# Patient Record
Sex: MALE | Race: WHITE | NOT HISPANIC OR LATINO | URBAN - METROPOLITAN AREA
[De-identification: names, ages, dates, MRNs, and addresses within clinical notes are randomized per-mention and may not be internally consistent; named-entity substitution may affect disease eponyms.]

---

## 2017-02-21 ENCOUNTER — OUTPATIENT (OUTPATIENT)
Dept: OUTPATIENT SERVICES | Facility: HOSPITAL | Age: 77
LOS: 1 days | Discharge: HOME | End: 2017-02-21

## 2017-03-31 ENCOUNTER — RECORD ABSTRACTING (OUTPATIENT)
Age: 77
End: 2017-03-31

## 2017-03-31 DIAGNOSIS — Z86.718 PERSONAL HISTORY OF OTHER VENOUS THROMBOSIS AND EMBOLISM: ICD-10-CM

## 2017-04-06 ENCOUNTER — APPOINTMENT (OUTPATIENT)
Dept: VASCULAR SURGERY | Facility: CLINIC | Age: 77
End: 2017-04-06

## 2017-04-17 ENCOUNTER — APPOINTMENT (OUTPATIENT)
Dept: VASCULAR SURGERY | Facility: CLINIC | Age: 77
End: 2017-04-17

## 2017-04-17 VITALS
WEIGHT: 292 LBS | HEIGHT: 75 IN | BODY MASS INDEX: 36.31 KG/M2 | DIASTOLIC BLOOD PRESSURE: 60 MMHG | SYSTOLIC BLOOD PRESSURE: 116 MMHG

## 2017-04-18 ENCOUNTER — APPOINTMENT (OUTPATIENT)
Dept: SURGERY | Facility: CLINIC | Age: 77
End: 2017-04-18

## 2017-04-24 ENCOUNTER — APPOINTMENT (OUTPATIENT)
Dept: VASCULAR SURGERY | Facility: CLINIC | Age: 77
End: 2017-04-24

## 2017-04-28 ENCOUNTER — APPOINTMENT (OUTPATIENT)
Dept: VASCULAR SURGERY | Facility: CLINIC | Age: 77
End: 2017-04-28

## 2017-05-03 ENCOUNTER — APPOINTMENT (OUTPATIENT)
Dept: VASCULAR SURGERY | Facility: CLINIC | Age: 77
End: 2017-05-03

## 2017-05-03 ENCOUNTER — APPOINTMENT (OUTPATIENT)
Dept: CARDIOLOGY | Facility: CLINIC | Age: 77
End: 2017-05-03

## 2017-05-03 VITALS
BODY MASS INDEX: 35.56 KG/M2 | HEIGHT: 75 IN | SYSTOLIC BLOOD PRESSURE: 130 MMHG | DIASTOLIC BLOOD PRESSURE: 60 MMHG | HEART RATE: 59 BPM | WEIGHT: 286 LBS

## 2017-05-09 ENCOUNTER — APPOINTMENT (OUTPATIENT)
Dept: VASCULAR SURGERY | Facility: CLINIC | Age: 77
End: 2017-05-09

## 2017-05-09 DIAGNOSIS — I83.023 VARICOSE VEINS OF LEFT LOWER EXTREMITY WITH ULCER OF ANKLE: ICD-10-CM

## 2017-05-12 ENCOUNTER — RECORD ABSTRACTING (OUTPATIENT)
Age: 77
End: 2017-05-12

## 2017-05-12 DIAGNOSIS — Z87.39 PERSONAL HISTORY OF OTHER DISEASES OF THE MUSCULOSKELETAL SYSTEM AND CONNECTIVE TISSUE: ICD-10-CM

## 2017-05-12 DIAGNOSIS — Z82.49 FAMILY HISTORY OF ISCHEMIC HEART DISEASE AND OTHER DISEASES OF THE CIRCULATORY SYSTEM: ICD-10-CM

## 2017-05-12 DIAGNOSIS — H54.7 UNSPECIFIED VISUAL LOSS: ICD-10-CM

## 2017-05-18 ENCOUNTER — APPOINTMENT (OUTPATIENT)
Dept: SURGERY | Facility: CLINIC | Age: 77
End: 2017-05-18

## 2017-05-23 ENCOUNTER — APPOINTMENT (OUTPATIENT)
Dept: VASCULAR SURGERY | Facility: CLINIC | Age: 77
End: 2017-05-23

## 2017-05-23 ENCOUNTER — APPOINTMENT (OUTPATIENT)
Dept: SURGERY | Facility: CLINIC | Age: 77
End: 2017-05-23

## 2017-05-23 VITALS
WEIGHT: 287 LBS | HEIGHT: 75 IN | BODY MASS INDEX: 35.68 KG/M2 | DIASTOLIC BLOOD PRESSURE: 60 MMHG | SYSTOLIC BLOOD PRESSURE: 140 MMHG

## 2017-06-13 ENCOUNTER — OUTPATIENT (OUTPATIENT)
Dept: OUTPATIENT SERVICES | Facility: HOSPITAL | Age: 77
LOS: 1 days | Discharge: HOME | End: 2017-06-13

## 2017-06-13 DIAGNOSIS — I50.9 HEART FAILURE, UNSPECIFIED: ICD-10-CM

## 2017-06-13 DIAGNOSIS — I48.2 CHRONIC ATRIAL FIBRILLATION: ICD-10-CM

## 2017-06-13 DIAGNOSIS — R07.9 CHEST PAIN, UNSPECIFIED: ICD-10-CM

## 2017-06-13 DIAGNOSIS — M17.10 UNILATERAL PRIMARY OSTEOARTHRITIS, UNSPECIFIED KNEE: ICD-10-CM

## 2017-06-13 DIAGNOSIS — J18.9 PNEUMONIA, UNSPECIFIED ORGANISM: ICD-10-CM

## 2017-06-14 ENCOUNTER — MEDICATION RENEWAL (OUTPATIENT)
Age: 77
End: 2017-06-14

## 2017-06-22 ENCOUNTER — MEDICATION RENEWAL (OUTPATIENT)
Age: 77
End: 2017-06-22

## 2017-06-22 ENCOUNTER — INPATIENT (INPATIENT)
Facility: HOSPITAL | Age: 77
LOS: 5 days | Discharge: HOME | End: 2017-06-28
Attending: INTERNAL MEDICINE | Admitting: INTERNAL MEDICINE

## 2017-06-22 DIAGNOSIS — I48.2 CHRONIC ATRIAL FIBRILLATION: ICD-10-CM

## 2017-06-22 DIAGNOSIS — M17.10 UNILATERAL PRIMARY OSTEOARTHRITIS, UNSPECIFIED KNEE: ICD-10-CM

## 2017-06-22 DIAGNOSIS — J18.9 PNEUMONIA, UNSPECIFIED ORGANISM: ICD-10-CM

## 2017-06-22 DIAGNOSIS — R07.9 CHEST PAIN, UNSPECIFIED: ICD-10-CM

## 2017-06-22 DIAGNOSIS — I50.9 HEART FAILURE, UNSPECIFIED: ICD-10-CM

## 2017-06-28 DIAGNOSIS — Z79.01 LONG TERM (CURRENT) USE OF ANTICOAGULANTS: ICD-10-CM

## 2017-06-28 DIAGNOSIS — I82.91 CHRONIC EMBOLISM AND THROMBOSIS OF UNSPECIFIED VEIN: ICD-10-CM

## 2017-07-06 ENCOUNTER — OUTPATIENT (OUTPATIENT)
Dept: OUTPATIENT SERVICES | Facility: HOSPITAL | Age: 77
LOS: 1 days | Discharge: HOME | End: 2017-07-06

## 2017-07-06 ENCOUNTER — APPOINTMENT (OUTPATIENT)
Dept: CARDIOLOGY | Facility: CLINIC | Age: 77
End: 2017-07-06

## 2017-07-06 VITALS
DIASTOLIC BLOOD PRESSURE: 50 MMHG | HEART RATE: 49 BPM | HEIGHT: 75 IN | BODY MASS INDEX: 33.69 KG/M2 | SYSTOLIC BLOOD PRESSURE: 102 MMHG | WEIGHT: 271 LBS

## 2017-07-06 DIAGNOSIS — I50.9 HEART FAILURE, UNSPECIFIED: ICD-10-CM

## 2017-07-06 DIAGNOSIS — I82.91 CHRONIC EMBOLISM AND THROMBOSIS OF UNSPECIFIED VEIN: ICD-10-CM

## 2017-07-06 DIAGNOSIS — M17.10 UNILATERAL PRIMARY OSTEOARTHRITIS, UNSPECIFIED KNEE: ICD-10-CM

## 2017-07-06 DIAGNOSIS — J18.9 PNEUMONIA, UNSPECIFIED ORGANISM: ICD-10-CM

## 2017-07-06 DIAGNOSIS — Z79.01 LONG TERM (CURRENT) USE OF ANTICOAGULANTS: ICD-10-CM

## 2017-07-06 DIAGNOSIS — I24.8 OTHER FORMS OF ACUTE ISCHEMIC HEART DISEASE: ICD-10-CM

## 2017-07-06 DIAGNOSIS — R07.9 CHEST PAIN, UNSPECIFIED: ICD-10-CM

## 2017-07-06 DIAGNOSIS — I48.2 CHRONIC ATRIAL FIBRILLATION: ICD-10-CM

## 2017-07-06 RX ORDER — ALBUTEROL SULFATE 90 UG/1
108 (90 BASE) AEROSOL, METERED RESPIRATORY (INHALATION)
Qty: 9 | Refills: 0 | Status: ACTIVE | COMMUNITY
Start: 2017-06-28

## 2017-07-12 DIAGNOSIS — E66.9 OBESITY, UNSPECIFIED: ICD-10-CM

## 2017-07-12 DIAGNOSIS — I48.2 CHRONIC ATRIAL FIBRILLATION: ICD-10-CM

## 2017-07-12 DIAGNOSIS — I50.23 ACUTE ON CHRONIC SYSTOLIC (CONGESTIVE) HEART FAILURE: ICD-10-CM

## 2017-07-12 DIAGNOSIS — J44.1 CHRONIC OBSTRUCTIVE PULMONARY DISEASE WITH (ACUTE) EXACERBATION: ICD-10-CM

## 2017-07-12 DIAGNOSIS — I49.3 VENTRICULAR PREMATURE DEPOLARIZATION: ICD-10-CM

## 2017-07-12 DIAGNOSIS — Z72.0 TOBACCO USE: ICD-10-CM

## 2017-07-12 DIAGNOSIS — Z79.01 LONG TERM (CURRENT) USE OF ANTICOAGULANTS: ICD-10-CM

## 2017-07-12 DIAGNOSIS — E78.5 HYPERLIPIDEMIA, UNSPECIFIED: ICD-10-CM

## 2017-07-12 DIAGNOSIS — I11.0 HYPERTENSIVE HEART DISEASE WITH HEART FAILURE: ICD-10-CM

## 2017-07-12 DIAGNOSIS — J96.01 ACUTE RESPIRATORY FAILURE WITH HYPOXIA: ICD-10-CM

## 2017-07-12 DIAGNOSIS — I25.10 ATHEROSCLEROTIC HEART DISEASE OF NATIVE CORONARY ARTERY WITHOUT ANGINA PECTORIS: ICD-10-CM

## 2017-07-12 DIAGNOSIS — Z96.651 PRESENCE OF RIGHT ARTIFICIAL KNEE JOINT: ICD-10-CM

## 2017-07-14 ENCOUNTER — OUTPATIENT (OUTPATIENT)
Dept: OUTPATIENT SERVICES | Facility: HOSPITAL | Age: 77
LOS: 1 days | Discharge: HOME | End: 2017-07-14

## 2017-07-14 DIAGNOSIS — R07.9 CHEST PAIN, UNSPECIFIED: ICD-10-CM

## 2017-07-14 DIAGNOSIS — I50.9 HEART FAILURE, UNSPECIFIED: ICD-10-CM

## 2017-07-14 DIAGNOSIS — M17.10 UNILATERAL PRIMARY OSTEOARTHRITIS, UNSPECIFIED KNEE: ICD-10-CM

## 2017-07-14 DIAGNOSIS — J18.9 PNEUMONIA, UNSPECIFIED ORGANISM: ICD-10-CM

## 2017-07-14 DIAGNOSIS — I82.91 CHRONIC EMBOLISM AND THROMBOSIS OF UNSPECIFIED VEIN: ICD-10-CM

## 2017-07-14 DIAGNOSIS — Z79.01 LONG TERM (CURRENT) USE OF ANTICOAGULANTS: ICD-10-CM

## 2017-07-14 DIAGNOSIS — I48.2 CHRONIC ATRIAL FIBRILLATION: ICD-10-CM

## 2017-08-01 ENCOUNTER — APPOINTMENT (OUTPATIENT)
Dept: SURGERY | Facility: CLINIC | Age: 77
End: 2017-08-01
Payer: MEDICARE

## 2017-08-01 ENCOUNTER — MEDICATION RENEWAL (OUTPATIENT)
Age: 77
End: 2017-08-01

## 2017-08-01 VITALS
WEIGHT: 271 LBS | HEIGHT: 75 IN | BODY MASS INDEX: 33.69 KG/M2 | DIASTOLIC BLOOD PRESSURE: 60 MMHG | HEART RATE: 91 BPM | SYSTOLIC BLOOD PRESSURE: 110 MMHG | OXYGEN SATURATION: 71 %

## 2017-08-01 DIAGNOSIS — E78.2 MIXED HYPERLIPIDEMIA: ICD-10-CM

## 2017-08-01 PROCEDURE — 99213 OFFICE O/P EST LOW 20 MIN: CPT

## 2017-08-10 ENCOUNTER — APPOINTMENT (OUTPATIENT)
Dept: CARDIOLOGY | Facility: CLINIC | Age: 77
End: 2017-08-10

## 2017-08-10 VITALS
HEIGHT: 75 IN | BODY MASS INDEX: 33.07 KG/M2 | SYSTOLIC BLOOD PRESSURE: 86 MMHG | WEIGHT: 266 LBS | DIASTOLIC BLOOD PRESSURE: 62 MMHG

## 2017-08-14 ENCOUNTER — APPOINTMENT (OUTPATIENT)
Dept: VASCULAR SURGERY | Facility: CLINIC | Age: 77
End: 2017-08-14

## 2017-08-16 ENCOUNTER — MEDICATION RENEWAL (OUTPATIENT)
Age: 77
End: 2017-08-16

## 2017-09-06 ENCOUNTER — APPOINTMENT (OUTPATIENT)
Dept: CARDIOLOGY | Facility: CLINIC | Age: 77
End: 2017-09-06

## 2017-09-08 ENCOUNTER — OUTPATIENT (OUTPATIENT)
Dept: OUTPATIENT SERVICES | Facility: HOSPITAL | Age: 77
LOS: 1 days | Discharge: HOME | End: 2017-09-08

## 2017-09-08 DIAGNOSIS — I48.2 CHRONIC ATRIAL FIBRILLATION: ICD-10-CM

## 2017-09-08 DIAGNOSIS — J18.9 PNEUMONIA, UNSPECIFIED ORGANISM: ICD-10-CM

## 2017-09-08 DIAGNOSIS — Z79.01 LONG TERM (CURRENT) USE OF ANTICOAGULANTS: ICD-10-CM

## 2017-09-08 DIAGNOSIS — R07.9 CHEST PAIN, UNSPECIFIED: ICD-10-CM

## 2017-09-08 DIAGNOSIS — I82.91 CHRONIC EMBOLISM AND THROMBOSIS OF UNSPECIFIED VEIN: ICD-10-CM

## 2017-09-08 DIAGNOSIS — I50.9 HEART FAILURE, UNSPECIFIED: ICD-10-CM

## 2017-09-08 DIAGNOSIS — M17.10 UNILATERAL PRIMARY OSTEOARTHRITIS, UNSPECIFIED KNEE: ICD-10-CM

## 2017-10-18 DIAGNOSIS — I82.91 CHRONIC EMBOLISM AND THROMBOSIS OF UNSPECIFIED VEIN: ICD-10-CM

## 2017-10-18 DIAGNOSIS — Z79.01 LONG TERM (CURRENT) USE OF ANTICOAGULANTS: ICD-10-CM

## 2017-11-03 ENCOUNTER — OUTPATIENT (OUTPATIENT)
Dept: OUTPATIENT SERVICES | Facility: HOSPITAL | Age: 77
LOS: 1 days | Discharge: HOME | End: 2017-11-03

## 2017-11-03 DIAGNOSIS — I82.91 CHRONIC EMBOLISM AND THROMBOSIS OF UNSPECIFIED VEIN: ICD-10-CM

## 2017-11-03 DIAGNOSIS — R07.9 CHEST PAIN, UNSPECIFIED: ICD-10-CM

## 2017-11-03 DIAGNOSIS — M17.10 UNILATERAL PRIMARY OSTEOARTHRITIS, UNSPECIFIED KNEE: ICD-10-CM

## 2017-11-03 DIAGNOSIS — J18.9 PNEUMONIA, UNSPECIFIED ORGANISM: ICD-10-CM

## 2017-11-03 DIAGNOSIS — I48.2 CHRONIC ATRIAL FIBRILLATION: ICD-10-CM

## 2017-11-03 DIAGNOSIS — I50.9 HEART FAILURE, UNSPECIFIED: ICD-10-CM

## 2017-11-03 DIAGNOSIS — Z79.01 LONG TERM (CURRENT) USE OF ANTICOAGULANTS: ICD-10-CM

## 2017-11-03 DIAGNOSIS — J44.1 CHRONIC OBSTRUCTIVE PULMONARY DISEASE WITH (ACUTE) EXACERBATION: ICD-10-CM

## 2017-11-16 ENCOUNTER — APPOINTMENT (OUTPATIENT)
Dept: CARDIOLOGY | Facility: CLINIC | Age: 77
End: 2017-11-16

## 2017-11-16 VITALS
SYSTOLIC BLOOD PRESSURE: 100 MMHG | BODY MASS INDEX: 34.57 KG/M2 | HEART RATE: 91 BPM | DIASTOLIC BLOOD PRESSURE: 64 MMHG | WEIGHT: 278 LBS | HEIGHT: 75 IN

## 2017-12-29 ENCOUNTER — OUTPATIENT (OUTPATIENT)
Dept: OUTPATIENT SERVICES | Facility: HOSPITAL | Age: 77
LOS: 1 days | Discharge: HOME | End: 2017-12-29

## 2017-12-29 DIAGNOSIS — I50.9 HEART FAILURE, UNSPECIFIED: ICD-10-CM

## 2017-12-29 DIAGNOSIS — J18.9 PNEUMONIA, UNSPECIFIED ORGANISM: ICD-10-CM

## 2017-12-29 DIAGNOSIS — I48.2 CHRONIC ATRIAL FIBRILLATION: ICD-10-CM

## 2017-12-29 DIAGNOSIS — R07.9 CHEST PAIN, UNSPECIFIED: ICD-10-CM

## 2017-12-29 DIAGNOSIS — M17.10 UNILATERAL PRIMARY OSTEOARTHRITIS, UNSPECIFIED KNEE: ICD-10-CM

## 2018-02-23 ENCOUNTER — OUTPATIENT (OUTPATIENT)
Dept: OUTPATIENT SERVICES | Facility: HOSPITAL | Age: 78
LOS: 1 days | Discharge: HOME | End: 2018-02-23

## 2018-02-23 DIAGNOSIS — Z79.01 LONG TERM (CURRENT) USE OF ANTICOAGULANTS: ICD-10-CM

## 2018-02-23 DIAGNOSIS — I82.91 CHRONIC EMBOLISM AND THROMBOSIS OF UNSPECIFIED VEIN: ICD-10-CM

## 2018-03-20 ENCOUNTER — MEDICATION RENEWAL (OUTPATIENT)
Age: 78
End: 2018-03-20

## 2018-03-29 ENCOUNTER — OUTPATIENT (OUTPATIENT)
Dept: OUTPATIENT SERVICES | Facility: HOSPITAL | Age: 78
LOS: 1 days | Discharge: HOME | End: 2018-03-29

## 2018-03-29 DIAGNOSIS — I50.9 HEART FAILURE, UNSPECIFIED: ICD-10-CM

## 2018-04-04 ENCOUNTER — APPOINTMENT (OUTPATIENT)
Dept: CARDIOLOGY | Facility: CLINIC | Age: 78
End: 2018-04-04

## 2018-04-04 VITALS
BODY MASS INDEX: 37.37 KG/M2 | HEART RATE: 51 BPM | DIASTOLIC BLOOD PRESSURE: 59 MMHG | WEIGHT: 299 LBS | SYSTOLIC BLOOD PRESSURE: 130 MMHG

## 2018-04-04 RX ORDER — CLOPIDOGREL BISULFATE 75 MG/1
75 TABLET, FILM COATED ORAL DAILY
Qty: 90 | Refills: 3 | Status: COMPLETED | COMMUNITY
End: 2018-04-04

## 2018-04-06 ENCOUNTER — APPOINTMENT (OUTPATIENT)
Dept: VASCULAR SURGERY | Facility: CLINIC | Age: 78
End: 2018-04-06
Payer: MEDICARE

## 2018-04-06 PROCEDURE — 29580 STRAPPING UNNA BOOT: CPT | Mod: RT

## 2018-04-10 RX ORDER — LISINOPRIL 10 MG/1
10 TABLET ORAL
Qty: 90 | Refills: 0 | Status: DISCONTINUED | COMMUNITY
Start: 2017-07-06 | End: 2018-04-10

## 2018-04-13 ENCOUNTER — APPOINTMENT (OUTPATIENT)
Dept: VASCULAR SURGERY | Facility: CLINIC | Age: 78
End: 2018-04-13
Payer: MEDICARE

## 2018-04-13 PROCEDURE — 29580 STRAPPING UNNA BOOT: CPT | Mod: RT

## 2018-04-16 ENCOUNTER — APPOINTMENT (OUTPATIENT)
Dept: CARDIOLOGY | Facility: CLINIC | Age: 78
End: 2018-04-16

## 2018-04-16 ENCOUNTER — APPOINTMENT (OUTPATIENT)
Dept: VASCULAR SURGERY | Facility: CLINIC | Age: 78
End: 2018-04-16
Payer: MEDICARE

## 2018-04-16 ENCOUNTER — INPATIENT (INPATIENT)
Facility: HOSPITAL | Age: 78
LOS: 3 days | Discharge: HOME HEALTH | End: 2018-04-20
Attending: INTERNAL MEDICINE | Admitting: INTERNAL MEDICINE

## 2018-04-16 VITALS
TEMPERATURE: 98 F | RESPIRATION RATE: 20 BRPM | DIASTOLIC BLOOD PRESSURE: 56 MMHG | HEART RATE: 56 BPM | OXYGEN SATURATION: 86 % | SYSTOLIC BLOOD PRESSURE: 124 MMHG

## 2018-04-16 VITALS
BODY MASS INDEX: 37.18 KG/M2 | SYSTOLIC BLOOD PRESSURE: 99 MMHG | HEART RATE: 53 BPM | DIASTOLIC BLOOD PRESSURE: 62 MMHG | HEIGHT: 75 IN | WEIGHT: 299 LBS

## 2018-04-16 DIAGNOSIS — T36.95XA ADVERSE EFFECT OF UNSPECIFIED SYSTEMIC ANTIBIOTIC, INITIAL ENCOUNTER: ICD-10-CM

## 2018-04-16 DIAGNOSIS — F17.210 NICOTINE DEPENDENCE, CIGARETTES, UNCOMPLICATED: ICD-10-CM

## 2018-04-16 DIAGNOSIS — E66.01 MORBID (SEVERE) OBESITY DUE TO EXCESS CALORIES: ICD-10-CM

## 2018-04-16 DIAGNOSIS — I27.29 OTHER SECONDARY PULMONARY HYPERTENSION: ICD-10-CM

## 2018-04-16 DIAGNOSIS — R79.1 ABNORMAL COAGULATION PROFILE: ICD-10-CM

## 2018-04-16 DIAGNOSIS — L03.115 CELLULITIS OF RIGHT LOWER LIMB: ICD-10-CM

## 2018-04-16 DIAGNOSIS — I48.91 UNSPECIFIED ATRIAL FIBRILLATION: ICD-10-CM

## 2018-04-16 DIAGNOSIS — E78.5 HYPERLIPIDEMIA, UNSPECIFIED: ICD-10-CM

## 2018-04-16 DIAGNOSIS — E11.622 TYPE 2 DIABETES MELLITUS WITH OTHER SKIN ULCER: ICD-10-CM

## 2018-04-16 DIAGNOSIS — I87.2 VENOUS INSUFFICIENCY (CHRONIC) (PERIPHERAL): ICD-10-CM

## 2018-04-16 DIAGNOSIS — Z91.11 PATIENT'S NONCOMPLIANCE WITH DIETARY REGIMEN: ICD-10-CM

## 2018-04-16 DIAGNOSIS — Z98.84 BARIATRIC SURGERY STATUS: ICD-10-CM

## 2018-04-16 DIAGNOSIS — J96.10 CHRONIC RESPIRATORY FAILURE, UNSPECIFIED WHETHER WITH HYPOXIA OR HYPERCAPNIA: ICD-10-CM

## 2018-04-16 DIAGNOSIS — I25.10 ATHEROSCLEROTIC HEART DISEASE OF NATIVE CORONARY ARTERY WITHOUT ANGINA PECTORIS: ICD-10-CM

## 2018-04-16 DIAGNOSIS — L97.919 NON-PRESSURE CHRONIC ULCER OF UNSPECIFIED PART OF RIGHT LOWER LEG WITH UNSPECIFIED SEVERITY: ICD-10-CM

## 2018-04-16 DIAGNOSIS — J44.9 CHRONIC OBSTRUCTIVE PULMONARY DISEASE, UNSPECIFIED: ICD-10-CM

## 2018-04-16 DIAGNOSIS — I50.9 HEART FAILURE, UNSPECIFIED: ICD-10-CM

## 2018-04-16 DIAGNOSIS — Z99.81 DEPENDENCE ON SUPPLEMENTAL OXYGEN: ICD-10-CM

## 2018-04-16 DIAGNOSIS — Z95.5 PRESENCE OF CORONARY ANGIOPLASTY IMPLANT AND GRAFT: Chronic | ICD-10-CM

## 2018-04-16 DIAGNOSIS — G47.33 OBSTRUCTIVE SLEEP APNEA (ADULT) (PEDIATRIC): ICD-10-CM

## 2018-04-16 DIAGNOSIS — Z95.5 PRESENCE OF CORONARY ANGIOPLASTY IMPLANT AND GRAFT: ICD-10-CM

## 2018-04-16 DIAGNOSIS — Z86.718 PERSONAL HISTORY OF OTHER VENOUS THROMBOSIS AND EMBOLISM: ICD-10-CM

## 2018-04-16 DIAGNOSIS — Z79.01 LONG TERM (CURRENT) USE OF ANTICOAGULANTS: ICD-10-CM

## 2018-04-16 DIAGNOSIS — I11.0 HYPERTENSIVE HEART DISEASE WITH HEART FAILURE: ICD-10-CM

## 2018-04-16 DIAGNOSIS — I50.22 CHRONIC SYSTOLIC (CONGESTIVE) HEART FAILURE: ICD-10-CM

## 2018-04-16 LAB
ALBUMIN SERPL ELPH-MCNC: 3.3 G/DL — LOW (ref 3.5–5.2)
ALP SERPL-CCNC: 90 U/L — SIGNIFICANT CHANGE UP (ref 30–115)
ALT FLD-CCNC: 14 U/L — SIGNIFICANT CHANGE UP (ref 0–41)
APTT BLD: 51.2 SEC — HIGH (ref 27–39.2)
AST SERPL-CCNC: 28 U/L — SIGNIFICANT CHANGE UP (ref 0–41)
BASE EXCESS BLDV CALC-SCNC: 9.7 MMOL/L — HIGH (ref -2–2)
BASOPHILS # BLD AUTO: 0.02 K/UL — SIGNIFICANT CHANGE UP (ref 0–0.2)
BASOPHILS NFR BLD AUTO: 0.3 % — SIGNIFICANT CHANGE UP (ref 0–1)
BILIRUB SERPL-MCNC: 1.6 MG/DL — HIGH (ref 0.2–1.2)
BUN SERPL-MCNC: 17 MG/DL — SIGNIFICANT CHANGE UP (ref 10–20)
CA-I SERPL-SCNC: 1.09 MMOL/L — LOW (ref 1.12–1.3)
CALCIUM SERPL-MCNC: 8.1 MG/DL — LOW (ref 8.5–10.1)
CHLORIDE SERPL-SCNC: 98 MMOL/L — SIGNIFICANT CHANGE UP (ref 98–110)
CO2 SERPL-SCNC: 31 MMOL/L — SIGNIFICANT CHANGE UP (ref 17–32)
CREAT SERPL-MCNC: 1.2 MG/DL — SIGNIFICANT CHANGE UP (ref 0.7–1.5)
EOSINOPHIL # BLD AUTO: 0.21 K/UL — SIGNIFICANT CHANGE UP (ref 0–0.7)
EOSINOPHIL NFR BLD AUTO: 3.6 % — SIGNIFICANT CHANGE UP (ref 0–8)
GAS PNL BLDV: 140 MMOL/L — SIGNIFICANT CHANGE UP (ref 136–145)
GAS PNL BLDV: SIGNIFICANT CHANGE UP
GLUCOSE SERPL-MCNC: 101 MG/DL — HIGH (ref 70–99)
HCO3 BLDV-SCNC: 36 MMOL/L — HIGH (ref 22–29)
HCT VFR BLD CALC: 31.6 % — LOW (ref 42–52)
HCT VFR BLDA CALC: 32.3 % — LOW (ref 34–44)
HGB BLD CALC-MCNC: 10.5 G/DL — LOW (ref 14–18)
HGB BLD-MCNC: 9.7 G/DL — LOW (ref 14–18)
IMM GRANULOCYTES NFR BLD AUTO: 0.2 % — SIGNIFICANT CHANGE UP (ref 0.1–0.3)
INR BLD: 1.05 RATIO — SIGNIFICANT CHANGE UP (ref 0.65–1.3)
LACTATE BLDV-MCNC: 1.5 MMOL/L — SIGNIFICANT CHANGE UP (ref 0.5–1.6)
LACTATE SERPL-SCNC: 2.8 MMOL/L — HIGH (ref 0.5–2.2)
LYMPHOCYTES # BLD AUTO: 0.72 K/UL — LOW (ref 1.2–3.4)
LYMPHOCYTES # BLD AUTO: 12.2 % — LOW (ref 20.5–51.1)
MCHC RBC-ENTMCNC: 26.6 PG — LOW (ref 27–31)
MCHC RBC-ENTMCNC: 30.7 G/DL — LOW (ref 32–37)
MCV RBC AUTO: 86.8 FL — SIGNIFICANT CHANGE UP (ref 80–94)
MONOCYTES # BLD AUTO: 1.13 K/UL — HIGH (ref 0.1–0.6)
MONOCYTES NFR BLD AUTO: 19.2 % — HIGH (ref 1.7–9.3)
NEUTROPHILS # BLD AUTO: 3.81 K/UL — SIGNIFICANT CHANGE UP (ref 1.4–6.5)
NEUTROPHILS NFR BLD AUTO: 64.5 % — SIGNIFICANT CHANGE UP (ref 42.2–75.2)
NRBC # BLD: 0 /100 WBCS — SIGNIFICANT CHANGE UP (ref 0–0)
PCO2 BLDV: 61 MMHG — HIGH (ref 41–51)
PH BLDV: 7.39 — SIGNIFICANT CHANGE UP (ref 7.26–7.43)
PLATELET # BLD AUTO: 214 K/UL — SIGNIFICANT CHANGE UP (ref 130–400)
PO2 BLDV: 16 MMHG — LOW (ref 20–40)
POTASSIUM BLDV-SCNC: 3.8 MMOL/L — SIGNIFICANT CHANGE UP (ref 3.3–5.6)
POTASSIUM SERPL-MCNC: 4.7 MMOL/L — SIGNIFICANT CHANGE UP (ref 3.5–5)
POTASSIUM SERPL-SCNC: 4.7 MMOL/L — SIGNIFICANT CHANGE UP (ref 3.5–5)
PROT SERPL-MCNC: 6.3 G/DL — SIGNIFICANT CHANGE UP (ref 6–8)
PROTHROM AB SERPL-ACNC: 11.4 SEC — SIGNIFICANT CHANGE UP (ref 9.95–12.87)
RBC # BLD: 3.64 M/UL — LOW (ref 4.7–6.1)
RBC # FLD: 18.6 % — HIGH (ref 11.5–14.5)
SAO2 % BLDV: 16 % — SIGNIFICANT CHANGE UP
SODIUM SERPL-SCNC: 141 MMOL/L — SIGNIFICANT CHANGE UP (ref 135–146)
WBC # BLD: 5.9 K/UL — SIGNIFICANT CHANGE UP (ref 4.8–10.8)
WBC # FLD AUTO: 5.9 K/UL — SIGNIFICANT CHANGE UP (ref 4.8–10.8)

## 2018-04-16 PROCEDURE — 99212 OFFICE O/P EST SF 10 MIN: CPT

## 2018-04-16 RX ORDER — WARFARIN SODIUM 2.5 MG/1
5 TABLET ORAL ONCE
Qty: 0 | Refills: 0 | Status: COMPLETED | OUTPATIENT
Start: 2018-04-16 | End: 2018-04-16

## 2018-04-16 RX ORDER — LISINOPRIL 2.5 MG/1
10 TABLET ORAL DAILY
Qty: 0 | Refills: 0 | Status: DISCONTINUED | OUTPATIENT
Start: 2018-04-16 | End: 2018-04-17

## 2018-04-16 RX ORDER — ATORVASTATIN CALCIUM 80 MG/1
40 TABLET, FILM COATED ORAL DAILY
Qty: 0 | Refills: 0 | Status: DISCONTINUED | OUTPATIENT
Start: 2018-04-16 | End: 2018-04-17

## 2018-04-16 RX ORDER — AMPICILLIN SODIUM AND SULBACTAM SODIUM 250; 125 MG/ML; MG/ML
INJECTION, POWDER, FOR SUSPENSION INTRAMUSCULAR; INTRAVENOUS
Qty: 0 | Refills: 0 | Status: DISCONTINUED | OUTPATIENT
Start: 2018-04-16 | End: 2018-04-18

## 2018-04-16 RX ORDER — METOPROLOL TARTRATE 50 MG
50 TABLET ORAL DAILY
Qty: 0 | Refills: 0 | Status: DISCONTINUED | OUTPATIENT
Start: 2018-04-16 | End: 2018-04-17

## 2018-04-16 RX ORDER — AMPICILLIN SODIUM AND SULBACTAM SODIUM 250; 125 MG/ML; MG/ML
3 INJECTION, POWDER, FOR SUSPENSION INTRAMUSCULAR; INTRAVENOUS EVERY 6 HOURS
Qty: 0 | Refills: 0 | Status: DISCONTINUED | OUTPATIENT
Start: 2018-04-16 | End: 2018-04-18

## 2018-04-16 RX ORDER — SODIUM CHLORIDE 9 MG/ML
4100 INJECTION, SOLUTION INTRAVENOUS ONCE
Qty: 0 | Refills: 0 | Status: COMPLETED | OUTPATIENT
Start: 2018-04-16 | End: 2018-04-16

## 2018-04-16 RX ORDER — AMPICILLIN SODIUM AND SULBACTAM SODIUM 250; 125 MG/ML; MG/ML
3 INJECTION, POWDER, FOR SUSPENSION INTRAMUSCULAR; INTRAVENOUS ONCE
Qty: 0 | Refills: 0 | Status: COMPLETED | OUTPATIENT
Start: 2018-04-16 | End: 2018-04-16

## 2018-04-16 RX ADMIN — AMPICILLIN SODIUM AND SULBACTAM SODIUM 200 GRAM(S): 250; 125 INJECTION, POWDER, FOR SUSPENSION INTRAMUSCULAR; INTRAVENOUS at 16:03

## 2018-04-16 RX ADMIN — SODIUM CHLORIDE 1366.67 MILLILITER(S): 9 INJECTION, SOLUTION INTRAVENOUS at 12:46

## 2018-04-16 RX ADMIN — AMPICILLIN SODIUM AND SULBACTAM SODIUM 200 GRAM(S): 250; 125 INJECTION, POWDER, FOR SUSPENSION INTRAMUSCULAR; INTRAVENOUS at 20:17

## 2018-04-16 NOTE — CONSULT NOTE ADULT - SUBJECTIVE AND OBJECTIVE BOX
VASCULAR SURGERY CONSULT NOTE    HPI:  Mr. Browne is a 78 yo M with significant PMHx for HTN, CAD, DM, HL, A-Fib and RLE DVT, on Coumadin, venous stasis and venous stasis ulcerations, who was sent into ED for right lower extremity erythema, suspected cellulitis, after his evaluation in the office for 2 weeks worsening skin breaking, increased greenish color drainage and redness of the skin       PAST MEDICAL & SURGICAL HISTORY:  High cholesterol  Hypertension  Blood clot in vein  COPD (chronic obstructive pulmonary disease)  S/P coronary artery stent placement  TKR    No Known Allergies    Home Medications:  atorvastatin:  (16 Apr 2018 11:22)  lisinopril:  (16 Apr 2018 11:22)  Metoprolol Tartrate:  (16 Apr 2018 11:22)  warfarin:  (16 Apr 2018 11:22)      REVIEW OF SYSTEMS:  GENERAL:                                         negative  SKIN/BREAST:                                  see HPI  OPTHALMOLOGIC:                          negative  ENMT:                                               negative  RESPIRATORY AND THORAX:        negative  CARDIOVASCULAR:                         see HPI  GASTROINTESTINAL:                       negative  MUSCULOSKELETAL:                       negative  NEUROLOGIC:                                   negative  PSYCHIATRIC:                                    negative  HEMATOLOGY/LYMPHATICS:         negative  ENDOCRINE:                                     negative  ALLERGIC/IMMUNOLOGIC:            negative      PHYSICAL EXAM  Vital Signs Last 24 Hrs  T(C): 36.6 (16 Apr 2018 10:14), Max: 36.6 (16 Apr 2018 10:14)  T(F): 97.8 (16 Apr 2018 10:14), Max: 97.8 (16 Apr 2018 10:14)  HR: 56 (16 Apr 2018 10:14) (56 - 56)  BP: 124/56 (16 Apr 2018 10:14) (124/56 - 124/56)  BP(mean): --  RR: 20 (16 Apr 2018 10:14) (20 - 20)  SpO2: 95% (16 Apr 2018 10:19) (86% - 95%)    Appearance: Normal	  HEENT:   Normal oral mucosa, PERRL, EOMI	  Neck: Supple, + JVD/ - JVD; Carotid Bruit   Cardiovascular: Normal S1 S2, No JVD, No murmurs,   Respiratory: Lungs clear to auscultation/Decreased Breath Sounds/No Rales, Rhonchi, Wheezing	  Gastrointestinal:  Soft, Non-tender, + BS	  Skin: right lower extremity: erythema warm from the foot to the knee. Superficial superficial skin ulcers leaking skin and drainage. weeping edema   Extremities: Normal range of motion, No clubbing, cyanosis   Neurologic: Non-focal  Psychiatry: A & O x 3, Mood & affect appropriate        MEDICATIONS:   MEDICATIONS  (STANDING):  ampicillin/sulbactam  IVPB      ampicillin/sulbactam  IVPB 3 Gram(s) IV Intermittent once  ampicillin/sulbactam  IVPB 3 Gram(s) IV Intermittent every 6 hours    MEDICATIONS  (PRN):      LAB/STUDIES:                        9.7    5.90  )-----------( 214      ( 16 Apr 2018 11:42 )             31.6     04-16    141  |  98  |  17  ----------------------------<  101<H>  4.7   |  31  |  1.2    Ca    8.1<L>      16 Apr 2018 11:42    TPro  6.3  /  Alb  3.3<L>  /  TBili  1.6<H>  /  DBili  x   /  AST  28  /  ALT  14  /  AlkPhos  90  04-16    PT/INR - ( 16 Apr 2018 11:47 )   PT: 11.40 sec;   INR: 1.05 ratio         PTT - ( 16 Apr 2018 11:47 )  PTT:51.2 sec  LIVER FUNCTIONS - ( 16 Apr 2018 11:42 )  Alb: 3.3 g/dL / Pro: 6.3 g/dL / ALK PHOS: 90 U/L / ALT: 14 U/L / AST: 28 U/L / GGT: x               ASSESSMENT/PLAN:  78 yo M with significant PMHx for HTN, CAD, DM, HL, A-Fib and RLE DVT, on Coumadin, venous stasis and venous stasis ulcerations, who was sent into ED for right lower extremity erythema, suspected cellulitis due to large are of erythema and greenish color drainage    -  Dressing twice a day changes: with non-adhesive pads, abd pads, kirlex and ace wraps  - keep right leg elevated on 2 pillows to improve swelling  - IV Abx for cellulitis  - Hx DVT - on Coumadin, keep INR 2-3        SPECTRA: [id number]

## 2018-04-16 NOTE — ED ADULT TRIAGE NOTE - CHIEF COMPLAINT QUOTE
Patient sent in by MD Colin for IV abx for RLE cellulitis Patient sent in by vascular doctor for IV abx for RLE cellulitis.

## 2018-04-16 NOTE — ED PROVIDER NOTE - PROGRESS NOTE DETAILS
CASE D/W VASCULAR RESIDENT, WILL EVALUATE PT IN THE ED IVFS HELD DUE TO H/O CHF AND WORSNEING PULMONARY VASCULAR CONGESTION ON CXR. VASCULAR AT PTS BEDSIDE.

## 2018-04-16 NOTE — H&P ADULT - NSHPREVIEWOFSYSTEMS_GEN_ALL_CORE
General:  not confused  Skin: as per HPI  Ophthalmologic: no blurred vision  ENMT: no neck mass	  Respiratory and Thorax: no chest pain, worsening SOB in the mornine when waking up  Cardiovascular: no chest pain, no chest palpitations  Gastrointestinal: no abdominal pain	  Musculoskeletal: no muscle pain  Neurological: no neuropathy, no weakness  Psychiatric: AOX3

## 2018-04-16 NOTE — H&P ADULT - NSHPLABSRESULTS_GEN_ALL_CORE
Medications:    MEDICATIONS  (STANDING):  ampicillin/sulbactam  IVPB      ampicillin/sulbactam  IVPB 3 Gram(s) IV Intermittent once  ampicillin/sulbactam  IVPB 3 Gram(s) IV Intermittent every 6 hours  atorvastatin Oral Tab/Cap - Peds 40 milliGRAM(s) Oral daily  lisinopril 10 milliGRAM(s) Oral daily  metoprolol succinate ER 50 milliGRAM(s) Oral daily  warfarin 5 milliGRAM(s) Oral once    MEDICATIONS  (PRN):      Vitals:    Vital Signs Last 24 Hrs  T(C): 36.6 (16 Apr 2018 10:14), Max: 36.6 (16 Apr 2018 10:14)  T(F): 97.8 (16 Apr 2018 10:14), Max: 97.8 (16 Apr 2018 10:14)  HR: 56 (16 Apr 2018 10:14) (56 - 56)  BP: 124/56 (16 Apr 2018 10:14) (124/56 - 124/56)  BP(mean): --  RR: 20 (16 Apr 2018 10:14) (20 - 20)  SpO2: 95% (16 Apr 2018 10:19) (86% - 95%)    Labs:     04-16    141  |  98  |  17  ----------------------------<  101<H>  4.7   |  31  |  1.2    Ca    8.1<L>      16 Apr 2018 11:42    TPro  6.3  /  Alb  3.3<L>  /  TBili  1.6<H>  /  DBili  x   /  AST  28  /  ALT  14  /  AlkPhos  90  04-16                          9.7    5.90  )-----------( 214      ( 16 Apr 2018 11:42 )             31.6         LIVER FUNCTIONS - ( 16 Apr 2018 11:42 )  Alb: 3.3 g/dL / Pro: 6.3 g/dL / ALK PHOS: 90 U/L / ALT: 14 U/L / AST: 28 U/L / GGT: x           PT/INR - ( 16 Apr 2018 11:47 )   PT: 11.40 sec;   INR: 1.05 ratio         PTT - ( 16 Apr 2018 11:47 )  PTT:51.2 sec      Cultures:

## 2018-04-16 NOTE — ED PROVIDER NOTE - OBJECTIVE STATEMENT
76 Y/O M HTN, DYSLIPIDEMIA, DM, COPD, CAD, CHF, S/P CARDIAC STENTS, AFIB, RLE DVT (ON COUMADIN) SENT TO ED FROM VASCULAR CLINIC WITH RLE CELLULITIS. PT WITH B/L CHRONIC VENOUS STASIS CHANGES AND OVER THE PAST WEEK NOTED INCREASING EDEMA, ERTYHEMA AND WEEPING FROM UNDER COMPRESSION WRAP. NO FEVER, CHILLS. NO N/V, DIZZINESS, WEAKNESS.

## 2018-04-16 NOTE — ED ADULT NURSE NOTE - PMH
Blood clot in vein    COPD (chronic obstructive pulmonary disease)    High cholesterol    Hypertension

## 2018-04-16 NOTE — H&P ADULT - ATTENDING COMMENTS
78 yo M with PMHx HTN, CAD, DM2, HL, A-Fib, COPD, chronic respiratory failure, chronic systolic CHF, KYLIE, obesity and RLE DVT, on Coumadin, venous stasis and venous stasis ulcerations, who was sent into ED for right lower extremity erythema, suspected cellulitis, after his evaluation in the office for 2 weeks worsening skin breaking, increased greenish color drainage and redness of the skin. Pt denied trauma, but states his boot was too tight and that contributed to ulcer formation, denies fever, chills. (+) SOB, leg swelling, denies orthopnea. Pt states he never had similar symptoms before.    FHx: non contributory  Surgical Hx: Gastric bypass, R TKR  Social Hx: former smoker, drinks alcohol on weekends, denies drugs    ROS: ALL ROS negative except as mentioned above    T(C): 35.7  HR: 56  BP: 136/63  RR: 18  SpO2: 95%    Physical exam: NAD, not toxic looking, obese  HEENT: PERRL  NECK: supple, no JVD  RESP: basilar crackles, no rhonchi  CVS: S1S2, irregularly irregular   GI: abdomen soft NT, ND  Extremities: Right foot covered with dressing, pt states it was just changed and he didn't let it unwrap. + edema RLE >LLE  NEURO: AOx3, no focal deficit  H/L: no enlarged LN noted     LABS:                       8.5    5.18  )-----------( 184      ( 17 Apr 2018 06:00 )             27.6   04-17    141  |  99  |  15  ----------------------------<  83  3.0<L>   |  35<H>  |  1.0    Ca    7.6<L>      17 Apr 2018 06:00  Mg     2.1     04-17    TPro  6.3  /  Alb  3.3<L>  /  TBili  1.6<H>  /  DBili  x   /  AST  28  /  ALT  14  /  AlkPhos  90  04-16    Sedimentation Rate, Erythrocyte (04.17.18 @ 12:55)    Sedimentation Rate, Erythrocyte: 35 mm/Hr  INR: 4.15    CXR: cardiomegaly, vascular congestion    EKG: A. Fib with slow ventricular response    A/P: 1. Infected RLE ulcer with cellulitis, in setting of chronic venous stasis.  - continue Unasyn  - Wound Cx  - wound care  - leg elevation  - pt has know recent DVT on RLE, no need for US at this time    2. Acute on chronic systolic CHF exacerbation  - rios end BNP  - will switch to Lasix 40 mg IV BID  - continue BB, Statins, not on ASA and ACE, will clarify reason and start if no contraindications.  - daily weight, monitor I&O, fluid restriction  - last ECHO in 6/17, was not able to review the result due to technical issues    3. COPD, chronic respiratory failure as per pt  - no acute exacerbation, not on inhalers, will clarify the reason    4. DVT of RLE  - on Coumadin  - INR supratherapeutic today, will hold dose today and repeat INR tomorrow    5. KYLIE - not on BiPAP at home  6. Morbid obesity  7. DM2 - Insulin regimen, CBG check    Prophylactic measures  DVT ppx with therapeutic dose of Coumadin  GI ppx not indicated  Heart healthy diet with fluid restriction  Full code    Dispo - pending clinical course

## 2018-04-16 NOTE — H&P ADULT - HISTORY OF PRESENT ILLNESS
78 yo M with significant PMHx for HTN, CAD, DM, HL, A-Fib and RLE DVT, on Coumadin, venous stasis and venous stasis ulcerations, who was sent into ED for right lower extremity erythema, suspected cellulitis due to large are of erythema and greenish color drainage from vascular office. Patient states that he had a DVT 3 weeks ago and was seen by vascular where they placed a compression boot on right leg for thrombophlebity syndrome (post-DVT edema). Pt states the boot was too tight so he went back in 1 week ago after wife had cut the boot off because he could not feel his toes. Another compression boot was placed however he noticed that there was malodourous and serous fluid leaking from the edges. This prompted another visit to the vascular office, whereupon he was directed to the ED after boot was removed for cellulitis. No fever, no chills, no SOB, no chest pain.       pulm: Dr. Murray  cards: Dr. Scherer

## 2018-04-16 NOTE — PROVIDER CONTACT NOTE (MEDICATION) - SITUATION
md Garcia was notified that the pt does not take 5 of coumadin. he also stated he took the coumadin this AM, and refused this evening dose. INR will be reevaluated in AM.

## 2018-04-16 NOTE — H&P ADULT - ASSESSMENT
76 yo M with significant PMHx for HTN, CAD, DM, HL, A-Fib and RLE DVT, on Coumadin, venous stasis and venous stasis ulcerations, who was sent into ED for right lower extremity erythema, suspected cellulitis due to large are of erythema and greenish color drainage from vascular office.      1) Cellulitis  - f/u podiatry --> Dressing twice a day changes: with non-adhesive pads, abd pads, kirlex and ace wraps; keep right leg elevated on 2 pillows to improve swelling  - c/w Unasyn   - f/u ID consult    2) h/o DVT on coumadin  - keep INR 2-3    3) DM type II  - carb consistent diet  - monitor finger sticks  - will start insulin regimen if sugars high    4) h/o Afib on coumadin  - c/w coumadin: INR 2-3    5) HTN/CAD  - c/w home meds    6) COPD: has home O2  - c/w O2  - usese BiPAP at night    7) likely has KYLIE: stop bang questionnaire yields 8/8: high risk; follow up pulmonolgy as outpatient, will need polysomnography  - c/w current management; consider discussing with pulmonologist as outpatient      DVT ppx: coumadin    DISPO: home when medically stable

## 2018-04-16 NOTE — ED PROVIDER NOTE - NS ED ROS FT
Constitutional:  See HPI.  Eyes:  No visual changes, eye pain or discharge.  ENMT:  No hearing changes, pain, discharge or infections. No neck pain or stiffness.  Cardiac:  No chest pain, SOB or edema. No chest pain with exertion.  Respiratory:  No cough or respiratory distress. No hemoptysis. No history of asthma or RAD.  GI:  No nausea, vomiting, diarrhea or abdominal pain.  MS:  No myalgia, muscle weakness, joint pain or back pain.  Neuro:  No headache or weakness.  No LOC.  Except as documented in the HPI,  all other systems are negative.

## 2018-04-16 NOTE — H&P ADULT - NSHPSOCIALHISTORY_GEN_ALL_CORE
significant smoking history of 1-2 packs a day for last 30 years: attempted to quick several times, decreasing smoking dose now  no drug use    lives with wife at home, 2 kids, he has help at home from family member. no health aid, states he does not need one

## 2018-04-16 NOTE — ED PROVIDER NOTE - PHYSICAL EXAMINATION
CONSTITUTIONAL: Well-appearing; well-nourished; in no apparent distress.   HEAD: Normocephalic; atraumatic.   EYES: PERRL; EOM intact. Conjunctiva normal B/L.   ENT: Normal pharynx with no tonsillar hypertrophy. MMM.  NECK: Supple; non-tender; no cervical lymphadenopathy.   CHEST: Normal chest excursion with respiration.   CARDIOVASCULAR: Irregularly irregular rhythm, no murmurs, rubs, or gallops.   RESPIRATORY: B/L rhonchi  GI/: Normal bowel sounds; non-distended; non-tender.  EXT: Normal ROM in all four extremities; RLE with erythema, edema from proximal leg to foot. + crusting of leg and skin cracking. + weeping seroud fluid. calf supple, nontender. B/L chronic venous stasis changes. 2+ pedal pulses B/L. No ulcercations, crepitus.   NEURO: A & O x 4; CN 2-12 intact. Grossly unremarkable.

## 2018-04-17 LAB
ANION GAP SERPL CALC-SCNC: 7 MMOL/L — SIGNIFICANT CHANGE UP (ref 7–14)
APTT BLD: 42.8 SEC — HIGH (ref 27–39.2)
BASOPHILS # BLD AUTO: 0.01 K/UL — SIGNIFICANT CHANGE UP (ref 0–0.2)
BASOPHILS NFR BLD AUTO: 0.2 % — SIGNIFICANT CHANGE UP (ref 0–1)
BUN SERPL-MCNC: 15 MG/DL — SIGNIFICANT CHANGE UP (ref 10–20)
CALCIUM SERPL-MCNC: 7.6 MG/DL — LOW (ref 8.5–10.1)
CHLORIDE SERPL-SCNC: 99 MMOL/L — SIGNIFICANT CHANGE UP (ref 98–110)
CO2 SERPL-SCNC: 35 MMOL/L — HIGH (ref 17–32)
CREAT SERPL-MCNC: 1 MG/DL — SIGNIFICANT CHANGE UP (ref 0.7–1.5)
EOSINOPHIL # BLD AUTO: 0.21 K/UL — SIGNIFICANT CHANGE UP (ref 0–0.7)
EOSINOPHIL NFR BLD AUTO: 4.1 % — SIGNIFICANT CHANGE UP (ref 0–8)
ERYTHROCYTE [SEDIMENTATION RATE] IN BLOOD: 35 MM/HR — HIGH (ref 0–10)
GLUCOSE SERPL-MCNC: 83 MG/DL — SIGNIFICANT CHANGE UP (ref 70–99)
HCT VFR BLD CALC: 27.6 % — LOW (ref 42–52)
HGB BLD-MCNC: 8.5 G/DL — LOW (ref 14–18)
IMM GRANULOCYTES NFR BLD AUTO: 0.4 % — HIGH (ref 0.1–0.3)
INR BLD: 4.08 RATIO — HIGH (ref 0.65–1.3)
INR BLD: 4.15 RATIO — HIGH (ref 0.65–1.3)
LYMPHOCYTES # BLD AUTO: 0.72 K/UL — LOW (ref 1.2–3.4)
LYMPHOCYTES # BLD AUTO: 13.9 % — LOW (ref 20.5–51.1)
MAGNESIUM SERPL-MCNC: 2.1 MG/DL — SIGNIFICANT CHANGE UP (ref 1.8–2.4)
MCHC RBC-ENTMCNC: 26.6 PG — LOW (ref 27–31)
MCHC RBC-ENTMCNC: 30.8 G/DL — LOW (ref 32–37)
MCV RBC AUTO: 86.3 FL — SIGNIFICANT CHANGE UP (ref 80–94)
MONOCYTES # BLD AUTO: 0.92 K/UL — HIGH (ref 0.1–0.6)
MONOCYTES NFR BLD AUTO: 17.8 % — HIGH (ref 1.7–9.3)
NEUTROPHILS # BLD AUTO: 3.3 K/UL — SIGNIFICANT CHANGE UP (ref 1.4–6.5)
NEUTROPHILS NFR BLD AUTO: 63.6 % — SIGNIFICANT CHANGE UP (ref 42.2–75.2)
NRBC # BLD: 0 /100 WBCS — SIGNIFICANT CHANGE UP (ref 0–0)
PLATELET # BLD AUTO: 184 K/UL — SIGNIFICANT CHANGE UP (ref 130–400)
POTASSIUM SERPL-MCNC: 3 MMOL/L — LOW (ref 3.5–5)
POTASSIUM SERPL-SCNC: 3 MMOL/L — LOW (ref 3.5–5)
PROTHROM AB SERPL-ACNC: >40 SEC — HIGH (ref 9.95–12.87)
PROTHROM AB SERPL-ACNC: >40 SEC — HIGH (ref 9.95–12.87)
RBC # BLD: 3.2 M/UL — LOW (ref 4.7–6.1)
RBC # FLD: 18.5 % — HIGH (ref 11.5–14.5)
SODIUM SERPL-SCNC: 141 MMOL/L — SIGNIFICANT CHANGE UP (ref 135–146)
WBC # BLD: 5.18 K/UL — SIGNIFICANT CHANGE UP (ref 4.8–10.8)
WBC # FLD AUTO: 5.18 K/UL — SIGNIFICANT CHANGE UP (ref 4.8–10.8)

## 2018-04-17 RX ORDER — TRAMADOL HYDROCHLORIDE 50 MG/1
50 TABLET ORAL EVERY 8 HOURS
Qty: 0 | Refills: 0 | Status: DISCONTINUED | OUTPATIENT
Start: 2018-04-17 | End: 2018-04-20

## 2018-04-17 RX ORDER — METOPROLOL TARTRATE 50 MG
25 TABLET ORAL DAILY
Qty: 0 | Refills: 0 | Status: DISCONTINUED | OUTPATIENT
Start: 2018-04-17 | End: 2018-04-20

## 2018-04-17 RX ORDER — POTASSIUM CHLORIDE 20 MEQ
40 PACKET (EA) ORAL EVERY 4 HOURS
Qty: 0 | Refills: 0 | Status: COMPLETED | OUTPATIENT
Start: 2018-04-17 | End: 2018-04-17

## 2018-04-17 RX ORDER — FUROSEMIDE 40 MG
40 TABLET ORAL ONCE
Qty: 0 | Refills: 0 | Status: COMPLETED | OUTPATIENT
Start: 2018-04-17 | End: 2018-04-17

## 2018-04-17 RX ORDER — OXYCODONE AND ACETAMINOPHEN 5; 325 MG/1; MG/1
1 TABLET ORAL EVERY 6 HOURS
Qty: 0 | Refills: 0 | Status: DISCONTINUED | OUTPATIENT
Start: 2018-04-17 | End: 2018-04-17

## 2018-04-17 RX ORDER — FUROSEMIDE 40 MG
40 TABLET ORAL EVERY 12 HOURS
Qty: 0 | Refills: 0 | Status: DISCONTINUED | OUTPATIENT
Start: 2018-04-17 | End: 2018-04-17

## 2018-04-17 RX ORDER — KETOROLAC TROMETHAMINE 30 MG/ML
30 SYRINGE (ML) INJECTION ONCE
Qty: 0 | Refills: 0 | Status: DISCONTINUED | OUTPATIENT
Start: 2018-04-17 | End: 2018-04-17

## 2018-04-17 RX ORDER — ATORVASTATIN CALCIUM 80 MG/1
20 TABLET, FILM COATED ORAL AT BEDTIME
Qty: 0 | Refills: 0 | Status: DISCONTINUED | OUTPATIENT
Start: 2018-04-17 | End: 2018-04-20

## 2018-04-17 RX ORDER — ATORVASTATIN CALCIUM 80 MG/1
40 TABLET, FILM COATED ORAL AT BEDTIME
Qty: 0 | Refills: 0 | Status: DISCONTINUED | OUTPATIENT
Start: 2018-04-17 | End: 2018-04-17

## 2018-04-17 RX ORDER — FUROSEMIDE 40 MG
40 TABLET ORAL EVERY 12 HOURS
Qty: 0 | Refills: 0 | Status: DISCONTINUED | OUTPATIENT
Start: 2018-04-17 | End: 2018-04-20

## 2018-04-17 RX ORDER — SACUBITRIL AND VALSARTAN 24; 26 MG/1; MG/1
1 TABLET, FILM COATED ORAL
Qty: 0 | Refills: 0 | Status: DISCONTINUED | OUTPATIENT
Start: 2018-04-17 | End: 2018-04-20

## 2018-04-17 RX ADMIN — ATORVASTATIN CALCIUM 20 MILLIGRAM(S): 80 TABLET, FILM COATED ORAL at 21:09

## 2018-04-17 RX ADMIN — Medication 40 MILLIGRAM(S): at 12:59

## 2018-04-17 RX ADMIN — Medication 40 MILLIGRAM(S): at 15:59

## 2018-04-17 RX ADMIN — AMPICILLIN SODIUM AND SULBACTAM SODIUM 200 GRAM(S): 250; 125 INJECTION, POWDER, FOR SUSPENSION INTRAMUSCULAR; INTRAVENOUS at 17:10

## 2018-04-17 RX ADMIN — Medication 40 MILLIEQUIVALENT(S): at 13:00

## 2018-04-17 RX ADMIN — Medication 30 MILLIGRAM(S): at 11:03

## 2018-04-17 RX ADMIN — AMPICILLIN SODIUM AND SULBACTAM SODIUM 200 GRAM(S): 250; 125 INJECTION, POWDER, FOR SUSPENSION INTRAMUSCULAR; INTRAVENOUS at 07:02

## 2018-04-17 RX ADMIN — AMPICILLIN SODIUM AND SULBACTAM SODIUM 200 GRAM(S): 250; 125 INJECTION, POWDER, FOR SUSPENSION INTRAMUSCULAR; INTRAVENOUS at 23:01

## 2018-04-17 RX ADMIN — Medication 50 MILLIGRAM(S): at 05:33

## 2018-04-17 RX ADMIN — Medication 40 MILLIGRAM(S): at 17:10

## 2018-04-17 RX ADMIN — SACUBITRIL AND VALSARTAN 1 TABLET(S): 24; 26 TABLET, FILM COATED ORAL at 22:59

## 2018-04-17 RX ADMIN — AMPICILLIN SODIUM AND SULBACTAM SODIUM 200 GRAM(S): 250; 125 INJECTION, POWDER, FOR SUSPENSION INTRAMUSCULAR; INTRAVENOUS at 11:04

## 2018-04-17 RX ADMIN — AMPICILLIN SODIUM AND SULBACTAM SODIUM 200 GRAM(S): 250; 125 INJECTION, POWDER, FOR SUSPENSION INTRAMUSCULAR; INTRAVENOUS at 01:43

## 2018-04-17 RX ADMIN — Medication 40 MILLIEQUIVALENT(S): at 10:37

## 2018-04-17 RX ADMIN — AMPICILLIN SODIUM AND SULBACTAM SODIUM 200 GRAM(S): 250; 125 INJECTION, POWDER, FOR SUSPENSION INTRAMUSCULAR; INTRAVENOUS at 01:44

## 2018-04-17 NOTE — PROGRESS NOTE ADULT - SUBJECTIVE AND OBJECTIVE BOX
Patient is a 77y old  Male who presents with a chief complaint of cellulitis (16 Apr 2018 15:16)  Overnight events: no acute events, right leg dressing changed by vascular team this morning    PAST MEDICAL & SURGICAL HISTORY:  High cholesterol  Hypertension  Blood clot in vein  COPD (chronic obstructive pulmonary disease)  S/P coronary artery stent placement      MEDICATIONS  (STANDING):  ampicillin/sulbactam  IVPB      ampicillin/sulbactam  IVPB 3 Gram(s) IV Intermittent every 6 hours  atorvastatin 20 milliGRAM(s) Oral at bedtime  furosemide    Tablet 40 milliGRAM(s) Oral every 12 hours  metoprolol succinate ER 25 milliGRAM(s) Oral daily    MEDICATIONS  (PRN):  traMADol 50 milliGRAM(s) Oral every 8 hours PRN Severe Pain (7 - 10)      Home medications  atorvastatin:   lisinopril:   Metoprolol Tartrate:   warfarin:       Vital Signs Last 24 Hrs  T(C): 35.6 (17 Apr 2018 07:30), Max: 36.8 (16 Apr 2018 23:54)  T(F): 96.1 (17 Apr 2018 07:30), Max: 98.2 (16 Apr 2018 23:54)  HR: 66 (17 Apr 2018 07:30) (66 - 80)  BP: 129/56 (17 Apr 2018 07:30) (129/56 - 183/63)  BP(mean): --  RR: 18 (17 Apr 2018 07:30) (18 - 18)  SpO2: 95% (16 Apr 2018 19:35) (95% - 96%)  CAPILLARY BLOOD GLUCOSE        I&O's Summary    16 Apr 2018 07:01  -  17 Apr 2018 07:00  --------------------------------------------------------  IN: 200 mL / OUT: 390 mL / NET: -190 mL    17 Apr 2018 07:01  -  17 Apr 2018 13:41  --------------------------------------------------------  IN: 0 mL / OUT: 250 mL / NET: -250 mL        Physical Exam:    -     General : on O2, NAD    -      Cardiac: irregular S1S2    -      Pulm: bibasilar crackles R>L    -      GI: soft non tender        Labs:                        8.5    5.18  )-----------( 184      ( 17 Apr 2018 06:00 )             27.6             04-17    141  |  99  |  15  ----------------------------<  83  3.0<L>   |  35<H>  |  1.0    Ca    7.6<L>      17 Apr 2018 06:00    TPro  6.3  /  Alb  3.3<L>  /  TBili  1.6<H>  /  DBili  x   /  AST  28  /  ALT  14  /  AlkPhos  90  04-16    LIVER FUNCTIONS - ( 16 Apr 2018 11:42 )  Alb: 3.3 g/dL / Pro: 6.3 g/dL / ALK PHOS: 90 U/L / ALT: 14 U/L / AST: 28 U/L / GGT: x                 PT/INR - ( 17 Apr 2018 06:00 )   PT: >40.00 sec;   INR: 4.08 ratio         PTT - ( 17 Apr 2018 06:00 )  PTT:42.8 sec    Imaging:  < from: Xray Chest 1 View- PORTABLE-Urgent (04.16.18 @ 11:32) >  Stable right lower lung field pleural effusion versus thickening with   adjacent stable parenchymal disease.     New pulmonary vascular congestion.    < end of copied text >      ECG: < from: 12 Lead ECG (04.16.18 @ 10:54) >  Diagnosis Line Atrial fibrillation with slow ventricular response with ventricular escape  complexes  Low voltage QRS  Nonspecific ST abnormality  Abnormal ECG    < end of copied text >

## 2018-04-18 LAB
ANION GAP SERPL CALC-SCNC: 10 MMOL/L — SIGNIFICANT CHANGE UP (ref 7–14)
ANION GAP SERPL CALC-SCNC: 13 MMOL/L — SIGNIFICANT CHANGE UP (ref 7–14)
BASOPHILS # BLD AUTO: 0.02 K/UL — SIGNIFICANT CHANGE UP (ref 0–0.2)
BASOPHILS NFR BLD AUTO: 0.4 % — SIGNIFICANT CHANGE UP (ref 0–1)
BUN SERPL-MCNC: 16 MG/DL — SIGNIFICANT CHANGE UP (ref 10–20)
BUN SERPL-MCNC: 17 MG/DL — SIGNIFICANT CHANGE UP (ref 10–20)
CALCIUM SERPL-MCNC: 7.9 MG/DL — LOW (ref 8.5–10.1)
CALCIUM SERPL-MCNC: 8 MG/DL — LOW (ref 8.5–10.1)
CHLORIDE SERPL-SCNC: 100 MMOL/L — SIGNIFICANT CHANGE UP (ref 98–110)
CHLORIDE SERPL-SCNC: 99 MMOL/L — SIGNIFICANT CHANGE UP (ref 98–110)
CO2 SERPL-SCNC: 28 MMOL/L — SIGNIFICANT CHANGE UP (ref 17–32)
CO2 SERPL-SCNC: 33 MMOL/L — HIGH (ref 17–32)
CREAT SERPL-MCNC: 1.1 MG/DL — SIGNIFICANT CHANGE UP (ref 0.7–1.5)
CREAT SERPL-MCNC: 1.2 MG/DL — SIGNIFICANT CHANGE UP (ref 0.7–1.5)
CRP SERPL-MCNC: 5.5 MG/DL — HIGH (ref 0–0.4)
EOSINOPHIL # BLD AUTO: 0.22 K/UL — SIGNIFICANT CHANGE UP (ref 0–0.7)
EOSINOPHIL NFR BLD AUTO: 4.5 % — SIGNIFICANT CHANGE UP (ref 0–8)
GLUCOSE SERPL-MCNC: 83 MG/DL — SIGNIFICANT CHANGE UP (ref 70–99)
GLUCOSE SERPL-MCNC: 93 MG/DL — SIGNIFICANT CHANGE UP (ref 70–99)
HCT VFR BLD CALC: 31.6 % — LOW (ref 42–52)
HGB BLD-MCNC: 9.5 G/DL — LOW (ref 14–18)
IMM GRANULOCYTES NFR BLD AUTO: 0.2 % — SIGNIFICANT CHANGE UP (ref 0.1–0.3)
INR BLD: 3.5 RATIO — HIGH (ref 0.65–1.3)
LYMPHOCYTES # BLD AUTO: 0.79 K/UL — LOW (ref 1.2–3.4)
LYMPHOCYTES # BLD AUTO: 16.2 % — LOW (ref 20.5–51.1)
MAGNESIUM SERPL-MCNC: 2.1 MG/DL — SIGNIFICANT CHANGE UP (ref 1.8–2.4)
MCHC RBC-ENTMCNC: 26.5 PG — LOW (ref 27–31)
MCHC RBC-ENTMCNC: 30.1 G/DL — LOW (ref 32–37)
MCV RBC AUTO: 88.3 FL — SIGNIFICANT CHANGE UP (ref 80–94)
MONOCYTES # BLD AUTO: 0.72 K/UL — HIGH (ref 0.1–0.6)
MONOCYTES NFR BLD AUTO: 14.7 % — HIGH (ref 1.7–9.3)
NEUTROPHILS # BLD AUTO: 3.13 K/UL — SIGNIFICANT CHANGE UP (ref 1.4–6.5)
NEUTROPHILS NFR BLD AUTO: 64 % — SIGNIFICANT CHANGE UP (ref 42.2–75.2)
NT-PROBNP SERPL-SCNC: 7601 PG/ML — HIGH (ref 0–300)
PLATELET # BLD AUTO: 203 K/UL — SIGNIFICANT CHANGE UP (ref 130–400)
POTASSIUM SERPL-MCNC: 3.7 MMOL/L — SIGNIFICANT CHANGE UP (ref 3.5–5)
POTASSIUM SERPL-MCNC: 4.8 MMOL/L — SIGNIFICANT CHANGE UP (ref 3.5–5)
POTASSIUM SERPL-SCNC: 3.7 MMOL/L — SIGNIFICANT CHANGE UP (ref 3.5–5)
POTASSIUM SERPL-SCNC: 4.8 MMOL/L — SIGNIFICANT CHANGE UP (ref 3.5–5)
PROTHROM AB SERPL-ACNC: 38.8 SEC — HIGH (ref 9.95–12.87)
RBC # BLD: 3.58 M/UL — LOW (ref 4.7–6.1)
RBC # FLD: 18.7 % — HIGH (ref 11.5–14.5)
SODIUM SERPL-SCNC: 140 MMOL/L — SIGNIFICANT CHANGE UP (ref 135–146)
SODIUM SERPL-SCNC: 143 MMOL/L — SIGNIFICANT CHANGE UP (ref 135–146)
WBC # BLD: 4.89 K/UL — SIGNIFICANT CHANGE UP (ref 4.8–10.8)
WBC # FLD AUTO: 4.89 K/UL — SIGNIFICANT CHANGE UP (ref 4.8–10.8)

## 2018-04-18 RX ORDER — BUDESONIDE AND FORMOTEROL FUMARATE DIHYDRATE 160; 4.5 UG/1; UG/1
2 AEROSOL RESPIRATORY (INHALATION)
Qty: 0 | Refills: 0 | Status: DISCONTINUED | OUTPATIENT
Start: 2018-04-18 | End: 2018-04-20

## 2018-04-18 RX ORDER — CEFAZOLIN SODIUM 1 G
2000 VIAL (EA) INJECTION ONCE
Qty: 0 | Refills: 0 | Status: COMPLETED | OUTPATIENT
Start: 2018-04-18 | End: 2018-04-18

## 2018-04-18 RX ORDER — CEFAZOLIN SODIUM 1 G
2000 VIAL (EA) INJECTION EVERY 8 HOURS
Qty: 0 | Refills: 0 | Status: DISCONTINUED | OUTPATIENT
Start: 2018-04-18 | End: 2018-04-20

## 2018-04-18 RX ORDER — CEFAZOLIN SODIUM 1 G
VIAL (EA) INJECTION
Qty: 0 | Refills: 0 | Status: DISCONTINUED | OUTPATIENT
Start: 2018-04-18 | End: 2018-04-20

## 2018-04-18 RX ADMIN — Medication 100 MILLIGRAM(S): at 14:30

## 2018-04-18 RX ADMIN — ATORVASTATIN CALCIUM 20 MILLIGRAM(S): 80 TABLET, FILM COATED ORAL at 21:21

## 2018-04-18 RX ADMIN — TRAMADOL HYDROCHLORIDE 50 MILLIGRAM(S): 50 TABLET ORAL at 22:39

## 2018-04-18 RX ADMIN — TRAMADOL HYDROCHLORIDE 50 MILLIGRAM(S): 50 TABLET ORAL at 12:30

## 2018-04-18 RX ADMIN — BUDESONIDE AND FORMOTEROL FUMARATE DIHYDRATE 2 PUFF(S): 160; 4.5 AEROSOL RESPIRATORY (INHALATION) at 21:21

## 2018-04-18 RX ADMIN — Medication 25 MILLIGRAM(S): at 05:05

## 2018-04-18 RX ADMIN — Medication 40 MILLIGRAM(S): at 17:55

## 2018-04-18 RX ADMIN — Medication 100 MILLIGRAM(S): at 21:21

## 2018-04-18 RX ADMIN — TRAMADOL HYDROCHLORIDE 50 MILLIGRAM(S): 50 TABLET ORAL at 12:45

## 2018-04-18 RX ADMIN — TRAMADOL HYDROCHLORIDE 50 MILLIGRAM(S): 50 TABLET ORAL at 00:12

## 2018-04-18 RX ADMIN — Medication 40 MILLIGRAM(S): at 17:56

## 2018-04-18 RX ADMIN — Medication 100 MILLIGRAM(S): at 22:34

## 2018-04-18 RX ADMIN — AMPICILLIN SODIUM AND SULBACTAM SODIUM 200 GRAM(S): 250; 125 INJECTION, POWDER, FOR SUSPENSION INTRAMUSCULAR; INTRAVENOUS at 05:06

## 2018-04-18 RX ADMIN — SACUBITRIL AND VALSARTAN 1 TABLET(S): 24; 26 TABLET, FILM COATED ORAL at 19:12

## 2018-04-18 RX ADMIN — SACUBITRIL AND VALSARTAN 1 TABLET(S): 24; 26 TABLET, FILM COATED ORAL at 05:05

## 2018-04-18 RX ADMIN — AMPICILLIN SODIUM AND SULBACTAM SODIUM 200 GRAM(S): 250; 125 INJECTION, POWDER, FOR SUSPENSION INTRAMUSCULAR; INTRAVENOUS at 11:14

## 2018-04-18 RX ADMIN — Medication 40 MILLIGRAM(S): at 05:05

## 2018-04-18 RX ADMIN — TRAMADOL HYDROCHLORIDE 50 MILLIGRAM(S): 50 TABLET ORAL at 22:09

## 2018-04-18 NOTE — CONSULT NOTE ADULT - ASSESSMENT
76 yo M with significant PMHx for HTN, CAD, CHF,  HL, A-Fib and RLE DVT, on Coumadin, venous stasis and venous stasis ulcerations, was sent into ED from vascular office for right lower extremity erythema, suspected cellulitis with greenish color drainage. Patient states that he had a DVT 3 weeks ago and was seen by vascular where they placed a compression boot on right leg for thrombophlebity syndrome (post-DVT edema). Pt states the boot was too tight so he went back in 1 week ago after wife had cut the boot off because he could not feel his toes. Another compression boot was placed however he noticed that there was malodourous and serous fluid leaking from the edges. This prompted another visit to the vascular office, whereupon he was directed to the ED after boot was removed for cellulitis. No fever, no chills, no SOB, no chest pain.     ** Cellulitis  - No fever or elevated WBC since admission  -   - f/u vascular --> Dressing twice a day changes: with non-adhesive pads, abd pads, kirlex and ace wraps; keep right leg elevated on 2 pillows to improve swelling  - currently on Unasyn   - keep right leg elevated on 2 pillows to improve swelling 76 yo M with significant PMHx for HTN, CAD, CHF,  HL, A-Fib and RLE DVT, on Coumadin, venous stasis and venous stasis ulcerations, was sent into ED from vascular office for right lower extremity erythema, suspected cellulitis with greenish color drainage. Patient states that he had a DVT 3 weeks ago and was seen by vascular where they placed a compression boot on right leg for thrombophlebity syndrome (post-DVT edema). Pt states the boot was too tight so he went back in 1 week ago after wife had cut the boot off because he could not feel his toes. Another compression boot was placed however he noticed that there was malodourous and serous fluid leaking from the edges. This prompted another visit to the vascular office, whereupon he was directed to the ED after boot was removed for cellulitis. No fever, no chills, no SOB, no chest pain.     ** Cellulitis  - No fever or elevated WBC since admission  - Pt states symptoms improved since antibiotics started  - f/u vascular --> Dressing twice a day changes: with non-adhesive pads, abd pads, kirlex and ace wraps; keep right leg elevated on 2 pillows to improve swelling  - currently on Unasyn   - keep right leg elevated on 2 pillows to improve swelling 76 yo M with significant PMHx for HTN, CAD, CHF,  HL, A-Fib and RLE DVT, on Coumadin, venous stasis and venous stasis ulcerations, was sent into ED from vascular office for right lower extremity erythema, suspected cellulitis with greenish color drainage. Patient states that he had a DVT 3 weeks ago and was seen by vascular where they placed a compression boot on right leg for thrombophlebity syndrome (post-DVT edema). Pt states the boot was too tight so he went back in 1 week ago after wife had cut the boot off because he could not feel his toes. Another compression boot was placed however he noticed that there was malodourous and serous fluid leaking from the edges. This prompted another visit to the vascular office, whereupon he was directed to the ED after boot was removed for cellulitis. No fever, no chills, no SOB, no chest pain.     IMPRESSION:  Cellulitis RLE, possibly ersipelas.    RECOMMENDATIONS:  Clinda 600mg iv q6h  Ancef 2gm iv q8h.  Prednisone po 40mg q24h

## 2018-04-18 NOTE — PROGRESS NOTE ADULT - SUBJECTIVE AND OBJECTIVE BOX
Patient is a 77y old  Male who presents with a chief complaint of cellulitis (16 Apr 2018 15:16)    Overnight events: no acute events overnight    PAST MEDICAL & SURGICAL HISTORY:  High cholesterol  Hypertension  Blood clot in vein  COPD (chronic obstructive pulmonary disease)  S/P coronary artery stent placement      MEDICATIONS  (STANDING):  atorvastatin 20 milliGRAM(s) Oral at bedtime  ceFAZolin   IVPB      ceFAZolin   IVPB 2000 milliGRAM(s) IV Intermittent once  ceFAZolin   IVPB 2000 milliGRAM(s) IV Intermittent every 8 hours  clindamycin IVPB 600 milliGRAM(s) IV Intermittent once  clindamycin IVPB 600 milliGRAM(s) IV Intermittent every 8 hours  clindamycin IVPB      furosemide   Injectable 40 milliGRAM(s) IV Push every 12 hours  metoprolol succinate ER 25 milliGRAM(s) Oral daily  predniSONE   Tablet 40 milliGRAM(s) Oral daily  sacubitril 24 mG/valsartan 26 mG 1 Tablet(s) Oral two times a day    MEDICATIONS  (PRN):  traMADol 50 milliGRAM(s) Oral every 8 hours PRN Severe Pain (7 - 10)    Vital Signs Last 24 Hrs  T(C): 35.2 (18 Apr 2018 08:00), Max: 36.3 (17 Apr 2018 23:48)  T(F): 95.4 (18 Apr 2018 08:00), Max: 97.4 (17 Apr 2018 23:48)  HR: 59 (18 Apr 2018 08:00) (56 - 63)  BP: 132/77 (18 Apr 2018 08:00) (132/77 - 153/68)  BP(mean): --  RR: 18 (18 Apr 2018 08:00) (18 - 18)  SpO2: --  CAPILLARY BLOOD GLUCOSE        I&O's Summary    17 Apr 2018 07:01  -  18 Apr 2018 07:00  --------------------------------------------------------  IN: 200 mL / OUT: 2170 mL / NET: -1970 mL    18 Apr 2018 07:01  -  18 Apr 2018 14:33  --------------------------------------------------------  IN: 480 mL / OUT: 1050 mL / NET: -570 mL        Physical Exam:    -     General : NAD    -      Cardiac: irregular S1S2    -      Pulm: bibasilar crackles R>L    -      GI:soft non tender    -      Musculoskeletal: right leg erythema from heel to below knee with demarcation and swelling        Labs:                        9.5    4.89  )-----------( 203      ( 18 Apr 2018 09:01 )             31.6             04-18    143  |  100  |  16  ----------------------------<  93  3.7   |  33<H>  |  1.1    Ca    7.9<L>      18 Apr 2018 09:01  Mg     2.1     04-17              PT/INR - ( 18 Apr 2018 09:01 )   PT: 38.80 sec;   INR: 3.50 ratio         PTT - ( 17 Apr 2018 06:00 )  PTT:42.8 sec            Culture - Blood (collected 16 Apr 2018 10:32)  Source: .Blood Blood  Preliminary Report (18 Apr 2018 01:02):    No growth to date.    Culture - Blood (collected 16 Apr 2018 10:32)  Source: .Blood Blood  Preliminary Report (18 Apr 2018 01:02):    No growth to date.

## 2018-04-18 NOTE — PROGRESS NOTE ADULT - ASSESSMENT
78 yo M with significant PMHx for HTN, CAD, CHF,  HL, A-Fib and RLE DVT, on Coumadin, venous stasis and venous stasis ulcerations, was sent into ED from vascular office for right lower extremity erythema, suspected cellulitis with greenish color drainage. Patient states that he had a DVT 3 weeks ago and was seen by vascular where they placed a compression boot on right leg for thrombophlebity syndrome (post-DVT edema). Pt states the boot was too tight so he went back in 1 week ago after wife had cut the boot off because he could not feel his toes. Another compression boot was placed however he noticed that there was malodourous and serous fluid leaking from the edges. This prompted another visit to the vascular office, whereupon he was directed to the ED after boot was removed for cellulitis. No fever, no chills, no SOB, no chest pain.     ** Cellulitis / erysipelas of right lower extremity   - Dressing twice a day changes: with non-adhesive pads, abd pads, kirlex and ace wraps; keep right leg elevated on 2 pillows to improve swelling  - was on unacyn (4/17), will switch to clindamycin and cefazolin as per ID recommendations  - keep right leg elevated on 2 pillows to improve swelling    **h/o DVT on coumadin / afib  -INR =3.5 today, coumadin is to be held again tonight  -HR controlled / c/w lopressor    **HTN/CAD/DLD  - c/w home meds    **Acute on Chronic systolic heart failure; BNP>7K  -EF=45% in 2017, repeated echo is still pending  -on BB, entresto  -Lasix increased to IV bid, patient is in negative balance    ** COPD: has home O2  - c/w O2  - usees BiPAP at night  -symbicort to be resumed    ** likely has KYLIE: stop bang questionnaire yields 8/8: high risk; follow up pulmonolgy as outpatient, will need polysomnography    **DVT ppx: systemic ATG    DISPO: comes from home

## 2018-04-18 NOTE — CONSULT NOTE ADULT - SUBJECTIVE AND OBJECTIVE BOX
SUBJECTIVE:    Patient is a 77y old Male who presents with a chief complaint of cellulitis (16 Apr 2018 15:16)    Currently admitted to medicine with the primary diagnosis of Cellulitis of leg, right     Today is hospital day 2d.       PAST MEDICAL & SURGICAL HISTORY  High cholesterol  Hypertension  Blood clot in vein  COPD (chronic obstructive pulmonary disease)  S/P coronary artery stent placement    SOCIAL HISTORY:  Negative for smoking/alcohol/drug use.     ALLERGIES:  No Known Allergies    MEDICATIONS:  STANDING MEDICATIONS  ampicillin/sulbactam  IVPB      ampicillin/sulbactam  IVPB 3 Gram(s) IV Intermittent every 6 hours  atorvastatin 20 milliGRAM(s) Oral at bedtime  furosemide   Injectable 40 milliGRAM(s) IV Push every 12 hours  metoprolol succinate ER 25 milliGRAM(s) Oral daily  sacubitril 24 mG/valsartan 26 mG 1 Tablet(s) Oral two times a day    PRN MEDICATIONS  traMADol 50 milliGRAM(s) Oral every 8 hours PRN    VITALS:   T(F): 95.4  HR: 59  BP: 132/77  RR: 18  SpO2: --    LABS:                        8.5    5.18  )-----------( 184      ( 17 Apr 2018 06:00 )             27.6     04-17    141  |  99  |  15  ----------------------------<  83  3.0<L>   |  35<H>  |  1.0    Ca    7.6<L>      17 Apr 2018 06:00  Mg     2.1     04-17    TPro  6.3  /  Alb  3.3<L>  /  TBili  1.6<H>  /  DBili  x   /  AST  28  /  ALT  14  /  AlkPhos  90  04-16    PT/INR - ( 17 Apr 2018 12:55 )   PT: >40.00 sec;   INR: 4.15 ratio         PTT - ( 17 Apr 2018 06:00 )  PTT:42.8 sec      Sedimentation Rate, Erythrocyte: 35 mm/Hr <H> (04-17-18 @ 12:55)      Culture - Blood (collected 16 Apr 2018 10:32)  Source: .Blood Blood  Preliminary Report (18 Apr 2018 01:02):    No growth to date.    Culture - Blood (collected 16 Apr 2018 10:32)  Source: .Blood Blood  Preliminary Report (18 Apr 2018 01:02):    No growth to date.      RADIOLOGY:  < from: Xray Chest 1 View- PORTABLE-Urgent (04.16.18 @ 11:32) >  Stable right lower lung field pleural effusion versus thickening with   adjacent stable parenchymal disease.     New pulmonary vascular congestion.    < end of copied text >        PHYSICAL EXAM:  GEN: No acute distress  HEENT:   LUNGS:   HEART:   ABD:   EXT:   NEURO: AAOX3 SUBJECTIVE:    Patient is a 77y old Male who presents with a chief complaint of cellulitis (16 Apr 2018 15:16)    Currently admitted to medicine with the primary diagnosis of Cellulitis of leg, right     Today is hospital day 2d. States his pain in his leg is improved      PAST MEDICAL & SURGICAL HISTORY  High cholesterol  Hypertension  Blood clot in vein  COPD (chronic obstructive pulmonary disease)  S/P coronary artery stent placement    SOCIAL HISTORY:  Negative for smoking/alcohol/drug use.     ALLERGIES:  No Known Allergies    MEDICATIONS:  STANDING MEDICATIONS  ampicillin/sulbactam  IVPB      ampicillin/sulbactam  IVPB 3 Gram(s) IV Intermittent every 6 hours  atorvastatin 20 milliGRAM(s) Oral at bedtime  furosemide   Injectable 40 milliGRAM(s) IV Push every 12 hours  metoprolol succinate ER 25 milliGRAM(s) Oral daily  sacubitril 24 mG/valsartan 26 mG 1 Tablet(s) Oral two times a day    PRN MEDICATIONS  traMADol 50 milliGRAM(s) Oral every 8 hours PRN    VITALS:   T(F): 95.4  HR: 59  BP: 132/77  RR: 18  SpO2: --    LABS:                        8.5    5.18  )-----------( 184      ( 17 Apr 2018 06:00 )             27.6     04-17    141  |  99  |  15  ----------------------------<  83  3.0<L>   |  35<H>  |  1.0    Ca    7.6<L>      17 Apr 2018 06:00  Mg     2.1     04-17    TPro  6.3  /  Alb  3.3<L>  /  TBili  1.6<H>  /  DBili  x   /  AST  28  /  ALT  14  /  AlkPhos  90  04-16    PT/INR - ( 17 Apr 2018 12:55 )   PT: >40.00 sec;   INR: 4.15 ratio         PTT - ( 17 Apr 2018 06:00 )  PTT:42.8 sec      Sedimentation Rate, Erythrocyte: 35 mm/Hr <H> (04-17-18 @ 12:55)      Culture - Blood (collected 16 Apr 2018 10:32)  Source: .Blood Blood  Preliminary Report (18 Apr 2018 01:02):    No growth to date.    Culture - Blood (collected 16 Apr 2018 10:32)  Source: .Blood Blood  Preliminary Report (18 Apr 2018 01:02):    No growth to date.      RADIOLOGY:  < from: Xray Chest 1 View- PORTABLE-Urgent (04.16.18 @ 11:32) >  Stable right lower lung field pleural effusion versus thickening with   adjacent stable parenchymal disease.     New pulmonary vascular congestion.    < end of copied text >        PHYSICAL EXAM:  GEN: No acute distress  HEENT:   LUNGS:   HEART:   ABD:   EXT:   NEURO: AAOX3 SUBJECTIVE:    Patient is a 77y old Male who presents with a chief complaint of cellulitis (16 Apr 2018 15:16)    Currently admitted to medicine with the primary diagnosis of Cellulitis of leg, right     Today is hospital day 2d. States his pain in his leg is improved      PAST MEDICAL & SURGICAL HISTORY  High cholesterol  Hypertension  Blood clot in vein  COPD (chronic obstructive pulmonary disease)  S/P coronary artery stent placement    SOCIAL HISTORY:  Negative for smoking/alcohol/drug use.     ALLERGIES:  No Known Allergies    MEDICATIONS:  STANDING MEDICATIONS  ampicillin/sulbactam  IVPB      ampicillin/sulbactam  IVPB 3 Gram(s) IV Intermittent every 6 hours  atorvastatin 20 milliGRAM(s) Oral at bedtime  furosemide   Injectable 40 milliGRAM(s) IV Push every 12 hours  metoprolol succinate ER 25 milliGRAM(s) Oral daily  sacubitril 24 mG/valsartan 26 mG 1 Tablet(s) Oral two times a day    PRN MEDICATIONS  traMADol 50 milliGRAM(s) Oral every 8 hours PRN    VITALS:   T(F): 95.4  HR: 59  BP: 132/77  RR: 18  SpO2: --    LABS:                        8.5    5.18  )-----------( 184      ( 17 Apr 2018 06:00 )             27.6     04-17    141  |  99  |  15  ----------------------------<  83  3.0<L>   |  35<H>  |  1.0    Ca    7.6<L>      17 Apr 2018 06:00  Mg     2.1     04-17    TPro  6.3  /  Alb  3.3<L>  /  TBili  1.6<H>  /  DBili  x   /  AST  28  /  ALT  14  /  AlkPhos  90  04-16    PT/INR - ( 17 Apr 2018 12:55 )   PT: >40.00 sec;   INR: 4.15 ratio         PTT - ( 17 Apr 2018 06:00 )  PTT:42.8 sec      Sedimentation Rate, Erythrocyte: 35 mm/Hr <H> (04-17-18 @ 12:55)      Culture - Blood (collected 16 Apr 2018 10:32)  Source: .Blood Blood  Preliminary Report (18 Apr 2018 01:02):    No growth to date.    Culture - Blood (collected 16 Apr 2018 10:32)  Source: .Blood Blood  Preliminary Report (18 Apr 2018 01:02):    No growth to date.      RADIOLOGY:  < from: Xray Chest 1 View- PORTABLE-Urgent (04.16.18 @ 11:32) >  Stable right lower lung field pleural effusion versus thickening with   adjacent stable parenchymal disease.     New pulmonary vascular congestion.    < end of copied text >        PHYSICAL EXAM:  GEN: No acute distress  HEENT: NC/AT  ABD: soft, NT, mild distention  EXT: R leg erythema from below knee to dorsum of foot, tenderness to palpation of patches of yellowish cracked and crusted skin on dorsum of foot. +2 palpable pulses. +2 edema to R leg from ankle to thigh. No pus, signs of abscess or induration.  NEURO: AAOX3, no focal deficits. Sensation in tact to bilateral lower extremities. MSE 5/5 nilateral lower extremities.

## 2018-04-18 NOTE — CONSULT NOTE ADULT - ATTENDING COMMENTS
as above    Pt with history of ulceration presented with cellulitis.     1) IV abx  2) leg elevation
I have personally examined the patient and reviewed the documentation above.  Corrections and edits were made wherever needed.

## 2018-04-19 ENCOUNTER — TRANSCRIPTION ENCOUNTER (OUTPATIENT)
Age: 78
End: 2018-04-19

## 2018-04-19 LAB
ANION GAP SERPL CALC-SCNC: 11 MMOL/L — SIGNIFICANT CHANGE UP (ref 7–14)
APTT BLD: 41.4 SEC — HIGH (ref 27–39.2)
BUN SERPL-MCNC: 17 MG/DL — SIGNIFICANT CHANGE UP (ref 10–20)
CALCIUM SERPL-MCNC: 7.7 MG/DL — LOW (ref 8.5–10.1)
CHLORIDE SERPL-SCNC: 94 MMOL/L — LOW (ref 98–110)
CO2 SERPL-SCNC: 34 MMOL/L — HIGH (ref 17–32)
CREAT SERPL-MCNC: 1.3 MG/DL — SIGNIFICANT CHANGE UP (ref 0.7–1.5)
GLUCOSE SERPL-MCNC: 127 MG/DL — HIGH (ref 70–99)
INR BLD: 4.13 RATIO — HIGH (ref 0.65–1.3)
POTASSIUM SERPL-MCNC: 3.8 MMOL/L — SIGNIFICANT CHANGE UP (ref 3.5–5)
POTASSIUM SERPL-SCNC: 3.8 MMOL/L — SIGNIFICANT CHANGE UP (ref 3.5–5)
PROTHROM AB SERPL-ACNC: >40 SEC — HIGH (ref 9.95–12.87)
SODIUM SERPL-SCNC: 139 MMOL/L — SIGNIFICANT CHANGE UP (ref 135–146)

## 2018-04-19 RX ADMIN — Medication 100 MILLIGRAM(S): at 05:27

## 2018-04-19 RX ADMIN — BUDESONIDE AND FORMOTEROL FUMARATE DIHYDRATE 2 PUFF(S): 160; 4.5 AEROSOL RESPIRATORY (INHALATION) at 22:25

## 2018-04-19 RX ADMIN — ATORVASTATIN CALCIUM 20 MILLIGRAM(S): 80 TABLET, FILM COATED ORAL at 22:25

## 2018-04-19 RX ADMIN — Medication 25 MILLIGRAM(S): at 05:26

## 2018-04-19 RX ADMIN — BUDESONIDE AND FORMOTEROL FUMARATE DIHYDRATE 2 PUFF(S): 160; 4.5 AEROSOL RESPIRATORY (INHALATION) at 11:44

## 2018-04-19 RX ADMIN — Medication 40 MILLIGRAM(S): at 05:26

## 2018-04-19 RX ADMIN — SACUBITRIL AND VALSARTAN 1 TABLET(S): 24; 26 TABLET, FILM COATED ORAL at 17:16

## 2018-04-19 RX ADMIN — Medication 100 MILLIGRAM(S): at 14:19

## 2018-04-19 RX ADMIN — Medication 40 MILLIGRAM(S): at 17:17

## 2018-04-19 RX ADMIN — Medication 100 MILLIGRAM(S): at 11:44

## 2018-04-19 RX ADMIN — Medication 100 MILLIGRAM(S): at 22:25

## 2018-04-19 RX ADMIN — Medication 100 MILLIGRAM(S): at 05:26

## 2018-04-19 RX ADMIN — SACUBITRIL AND VALSARTAN 1 TABLET(S): 24; 26 TABLET, FILM COATED ORAL at 05:26

## 2018-04-19 NOTE — DISCHARGE NOTE ADULT - MEDICATION SUMMARY - MEDICATIONS TO STOP TAKING
I will STOP taking the medications listed below when I get home from the hospital:    lisinopril    warfarin

## 2018-04-19 NOTE — DISCHARGE NOTE ADULT - CARE PLAN
Principal Discharge DX:	Cellulitis of foot, right  Goal:	treatment  Assessment and plan of treatment:	-continue antibiotics doxycycline for 8 more days  -dressing once daily  -keep right leg elevated to decrease edema  - You can take a shower or bath, but be sure to pat the area dry with a towel afterward. Do not put any antibiotic ointments or creams on the area  -You should call if the red area gets Bigger, More swollen or More painful  Secondary Diagnosis:	Heart failure  Goal:	symptoms relief  Assessment and plan of treatment:	-take lasix as instructed  -avoid salt or excess water intake  -if SOB or increase feet swelling call your doctor

## 2018-04-19 NOTE — DISCHARGE NOTE ADULT - MEDICATION SUMMARY - MEDICATIONS TO TAKE
I will START or STAY ON the medications listed below when I get home from the hospital:    sacubitril-valsartan 24 mg-26 mg oral tablet  -- 1 tab(s) by mouth 2 times a day  -- Indication: For Heart failure    Lipitor 20 mg oral tablet  -- 1 tab(s) by mouth once a day (at bedtime)  -- Indication: For dyslipidemia    doxycycline hyclate 100 mg oral capsule  -- 1 cap(s) by mouth 2 times a day   -- Avoid prolonged or excessive exposure to direct and/or artificial sunlight while taking this medication.  Do not take this drug if you are pregnant.  Finish all this medication unless otherwise directed by prescriber.  Medication should be taken with plenty of water.    -- Indication: For Cellulitis of foot, right    Metoprolol Succinate ER 25 mg oral tablet, extended release  -- 1 tab(s) by mouth once a day  -- Indication: For Heart failure    budesonide-formoterol 80 mcg-4.5 mcg/inh inhalation aerosol  -- 1 puff(s) inhaled 2 times a day  -- Indication: For COPD (chronic obstructive pulmonary disease)    furosemide 40 mg oral tablet  -- 1 tab(s) by mouth 2 times a day  -- Indication: For Heart failure I will START or STAY ON the medications listed below when I get home from the hospital:    sacubitril-valsartan 24 mg-26 mg oral tablet  -- 1 tab(s) by mouth 2 times a day  -- Indication: For CHF    Lipitor 20 mg oral tablet  -- 1 tab(s) by mouth once a day (at bedtime)  -- Indication: For DLD    doxycycline hyclate 100 mg oral capsule  -- 1 cap(s) by mouth 2 times a day   -- Avoid prolonged or excessive exposure to direct and/or artificial sunlight while taking this medication.  Do not take this drug if you are pregnant.  Finish all this medication unless otherwise directed by prescriber.  Medication should be taken with plenty of water.    -- Indication: For Cellulitis of foot, right    Metoprolol Succinate ER 25 mg oral tablet, extended release  -- 1 tab(s) by mouth once a day  -- Indication: For Heart failure    budesonide-formoterol 80 mcg-4.5 mcg/inh inhalation aerosol  -- 1 puff(s) inhaled 2 times a day  -- Indication: For COPD (chronic obstructive pulmonary disease)    furosemide 40 mg oral tablet  -- 1 tab(s) by mouth 2 times a day  -- Indication: For Heart failure

## 2018-04-19 NOTE — PROGRESS NOTE ADULT - SUBJECTIVE AND OBJECTIVE BOX
Patient is a 77y old  Male who presents with a chief complaint of cellulitis (16 Apr 2018 15:16)      Overnight events:    PAST MEDICAL & SURGICAL HISTORY:  High cholesterol  Hypertension  Blood clot in vein  COPD (chronic obstructive pulmonary disease)  S/P coronary artery stent placement      MEDICATIONS  (STANDING):  atorvastatin 20 milliGRAM(s) Oral at bedtime  buDESOnide  80 MICROgram(s)/formoterol 4.5 MICROgram(s) Inhaler 2 Puff(s) Inhalation two times a day  ceFAZolin   IVPB      ceFAZolin   IVPB 2000 milliGRAM(s) IV Intermittent every 8 hours  clindamycin IVPB 600 milliGRAM(s) IV Intermittent every 8 hours  clindamycin IVPB      furosemide   Injectable 40 milliGRAM(s) IV Push every 12 hours  metoprolol succinate ER 25 milliGRAM(s) Oral daily  sacubitril 24 mG/valsartan 26 mG 1 Tablet(s) Oral two times a day    MEDICATIONS  (PRN):  traMADol 50 milliGRAM(s) Oral every 8 hours PRN Severe Pain (7 - 10)      Home medications  atorvastatin:   lisinopril:   Metoprolol Tartrate:   warfarin:       Vital Signs Last 24 Hrs  T(C): 36.2 (19 Apr 2018 07:56), Max: 36.2 (19 Apr 2018 07:56)  T(F): 97.1 (19 Apr 2018 07:56), Max: 97.1 (19 Apr 2018 07:56)  HR: 69 (19 Apr 2018 07:56) (66 - 70)  BP: 123/59 (19 Apr 2018 07:56) (118/58 - 135/58)  BP(mean): --  RR: 20 (19 Apr 2018 07:56) (18 - 20)  SpO2: 96% (19 Apr 2018 05:27) (96% - 96%)  CAPILLARY BLOOD GLUCOSE        I&O's Summary    18 Apr 2018 07:01  -  19 Apr 2018 07:00  --------------------------------------------------------  IN: 680 mL / OUT: 2050 mL / NET: -1370 mL        Physical Exam:    -     General : NAD    -      Cardiac: regular S1S2    -      Pulm: decrease crackles but still present on the right    -      GI: soft non tender    -      Musculoskeletal: right leg decreased swelling and oozing, no edema in left side    -      Neuro:        Labs:                        9.5    4.89  )-----------( 203      ( 18 Apr 2018 09:01 )             31.6             04-19    139  |  94<L>  |  17  ----------------------------<  127<H>  3.8   |  34<H>  |  1.3    Ca    7.7<L>      19 Apr 2018 06:59  Mg     2.1     04-18              PT/INR - ( 19 Apr 2018 06:59 )   PT: >40.00 sec;   INR: 4.13 ratio        Culture - Blood (collected 16 Apr 2018 10:32)  Source: .Blood Blood  Preliminary Report (18 Apr 2018 01:02):    No growth to date.    Culture - Blood (collected 16 Apr 2018 10:32)  Source: .Blood Blood  Preliminary Report (18 Apr 2018 01:02):    No growth to date. Patient is a 77y old  Male who presents with a chief complaint of cellulitis (16 Apr 2018 15:16)    Overnight events: no events overnight reports improvement in breathing and LE swelling    PAST MEDICAL & SURGICAL HISTORY:  High cholesterol  Hypertension  Blood clot in vein  COPD (chronic obstructive pulmonary disease)  S/P coronary artery stent placement      MEDICATIONS  (STANDING):  atorvastatin 20 milliGRAM(s) Oral at bedtime  buDESOnide  80 MICROgram(s)/formoterol 4.5 MICROgram(s) Inhaler 2 Puff(s) Inhalation two times a day  ceFAZolin   IVPB      ceFAZolin   IVPB 2000 milliGRAM(s) IV Intermittent every 8 hours  clindamycin IVPB 600 milliGRAM(s) IV Intermittent every 8 hours  clindamycin IVPB      furosemide   Injectable 40 milliGRAM(s) IV Push every 12 hours  metoprolol succinate ER 25 milliGRAM(s) Oral daily  sacubitril 24 mG/valsartan 26 mG 1 Tablet(s) Oral two times a day    MEDICATIONS  (PRN):  traMADol 50 milliGRAM(s) Oral every 8 hours PRN Severe Pain (7 - 10)      Home medications  atorvastatin:   lisinopril:   Metoprolol Tartrate:   warfarin:       Vital Signs Last 24 Hrs  T(C): 36.2 (19 Apr 2018 07:56), Max: 36.2 (19 Apr 2018 07:56)  T(F): 97.1 (19 Apr 2018 07:56), Max: 97.1 (19 Apr 2018 07:56)  HR: 69 (19 Apr 2018 07:56) (66 - 70)  BP: 123/59 (19 Apr 2018 07:56) (118/58 - 135/58)  BP(mean): --  RR: 20 (19 Apr 2018 07:56) (18 - 20)  SpO2: 96% (19 Apr 2018 05:27) (96% - 96%)  CAPILLARY BLOOD GLUCOSE        I&O's Summary    18 Apr 2018 07:01  -  19 Apr 2018 07:00  --------------------------------------------------------  IN: 680 mL / OUT: 2050 mL / NET: -1370 mL        Physical Exam:    -     General : NAD    -      Cardiac: regular S1S2    -      Pulm: decrease crackles but still present on the right    -      GI: soft non tender    -      Musculoskeletal: right leg decreased swelling and oozing, no edema in left side    Labs:                        9.5    4.89  )-----------( 203      ( 18 Apr 2018 09:01 )             31.6             04-19    139  |  94<L>  |  17  ----------------------------<  127<H>  3.8   |  34<H>  |  1.3    Ca    7.7<L>      19 Apr 2018 06:59  Mg     2.1     04-18              PT/INR - ( 19 Apr 2018 06:59 )   PT: >40.00 sec;   INR: 4.13 ratio        Culture - Blood (collected 16 Apr 2018 10:32)  Source: .Blood Blood  Preliminary Report (18 Apr 2018 01:02):    No growth to date.    Culture - Blood (collected 16 Apr 2018 10:32)  Source: .Blood Blood  Preliminary Report (18 Apr 2018 01:02):    No growth to date.

## 2018-04-19 NOTE — DISCHARGE NOTE ADULT - CARE PROVIDER_API CALL
Arnulfo Murray), Medicine  [address] [place] [address]  Phone: [phone]  Fax: [phone]    Arnulfo Scherer), Cardiovascular Disease; Internal Medicine; Interventional Cardiology  [address] [place] [id number]  Phone: [phone]  Fax: [phone]    coumadin clinic,   Phone: [phone]  Fax: (   )    -    [place]), Internal Medicine  [address] [place] [address]  Phone: [phone]  Fax: [phone]

## 2018-04-19 NOTE — PROGRESS NOTE ADULT - SUBJECTIVE AND OBJECTIVE BOX
RODNEY SANTO  77y, Male      OVERNIGHT EVENTS:    Wife at bedside. No pain in RLE.    VITALS:  T(F): 96.4, Max: 96.9 (04-18-18 @ 15:20)  HR: 65  BP: 111/63  RR: 18Vital Signs Last 24 Hrs  T(C): 35.8 (19 Apr 2018 07:56), Max: 36.1 (18 Apr 2018 15:20)  T(F): 96.4 (19 Apr 2018 07:56), Max: 96.9 (18 Apr 2018 15:20)  HR: 65 (19 Apr 2018 07:56) (65 - 70)  BP: 111/63 (19 Apr 2018 07:56) (111/63 - 135/58)  BP(mean): --  RR: 18 (19 Apr 2018 07:56) (18 - 18)  SpO2: 96% (19 Apr 2018 05:27) (96% - 96%)    TESTS & MEASUREMENTS:                        9.5    4.89  )-----------( 203      ( 18 Apr 2018 09:01 )             31.6     04-19    139  |  94<L>  |  17  ----------------------------<  127<H>  3.8   |  34<H>  |  1.3    Ca    7.7<L>      19 Apr 2018 06:59  Mg     2.1     04-18          Culture - Blood (collected 04-16-18 @ 10:32)  Source: .Blood Blood  Preliminary Report (04-18-18 @ 01:02):    No growth to date.    Culture - Blood (collected 04-16-18 @ 10:32)  Source: .Blood Blood  Preliminary Report (04-18-18 @ 01:02):    No growth to date.            RADIOLOGY & ADDITIONAL TESTS:    ANTIBIOTICS:  ceFAZolin   IVPB      ceFAZolin   IVPB 2000 milliGRAM(s) IV Intermittent every 8 hours  clindamycin IVPB 600 milliGRAM(s) IV Intermittent every 8 hours  clindamycin IVPB

## 2018-04-19 NOTE — DISCHARGE NOTE ADULT - PLAN OF CARE
MD Evaluation/Progress Note  Time: 25 minutes    Chief Complaint/Problem Status:Follow up on    Paranoid schizophrenia    History: Chart reviewed, progress gathered, case discussed with multidisciplinary team, patient seen, appropriate support/counselling/ therapy provided.    Subjectively reports making progress every day but objectively appears mildly deteriorated since last review yesterday. Not as spontaneous. Remains isolated in room. States this is how he is trying to deal with the auditory hallucinations. Continues to endorse hearing a voice that remains critical in nature towards him and passes negative judgments on others. However claims that it has quitened down. Did not attend any groups. Writer encouraged patient to attend some groups and also spend some time in the milieu to test himself out. Denies subjective paranoia. Complying with medications. In fact he seeks them out on his own. Increasing dose of Zyprexa. Stipulations finalized  Medication Side Effects: None   Sleep: Adequate  Appetite:Fair  Groups:no      Recent Lab study results:    CBC with differential  Lab Results   Component Value Date    WBC 3.7 (L) 03/04/2017    RBC 5.11 03/04/2017    HGB 16.2 03/04/2017    HCT 44.9 03/04/2017     03/04/2017    SEG 59 03/04/2017    PMON 11 03/04/2017    PEOS 1 03/04/2017    PBASO 0 03/04/2017    ANEUT 2.2 03/04/2017    ALYMS 1.1 (L) 03/04/2017    PEREZ 0.4 03/04/2017    AEOS 0.0 (L) 03/04/2017    ABASO 0.0 03/04/2017       Comprehensive metabolic panel  Lab Results   Component Value Date    SODIUM 141 03/04/2017    POTASSIUM 4.1 03/04/2017    CHLORIDE 106 03/04/2017    CO2 25 03/04/2017    GLUCOSE 123 (H) 03/04/2017    BUN 8 03/04/2017    CREATININE 0.77 03/04/2017    CALCIUM 9.7 03/04/2017    ALBUMIN 4.4 03/04/2017    BILIRUBIN 0.5 03/04/2017    ALKPT 48 03/04/2017    AST 12 03/04/2017    GPT 23 03/04/2017     No results for input(s): GGTP in the last 72 hours.  No results found for: MG  No results  found for this or any previous visit.    Thyroid function tests  No results for input(s): TSH, T4FREE, T3FREE in the last 72 hours.    POC Breath Alcohol testing result:       [facility]  [id number]@    POC Urine Drug Screen Results  Cocaine:                      Opiates:                       Buprenorphine:             Amphetamines:            Propoxyphene:            Oxycodone/Oxycontin:      Methamphetamine:        Barbituates:                   Marijuana:                 Benzodiazepine:         Methadone:                       Methylenedioxymethamphetamine:         No results found for: LITHIUM   No results found for: VALP  No results found for: CARBAM    No results found for: CPK  No results found for: HGBA1C    LIPID PANEL  No results found for: CHOLESTEROL No results found for: HDL No results found for: CHOHDL No results found for: TRIGLYCERIDE No results found for: CALCLDL  No results found for this or any previous visit.    Urine Panel  No results for input(s): UOSM, UK, 5UNITR, UCROA, UCL, NANCY, UKET, USPG, UPROT, UWBC, URBC, UBILI, UPH, UURO, USPG, UBACTR, UTPELC in the last 72 hours.    Invalid input(s): SPGRAVITY    Latest radiology results: No results found.    EKG - none    Examination:     VITALS:  Visit Vitals   • /84 (Patient Position: Standing)   • Pulse 105   • Temp 97.3 °F (36.3 °C) (Oral)   • Resp 18   • Ht 5' 8\" (1.727 m)   • Wt 73.6 kg   • SpO2 99%   • BMI 24.67 kg/m2       MENTAL STATUS EXAM  General appearance: Cooperative, age-appropriate  Speech: Fluent  Mood/affect: Constricted  Psychomotor: slow  Associations: intact  Thought process: Mostly linear, impoverished  Thought content: Paranoid  Perceptual disorders/hallucinations: auditory hallucinations  Level of consciousness: Alert  Orientation: oriented to person, place, time, and general circumstances  Attention/ Concentration: reduced ability to maintain attention  Fund of knowledge/ Intelligence:  average  Memory: no apparent impairments in short term memory and no apparent impairments in long term memory   Insight: Partial  Judgement: Better  Suicidal thoughts: no  homicidal thoughts: no  Language:intact  Gait/ Station:normal    Medical Decision Making     Overall Response to Treatment:  Essentially Same    New problem: no    Medication Review/Mgmt: medication changes:  see orders    Labs: no    Legal : Chapter 51 stipulations    Risk of Assessment for Harm to Self/Suicide risk: low    Risk of Assessment of Harm to Others: moderate    Risk of vulnerability: high    Withdrawal risk: none    Reasons for continued hospitalization: continued treatment of psychotic symptoms     treatment -continue antibiotics doxycycline for 8 more days  -dressing once daily  -keep right leg elevated to decrease edema  - You can take a shower or bath, but be sure to pat the area dry with a towel afterward. Do not put any antibiotic ointments or creams on the area  -You should call if the red area gets Bigger, More swollen or More painful symptoms relief -take lasix as instructed  -avoid salt or excess water intake  -if SOB or increase feet swelling call your doctor

## 2018-04-19 NOTE — CONSULT NOTE ADULT - SUBJECTIVE AND OBJECTIVE BOX
HPI:  76 yo M with significant PMHx for HTN, CAD, DM, HL, A-Fib and RLE DVT, on Coumadin, venous stasis and venous stasis ulcerations, who was sent into ED for right lower extremity erythema, suspected cellulitis due to large are of erythema and greenish color drainage from vascular office. Patient states that he had a DVT 3 weeks ago and was seen by vascular where they placed a compression boot on right leg for thrombophlebity syndrome (post-DVT edema). Pt states the boot was too tight so he went back in 1 week ago after wife had cut the boot off because he could not feel his toes. Another compression boot was placed however he noticed that there was malodourous and serous fluid leaking from the edges. This prompted another visit to the vascular office, whereupon he was directed to the ED after boot was removed for cellulitis. No fever, no chills, no SOB, no chest pain.       pulm: Dr. Murray  cards: Dr. Scherer (16 Apr 2018 15:16)      T(C): 36.2 (04-19-18 @ 15:41), Max: 36.2 (04-19-18 @ 15:41)  HR: 59 (04-19-18 @ 15:41) (59 - 70)  BP: 128/60 (04-19-18 @ 15:41) (111/63 - 135/58)  RR: 18 (04-19-18 @ 15:41) (18 - 18)  SpO2: 96% (04-19-18 @ 05:27) (96% - 96%)    MOTOR EXAM  5/5 all ext MOORE on O2 secondary to CHF      Physical Medicine And Rehabilitation Services are not indicated in this patient for the following Reason(s):    [    ] Patient is medically unstable      [    ]  Patient does not have appropriate activity orders      [     ] Patient has no weight bearing status for:      [   x  ] Patient is independently ambulating  HE DOESNT WANT PT OR NEED IT      [     ]  Patient is from Skilled Nursing Facility and is appropriate to return      [     ] Patient was non-ambulatory prior to admission      [     ]  Other      WILL CANCEL PM&R / PT request

## 2018-04-19 NOTE — PROGRESS NOTE ADULT - ASSESSMENT
78 yo M with significant PMHx for HTN, CAD, CHF,  HL, A-Fib and RLE DVT, on Coumadin, venous stasis and venous stasis ulcerations, was sent into ED from vascular office for right lower extremity erythema, suspected cellulitis with greenish color drainage. Patient states that he had a DVT 3 weeks ago and was seen by vascular where they placed a compression boot on right leg for thrombophlebity syndrome (post-DVT edema). Pt states the boot was too tight so he went back in 1 week ago after wife had cut the boot off because he could not feel his toes. Another compression boot was placed however he noticed that there was malodourous and serous fluid leaking from the edges. This prompted another visit to the vascular office, whereupon he was directed to the ED after boot was removed for cellulitis. No fever, no chills, no SOB, no chest pain.     ** Cellulitis / erysipelas of right lower extremity   - Dressing twice a day changes: with non-adhesive pads, abd pads, kirlex and ace wraps; keep right leg elevated on 2 pillows to improve swelling  - was on unacyn (4/17), will switch to clindamycin and cefazolin as per ID recommendations  - keep right leg elevated on 2 pillows to improve swelling    **h/o DVT on coumadin / afib  -INR =3.5 today, coumadin is to be held again tonight  -HR controlled / c/w lopressor    **HTN/CAD/DLD  - c/w home meds    **Acute on Chronic systolic heart failure; BNP>7K  -EF=45% in 2017, repeated echo is still pending  -on BB, entresto  -Lasix increased to IV bid, patient is in negative balance    ** COPD: has home O2  - c/w O2  - usees BiPAP at night  -symbicort to be resumed    ** likely has KYLIE: stop bang questionnaire yields 8/8: high risk; follow up pulmonolgy as outpatient, will need polysomnography    **DVT ppx: systemic ATG    DISPO: comes from home 76 yo M with significant PMHx for HTN, CAD, CHF,  HL, A-Fib and RLE DVT, on Coumadin, venous stasis and venous stasis ulcerations, was sent into ED from vascular office for right lower extremity erythema, suspected cellulitis with greenish color drainage. Patient states that he had a DVT 3 weeks ago and was seen by vascular where they placed a compression boot on right leg for thrombophlebity syndrome (post-DVT edema). Pt states the boot was too tight so he went back in 1 week ago after wife had cut the boot off because he could not feel his toes. Another compression boot was placed however he noticed that there was malodourous and serous fluid leaking from the edges. This prompted another visit to the vascular office, whereupon he was directed to the ED after boot was removed for cellulitis. No fever, no chills, no SOB, no chest pain.     ** Cellulitis improving  - Dressing twice a day changes: with non-adhesive pads, abd pads, kirlex and ace wraps; keep right leg elevated on 2 pillows to improve swelling  - was on unacyn (4/17) then clindamycin and cefazolin (4/18); plan to po Doxycycline 100mg q12h for 8 more days  - keep right leg elevated on 2 pillows to improve swelling    **h/o DVT on coumadin / afib  -INR =4.13 today, last dose of coumadin 4/16, keep coumadin on hold  -HR controlled / c/w lopressor    **HTN/CAD/DLD  - c/w home meds    **Acute on Chronic systolic heart failure; BNP>7K: improving  -EF=45% in 2017, repeated echo is still pending  -on BB, Entresto  -Lasix 40iv bid; negative 2L    ** COPD: has home O2  - c/w O2  - usees BiPAP at night  -symbicort bid    ** likely has KYLIE: stop bang questionnaire yields 8/8: high risk; follow up pulmonolgy as outpatient, will need polysomnography    **DVT ppx: systemic ATG    DISPO: comes from home

## 2018-04-19 NOTE — DISCHARGE NOTE ADULT - ADDITIONAL INSTRUCTIONS
-keep off coumadin until monday / follow up coumadin clinic on monday  -follow up dr green for KYLIE

## 2018-04-19 NOTE — DISCHARGE NOTE ADULT - HOSPITAL COURSE
78 yo M with significant PMHx for HTN, CAD, CHF,  HL, A-Fib and RLE DVT, on Coumadin, venous stasis and venous stasis ulcerations, was sent into ED from vascular office for right lower extremity erythema, suspected cellulitis with greenish color drainage. Patient states that he had a DVT 3 weeks ago and was seen by vascular where they placed a compression boot on right leg for thrombophlebity syndrome (post-DVT edema).   upon admission he was diagnosed with right leg cellulitis and was started on unacyn then switched to clindamycin and cefazolin, plan to discharge on doxycycline  patient also has heart failure with SOB, pro  BNP>7000 and congestion on CXR, repeat echo showed EF of 55% with elevated SPAP possible secondary to KYLIE. plan to follow up with dr way / dr Murray on OP for KYLIE diagnosis  patient was on coumadin for DVT + afib during admission INR was >3, coumadin was held --> follow up coumadin clinic in monday 76 yo M with significant PMHx for HTN, CAD, HL, A-Fib and RLE DVT, on Coumadin, venous stasis and venous stasis ulcerations, was sent into ED from vascular office for right lower extremity erythema, suspected cellulitis with greenish color drainage. Patient states that he had a DVT 3 weeks ago and was seen by vascular where they placed a compression boot on right leg for thrombophlebity syndrome (post-DVT edema).   upon admission he was diagnosed with right leg cellulitis and was started on unacyn then switched to clindamycin and cefazolin, plan to discharge on doxycycline  On admission patient also presented with SOB, pro  BNP>7000 and congestion on CXR, repeat echo showed EF of 55% with pulmonary hypertension possible secondary to KYLIE. plan to follow up with dr way / dr Murray on OP for KYLIE diagnosis  patient was on coumadin for DVT + afib during admission INR was >3, coumadin was held --> follow up coumadin clinic on monday

## 2018-04-19 NOTE — DISCHARGE NOTE ADULT - PROVIDER TOKENS
TOKEN:'[id number]',TOKEN:'[id number]',FREE:[LAST:[coumadin clinic],PHONE:[phone]],FAX:[(   )    -]],TOKEN:'[id number]'

## 2018-04-19 NOTE — PROGRESS NOTE ADULT - ATTENDING COMMENTS
Pt says the RLE pain and swelling is better, still has SOB even moving in bed.     PE: NAD,  morbidly obese  Lungs: b/l basilar crackles  CVS: S1S2, RRR  GI/Abd: soft, NT, ND, +BS  Extr: RLE with erythema from the dorsum of the foot to the knee, 3+ pitting edema.  Neuro: AOx3    A/P: 1. RLE cellulitis, in setting of chronic venous stasis after DVT.  - ABx adjusted based on ID recommendations   - leg elevation    2. Acute on chronic systolic CHF exacerbation  - BNP >7000  - will repeat ECHO  - continue Lasix 40 mg IV BID  - continue BB, Statins, Entresto (ordered as non-formulary).  - daily weight, monitor I&O, fluid restriction  - will consider cardiology eval if with significant change    3. COPD, chronic respiratory failure as per pt  - no acute exacerbation, not on inhalers, will clarify the reason    4. DVT of RLE  - on Coumadin  - INR 3.5 today, will hold dose today and repeat INR tomorrow and resume Coumadin tomorrow    5. KYLIE - not on BiPAP at home  6. Morbid obesity  7. DM2 - Insulin regimen, CBG check    Prophylactic measures  DVT ppx with therapeutic dose of Coumadin  GI ppx not indicated  Heart healthy diet with fluid restriction  Full code    Dispo - pending clinical course .
Agree with resident's note, HPI, PE, assessment and plan.    NO active complaints.  RLE swelling better, but pain persists.  SOB pretty much the same, but on PE crackles are less    1. Cellulitis - cont ABx IV today, will switch to PO tomorrow, dressing changes BID.  2. Hx of DVT, on Coumadin. Supra therapeutic INR - Coumadin on hold, INR still elevated, ? due to ABx or dietary noncompliance  3. Acute on chronic systolic CHF exacerbation - ECHO pending, will call cardio, pt insists to see him prior to dc, continue IV Lasix for today.  4. KYLIE - not compliant with BiPAP  5. Morbid obesity    Dispo: pending clinical course

## 2018-04-19 NOTE — DISCHARGE NOTE ADULT - CARE PROVIDERS DIRECT ADDRESSES
,[email or web address],DirectAddress_Unknown,DirectAddress_Unknown

## 2018-04-19 NOTE — PROGRESS NOTE ADULT - ASSESSMENT
IMPRESSIONS:  Resolving cellulitis.  RLE looks significantly better.  Blood cultures are negative.    RECOMMENDATIONS:  Iv antibiotics for 24 h and then change iv to po Doxycycline 100mg q12h for 8 more days.   D/C prednisone after dose on 4/20.  Recall prn please.

## 2018-04-20 ENCOUNTER — APPOINTMENT (OUTPATIENT)
Dept: VASCULAR SURGERY | Facility: CLINIC | Age: 78
End: 2018-04-20

## 2018-04-20 VITALS
SYSTOLIC BLOOD PRESSURE: 125 MMHG | HEART RATE: 69 BPM | TEMPERATURE: 97 F | RESPIRATION RATE: 18 BRPM | DIASTOLIC BLOOD PRESSURE: 80 MMHG

## 2018-04-20 LAB
ALBUMIN SERPL ELPH-MCNC: 3 G/DL — LOW (ref 3.5–5.2)
ALP SERPL-CCNC: 65 U/L — SIGNIFICANT CHANGE UP (ref 30–115)
ALT FLD-CCNC: 8 U/L — SIGNIFICANT CHANGE UP (ref 0–41)
ANION GAP SERPL CALC-SCNC: 10 MMOL/L — SIGNIFICANT CHANGE UP (ref 7–14)
APTT BLD: 38.3 SEC — SIGNIFICANT CHANGE UP (ref 27–39.2)
AST SERPL-CCNC: 21 U/L — SIGNIFICANT CHANGE UP (ref 0–41)
BILIRUB SERPL-MCNC: 0.7 MG/DL — SIGNIFICANT CHANGE UP (ref 0.2–1.2)
BUN SERPL-MCNC: 22 MG/DL — HIGH (ref 10–20)
CALCIUM SERPL-MCNC: 7.8 MG/DL — LOW (ref 8.5–10.1)
CHLORIDE SERPL-SCNC: 98 MMOL/L — SIGNIFICANT CHANGE UP (ref 98–110)
CO2 SERPL-SCNC: 35 MMOL/L — HIGH (ref 17–32)
CREAT SERPL-MCNC: 1.2 MG/DL — SIGNIFICANT CHANGE UP (ref 0.7–1.5)
FIBRINOGEN PPP-MCNC: 419 MG/DL — SIGNIFICANT CHANGE UP (ref 204.4–570.6)
GLUCOSE SERPL-MCNC: 99 MG/DL — SIGNIFICANT CHANGE UP (ref 70–99)
INR BLD: 4.56 RATIO — HIGH (ref 0.65–1.3)
POTASSIUM SERPL-MCNC: 3.5 MMOL/L — SIGNIFICANT CHANGE UP (ref 3.5–5)
POTASSIUM SERPL-SCNC: 3.5 MMOL/L — SIGNIFICANT CHANGE UP (ref 3.5–5)
PROT SERPL-MCNC: 5.8 G/DL — LOW (ref 6–8)
PROTHROM AB SERPL-ACNC: >40 SEC — HIGH (ref 9.95–12.87)
SODIUM SERPL-SCNC: 143 MMOL/L — SIGNIFICANT CHANGE UP (ref 135–146)

## 2018-04-20 RX ORDER — LISINOPRIL 2.5 MG/1
0 TABLET ORAL
Qty: 0 | Refills: 0 | COMMUNITY

## 2018-04-20 RX ORDER — WARFARIN SODIUM 2.5 MG/1
0 TABLET ORAL
Qty: 0 | Refills: 0 | COMMUNITY

## 2018-04-20 RX ORDER — METOPROLOL TARTRATE 50 MG
0 TABLET ORAL
Qty: 0 | Refills: 0 | COMMUNITY

## 2018-04-20 RX ORDER — BUDESONIDE AND FORMOTEROL FUMARATE DIHYDRATE 160; 4.5 UG/1; UG/1
1 AEROSOL RESPIRATORY (INHALATION)
Qty: 0 | Refills: 0 | DISCHARGE
Start: 2018-04-20

## 2018-04-20 RX ORDER — ATORVASTATIN CALCIUM 80 MG/1
0 TABLET, FILM COATED ORAL
Qty: 0 | Refills: 0 | COMMUNITY

## 2018-04-20 RX ORDER — FUROSEMIDE 40 MG
40 TABLET ORAL
Qty: 0 | Refills: 0 | Status: DISCONTINUED | OUTPATIENT
Start: 2018-04-20 | End: 2018-04-20

## 2018-04-20 RX ORDER — METOPROLOL TARTRATE 50 MG
1 TABLET ORAL
Qty: 0 | Refills: 0 | COMMUNITY
Start: 2018-04-20

## 2018-04-20 RX ORDER — SACUBITRIL AND VALSARTAN 24; 26 MG/1; MG/1
1 TABLET, FILM COATED ORAL
Qty: 0 | Refills: 0 | DISCHARGE
Start: 2018-04-20

## 2018-04-20 RX ORDER — FUROSEMIDE 40 MG
1 TABLET ORAL
Qty: 0 | Refills: 0 | COMMUNITY
Start: 2018-04-20

## 2018-04-20 RX ORDER — ATORVASTATIN CALCIUM 80 MG/1
1 TABLET, FILM COATED ORAL
Qty: 0 | Refills: 0 | DISCHARGE
Start: 2018-04-20

## 2018-04-20 RX ADMIN — SACUBITRIL AND VALSARTAN 1 TABLET(S): 24; 26 TABLET, FILM COATED ORAL at 05:23

## 2018-04-20 RX ADMIN — Medication 25 MILLIGRAM(S): at 05:23

## 2018-04-20 RX ADMIN — Medication 100 MILLIGRAM(S): at 05:22

## 2018-04-20 RX ADMIN — Medication 100 MILLIGRAM(S): at 14:20

## 2018-04-20 RX ADMIN — Medication 40 MILLIGRAM(S): at 05:23

## 2018-04-20 RX ADMIN — Medication 100 MILLIGRAM(S): at 05:24

## 2018-04-20 RX ADMIN — Medication 100 MILLIGRAM(S): at 13:23

## 2018-04-20 NOTE — PROGRESS NOTE ADULT - ASSESSMENT
1. Cellulitis - improving, can switch to PO ABx as per ID recs, dressing change QD.   2. Hx of DVT, on Coumadin. Supra therapeutic INR - Coumadin on hold, INR still elevated, ? due to ABx or dietary noncompliance. Pt instructed to hold Coumadin until Monday, when he will have appointment in Coumadin clinic.  3. SOB most likely due to Pulmonary HTN likely due to KYLIE - pt advised to f/u with Dr. Murray.  4. ?CHF - ECHO normal systolic function, continue Lasix 40 mg PO BID, BB, Entresto   5. Morbid obesity    Dispo: home with VNS, clinically stable for discharge home today. Pt will f/u with cardiology, PMD and pulm OP.

## 2018-04-20 NOTE — CONSULT NOTE ADULT - SUBJECTIVE AND OBJECTIVE BOX
76 yo M with HTN, non obstructive CAD, DM, HL, A-Fib and RLE DVT, on Coumadin, venous stasis and venous stasis ulcerations, who is admitted for RLE cellulitis caused by a boot he was wearing to avoid post DVT edema. He wa found to have acute on chronic CHF and was started on IV diuresis.    PAST MEDICAL & SURGICAL HISTORY:  High cholesterol  Hypertension  Blood clot in vein  COPD (chronic obstructive pulmonary disease)  S/P coronary artery stent placement    Home Medications:  atorvastatin:  (16 Apr 2018 11:22)  lisinopril:  (16 Apr 2018 11:22)  Metoprolol Tartrate:  (16 Apr 2018 11:22)  warfarin:  (16 Apr 2018 11:22)    MEDICATIONS  (STANDING):  atorvastatin 20 milliGRAM(s) Oral at bedtime  buDESOnide  80 MICROgram(s)/formoterol 4.5 MICROgram(s) Inhaler 2 Puff(s) Inhalation two times a day  ceFAZolin   IVPB      ceFAZolin   IVPB 2000 milliGRAM(s) IV Intermittent every 8 hours  clindamycin IVPB 600 milliGRAM(s) IV Intermittent every 8 hours  clindamycin IVPB      furosemide   Injectable 40 milliGRAM(s) IV Push every 12 hours  metoprolol succinate ER 25 milliGRAM(s) Oral daily  sacubitril 24 mG/valsartan 26 mG 1 Tablet(s) Oral two times a day    T(C): 35.8 (04-20-18 @ 08:00), Max: 36.2 (04-19-18 @ 15:41)  HR: 59 (04-20-18 @ 08:00) (59 - 78)  BP: 145/69 (04-20-18 @ 08:00) (128/60 - 145/69)  RR: 18 (04-20-18 @ 08:00) (18 - 19)  SpO2: 96% (04-19-18 @ 19:32) (96% - 96%)    PHYSICAL EXAM:  GENERAL: NAD, well-developed  CHEST/LUNG: decreased aire entry right base  HEART: irregular rate and rhythm  ABDOMEN: Soft, Nontender, Nondistended; Bowel sounds present  EXTREMITIES:  2+ Peripheral Pulses, no LLE edema, + redness and edema RLE    04-20    143  |  98  |  22<H>  ----------------------------<  99  3.5   |  35<H>  |  1.2    Ca    7.8<L>      20 Apr 2018 06:54  Mg     2.1     04-18    TPro  5.8<L>  /  Alb  3.0<L>  /  TBili  0.7  /  DBili  x   /  AST  21  /  ALT  8   /  AlkPhos  65  04-20    PT/INR - ( 20 Apr 2018 06:54 )   PT: >40.00 sec;   INR: 4.56 ratio         PTT - ( 20 Apr 2018 06:54 )  PTT:38.3 sec    < from: 12 Lead ECG (04.16.18 @ 10:54) >  Diagnosis Line Atrial fibrillation with slow ventricular response with ventricular escape  complexes  Low voltage QRS  Nonspecific ST abnormality  Abnormal ECG    < from: Xray Chest 1 View- PORTABLE-Urgent (04.16.18 @ 11:32) >  Stable right lower lung field pleural effusion versus thickening with   adjacent stable parenchymal disease.     New pulmonary vascular congestion.    < from: Transthoracic Echocardiogram (04.19.18 @ 09:40) >    Summary:   1. LV Ejection Fraction by Brown's Method with a biplane EF of 55 %.   2. Elevated mean left atrial pressure.   3. Estimated pulmonary artery systolic pressure is 51.3 mmHg assuming a   right atrial pressure of 10 mmHg, which is consistent with moderate   pulmonary hypertension.   4. Mitral valve mean gradient is 2.0 mmHg consistent with normal mitral   stenosis.    6/2017 : CORONARY CIRCULATION: Left main: Normal. The vessel was medium sized. LAD: The   vessel was medium sized and calcified. Angiography showed mild   atherosclerosis with no flow limiting lesions. Circumflex: The vessel was   medium to large sized. Mid circumflex: There was a discrete 60 % stenosis at   a site with no prior intervention, at the origin of OM1. The lesion is same   as seen on prior cath in 2007. There was JOSELUIS grade 3 flow through the vessel   (brisk flow). Distal circumflex: There was a diffuse 25 % stenosis at the   site of a prior stent, in-stent. 1st obtuse marginal: The vessel was large   sized. Angiography showed minor luminal irregularities with no flow limiting   lesions. RCA: The vessel was large sized (dominant). Angiography showed mild   atherosclerosis with no flow limiting lesions. Mid RCA: There was a tubular   40 % stenosis. There was JOSELUIS grade 3 flow through the vessel (brisk flow).   Distal vessel angiography showed mild diffuse disease. 77 M with Nonobstructive CAD, DM, AF on Coumadin and RLE DVT venous stasis and venous stasis ulcerations, who is admitted for RLE cellulitis caused by a boot he was wearing to avoid post DVT edema. He wa found to have acute on chronic CHF and was started on IV diuresis.    Cardiologist: Dr. Arnulfo Scherer    PAST MEDICAL & SURGICAL HISTORY:  High cholesterol  Hypertension  Blood clot in vein  COPD (chronic obstructive pulmonary disease)  S/P coronary artery stent placement    Home Medications:  atorvastatin:  (16 Apr 2018 11:22)  lisinopril:  (16 Apr 2018 11:22)  Metoprolol Tartrate:  (16 Apr 2018 11:22)  warfarin:  (16 Apr 2018 11:22)    MEDICATIONS  (STANDING):  atorvastatin 20 milliGRAM(s) Oral at bedtime  buDESOnide  80 MICROgram(s)/formoterol 4.5 MICROgram(s) Inhaler 2 Puff(s) Inhalation two times a day  ceFAZolin   IVPB      ceFAZolin   IVPB 2000 milliGRAM(s) IV Intermittent every 8 hours  clindamycin IVPB 600 milliGRAM(s) IV Intermittent every 8 hours  clindamycin IVPB      furosemide   Injectable 40 milliGRAM(s) IV Push every 12 hours  metoprolol succinate ER 25 milliGRAM(s) Oral daily  sacubitril 24 mG/valsartan 26 mG 1 Tablet(s) Oral two times a day    T(C): 35.8 (04-20-18 @ 08:00), Max: 36.2 (04-19-18 @ 15:41)  HR: 59 (04-20-18 @ 08:00) (59 - 78)  BP: 145/69 (04-20-18 @ 08:00) (128/60 - 145/69)  RR: 18 (04-20-18 @ 08:00) (18 - 19)  SpO2: 96% (04-19-18 @ 19:32) (96% - 96%)    PHYSICAL EXAM:  GENERAL: NAD, well-developed  CHEST/LUNG: decreased aire entry right base  HEART: irregular rate and rhythm  ABDOMEN: Soft, Nontender, Nondistended; Bowel sounds present  EXTREMITIES:  2+ Peripheral Pulses, no LLE edema, + redness and edema RLE    04-20    143  |  98  |  22<H>  ----------------------------<  99  3.5   |  35<H>  |  1.2    Ca    7.8<L>      20 Apr 2018 06:54  Mg     2.1     04-18    TPro  5.8<L>  /  Alb  3.0<L>  /  TBili  0.7  /  DBili  x   /  AST  21  /  ALT  8   /  AlkPhos  65  04-20    PT/INR - ( 20 Apr 2018 06:54 )   PT: >40.00 sec;   INR: 4.56 ratio      PTT - ( 20 Apr 2018 06:54 )  PTT:38.3 sec      < from: 12 Lead ECG (04.16.18 @ 10:54) >  Diagnosis Line Atrial fibrillation with slow ventricular response with ventricular escape  complexes  Low voltage QRS  Nonspecific ST abnormality  Abnormal ECG      < from: Xray Chest 1 View- PORTABLE-Urgent (04.16.18 @ 11:32) >  Stable right lower lung field pleural effusion versus thickening with   adjacent stable parenchymal disease.     New pulmonary vascular congestion.      < from: Transthoracic Echocardiogram (04.19.18 @ 09:40) >    Summary:   1. LV Ejection Fraction by Brown's Method with a biplane EF of 55 %.   2. Elevated mean left atrial pressure.   3. Estimated pulmonary artery systolic pressure is 51.3 mmHg assuming a   right atrial pressure of 10 mmHg, which is consistent with moderate   pulmonary hypertension.   4. Mitral valve mean gradient is 2.0 mmHg consistent with normal mitral   stenosis.    6/2017 : CORONARY CIRCULATION: Left main: Normal. The vessel was medium sized. LAD: The   vessel was medium sized and calcified. Angiography showed mild   atherosclerosis with no flow limiting lesions. Circumflex: The vessel was   medium to large sized. Mid circumflex: There was a discrete 60 % stenosis at   a site with no prior intervention, at the origin of OM1. The lesion is same   as seen on prior cath in 2007. There was JOSELUIS grade 3 flow through the vessel   (brisk flow). Distal circumflex: There was a diffuse 25 % stenosis at the   site of a prior stent, in-stent. 1st obtuse marginal: The vessel was large   sized. Angiography showed minor luminal irregularities with no flow limiting   lesions. RCA: The vessel was large sized (dominant). Angiography showed mild   atherosclerosis with no flow limiting lesions. Mid RCA: There was a tubular   40 % stenosis. There was JOSELUIS grade 3 flow through the vessel (brisk flow).   Distal vessel angiography showed mild diffuse disease.

## 2018-04-20 NOTE — PROGRESS NOTE ADULT - SUBJECTIVE AND OBJECTIVE BOX
RODNEY SANTO    76 yo M with PMHx HTN, CAD, DM2, HL, A-Fib, COPD, chronic respiratory failure, chronic systolic CHF, KYLIE, obesity and RLE DVT, on Coumadin, venous stasis and venous stasis ulcerations, who was sent into ED for right lower extremity erythema, suspected cellulitis, after his evaluation in the office for 2 weeks worsening skin breaking, increased greenish color drainage and redness of the skin. Pt denied trauma, but states his boot was too tight and that contributed to ulcer formation, denies fever, chills. (+) SOB, leg swelling, denies orthopnea. Pt states he never had similar symptoms before.    NO new complaints, SOB slightly better, draining from the Right leg significantly improved as well as pain and erythema.    PHYSICAL EXAM:  T(C): 35.8, Max: 35.9 (04-19-18 @ 23:32)  HR: 59 (59 - 78)  BP: 145/69 (134/58 - 145/69)  RR: 18 (18 - 19)  SpO2: 96% (96% - 96%)    Physical exam: NAD, morbidly obese  HEENT: PERRL  NECK: supple, no JVD  RESP: basilar crackles improved, no rhonchi  CVS: S1S2, irregularly irregular   GI: abdomen soft NT, ND, obese  Extremities: Right leg edema improved, erythema subsided  NEURO: AOx3, no focal deficit  H/L: no enlarged LN noted     LABS:    04-20    143  |  98  |  22<H>  ----------------------------<  99  3.5   |  35<H>  |  1.2    Ca    7.8<L>      20 Apr 2018 06:54  Mg     2.1     04-18    TPro  5.8<L>  /  Alb  3.0<L>  /  TBili  0.7  /  DBili  x   /  AST  21  /  ALT  8   /  AlkPhos  65  04-20    PT/INR - ( 20 Apr 2018 06:54 )   PT: >40.00 sec;   INR: 4.56 ratio         PTT - ( 20 Apr 2018 06:54 )  PTT:38.3 sec    ECHO: Moderate pulmonary hypertension. Mitral valve mean gradient is 2.0 mmHg consistent with normal mitral stenosis.    MEDICATIONS  (STANDING):  atorvastatin 20 milliGRAM(s) Oral at bedtime  buDESOnide  80 MICROgram(s)/formoterol 4.5 MICROgram(s) Inhaler 2 Puff(s) Inhalation two times a day  ceFAZolin   IVPB      ceFAZolin   IVPB 2000 milliGRAM(s) IV Intermittent every 8 hours  clindamycin IVPB 600 milliGRAM(s) IV Intermittent every 8 hours  clindamycin IVPB      furosemide    Tablet 40 milliGRAM(s) Oral two times a day  metoprolol succinate ER 25 milliGRAM(s) Oral daily  sacubitril 24 mG/valsartan 26 mG 1 Tablet(s) Oral two times a day    MEDICATIONS  (PRN):  traMADol 50 milliGRAM(s) Oral every 8 hours PRN Severe Pain (7 - 10)

## 2018-04-20 NOTE — PROGRESS NOTE ADULT - NSHPATTENDINGPLANDISCUSS_GEN_ALL_CORE
residents, nursing, , patient
residents, nursing, , patient and his wife
residents, nursing, , patient and his wife

## 2018-04-20 NOTE — CONSULT NOTE ADULT - ASSESSMENT
77 yrs old male patient with CHF, COPD on home oxygen, AFib and DVT on coumadin is here for RLE cellulitis being treated with antibiotics - found to have pulmonary congestion on CXR with elevated pro BNP - started on IV diuresis.    * acute on chronic systolic CHF: Repeat echo with EF 55%, pulmonary hypertension    on IV diuresis --->  switch to PO lasix    continue with bblocker and entresto  *AFib , rate control with bblocker - continue with coumadin  * Infected RLE ulcer with cellulitis, in setting of chronic venous stasis: ABX as per ID

## 2018-04-22 LAB
CULTURE RESULTS: SIGNIFICANT CHANGE UP
CULTURE RESULTS: SIGNIFICANT CHANGE UP
SPECIMEN SOURCE: SIGNIFICANT CHANGE UP
SPECIMEN SOURCE: SIGNIFICANT CHANGE UP

## 2018-04-23 ENCOUNTER — OUTPATIENT (OUTPATIENT)
Dept: OUTPATIENT SERVICES | Facility: HOSPITAL | Age: 78
LOS: 1 days | Discharge: HOME | End: 2018-04-23

## 2018-04-23 DIAGNOSIS — I82.91 CHRONIC EMBOLISM AND THROMBOSIS OF UNSPECIFIED VEIN: ICD-10-CM

## 2018-04-23 DIAGNOSIS — Z79.01 LONG TERM (CURRENT) USE OF ANTICOAGULANTS: ICD-10-CM

## 2018-04-23 DIAGNOSIS — Z95.5 PRESENCE OF CORONARY ANGIOPLASTY IMPLANT AND GRAFT: Chronic | ICD-10-CM

## 2018-04-23 PROBLEM — I10 ESSENTIAL (PRIMARY) HYPERTENSION: Chronic | Status: ACTIVE | Noted: 2018-04-16

## 2018-04-23 PROBLEM — J44.9 CHRONIC OBSTRUCTIVE PULMONARY DISEASE, UNSPECIFIED: Chronic | Status: ACTIVE | Noted: 2018-04-16

## 2018-05-07 ENCOUNTER — APPOINTMENT (OUTPATIENT)
Dept: VASCULAR SURGERY | Facility: CLINIC | Age: 78
End: 2018-05-07
Payer: MEDICARE

## 2018-05-07 ENCOUNTER — OUTPATIENT (OUTPATIENT)
Dept: OUTPATIENT SERVICES | Facility: HOSPITAL | Age: 78
LOS: 1 days | Discharge: HOME | End: 2018-05-07

## 2018-05-07 DIAGNOSIS — Z79.01 LONG TERM (CURRENT) USE OF ANTICOAGULANTS: ICD-10-CM

## 2018-05-07 DIAGNOSIS — I82.91 CHRONIC EMBOLISM AND THROMBOSIS OF UNSPECIFIED VEIN: ICD-10-CM

## 2018-05-07 DIAGNOSIS — Z95.5 PRESENCE OF CORONARY ANGIOPLASTY IMPLANT AND GRAFT: Chronic | ICD-10-CM

## 2018-05-07 PROCEDURE — 99212 OFFICE O/P EST SF 10 MIN: CPT

## 2018-05-14 ENCOUNTER — OUTPATIENT (OUTPATIENT)
Dept: OUTPATIENT SERVICES | Facility: HOSPITAL | Age: 78
LOS: 1 days | Discharge: HOME | End: 2018-05-14

## 2018-05-14 DIAGNOSIS — I82.91 CHRONIC EMBOLISM AND THROMBOSIS OF UNSPECIFIED VEIN: ICD-10-CM

## 2018-05-14 DIAGNOSIS — Z95.5 PRESENCE OF CORONARY ANGIOPLASTY IMPLANT AND GRAFT: Chronic | ICD-10-CM

## 2018-05-14 DIAGNOSIS — Z79.01 LONG TERM (CURRENT) USE OF ANTICOAGULANTS: ICD-10-CM

## 2018-05-21 ENCOUNTER — OUTPATIENT (OUTPATIENT)
Dept: OUTPATIENT SERVICES | Facility: HOSPITAL | Age: 78
LOS: 1 days | Discharge: HOME | End: 2018-05-21

## 2018-05-21 DIAGNOSIS — Z79.01 LONG TERM (CURRENT) USE OF ANTICOAGULANTS: ICD-10-CM

## 2018-05-21 DIAGNOSIS — I82.91 CHRONIC EMBOLISM AND THROMBOSIS OF UNSPECIFIED VEIN: ICD-10-CM

## 2018-05-21 DIAGNOSIS — Z95.5 PRESENCE OF CORONARY ANGIOPLASTY IMPLANT AND GRAFT: Chronic | ICD-10-CM

## 2018-05-24 ENCOUNTER — APPOINTMENT (OUTPATIENT)
Dept: VASCULAR SURGERY | Facility: CLINIC | Age: 78
End: 2018-05-24
Payer: MEDICARE

## 2018-05-24 PROCEDURE — 99212 OFFICE O/P EST SF 10 MIN: CPT

## 2018-06-04 ENCOUNTER — OUTPATIENT (OUTPATIENT)
Dept: OUTPATIENT SERVICES | Facility: HOSPITAL | Age: 78
LOS: 1 days | Discharge: HOME | End: 2018-06-04

## 2018-06-04 DIAGNOSIS — Z79.01 LONG TERM (CURRENT) USE OF ANTICOAGULANTS: ICD-10-CM

## 2018-06-04 DIAGNOSIS — I82.91 CHRONIC EMBOLISM AND THROMBOSIS OF UNSPECIFIED VEIN: ICD-10-CM

## 2018-06-04 DIAGNOSIS — Z95.5 PRESENCE OF CORONARY ANGIOPLASTY IMPLANT AND GRAFT: Chronic | ICD-10-CM

## 2018-06-07 ENCOUNTER — APPOINTMENT (OUTPATIENT)
Dept: VASCULAR SURGERY | Facility: CLINIC | Age: 78
End: 2018-06-07
Payer: MEDICARE

## 2018-06-07 DIAGNOSIS — I87.2 VENOUS INSUFFICIENCY (CHRONIC) (PERIPHERAL): ICD-10-CM

## 2018-06-07 DIAGNOSIS — Z86.718 PERSONAL HISTORY OF OTHER VENOUS THROMBOSIS AND EMBOLISM: ICD-10-CM

## 2018-06-07 PROCEDURE — 93931 UPPER EXTREMITY STUDY: CPT

## 2018-06-07 PROCEDURE — 93971 EXTREMITY STUDY: CPT

## 2018-06-07 PROCEDURE — 99213 OFFICE O/P EST LOW 20 MIN: CPT

## 2018-06-21 ENCOUNTER — APPOINTMENT (OUTPATIENT)
Dept: CARDIOLOGY | Facility: CLINIC | Age: 78
End: 2018-06-21

## 2018-06-21 VITALS
WEIGHT: 293 LBS | BODY MASS INDEX: 36.43 KG/M2 | SYSTOLIC BLOOD PRESSURE: 90 MMHG | HEART RATE: 55 BPM | DIASTOLIC BLOOD PRESSURE: 58 MMHG | HEIGHT: 75 IN

## 2018-06-21 DIAGNOSIS — Z87.891 PERSONAL HISTORY OF NICOTINE DEPENDENCE: ICD-10-CM

## 2018-06-25 ENCOUNTER — OUTPATIENT (OUTPATIENT)
Dept: OUTPATIENT SERVICES | Facility: HOSPITAL | Age: 78
LOS: 1 days | Discharge: HOME | End: 2018-06-25

## 2018-06-25 DIAGNOSIS — I82.91 CHRONIC EMBOLISM AND THROMBOSIS OF UNSPECIFIED VEIN: ICD-10-CM

## 2018-06-25 DIAGNOSIS — Z95.5 PRESENCE OF CORONARY ANGIOPLASTY IMPLANT AND GRAFT: Chronic | ICD-10-CM

## 2018-06-25 DIAGNOSIS — Z79.01 LONG TERM (CURRENT) USE OF ANTICOAGULANTS: ICD-10-CM

## 2018-06-28 ENCOUNTER — APPOINTMENT (OUTPATIENT)
Dept: VASCULAR SURGERY | Facility: CLINIC | Age: 78
End: 2018-06-28
Payer: MEDICARE

## 2018-06-28 DIAGNOSIS — L97.319 VARICOSE VEINS OF RIGHT LOWER EXTREMITY WITH ULCER OF ANKLE: ICD-10-CM

## 2018-06-28 DIAGNOSIS — I83.013 VARICOSE VEINS OF RIGHT LOWER EXTREMITY WITH ULCER OF ANKLE: ICD-10-CM

## 2018-06-28 PROCEDURE — 99212 OFFICE O/P EST SF 10 MIN: CPT

## 2018-07-06 ENCOUNTER — EMERGENCY (EMERGENCY)
Facility: HOSPITAL | Age: 78
LOS: 0 days | Discharge: HOME | End: 2018-07-06
Attending: EMERGENCY MEDICINE | Admitting: EMERGENCY MEDICINE

## 2018-07-06 VITALS
DIASTOLIC BLOOD PRESSURE: 59 MMHG | RESPIRATION RATE: 22 BRPM | TEMPERATURE: 99 F | SYSTOLIC BLOOD PRESSURE: 143 MMHG | OXYGEN SATURATION: 91 % | HEART RATE: 46 BPM

## 2018-07-06 VITALS
HEART RATE: 48 BPM | RESPIRATION RATE: 20 BRPM | SYSTOLIC BLOOD PRESSURE: 138 MMHG | OXYGEN SATURATION: 93 % | DIASTOLIC BLOOD PRESSURE: 60 MMHG

## 2018-07-06 DIAGNOSIS — Z86.718 PERSONAL HISTORY OF OTHER VENOUS THROMBOSIS AND EMBOLISM: ICD-10-CM

## 2018-07-06 DIAGNOSIS — I25.10 ATHEROSCLEROTIC HEART DISEASE OF NATIVE CORONARY ARTERY WITHOUT ANGINA PECTORIS: ICD-10-CM

## 2018-07-06 DIAGNOSIS — R60.9 EDEMA, UNSPECIFIED: ICD-10-CM

## 2018-07-06 DIAGNOSIS — R63.5 ABNORMAL WEIGHT GAIN: ICD-10-CM

## 2018-07-06 DIAGNOSIS — Z79.01 LONG TERM (CURRENT) USE OF ANTICOAGULANTS: ICD-10-CM

## 2018-07-06 DIAGNOSIS — Z79.899 OTHER LONG TERM (CURRENT) DRUG THERAPY: ICD-10-CM

## 2018-07-06 DIAGNOSIS — Z95.5 PRESENCE OF CORONARY ANGIOPLASTY IMPLANT AND GRAFT: Chronic | ICD-10-CM

## 2018-07-06 DIAGNOSIS — J44.9 CHRONIC OBSTRUCTIVE PULMONARY DISEASE, UNSPECIFIED: ICD-10-CM

## 2018-07-06 DIAGNOSIS — I50.9 HEART FAILURE, UNSPECIFIED: ICD-10-CM

## 2018-07-06 DIAGNOSIS — Z79.84 LONG TERM (CURRENT) USE OF ORAL HYPOGLYCEMIC DRUGS: ICD-10-CM

## 2018-07-06 DIAGNOSIS — Z79.52 LONG TERM (CURRENT) USE OF SYSTEMIC STEROIDS: ICD-10-CM

## 2018-07-06 DIAGNOSIS — R06.02 SHORTNESS OF BREATH: ICD-10-CM

## 2018-07-06 DIAGNOSIS — Z87.891 PERSONAL HISTORY OF NICOTINE DEPENDENCE: ICD-10-CM

## 2018-07-06 DIAGNOSIS — I48.91 UNSPECIFIED ATRIAL FIBRILLATION: ICD-10-CM

## 2018-07-06 LAB
ALBUMIN SERPL ELPH-MCNC: 3.7 G/DL — SIGNIFICANT CHANGE UP (ref 3.5–5.2)
ALP SERPL-CCNC: 91 U/L — SIGNIFICANT CHANGE UP (ref 30–115)
ALT FLD-CCNC: 13 U/L — SIGNIFICANT CHANGE UP (ref 0–41)
ANION GAP SERPL CALC-SCNC: 14 MMOL/L — SIGNIFICANT CHANGE UP (ref 7–14)
APTT BLD: 41.7 SEC — HIGH (ref 27–39.2)
AST SERPL-CCNC: 20 U/L — SIGNIFICANT CHANGE UP (ref 0–41)
BASE EXCESS BLDV CALC-SCNC: 6.6 MMOL/L — HIGH (ref -2–2)
BASOPHILS # BLD AUTO: 0.02 K/UL — SIGNIFICANT CHANGE UP (ref 0–0.2)
BASOPHILS NFR BLD AUTO: 0.5 % — SIGNIFICANT CHANGE UP (ref 0–1)
BILIRUB SERPL-MCNC: 1.5 MG/DL — HIGH (ref 0.2–1.2)
BUN SERPL-MCNC: 29 MG/DL — HIGH (ref 10–20)
CA-I SERPL-SCNC: 1.16 MMOL/L — SIGNIFICANT CHANGE UP (ref 1.12–1.3)
CALCIUM SERPL-MCNC: 8.6 MG/DL — SIGNIFICANT CHANGE UP (ref 8.5–10.1)
CHLORIDE SERPL-SCNC: 101 MMOL/L — SIGNIFICANT CHANGE UP (ref 98–110)
CK SERPL-CCNC: 83 U/L — SIGNIFICANT CHANGE UP (ref 0–225)
CO2 SERPL-SCNC: 26 MMOL/L — SIGNIFICANT CHANGE UP (ref 17–32)
CREAT SERPL-MCNC: 1.4 MG/DL — SIGNIFICANT CHANGE UP (ref 0.7–1.5)
EOSINOPHIL # BLD AUTO: 0.1 K/UL — SIGNIFICANT CHANGE UP (ref 0–0.7)
EOSINOPHIL NFR BLD AUTO: 2.5 % — SIGNIFICANT CHANGE UP (ref 0–8)
GAS PNL BLDV: 143 MMOL/L — SIGNIFICANT CHANGE UP (ref 136–145)
GAS PNL BLDV: SIGNIFICANT CHANGE UP
GLUCOSE SERPL-MCNC: 117 MG/DL — HIGH (ref 70–99)
HCO3 BLDV-SCNC: 33 MMOL/L — HIGH (ref 22–29)
HCT VFR BLD CALC: 27.8 % — LOW (ref 42–52)
HCT VFR BLDA CALC: 27.5 % — LOW (ref 34–44)
HGB BLD CALC-MCNC: 9 G/DL — LOW (ref 14–18)
HGB BLD-MCNC: 8.6 G/DL — LOW (ref 14–18)
IMM GRANULOCYTES NFR BLD AUTO: 0.5 % — HIGH (ref 0.1–0.3)
INR BLD: 3.89 RATIO — HIGH (ref 0.65–1.3)
LACTATE BLDV-MCNC: 1.4 MMOL/L — SIGNIFICANT CHANGE UP (ref 0.5–1.6)
LYMPHOCYTES # BLD AUTO: 0.74 K/UL — LOW (ref 1.2–3.4)
LYMPHOCYTES # BLD AUTO: 18.2 % — LOW (ref 20.5–51.1)
MCHC RBC-ENTMCNC: 26.8 PG — LOW (ref 27–31)
MCHC RBC-ENTMCNC: 30.9 G/DL — LOW (ref 32–37)
MCV RBC AUTO: 86.6 FL — SIGNIFICANT CHANGE UP (ref 80–94)
MONOCYTES # BLD AUTO: 0.56 K/UL — SIGNIFICANT CHANGE UP (ref 0.1–0.6)
MONOCYTES NFR BLD AUTO: 13.8 % — HIGH (ref 1.7–9.3)
NEUTROPHILS # BLD AUTO: 2.62 K/UL — SIGNIFICANT CHANGE UP (ref 1.4–6.5)
NEUTROPHILS NFR BLD AUTO: 64.5 % — SIGNIFICANT CHANGE UP (ref 42.2–75.2)
NRBC # BLD: 0 /100 WBCS — SIGNIFICANT CHANGE UP (ref 0–0)
NT-PROBNP SERPL-SCNC: HIGH PG/ML (ref 0–300)
PCO2 BLDV: 60 MMHG — HIGH (ref 41–51)
PH BLDV: 7.35 — SIGNIFICANT CHANGE UP (ref 7.26–7.43)
PLATELET # BLD AUTO: 174 K/UL — SIGNIFICANT CHANGE UP (ref 130–400)
PO2 BLDV: 16 MMHG — LOW (ref 20–40)
POTASSIUM BLDV-SCNC: 3.4 MMOL/L — SIGNIFICANT CHANGE UP (ref 3.3–5.6)
POTASSIUM SERPL-MCNC: 3.7 MMOL/L — SIGNIFICANT CHANGE UP (ref 3.5–5)
POTASSIUM SERPL-SCNC: 3.7 MMOL/L — SIGNIFICANT CHANGE UP (ref 3.5–5)
PROT SERPL-MCNC: 6.8 G/DL — SIGNIFICANT CHANGE UP (ref 6–8)
PROTHROM AB SERPL-ACNC: >40 SEC — HIGH (ref 9.95–12.87)
RBC # BLD: 3.21 M/UL — LOW (ref 4.7–6.1)
RBC # FLD: 21.1 % — HIGH (ref 11.5–14.5)
SAO2 % BLDV: 14 % — SIGNIFICANT CHANGE UP
SODIUM SERPL-SCNC: 141 MMOL/L — SIGNIFICANT CHANGE UP (ref 135–146)
TROPONIN T SERPL-MCNC: <0.01 NG/ML — SIGNIFICANT CHANGE UP
WBC # BLD: 4.06 K/UL — LOW (ref 4.8–10.8)
WBC # FLD AUTO: 4.06 K/UL — LOW (ref 4.8–10.8)

## 2018-07-06 RX ORDER — FUROSEMIDE 40 MG
40 TABLET ORAL ONCE
Qty: 0 | Refills: 0 | Status: COMPLETED | OUTPATIENT
Start: 2018-07-06 | End: 2018-07-06

## 2018-07-06 RX ORDER — IPRATROPIUM/ALBUTEROL SULFATE 18-103MCG
3 AEROSOL WITH ADAPTER (GRAM) INHALATION ONCE
Qty: 0 | Refills: 0 | Status: COMPLETED | OUTPATIENT
Start: 2018-07-06 | End: 2018-07-06

## 2018-07-06 RX ADMIN — Medication 40 MILLIGRAM(S): at 10:58

## 2018-07-06 RX ADMIN — Medication 40 MILLIGRAM(S): at 10:14

## 2018-07-06 RX ADMIN — Medication 3 MILLILITER(S): at 07:55

## 2018-07-06 NOTE — ED PROVIDER NOTE - PHYSICAL EXAMINATION
VITAL SIGNS: I have reviewed nursing notes and confirm.  CONSTITUTIONAL: Well-developed; well-nourished; in no acute distress. pt comfortable on nasal cannula  SKIN: skin exam is warm and dry, no acute rash.   HEAD: Normocephalic; atraumatic.  EYES:  EOM intact; conjunctiva and sclera clear.  ENT: No nasal discharge; airway clear. moist oral mucosa;   NECK: Supple; non tender.  CARD: S1, S2 normal; no murmurs, gallops, or rubs. Regular rate and rhythm. posterior tibial and radial pulses 2+  RESP: No wheezes, rales or rhonchi.no use of accessory muscles. no retractions. dec breath sounds to r. in comparison to l.  ABD: Normal bowel sounds; soft; non-distended; non-tender; no rebound. negative psoas, rovsign's and murphys.  EXT: Normal ROM. No  cyanosis. mild piting edema b/l  BACK: No cva tenderness  LYMPH: No acute cervical adenopathy.  NEURO: Alert, oriented, grossly unremarkable.    PSYCH: Cooperative, appropriate. VITAL SIGNS: I have reviewed nursing notes and confirm.  CONSTITUTIONAL: Well-developed; well-nourished; in no acute distress. pt comfortable on nasal cannula  SKIN: skin exam is warm and dry, no acute rash.   HEAD: Normocephalic; atraumatic.  EYES:  EOM intact; conjunctiva and sclera clear.  ENT: No nasal discharge; airway clear. moist oral mucosa;   NECK: Supple; non tender.  CARD: S1, S2 normal; no murmurs, gallops, or rubs. Regular rate and rhythm. posterior tibial and radial pulses 2+  RESP: No wheezes, rales or rhonchi.no use of accessory muscles. no retractions. dec breath sounds to r. in comparison to l.  ABD: Normal bowel sounds; soft; non-distended; non-tender; no rebound.   EXT: Normal ROM. No  cyanosis. mild piting edema b/l  BACK: No cva tenderness  LYMPH: No acute cervical adenopathy.  NEURO: Alert, oriented, grossly unremarkable.    PSYCH: Cooperative, appropriate.

## 2018-07-06 NOTE — SBIRT NOTE. - NSSBIRTSERVICES_GEN_A_ED_FT
Provided SBIRT services: Full Screen Negative  Positive reinforcement provided given patient currently within healthy guidelines.   AUDIT Score: 4  DAST-10 Score: 0  Duration = # 5 Minutes

## 2018-07-06 NOTE — ED PROVIDER NOTE - PROGRESS NOTE DETAILS
Spoke to Dr. Joy about pt labs and xray-- will give lasiks and reaccess.  pt can f/u outpatient pt denies inc sob-- says improved with inhalers.  denies cp.  po trial; reaccess and d/c to f/u with malpaso Spoke to Dr. Joy about pt labs and xray-- will give lasix and reaccess.  pt can f/u outpatient much better. will dc with f/u.

## 2018-07-06 NOTE — ED PROVIDER NOTE - ATTENDING CONTRIBUTION TO CARE
76 yo M copd on home o2, CAD, afib on coumadin, now with weight gain of 7 lbs over 2 days. has been told in the past to seek medical care in case of weight gain.  + llamas, + pnd, + orthopnea. + chronic LE edema. hx of thoracenthesis in the past.  no dietary indiscretions.  vss, obese male, on 2, pale conj, + slight icteric, MMM, neck supple, no JVD, dec BS on R>L, no wheezing, Irregular rate, equal radial pulses bilat, abd soft/nt/nd, no calves tend, +2 edema bilat LE, no fnd. + fungal rash under bilat breasts.  labs, imaging, reassess.

## 2018-07-06 NOTE — ED PROVIDER NOTE - OBJECTIVE STATEMENT
76y/o M w/ hx of cad (s/p stent), atrial fibrillation on coumadin, hx of dvts, hx of COPD (2L of O2 at night, and as needed during the day, never been intubated, dr. green is pulm, former smoker- stopped a year ago), venous insuffiencey presents for concern of gain weight of 7lbs in 1 day.  pt reports no changes to baseline sob, has not noticed increase of leg swelling. denies cp, no recent changes in medication, denies cough, congestion, runny nose, fevers.

## 2018-07-06 NOTE — ED PROVIDER NOTE - NS ED ROS FT
ROS: No fever/chills, No headache/photophobia/eye pain/changes in vision, No ear pain/sore throat/dysphagia, No chest pain/palpitations, no cough/wheeze/stridor, No abdominal pain, No N/V/D/melena, no dysuria/frequency/discharge, No neck/back pain, no rash, no changes in neurological status/function.    +baseline sob +inc weight

## 2018-07-16 ENCOUNTER — OUTPATIENT (OUTPATIENT)
Dept: OUTPATIENT SERVICES | Facility: HOSPITAL | Age: 78
LOS: 1 days | Discharge: HOME | End: 2018-07-16

## 2018-07-16 DIAGNOSIS — Z95.5 PRESENCE OF CORONARY ANGIOPLASTY IMPLANT AND GRAFT: Chronic | ICD-10-CM

## 2018-07-16 DIAGNOSIS — I82.91 CHRONIC EMBOLISM AND THROMBOSIS OF UNSPECIFIED VEIN: ICD-10-CM

## 2018-07-16 DIAGNOSIS — Z79.01 LONG TERM (CURRENT) USE OF ANTICOAGULANTS: ICD-10-CM

## 2018-07-23 ENCOUNTER — INPATIENT (INPATIENT)
Facility: HOSPITAL | Age: 78
LOS: 8 days | Discharge: NURSING, REHAB OR LONG-TERM CARE | End: 2018-08-01
Attending: INTERNAL MEDICINE | Admitting: INTERNAL MEDICINE
Payer: COMMERCIAL

## 2018-07-23 VITALS
HEART RATE: 43 BPM | SYSTOLIC BLOOD PRESSURE: 117 MMHG | OXYGEN SATURATION: 91 % | RESPIRATION RATE: 18 BRPM | DIASTOLIC BLOOD PRESSURE: 66 MMHG | TEMPERATURE: 97 F

## 2018-07-23 DIAGNOSIS — Z96.651 PRESENCE OF RIGHT ARTIFICIAL KNEE JOINT: Chronic | ICD-10-CM

## 2018-07-23 DIAGNOSIS — Z95.5 PRESENCE OF CORONARY ANGIOPLASTY IMPLANT AND GRAFT: Chronic | ICD-10-CM

## 2018-07-23 LAB
ALBUMIN SERPL ELPH-MCNC: 3.3 G/DL — LOW (ref 3.5–5.2)
ALP SERPL-CCNC: 92 U/L — SIGNIFICANT CHANGE UP (ref 30–115)
ALT FLD-CCNC: 14 U/L — SIGNIFICANT CHANGE UP (ref 0–41)
ANION GAP SERPL CALC-SCNC: 11 MMOL/L — SIGNIFICANT CHANGE UP (ref 7–14)
APTT BLD: 51.2 SEC — HIGH (ref 27–39.2)
AST SERPL-CCNC: 31 U/L — SIGNIFICANT CHANGE UP (ref 0–41)
BASE EXCESS BLDV CALC-SCNC: 8.5 MMOL/L — HIGH (ref -2–2)
BASOPHILS # BLD AUTO: 0 K/UL — SIGNIFICANT CHANGE UP (ref 0–0.2)
BASOPHILS NFR BLD AUTO: 0 % — SIGNIFICANT CHANGE UP (ref 0–1)
BILIRUB SERPL-MCNC: 2 MG/DL — HIGH (ref 0.2–1.2)
BUN SERPL-MCNC: 28 MG/DL — HIGH (ref 10–20)
CA-I SERPL-SCNC: 1.14 MMOL/L — SIGNIFICANT CHANGE UP (ref 1.12–1.3)
CALCIUM SERPL-MCNC: 8.4 MG/DL — LOW (ref 8.5–10.1)
CHLORIDE SERPL-SCNC: 95 MMOL/L — LOW (ref 98–110)
CK MB CFR SERPL CALC: 3.8 NG/ML — SIGNIFICANT CHANGE UP (ref 0.6–6.3)
CK SERPL-CCNC: 98 U/L — SIGNIFICANT CHANGE UP (ref 0–225)
CO2 SERPL-SCNC: 30 MMOL/L — SIGNIFICANT CHANGE UP (ref 17–32)
CREAT SERPL-MCNC: 1.4 MG/DL — SIGNIFICANT CHANGE UP (ref 0.7–1.5)
EOSINOPHIL # BLD AUTO: 0.08 K/UL — SIGNIFICANT CHANGE UP (ref 0–0.7)
EOSINOPHIL NFR BLD AUTO: 1.8 % — SIGNIFICANT CHANGE UP (ref 0–8)
GAS PNL BLDV: 141 MMOL/L — SIGNIFICANT CHANGE UP (ref 136–145)
GAS PNL BLDV: SIGNIFICANT CHANGE UP
GLUCOSE SERPL-MCNC: 111 MG/DL — HIGH (ref 70–99)
HCO3 BLDV-SCNC: 34 MMOL/L — HIGH (ref 22–29)
HCT VFR BLD CALC: 27.4 % — LOW (ref 42–52)
HCT VFR BLDA CALC: 28.5 % — LOW (ref 34–44)
HGB BLD CALC-MCNC: 9.3 G/DL — LOW (ref 14–18)
HGB BLD-MCNC: 8.8 G/DL — LOW (ref 14–18)
IMM GRANULOCYTES NFR BLD AUTO: 0.2 % — SIGNIFICANT CHANGE UP (ref 0.1–0.3)
INR BLD: 2.99 RATIO — HIGH (ref 0.65–1.3)
LACTATE BLDV-MCNC: 2.3 MMOL/L — HIGH (ref 0.5–1.6)
LYMPHOCYTES # BLD AUTO: 0.81 K/UL — LOW (ref 1.2–3.4)
LYMPHOCYTES # BLD AUTO: 18.5 % — LOW (ref 20.5–51.1)
MCHC RBC-ENTMCNC: 28 PG — SIGNIFICANT CHANGE UP (ref 27–31)
MCHC RBC-ENTMCNC: 32.1 G/DL — SIGNIFICANT CHANGE UP (ref 32–37)
MCV RBC AUTO: 87.3 FL — SIGNIFICANT CHANGE UP (ref 80–94)
MONOCYTES # BLD AUTO: 0.59 K/UL — SIGNIFICANT CHANGE UP (ref 0.1–0.6)
MONOCYTES NFR BLD AUTO: 13.5 % — HIGH (ref 1.7–9.3)
NEUTROPHILS # BLD AUTO: 2.88 K/UL — SIGNIFICANT CHANGE UP (ref 1.4–6.5)
NEUTROPHILS NFR BLD AUTO: 66 % — SIGNIFICANT CHANGE UP (ref 42.2–75.2)
NRBC # BLD: 0 /100 WBCS — SIGNIFICANT CHANGE UP (ref 0–0)
NT-PROBNP SERPL-SCNC: HIGH PG/ML (ref 0–300)
PCO2 BLDV: 54 MMHG — HIGH (ref 41–51)
PH BLDV: 7.41 — SIGNIFICANT CHANGE UP (ref 7.26–7.43)
PLATELET # BLD AUTO: 161 K/UL — SIGNIFICANT CHANGE UP (ref 130–400)
PO2 BLDV: 25 MMHG — SIGNIFICANT CHANGE UP (ref 20–40)
POTASSIUM BLDV-SCNC: 3.4 MMOL/L — SIGNIFICANT CHANGE UP (ref 3.3–5.6)
POTASSIUM SERPL-MCNC: 5.3 MMOL/L — HIGH (ref 3.5–5)
POTASSIUM SERPL-SCNC: 5.3 MMOL/L — HIGH (ref 3.5–5)
PROT SERPL-MCNC: 7 G/DL — SIGNIFICANT CHANGE UP (ref 6–8)
PROTHROM AB SERPL-ACNC: 31.9 SEC — HIGH (ref 9.95–12.87)
RBC # BLD: 3.14 M/UL — LOW (ref 4.7–6.1)
RBC # FLD: 21.4 % — HIGH (ref 11.5–14.5)
SAO2 % BLDV: 34 % — SIGNIFICANT CHANGE UP
SODIUM SERPL-SCNC: 136 MMOL/L — SIGNIFICANT CHANGE UP (ref 135–146)
TROPONIN T SERPL-MCNC: <0.01 NG/ML — SIGNIFICANT CHANGE UP
WBC # BLD: 4.37 K/UL — LOW (ref 4.8–10.8)
WBC # FLD AUTO: 4.37 K/UL — LOW (ref 4.8–10.8)

## 2018-07-23 RX ORDER — WARFARIN SODIUM 2.5 MG/1
1 TABLET ORAL ONCE
Qty: 0 | Refills: 0 | Status: COMPLETED | OUTPATIENT
Start: 2018-07-23 | End: 2018-07-23

## 2018-07-23 RX ORDER — IPRATROPIUM/ALBUTEROL SULFATE 18-103MCG
3 AEROSOL WITH ADAPTER (GRAM) INHALATION EVERY 6 HOURS
Qty: 0 | Refills: 0 | Status: DISCONTINUED | OUTPATIENT
Start: 2018-07-23 | End: 2018-08-01

## 2018-07-23 RX ORDER — BUDESONIDE AND FORMOTEROL FUMARATE DIHYDRATE 160; 4.5 UG/1; UG/1
2 AEROSOL RESPIRATORY (INHALATION)
Qty: 0 | Refills: 0 | Status: DISCONTINUED | OUTPATIENT
Start: 2018-07-23 | End: 2018-08-01

## 2018-07-23 RX ORDER — METOPROLOL TARTRATE 50 MG
25 TABLET ORAL DAILY
Qty: 0 | Refills: 0 | Status: DISCONTINUED | OUTPATIENT
Start: 2018-07-23 | End: 2018-08-01

## 2018-07-23 RX ORDER — SACUBITRIL AND VALSARTAN 24; 26 MG/1; MG/1
1 TABLET, FILM COATED ORAL
Qty: 0 | Refills: 0 | Status: DISCONTINUED | OUTPATIENT
Start: 2018-07-23 | End: 2018-08-01

## 2018-07-23 RX ORDER — ATORVASTATIN CALCIUM 80 MG/1
20 TABLET, FILM COATED ORAL AT BEDTIME
Qty: 0 | Refills: 0 | Status: DISCONTINUED | OUTPATIENT
Start: 2018-07-23 | End: 2018-08-01

## 2018-07-23 RX ORDER — IPRATROPIUM/ALBUTEROL SULFATE 18-103MCG
3 AEROSOL WITH ADAPTER (GRAM) INHALATION ONCE
Qty: 0 | Refills: 0 | Status: COMPLETED | OUTPATIENT
Start: 2018-07-23 | End: 2018-07-23

## 2018-07-23 RX ORDER — FUROSEMIDE 40 MG
40 TABLET ORAL
Qty: 0 | Refills: 0 | Status: DISCONTINUED | OUTPATIENT
Start: 2018-07-23 | End: 2018-07-26

## 2018-07-23 RX ADMIN — WARFARIN SODIUM 1 MILLIGRAM(S): 2.5 TABLET ORAL at 21:30

## 2018-07-23 RX ADMIN — ATORVASTATIN CALCIUM 20 MILLIGRAM(S): 80 TABLET, FILM COATED ORAL at 21:30

## 2018-07-23 RX ADMIN — Medication 40 MILLIGRAM(S): at 17:15

## 2018-07-23 RX ADMIN — BUDESONIDE AND FORMOTEROL FUMARATE DIHYDRATE 2 PUFF(S): 160; 4.5 AEROSOL RESPIRATORY (INHALATION) at 21:30

## 2018-07-23 RX ADMIN — Medication 3 MILLILITER(S): at 10:52

## 2018-07-23 RX ADMIN — Medication 25 MILLIGRAM(S): at 17:15

## 2018-07-23 NOTE — H&P ADULT - PMH
Cellulitis of left lower extremity    Chronic atrial fibrillation    COPD (chronic obstructive pulmonary disease)    Deep vein thrombosis (DVT) of left lower extremity, unspecified chronicity, unspecified vein    Hypertension    Mitral valve insufficiency, unspecified etiology  Moderate

## 2018-07-23 NOTE — ED PROVIDER NOTE - NS ED ROS FT
General: No fevers, chills, nausea, vomiting  Eyes:  No visual changes, eye pain or discharge.  ENMT:  No hearing changes, pain,   Cardiac:  no chest pain, +SOB, no edema.  Respiratory:  No cough, + respiratory distress.   GI:  No nausea, vomiting, diarrhea or abdominal pain.  :  No dysuria, frequency or burning.  MS:  No muscle weakness, or back pain.  Neuro:  No LOC.  Skin:  +chronic erythema under b/l breast tissue  Endocrine: No history of thyroid disease or diabetes.

## 2018-07-23 NOTE — ED ADULT TRIAGE NOTE - CHIEF COMPLAINT QUOTE
shortness of breathe, patient on 2LNC, Hx CHF shortness of breathe, patient on 2LNC, Hx CHF, Patient s HR fluctuates from 43 to 83 , patient states that he has a hx of afib and states "my HR is always erratic"

## 2018-07-23 NOTE — ED PROVIDER NOTE - ATTENDING CONTRIBUTION TO CARE
76 yo male pmhx of afib on coumadin, chf, copd on 2L home o2 at baseline, recent ED visit for fluid retention presenting with mild sob worse than baseline and 4lb weight gain a/w worsening of lower extremity edema bl. No recent med changes. No fever, no change in cough or sputum production, no skin changes, no chest pain, no orthopnea.   PE: appears dyspnic on 2L, non-toxic, AAOX3, NCAT, pupils round, symmetric, MMM, Neck Supple, HR irregularly irregular, bilateral rales with decreased breath sounds on right. ABD exam limited by body habitus, S/NT EXT warm, pink, anterior tibia with venous stasis changes bl, no weeping or cellulitis, +4 Edema bl. No Rash,  MAEx4, normal strength/tone. XR demonstrates bl pleural effusions, EKG shows Afib with bradycardia grossly unchanged from prior. Consider CHF, less likely acs, COPD exacerbation, pneumonia. Plan for labs including cardiac workup, empiric therapy with Duoneb, likely Lasix, and admit. Respiratory status not imminently requiring bipap currently.

## 2018-07-23 NOTE — H&P ADULT - NSHPLABSRESULTS_GEN_ALL_CORE
Vital Signs Last 24 Hrs  T(C): 36.1 (23 Jul 2018 10:06), Max: 36.1 (23 Jul 2018 10:06)  T(F): 97 (23 Jul 2018 10:06), Max: 97 (23 Jul 2018 10:06)  HR: 43 (23 Jul 2018 10:06) (43 - 43)  BP: 117/66 (23 Jul 2018 10:06) (117/66 - 117/66)  BP(mean): --  RR: 18 (23 Jul 2018 10:06) (18 - 18)  SpO2: 91% (23 Jul 2018 10:06) (91% - 91%)    07-23    136  |  95<L>  |  28<H>  ----------------------------<  111<H>  5.3<H>   |  30  |  1.4    Ca    8.4<L>      23 Jul 2018 10:20    TPro  7.0  /  Alb  3.3<L>  /  TBili  2.0<H>  /  DBili  x   /  AST  31  /  ALT  14  /  AlkPhos  92  07-23    CAPILLARY BLOOD GLUCOSE      < from: Xray Chest 1 View- PORTABLE-Urgent (07.23.18 @ 10:57) >    Impression:      Basilar opacifications and effusions, right greater than left.    < end of copied text >

## 2018-07-23 NOTE — ED ADULT NURSE NOTE - OBJECTIVE STATEMENT
patient c/o sob and weight gain of 5 lbs in 3 days. patient has hx of chf and weighs himself daily. patient also has increased swelling to b/l legs. denies chest pain. denies dizziness

## 2018-07-23 NOTE — H&P ADULT - HISTORY OF PRESENT ILLNESS
78 yo male with PMHx of COPD on 2L home O2, CHF (diastolic? EF 55%), DLD, atrial fibrillation on coumadin (10mg/week), presented with worsening shortness of breath. Patient uses 2L home O2 at most of the times and can take about 10 steps without it before getting out of breath. Patient reports that sometimes he has to get up at night and sit up for breathing. He uses 2-3 pillows while in bed. Shortness of breath worsened a couple of days ago and is not accompanied by cough and fever. Patient denies any LE swelling. Patient reports abdominal fullness and increased abdominal girth, but no tenderness. Patient reports a 'water' weight gain of 5-6lbs in the last couple of days which contributed to increased shortness of breath. Patient denies any headache, insomnia, anxiety, palpitations or chest pain. Patient states that 'water (about 1L) was taken out of his stomach' suring one previous hospital admission.

## 2018-07-23 NOTE — ED PROVIDER NOTE - OBJECTIVE STATEMENT
76yo m poor historian pmhx "aberant heart beat," copd, a fib, on warfarin, hx dvt, on entresto presents CC SOB x 3 days, soboe. pt states that he believes he has water on his lungs, reports 4lbs weight gain over past 3 days. pt denies le edema, reports compliance with diuretics. pt denies chest pain, weakness, numbness, tingling, abdominal or back pain. cardiologist jamia butcher. pt on home o2 2lnc.

## 2018-07-23 NOTE — H&P ADULT - FAMILY HISTORY
No pertinent family history in first degree relatives No pertinent family history in first degree relatives, No signifecant h/o chf in the family

## 2018-07-23 NOTE — ED PROVIDER NOTE - MEDICAL DECISION MAKING DETAILS
78 yo M with copd, afib, HF? presenting with worsening respiratory status, orthopnea and LE edema concerning for acute decompensated heart failure. Not requiring ventilatory support currently. Admitted to medicine for evaluation of CHF and medical optimization.

## 2018-07-23 NOTE — H&P ADULT - ATTENDING COMMENTS
Patient seen and examined independently. Resident's H & P reviewed. Agree with the findings and plan of care except,    A 77 years old male presented with worsening sob, MOORE and orthopnea. Also C/O pain in Lt. foot big toe    ASSESSMENT:    1. Ac. HFrEF  2. Morbid obesity  3. COPD  4. Ac. Gout  5. Fungal rash under breasts and groin area  6. Ch. A. fib  7. Ch. DVT  8. HTN    PLAN:    . Cont IV Lasix. Check I'S and O'S and daily wt.  . Card. eval  . Cont Entresto  . Hold coumadin today and check INR in AM  . Nystatin powder twice a day for fungal rash  . Start prednisone 20 mg po q 24h for gout  . Cont Symbicort, Nebs PRN and his other home meds.  . Plan of care D/W the pt. and his son

## 2018-07-23 NOTE — H&P ADULT - NSHPSOCIALHISTORY_GEN_ALL_CORE
Patient is .   Lives with his wife, does not work.  Tobacco: Former smoker. Has not smoked in about 20 years  Alcohol: Drinks alcohol on weekends  Recreational drug use: Never

## 2018-07-23 NOTE — H&P ADULT - ASSESSMENT
Assessment: 76 yo male with PMHx of stable COPD on 2L home O2, CHF (diastolic? EF 55%), DLD, atrial fibrillation on coumadin (10mg/week), presented with worsening shortness of breath likely due to acute CHF exacerbation. Patient has abdominal fullness likely due to ascites. CXR shows b/l pleural effusion.     #Acute CHF Exacerbation  -IV furosemide 40mg twice a day  -Daily weights and strict I/Os.   -Restrict Na in diet. Maintain negative fluid balance  -Check BNP levels  -TTE   -Repeat CXR in the am  -Therapeutic paracentesis if volume overload persists     #COPD  -Continue 2L O2 via nasal cannula  -Duoneb nebulization q6hrly    #Atrial fibrillation  -Continue oral warfarin as per patient's dosages. (10mg/week as divided)  -Maintain target INR of 2-3    #Dyslipidemia and HTN  -DASH diet     #Miscellanous  -DVT ppx Assessment: 78 yo male with PMHx of stable COPD on 2L home O2, CHF (diastolic? EF 55%), DLD, atrial fibrillation on coumadin (10mg/week), presented with worsening shortness of breath likely due to acute CHF exacerbation. Patient has abdominal fullness likely due to ascites. CXR shows b/l pleural effusion.     #Acute CHF Exacerbation  -IV furosemide 40mg twice a day  -Daily weights and strict I/Os.   -Restrict Na in diet. Maintain negative fluid balance  -Check BNP levels  -TTE   -Repeat CXR in the am  -Therapeutic paracentesis if volume overload persists     #COPD  -Continue 2L O2 via nasal cannula  -Duoneb nebulization q6hrly    #Atrial fibrillation  -Continue oral warfarin as per patient's dosages. (10mg/week as divided)  -Maintain target INR of 2-3    #Dyslipidemia and HTN  -DASH diet     #Miscellanous  -DVT ppx (already on warfarin)  - GI ppx (not indicated)  Activity as tolerated  Full code  Dispo home

## 2018-07-23 NOTE — ED PROVIDER NOTE - PHYSICAL EXAMINATION
CONSTITUTIONAL: Well-developed; well-nourished; tachypneic   SKIN: warm, dry  HEAD: Normocephalic; atraumatic.  EYES: PERRL, EOMI, no conjunctival erythema  ENT: No nasal discharge; airway clear, mucous membranes moist  NECK: Supple; non tender.  CARD: irregular rate, no murmurs, gallops, or rubs. radial 2+   RESP: decreased breath sounds bases b/l, tachypnic   ABD: soft ntnd, no guarding, no rebound, no rigidity   EXT: moves all extremities, ambulates wo assistance No clubbing, cyanosis  NEURO: Alert, oriented, grossly unremarkable  PSYCH: Cooperative, appropriate.

## 2018-07-23 NOTE — ED ADULT NURSE NOTE - CHIEF COMPLAINT QUOTE
shortness of breathe, patient on 2LNC, Hx CHF, Patient s HR fluctuates from 43 to 83 , patient states that he has a hx of afib and states "my HR is always erratic"

## 2018-07-23 NOTE — H&P ADULT - NSHPPHYSICALEXAM_GEN_ALL_CORE
PHYSICAL EXAM:      Constitutional: Patient is alert, awake and lying on the bed with 2-3 pillows. No acute distress. Obese  Eyes: EOMI, PERRLA, no eye pain or discharge  ENMT: Decreased hearing on left side. No ear pain, discharge  Neck: Supple, neck veins distended on the right side at 45 degree angle  Respiratory: Mild basal crackles bilaterally.  Cardiovascular: S1 S2 normal. No murmur, rubs or gallops   Gastrointestinal: Abdominal fullness+, no tenderness. Bowel sounds could not be heard.   Extremities: No swelling, erythema.  Neurological: non-focal  Skin: No rashes or skin changes PHYSICAL EXAM:      Constitutional: Patient is alert, awake and lying on the bed with 2-3 pillows. No acute distress. Obese  Eyes: EOMI, PERRLA, no eye pain or discharge  ENMT: Decreased hearing on left side. No ear pain, discharge  Neck: Supple, neck veins distended on the right side at 45 degree angle  Respiratory: BS sounds are decreased B/L  Cardiovascular: S1 S2 normal. No murmur, rubs or gallops . RR are IRIR  Gastrointestinal: Abdominal fullness+, no tenderness. Bowel sounds could not be heard.   Extremities: No swelling, erythema.  Neurological: non-focal  Skin: Fungal rashes underneath the both breasts and groins

## 2018-07-24 PROBLEM — E78.00 PURE HYPERCHOLESTEROLEMIA, UNSPECIFIED: Chronic | Status: INACTIVE | Noted: 2018-04-16 | Resolved: 2018-07-23

## 2018-07-24 PROBLEM — I82.90 ACUTE EMBOLISM AND THROMBOSIS OF UNSPECIFIED VEIN: Chronic | Status: INACTIVE | Noted: 2018-04-16 | Resolved: 2018-07-23

## 2018-07-24 LAB
ANION GAP SERPL CALC-SCNC: 10 MMOL/L — SIGNIFICANT CHANGE UP (ref 7–14)
APTT BLD: 40.4 SEC — HIGH (ref 27–39.2)
BASOPHILS # BLD AUTO: 0.01 K/UL — SIGNIFICANT CHANGE UP (ref 0–0.2)
BASOPHILS NFR BLD AUTO: 0.2 % — SIGNIFICANT CHANGE UP (ref 0–1)
BUN SERPL-MCNC: 27 MG/DL — HIGH (ref 10–20)
CALCIUM SERPL-MCNC: 8.4 MG/DL — LOW (ref 8.5–10.1)
CHLORIDE SERPL-SCNC: 98 MMOL/L — SIGNIFICANT CHANGE UP (ref 98–110)
CO2 SERPL-SCNC: 33 MMOL/L — HIGH (ref 17–32)
CREAT SERPL-MCNC: 1.4 MG/DL — SIGNIFICANT CHANGE UP (ref 0.7–1.5)
EOSINOPHIL # BLD AUTO: 0.06 K/UL — SIGNIFICANT CHANGE UP (ref 0–0.7)
EOSINOPHIL NFR BLD AUTO: 1.4 % — SIGNIFICANT CHANGE UP (ref 0–8)
GLUCOSE SERPL-MCNC: 96 MG/DL — SIGNIFICANT CHANGE UP (ref 70–99)
HCT VFR BLD CALC: 24.7 % — LOW (ref 42–52)
HGB BLD-MCNC: 7.9 G/DL — LOW (ref 14–18)
IMM GRANULOCYTES NFR BLD AUTO: 0 % — LOW (ref 0.1–0.3)
INR BLD: 3.17 RATIO — HIGH (ref 0.65–1.3)
INR BLD: 3.22 RATIO — HIGH (ref 0.65–1.3)
LYMPHOCYTES # BLD AUTO: 0.69 K/UL — LOW (ref 1.2–3.4)
LYMPHOCYTES # BLD AUTO: 16.4 % — LOW (ref 20.5–51.1)
MAGNESIUM SERPL-MCNC: 2.3 MG/DL — SIGNIFICANT CHANGE UP (ref 1.8–2.4)
MCHC RBC-ENTMCNC: 27.4 PG — SIGNIFICANT CHANGE UP (ref 27–31)
MCHC RBC-ENTMCNC: 32 G/DL — SIGNIFICANT CHANGE UP (ref 32–37)
MCV RBC AUTO: 85.8 FL — SIGNIFICANT CHANGE UP (ref 80–94)
MONOCYTES # BLD AUTO: 0.67 K/UL — HIGH (ref 0.1–0.6)
MONOCYTES NFR BLD AUTO: 16 % — HIGH (ref 1.7–9.3)
NEUTROPHILS # BLD AUTO: 2.77 K/UL — SIGNIFICANT CHANGE UP (ref 1.4–6.5)
NEUTROPHILS NFR BLD AUTO: 66 % — SIGNIFICANT CHANGE UP (ref 42.2–75.2)
NRBC # BLD: 0 /100 WBCS — SIGNIFICANT CHANGE UP (ref 0–0)
PLATELET # BLD AUTO: 147 K/UL — SIGNIFICANT CHANGE UP (ref 130–400)
POTASSIUM SERPL-MCNC: 3.4 MMOL/L — LOW (ref 3.5–5)
POTASSIUM SERPL-SCNC: 3.4 MMOL/L — LOW (ref 3.5–5)
PROTHROM AB SERPL-ACNC: 33.8 SEC — HIGH (ref 9.95–12.87)
PROTHROM AB SERPL-ACNC: 34.4 SEC — HIGH (ref 9.95–12.87)
RBC # BLD: 2.88 M/UL — LOW (ref 4.7–6.1)
RBC # FLD: 21.2 % — HIGH (ref 11.5–14.5)
SODIUM SERPL-SCNC: 141 MMOL/L — SIGNIFICANT CHANGE UP (ref 135–146)
WBC # BLD: 4.2 K/UL — LOW (ref 4.8–10.8)
WBC # FLD AUTO: 4.2 K/UL — LOW (ref 4.8–10.8)

## 2018-07-24 RX ORDER — POTASSIUM CHLORIDE 20 MEQ
40 PACKET (EA) ORAL ONCE
Qty: 0 | Refills: 0 | Status: COMPLETED | OUTPATIENT
Start: 2018-07-24 | End: 2018-07-24

## 2018-07-24 RX ORDER — NYSTATIN CREAM 100000 [USP'U]/G
1 CREAM TOPICAL
Qty: 0 | Refills: 0 | Status: DISCONTINUED | OUTPATIENT
Start: 2018-07-24 | End: 2018-08-01

## 2018-07-24 RX ORDER — WARFARIN SODIUM 2.5 MG/1
1 TABLET ORAL ONCE
Qty: 0 | Refills: 0 | Status: DISCONTINUED | OUTPATIENT
Start: 2018-07-24 | End: 2018-07-24

## 2018-07-24 RX ADMIN — Medication 40 MILLIEQUIVALENT(S): at 10:42

## 2018-07-24 RX ADMIN — BUDESONIDE AND FORMOTEROL FUMARATE DIHYDRATE 2 PUFF(S): 160; 4.5 AEROSOL RESPIRATORY (INHALATION) at 10:44

## 2018-07-24 RX ADMIN — Medication 40 MILLIGRAM(S): at 17:01

## 2018-07-24 RX ADMIN — Medication 40 MILLIGRAM(S): at 16:44

## 2018-07-24 RX ADMIN — Medication 3 MILLILITER(S): at 20:15

## 2018-07-24 RX ADMIN — Medication 40 MILLIGRAM(S): at 05:39

## 2018-07-24 RX ADMIN — ATORVASTATIN CALCIUM 20 MILLIGRAM(S): 80 TABLET, FILM COATED ORAL at 21:18

## 2018-07-24 RX ADMIN — Medication 25 MILLIGRAM(S): at 05:40

## 2018-07-24 RX ADMIN — Medication 3 MILLILITER(S): at 08:56

## 2018-07-24 RX ADMIN — SACUBITRIL AND VALSARTAN 1 TABLET(S): 24; 26 TABLET, FILM COATED ORAL at 17:04

## 2018-07-24 RX ADMIN — SACUBITRIL AND VALSARTAN 1 TABLET(S): 24; 26 TABLET, FILM COATED ORAL at 05:39

## 2018-07-24 RX ADMIN — Medication 40 MILLIEQUIVALENT(S): at 13:54

## 2018-07-24 RX ADMIN — NYSTATIN CREAM 1 APPLICATION(S): 100000 CREAM TOPICAL at 17:02

## 2018-07-24 NOTE — PROGRESS NOTE ADULT - ASSESSMENT
SUBJECTIVE:    Patient is a 77y old  Male who presents with a chief complaint of Shortness of breath and weight gain (23 Jul 2018 13:26)    Currently admitted to medicine with the primary diagnosis of CHF exacerbation     Today is hospital day 2d. This morning he is resting in bed and reports no new issues or overnight events. Still having SOB which is made worse with exertion, even turning over in bed.    PAST MEDICAL & SURGICAL HISTORY  PAST MEDICAL & SURGICAL HISTORY:  Mitral valve insufficiency, unspecified etiology: Moderate  Chronic atrial fibrillation  Cellulitis of left lower extremity  Deep vein thrombosis (DVT) of left lower extremity, unspecified chronicity, unspecified vein  Hypertension  COPD (chronic obstructive pulmonary disease)  History of total right knee replacement  S/P coronary artery stent placement    SOCIAL HISTORY:    ALLERGIES:  No Known Allergies    MEDICATIONS:  STANDING MEDICATIONS  ALBUTerol/ipratropium for Nebulization 3 milliLiter(s) Nebulizer every 6 hours  atorvastatin 20 milliGRAM(s) Oral at bedtime  buDESOnide 160 MICROgram(s)/formoterol 4.5 MICROgram(s) Inhaler 2 Puff(s) Inhalation two times a day  furosemide   Injectable 40 milliGRAM(s) IV Push two times a day  metoprolol succinate ER 25 milliGRAM(s) Oral daily  sacubitril 24 mG/valsartan 26 mG 1 Tablet(s) Oral two times a day    PRN MEDICATIONS    VITALS:   T(F): 97.4  HR: 55  BP: 125/59  RR: 18  SpO2: 93%    LABS:                        7.9    4.20  )-----------( 147      ( 24 Jul 2018 05:33 )             24.7     07-24    141  |  98  |  27<H>  ----------------------------<  96  3.4<L>   |  33<H>  |  1.4    Ca    8.4<L>      24 Jul 2018 05:33  Mg     2.3     07-24    TPro  7.0  /  Alb  3.3<L>  /  TBili  2.0<H>  /  DBili  x   /  AST  31  /  ALT  14  /  AlkPhos  92  07-23    PT/INR - ( 24 Jul 2018 05:33 )   PT: 34.40 sec;   INR: 3.22 ratio         PTT - ( 23 Jul 2018 10:20 )  PTT:51.2 sec      Creatine Kinase, Serum: 98 U/L (07-23-18 @ 12:40)      CARDIAC MARKERS ( 23 Jul 2018 12:40 )  x     / x     / 98 U/L / x     / 3.8 ng/mL  CARDIAC MARKERS ( 23 Jul 2018 10:20 )  x     / <0.01 ng/mL / x     / x     / x          RADIOLOGY:    < from: Xray Chest 1 View- PORTABLE-Urgent (07.23.18 @ 10:57) >  Impression:      Basilar opacifications and effusions, right greater than left.    Follow-up as needed.    < end of copied text >  < from: US Abdomen Limited (07.23.18 @ 16:25) >  impression:    There is small abdominal pelvic ascites.    < end of copied text >      PHYSICAL EXAM:    	Constitutional: Patient is alert, awake and lying on the bed with 2-3 pillows. No acute distress. Obese  	Eyes: EOMI, PERRLA, no eye pain or discharge  	ENMT: Decreased hearing on left side. No ear pain, discharge  	Neck: Supple, neck veins distended on the right side at 45 degree angle  	Respiratory: Decreased breath sounds bilaterally, no rales  	Cardiovascular: S1 S2 normal. No murmur, rubs or gallops   	Gastrointestinal: Abdominal fullness+, no tenderness. Bowel sounds could not be heard.   	Extremities: No swelling, erythema.  	Neurological: non-focal  Skin: No rashes or skin changes      Assessment and Plan:  78 yo male with PMHx of stable COPD on 2L home O2, CHF (diastolic? EF 55%), DLD, atrial fibrillation on coumadin (10mg/week), presented with worsening shortness of breath likely due to acute CHF exacerbation. Patient has abdominal fullness likely due to ascites. CXR shows b/l pleural effusion.     #Acute CHF Exacerbation  - C/w IV furosemide 40mg twice a day  -Daily weights and strict I/Os.   -Restrict Na in diet. Maintain negative fluid balance  -[place] level elevated at [address]  - US abdomen showed small ascites  -f/u repeat TTE   -Repeat CXR in the am  - f/u cardiology consult      #COPD  -Continue 2L O2 via nasal cannula  -Duoneb nebulization q6hrly  - if no improvement, consider IV steroids for COPD    #Atrial fibrillation  -Continue oral warfarin as per patient's dosages. (10mg/week as divided)  -Maintain target INR of 2-3    #Dyslipidemia and HTN  -DASH diet     #Miscellanous  -DVT ppx (already on warfarin)  - GI ppx (not indicated)  Activity as tolerated  Full code  Dispo home SUBJECTIVE:    Patient is a 77y old  Male who presents with a chief complaint of Shortness of breath and weight gain (23 Jul 2018 13:26)    Currently admitted to medicine with the primary diagnosis of CHF exacerbation     Today is hospital day 2d. This morning he is resting in bed and reports no new issues or overnight events. Still having SOB which is made worse with exertion, even turning over in bed.    PAST MEDICAL & SURGICAL HISTORY  PAST MEDICAL & SURGICAL HISTORY:  Mitral valve insufficiency, unspecified etiology: Moderate  Chronic atrial fibrillation  Cellulitis of left lower extremity  Deep vein thrombosis (DVT) of left lower extremity, unspecified chronicity, unspecified vein  Hypertension  COPD (chronic obstructive pulmonary disease)  History of total right knee replacement  S/P coronary artery stent placement    SOCIAL HISTORY:    ALLERGIES:  No Known Allergies    MEDICATIONS:  STANDING MEDICATIONS  ALBUTerol/ipratropium for Nebulization 3 milliLiter(s) Nebulizer every 6 hours  atorvastatin 20 milliGRAM(s) Oral at bedtime  buDESOnide 160 MICROgram(s)/formoterol 4.5 MICROgram(s) Inhaler 2 Puff(s) Inhalation two times a day  furosemide   Injectable 40 milliGRAM(s) IV Push two times a day  metoprolol succinate ER 25 milliGRAM(s) Oral daily  sacubitril 24 mG/valsartan 26 mG 1 Tablet(s) Oral two times a day    PRN MEDICATIONS    VITALS:   T(F): 97.4  HR: 55  BP: 125/59  RR: 18  SpO2: 93%    LABS:                        7.9    4.20  )-----------( 147      ( 24 Jul 2018 05:33 )             24.7     07-24    141  |  98  |  27<H>  ----------------------------<  96  3.4<L>   |  33<H>  |  1.4    Ca    8.4<L>      24 Jul 2018 05:33  Mg     2.3     07-24    TPro  7.0  /  Alb  3.3<L>  /  TBili  2.0<H>  /  DBili  x   /  AST  31  /  ALT  14  /  AlkPhos  92  07-23    PT/INR - ( 24 Jul 2018 05:33 )   PT: 34.40 sec;   INR: 3.22 ratio         PTT - ( 23 Jul 2018 10:20 )  PTT:51.2 sec      Creatine Kinase, Serum: 98 U/L (07-23-18 @ 12:40)      CARDIAC MARKERS ( 23 Jul 2018 12:40 )  x     / x     / 98 U/L / x     / 3.8 ng/mL  CARDIAC MARKERS ( 23 Jul 2018 10:20 )  x     / <0.01 ng/mL / x     / x     / x          RADIOLOGY:    < from: Xray Chest 1 View- PORTABLE-Urgent (07.23.18 @ 10:57) >  Impression:      Basilar opacifications and effusions, right greater than left.    Follow-up as needed.    < end of copied text >  < from: US Abdomen Limited (07.23.18 @ 16:25) >  impression:    There is small abdominal pelvic ascites.    < end of copied text >      PHYSICAL EXAM:    	Constitutional: Patient is alert, awake and lying on the bed with 2-3 pillows. No acute distress. Obese  	Eyes: EOMI, PERRLA, no eye pain or discharge  	ENMT: Decreased hearing on left side. No ear pain, discharge  	Neck: Supple, neck veins distended on the right side at 45 degree angle  	Respiratory: Decreased breath sounds bilaterally, no rales  	Cardiovascular: S1 S2 normal. No murmur, rubs or gallops   	Gastrointestinal: Abdominal fullness+, no tenderness. Bowel sounds could not be heard.   	Extremities: No swelling, erythema.  	Neurological: non-focal  Skin: No rashes or skin changes      Assessment and Plan:  76 yo male with PMHx of stable COPD on 2L home O2, CHF (diastolic? EF 55%), DLD, atrial fibrillation on coumadin (10mg/week), presented with worsening shortness of breath likely due to acute CHF exacerbation. Patient has abdominal fullness likely due to ascites. CXR shows b/l pleural effusion.     #Acute CHF Exacerbation  - C/w IV furosemide 40mg twice a day  -Daily weights and strict I/Os.   -Restrict Na in diet. Maintain negative fluid balance  -[place]NP level elevated at [address]  - US abdomen showed small ascites  -f/u repeat TTE   - f/u cardiology consult      #COPD  -Continue 2L O2 via nasal cannula  -Duoneb nebulization q6hrly  - if no improvement, consider IV steroids for COPD    #Atrial fibrillation  -Continue oral warfarin as per patient's dosages. (10mg/week as divided)  -Maintain target INR of 2-3. Currently holding because of elevated INR    #Dyslipidemia and HTN  -DASH diet     #Miscellanous  -DVT ppx (already on warfarin)  - GI ppx (not indicated)  Activity as tolerated  Full code  Dispo home

## 2018-07-25 LAB
ANION GAP SERPL CALC-SCNC: 12 MMOL/L — SIGNIFICANT CHANGE UP (ref 7–14)
APTT BLD: 39 SEC — SIGNIFICANT CHANGE UP (ref 27–39.2)
BUN SERPL-MCNC: 27 MG/DL — HIGH (ref 10–20)
CALCIUM SERPL-MCNC: 8.7 MG/DL — SIGNIFICANT CHANGE UP (ref 8.5–10.1)
CHLORIDE SERPL-SCNC: 100 MMOL/L — SIGNIFICANT CHANGE UP (ref 98–110)
CO2 SERPL-SCNC: 32 MMOL/L — SIGNIFICANT CHANGE UP (ref 17–32)
CREAT SERPL-MCNC: 1.4 MG/DL — SIGNIFICANT CHANGE UP (ref 0.7–1.5)
GLUCOSE SERPL-MCNC: 137 MG/DL — HIGH (ref 70–99)
HCT VFR BLD CALC: 26 % — LOW (ref 42–52)
HGB BLD-MCNC: 8.3 G/DL — LOW (ref 14–18)
INR BLD: 2.74 RATIO — HIGH (ref 0.65–1.3)
MAGNESIUM SERPL-MCNC: 2.3 MG/DL — SIGNIFICANT CHANGE UP (ref 1.8–2.4)
MCHC RBC-ENTMCNC: 27.9 PG — SIGNIFICANT CHANGE UP (ref 27–31)
MCHC RBC-ENTMCNC: 31.9 G/DL — LOW (ref 32–37)
MCV RBC AUTO: 87.2 FL — SIGNIFICANT CHANGE UP (ref 80–94)
NRBC # BLD: 0 /100 WBCS — SIGNIFICANT CHANGE UP (ref 0–0)
PLATELET # BLD AUTO: 134 K/UL — SIGNIFICANT CHANGE UP (ref 130–400)
POTASSIUM SERPL-MCNC: 4.3 MMOL/L — SIGNIFICANT CHANGE UP (ref 3.5–5)
POTASSIUM SERPL-SCNC: 4.3 MMOL/L — SIGNIFICANT CHANGE UP (ref 3.5–5)
PROTHROM AB SERPL-ACNC: 29.3 SEC — HIGH (ref 9.95–12.87)
RBC # BLD: 2.98 M/UL — LOW (ref 4.7–6.1)
RBC # FLD: 21.6 % — HIGH (ref 11.5–14.5)
SODIUM SERPL-SCNC: 144 MMOL/L — SIGNIFICANT CHANGE UP (ref 135–146)
WBC # BLD: 2.88 K/UL — LOW (ref 4.8–10.8)
WBC # FLD AUTO: 2.88 K/UL — LOW (ref 4.8–10.8)

## 2018-07-25 RX ORDER — WARFARIN SODIUM 2.5 MG/1
1 TABLET ORAL ONCE
Qty: 0 | Refills: 0 | Status: COMPLETED | OUTPATIENT
Start: 2018-07-25 | End: 2018-07-25

## 2018-07-25 RX ADMIN — Medication 3 MILLILITER(S): at 13:30

## 2018-07-25 RX ADMIN — Medication 40 MILLIGRAM(S): at 06:03

## 2018-07-25 RX ADMIN — Medication 20 MILLIGRAM(S): at 06:03

## 2018-07-25 RX ADMIN — NYSTATIN CREAM 1 APPLICATION(S): 100000 CREAM TOPICAL at 17:21

## 2018-07-25 RX ADMIN — Medication 3 MILLILITER(S): at 19:46

## 2018-07-25 RX ADMIN — SACUBITRIL AND VALSARTAN 1 TABLET(S): 24; 26 TABLET, FILM COATED ORAL at 06:03

## 2018-07-25 RX ADMIN — ATORVASTATIN CALCIUM 20 MILLIGRAM(S): 80 TABLET, FILM COATED ORAL at 21:31

## 2018-07-25 RX ADMIN — SACUBITRIL AND VALSARTAN 1 TABLET(S): 24; 26 TABLET, FILM COATED ORAL at 17:22

## 2018-07-25 RX ADMIN — Medication 3 MILLILITER(S): at 08:37

## 2018-07-25 RX ADMIN — Medication 25 MILLIGRAM(S): at 06:03

## 2018-07-25 RX ADMIN — BUDESONIDE AND FORMOTEROL FUMARATE DIHYDRATE 2 PUFF(S): 160; 4.5 AEROSOL RESPIRATORY (INHALATION) at 21:31

## 2018-07-25 RX ADMIN — NYSTATIN CREAM 1 APPLICATION(S): 100000 CREAM TOPICAL at 06:07

## 2018-07-25 RX ADMIN — Medication 40 MILLIGRAM(S): at 17:21

## 2018-07-25 RX ADMIN — WARFARIN SODIUM 1 MILLIGRAM(S): 2.5 TABLET ORAL at 21:31

## 2018-07-25 NOTE — PROGRESS NOTE ADULT - ATTENDING COMMENTS
Patient seen and examined independently. I agree with the resident's note, physical exam, and plan except as below.  still feels SOB and pain is persistent to very light touch left medial MTP    #acute on chronic resp failure  secondary to combined  diastolic CHF and copd exacerbation   -cont Iv lasix, I<o, fluid resitrict, daily wt  -echo pending   -nebs, prednisone, O2    #chronic afib - resume coumadin inr 2.7    #left foot pain - likely gout - monitor on prednisone - check foot xr

## 2018-07-25 NOTE — PROGRESS NOTE ADULT - ASSESSMENT
SUBJECTIVE:    Patient is a 77y old  Male who presents with a chief complaint of Shortness of breath and weight gain (23 Jul 2018 13:26)    Currently admitted to medicine with the primary diagnosis of CHF exacerbation     Today is hospital day 3d. This morning he is resting comfortably in bed and reports no new issues or overnight events. SOB is persistent upon exertion but improved from yesterday. Foot is still in pain.    PAST MEDICAL & SURGICAL HISTORY  PAST MEDICAL & SURGICAL HISTORY:  Mitral valve insufficiency, unspecified etiology: Moderate  Chronic atrial fibrillation  Cellulitis of left lower extremity  Deep vein thrombosis (DVT) of left lower extremity, unspecified chronicity, unspecified vein  Hypertension  COPD (chronic obstructive pulmonary disease)  History of total right knee replacement  S/P coronary artery stent placement    SOCIAL HISTORY:    ALLERGIES:  No Known Allergies    MEDICATIONS:  STANDING MEDICATIONS  ALBUTerol/ipratropium for Nebulization 3 milliLiter(s) Nebulizer every 6 hours  atorvastatin 20 milliGRAM(s) Oral at bedtime  buDESOnide 160 MICROgram(s)/formoterol 4.5 MICROgram(s) Inhaler 2 Puff(s) Inhalation two times a day  furosemide   Injectable 40 milliGRAM(s) IV Push two times a day  metoprolol succinate ER 25 milliGRAM(s) Oral daily  nystatin Powder 1 Application(s) Topical two times a day  predniSONE   Tablet 20 milliGRAM(s) Oral daily  sacubitril 24 mG/valsartan 26 mG 1 Tablet(s) Oral two times a day  warfarin 1 milliGRAM(s) Oral once    PRN MEDICATIONS    VITALS:   T(F): 98.6  HR: 64  BP: 123/62  RR: 18  SpO2: 95%    LABS:                        8.3    2.88  )-----------( 134      ( 25 Jul 2018 07:03 )             26.0     07-25    144  |  100  |  27<H>  ----------------------------<  137<H>  4.3   |  32  |  1.4    Ca    8.7      25 Jul 2018 07:03  Mg     2.3     07-25    TPro  7.0  /  Alb  3.3<L>  /  TBili  2.0<H>  /  DBili  x   /  AST  31  /  ALT  14  /  AlkPhos  92  07-23    PT/INR - ( 25 Jul 2018 07:03 )   PT: 29.30 sec;   INR: 2.74 ratio         PTT - ( 25 Jul 2018 07:03 )  PTT:39.0 sec          CARDIAC MARKERS ( 23 Jul 2018 12:40 )  x     / x     / 98 U/L / x     / 3.8 ng/mL  CARDIAC MARKERS ( 23 Jul 2018 10:20 )  x     / <0.01 ng/mL / x     / x     / x          RADIOLOGY:    PHYSICAL EXAM:  Constitutional: Patient is alert, awake and lying on the bed with 2-3 pillows. No acute distress. Obese  HEENT: NCAT  	Respiratory: Decreased breath sounds bilaterally, no rales  	Cardiovascular: S1 S2 normal. No murmur, rubs or gallops.    	Gastrointestinal: Abdominal fullness+, no tenderness.   	Extremities: Redness on medial aspect of L big toe, ttp  	Neurological: non-focal  Skin: Rash on chest under breast bilaterally      Assessment and Plan:  76 yo male with PMHx of stable COPD on 2L home O2, CHF (diastolic? EF 55%), DLD, atrial fibrillation on coumadin (10mg/week), presented with worsening shortness of breath likely due to acute CHF exacerbation. Patient has abdominal fullness likely due to ascites. CXR shows b/l pleural effusion.     #Acute CHF Exacerbation  - C/w IV furosemide 40mg twice a day  - c/w entresto  -Daily weights and strict I/Os.   -Restrict Na in diet. Maintain negative fluid balance  - US abdomen showed small ascites  -f/u repeat TTE   - f/u cardiology consult      #COPD  -Continue 2L O2 via nasal cannula  -Duoneb nebulization q6hrly  - on prednisone 20 daily    #Foot pain  - likely 2/2 gout  - gave 40 mg prednisone yesterday and started on 20 mg daily today  - if not resolving consider x-ray, possible podiatry consult      #Atrial fibrillation  - coumadin was held yesterday but restarted today on home dose because INR was not elevated    #Chest rash  -nystatin BID    #Dyslipidemia and HTN  -DASH diet     #Miscellanous  -DVT ppx (already on warfarin)  - GI ppx (not indicated)  Activity as tolerated  Full code  Dispo home

## 2018-07-26 LAB
ANION GAP SERPL CALC-SCNC: 13 MMOL/L — SIGNIFICANT CHANGE UP (ref 7–14)
APTT BLD: 36 SEC — SIGNIFICANT CHANGE UP (ref 27–39.2)
BUN SERPL-MCNC: 33 MG/DL — HIGH (ref 10–20)
CALCIUM SERPL-MCNC: 8.6 MG/DL — SIGNIFICANT CHANGE UP (ref 8.5–10.1)
CHLORIDE SERPL-SCNC: 98 MMOL/L — SIGNIFICANT CHANGE UP (ref 98–110)
CO2 SERPL-SCNC: 31 MMOL/L — SIGNIFICANT CHANGE UP (ref 17–32)
CREAT SERPL-MCNC: 1.6 MG/DL — HIGH (ref 0.7–1.5)
GLUCOSE SERPL-MCNC: 98 MG/DL — SIGNIFICANT CHANGE UP (ref 70–99)
HCT VFR BLD CALC: 24.2 % — LOW (ref 42–52)
HGB BLD-MCNC: 7.6 G/DL — LOW (ref 14–18)
INR BLD: 3.05 RATIO — HIGH (ref 0.65–1.3)
MAGNESIUM SERPL-MCNC: 2.4 MG/DL — SIGNIFICANT CHANGE UP (ref 1.8–2.4)
MCHC RBC-ENTMCNC: 27.4 PG — SIGNIFICANT CHANGE UP (ref 27–31)
MCHC RBC-ENTMCNC: 31.4 G/DL — LOW (ref 32–37)
MCV RBC AUTO: 87.4 FL — SIGNIFICANT CHANGE UP (ref 80–94)
NRBC # BLD: 0 /100 WBCS — SIGNIFICANT CHANGE UP (ref 0–0)
PLATELET # BLD AUTO: 141 K/UL — SIGNIFICANT CHANGE UP (ref 130–400)
POTASSIUM SERPL-MCNC: 3.9 MMOL/L — SIGNIFICANT CHANGE UP (ref 3.5–5)
POTASSIUM SERPL-SCNC: 3.9 MMOL/L — SIGNIFICANT CHANGE UP (ref 3.5–5)
PROTHROM AB SERPL-ACNC: 32.6 SEC — HIGH (ref 9.95–12.87)
RBC # BLD: 2.77 M/UL — LOW (ref 4.7–6.1)
RBC # FLD: 21.5 % — HIGH (ref 11.5–14.5)
SODIUM SERPL-SCNC: 142 MMOL/L — SIGNIFICANT CHANGE UP (ref 135–146)
WBC # BLD: 4.89 K/UL — SIGNIFICANT CHANGE UP (ref 4.8–10.8)
WBC # FLD AUTO: 4.89 K/UL — SIGNIFICANT CHANGE UP (ref 4.8–10.8)

## 2018-07-26 PROCEDURE — 93970 EXTREMITY STUDY: CPT | Mod: 26

## 2018-07-26 RX ORDER — FUROSEMIDE 40 MG
60 TABLET ORAL DAILY
Qty: 0 | Refills: 0 | Status: DISCONTINUED | OUTPATIENT
Start: 2018-07-26 | End: 2018-07-26

## 2018-07-26 RX ORDER — WARFARIN SODIUM 2.5 MG/1
0.5 TABLET ORAL ONCE
Qty: 0 | Refills: 0 | Status: COMPLETED | OUTPATIENT
Start: 2018-07-26 | End: 2018-07-26

## 2018-07-26 RX ORDER — IBUPROFEN 200 MG
400 TABLET ORAL ONCE
Qty: 0 | Refills: 0 | Status: COMPLETED | OUTPATIENT
Start: 2018-07-26 | End: 2018-07-26

## 2018-07-26 RX ORDER — BUDESONIDE AND FORMOTEROL FUMARATE DIHYDRATE 160; 4.5 UG/1; UG/1
2 AEROSOL RESPIRATORY (INHALATION)
Qty: 0 | Refills: 0 | Status: DISCONTINUED | OUTPATIENT
Start: 2018-07-26 | End: 2018-07-27

## 2018-07-26 RX ORDER — FUROSEMIDE 40 MG
60 TABLET ORAL
Qty: 0 | Refills: 0 | Status: DISCONTINUED | OUTPATIENT
Start: 2018-07-26 | End: 2018-07-30

## 2018-07-26 RX ADMIN — WARFARIN SODIUM 0.5 MILLIGRAM(S): 2.5 TABLET ORAL at 21:25

## 2018-07-26 RX ADMIN — NYSTATIN CREAM 1 APPLICATION(S): 100000 CREAM TOPICAL at 06:53

## 2018-07-26 RX ADMIN — Medication 40 MILLIGRAM(S): at 06:53

## 2018-07-26 RX ADMIN — Medication 400 MILLIGRAM(S): at 07:45

## 2018-07-26 RX ADMIN — Medication 20 MILLIGRAM(S): at 06:53

## 2018-07-26 RX ADMIN — Medication 3 MILLILITER(S): at 08:03

## 2018-07-26 RX ADMIN — Medication 60 MILLIGRAM(S): at 08:38

## 2018-07-26 RX ADMIN — Medication 60 MILLIGRAM(S): at 17:19

## 2018-07-26 RX ADMIN — Medication 3 MILLILITER(S): at 13:55

## 2018-07-26 RX ADMIN — ATORVASTATIN CALCIUM 20 MILLIGRAM(S): 80 TABLET, FILM COATED ORAL at 21:25

## 2018-07-26 RX ADMIN — BUDESONIDE AND FORMOTEROL FUMARATE DIHYDRATE 2 PUFF(S): 160; 4.5 AEROSOL RESPIRATORY (INHALATION) at 21:26

## 2018-07-26 RX ADMIN — Medication 3 MILLILITER(S): at 21:28

## 2018-07-26 RX ADMIN — SACUBITRIL AND VALSARTAN 1 TABLET(S): 24; 26 TABLET, FILM COATED ORAL at 06:53

## 2018-07-26 RX ADMIN — Medication 25 MILLIGRAM(S): at 06:53

## 2018-07-26 RX ADMIN — Medication 400 MILLIGRAM(S): at 00:40

## 2018-07-26 RX ADMIN — NYSTATIN CREAM 1 APPLICATION(S): 100000 CREAM TOPICAL at 17:20

## 2018-07-26 RX ADMIN — SACUBITRIL AND VALSARTAN 1 TABLET(S): 24; 26 TABLET, FILM COATED ORAL at 17:20

## 2018-07-26 NOTE — PROGRESS NOTE ADULT - ASSESSMENT
SUBJECTIVE:    Patient is a 77y old  Male who presents with a chief complaint of Shortness of breath and weight gain (23 Jul 2018 13:26)    Currently admitted to medicine with the primary diagnosis of CHF exacerbation     Today is hospital day 4d. This morning he is resting comfortably in bed and reports no new issues or overnight events. Still is SOB even at rest, no improvement from yesterday. No chest pain.    PAST MEDICAL & SURGICAL HISTORY  PAST MEDICAL & SURGICAL HISTORY:  Mitral valve insufficiency, unspecified etiology: Moderate  Chronic atrial fibrillation  Cellulitis of left lower extremity  Deep vein thrombosis (DVT) of left lower extremity, unspecified chronicity, unspecified vein  Hypertension  COPD (chronic obstructive pulmonary disease)  History of total right knee replacement  S/P coronary artery stent placement    SOCIAL HISTORY:    ALLERGIES:  No Known Allergies    MEDICATIONS:  STANDING MEDICATIONS  ALBUTerol/ipratropium for Nebulization 3 milliLiter(s) Nebulizer every 6 hours  atorvastatin 20 milliGRAM(s) Oral at bedtime  buDESOnide  80 MICROgram(s)/formoterol 4.5 MICROgram(s) Inhaler 2 Puff(s) Inhalation two times a day  buDESOnide 160 MICROgram(s)/formoterol 4.5 MICROgram(s) Inhaler 2 Puff(s) Inhalation two times a day  furosemide    Tablet 60 milliGRAM(s) Oral daily  methylPREDNISolone sodium succinate Injectable 60 milliGRAM(s) IV Push every 12 hours  metoprolol succinate ER 25 milliGRAM(s) Oral daily  nystatin Powder 1 Application(s) Topical two times a day  sacubitril 24 mG/valsartan 26 mG 1 Tablet(s) Oral two times a day  warfarin 0.5 milliGRAM(s) Oral once    PRN MEDICATIONS    VITALS:   T(F): 98.2  HR: 67  BP: 137/62  RR: 18  SpO2: --    LABS:                        7.6    4.89  )-----------( 141      ( 26 Jul 2018 06:13 )             24.2     07-26    142  |  98  |  33<H>  ----------------------------<  98  3.9   |  31  |  1.6<H>    Ca    8.6      26 Jul 2018 06:13  Mg     2.4     07-26      PT/INR - ( 26 Jul 2018 06:13 )   PT: 32.60 sec;   INR: 3.05 ratio         PTT - ( 26 Jul 2018 06:13 )  PTT:36.0 sec              RADIOLOGY:    < from: VA Duplex Lower Ext Vein Scan, Bilat (07.26.18 @ 09:12) >  Impression:    Chronic deep venous thrombosis right common femoral, femoral and   popliteal veins    No evidence of deep venous thrombosis or superficial thrombophlebitis in   left lower extremity.    < end of copied text >  < from: Xray Chest 1 View-PORTABLE IMMEDIATE (07.26.18 @ 08:54) >  Impression:      Stable right greater than left parenchymal opacity/right effusion    < end of copied text >    < from: Transthoracic Echocardiogram (07.25.18 @ 12:04) >  Summary:   1. Normal global left ventricular systolic function.   2. Left atrial enlargement.   3. LV Ejection Fraction by Brown's Method with a biplane EF of 64 %.   4. Moderately enlarged right ventricle.   5. Moderately reduced RV systolic function.   6. Right atrial enlargement.   7. Mild to moderate mitral valve regurgitation.   8. Moderate-severe tricuspid regurgitation.   9. Pulmonic valve regurgitation.  10. Estimated pulmonary artery systolic pressure is 62.7 mmHg assuming a   right atrial pressure of 15 mmHg, which is consistent with severe   pulmonary hypertension.  11. There is mild aortic root calcification.    < end of copied text >      PHYSICAL EXAM:  Constitutional: Patient is alert, awake and lying on the bed with 2-3 pillows. No acute distress. Obese  HEENT: NCAT  	Respiratory: Decreased breath sounds bilaterally, no rales  	Cardiovascular: S1 S2 normal. No murmur, rubs or gallops.    	Gastrointestinal: Abdominal fullness+, no tenderness.   	Extremities: Redness on medial aspect of L big toe, ttp  	Neurological: non-focal  Skin: Rash on chest under breast bilaterally      Assessment and Plan:  78 yo male with PMHx of stable COPD on 2L home O2, CHF (diastolic? EF 55%), DLD, atrial fibrillation on coumadin (10mg/week), presented with worsening shortness of breath likely due to acute CHF exacerbation. Patient has abdominal fullness likely due to ascites. CXR shows b/l pleural effusion. Now treating for COPD exacerbation as well.    #Acute CHF Exacerbation  - switch to lasix PO 60 mg daily  - c/w entresto  -Daily weights and strict I/Os.   -Restrict Na in diet. Maintain negative fluid balance  - US abdomen showed small ascites  - TTE shows severe pulmonary HTN  - repeat CXR shows stable effusion  - va duplex shows chronic DVT in R leg  - f/u cardiology consult      #COPD  - switch to IV solumedrol 60 mg q12h  - start on symbicort    #Foot pain  - likely 2/2 gout  - c/w prednisone 20 mg daily   - if not resolving consider x-ray, possible podiatry consult      #Atrial fibrillation  - INR slightly elevated, gaving coumadin 0.5 mg today    #Chest rash  -nystatin BID    #Dyslipidemia and HTN  -DASH diet     #Miscellanous  -DVT ppx (already on warfarin)  - GI ppx (not indicated)  Activity as tolerated  Full code  Dispo home

## 2018-07-26 NOTE — CONSULT NOTE ADULT - ASSESSMENT
76 yo male with PMHx of COPD on 2L home O2, CHF (diastolic? EF 55%), DLD, atrial fibrillation on coumadin (10mg/week), presented with worsening shortness of breath. Patient uses 2L home O2 at most of the times and can take about 10 steps without it before getting out of breath. Patient reports that sometimes he has to get up at night and sit up for breathing. He uses 2-3 pillows while in bed. Shortness of breath worsened a couple of days ago and is not accompanied by cough and fever. Patient denies any LE swelling. Patient reports abdominal fullness and increased abdominal girth, but no tenderness. Patient reports a 'water' weight gain of 5-6lbs in the last couple of days which contributed to increased shortness of breath. Patient denies any headache, insomnia, anxiety, palpitations or chest pain.     1) SOB secondary to CHF Exacerbation   Lasix IV   Follow strict I&O  Follow with Repeat CXR 76 yo male with PMHx of COPD on 2L home O2, CHF (diastolic? EF 55%), DLD, atrial fibrillation on coumadin (10mg/week), presented with worsening shortness of breath. Patient uses 2L home O2 at most of the times and can take about 10 steps without it before getting out of breath. Patient reports that sometimes he has to get up at night and sit up for breathing. He uses 2-3 pillows while in bed. Shortness of breath worsened a couple of days ago and is not accompanied by cough and fever. Patient denies any LE swelling. Patient reports abdominal fullness and increased abdominal girth, but no tenderness. Patient reports a 'water' weight gain of 5-6lbs in the last couple of days which contributed to increased shortness of breath. Patient denies any headache, insomnia, anxiety, palpitations or chest pain.     1) SOB secondary to CHF Exacerbation   Lasix IV   Follow strict I&O  Follow with Repeat CXR     Attending: Patient seen and examined. Discussed with the team on rounds.   CHF exacerbation. Diastolic CHF and right sided HF, pulmonary HTN, COPD, pleural effusions.    Recommend: Increase Lasix to 60 mg IV q12 and re-assess I/O, weight and symptoms.  C/w BB, Entresto  Cath - non-obstructive.  Echo reviewed.  Monitor renal function.  F/u with Dr. Scherer.  Consider pulmonary evaluation.

## 2018-07-26 NOTE — PROGRESS NOTE ADULT - SUBJECTIVE AND OBJECTIVE BOX
RODNEY SANTO  77y Male    INTERVAL HPI/OVERNIGHT EVENTS:  Patient seen and examined.    ROS: All other systems are negative.    Vital Signs:    T(F): 95.4 (18 @ 13:04), Max: 98.2 (18 @ 05:26)  HR: 72 (18 @ 13:04) (67 - 72)  BP: 134/61 (18 @ 13:04) (133/63 - 137/62)  RR: 18 (18 @ 13:04) (18 - 18)  SpO2: --  I&O's Summary    2018 07:  -  2018 07:00  --------------------------------------------------------  IN: 700 mL / OUT: 1340 mL / NET: -640 mL    2018 07:  -  2018 16:38  --------------------------------------------------------  IN: 700 mL / OUT: 400 mL / NET: 300 mL      Daily     Daily Weight in k.6 (2018 05:26)  CAPILLARY BLOOD GLUCOSE          PHYSICAL EXAM:    GENERAL:  NAD  SKIN: No rashes or lesions  HENT: Atrumatic. Normocephalic. PERRL. Moist membranes.  NECK: Supple, No JVD. No lymphadenopathy.  PULMONARY: CTA B/L. No wheezing. No rales  CVS: Normal S1, S2. Rate and Rythm are regular. No murmurs.  ABDOMEN/GI: Soft, Nontender, Nondistended; BS present  EXTREMITIES: Peripheral pulses intact. No edema B/L LE.  NEUROLOGIC:  No motor or sensory deficit.  PSYCH: Alert & oriented x 3    Consultant(s) Notes Reviewed:  [x ] YES  [ ] NO  Care Discussed with Consultants/Other Providers [ x] YES  [ ] NO    EKG reviewed  Telemetry reviewed    LABS:                        7.6    4.89  )-----------( 141      ( 2018 06:13 )             24.2         142  |  98  |  33<H>  ----------------------------<  98  3.9   |  31  |  1.6<H>    Ca    8.6      2018 06:13  Mg     2.4           PT/INR - ( 2018 06:13 )   PT: 32.60 sec;   INR: 3.05 ratio         PTT - ( 2018 06:13 )  PTT:36.0 sec  Serum Pro-Brain Natriuretic Peptide: 13689 pg/mL (18 @ 12:40)          RADIOLOGY & ADDITIONAL TESTS:      < from: Transthoracic Echocardiogram (18 @ 12:04) >    Summary:   1. Normal global left ventricular systolic function.   2. Left atrial enlargement.   3. LV Ejection Fraction by Brown's Method with a biplane EF of 64 %.   4. Moderately enlarged right ventricle.   5. Moderately reduced RV systolic function.   6. Right atrial enlargement.   7. Mild to moderate mitral valve regurgitation.   8. Moderate-severe tricuspid regurgitation.   9. Pulmonic valve regurgitation.  10. Estimated pulmonary artery systolic pressure is 62.7 mmHg assuming a   right atrial pressure of 15 mmHg, which is consistent with severe   pulmonary hypertension.  11. There is mild aortic root calcification.    < end of copied text >  Imaging or report Personally Reviewed:  [ ] YES  [ ] NO    Medications:  Standing  ALBUTerol/ipratropium for Nebulization 3 milliLiter(s) Nebulizer every 6 hours  atorvastatin 20 milliGRAM(s) Oral at bedtime  buDESOnide  80 MICROgram(s)/formoterol 4.5 MICROgram(s) Inhaler 2 Puff(s) Inhalation two times a day  buDESOnide 160 MICROgram(s)/formoterol 4.5 MICROgram(s) Inhaler 2 Puff(s) Inhalation two times a day  furosemide   Injectable 60 milliGRAM(s) IV Push two times a day  methylPREDNISolone sodium succinate Injectable 60 milliGRAM(s) IV Push every 12 hours  metolazone 2.5 milliGRAM(s) Oral daily  metoprolol succinate ER 25 milliGRAM(s) Oral daily  nystatin Powder 1 Application(s) Topical two times a day  sacubitril 24 mG/valsartan 26 mG 1 Tablet(s) Oral two times a day  warfarin 0.5 milliGRAM(s) Oral once    PRN Meds      Case discussed with resident    Care discussed with pt/family RODNEY SANTO  77y Male    INTERVAL HPI/OVERNIGHT EVENTS:  Patient seen and examined. Complains that his breathing has gotten worse. Becomes short of breath after walking a few steps.    ROS: All other systems are negative.    Vital Signs:    T(F): 95.4 (18 @ 13:04), Max: 98.2 (18 @ 05:26)  HR: 72 (18 @ 13:04) (67 - 72)  BP: 134/61 (18 @ 13:04) (133/63 - 137/62)  RR: 18 (18 @ 13:04) (18 - 18)  SpO2: --  I&O's Summary    2018 07:  -  2018 07:00  --------------------------------------------------------  IN: 700 mL / OUT: 1340 mL / NET: -640 mL    2018 07:01  -  2018 16:38  --------------------------------------------------------  IN: 700 mL / OUT: 400 mL / NET: 300 mL      Daily     Daily Weight in k.6 (2018 05:26)  CAPILLARY BLOOD GLUCOSE          PHYSICAL EXAM:    GENERAL:  NAD  SKIN: No rashes or lesions  HENT: Atrumatic. Normocephalic. PERRL. Moist membranes.  NECK: Supple, No JVD. No lymphadenopathy.  PULMONARY: +ve B/L wheezing. BS are decreased in the bases.  CVS: Normal S1, S2. Rate and Rythm are regular. No murmurs.  ABDOMEN/GI: Obese, Soft, Nontender, Nondistended; BS present  EXTREMITIES: Peripheral pulses intact. Rt. leg swollen as compared to Lt.  NEUROLOGIC:  No motor or sensory deficit.  PSYCH: Alert & oriented x 3    Consultant(s) Notes Reviewed:  [x ] YES  [ ] NO  Care Discussed with Consultants/Other Providers [ x] YES  [ ] NO    EKG reviewed  Telemetry reviewed    LABS:                        7.6    4.89  )-----------( 141      ( 2018 06:13 )             24.2   Hemoglobin: 7.6 g/dL ( @ 06:13)  Hemoglobin: 8.3 g/dL ( @ 07:03)  Hemoglobin: 7.9 g/dL ( @ 05:33)  Hemoglobin: 8.8 g/dL ( @ 10:20)        142  |  98  |  33<H>  ----------------------------<  98   Creatinine Trend: 1.6<--, 1.4<--, 1.4<--, 1.4<--, 1.4<--  3.9   |  31  |  1.6<H>    Ca    8.6      2018 06:13  Mg     2.4           PT/INR - ( 2018 06:13 )   PT: 32.60 sec;   INR: 3.05 ratio         PTT - ( 2018 06:13 )  PTT:36.0 sec  Serum Pro-Brain Natriuretic Peptide: 42810 pg/mL (18 @ 12:40)          RADIOLOGY & ADDITIONAL TESTS:      < from: Transthoracic Echocardiogram (18 @ 12:04) >    Summary:   1. Normal global left ventricular systolic function.   2. Left atrial enlargement.   3. LV Ejection Fraction by Brown's Method with a biplane EF of 64 %.   4. Moderately enlarged right ventricle.   5. Moderately reduced RV systolic function.   6. Right atrial enlargement.   7. Mild to moderate mitral valve regurgitation.   8. Moderate-severe tricuspid regurgitation.   9. Pulmonic valve regurgitation.  10. Estimated pulmonary artery systolic pressure is 62.7 mmHg assuming a   right atrial pressure of 15 mmHg, which is consistent with severe   pulmonary hypertension.  11. There is mild aortic root calcification.    < end of copied text >  Imaging or report Personally Reviewed:  [ ] YES  [ ] NO    Medications:  Standing  ALBUTerol/ipratropium for Nebulization 3 milliLiter(s) Nebulizer every 6 hours  atorvastatin 20 milliGRAM(s) Oral at bedtime  buDESOnide  80 MICROgram(s)/formoterol 4.5 MICROgram(s) Inhaler 2 Puff(s) Inhalation two times a day  buDESOnide 160 MICROgram(s)/formoterol 4.5 MICROgram(s) Inhaler 2 Puff(s) Inhalation two times a day  furosemide   Injectable 60 milliGRAM(s) IV Push two times a day  methylPREDNISolone sodium succinate Injectable 60 milliGRAM(s) IV Push every 12 hours  metolazone 2.5 milliGRAM(s) Oral daily  metoprolol succinate ER 25 milliGRAM(s) Oral daily  nystatin Powder 1 Application(s) Topical two times a day  sacubitril 24 mG/valsartan 26 mG 1 Tablet(s) Oral two times a day  warfarin 0.5 milliGRAM(s) Oral once    PRN Meds      Case discussed with resident    Care discussed with pt/family

## 2018-07-26 NOTE — PROGRESS NOTE ADULT - ASSESSMENT
Assessment: 76 yo male with PMHx of stable COPD on 2L home O2, CHF (diastolic? EF 55%), DLD, atrial fibrillation on coumadin (10mg/week), presented with worsening shortness of breath, MOORE and orthopnea. Also C/O pain in Lt. foot big toe    1. Ac. HFrEF  2. Morbid obesity  3. COPD  4. Ac. Gout  5. Fungal rash under breasts and groin area  6. Ch. A. fib  7. Ch. DVT  8. HTN    PLAN:    . Cont IV Lasix. Add metolazone 2.5 mg po q 24h. Check I'S and O'S and daily wt.  . Care D/W the Card.  . Cont Entresto  . Give Coumadin .5 po tonight.  . Nystatin powder twice a day for fungal rash  . D/C prednisone. Start Solumedrol 60 mg IVPB q 12h.  . Cont Symbicort, Nebs PRN and his other home meds.  . Plan of care D/W the pt. and his son . Assessment: 78 yo male with PMHx of stable COPD on 2L home O2, CHF (diastolic? EF 55%), DLD, atrial fibrillation on coumadin (10mg/week), presented with worsening shortness of breath, MOORE and orthopnea. Also C/O pain in Lt. foot big toe    1. Ac. HFrEF  2. Morbid obesity  3. COPD  4. Ac. Gout  5. Fungal rash under breasts and groin area  6. Ch. A. fib  7. Ch. DVT  8. HTN    PLAN:    . Cont IV Lasix. Add metolazone 2.5 mg po q 24h. Check I'S and O'S and daily wt.  . Care D/W the Card.  . Cont Entresto  . Give Coumadin .5 mg po tonight. Check INR in AM  . Nystatin powder twice a day for fungal rash  . D/C prednisone. Start Solumedrol 60 mg IVPB q 12h.  . Cont Symbicort, Nebs PRN and his other home meds.  . Plan of care D/W the pt.

## 2018-07-26 NOTE — CONSULT NOTE ADULT - SUBJECTIVE AND OBJECTIVE BOX
Patient is a 77y old  Male who presents with a chief complaint of Shortness of breath and weight gain (23 Jul 2018 13:26)      HPI:  78 yo male with PMHx of COPD on 2L home O2, CHF (diastolic? EF 55%), DLD, atrial fibrillation on coumadin (10mg/week), presented with worsening shortness of breath. Patient uses 2L home O2 at most of the times and can take about 10 steps without it before getting out of breath. Patient reports that sometimes he has to get up at night and sit up for breathing. He uses 2-3 pillows while in bed. Shortness of breath worsened a couple of days ago and is not accompanied by cough and fever. Patient denies any LE swelling. Patient reports abdominal fullness and increased abdominal girth, but no tenderness. Patient reports a 'water' weight gain of 5-6lbs in the last couple of days which contributed to increased shortness of breath. Patient denies any headache, insomnia, anxiety, palpitations or chest pain. Patient states that 'water (about 1L) was taken out of his stomach' suring one previous hospital admission. (23 Jul 2018 13:26)      PAST MEDICAL & SURGICAL HISTORY:  Mitral valve insufficiency, unspecified etiology: Moderate  Chronic atrial fibrillation  Cellulitis of left lower extremity  Deep vein thrombosis (DVT) of left lower extremity, unspecified chronicity, unspecified vein  Hypertension  COPD (chronic obstructive pulmonary disease)  History of total right knee replacement  S/P coronary artery stent placement            ECHO  FINDINGS:    < from: Transthoracic Echocardiogram (07.25.18 @ 12:04) >  Summary:   1. Normal global left ventricular systolic function.   2. Left atrial enlargement.   3. LV Ejection Fraction by Brown's Method with a biplane EF of 64 %.   4. Moderately enlarged right ventricle.   5. Moderately reduced RV systolic function.   6. Right atrial enlargement.   7. Mild to moderate mitral valve regurgitation.   8. Moderate-severe tricuspid regurgitation.   9. Pulmonic valve regurgitation.  10. Estimated pulmonary artery systolic pressure is 62.7 mmHg assuming a   right atrial pressure of 15 mmHg, which is consistent with severe   pulmonary hypertension.  11. There is mild aortic root calcification.    < end of copied text >        MEDICATIONS  (STANDING):  ALBUTerol/ipratropium for Nebulization 3 milliLiter(s) Nebulizer every 6 hours  atorvastatin 20 milliGRAM(s) Oral at bedtime  buDESOnide  80 MICROgram(s)/formoterol 4.5 MICROgram(s) Inhaler 2 Puff(s) Inhalation two times a day  buDESOnide 160 MICROgram(s)/formoterol 4.5 MICROgram(s) Inhaler 2 Puff(s) Inhalation two times a day  furosemide    Tablet 60 milliGRAM(s) Oral daily  methylPREDNISolone sodium succinate Injectable 60 milliGRAM(s) IV Push every 12 hours  metoprolol succinate ER 25 milliGRAM(s) Oral daily  nystatin Powder 1 Application(s) Topical two times a day  sacubitril 24 mG/valsartan 26 mG 1 Tablet(s) Oral two times a day  warfarin 0.5 milliGRAM(s) Oral once    MEDICATIONS  (PRN):      FAMILY HISTORY:  No pertinent family history in first degree relatives: No signifecant h/o chf in the family        ALCOHOL:  Vital Signs Last 24 Hrs  T(C): 36.8 (26 Jul 2018 05:26), Max: 36.8 (26 Jul 2018 05:26)  T(F): 98.2 (26 Jul 2018 05:26), Max: 98.2 (26 Jul 2018 05:26)  HR: 67 (26 Jul 2018 05:26) (67 - 72)  BP: 137/62 (26 Jul 2018 05:26) (129/55 - 137/62)  BP(mean): --  RR: 18 (26 Jul 2018 05:26) (18 - 18)  SpO2: --    PHYSICAL EXAM:      Constitutional: obese male experiencing Increased shortness of breath but no acute distress.     Eyes: pupils equal and reactive to light.     Neck: soft no mass     Back: WNL     Respiratory: Good air entry bilaterally. Positive rhonchi and crackles bilaterally.     Cardiovascular: s1s2 no murmur.     Gastrointestinal: Abd soft non tender     Extremities: WNL     Neurological: AAOX3  no focal neurological deficits     Skin: Good skin turgor no discoloration       Musculoskeletal: WNL           ECG:    I&O's Detail    25 Jul 2018 07:01  -  26 Jul 2018 07:00  --------------------------------------------------------  IN:    Oral Fluid: 700 mL  Total IN: 700 mL    OUT:    Voided: 1340 mL  Total OUT: 1340 mL    Total NET: -640 mL      26 Jul 2018 07:01  -  26 Jul 2018 11:34  --------------------------------------------------------  IN:    Oral Fluid: 360 mL  Total IN: 360 mL    OUT:    Voided: 300 mL  Total OUT: 300 mL    Total NET: 60 mL          LABS:                        7.6    4.89  )-----------( 141      ( 26 Jul 2018 06:13 )             24.2     07-26    < from: Xray Chest 1 View-PORTABLE IMMEDIATE (07.26.18 @ 08:54) >    Impression:      Stable right greater than left parenchymal opacity/right effusion    < end of copied text >  < from: Xray Chest 1 View-PORTABLE IMMEDIATE (07.26.18 @ 08:54) >    Impression:      Stable right greater than left parenchymal opacity/right effusion    < end of copied text >  142  |  98  |  33<H>  ----------------------------<  98  3.9   |  31  |  1.6<H>    Ca    8.6      26 Jul 2018 06:13  Mg     2.4     07-26          PT/INR - ( 26 Jul 2018 06:13 )   PT: 32.60 sec;   INR: 3.05 ratio         PTT - ( 26 Jul 2018 06:13 )  PTT:36.0 sec    I&O's Summary    25 Jul 2018 07:01  -  26 Jul 2018 07:00  --------------------------------------------------------  IN: 700 mL / OUT: 1340 mL / NET: -640 mL    26 Jul 2018 07:01  -  26 Jul 2018 11:34  --------------------------------------------------------  IN: 360 mL / OUT: 300 mL / NET: 60 mL      BNP 17,039 Patient is a 77y old  Male who presents with a chief complaint of Shortness of breath and weight gain (23 Jul 2018 13:26)      HPI:  76 yo male with PMHx of COPD on 2L home O2, CHF (diastolic? EF 55%), DLD, atrial fibrillation on coumadin (10mg/week), presented with worsening shortness of breath. Patient uses 2L home O2 at most of the times and can take about 10 steps without it before getting out of breath. Patient reports that sometimes he has to get up at night and sit up for breathing. He uses 2-3 pillows while in bed. Shortness of breath worsened a couple of days ago and is not accompanied by cough and fever. Patient denies any LE swelling. Patient reports abdominal fullness and increased abdominal girth, but no tenderness. Patient reports a 'water' weight gain of 5-6lbs in the last couple of days which contributed to increased shortness of breath. Patient denies any headache, insomnia, anxiety, palpitations or chest pain. Patient states that 'water (about 1L) was taken out of his stomach' suring one previous hospital admission. (23 Jul 2018 13:26)      PAST MEDICAL & SURGICAL HISTORY:  Mitral valve insufficiency, unspecified etiology: Moderate  Chronic atrial fibrillation  Cellulitis of left lower extremity  Deep vein thrombosis (DVT) of left lower extremity, unspecified chronicity, unspecified vein  Hypertension  COPD (chronic obstructive pulmonary disease)  History of total right knee replacement  S/P coronary artery stent placement    ROS: As above, otherwise negative    Social: Former smoker            ECHO  FINDINGS:    < from: Transthoracic Echocardiogram (07.25.18 @ 12:04) >  Summary:   1. Normal global left ventricular systolic function.   2. Left atrial enlargement.   3. LV Ejection Fraction by Brown's Method with a biplane EF of 64 %.   4. Moderately enlarged right ventricle.   5. Moderately reduced RV systolic function.   6. Right atrial enlargement.   7. Mild to moderate mitral valve regurgitation.   8. Moderate-severe tricuspid regurgitation.   9. Pulmonic valve regurgitation.  10. Estimated pulmonary artery systolic pressure is 62.7 mmHg assuming a   right atrial pressure of 15 mmHg, which is consistent with severe   pulmonary hypertension.  11. There is mild aortic root calcification.    < end of copied text >        MEDICATIONS  (STANDING):  ALBUTerol/ipratropium for Nebulization 3 milliLiter(s) Nebulizer every 6 hours  atorvastatin 20 milliGRAM(s) Oral at bedtime  buDESOnide  80 MICROgram(s)/formoterol 4.5 MICROgram(s) Inhaler 2 Puff(s) Inhalation two times a day  buDESOnide 160 MICROgram(s)/formoterol 4.5 MICROgram(s) Inhaler 2 Puff(s) Inhalation two times a day  furosemide    Tablet 60 milliGRAM(s) Oral daily  methylPREDNISolone sodium succinate Injectable 60 milliGRAM(s) IV Push every 12 hours  metoprolol succinate ER 25 milliGRAM(s) Oral daily  nystatin Powder 1 Application(s) Topical two times a day  sacubitril 24 mG/valsartan 26 mG 1 Tablet(s) Oral two times a day  warfarin 0.5 milliGRAM(s) Oral once    MEDICATIONS  (PRN):      FAMILY HISTORY:  No pertinent family history in first degree relatives: No significant h/o chf in the family        ALCOHOL:  Vital Signs Last 24 Hrs  T(C): 36.8 (26 Jul 2018 05:26), Max: 36.8 (26 Jul 2018 05:26)  T(F): 98.2 (26 Jul 2018 05:26), Max: 98.2 (26 Jul 2018 05:26)  HR: 67 (26 Jul 2018 05:26) (67 - 72)  BP: 137/62 (26 Jul 2018 05:26) (129/55 - 137/62)  BP(mean): --  RR: 18 (26 Jul 2018 05:26) (18 - 18)  SpO2: --    PHYSICAL EXAM:      Constitutional: obese male experiencing Increased shortness of breath but no acute distress.     Eyes: pupils equal and reactive to light.     Neck: soft no mass     Back: WNL     Respiratory: Good air entry bilaterally. Positive rhonchi and crackles bilaterally.     Cardiovascular: s1s2 no murmur.     Gastrointestinal: Abd soft non tender     Extremities: B/L edema, right >left     Neurological: AAOX3  no focal neurological deficits     Skin: Good skin turgor no discoloration       Musculoskeletal: WNL           ECG:    AF    I&O's Detail    25 Jul 2018 07:01  -  26 Jul 2018 07:00  --------------------------------------------------------  IN:    Oral Fluid: 700 mL  Total IN: 700 mL    OUT:    Voided: 1340 mL  Total OUT: 1340 mL    Total NET: -640 mL      26 Jul 2018 07:01  -  26 Jul 2018 11:34  --------------------------------------------------------  IN:    Oral Fluid: 360 mL  Total IN: 360 mL    OUT:    Voided: 300 mL  Total OUT: 300 mL    Total NET: 60 mL          LABS:                        7.6    4.89  )-----------( 141      ( 26 Jul 2018 06:13 )             24.2     07-26    < from: Xray Chest 1 View-PORTABLE IMMEDIATE (07.26.18 @ 08:54) >    Impression:      Stable right greater than left parenchymal opacity/right effusion    < end of copied text >  < from: Xray Chest 1 View-PORTABLE IMMEDIATE (07.26.18 @ 08:54) >    Impression:      Stable right greater than left parenchymal opacity/right effusion    < end of copied text >  142  |  98  |  33<H>  ----------------------------<  98  3.9   |  31  |  1.6<H>    Ca    8.6      26 Jul 2018 06:13  Mg     2.4     07-26          PT/INR - ( 26 Jul 2018 06:13 )   PT: 32.60 sec;   INR: 3.05 ratio         PTT - ( 26 Jul 2018 06:13 )  PTT:36.0 sec    I&O's Summary    25 Jul 2018 07:01  -  26 Jul 2018 07:00  --------------------------------------------------------  IN: 700 mL / OUT: 1340 mL / NET: -640 mL    26 Jul 2018 07:01  -  26 Jul 2018 11:34  --------------------------------------------------------  IN: 360 mL / OUT: 300 mL / NET: 60 mL      BNP 17,039

## 2018-07-27 LAB
ANION GAP SERPL CALC-SCNC: 11 MMOL/L — SIGNIFICANT CHANGE UP (ref 7–14)
APTT BLD: 34 SEC — SIGNIFICANT CHANGE UP (ref 27–39.2)
BUN SERPL-MCNC: 40 MG/DL — HIGH (ref 10–20)
CALCIUM SERPL-MCNC: 8.8 MG/DL — SIGNIFICANT CHANGE UP (ref 8.5–10.1)
CHLORIDE SERPL-SCNC: 98 MMOL/L — SIGNIFICANT CHANGE UP (ref 98–110)
CO2 SERPL-SCNC: 33 MMOL/L — HIGH (ref 17–32)
CREAT SERPL-MCNC: 1.6 MG/DL — HIGH (ref 0.7–1.5)
GLUCOSE SERPL-MCNC: 147 MG/DL — HIGH (ref 70–99)
HCT VFR BLD CALC: 25 % — LOW (ref 42–52)
HGB BLD-MCNC: 7.9 G/DL — LOW (ref 14–18)
INR BLD: 3 RATIO — HIGH (ref 0.65–1.3)
MAGNESIUM SERPL-MCNC: 2.4 MG/DL — SIGNIFICANT CHANGE UP (ref 1.8–2.4)
MCHC RBC-ENTMCNC: 27.5 PG — SIGNIFICANT CHANGE UP (ref 27–31)
MCHC RBC-ENTMCNC: 31.6 G/DL — LOW (ref 32–37)
MCV RBC AUTO: 87.1 FL — SIGNIFICANT CHANGE UP (ref 80–94)
NRBC # BLD: 0 /100 WBCS — SIGNIFICANT CHANGE UP (ref 0–0)
PLATELET # BLD AUTO: 143 K/UL — SIGNIFICANT CHANGE UP (ref 130–400)
POTASSIUM SERPL-MCNC: 4.1 MMOL/L — SIGNIFICANT CHANGE UP (ref 3.5–5)
POTASSIUM SERPL-SCNC: 4.1 MMOL/L — SIGNIFICANT CHANGE UP (ref 3.5–5)
PROTHROM AB SERPL-ACNC: 32 SEC — HIGH (ref 9.95–12.87)
RBC # BLD: 2.87 M/UL — LOW (ref 4.7–6.1)
RBC # FLD: 21.6 % — HIGH (ref 11.5–14.5)
SODIUM SERPL-SCNC: 142 MMOL/L — SIGNIFICANT CHANGE UP (ref 135–146)
WBC # BLD: 4.48 K/UL — LOW (ref 4.8–10.8)
WBC # FLD AUTO: 4.48 K/UL — LOW (ref 4.8–10.8)

## 2018-07-27 RX ORDER — ACETAMINOPHEN 500 MG
650 TABLET ORAL EVERY 6 HOURS
Qty: 0 | Refills: 0 | Status: DISCONTINUED | OUTPATIENT
Start: 2018-07-27 | End: 2018-08-01

## 2018-07-27 RX ORDER — WARFARIN SODIUM 2.5 MG/1
0.5 TABLET ORAL ONCE
Qty: 0 | Refills: 0 | Status: COMPLETED | OUTPATIENT
Start: 2018-07-27 | End: 2018-07-27

## 2018-07-27 RX ADMIN — BUDESONIDE AND FORMOTEROL FUMARATE DIHYDRATE 2 PUFF(S): 160; 4.5 AEROSOL RESPIRATORY (INHALATION) at 11:50

## 2018-07-27 RX ADMIN — BUDESONIDE AND FORMOTEROL FUMARATE DIHYDRATE 2 PUFF(S): 160; 4.5 AEROSOL RESPIRATORY (INHALATION) at 21:07

## 2018-07-27 RX ADMIN — Medication 60 MILLIGRAM(S): at 17:17

## 2018-07-27 RX ADMIN — ATORVASTATIN CALCIUM 20 MILLIGRAM(S): 80 TABLET, FILM COATED ORAL at 21:09

## 2018-07-27 RX ADMIN — Medication 60 MILLIGRAM(S): at 06:29

## 2018-07-27 RX ADMIN — SACUBITRIL AND VALSARTAN 1 TABLET(S): 24; 26 TABLET, FILM COATED ORAL at 17:18

## 2018-07-27 RX ADMIN — Medication 3 MILLILITER(S): at 09:02

## 2018-07-27 RX ADMIN — WARFARIN SODIUM 0.5 MILLIGRAM(S): 2.5 TABLET ORAL at 21:06

## 2018-07-27 RX ADMIN — Medication 650 MILLIGRAM(S): at 06:37

## 2018-07-27 RX ADMIN — SACUBITRIL AND VALSARTAN 1 TABLET(S): 24; 26 TABLET, FILM COATED ORAL at 06:26

## 2018-07-27 RX ADMIN — Medication 650 MILLIGRAM(S): at 07:30

## 2018-07-27 RX ADMIN — Medication 25 MILLIGRAM(S): at 06:27

## 2018-07-27 RX ADMIN — Medication 60 MILLIGRAM(S): at 06:27

## 2018-07-27 RX ADMIN — Medication 3 MILLILITER(S): at 21:24

## 2018-07-27 RX ADMIN — NYSTATIN CREAM 1 APPLICATION(S): 100000 CREAM TOPICAL at 17:18

## 2018-07-27 RX ADMIN — Medication 3 MILLILITER(S): at 13:07

## 2018-07-27 RX ADMIN — NYSTATIN CREAM 1 APPLICATION(S): 100000 CREAM TOPICAL at 06:28

## 2018-07-27 NOTE — PROGRESS NOTE ADULT - ASSESSMENT
Assessment: 78 yo male with PMHx of stable COPD on 2L home O2, CHF (diastolic? EF 55%), DLD, atrial fibrillation on coumadin (10mg/week), presented with worsening shortness of breath, MOORE and orthopnea. Also C/O pain in Lt. foot big toe    1. Ac. HFrEF  2. Morbid obesity  3. COPD  4. Ac. Gout  5. Fungal rash under breasts and groin area  6. Ch. A. fib  7. Ch. DVT  8. HTN  9. Ch. Iron deficiency anemia (H/O Gastric bypass)    PLAN:    . Cont IV Lasix. Add metolazone 2.5 mg po q 24h. Check I'S and O'S and daily wt.  . Care D/W the Card.  . Pulm eval  . Cont Entresto  . Give Coumadin .5 mg po tonight. Check INR in AM  . Nystatin powder twice a day for fungal rash  . D/C prednisone. Start Solumedrol 60 mg IVPB q 12h.  . Cont Symbicort, Nebs PRN and his other home meds.  . Plan of care D/W the pt. Assessment: 76 yo male with PMHx of stable COPD on 2L home O2, CHF (diastolic? EF 55%), DLD, atrial fibrillation on coumadin (10mg/week), presented with worsening shortness of breath, MOORE and orthopnea. Also C/O pain in Lt. foot big toe    1. Ac. HFrEF  2. Morbid obesity  3. COPD  4. Ac. Gout  5. Fungal rash under breasts and groin area  6. Ch. A. fib  7. Ch. DVT  8. HTN  9. Ch. Iron deficiency anemia (H/O Gastric bypass)    PLAN:    . Cont IV Lasix. and metolazone 2.5 mg po q 24h. Check I'S and O'S and daily wt.  . Check BUN/Cr in AM. If trending up switch him to po diuretics.  . Pulm eval  . Care D/W the Card.  . Pulm eval  . Cont Entresto  . Give Coumadin .5 mg po tonight. Check INR in AM  . Nystatin powder twice a day for fungal rash  . D/C Solumedrol. Prednisone 40 mg po q 24h.  . Cont Symbicort, Nebs PRN and his other home meds.  . Plan of care D/W the pt.

## 2018-07-27 NOTE — PROGRESS NOTE ADULT - ASSESSMENT
SUBJECTIVE:    Patient is a 77y old  Male who presents with a chief complaint of Shortness of breath and weight gain (23 Jul 2018 13:26)    Currently admitted to medicine with the primary diagnosis of CHF exacerbation     Today is hospital day 4d. This morning he is resting in bed and reports no new issues or overnight events. Still has SOB, unchanged, no chest pain. R foot still painful.    PAST MEDICAL & SURGICAL HISTORY  PAST MEDICAL & SURGICAL HISTORY:  Mitral valve insufficiency, unspecified etiology: Moderate  Chronic atrial fibrillation  Cellulitis of left lower extremity  Deep vein thrombosis (DVT) of left lower extremity, unspecified chronicity, unspecified vein  Hypertension  COPD (chronic obstructive pulmonary disease)  History of total right knee replacement  S/P coronary artery stent placement    SOCIAL HISTORY:    ALLERGIES:  No Known Allergies    MEDICATIONS:  STANDING MEDICATIONS  ALBUTerol/ipratropium for Nebulization 3 milliLiter(s) Nebulizer every 6 hours  atorvastatin 20 milliGRAM(s) Oral at bedtime  buDESOnide 160 MICROgram(s)/formoterol 4.5 MICROgram(s) Inhaler 2 Puff(s) Inhalation two times a day  furosemide   Injectable 60 milliGRAM(s) IV Push two times a day  methylPREDNISolone sodium succinate Injectable 60 milliGRAM(s) IV Push every 12 hours  metolazone 2.5 milliGRAM(s) Oral daily  metoprolol succinate ER 25 milliGRAM(s) Oral daily  nystatin Powder 1 Application(s) Topical two times a day  sacubitril 24 mG/valsartan 26 mG 1 Tablet(s) Oral two times a day  warfarin 0.5 milliGRAM(s) Oral once    PRN MEDICATIONS  acetaminophen   Tablet. 650 milliGRAM(s) Oral every 6 hours PRN    VITALS:   T(F): 97.7  HR: 70  BP: 139/59  RR: 18  SpO2: --    LABS:                        7.9    4.48  )-----------( 143      ( 27 Jul 2018 06:31 )             25.0     07-27    142  |  98  |  40<H>  ----------------------------<  147<H>  4.1   |  33<H>  |  1.6<H>    Ca    8.8      27 Jul 2018 06:31  Mg     2.4     07-27      PT/INR - ( 27 Jul 2018 06:31 )   PT: 32.00 sec;   INR: 3.00 ratio         PTT - ( 27 Jul 2018 06:31 )  PTT:34.0 sec              RADIOLOGY:      < from: Xray Chest 1 View-PORTABLE IMMEDIATE (07.26.18 @ 08:54) >  Impression:      Stable right greater than left parenchymal opacity/right effusion    < end of copied text >  PHYSICAL EXAM:  Constitutional: Patient is alert, awake and lying on the bed with 2-3 pillows. Having difficulty speaking full sentences due to SOB. Obese  HEENT: NCAT  	Respiratory: Decreased breath sounds bilaterally, no rales  	Cardiovascular: S1 S2 normal. No murmur, rubs or gallops.    	Gastrointestinal: Abdominal fullness+, no tenderness.   	Extremities: Redness on medial aspect of L big toe, ttp  	Neurological: non-focal  Skin: Rash on chest under breast bilaterally      Assessment and Plan:  78 yo male with PMHx of stable COPD on 2L home O2, CHF (diastolic? EF 55%), DLD, atrial fibrillation on coumadin (10mg/week), presented with worsening shortness of breath likely due to acute CHF exacerbation. Patient has abdominal fullness likely due to ascites. CXR shows b/l pleural effusion. Now treating for COPD exacerbation as well.    #Acute CHF Exacerbation  - increased to IV lasix 60 mg BID  - Added metolazone 2.5 mg po q 24h  - c/w entresto  -Daily weights and strict I/Os.   -Restrict Na in diet. Maintain negative fluid balance  - US abdomen showed small ascites  - TTE shows severe pulmonary HTN  - repeat CXR shows stable effusion  - va duplex shows chronic DVT in R leg  - f/u cardiology recommendations      #COPD  - IV solumedrol 60 mg one more dose tonight  - switch to prednisone 40 mg daily tomorrow  - start on symbicort    #Foot pain  -has not responded to steroids, unlikely gout  - f/u L foot xray       #Atrial fibrillation  - INR slightly elevated, giving coumadin again 0.5 mg today  -monitor INR    #Chest rash  -nystatin BID    #Dyslipidemia and HTN  -DASH diet     #Miscellanous  -DVT ppx (already on warfarin)  - GI ppx (not indicated)  Activity as tolerated  Full code  Dispo home

## 2018-07-27 NOTE — CONSULT NOTE ADULT - SUBJECTIVE AND OBJECTIVE BOX
Patient is a 77y old  Male who presents with a chief complaint of Shortness of breath and weight gain (23 Jul 2018 13:26)    HPI:  76 yo male with PMHx of COPD on 2L home O2, CHF (diastolic? EF 55%), DLD, atrial fibrillation on coumadin (10mg/week), presented with worsening shortness of breath. Patient uses 2L home O2 at most of the times and can take about 10 steps without it before getting out of breath. Patient reports that sometimes he has to get up at night and sit up for breathing. He uses 2-3 pillows while in bed. Shortness of breath worsened a couple of days ago and is not accompanied by cough and fever. Patient denies any LE swelling. Patient reports abdominal fullness and increased abdominal girth, but no tenderness. Patient reports a 'water' weight gain of 5-6lbs in the last couple of days which contributed to increased shortness of breath. Patient denies any headache, insomnia, anxiety, palpitations or chest pain. Patient states that 'water (about 1L) was taken out of his stomach' suring one previous hospital admission. (23 Jul 2018 13:26)      PAST MEDICAL & SURGICAL HISTORY:  Mitral valve insufficiency, unspecified etiology: Moderate  Chronic atrial fibrillation  Cellulitis of left lower extremity  Deep vein thrombosis (DVT) of left lower extremity, unspecified chronicity, unspecified vein  Hypertension  COPD (chronic obstructive pulmonary disease)  History of total right knee replacement  S/P coronary artery stent placement      Hospital Course: Exac COPD/ CHF- On diuretics/ steroids  L Foot pain- ? gout -no improvement on steroids- xray pending  TODAY'S SUBJECTIVE & REVIEW OF SYMPTOMS:     Constitutional Weakness   Cardio WNL   Resp WNL   GI WNL  Heme WNL  Endo WNL  Skin WNL  MSK WNL  Neuro WNL  Cognitive WNL  Psych WNL      MEDICATIONS  (STANDING):  ALBUTerol/ipratropium for Nebulization 3 milliLiter(s) Nebulizer every 6 hours  atorvastatin 20 milliGRAM(s) Oral at bedtime  buDESOnide 160 MICROgram(s)/formoterol 4.5 MICROgram(s) Inhaler 2 Puff(s) Inhalation two times a day  furosemide   Injectable 60 milliGRAM(s) IV Push two times a day  methylPREDNISolone sodium succinate Injectable 60 milliGRAM(s) IV Push every 12 hours  metolazone 2.5 milliGRAM(s) Oral daily  metoprolol succinate ER 25 milliGRAM(s) Oral daily  nystatin Powder 1 Application(s) Topical two times a day  sacubitril 24 mG/valsartan 26 mG 1 Tablet(s) Oral two times a day  warfarin 0.5 milliGRAM(s) Oral once    MEDICATIONS  (PRN):  acetaminophen   Tablet. 650 milliGRAM(s) Oral every 6 hours PRN Mild Pain (1 - 3)      FAMILY HISTORY:  No pertinent family history in first degree relatives: No signifecant h/o chf in the family      Allergies    No Known Allergies    Intolerances        SOCIAL HISTORY:    [    ] Etoh  [    ] Smoking  [    ] Substance abuse     Home Environment:  [    ] Home Alone  [ x   ] Lives with Family  [    ] Home Health Aid    Dwelling:  [    ] Apartment  [  x  ] Private House  [    ] Adult Home  [    ] Skilled Nursing Facility      [    ] Short Term  [    ] Long Term  [ x   ] Stairs CHAIRLIFT                          [    ] Elevator     FUNCTIONAL STATUS PTA: (Check all that apply)  Ambulation: [  x   ]Independent    [    ] Dependent     [    ] Non-Ambulatory  Assistive Device: [    ] SA Cane  [    ]  Q Cane  [ x   ] Walker  [    ]  Wheelchair  ADL : [ x   ] Independent  [    ]  Dependent   +O2    Vital Signs Last 24 Hrs  T(C): 36.5 (27 Jul 2018 04:52), Max: 36.5 (27 Jul 2018 04:52)  T(F): 97.7 (27 Jul 2018 04:52), Max: 97.7 (27 Jul 2018 04:52)  HR: 70 (27 Jul 2018 04:52) (64 - 70)  BP: 139/59 (27 Jul 2018 04:52) (139/59 - 141/64)  BP(mean): --  RR: 18 (27 Jul 2018 04:52) (18 - 18)  SpO2: --      PHYSICAL EXAM: Alert & Oriented X3 on O2  GENERAL: NAD, well-groomed, well-developed  HEAD:  Atraumatic, Normocephalic  EYES: EOMI, PERRLA, conjunctiva and sclera clear  NECK: Supple, No JVD, Normal thyroid  CHEST/LUNG: Diminished BS Lucas  HEART: S1s2 RRR  ABDOMEN: Soft, Nontender, Nondistended; Bowel sounds present  EXTREMITIES:  Chronic swelling/erythema RLE, L foot with tenderness at instep/ no warmth, no erythema    NERVOUS SYSTEM:  Cranial Nerves 2-12 intact [  x  ] Abnormal  [    ]  ROM: WFL all extremities [  x  ]  Abnormal [     ]  Motor Strength: WFL all extremities  [   x ]  Abnormal [    ]  Sensation: intact to light touch [    ] Abnormal [    ]      FUNCTIONAL STATUS:  Bed Mobility: [   ]  Independent [    ]  Supervision [x    ]  Needs Assistance [  ]  N/A  Transfers: [    ]  Independent [    ]  Supervision [  x  ]  Needs Assistance [    ]  N/A    Ambulation:  [    ]  Independent [    ]  Supervision [  x  ]  Needs Assistance [    ]  N/A   ADL:  [    ]   Independent [ x   ] Requires Assistance [    ] N/A       LABS:                        7.9    4.48  )-----------( 143      ( 27 Jul 2018 06:31 )             25.0     07-27    142  |  98  |  40<H>  ----------------------------<  147<H>  4.1   |  33<H>  |  1.6<H>    Ca    8.8      27 Jul 2018 06:31  Mg     2.4     07-27      PT/INR - ( 27 Jul 2018 06:31 )   PT: 32.00 sec;   INR: 3.00 ratio         PTT - ( 27 Jul 2018 06:31 )  PTT:34.0 sec      RADIOLOGY & ADDITIONAL STUDIES:

## 2018-07-27 NOTE — PROGRESS NOTE ADULT - SUBJECTIVE AND OBJECTIVE BOX
RODNEY SANTO  77y Male    INTERVAL HPI/OVERNIGHT EVENTS:  Patient seen and examined. Still having sob. Having dry cough.     ROS: All other systems are negative.    Vital Signs:    T(F): 97.7 (18 @ 04:52), Max: 97.7 (18 @ 04:52)  HR: 63 (18 @ 14:16) (63 - 70)  BP: 129/59 (18 @ 14:16) (129/59 - 141/64)  RR: 18 (18 @ 14:16) (18 - 18)  SpO2: --  I&O's Summary    2018 07:  -  2018 07:00  --------------------------------------------------------  IN: 700 mL / OUT: 1725 mL / NET: -1025 mL    2018 07:  -  2018 14:42  --------------------------------------------------------  IN: 940 mL / OUT: 600 mL / NET: 340 mL      Daily     Daily Weight in k.5 (2018 04:52)  CAPILLARY BLOOD GLUCOSE          PHYSICAL EXAM:    GENERAL:  NAD  SKIN: No rashes or lesions  HENT: Atrumatic. Normocephalic. PERRL. Moist membranes.  NECK: Supple, No JVD. No lymphadenopathy.  PULMONARY: BS are decreased in the bases. No wheezing. No rales  CVS: Normal S1, S2. Rate and Rythm are regular. No murmurs.  ABDOMEN/GI: Obese, Soft, Nontender, Nondistended; BS present  EXTREMITIES: Peripheral pulses intact. 2+ pitting edema B/L LE.  NEUROLOGIC:  No motor or sensory deficit.  PSYCH: Alert & oriented x 3    Consultant(s) Notes Reviewed:  [x ] YES  [ ] NO  Care Discussed with Consultants/Other Providers [ x] YES  [ ] NO    EKG reviewed  Telemetry reviewed    LABS:                        7.9    4.48  )-----------( 143      ( 2018 06:31 )             25.0   Hemoglobin: 7.9 g/dL ( @ 06:31)  Hemoglobin: 7.6 g/dL ( @ 06:13)  Hemoglobin: 8.3 g/dL ( @ 07:03)  Hemoglobin: 7.9 g/dL ( @ 05:33)  Hemoglobin: 8.8 g/dL ( @ 10:20)        142  |  98  |  40<H>  ----------------------------<  147<H>  4.1   |  33<H>  |  1.6<H>    Ca    8.8      2018 06:31  Mg     2.4           PT/INR - ( 2018 06:31 )   PT: 32.00 sec;   INR: 3.00 ratio         PTT - ( 2018 06:31 )  PTT:34.0 sec  Serum Pro-Brain Natriuretic Peptide: 35263 pg/mL (18 @ 12:40)          RADIOLOGY & ADDITIONAL TESTS:      Imaging or report Personally Reviewed:  [ ] YES  [ ] NO    Medications:  Standing  ALBUTerol/ipratropium for Nebulization 3 milliLiter(s) Nebulizer every 6 hours  atorvastatin 20 milliGRAM(s) Oral at bedtime  buDESOnide 160 MICROgram(s)/formoterol 4.5 MICROgram(s) Inhaler 2 Puff(s) Inhalation two times a day  furosemide   Injectable 60 milliGRAM(s) IV Push two times a day  methylPREDNISolone sodium succinate Injectable 60 milliGRAM(s) IV Push every 12 hours  metolazone 2.5 milliGRAM(s) Oral daily  metoprolol succinate ER 25 milliGRAM(s) Oral daily  nystatin Powder 1 Application(s) Topical two times a day  sacubitril 24 mG/valsartan 26 mG 1 Tablet(s) Oral two times a day  warfarin 0.5 milliGRAM(s) Oral once    PRN Meds  acetaminophen   Tablet. 650 milliGRAM(s) Oral every 6 hours PRN      Case discussed with resident    Care discussed with pt/family

## 2018-07-27 NOTE — CONSULT NOTE ADULT - ASSESSMENT
IMPRESSION: Rehab of Debilitation / L foot pain Doubt gout    PRECAUTIONS: [   x ] Cardiac  [ x   ] Respiratory  [    ] Seizures [    ] Contact Isolation  [    ] Droplet Isolation  [    ] Other    Weight Bearing Status:     RECOMMENDATION:    Out of Bed to Chair     DVT/Decubiti Prophylaxis    REHAB PLAN:     [   x  ] Bedside P/T 3-5 times a week   [     ] Bedside O/T  2-3 times a week   [     ] No Rehab Therapy Indicated   [     ]  Speech Therapy   Conditioning/ROM                                 ADL  Bed Mobility                                            Conditioning/ROM  Transfers                                                  Bed Mobility  Sitting /Standing Balance                      Transfers                                        Gait Training                                            Sitting/Standing Balance  Stair Training [   ]Applicable                 Home equipment Eval                                                                     Splinting  [   ] Only      GOALS:   ADL   [  x  ]   Independent         Transfers  [  x  ] Independent            Ambulation  [   x  ] Independent     [  x   ] With device                            [    ]  CG                                               [    ]  CG                                                    [     ] CG                            [    ] Min A                                          [    ] Min A                                                [     ] Min  A          DISCHARGE PLAN:   [     ]  Good candidate for Intensive Rehabilitation/Hospital based-4A [facility]                                             Will tolerate 3hrs Intensive Rehab Daily                                       [      ]  Short Term Rehab in Skilled Nursing Facility                                       [   x   ]  Home with Outpatient or  services                                         [      ]  Possible Candidate for Intensive Hospital based Rehab

## 2018-07-28 DIAGNOSIS — R06.02 SHORTNESS OF BREATH: ICD-10-CM

## 2018-07-28 DIAGNOSIS — I48.2 CHRONIC ATRIAL FIBRILLATION: ICD-10-CM

## 2018-07-28 DIAGNOSIS — I50.9 HEART FAILURE, UNSPECIFIED: ICD-10-CM

## 2018-07-28 DIAGNOSIS — J44.9 CHRONIC OBSTRUCTIVE PULMONARY DISEASE, UNSPECIFIED: ICD-10-CM

## 2018-07-28 DIAGNOSIS — E66.9 OBESITY, UNSPECIFIED: ICD-10-CM

## 2018-07-28 DIAGNOSIS — N17.9 ACUTE KIDNEY FAILURE, UNSPECIFIED: ICD-10-CM

## 2018-07-28 DIAGNOSIS — I82.402 ACUTE EMBOLISM AND THROMBOSIS OF UNSPECIFIED DEEP VEINS OF LEFT LOWER EXTREMITY: ICD-10-CM

## 2018-07-28 DIAGNOSIS — Z29.9 ENCOUNTER FOR PROPHYLACTIC MEASURES, UNSPECIFIED: ICD-10-CM

## 2018-07-28 DIAGNOSIS — L03.116 CELLULITIS OF LEFT LOWER LIMB: ICD-10-CM

## 2018-07-28 DIAGNOSIS — I10 ESSENTIAL (PRIMARY) HYPERTENSION: ICD-10-CM

## 2018-07-28 DIAGNOSIS — E66.01 MORBID (SEVERE) OBESITY DUE TO EXCESS CALORIES: ICD-10-CM

## 2018-07-28 LAB
ANION GAP SERPL CALC-SCNC: 14 MMOL/L — SIGNIFICANT CHANGE UP (ref 7–14)
APTT BLD: 35.2 SEC — SIGNIFICANT CHANGE UP (ref 27–39.2)
BUN SERPL-MCNC: 46 MG/DL — HIGH (ref 10–20)
CALCIUM SERPL-MCNC: 8.9 MG/DL — SIGNIFICANT CHANGE UP (ref 8.5–10.1)
CHLORIDE SERPL-SCNC: 95 MMOL/L — LOW (ref 98–110)
CO2 SERPL-SCNC: 32 MMOL/L — SIGNIFICANT CHANGE UP (ref 17–32)
CREAT SERPL-MCNC: 1.6 MG/DL — HIGH (ref 0.7–1.5)
GLUCOSE SERPL-MCNC: 130 MG/DL — HIGH (ref 70–99)
HCT VFR BLD CALC: 26.3 % — LOW (ref 42–52)
HGB BLD-MCNC: 8.4 G/DL — LOW (ref 14–18)
INR BLD: 2.92 RATIO — HIGH (ref 0.65–1.3)
MAGNESIUM SERPL-MCNC: 2.5 MG/DL — HIGH (ref 1.8–2.4)
MCHC RBC-ENTMCNC: 28 PG — SIGNIFICANT CHANGE UP (ref 27–31)
MCHC RBC-ENTMCNC: 31.9 G/DL — LOW (ref 32–37)
MCV RBC AUTO: 87.7 FL — SIGNIFICANT CHANGE UP (ref 80–94)
NRBC # BLD: 0 /100 WBCS — SIGNIFICANT CHANGE UP (ref 0–0)
PLATELET # BLD AUTO: 159 K/UL — SIGNIFICANT CHANGE UP (ref 130–400)
POTASSIUM SERPL-MCNC: 4.1 MMOL/L — SIGNIFICANT CHANGE UP (ref 3.5–5)
POTASSIUM SERPL-SCNC: 4.1 MMOL/L — SIGNIFICANT CHANGE UP (ref 3.5–5)
PROTHROM AB SERPL-ACNC: 31.2 SEC — HIGH (ref 9.95–12.87)
RBC # BLD: 3 M/UL — LOW (ref 4.7–6.1)
RBC # FLD: 21.8 % — HIGH (ref 11.5–14.5)
SODIUM SERPL-SCNC: 141 MMOL/L — SIGNIFICANT CHANGE UP (ref 135–146)
WBC # BLD: 6.36 K/UL — SIGNIFICANT CHANGE UP (ref 4.8–10.8)
WBC # FLD AUTO: 6.36 K/UL — SIGNIFICANT CHANGE UP (ref 4.8–10.8)

## 2018-07-28 RX ORDER — WARFARIN SODIUM 2.5 MG/1
0.5 TABLET ORAL ONCE
Qty: 0 | Refills: 0 | Status: COMPLETED | OUTPATIENT
Start: 2018-07-28 | End: 2018-07-28

## 2018-07-28 RX ADMIN — BUDESONIDE AND FORMOTEROL FUMARATE DIHYDRATE 2 PUFF(S): 160; 4.5 AEROSOL RESPIRATORY (INHALATION) at 07:25

## 2018-07-28 RX ADMIN — NYSTATIN CREAM 1 APPLICATION(S): 100000 CREAM TOPICAL at 17:36

## 2018-07-28 RX ADMIN — Medication 3 MILLILITER(S): at 07:52

## 2018-07-28 RX ADMIN — SACUBITRIL AND VALSARTAN 1 TABLET(S): 24; 26 TABLET, FILM COATED ORAL at 05:28

## 2018-07-28 RX ADMIN — Medication 60 MILLIGRAM(S): at 17:36

## 2018-07-28 RX ADMIN — NYSTATIN CREAM 1 APPLICATION(S): 100000 CREAM TOPICAL at 05:29

## 2018-07-28 RX ADMIN — Medication 3 MILLILITER(S): at 19:55

## 2018-07-28 RX ADMIN — ATORVASTATIN CALCIUM 20 MILLIGRAM(S): 80 TABLET, FILM COATED ORAL at 22:13

## 2018-07-28 RX ADMIN — Medication 40 MILLIGRAM(S): at 05:28

## 2018-07-28 RX ADMIN — BUDESONIDE AND FORMOTEROL FUMARATE DIHYDRATE 2 PUFF(S): 160; 4.5 AEROSOL RESPIRATORY (INHALATION) at 20:18

## 2018-07-28 RX ADMIN — SACUBITRIL AND VALSARTAN 1 TABLET(S): 24; 26 TABLET, FILM COATED ORAL at 17:35

## 2018-07-28 RX ADMIN — Medication 25 MILLIGRAM(S): at 05:28

## 2018-07-28 RX ADMIN — Medication 3 MILLILITER(S): at 13:56

## 2018-07-28 RX ADMIN — Medication 60 MILLIGRAM(S): at 05:28

## 2018-07-28 RX ADMIN — WARFARIN SODIUM 0.5 MILLIGRAM(S): 2.5 TABLET ORAL at 22:14

## 2018-07-28 NOTE — PROGRESS NOTE ADULT - SUBJECTIVE AND OBJECTIVE BOX
RODNEY SANTO  77y  Male      Patient is a 77y old  Male who presents with a chief complaint of Shortness of breath and weight gain (23 Jul 2018 13:26)      INTERVAL HPI/OVERNIGHT EVENTS:  no cp, abd pain, fever, diarrhea. sob stable from yesterday.    Vital Signs Last 24 Hrs  T(C): 36 (28 Jul 2018 13:11), Max: 36.3 (28 Jul 2018 06:10)  T(F): 96.8 (28 Jul 2018 13:11), Max: 97.3 (28 Jul 2018 06:10)  HR: 63 (28 Jul 2018 13:11) (54 - 66)  BP: 135/60 (28 Jul 2018 13:11) (115/55 - 135/60)  BP(mean): --  RR: 20 (28 Jul 2018 13:11) (18 - 20)  SpO2: --    PHYSICAL EXAM:  GENERAL: mod resp distress; obese  HEAD:  Atraumatic, Normocephalic  CHEST/LUNG: moderate resp distress; base crackles  HEART: Regular rate and rhythm; No murmurs, rubs, or gallops  ABDOMEN: Soft, Nontender, Nondistended; Bowel sounds present  EXTREMITIES:  2+ Peripheral Pulses, No clubbing, cyanosis, 2+ pit edema R>L      Consultant(s) Notes Reviewed:  [x ] YES  [ ] NO  Care Discussed with Consultants/Other Providers [ x] YES  [ ] NO    LABS:                        8.4    6.36  )-----------( 159      ( 28 Jul 2018 05:48 )             26.3     07-28    141  |  95<L>  |  46<H>  ----------------------------<  130<H>  4.1   |  32  |  1.6<H>    Ca    8.9      28 Jul 2018 05:48  Mg     2.5     07-28      PT/INR - ( 28 Jul 2018 05:48 )   PT: 31.20 sec;   INR: 2.92 ratio         PTT - ( 28 Jul 2018 05:48 )  PTT:35.2 sec      CAPILLARY BLOOD GLUCOSE          RADIOLOGY & ADDITIONAL TESTS:    Imaging Personally Reviewed:  [ ] YES  [ ] NO

## 2018-07-28 NOTE — PROGRESS NOTE ADULT - ASSESSMENT
77M PMHx COPD on 2L home O2, diastolic CHF, Afib on coumadin here with acute hypoxic and hypercapnic resp failure.

## 2018-07-28 NOTE — CONSULT NOTE ADULT - SUBJECTIVE AND OBJECTIVE BOX
RODNEY SANTO  77y  Male  Patient seen and examined. Chart reviewed and events noted.  HPI:  76 yo male with PMHx of COPD, KYLIE not using CPAP but only 2L home O2, CHF (diastolic? EF 55%), Pulmonary HTN, atrial fibrillation on coumadin (10mg/week), presented with worsening shortness of breath. Patient uses 2L home O2 at most of the times and can take about 10 steps without it before getting out of breath. Patient reports that sometimes he has to get up at night and sit up for breathing. He uses 2-3 pillows while in bed. Shortness of breath worsened a couple of days ago and is not accompanied by cough and fever. Patient denies any LE swelling. Patient reports abdominal fullness and increased abdominal girth, but no tenderness. Patient reports a 'water' weight gain of 5-6lbs in the last couple of days which contributed to increased shortness of breath. Patient denies any headache, insomnia, anxiety, palpitations or chest pain. (23 Jul 2018 13:26)    PAST MEDICAL & SURGICAL HISTORY:  Mitral valve insufficiency, unspecified etiology: Moderate  Pulm HTN (~65) SEVERE  Chronic atrial fibrillation  KYLIE (non compliant)   Cellulitis of left lower extremity  Deep vein thrombosis (DVT) of left lower extremity  Hypertension  COPD (chronic obstructive pulmonary disease)  CAD  History of total right knee replacement  S/P coronary artery stent placement    Allergies - No Known Allergies    FAMILY HISTORY:  No pertinent family history in first degree relatives    SOCIAL HISTORY  Smoking History: Ex smoker  Alcohol: denied  Drugs: denied  Occupation: retired &   MEDICATIONS  (STANDING):  ALBUTerol/ipratropium for Nebulization 3 milliLiter(s) Nebulizer every 6 hours  atorvastatin 20 milliGRAM(s) Oral at bedtime  buDESOnide 160 MICROgram(s)/formoterol 4.5 MICROgram(s) Inhaler 2 Puff(s) Inhalation two times a day  furosemide   Injectable 60 milliGRAM(s) IV Push two times a day  metolazone 2.5 milliGRAM(s) Oral daily  metoprolol succinate ER 25 milliGRAM(s) Oral daily  nystatin Powder 1 Application(s) Topical two times a day  predniSONE   Tablet 40 milliGRAM(s) Oral daily  sacubitril 24 mG/valsartan 26 mG 1 Tablet(s) Oral two times a day  MEDICATIONS  (PRN):  acetaminophen   Tablet. 650 milliGRAM(s) Oral every 6 hours PRN Mild Pain (1 - 3)    REVIEW OF SYSTEMS:  CONSTITUTIONAL: No fevers or chills.  HEENT: No visual disturbance or sore throat  NECK: No pain or stiffness  RESPIRATORY: SOB/MOORE and dry cough  CARDIOVASCULAR: No chest pain or palpitations  GASTROINTESTINAL:  No nausea, vomiting, or diarrhea. No abdominal pain.  GENITOURINARY: No dysuria, frequency or hematuria  NEUROLOGICAL: No numbness or headache  EXTREMITIES: Mild edema  No calf pain or tenderness. Chronic redness legs  Vital Signs Last 24 Hrs  T(F): 96.8 (28 Jul 2018 13:11), Max: 97.3 (28 Jul 2018 06:10)  HR: 63 (28 Jul 2018 13:11) (54 - 66)  BP: 135/60 (28 Jul 2018 13:11) (115/55 - 135/60)  RR: 20 (28 Jul 2018 13:11) (18 - 20)  SpO2: 94% on 2 Liters NC O2  PHYSICAL EXAM:  Constitutional: A A O x 3 NAD Freely speaking. O2 in use  HEAD: PERRLA, No JVD, Atraumatic, Normocephalic  Neck: No neck pain  Respiratory: Decreased BS bilaterally   Cardiovascular: Irregular rate and rhythm;  Gastrointestinal: Soft NT +BS Obese  Extremities: +1 edema. No calf pain or tenderness. Movement in all four extremities  Skin: Chronic skin changes    LABS:                        8.4    6.36  )-----------( 159      ( 28 Jul 2018 05:48 )             26.3     07-28    141  |  95<L>  |  46<H>  ----------------------------<  130<H>  4.1   |  32  |  1.6<H>    Ca    8.9      28 Jul 2018 05:48  Mg     2.5     07-28  PT/INR - ( 28 Jul 2018 05:48 )   PT: 31.20 sec;   INR: 2.92 ratio    PTT - ( 28 Jul 2018 05:48 )  PTT:35.2 sec  BNP 42909    RADIOLOGY:  Chest X-Ray: Bilateral pleural effusions, CHF, and atelectasis      2 D ECHO:   1. Normal global left ventricular systolic function.   2. Left atrial enlargement.   3. LV Ejection Fraction by Brown's Method with a biplane EF of 64 %.   4. Moderately enlarged right ventricle.   5. Moderately reduced RV systolic function.   6. Right atrial enlargement.   7. Mild to moderate mitral valve regurgitation.   8. Moderate-severe tricuspid regurgitation.   9. Pulmonic valve regurgitation.  10. Estimated pulmonary artery systolic pressure is 62.7 mmHg assuming a right atrial pressure of 15 mmHg, which is consistent with severe         pulmonary hypertension.  11. There is mild aortic root calcification.    Venous Duplex:  Chronic deep venous thrombosis right common femoral, femoral and   popliteal veins    ASSESSMENT:  Chronic SOB & hypoxia & dry cough  Untreated severe KYLIE  Severe Pulm HTN  Chronic CHF - Cor pulmonale   Chronic bilateral pleural effusions with atelectasis   O2 dependant due to above  Moderate COPD H/O tobacco use  AFIB on AC RX  Chronic Right DVT    PLAN:  O2 at 2  liters. Keep Sat at 92%  Lasix   I and O monitoring   Bronchodilator Rx  CPAP of 10 cm at HS - Refused   Follow his INR  GI / DVT prophylaxis   Rate control for AFib   Out patient follow up  I spoke with Dr Henderson several times yesterday regarding this patient and left a message for Dr. Pritchett to call me if any questions  THANK YOU

## 2018-07-28 NOTE — PROGRESS NOTE ADULT - PROBLEM SELECTOR PLAN 1
suspect acute decompensated heart failure in setting of end stage COPD +/- obesity hypoventilation syndrome  appears overloaded continue lasix 60 iv bid  metolazone 2.5  prednisone 40  supplemental o2 to keep spO2 >88  symbicort, duoneb q6

## 2018-07-29 LAB
ANION GAP SERPL CALC-SCNC: 13 MMOL/L — SIGNIFICANT CHANGE UP (ref 7–14)
APTT BLD: 32.9 SEC — SIGNIFICANT CHANGE UP (ref 27–39.2)
BUN SERPL-MCNC: 50 MG/DL — HIGH (ref 10–20)
CALCIUM SERPL-MCNC: 9.2 MG/DL — SIGNIFICANT CHANGE UP (ref 8.5–10.1)
CHLORIDE SERPL-SCNC: 92 MMOL/L — LOW (ref 98–110)
CO2 SERPL-SCNC: 36 MMOL/L — HIGH (ref 17–32)
CREAT SERPL-MCNC: 1.7 MG/DL — HIGH (ref 0.7–1.5)
GLUCOSE SERPL-MCNC: 103 MG/DL — HIGH (ref 70–99)
HCT VFR BLD CALC: 26.9 % — LOW (ref 42–52)
HGB BLD-MCNC: 8.6 G/DL — LOW (ref 14–18)
INR BLD: 3.15 RATIO — HIGH (ref 0.65–1.3)
MAGNESIUM SERPL-MCNC: 2.2 MG/DL — SIGNIFICANT CHANGE UP (ref 1.8–2.4)
MCHC RBC-ENTMCNC: 27.7 PG — SIGNIFICANT CHANGE UP (ref 27–31)
MCHC RBC-ENTMCNC: 32 G/DL — SIGNIFICANT CHANGE UP (ref 32–37)
MCV RBC AUTO: 86.5 FL — SIGNIFICANT CHANGE UP (ref 80–94)
NRBC # BLD: 0 /100 WBCS — SIGNIFICANT CHANGE UP (ref 0–0)
PLATELET # BLD AUTO: 163 K/UL — SIGNIFICANT CHANGE UP (ref 130–400)
POTASSIUM SERPL-MCNC: 3.6 MMOL/L — SIGNIFICANT CHANGE UP (ref 3.5–5)
POTASSIUM SERPL-SCNC: 3.6 MMOL/L — SIGNIFICANT CHANGE UP (ref 3.5–5)
PROTHROM AB SERPL-ACNC: 33.6 SEC — HIGH (ref 9.95–12.87)
RBC # BLD: 3.11 M/UL — LOW (ref 4.7–6.1)
RBC # FLD: 21.7 % — HIGH (ref 11.5–14.5)
SODIUM SERPL-SCNC: 141 MMOL/L — SIGNIFICANT CHANGE UP (ref 135–146)
WBC # BLD: 5.93 K/UL — SIGNIFICANT CHANGE UP (ref 4.8–10.8)
WBC # FLD AUTO: 5.93 K/UL — SIGNIFICANT CHANGE UP (ref 4.8–10.8)

## 2018-07-29 RX ADMIN — BUDESONIDE AND FORMOTEROL FUMARATE DIHYDRATE 2 PUFF(S): 160; 4.5 AEROSOL RESPIRATORY (INHALATION) at 19:59

## 2018-07-29 RX ADMIN — Medication 3 MILLILITER(S): at 19:54

## 2018-07-29 RX ADMIN — ATORVASTATIN CALCIUM 20 MILLIGRAM(S): 80 TABLET, FILM COATED ORAL at 21:30

## 2018-07-29 RX ADMIN — Medication 60 MILLIGRAM(S): at 06:07

## 2018-07-29 RX ADMIN — NYSTATIN CREAM 1 APPLICATION(S): 100000 CREAM TOPICAL at 06:09

## 2018-07-29 RX ADMIN — Medication 25 MILLIGRAM(S): at 06:08

## 2018-07-29 RX ADMIN — Medication 650 MILLIGRAM(S): at 11:31

## 2018-07-29 RX ADMIN — NYSTATIN CREAM 1 APPLICATION(S): 100000 CREAM TOPICAL at 18:31

## 2018-07-29 RX ADMIN — Medication 3 MILLILITER(S): at 08:10

## 2018-07-29 RX ADMIN — Medication 650 MILLIGRAM(S): at 23:23

## 2018-07-29 RX ADMIN — Medication 40 MILLIGRAM(S): at 06:07

## 2018-07-29 RX ADMIN — Medication 650 MILLIGRAM(S): at 04:52

## 2018-07-29 RX ADMIN — Medication 60 MILLIGRAM(S): at 18:30

## 2018-07-29 RX ADMIN — SACUBITRIL AND VALSARTAN 1 TABLET(S): 24; 26 TABLET, FILM COATED ORAL at 06:07

## 2018-07-29 RX ADMIN — Medication 3 MILLILITER(S): at 14:07

## 2018-07-29 RX ADMIN — Medication 650 MILLIGRAM(S): at 23:53

## 2018-07-29 RX ADMIN — SACUBITRIL AND VALSARTAN 1 TABLET(S): 24; 26 TABLET, FILM COATED ORAL at 18:30

## 2018-07-29 NOTE — PROGRESS NOTE ADULT - PROBLEM SELECTOR PLAN 1
due to acute decompensated heart failure  much improved today, diuresing well; negative 8 L for this admission; still with impressive scrotal and penile edema but lungs clear; needs further diuresis  continue lasix 60 iv bid  metolazone 2.5  prednisone 40  supplemental o2 to keep spO2 >88  symbicort, duoneb q6 due to acute decompensated heart failure  much improved today, diuresing well; negative 8 L for this admission; still with impressive scrotal and penile edema but lungs clear; needs further diuresis  continue lasix 60 iv bid  metolazone 2.5  d/c prednisone  supplemental o2 to keep spO2 >88  symbicort, duoneb q6

## 2018-07-29 NOTE — PROGRESS NOTE ADULT - ASSESSMENT
SUBJECTIVE:    Patient is a 77y old  Male who presents with a chief complaint of Shortness of breath and weight gain (23 Jul 2018 13:26)    Currently admitted to medicine with the primary diagnosis of CHF exacerbation     Today is hospital day 7d. This morning he is resting comfortably in bed and reports no new issues or overnight events. SOB improved today.    PAST MEDICAL & SURGICAL HISTORY  PAST MEDICAL & SURGICAL HISTORY:  Mitral valve insufficiency, unspecified etiology: Moderate  Chronic atrial fibrillation  Cellulitis of left lower extremity  Deep vein thrombosis (DVT) of left lower extremity, unspecified chronicity, unspecified vein  Hypertension  COPD (chronic obstructive pulmonary disease)  History of total right knee replacement  S/P coronary artery stent placement    SOCIAL HISTORY:    ALLERGIES:  No Known Allergies    MEDICATIONS:  STANDING MEDICATIONS  ALBUTerol/ipratropium for Nebulization 3 milliLiter(s) Nebulizer every 6 hours  atorvastatin 20 milliGRAM(s) Oral at bedtime  buDESOnide 160 MICROgram(s)/formoterol 4.5 MICROgram(s) Inhaler 2 Puff(s) Inhalation two times a day  furosemide   Injectable 60 milliGRAM(s) IV Push two times a day  metolazone 2.5 milliGRAM(s) Oral daily  metoprolol succinate ER 25 milliGRAM(s) Oral daily  nystatin Powder 1 Application(s) Topical two times a day  sacubitril 24 mG/valsartan 26 mG 1 Tablet(s) Oral two times a day    PRN MEDICATIONS  acetaminophen   Tablet. 650 milliGRAM(s) Oral every 6 hours PRN    VITALS:   T(F): 97.1  HR: 77  BP: 119/69  RR: 20  SpO2: --    LABS:                        8.6    5.93  )-----------( 163      ( 29 Jul 2018 08:08 )             26.9     07-29    141  |  92<L>  |  50<H>  ----------------------------<  103<H>  3.6   |  36<H>  |  1.7<H>    Ca    9.2      29 Jul 2018 08:08  Mg     2.2     07-29      PT/INR - ( 29 Jul 2018 08:08 )   PT: 33.60 sec;   INR: 3.15 ratio         PTT - ( 29 Jul 2018 08:08 )  PTT:32.9 sec              RADIOLOGY:    PHYSICAL EXAM:  GENERAL: NAD, well-groomed, well-developed  HEAD:  Atraumatic, Normocephalic  CHEST/LUNG: Clear to auscultation bilaterally; No rales, rhonchi, wheezing, or rubs  HEART: Regular rate and rhythm; No murmurs, rubs, or gallops  ABDOMEN: Soft, Nontender, Nondistended; Bowel sounds present  EXTREMITIES:  2+ Peripheral Pulses, 2+ pit edema; +scrotal and penile edema      Assessment and Plan:  76 yo male with PMHx of stable COPD on 2L home O2, CHF (diastolic? EF 55%), DLD, atrial fibrillation on coumadin (10mg/week), presented with worsening shortness of breath likely due to acute CHF exacerbation. Now treating for COPD exacerbation as well.    #Acute CHF Exacerbation  - c/w IV lasix 60 mg BID  - c/w metolazone 2.5 mg po q 24h  - c/w entresto  -Daily weights and strict I/Os.   -Restrict Na in diet. Maintain negative fluid balance  - US abdomen showed small ascites  - TTE shows severe pulmonary HTN  - va duplex shows chronic DVT in R leg  - f/u cardiology recommendations      #COPD  -d/c prednisone  - start on symbicort    #Foot pain  -has not responded to steroids, unlikely gout  - L foot xray shows osteoarthritis       #Atrial fibrillation  - c/w coumadin  -monitor INR    #Chest rash  -nystatin BID    #Dyslipidemia and HTN  -DASH diet     #Miscellanous  -DVT ppx (already on warfarin)  - GI ppx (not indicated)  Activity as tolerated  Full code  Dispo home

## 2018-07-29 NOTE — PROGRESS NOTE ADULT - PROBLEM SELECTOR PROBLEM 3
Deep vein thrombosis (DVT) of left lower extremity, unspecified chronicity, unspecified vein
Deep vein thrombosis (DVT) of left lower extremity, unspecified chronicity, unspecified vein

## 2018-07-29 NOTE — PROGRESS NOTE ADULT - SUBJECTIVE AND OBJECTIVE BOX
RODNEY SANTO  77y  Male      Patient is a 77y old  Male who presents with a chief complaint of Shortness of breath and weight gain (23 Jul 2018 13:26)      INTERVAL HPI/OVERNIGHT EVENTS:  sob improved today. no cp, abd pain, fever, diarrhea.    Vital Signs Last 24 Hrs  T(C): 36.2 (29 Jul 2018 12:54), Max: 36.3 (28 Jul 2018 20:02)  T(F): 97.1 (29 Jul 2018 12:54), Max: 97.4 (28 Jul 2018 20:02)  HR: 77 (29 Jul 2018 12:54) (66 - 77)  BP: 119/69 (29 Jul 2018 12:54) (119/69 - 151/65)  BP(mean): --  RR: 20 (29 Jul 2018 12:54) (20 - 20)  SpO2: --    PHYSICAL EXAM:  GENERAL: NAD, well-groomed, well-developed  HEAD:  Atraumatic, Normocephalic  CHEST/LUNG: Clear to auscultation bilaterally; No rales, rhonchi, wheezing, or rubs  HEART: Regular rate and rhythm; No murmurs, rubs, or gallops  ABDOMEN: Soft, Nontender, Nondistended; Bowel sounds present  EXTREMITIES:  2+ Peripheral Pulses, 2+ pit edema; +scrotal and penile edema      Consultant(s) Notes Reviewed:  [x ] YES  [ ] NO  Care Discussed with Consultants/Other Providers [ x] YES  [ ] NO    LABS:                        8.6    5.93  )-----------( 163      ( 29 Jul 2018 08:08 )             26.9     07-29    141  |  92<L>  |  50<H>  ----------------------------<  103<H>  3.6   |  36<H>  |  1.7<H>    Ca    9.2      29 Jul 2018 08:08  Mg     2.2     07-29      PT/INR - ( 29 Jul 2018 08:08 )   PT: 33.60 sec;   INR: 3.15 ratio         PTT - ( 29 Jul 2018 08:08 )  PTT:32.9 sec      CAPILLARY BLOOD GLUCOSE          RADIOLOGY & ADDITIONAL TESTS:    Imaging Personally Reviewed:  [ ] YES  [ ] NO

## 2018-07-29 NOTE — PHYSICAL THERAPY INITIAL EVALUATION ADULT - SPECIFY REASON(S)
Pt declined PT at this time. Reports he had a panic attack last night, and would like to rest right now. Pt reports he has been getting OOB, sitting in b/s chair. Encouraged to continue OOB.

## 2018-07-29 NOTE — PROGRESS NOTE ADULT - ASSESSMENT
77M PMHx COPD on 2L home O2, diastolic CHF, Afib on coumadin here with acute hypoxic and hypercapnic resp failure due to acute decompensated heart failure.

## 2018-07-30 LAB
ANION GAP SERPL CALC-SCNC: 12 MMOL/L — SIGNIFICANT CHANGE UP (ref 7–14)
APTT BLD: 32.8 SEC — SIGNIFICANT CHANGE UP (ref 27–39.2)
BASE EXCESS BLDA CALC-SCNC: 19.9 MMOL/L — HIGH (ref -2–2)
BUN SERPL-MCNC: 49 MG/DL — HIGH (ref 10–20)
CALCIUM SERPL-MCNC: 9.4 MG/DL — SIGNIFICANT CHANGE UP (ref 8.5–10.1)
CHLORIDE SERPL-SCNC: 89 MMOL/L — LOW (ref 98–110)
CO2 SERPL-SCNC: 41 MMOL/L — CRITICAL HIGH (ref 17–32)
CREAT SERPL-MCNC: 1.5 MG/DL — SIGNIFICANT CHANGE UP (ref 0.7–1.5)
GLUCOSE SERPL-MCNC: 132 MG/DL — HIGH (ref 70–99)
HCO3 BLDA-SCNC: 46 MMOL/L — CRITICAL HIGH (ref 23–27)
HCT VFR BLD CALC: 28 % — LOW (ref 42–52)
HGB BLD-MCNC: 9 G/DL — LOW (ref 14–18)
INR BLD: 3.13 RATIO — HIGH (ref 0.65–1.3)
MAGNESIUM SERPL-MCNC: 2 MG/DL — SIGNIFICANT CHANGE UP (ref 1.8–2.4)
MCHC RBC-ENTMCNC: 27.3 PG — SIGNIFICANT CHANGE UP (ref 27–31)
MCHC RBC-ENTMCNC: 32.1 G/DL — SIGNIFICANT CHANGE UP (ref 32–37)
MCV RBC AUTO: 84.8 FL — SIGNIFICANT CHANGE UP (ref 80–94)
NRBC # BLD: 0 /100 WBCS — SIGNIFICANT CHANGE UP (ref 0–0)
PCO2 BLDA: 60 MMHG — HIGH (ref 38–42)
PH BLDA: 7.49 — HIGH (ref 7.38–7.42)
PLATELET # BLD AUTO: 165 K/UL — SIGNIFICANT CHANGE UP (ref 130–400)
PO2 BLDA: 64 MMHG — LOW (ref 78–95)
POTASSIUM SERPL-MCNC: 3.1 MMOL/L — LOW (ref 3.5–5)
POTASSIUM SERPL-SCNC: 3.1 MMOL/L — LOW (ref 3.5–5)
PROTHROM AB SERPL-ACNC: 33.4 SEC — HIGH (ref 9.95–12.87)
RBC # BLD: 3.3 M/UL — LOW (ref 4.7–6.1)
RBC # FLD: 21.2 % — HIGH (ref 11.5–14.5)
SAO2 % BLDA: 92 % — LOW (ref 94–98)
SODIUM SERPL-SCNC: 142 MMOL/L — SIGNIFICANT CHANGE UP (ref 135–146)
WBC # BLD: 5.72 K/UL — SIGNIFICANT CHANGE UP (ref 4.8–10.8)
WBC # FLD AUTO: 5.72 K/UL — SIGNIFICANT CHANGE UP (ref 4.8–10.8)

## 2018-07-30 RX ORDER — POTASSIUM CHLORIDE 20 MEQ
20 PACKET (EA) ORAL THREE TIMES A DAY
Qty: 0 | Refills: 0 | Status: COMPLETED | OUTPATIENT
Start: 2018-07-30 | End: 2018-07-30

## 2018-07-30 RX ORDER — OXYCODONE AND ACETAMINOPHEN 5; 325 MG/1; MG/1
1 TABLET ORAL EVERY 6 HOURS
Qty: 0 | Refills: 0 | Status: DISCONTINUED | OUTPATIENT
Start: 2018-07-30 | End: 2018-08-01

## 2018-07-30 RX ORDER — WARFARIN SODIUM 2.5 MG/1
0.5 TABLET ORAL ONCE
Qty: 0 | Refills: 0 | Status: COMPLETED | OUTPATIENT
Start: 2018-07-30 | End: 2018-07-30

## 2018-07-30 RX ORDER — WARFARIN SODIUM 2.5 MG/1
1 TABLET ORAL ONCE
Qty: 0 | Refills: 0 | Status: DISCONTINUED | OUTPATIENT
Start: 2018-07-30 | End: 2018-07-30

## 2018-07-30 RX ORDER — OXYCODONE AND ACETAMINOPHEN 5; 325 MG/1; MG/1
1 TABLET ORAL ONCE
Qty: 0 | Refills: 0 | Status: DISCONTINUED | OUTPATIENT
Start: 2018-07-30 | End: 2018-07-30

## 2018-07-30 RX ORDER — FUROSEMIDE 40 MG
60 TABLET ORAL
Qty: 0 | Refills: 0 | Status: DISCONTINUED | OUTPATIENT
Start: 2018-07-30 | End: 2018-08-01

## 2018-07-30 RX ADMIN — SACUBITRIL AND VALSARTAN 1 TABLET(S): 24; 26 TABLET, FILM COATED ORAL at 05:52

## 2018-07-30 RX ADMIN — Medication 60 MILLIGRAM(S): at 17:13

## 2018-07-30 RX ADMIN — SACUBITRIL AND VALSARTAN 1 TABLET(S): 24; 26 TABLET, FILM COATED ORAL at 17:13

## 2018-07-30 RX ADMIN — Medication 3 MILLILITER(S): at 07:43

## 2018-07-30 RX ADMIN — Medication 25 MILLIGRAM(S): at 05:52

## 2018-07-30 RX ADMIN — Medication 20 MILLIEQUIVALENT(S): at 21:25

## 2018-07-30 RX ADMIN — BUDESONIDE AND FORMOTEROL FUMARATE DIHYDRATE 2 PUFF(S): 160; 4.5 AEROSOL RESPIRATORY (INHALATION) at 08:02

## 2018-07-30 RX ADMIN — BUDESONIDE AND FORMOTEROL FUMARATE DIHYDRATE 2 PUFF(S): 160; 4.5 AEROSOL RESPIRATORY (INHALATION) at 20:07

## 2018-07-30 RX ADMIN — Medication 60 MILLIGRAM(S): at 05:53

## 2018-07-30 RX ADMIN — OXYCODONE AND ACETAMINOPHEN 1 TABLET(S): 5; 325 TABLET ORAL at 01:51

## 2018-07-30 RX ADMIN — Medication 3 MILLILITER(S): at 19:44

## 2018-07-30 RX ADMIN — Medication 3 MILLILITER(S): at 13:18

## 2018-07-30 RX ADMIN — WARFARIN SODIUM 0.5 MILLIGRAM(S): 2.5 TABLET ORAL at 21:25

## 2018-07-30 RX ADMIN — Medication 20 MILLIEQUIVALENT(S): at 10:49

## 2018-07-30 RX ADMIN — OXYCODONE AND ACETAMINOPHEN 1 TABLET(S): 5; 325 TABLET ORAL at 10:52

## 2018-07-30 RX ADMIN — ATORVASTATIN CALCIUM 20 MILLIGRAM(S): 80 TABLET, FILM COATED ORAL at 21:25

## 2018-07-30 RX ADMIN — OXYCODONE AND ACETAMINOPHEN 1 TABLET(S): 5; 325 TABLET ORAL at 10:55

## 2018-07-30 RX ADMIN — Medication 3 MILLILITER(S): at 01:55

## 2018-07-30 RX ADMIN — NYSTATIN CREAM 1 APPLICATION(S): 100000 CREAM TOPICAL at 17:13

## 2018-07-30 RX ADMIN — NYSTATIN CREAM 1 APPLICATION(S): 100000 CREAM TOPICAL at 05:53

## 2018-07-30 RX ADMIN — Medication 20 MILLIEQUIVALENT(S): at 13:24

## 2018-07-30 NOTE — DIETITIAN INITIAL EVALUATION ADULT. - PHYSICAL APPEARANCE
obese/alert and oriented. BMI: 37.2 (282#/73"), IBW: 184#, UBW: 292-303#, 2+ edema to B/L leg; Baljinder 17

## 2018-07-30 NOTE — DIETITIAN INITIAL EVALUATION ADULT. - ORAL INTAKE PTA
good/Pt has good/appetite po intake and follows a heart healthy diet focusing on no salt at all. Pt is aware of his fluid shifts and avoids salt. Denies use of oral nutrition supplements and has NKFA.

## 2018-07-30 NOTE — PROGRESS NOTE ADULT - ASSESSMENT
Assessment: 76 yo male with PMHx of stable COPD on 2L home O2, CHF (diastolic? EF 55%), DLD, atrial fibrillation on coumadin (10mg/week), presented with worsening shortness of breath, MOORE and orthopnea. Also C/O pain in Lt. foot big toe    1. Ac. HFrEF  2. Morbid obesity  3. COPD  4. Ac. Gout  5. Fungal rash under breasts and groin area  6. Ch. A. fib  7. Ch. DVT  8. HTN  9. Ch. Iron deficiency anemia (H/O Gastric bypass)    PLAN:    . Cont IV Lasix. and metolazone 2.5 mg po q 24h. Check I'S and O'S and daily wt.  . Check BUN/Cr in AM. If trending up switch him to po diuretics.  . Pulm eval  . Care D/W the Card.  . Pulm eval  . Cont Entresto  . Give Coumadin .5 mg po tonight. Check INR in AM  . Nystatin powder twice a day for fungal rash  . D/C Solumedrol. Prednisone 40 mg po q 24h.  . Cont Symbicort, Nebs PRN and his other home meds.  . Plan of care D/W the pt. Assessment: 76 yo male with PMHx of stable COPD on 2L home O2, CHF (diastolic? EF 55%), DLD, atrial fibrillation on coumadin (10mg/week), presented with worsening shortness of breath, MOORE and orthopnea. Also C/O pain in Lt. foot big toe    1. Ac. HFrEF  2. Morbid obesity  3. COPD  4. Ac. Gout  5. Fungal rash under breasts and groin area  6. Ch. A. fib  7. Ch. DVT  8. HTN  9. Ch. Iron deficiency anemia (H/O Gastric bypass)  10. Alkalosis    PLAN:    . Change Lasix to 60 mg po q 12h. Cont metolazone 2.5 mg po q 24h. Check I'S and O'S and daily wt.  . Check BUN/Cr in AM.   . Pulm eval noted  . Care D/W the Card.  . Cont Entresto  . Give Coumadin .5 mg po tonight. Check INR in AM  . Nystatin powder twice a day for fungal rash  . Cont Symbicort, Nebs PRN and his other home meds.  . Rehab eval  . Plan of care D/W the pt.

## 2018-07-30 NOTE — PROGRESS NOTE ADULT - SUBJECTIVE AND OBJECTIVE BOX
RODNEY SANTO  77y Male    INTERVAL HPI/OVERNIGHT EVENTS:  Patient seen and examined. Feels better today. Breathing has improved. No sob. No cough.    ROS: All other systems are negative.    Vital Signs:    T(F): 96.4 (18 @ 13:25), Max: 97.7 (18 @ 20:12)  HR: 70 (18 @ 13:25) (62 - 70)  BP: 95/46 (18 @ 13:25) (95/46 - 122/55)  RR: 18 (18 @ 13:25) (18 - 20)  SpO2: 95% (18 @ 18:16) (95% - 95%)  I&O's Summary    2018 07:  -  2018 07:00  --------------------------------------------------------  IN: 50 mL / OUT: 3825 mL / NET: -3775 mL    2018 07:01  -  2018 17:29  --------------------------------------------------------  IN: 0 mL / OUT: 1800 mL / NET: -1800 mL      Daily     Daily Weight in k.4 (2018 10:57)  CAPILLARY BLOOD GLUCOSE  125 (2018 20:46)          PHYSICAL EXAM:    GENERAL:  NAD  SKIN: No rashes or lesions  HENT: Atrumatic. Normocephalic. PERRL. Moist membranes.  NECK: Supple, No JVD. No lymphadenopathy.  PULMONARY: BS are decreased in the bases B/L. No wheezing. No rales  CVS: Normal S1, S2. Rate and Rythm are regular. No murmurs.  ABDOMEN/GI: Soft, Nontender, Nondistended; BS present  EXTREMITIES: Peripheral pulses intact. 2+ pitting edema B/L LE.  NEUROLOGIC:  No motor or sensory deficit.  PSYCH: Alert & oriented x 3    Consultant(s) Notes Reviewed:  [x ] YES  [ ] NO  Care Discussed with Consultants/Other Providers [ x] YES  [ ] NO    EKG reviewed  Telemetry reviewed    LABS:                        9.0    5.72  )-----------( 165      ( 2018 09:15 )             28.0     07-30    142  |  89<L>  |  49<H>  ----------------------------<  132<H>  3.1<L>   |  41<HH>  |  1.5    Ca    9.4      2018 09:15  Mg     2.0     07-30      PT/INR - ( 2018 09:15 )   PT: 33.40 sec;   INR: 3.13 ratio         PTT - ( 2018 09:15 )  PTT:32.8 sec          RADIOLOGY & ADDITIONAL TESTS:      Imaging or report Personally Reviewed:  [ ] YES  [ ] NO    Medications:  Standing  ALBUTerol/ipratropium for Nebulization 3 milliLiter(s) Nebulizer every 6 hours  atorvastatin 20 milliGRAM(s) Oral at bedtime  buDESOnide 160 MICROgram(s)/formoterol 4.5 MICROgram(s) Inhaler 2 Puff(s) Inhalation two times a day  furosemide    Tablet 60 milliGRAM(s) Oral two times a day  metolazone 2.5 milliGRAM(s) Oral daily  metoprolol succinate ER 25 milliGRAM(s) Oral daily  nystatin Powder 1 Application(s) Topical two times a day  potassium chloride   Powder 20 milliEquivalent(s) Oral three times a day  sacubitril 24 mG/valsartan 26 mG 1 Tablet(s) Oral two times a day  warfarin 0.5 milliGRAM(s) Oral once    PRN Meds  acetaminophen   Tablet. 650 milliGRAM(s) Oral every 6 hours PRN  oxyCODONE    5 mG/acetaminophen 325 mG 1 Tablet(s) Oral every 6 hours PRN      Case discussed with resident    Care discussed with pt/family

## 2018-07-30 NOTE — DIETITIAN INITIAL EVALUATION ADULT. - OTHER INFO
Pt p/w with SOB and weight gain. Acute CHF Exacerbation--TTE shows severe pulmonary HTN and  va duplex shows chronic DVT in R leg; f/u cardiology recommendations. Foot pain--L foot xray shows osteoarthritis. Reason for Assessment: LOS

## 2018-07-30 NOTE — PROGRESS NOTE ADULT - SUBJECTIVE AND OBJECTIVE BOX
RODNEY SANTO 77y Male  MRN#: [id number]   CODE STATUS:________      SUBJECTIVE  Patient is a 77y old Male who presents with a chief complaint of Shortness of breath and weight gain (23 Jul 2018 13:26)    he is currently admitted to medicine with the primary diagnosis of CHF exacerbation    Today is hospital day 7d, and this morning he is complaining of Lt great toe pain and testicle pain. Pt is also complaining of occasional SOB. Otherwise, pt is resting in bed comfortably today.    No acute overnight events.     OBJECTIVE  PAST MEDICAL & SURGICAL HISTORY  Mitral valve insufficiency, unspecified etiology: Moderate  Chronic atrial fibrillation  Cellulitis of left lower extremity  Deep vein thrombosis (DVT) of left lower extremity, unspecified chronicity, unspecified vein  Hypertension  COPD (chronic obstructive pulmonary disease)  History of total right knee replacement  S/P coronary artery stent placement    ALLERGIES:  No Known Allergies    MEDICATIONS:  STANDING MEDICATIONS  ALBUTerol/ipratropium for Nebulization 3 milliLiter(s) Nebulizer every 6 hours  atorvastatin 20 milliGRAM(s) Oral at bedtime  buDESOnide 160 MICROgram(s)/formoterol 4.5 MICROgram(s) Inhaler 2 Puff(s) Inhalation two times a day  furosemide    Tablet 60 milliGRAM(s) Oral two times a day  metolazone 2.5 milliGRAM(s) Oral daily  metoprolol succinate ER 25 milliGRAM(s) Oral daily  nystatin Powder 1 Application(s) Topical two times a day  potassium chloride   Powder 20 milliEquivalent(s) Oral three times a day  sacubitril 24 mG/valsartan 26 mG 1 Tablet(s) Oral two times a day  warfarin 0.5 milliGRAM(s) Oral once    PRN MEDICATIONS  acetaminophen   Tablet. 650 milliGRAM(s) Oral every 6 hours PRN  oxyCODONE    5 mG/acetaminophen 325 mG 1 Tablet(s) Oral every 6 hours PRN    HOME MEDICATIONS  Home Medications:  budesonide-formoterol 80 mcg-4.5 mcg/inh inhalation aerosol: 1 puff(s) inhaled 2 times a day (20 Apr 2018 14:44)  furosemide 40 mg oral tablet: 1 tab(s) orally 2 times a day (20 Apr 2018 14:44)  Lipitor 20 mg oral tablet: 1 tab(s) orally once a day (at bedtime) (20 Apr 2018 14:44)  Metoprolol Succinate ER 25 mg oral tablet, extended release: 1 tab(s) orally once a day (20 Apr 2018 14:44)  sacubitril-valsartan 24 mg-26 mg oral tablet: 1 tab(s) orally 2 times a day (20 Apr 2018 14:44)  warfarin 2 mg oral tablet: Patient takes 10mg warfarin per week and has his doses divided into 1-2gm doses per day (23 Jul 2018 13:42)      VITAL SIGNS: Last 24 Hours  T(C): 36.2 (30 Jul 2018 05:40), Max: 36.5 (29 Jul 2018 20:12)  T(F): 97.2 (30 Jul 2018 05:40), Max: 97.7 (29 Jul 2018 20:12)  HR: 65 (30 Jul 2018 05:40) (62 - 77)  BP: 107/50 (30 Jul 2018 05:40) (107/50 - 122/55)  BP(mean): --  RR: 18 (30 Jul 2018 05:40) (18 - 20)  SpO2: 95% (29 Jul 2018 18:16) (95% - 95%)    LABS:                        9.0    5.72  )-----------( 165      ( 30 Jul 2018 09:15 )             28.0     07-30    142  |  89<L>  |  49<H>  ----------------------------<  132<H>  3.1<L>   |  41<HH>  |  1.5    Ca    9.4      30 Jul 2018 09:15  Mg     2.0     07-30        PT/INR - ( 30 Jul 2018 09:15 )   PT: 33.40 sec;   INR: 3.13 ratio         PTT - ( 30 Jul 2018 09:15 )  PTT:32.8 sec              CAPILLARY BLOOD GLUCOSE  125 (29 Jul 2018 20:46)          RADIOLOGY:  < from: Xray Foot AP + Lateral + Oblique, Left (07.27.18 @ 10:25) >  Impression:  No acute fracture or dislocation.  Osteoarthritis, moderate.  < end of copied text >    - US abdomen showed small ascites  - TTE shows severe pulmonary HTN  - CXR shows stable effusion  - va duplex shows chronic DVT in R leg      PHYSICAL EXAM:    GENERAL: NAD, well-developed, AAOx3  HEENT:  Atraumatic, Normocephalic. EOMI, PERRLA, conjunctiva and sclera clear, No JVD  PULMONARY: Clear to auscultation bilaterally; No wheeze  CARDIOVASCULAR: Regular rate and rhythm; No murmurs, rubs, or gallops  GASTROINTESTINAL: Soft, Nontender, Nondistended; Bowel sounds present  MUSCULOSKELETAL:  +tenderness and erythema on LT great toe, pitting edema on Lt LE  NEUROLOGY: non-focal  SKIN: No rashes or lesions      ADMISSION SUMMARY  Patient is a 77y old Male who presents with a chief complaint of Shortness of breath and weight gain (23 Jul 2018 13:26)     he currently admitted to medicine with the primary diagnosis of CHF exacerbation      ASSESSMENT & PLAN    76 yo male with PMHx of stable COPD on 2L home O2, CHF (diastolic? EF 55%), DLD, atrial fibrillation on coumadin (10mg/week), presented with worsening shortness of breath likely due to acute CHF exacerbation. Patient has abdominal fullness likely due to ascites. CXR shows b/l pleural effusion. Now treating for COPD exacerbation as well.    #Acute CHF Exacerbation  - c/w IV lasix 60 mg BID  - c/w metolazone 2.5 mg po q 24h  - c/w entresto  -Daily weights and strict I/Os.   -Restrict Na in diet. Maintain negative fluid balance  - f/u cardiology recommendations    #COPD  - switch to prednisone 40 mg daily tomorrow  - start on symbicort  - outpt f/u    #Foot pain  -has not responded to steroids, unlikely gout  -L foot xray showed no acute fx or dislocation    #Atrial fibrillation  -c/w coumadin  -monitor INR    #Chest rash  -nystatin BID    #Dyslipidemia and HTN  -DASH diet     #Miscellanous  -DVT ppx (already on warfarin)  - GI ppx (not indicated)  Activity as tolerated  Full code  Dispo home RODNEY SANTO 77y Male  MRN#: [id number]   CODE STATUS:________      SUBJECTIVE  Patient is a 77y old Male who presents with a chief complaint of Shortness of breath and weight gain (23 Jul 2018 13:26)    he is currently admitted to medicine with the primary diagnosis of CHF exacerbation    Today is hospital day 7d, and this morning he is complaining of Lt great toe pain and testicle pain. Pt is also complaining of occasional SOB. Otherwise, pt is resting in bed comfortably today. Wife is by bedside and does not want pt to go SNF.    No acute overnight events.     OBJECTIVE  PAST MEDICAL & SURGICAL HISTORY  Mitral valve insufficiency, unspecified etiology: Moderate  Chronic atrial fibrillation  Cellulitis of left lower extremity  Deep vein thrombosis (DVT) of left lower extremity, unspecified chronicity, unspecified vein  Hypertension  COPD (chronic obstructive pulmonary disease)  History of total right knee replacement  S/P coronary artery stent placement    ALLERGIES:  No Known Allergies    MEDICATIONS:  STANDING MEDICATIONS  ALBUTerol/ipratropium for Nebulization 3 milliLiter(s) Nebulizer every 6 hours  atorvastatin 20 milliGRAM(s) Oral at bedtime  buDESOnide 160 MICROgram(s)/formoterol 4.5 MICROgram(s) Inhaler 2 Puff(s) Inhalation two times a day  furosemide    Tablet 60 milliGRAM(s) Oral two times a day  metolazone 2.5 milliGRAM(s) Oral daily  metoprolol succinate ER 25 milliGRAM(s) Oral daily  nystatin Powder 1 Application(s) Topical two times a day  potassium chloride   Powder 20 milliEquivalent(s) Oral three times a day  sacubitril 24 mG/valsartan 26 mG 1 Tablet(s) Oral two times a day  warfarin 0.5 milliGRAM(s) Oral once    PRN MEDICATIONS  acetaminophen   Tablet. 650 milliGRAM(s) Oral every 6 hours PRN  oxyCODONE    5 mG/acetaminophen 325 mG 1 Tablet(s) Oral every 6 hours PRN    HOME MEDICATIONS  Home Medications:  budesonide-formoterol 80 mcg-4.5 mcg/inh inhalation aerosol: 1 puff(s) inhaled 2 times a day (20 Apr 2018 14:44)  furosemide 40 mg oral tablet: 1 tab(s) orally 2 times a day (20 Apr 2018 14:44)  Lipitor 20 mg oral tablet: 1 tab(s) orally once a day (at bedtime) (20 Apr 2018 14:44)  Metoprolol Succinate ER 25 mg oral tablet, extended release: 1 tab(s) orally once a day (20 Apr 2018 14:44)  sacubitril-valsartan 24 mg-26 mg oral tablet: 1 tab(s) orally 2 times a day (20 Apr 2018 14:44)  warfarin 2 mg oral tablet: Patient takes 10mg warfarin per week and has his doses divided into 1-2gm doses per day (23 Jul 2018 13:42)      VITAL SIGNS: Last 24 Hours  T(C): 36.2 (30 Jul 2018 05:40), Max: 36.5 (29 Jul 2018 20:12)  T(F): 97.2 (30 Jul 2018 05:40), Max: 97.7 (29 Jul 2018 20:12)  HR: 65 (30 Jul 2018 05:40) (62 - 77)  BP: 107/50 (30 Jul 2018 05:40) (107/50 - 122/55)  BP(mean): --  RR: 18 (30 Jul 2018 05:40) (18 - 20)  SpO2: 95% (29 Jul 2018 18:16) (95% - 95%)    LABS:                        9.0    5.72  )-----------( 165      ( 30 Jul 2018 09:15 )             28.0     07-30    142  |  89<L>  |  49<H>  ----------------------------<  132<H>  3.1<L>   |  41<HH>  |  1.5    Ca    9.4      30 Jul 2018 09:15  Mg     2.0     07-30        PT/INR - ( 30 Jul 2018 09:15 )   PT: 33.40 sec;   INR: 3.13 ratio         PTT - ( 30 Jul 2018 09:15 )  PTT:32.8 sec              CAPILLARY BLOOD GLUCOSE  125 (29 Jul 2018 20:46)          RADIOLOGY:  < from: Xray Foot AP + Lateral + Oblique, Left (07.27.18 @ 10:25) >  Impression:  No acute fracture or dislocation.  Osteoarthritis, moderate.  < end of copied text >    - US abdomen showed small ascites  - TTE shows severe pulmonary HTN  - CXR shows stable effusion  - va duplex shows chronic DVT in R leg      PHYSICAL EXAM:    GENERAL: NAD, well-developed, AAOx3  HEENT:  Atraumatic, Normocephalic. EOMI, PERRLA, conjunctiva and sclera clear, No JVD  PULMONARY: Clear to auscultation bilaterally; No wheeze  CARDIOVASCULAR: Regular rate and rhythm; No murmurs, rubs, or gallops  GASTROINTESTINAL: Soft, Nontender, Nondistended; Bowel sounds present  MUSCULOSKELETAL:  +tenderness and erythema on LT great toe, pitting edema on Lt LE  NEUROLOGY: non-focal  SKIN: No rashes or lesions      ADMISSION SUMMARY  Patient is a 77y old Male who presents with a chief complaint of Shortness of breath and weight gain (23 Jul 2018 13:26)     he currently admitted to medicine with the primary diagnosis of CHF exacerbation      ASSESSMENT & PLAN    78 yo male with PMHx of stable COPD on 2L home O2, CHF (diastolic? EF 55%), DLD, atrial fibrillation on coumadin (10mg/week), presented with worsening shortness of breath likely due to acute CHF exacerbation. Patient has abdominal fullness likely due to ascites. CXR shows b/l pleural effusion. Now treating for COPD exacerbation as well.    #Acute CHF Exacerbation  - switch to lasix 60mg BID from IV  - c/w metolazone 2.5 mg po q 24h  - c/w entresto  - repeated CXR shows stable bilateral effusions and opacities  - Daily weights and strict I/Os.   - Restrict Na in diet. Maintain negative fluid balance  - f/u cardiology recommendations    #COPD  - switch to prednisone 40 mg daily tomorrow  - start on symbicort  - outpt f/u    #Foot pain - likely osteoarthritis   -has not responded to steroids, unlikely gout  -L foot xray shows osteoarthritis  -pain management with tylenol    #Atrial fibrillation  -INR: 3.13 ratio    -c/w coumadin 0.5mg  -monitor INR    #Chest rash  -nystatin BID    #Dyslipidemia and HTN  -DASH diet     #Miscellanous  -DVT ppx (already on warfarin)  -GI ppx (not indicated)  Activity as tolerated  Full code  Dispo home

## 2018-07-30 NOTE — DIETITIAN INITIAL EVALUATION ADULT. - FEEDING SKILL
independent/Pt reports consuming ~75% of his meal trays and doesn't want any oral supplements at this time.

## 2018-07-30 NOTE — DIETITIAN INITIAL EVALUATION ADULT. - ENERGY NEEDS
Estimated Calorie Needs: 1969-4651 kcal/day (20-25 kcal/kg IBW)--needs decreased d/t obese BMI  Estimated Protein Needs: 74-83 gm/day (0.9-1 gm/kg IBW)  Estimated Fluid Needs: 1000ml fluid restriction per LIP

## 2018-07-31 ENCOUNTER — MEDICATION RENEWAL (OUTPATIENT)
Age: 78
End: 2018-07-31

## 2018-07-31 ENCOUNTER — TRANSCRIPTION ENCOUNTER (OUTPATIENT)
Age: 78
End: 2018-07-31

## 2018-07-31 LAB
ANION GAP SERPL CALC-SCNC: 6 MMOL/L — LOW (ref 7–14)
APTT BLD: 34.2 SEC — SIGNIFICANT CHANGE UP (ref 27–39.2)
BUN SERPL-MCNC: 45 MG/DL — HIGH (ref 10–20)
CALCIUM SERPL-MCNC: 9.3 MG/DL — SIGNIFICANT CHANGE UP (ref 8.5–10.1)
CHLORIDE SERPL-SCNC: 90 MMOL/L — LOW (ref 98–110)
CO2 SERPL-SCNC: 46 MMOL/L — CRITICAL HIGH (ref 17–32)
CREAT SERPL-MCNC: 1.4 MG/DL — SIGNIFICANT CHANGE UP (ref 0.7–1.5)
GLUCOSE SERPL-MCNC: 90 MG/DL — SIGNIFICANT CHANGE UP (ref 70–99)
HCT VFR BLD CALC: 30.4 % — LOW (ref 42–52)
HGB BLD-MCNC: 10 G/DL — LOW (ref 14–18)
INR BLD: 2.77 RATIO — HIGH (ref 0.65–1.3)
MAGNESIUM SERPL-MCNC: 2 MG/DL — SIGNIFICANT CHANGE UP (ref 1.8–2.4)
MCHC RBC-ENTMCNC: 28.2 PG — SIGNIFICANT CHANGE UP (ref 27–31)
MCHC RBC-ENTMCNC: 32.9 G/DL — SIGNIFICANT CHANGE UP (ref 32–37)
MCV RBC AUTO: 85.9 FL — SIGNIFICANT CHANGE UP (ref 80–94)
NRBC # BLD: 0 /100 WBCS — SIGNIFICANT CHANGE UP (ref 0–0)
PLATELET # BLD AUTO: 190 K/UL — SIGNIFICANT CHANGE UP (ref 130–400)
POTASSIUM SERPL-MCNC: 3.8 MMOL/L — SIGNIFICANT CHANGE UP (ref 3.5–5)
POTASSIUM SERPL-SCNC: 3.8 MMOL/L — SIGNIFICANT CHANGE UP (ref 3.5–5)
PROTHROM AB SERPL-ACNC: 29.6 SEC — HIGH (ref 9.95–12.87)
RBC # BLD: 3.54 M/UL — LOW (ref 4.7–6.1)
RBC # FLD: 21.3 % — HIGH (ref 11.5–14.5)
SODIUM SERPL-SCNC: 142 MMOL/L — SIGNIFICANT CHANGE UP (ref 135–146)
WBC # BLD: 6.28 K/UL — SIGNIFICANT CHANGE UP (ref 4.8–10.8)
WBC # FLD AUTO: 6.28 K/UL — SIGNIFICANT CHANGE UP (ref 4.8–10.8)

## 2018-07-31 RX ORDER — FUROSEMIDE 40 MG
3 TABLET ORAL
Qty: 0 | Refills: 0 | COMMUNITY
Start: 2018-07-31

## 2018-07-31 RX ORDER — DOCUSATE SODIUM 100 MG
100 CAPSULE ORAL THREE TIMES A DAY
Qty: 0 | Refills: 0 | Status: DISCONTINUED | OUTPATIENT
Start: 2018-07-31 | End: 2018-08-01

## 2018-07-31 RX ORDER — ACETAZOLAMIDE 250 MG/1
1 TABLET ORAL
Qty: 0 | Refills: 0 | COMMUNITY
Start: 2018-07-31

## 2018-07-31 RX ORDER — NYSTATIN CREAM 100000 [USP'U]/G
1 CREAM TOPICAL
Qty: 0 | Refills: 0 | DISCHARGE
Start: 2018-07-31

## 2018-07-31 RX ORDER — METOPROLOL TARTRATE 50 MG
1 TABLET ORAL
Qty: 0 | Refills: 0 | DISCHARGE
Start: 2018-07-31

## 2018-07-31 RX ORDER — ACETAMINOPHEN 500 MG
2 TABLET ORAL
Qty: 0 | Refills: 0 | DISCHARGE
Start: 2018-07-31

## 2018-07-31 RX ORDER — ACETAZOLAMIDE 250 MG/1
250 TABLET ORAL
Qty: 0 | Refills: 0 | Status: COMPLETED | OUTPATIENT
Start: 2018-07-31 | End: 2018-08-01

## 2018-07-31 RX ADMIN — BUDESONIDE AND FORMOTEROL FUMARATE DIHYDRATE 2 PUFF(S): 160; 4.5 AEROSOL RESPIRATORY (INHALATION) at 21:38

## 2018-07-31 RX ADMIN — Medication 3 MILLILITER(S): at 13:48

## 2018-07-31 RX ADMIN — OXYCODONE AND ACETAMINOPHEN 1 TABLET(S): 5; 325 TABLET ORAL at 21:41

## 2018-07-31 RX ADMIN — ATORVASTATIN CALCIUM 20 MILLIGRAM(S): 80 TABLET, FILM COATED ORAL at 21:39

## 2018-07-31 RX ADMIN — Medication 60 MILLIGRAM(S): at 17:34

## 2018-07-31 RX ADMIN — SACUBITRIL AND VALSARTAN 1 TABLET(S): 24; 26 TABLET, FILM COATED ORAL at 17:34

## 2018-07-31 RX ADMIN — BUDESONIDE AND FORMOTEROL FUMARATE DIHYDRATE 2 PUFF(S): 160; 4.5 AEROSOL RESPIRATORY (INHALATION) at 10:42

## 2018-07-31 RX ADMIN — Medication 3 MILLILITER(S): at 20:04

## 2018-07-31 RX ADMIN — NYSTATIN CREAM 1 APPLICATION(S): 100000 CREAM TOPICAL at 17:32

## 2018-07-31 RX ADMIN — OXYCODONE AND ACETAMINOPHEN 1 TABLET(S): 5; 325 TABLET ORAL at 22:30

## 2018-07-31 RX ADMIN — SACUBITRIL AND VALSARTAN 1 TABLET(S): 24; 26 TABLET, FILM COATED ORAL at 06:31

## 2018-07-31 RX ADMIN — OXYCODONE AND ACETAMINOPHEN 1 TABLET(S): 5; 325 TABLET ORAL at 10:42

## 2018-07-31 RX ADMIN — ACETAZOLAMIDE 250 MILLIGRAM(S): 250 TABLET ORAL at 13:29

## 2018-07-31 RX ADMIN — Medication 60 MILLIGRAM(S): at 06:31

## 2018-07-31 RX ADMIN — OXYCODONE AND ACETAMINOPHEN 1 TABLET(S): 5; 325 TABLET ORAL at 11:40

## 2018-07-31 RX ADMIN — ACETAZOLAMIDE 250 MILLIGRAM(S): 250 TABLET ORAL at 17:34

## 2018-07-31 RX ADMIN — Medication 3 MILLILITER(S): at 08:23

## 2018-07-31 RX ADMIN — Medication 25 MILLIGRAM(S): at 06:31

## 2018-07-31 RX ADMIN — NYSTATIN CREAM 1 APPLICATION(S): 100000 CREAM TOPICAL at 06:31

## 2018-07-31 NOTE — DISCHARGE NOTE ADULT - HOSPITAL COURSE
76 yo male with PMHx of COPD on 2L home O2, CHF (diastolic? EF 55%), DLD, atrial fibrillation on coumadin (10mg/week), presented with worsening shortness of breath. Patient uses 2L home O2 at most of the times and can take about 10 steps without it before getting out of breath. Patient reports that sometimes he has to get up at night and sit up for breathing. He uses 2-3 pillows while in bed. Shortness of breath worsened a couple of days ago and is not accompanied by cough and fever. Patient denies any LE swelling. Patient reports abdominal fullness and increased abdominal girth, but no tenderness. Patient reports a 'water' weight gain of 5-6lbs in the last couple of days which contributed to increased shortness of breath. Patient denies any headache, insomnia, anxiety, palpitations or chest pain. Patient states that 'water (about 1L) was taken out of his stomach' suring one previous hospital admission. (23 Jul 2018 13:26)    Pt was admitted for the primary diagnosis of CHF exacerbation. During the course of his stay, pt had a TTE showing normal EF function. Pt was asked to c/w lasix 60mg BID PO, metolazone 2.5mg PO q24h and entresto. Pt had daily weight and I/O monitoring. Sodium was restricted from diet and was maintained on negative fluid balance. Pt also had swollen testicles and legs most likely secondary to acute CHF exacerbation. Recommend to c/w diuresis and testicle sting. Pt also had COPD exacerbation. Pt had prednisone 40mg. Pt also started on symbicort and duoneb. Pt's COPD exacerbation resolved and was asked to follow up as outpatient. Pt started having foot pain. Venous duplex was done showing no DVT. Xray of Lt foot shows osteoarthritis. Recommended pain management with tylenols.    Pt is now medically stable for discharge. Pt was educated on correct way to take medications and asked to follow up with cardiology and pulmonology for his CHF and COPD. Pt further informed to return to the ED for worsening of symptoms.

## 2018-07-31 NOTE — DISCHARGE NOTE ADULT - MEDICATION SUMMARY - MEDICATIONS TO CHANGE
I will SWITCH the dose or number of times a day I take the medications listed below when I get home from the hospital:  None I will SWITCH the dose or number of times a day I take the medications listed below when I get home from the hospital:    furosemide 40 mg oral tablet  -- 1 tab(s) by mouth 2 times a day    warfarin 2 mg oral tablet  -- Patient takes 10mg warfarin per week and has his doses divided into 1-2gm doses per day I will SWITCH the dose or number of times a day I take the medications listed below when I get home from the hospital:    warfarin 2 mg oral tablet  -- Patient takes 10mg warfarin per week and has his doses divided into 1-2gm doses per day

## 2018-07-31 NOTE — DISCHARGE NOTE ADULT - CARE PROVIDERS DIRECT ADDRESSES
[email or web address]

## 2018-07-31 NOTE — DISCHARGE NOTE ADULT - CARE PROVIDER_API CALL
Arnulfo Scherer), Cardiovascular Disease; Internal Medicine; Interventional Cardiology  [address] [place] [address]  Phone: [phone]  Fax: [phone]    Arnulfo Murray), Medicine  [address] [place] [address]  Phone: [phone]  Fax: [phone]

## 2018-07-31 NOTE — DISCHARGE NOTE ADULT - PATIENT PORTAL LINK FT
You can access the [facility] Patient Portal, offered by [facility], by registering with the following website: [email or web address]

## 2018-07-31 NOTE — PROGRESS NOTE ADULT - SUBJECTIVE AND OBJECTIVE BOX
76 yo male with PMHx of stable COPD on 2L home O2, CHF (diastolic? EF 55%), DLD, atrial fibrillation on coumadin (10mg/week), presented with worsening shortness of breath likely due to acute CHF exacerbation. Patient has abdominal fullness likely due to ascites. CXR shows b/l pleural effusion. Now treating for COPD exacerbation as well.    #Acute CHF Exacerbation  - lasix 60mg BID PO  - c/w metolazone 2.5 mg po q 24h  - c/w entresto  - f/u BUN/Cr  - Daily weights and strict I/Os.   - Restrict Na in diet. Maintain negative fluid balance  - f/u cardiology recommendations    #COPD  - switch to prednisone 40 mg daily tomorrow  - start on symbicort  - outpt f/u    #Foot pain - likely osteoarthritis   -has not responded to steroids, unlikely gout  -L foot xray shows osteoarthritis  -pain management with tylenol    #Atrial fibrillation - stable  -INR: 3.13 ratio    -c/w coumadin 0.5mg  -monitor INR    #Chest rash  -nystatin BID    #Dyslipidemia and HTN  -DASH diet     #Miscellanous  -DVT ppx (already on warfarin)  -GI ppx (not indicated)  Activity as tolerated  Full code  Dispo home RODNEY SANTO 77y Male  MRN#: [id number]   CODE STATUS:________      SUBJECTIVE  Patient is a 77y old Male who presents with a chief complaint of Shortness of breath and weight gain (31 Jul 2018 10:20)    he is currently admitted to medicine with the primary diagnosis of Acute on chronic diastolic congestive heart failure    Today is hospital day 8d, and this morning he is still complaining of swollen testicles and Lt foot pain. Otherwise, pt has no other complaints and said he is ready to discharge to nursing home    No acute overnight events.     OBJECTIVE  PAST MEDICAL & SURGICAL HISTORY  Mitral valve insufficiency, unspecified etiology: Moderate  Chronic atrial fibrillation  Cellulitis of left lower extremity  Deep vein thrombosis (DVT) of left lower extremity, unspecified chronicity, unspecified vein  Hypertension  COPD (chronic obstructive pulmonary disease)  History of total right knee replacement  S/P coronary artery stent placement    ALLERGIES:  No Known Allergies    MEDICATIONS:  STANDING MEDICATIONS  acetazolamide    Tablet 250 milliGRAM(s) Oral two times a day  ALBUTerol/ipratropium for Nebulization 3 milliLiter(s) Nebulizer every 6 hours  atorvastatin 20 milliGRAM(s) Oral at bedtime  buDESOnide 160 MICROgram(s)/formoterol 4.5 MICROgram(s) Inhaler 2 Puff(s) Inhalation two times a day  furosemide    Tablet 60 milliGRAM(s) Oral two times a day  metoprolol succinate ER 25 milliGRAM(s) Oral daily  nystatin Powder 1 Application(s) Topical two times a day  sacubitril 24 mG/valsartan 26 mG 1 Tablet(s) Oral two times a day    PRN MEDICATIONS  acetaminophen   Tablet. 650 milliGRAM(s) Oral every 6 hours PRN  oxyCODONE    5 mG/acetaminophen 325 mG 1 Tablet(s) Oral every 6 hours PRN    HOME MEDICATIONS  Home Medications:  acetaminophen 325 mg oral tablet: 2 tab(s) orally every 6 hours, As needed, Mild Pain (1 - 3) (31 Jul 2018 13:18)  acetaZOLAMIDE 250 mg oral tablet: 1 tab(s) orally 2 times a day (31 Jul 2018 13:18)  budesonide-formoterol 80 mcg-4.5 mcg/inh inhalation aerosol: 1 puff(s) inhaled 2 times a day (20 Apr 2018 14:44)  furosemide 20 mg oral tablet: 3 tab(s) orally 2 times a day (31 Jul 2018 13:18)  Lipitor 20 mg oral tablet: 1 tab(s) orally once a day (at bedtime) (20 Apr 2018 14:44)  metoprolol succinate 25 mg oral tablet, extended release: 1 tab(s) orally once a day (31 Jul 2018 13:18)  nystatin 100,000 units/g topical powder: 1 application topically 2 times a day (31 Jul 2018 13:18)  oxyCODONE-acetaminophen 5 mg-325 mg oral tablet: 1 tab(s) orally every 6 hours, As needed, Severe Pain (7 - 10) (31 Jul 2018 13:18)  sacubitril-valsartan 24 mg-26 mg oral tablet: 1 tab(s) orally 2 times a day (20 Apr 2018 14:44)  warfarin 2 mg oral tablet: Patient takes 10mg warfarin per week and has his doses divided into 1-2gm doses per day (23 Jul 2018 13:42)      VITAL SIGNS: Last 24 Hours  T(C): 35.8 (31 Jul 2018 12:48), Max: 36.4 (31 Jul 2018 05:37)  T(F): 96.4 (31 Jul 2018 12:48), Max: 97.6 (31 Jul 2018 05:37)  HR: 65 (31 Jul 2018 12:48) (65 - 70)  BP: 110/53 (31 Jul 2018 12:48) (104/51 - 110/53)  BP(mean): --  RR: 18 (31 Jul 2018 12:48) (18 - 18)  SpO2: --    LABS:                        10.0   6.28  )-----------( 190      ( 31 Jul 2018 08:32 )             30.4     07-31    142  |  90<L>  |  45<H>  ----------------------------<  90  3.8   |  46<HH>  |  1.4    Ca    9.3      31 Jul 2018 08:32  Mg     2.0     07-31        PT/INR - ( 31 Jul 2018 08:32 )   PT: 29.60 sec;   INR: 2.77 ratio         PTT - ( 31 Jul 2018 08:32 )  PTT:34.2 sec    ABG - ( 30 Jul 2018 12:12 )  pH, Arterial: 7.49  pH, Blood: x     /  pCO2: 60    /  pO2: 64    / HCO3: 46    / Base Excess: 19.9  /  SaO2: 92                        CAPILLARY BLOOD GLUCOSE  125 (29 Jul 2018 20:46)          RADIOLOGY:      PHYSICAL EXAM:    GENERAL: NAD, well-developed, AAOx3  HEENT:  Atraumatic, Normocephalic. EOMI, PERRLA, conjunctiva and sclera clear, No JVD  PULMONARY: Clear to auscultation bilaterally; No wheeze  CARDIOVASCULAR: Regular rate and rhythm; No murmurs, rubs, or gallops  GASTROINTESTINAL: Soft, Nontender, Nondistended; Bowel sounds present  MUSCULOSKELETAL:  2+ Peripheral Pulses, B/L LE pitting edema, Lt 1st toe erythema and tenderness  NEUROLOGY: non-focal  SKIN: No rashes or lesions      ADMISSION SUMMARY  Patient is a 77y old Male who presents with a chief complaint of Shortness of breath and weight gain (31 Jul 2018 10:20)     he currently admitted to medicine with the primary diagnosis of Acute on chronic diastolic congestive heart failure      ASSESSMENT & PLAN    78 yo male with PMHx of stable COPD on 2L home O2, CHF (diastolic? EF 55%), DLD, atrial fibrillation on coumadin (10mg/week), presented with worsening shortness of breath likely due to acute CHF exacerbation. Patient has abdominal fullness likely due to ascites. CXR shows b/l pleural effusion. Now treating for COPD exacerbation as well.    #Metabolic alkalosis  -acetazolamice 250 mg PO q12h x 3 doses    #Acute CHF Exacerbation  - lasix 60mg BID PO  - d/c metolazone 2.5 mg po q 24h  - c/w entresto  - f/u BUN/Cr  - Daily weights and strict I/Os.   - Restrict Na in diet. Maintain negative fluid balance  - f/u cardiology recommendations    #COPD  - switch to prednisone 40 mg daily tomorrow  - start on symbicort and Neb PRN  - outpt f/u    #Foot pain - likely osteoarthritis   -has not responded to steroids, unlikely gout  -L foot xray shows osteoarthritis  -pain management with tylenol    #Atrial fibrillation - stable  -INR: 3.13 ratio    -c/w coumadin 0.5mg  -monitor INR    #Chest rash  -nystatin BID    #Dyslipidemia and HTN  -DASH diet     #Miscellanous  -DVT ppx (already on warfarin)  -GI ppx (not indicated)  Activity as tolerated  Full code  Dispo home

## 2018-07-31 NOTE — DISCHARGE NOTE ADULT - OTHER SIGNIFICANT FINDINGS
< from: Xray Chest 1 View AP/PA (07.24.18 @ 06:39) >  Impression:      Bilateral lower lung field opacities, right greater than left.      Small bilateral pleural effusions and atelectasis.      < end of copied text >      < from: Xray Chest 1 View-PORTABLE IMMEDIATE (07.30.18 @ 10:42) >  Impression:    Stable bilateral bilateral effusions and opacities.    < end of copied text >      < from: Xray Foot AP + Lateral + Oblique, Left (07.27.18 @ 10:25) >  Impression:    No acute fracture or dislocation.    Osteoarthritis, moderate.    < end of copied text >      < from: VA Duplex Lower Ext Vein Scan, Bilat (07.26.18 @ 09:12) >  Impression:    Chronic deep venous thrombosis right common femoral, femoral and   popliteal veins    No evidence of deep venous thrombosis or superficial thrombophlebitis in   left lower extremity.    < end of copied text >

## 2018-07-31 NOTE — DISCHARGE NOTE ADULT - CARE PLAN
Principal Discharge DX:	Acute on chronic diastolic congestive heart failure  Goal:	Stable. Medical Management.  Assessment and plan of treatment:	You were diagnosed with the primary diagnosis of acute on chronic diastolic congestive heart failure. Please continue your lasix 60 mg twice a day, metolazone 2.5mg once a day and entresto twice a day. Daily weight monitoring. Restrict sodium in diet. Follow up with your cardiologist as outpatient.  Secondary Diagnosis:	Chronic obstructive pulmonary disease with acute exacerbation  Goal:	Stable. Medical Management.  Assessment and plan of treatment:	You had acute exacerbation of COPD. You were on prednisone 40mg and your acute exacerbation resolved. Please continue your symbicort, duoneb. Please follow up as outpatient with your pulmonologist.  Secondary Diagnosis:	Osteoarthritis of foot, left  Goal:	Stable. Medical Management.  Assessment and plan of treatment:	Your new foot pain is most likely from osteoarthritis. We did duplex in your legs showing no DVT. Gout is not likely because the pain did not response to the oral steroid. X-ray of your foot showed osteoarthritis in the foot. Please continue your pain medication tylenol as needed. Follow up with your primary care doctor if foot pain worsen.  Secondary Diagnosis:	Chronic atrial fibrillation  Goal:	Stable. Medical Management.  Assessment and plan of treatment:	Your INR is in therapeutical level. Please continue your coumadin and get your INR checked regularly. Please follow up with your cardiologist as outpatient.  Secondary Diagnosis:	Hypertension  Goal:	Medical Management.  Assessment and plan of treatment:	Please continue your antihypertensive medication as instructed. Please check your blood pressure regularly and have low sodium diet. Please follow up with your primary doctor regularly for blood pressure monitoring. Principal Discharge DX:	Acute on chronic diastolic congestive heart failure  Goal:	Stable. Medical Management.  Assessment and plan of treatment:	You were diagnosed with the primary diagnosis of acute on chronic diastolic congestive heart failure. Please continue your lasix 60 mg twice a day and entresto twice a day. Daily weight monitoring. Restrict sodium in diet. Follow up with your cardiologist as outpatient.  Secondary Diagnosis:	Chronic obstructive pulmonary disease with acute exacerbation  Goal:	Stable. Medical Management.  Assessment and plan of treatment:	You had acute exacerbation of COPD. You were on prednisone 40mg and your acute exacerbation resolved. Please continue your symbicort, duoneb. Please follow up as outpatient with your pulmonologist.  Secondary Diagnosis:	Osteoarthritis of foot, left  Goal:	Stable. Medical Management.  Assessment and plan of treatment:	Your new foot pain is most likely from osteoarthritis. We did duplex in your legs showing no DVT. Gout is not likely because the pain did not response to the oral steroid. X-ray of your foot showed osteoarthritis in the foot. Please continue your pain medication tylenol as needed. Follow up with your primary care doctor if foot pain worsen.  Secondary Diagnosis:	Chronic atrial fibrillation  Goal:	Stable. Medical Management.  Assessment and plan of treatment:	Your INR is in therapeutical level. Please continue your coumadin and get your INR checked regularly. Please follow up with your cardiologist as outpatient.  Secondary Diagnosis:	Hypertension  Goal:	Medical Management.  Assessment and plan of treatment:	Please continue your antihypertensive medication as instructed. Please check your blood pressure regularly and have low sodium diet. Please follow up with your primary doctor regularly for blood pressure monitoring.

## 2018-07-31 NOTE — PHYSICAL THERAPY INITIAL EVALUATION ADULT - GAIT DISTANCE, PT EVAL
12 ft x 1, pt unable to amb further 2* SOB, pulse ox while amb was 93-95% on 2L, when pt returned to bed was 89-93% on 2L

## 2018-07-31 NOTE — DISCHARGE NOTE ADULT - SECONDARY DIAGNOSIS.
Chronic obstructive pulmonary disease with acute exacerbation Osteoarthritis of foot, left Chronic atrial fibrillation Hypertension

## 2018-07-31 NOTE — DISCHARGE NOTE ADULT - PLAN OF CARE
Stable. Medical Management. You were diagnosed with the primary diagnosis of acute on chronic diastolic congestive heart failure. Please continue your lasix 60 mg twice a day, metolazone 2.5mg once a day and entresto twice a day. Daily weight monitoring. Restrict sodium in diet. Follow up with your cardiologist as outpatient. You had acute exacerbation of COPD. You were on prednisone 40mg and your acute exacerbation resolved. Please continue your symbicort, duoneb. Please follow up as outpatient with your pulmonologist. Your new foot pain is most likely from osteoarthritis. We did duplex in your legs showing no DVT. Gout is not likely because the pain did not response to the oral steroid. X-ray of your foot showed osteoarthritis in the foot. Please continue your pain medication tylenol as needed. Follow up with your primary care doctor if foot pain worsen. Your INR is in therapeutical level. Please continue your coumadin and get your INR checked regularly. Please follow up with your cardiologist as outpatient. Medical Management. Please continue your antihypertensive medication as instructed. Please check your blood pressure regularly and have low sodium diet. Please follow up with your primary doctor regularly for blood pressure monitoring. You were diagnosed with the primary diagnosis of acute on chronic diastolic congestive heart failure. Please continue your lasix 60 mg twice a day and entresto twice a day. Daily weight monitoring. Restrict sodium in diet. Follow up with your cardiologist as outpatient.

## 2018-07-31 NOTE — PROGRESS NOTE ADULT - ASSESSMENT
Assessment: 76 yo male with PMHx of stable COPD on 2L home O2, CHF (diastolic? EF 55%), DLD, atrial fibrillation on coumadin (10mg/week), presented with worsening shortness of breath, MOORE and orthopnea. Also C/O pain in Lt. foot big toe    1. Ac. HFrEF  2. Morbid obesity  3. COPD  4. Ac. Gout  5. Fungal rash under breasts and groin area  6. Ch. A. fib  7. Ch. DVT  8. HTN  9. Ch. Iron deficiency anemia (H/O Gastric bypass)  10. Alkalosis    PLAN:    . Give Acetazolamice 250 mg po q 12h x 3 doses.  . Cont Lasix to 60 mg po q 12h. D/C metolazone. Check I'S and O'S and daily wt.  . Check BUN/Cr in AM.   . Pulm eval noted  . Cont Entresto  . Give Coumadin .5 mg po tonight. Check INR in AM  . Nystatin powder twice a day for fungal rash  . Cont Symbicort, Nebs PRN and his other home meds.  . D/C planning    * Med rec reviewed. Plan of care D/W the pt. Time spent 42 minutes.

## 2018-07-31 NOTE — PHYSICAL THERAPY INITIAL EVALUATION ADULT - GENERAL OBSERVATIONS, REHAB EVAL
9:40-9:55; Pt encountered in bed. + tele. HR 60-86, fluctuating. Educated on seated ther ex, use of commode for bathroom.
13:43-14:30 47 min   pt rec in bed in NAD, pt stated he was OOB earlier today, pt agreeable to PT, +O2 intact at 2L

## 2018-07-31 NOTE — DISCHARGE NOTE ADULT - MEDICATION SUMMARY - MEDICATIONS TO TAKE
I will START or STAY ON the medications listed below when I get home from the hospital:    acetaminophen 325 mg oral tablet  -- 2 tab(s) by mouth every 6 hours, As needed, Mild Pain (1 - 3)  -- Indication: For pain    oxyCODONE-acetaminophen 5 mg-325 mg oral tablet  -- 1 tab(s) by mouth every 6 hours, As needed, Severe Pain (7 - 10)  -- Indication: For pain    sacubitril-valsartan 24 mg-26 mg oral tablet  -- 1 tab(s) by mouth 2 times a day  -- Indication: For CHF exacerbation    warfarin 2 mg oral tablet  -- Patient takes 10mg warfarin per week and has his doses divided into 1-2gm doses per day  -- Indication: For Atrial fibillatio    Lipitor 20 mg oral tablet  -- 1 tab(s) by mouth once a day (at bedtime)  -- Indication: For Morbid obesity    metoprolol succinate 25 mg oral tablet, extended release  -- 1 tab(s) by mouth once a day  -- Indication: For CHF exacerbation    budesonide-formoterol 80 mcg-4.5 mcg/inh inhalation aerosol  -- 1 puff(s) inhaled 2 times a day  -- Indication: For COPD (chronic obstructive pulmonary disease)    nystatin 100,000 units/g topical powder  -- 1 application on skin 2 times a day  -- Indication: For Rash    acetaZOLAMIDE 250 mg oral tablet  -- 1 tab(s) by mouth 2 times a day  -- Indication: For COPD (chronic obstructive pulmonary disease)    furosemide 20 mg oral tablet  -- 3 tab(s) by mouth 2 times a day  -- Indication: For CHF exacerbation I will START or STAY ON the medications listed below when I get home from the hospital:    acetaminophen 325 mg oral tablet  -- 2 tab(s) by mouth every 6 hours, As needed, Mild Pain (1 - 3)  -- Indication: For pain    oxyCODONE-acetaminophen 5 mg-325 mg oral tablet  -- 1 tab(s) by mouth every 6 hours, As needed, Severe Pain (7 - 10)  -- Indication: For pain    sacubitril-valsartan 24 mg-26 mg oral tablet  -- 1 tab(s) by mouth 2 times a day  -- Indication: For CHF exacerbation    warfarin 1 mg oral tablet  -- 1 tab(s) by mouth once a day   -- Do not take this drug if you are pregnant.  It is very important that you take or use this exactly as directed.  Do not skip doses or discontinue unless directed by your doctor.  Obtain medical advice before taking any non-prescription drugs as some may affect the action of this medication.    -- Indication: For Atrial Fibrillation    Lipitor 20 mg oral tablet  -- 1 tab(s) by mouth once a day (at bedtime)  -- Indication: For Morbid obesity    metoprolol succinate 25 mg oral tablet, extended release  -- 1 tab(s) by mouth once a day  -- Indication: For CHF exacerbation    budesonide-formoterol 80 mcg-4.5 mcg/inh inhalation aerosol  -- 1 puff(s) inhaled 2 times a day  -- Indication: For COPD (chronic obstructive pulmonary disease)    nystatin 100,000 units/g topical powder  -- 1 application on skin 2 times a day  -- Indication: For Rash    acetaZOLAMIDE 250 mg oral tablet  -- 1 tab(s) by mouth 2 times a day for 2 days.  -- Indication: For COPD (chronic obstructive pulmonary disease)    furosemide 20 mg oral tablet  -- 2 tab(s) by mouth every 12 hours   -- Indication: For Acute on chronic diastolic congestive heart failure I will START or STAY ON the medications listed below when I get home from the hospital:    acetaminophen 325 mg oral tablet  -- 2 tab(s) by mouth every 6 hours, As needed, Mild Pain (1 - 3)  -- Indication: For pain    oxyCODONE-acetaminophen 5 mg-325 mg oral tablet  -- 1 tab(s) by mouth every 6 hours, As needed, Severe Pain (7 - 10)  -- Indication: For pain    sacubitril-valsartan 24 mg-26 mg oral tablet  -- 1 tab(s) by mouth 2 times a day  -- Indication: For CHF exacerbation    warfarin 1 mg oral tablet  -- 1 tab(s) by mouth once a day   -- Do not take this drug if you are pregnant.  It is very important that you take or use this exactly as directed.  Do not skip doses or discontinue unless directed by your doctor.  Obtain medical advice before taking any non-prescription drugs as some may affect the action of this medication.    -- Indication: For Atrial Fibrillation    Lipitor 20 mg oral tablet  -- 1 tab(s) by mouth once a day (at bedtime)  -- Indication: For Morbid obesity    metoprolol succinate 25 mg oral tablet, extended release  -- 1 tab(s) by mouth once a day  -- Indication: For CHF exacerbation    budesonide-formoterol 80 mcg-4.5 mcg/inh inhalation aerosol  -- 1 puff(s) inhaled 2 times a day  -- Indication: For COPD (chronic obstructive pulmonary disease)    nystatin 100,000 units/g topical powder  -- 1 application on skin 2 times a day  -- Indication: For Rash    acetaZOLAMIDE 250 mg oral tablet  -- 1 tab(s) by mouth 2 times a day for 2 days.  -- Indication: For COPD (chronic obstructive pulmonary disease)    furosemide 40 mg oral tablet  -- 1 tab(s) by mouth every 12 hours   -- Avoid prolonged or excessive exposure to direct and/or artificial sunlight while taking this medication.  It is very important that you take or use this exactly as directed.  Do not skip doses or discontinue unless directed by your doctor.  It may be advisable to drink a full glass orange juice or eat a banana daily while taking this medication.    -- Indication: For CHF exacerbation

## 2018-07-31 NOTE — PROGRESS NOTE ADULT - SUBJECTIVE AND OBJECTIVE BOX
RODNEY SANTO  77y Male    INTERVAL HPI/OVERNIGHT EVENTS:  Patient seen and examined. Feel much better today. Feels comfortable on 2L NC.    ROS: All other systems are negative.    Vital Signs:    T(F): 96.4 (18 @ 12:48), Max: 97.6 (18 @ 05:37)  HR: 65 (18 @ 12:48) (65 - 70)  BP: 110/53 (18 @ 12:48) (95/46 - 110/53)  RR: 18 (18 @ 12:48) (18 - 18)  SpO2: --  I&O's Summary    2018 07:  -  2018 07:00  --------------------------------------------------------  IN: 100 mL / OUT: 3225 mL / NET: -3125 mL    2018 07:  -  2018 12:59  --------------------------------------------------------  IN: 0 mL / OUT: 650 mL / NET: -650 mL      Daily     Daily Weight in k.1 (2018 10:20)  CAPILLARY BLOOD GLUCOSE          PHYSICAL EXAM:    GENERAL:  NAD  SKIN: No rashes or lesions  HENT: Atrumatic. Normocephalic. PERRL. Moist membranes.  NECK: Supple, No JVD. No lymphadenopathy.  PULMONARY: BS are decreased in the bases B/L. No wheezing. No rales  CVS: Normal S1, S2. Rate and Rythm are regular. No murmurs.  ABDOMEN/GI: Soft, Nontender, Nondistended; BS present  EXTREMITIES: Peripheral pulses intact. 1+ pitting edema B/L LE.  NEUROLOGIC:  No motor or sensory deficit.  PSYCH: Alert & oriented x 3    Consultant(s) Notes Reviewed:  [x ] YES  [ ] NO  Care Discussed with Consultants/Other Providers [ x] YES  [ ] NO    EKG reviewed  Telemetry reviewed    LABS:                        10.0   6.28  )-----------( 190      ( 2018 08:32 )             30.4     07-    142  |  90<L>  |  45<H>  ----------------------------<  90  3.8   |  46<HH>  |  1.4    Ca    9.3      2018 08:32  Mg     2.0           PT/INR - ( 2018 08:32 )   PT: 29.60 sec;   INR: 2.77 ratio         PTT - ( 2018 08:32 )  PTT:34.2 sec          RADIOLOGY & ADDITIONAL TESTS:      Imaging or report Personally Reviewed:  [ ] YES  [ ] NO    Medications:  Standing  ALBUTerol/ipratropium for Nebulization 3 milliLiter(s) Nebulizer every 6 hours  atorvastatin 20 milliGRAM(s) Oral at bedtime  buDESOnide 160 MICROgram(s)/formoterol 4.5 MICROgram(s) Inhaler 2 Puff(s) Inhalation two times a day  furosemide    Tablet 60 milliGRAM(s) Oral two times a day  metolazone 2.5 milliGRAM(s) Oral daily  metoprolol succinate ER 25 milliGRAM(s) Oral daily  nystatin Powder 1 Application(s) Topical two times a day  sacubitril 24 mG/valsartan 26 mG 1 Tablet(s) Oral two times a day    PRN Meds  acetaminophen   Tablet. 650 milliGRAM(s) Oral every 6 hours PRN  oxyCODONE    5 mG/acetaminophen 325 mG 1 Tablet(s) Oral every 6 hours PRN      Case discussed with resident    Care discussed with pt/family

## 2018-08-01 VITALS
HEART RATE: 76 BPM | DIASTOLIC BLOOD PRESSURE: 52 MMHG | RESPIRATION RATE: 18 BRPM | SYSTOLIC BLOOD PRESSURE: 107 MMHG | TEMPERATURE: 98 F

## 2018-08-01 LAB
ANION GAP SERPL CALC-SCNC: 11 MMOL/L — SIGNIFICANT CHANGE UP (ref 7–14)
APTT BLD: 31.2 SEC — SIGNIFICANT CHANGE UP (ref 27–39.2)
BUN SERPL-MCNC: 49 MG/DL — HIGH (ref 10–20)
CALCIUM SERPL-MCNC: 8.7 MG/DL — SIGNIFICANT CHANGE UP (ref 8.5–10.1)
CHLORIDE SERPL-SCNC: 86 MMOL/L — LOW (ref 98–110)
CO2 SERPL-SCNC: 43 MMOL/L — CRITICAL HIGH (ref 17–32)
CREAT SERPL-MCNC: 1.6 MG/DL — HIGH (ref 0.7–1.5)
GLUCOSE SERPL-MCNC: 90 MG/DL — SIGNIFICANT CHANGE UP (ref 70–99)
HCT VFR BLD CALC: 27.1 % — LOW (ref 42–52)
HGB BLD-MCNC: 8.8 G/DL — LOW (ref 14–18)
INR BLD: 1.9 RATIO — HIGH (ref 0.65–1.3)
MAGNESIUM SERPL-MCNC: 2.1 MG/DL — SIGNIFICANT CHANGE UP (ref 1.8–2.4)
MCHC RBC-ENTMCNC: 27.5 PG — SIGNIFICANT CHANGE UP (ref 27–31)
MCHC RBC-ENTMCNC: 32.5 G/DL — SIGNIFICANT CHANGE UP (ref 32–37)
MCV RBC AUTO: 84.7 FL — SIGNIFICANT CHANGE UP (ref 80–94)
NRBC # BLD: 0 /100 WBCS — SIGNIFICANT CHANGE UP (ref 0–0)
PLATELET # BLD AUTO: 166 K/UL — SIGNIFICANT CHANGE UP (ref 130–400)
POTASSIUM SERPL-MCNC: 3.3 MMOL/L — LOW (ref 3.5–5)
POTASSIUM SERPL-SCNC: 3.3 MMOL/L — LOW (ref 3.5–5)
PROTHROM AB SERPL-ACNC: 20.4 SEC — HIGH (ref 9.95–12.87)
RBC # BLD: 3.2 M/UL — LOW (ref 4.7–6.1)
RBC # FLD: 20.9 % — HIGH (ref 11.5–14.5)
SODIUM SERPL-SCNC: 140 MMOL/L — SIGNIFICANT CHANGE UP (ref 135–146)
WBC # BLD: 6.46 K/UL — SIGNIFICANT CHANGE UP (ref 4.8–10.8)
WBC # FLD AUTO: 6.46 K/UL — SIGNIFICANT CHANGE UP (ref 4.8–10.8)

## 2018-08-01 RX ORDER — FUROSEMIDE 40 MG
1 TABLET ORAL
Qty: 60 | Refills: 0
Start: 2018-08-01

## 2018-08-01 RX ORDER — ACETAZOLAMIDE 250 MG/1
1 TABLET ORAL
Qty: 4 | Refills: 0
Start: 2018-08-01 | End: 2018-08-02

## 2018-08-01 RX ORDER — ACETAZOLAMIDE 250 MG/1
500 TABLET ORAL ONCE
Qty: 0 | Refills: 0 | Status: COMPLETED | OUTPATIENT
Start: 2018-08-01 | End: 2018-08-01

## 2018-08-01 RX ORDER — POTASSIUM CHLORIDE 20 MEQ
40 PACKET (EA) ORAL ONCE
Qty: 0 | Refills: 0 | Status: COMPLETED | OUTPATIENT
Start: 2018-08-01 | End: 2018-08-01

## 2018-08-01 RX ORDER — WARFARIN SODIUM 2.5 MG/1
1 TABLET ORAL
Qty: 30 | Refills: 0
Start: 2018-08-01

## 2018-08-01 RX ORDER — FUROSEMIDE 40 MG
2 TABLET ORAL
Qty: 60 | Refills: 1 | OUTPATIENT
Start: 2018-08-01

## 2018-08-01 RX ORDER — WARFARIN SODIUM 2.5 MG/1
5 TABLET ORAL ONCE
Qty: 0 | Refills: 0 | Status: DISCONTINUED | OUTPATIENT
Start: 2018-08-01 | End: 2018-08-01

## 2018-08-01 RX ORDER — WARFARIN SODIUM 2.5 MG/1
0 TABLET ORAL
Qty: 0 | Refills: 0 | COMMUNITY

## 2018-08-01 RX ORDER — WARFARIN SODIUM 2.5 MG/1
2 TABLET ORAL ONCE
Qty: 0 | Refills: 0 | Status: COMPLETED | OUTPATIENT
Start: 2018-08-01 | End: 2018-08-01

## 2018-08-01 RX ADMIN — ACETAZOLAMIDE 250 MILLIGRAM(S): 250 TABLET ORAL at 05:36

## 2018-08-01 RX ADMIN — ACETAZOLAMIDE 110 MILLIGRAM(S): 250 TABLET ORAL at 12:39

## 2018-08-01 RX ADMIN — WARFARIN SODIUM 2 MILLIGRAM(S): 2.5 TABLET ORAL at 14:36

## 2018-08-01 RX ADMIN — NYSTATIN CREAM 1 APPLICATION(S): 100000 CREAM TOPICAL at 17:33

## 2018-08-01 RX ADMIN — SACUBITRIL AND VALSARTAN 1 TABLET(S): 24; 26 TABLET, FILM COATED ORAL at 05:37

## 2018-08-01 RX ADMIN — OXYCODONE AND ACETAMINOPHEN 1 TABLET(S): 5; 325 TABLET ORAL at 10:40

## 2018-08-01 RX ADMIN — OXYCODONE AND ACETAMINOPHEN 1 TABLET(S): 5; 325 TABLET ORAL at 09:48

## 2018-08-01 RX ADMIN — SACUBITRIL AND VALSARTAN 1 TABLET(S): 24; 26 TABLET, FILM COATED ORAL at 17:33

## 2018-08-01 RX ADMIN — Medication 40 MILLIEQUIVALENT(S): at 16:43

## 2018-08-01 RX ADMIN — BUDESONIDE AND FORMOTEROL FUMARATE DIHYDRATE 2 PUFF(S): 160; 4.5 AEROSOL RESPIRATORY (INHALATION) at 10:01

## 2018-08-01 RX ADMIN — Medication 3 MILLILITER(S): at 07:47

## 2018-08-01 RX ADMIN — Medication 25 MILLIGRAM(S): at 05:37

## 2018-08-01 RX ADMIN — Medication 60 MILLIGRAM(S): at 05:36

## 2018-08-01 RX ADMIN — NYSTATIN CREAM 1 APPLICATION(S): 100000 CREAM TOPICAL at 05:37

## 2018-08-01 NOTE — PROGRESS NOTE ADULT - PROVIDER SPECIALTY LIST ADULT
Internal Medicine

## 2018-08-01 NOTE — PROGRESS NOTE ADULT - SUBJECTIVE AND OBJECTIVE BOX
78 yo male with PMHx of stable COPD on 2L home O2, CHF (diastolic? EF 55%), DLD, atrial fibrillation on coumadin (10mg/week), presented with worsening shortness of breath likely due to acute CHF exacerbation. Patient has abdominal fullness likely due to ascites. CXR shows b/l pleural effusion. Now treating for COPD exacerbation as well.    #Metabolic alkalosis  -acetazolamice 250 mg PO q12h x 3 doses  -f/u BMP    #Acute CHF Exacerbation on chronic CHF - resolving  - lasix 60mg BID PO and entresto  - f/u BUN/Cr  - Daily weights and strict I/Os.   - Restrict Na in diet. Maintain negative fluid balance  - f/u cardiology recommendations    #COPD  - switch to prednisone 40 mg daily tomorrow  - start on symbicort and Neb PRN  - outpt f/u    #Foot pain - likely osteoarthritis   -has not responded to steroids, unlikely gout  -L foot xray shows osteoarthritis  -pain management with tylenol    #Atrial fibrillation - stable  -INR: 3.13 ratio    -c/w coumadin 0.5mg  -monitor INR    #Chest rash  -nystatin BID    #Dyslipidemia and HTN  -DASH diet     #Miscellanous  -DVT ppx (already on warfarin)  -GI ppx (not indicated)  Activity as tolerated  Full code  Dispo home    D/C planning

## 2018-08-01 NOTE — PROGRESS NOTE ADULT - SUBJECTIVE AND OBJECTIVE BOX
RODNEY SANTO  77y Male    INTERVAL HPI/OVERNIGHT EVENTS:  Patient seen and examined. No sob. Feels better.    ROS: All other systems are negative.    Vital Signs:    T(F): 97.9 (18 @ 14:22), Max: 98.1 (18 @ 19:50)  HR: 76 (18 @ 14:22) (65 - 77)  BP: 107/52 (18 @ 14:22) (98/47 - 109/49)  RR: 18 (18 @ 14:22) (18 - 18)  SpO2: 93% (18 @ 05:34) (93% - 98%)  I&O's Summary    2018 07:  -  01 Aug 2018 07:00  --------------------------------------------------------  IN: 0 mL / OUT: 1730 mL / NET: -1730 mL    01 Aug 2018 07:01  -  01 Aug 2018 14:31  --------------------------------------------------------  IN: 720 mL / OUT: 2030 mL / NET: -1310 mL      Daily     Daily Weight in k.8 (01 Aug 2018 05:29)  CAPILLARY BLOOD GLUCOSE          PHYSICAL EXAM:    GENERAL:  NAD  SKIN: No rashes or lesions  HENT: Atrumatic. Normocephalic. PERRL. Moist membranes.  NECK: Supple, No JVD. No lymphadenopathy.  PULMONARY: CTA B/L. No wheezing. No rales  CVS: Normal S1, S2. Rate and Rythm are IRIR. No murmurs.  ABDOMEN/GI: Soft, Nontender, Nondistended; BS present  EXTREMITIES: Peripheral pulses intact. No edema B/L LE.  NEUROLOGIC:  No motor or sensory deficit.  PSYCH: Alert & oriented x 3    Consultant(s) Notes Reviewed:  [x ] YES  [ ] NO  Care Discussed with Consultants/Other Providers [ x] YES  [ ] NO    EKG reviewed  Telemetry reviewed    LABS:                        8.8    6.46  )-----------( 166      ( 01 Aug 2018 05:11 )             27.1     08-    140  |  86<L>  |  49<H>  ----------------------------<  90  3.3<L>   |  43<HH>  |  1.6<H>    Ca    8.7      01 Aug 2018 05:11  Mg     2.1           PT/INR - ( 01 Aug 2018 12:17 )   PT: 20.40 sec;   INR: 1.90 ratio         PTT - ( 01 Aug 2018 12:17 )  PTT:31.2 sec          RADIOLOGY & ADDITIONAL TESTS:      Imaging or report Personally Reviewed:  [ ] YES  [ ] NO    Medications:  Standing  ALBUTerol/ipratropium for Nebulization 3 milliLiter(s) Nebulizer every 6 hours  atorvastatin 20 milliGRAM(s) Oral at bedtime  buDESOnide 160 MICROgram(s)/formoterol 4.5 MICROgram(s) Inhaler 2 Puff(s) Inhalation two times a day  furosemide    Tablet 60 milliGRAM(s) Oral two times a day  metoprolol succinate ER 25 milliGRAM(s) Oral daily  nystatin Powder 1 Application(s) Topical two times a day  sacubitril 24 mG/valsartan 26 mG 1 Tablet(s) Oral two times a day  warfarin 2 milliGRAM(s) Oral once    PRN Meds  acetaminophen   Tablet. 650 milliGRAM(s) Oral every 6 hours PRN  docusate sodium 100 milliGRAM(s) Oral three times a day PRN  oxyCODONE    5 mG/acetaminophen 325 mG 1 Tablet(s) Oral every 6 hours PRN      Case discussed with resident    Care discussed with pt/family

## 2018-08-01 NOTE — PROGRESS NOTE ADULT - ASSESSMENT
Assessment: 76 yo male with PMHx of stable COPD on 2L home O2, CHF (diastolic? EF 55%), DLD, atrial fibrillation on coumadin (10mg/week), presented with worsening shortness of breath, MOORE and orthopnea. Also C/O pain in Lt. foot big toe    1. Ac. HFrEF  2. Morbid obesity  3. COPD  4. Ac. Gout  5. Fungal rash under breasts and groin area  6. Ch. A. fib on coumadin  7. Ch. DVT  8. HTN  9. Ch. Iron deficiency anemia (H/O Gastric bypass)  10. Alkalosis    PLAN:    . Give Acetazolamice 250 mg po q 12h x 3 doses.  . Change Lasix to 40 mg po q 12h.   . Check BUN/Cr in AM.   . Pulm eval noted  . Cont Entresto  . Give Coumadin 1 mg po q hs on discharge  . Nystatin powder twice a day for fungal rash  . Cont Symbicort, Nebs PRN and his other home meds.  . D/C to SNF    * Med rec reviewed. Plan of care D/W the pt. Time spent 42 minutes.

## 2018-08-02 ENCOUNTER — OUTPATIENT (OUTPATIENT)
Dept: OUTPATIENT SERVICES | Facility: HOSPITAL | Age: 78
LOS: 1 days | Discharge: HOME | End: 2018-08-02

## 2018-08-02 DIAGNOSIS — R79.9 ABNORMAL FINDING OF BLOOD CHEMISTRY, UNSPECIFIED: ICD-10-CM

## 2018-08-02 DIAGNOSIS — Z96.651 PRESENCE OF RIGHT ARTIFICIAL KNEE JOINT: Chronic | ICD-10-CM

## 2018-08-02 DIAGNOSIS — Z95.5 PRESENCE OF CORONARY ANGIOPLASTY IMPLANT AND GRAFT: Chronic | ICD-10-CM

## 2018-08-02 DIAGNOSIS — D64.9 ANEMIA, UNSPECIFIED: ICD-10-CM

## 2018-08-03 PROBLEM — I48.2 CHRONIC ATRIAL FIBRILLATION: Chronic | Status: ACTIVE | Noted: 2018-07-23

## 2018-08-03 PROBLEM — I34.0 NONRHEUMATIC MITRAL (VALVE) INSUFFICIENCY: Chronic | Status: ACTIVE | Noted: 2018-07-23

## 2018-08-03 PROBLEM — I82.402 ACUTE EMBOLISM AND THROMBOSIS OF UNSPECIFIED DEEP VEINS OF LEFT LOWER EXTREMITY: Chronic | Status: ACTIVE | Noted: 2018-07-23

## 2018-08-03 PROBLEM — L03.116 CELLULITIS OF LEFT LOWER LIMB: Chronic | Status: ACTIVE | Noted: 2018-07-23

## 2018-08-06 ENCOUNTER — OUTPATIENT (OUTPATIENT)
Dept: OUTPATIENT SERVICES | Facility: HOSPITAL | Age: 78
LOS: 1 days | Discharge: HOME | End: 2018-08-06

## 2018-08-06 DIAGNOSIS — Z96.651 PRESENCE OF RIGHT ARTIFICIAL KNEE JOINT: Chronic | ICD-10-CM

## 2018-08-06 DIAGNOSIS — Z95.5 PRESENCE OF CORONARY ANGIOPLASTY IMPLANT AND GRAFT: Chronic | ICD-10-CM

## 2018-08-06 DIAGNOSIS — Z79.01 LONG TERM (CURRENT) USE OF ANTICOAGULANTS: ICD-10-CM

## 2018-08-09 ENCOUNTER — OUTPATIENT (OUTPATIENT)
Dept: OUTPATIENT SERVICES | Facility: HOSPITAL | Age: 78
LOS: 1 days | Discharge: HOME | End: 2018-08-09

## 2018-08-09 DIAGNOSIS — Z98.84 BARIATRIC SURGERY STATUS: ICD-10-CM

## 2018-08-09 DIAGNOSIS — M19.072 PRIMARY OSTEOARTHRITIS, LEFT ANKLE AND FOOT: ICD-10-CM

## 2018-08-09 DIAGNOSIS — D50.9 IRON DEFICIENCY ANEMIA, UNSPECIFIED: ICD-10-CM

## 2018-08-09 DIAGNOSIS — J44.1 CHRONIC OBSTRUCTIVE PULMONARY DISEASE WITH (ACUTE) EXACERBATION: ICD-10-CM

## 2018-08-09 DIAGNOSIS — Z87.891 PERSONAL HISTORY OF NICOTINE DEPENDENCE: ICD-10-CM

## 2018-08-09 DIAGNOSIS — E78.5 HYPERLIPIDEMIA, UNSPECIFIED: ICD-10-CM

## 2018-08-09 DIAGNOSIS — Z79.01 LONG TERM (CURRENT) USE OF ANTICOAGULANTS: ICD-10-CM

## 2018-08-09 DIAGNOSIS — R06.02 SHORTNESS OF BREATH: ICD-10-CM

## 2018-08-09 DIAGNOSIS — Z96.651 PRESENCE OF RIGHT ARTIFICIAL KNEE JOINT: ICD-10-CM

## 2018-08-09 DIAGNOSIS — M10.9 GOUT, UNSPECIFIED: ICD-10-CM

## 2018-08-09 DIAGNOSIS — G47.33 OBSTRUCTIVE SLEEP APNEA (ADULT) (PEDIATRIC): ICD-10-CM

## 2018-08-09 DIAGNOSIS — Z91.19 PATIENT'S NONCOMPLIANCE WITH OTHER MEDICAL TREATMENT AND REGIMEN: ICD-10-CM

## 2018-08-09 DIAGNOSIS — Z96.651 PRESENCE OF RIGHT ARTIFICIAL KNEE JOINT: Chronic | ICD-10-CM

## 2018-08-09 DIAGNOSIS — E87.3 ALKALOSIS: ICD-10-CM

## 2018-08-09 DIAGNOSIS — L03.116 CELLULITIS OF LEFT LOWER LIMB: ICD-10-CM

## 2018-08-09 DIAGNOSIS — I82.411 ACUTE EMBOLISM AND THROMBOSIS OF RIGHT FEMORAL VEIN: ICD-10-CM

## 2018-08-09 DIAGNOSIS — Z95.5 PRESENCE OF CORONARY ANGIOPLASTY IMPLANT AND GRAFT: ICD-10-CM

## 2018-08-09 DIAGNOSIS — I48.2 CHRONIC ATRIAL FIBRILLATION: ICD-10-CM

## 2018-08-09 DIAGNOSIS — R18.8 OTHER ASCITES: ICD-10-CM

## 2018-08-09 DIAGNOSIS — B49 UNSPECIFIED MYCOSIS: ICD-10-CM

## 2018-08-09 DIAGNOSIS — R21 RASH AND OTHER NONSPECIFIC SKIN ERUPTION: ICD-10-CM

## 2018-08-09 DIAGNOSIS — J96.20 ACUTE AND CHRONIC RESPIRATORY FAILURE, UNSPECIFIED WHETHER WITH HYPOXIA OR HYPERCAPNIA: ICD-10-CM

## 2018-08-09 DIAGNOSIS — Z95.5 PRESENCE OF CORONARY ANGIOPLASTY IMPLANT AND GRAFT: Chronic | ICD-10-CM

## 2018-08-09 DIAGNOSIS — N17.9 ACUTE KIDNEY FAILURE, UNSPECIFIED: ICD-10-CM

## 2018-08-09 DIAGNOSIS — I50.33 ACUTE ON CHRONIC DIASTOLIC (CONGESTIVE) HEART FAILURE: ICD-10-CM

## 2018-08-09 DIAGNOSIS — I08.1 RHEUMATIC DISORDERS OF BOTH MITRAL AND TRICUSPID VALVES: ICD-10-CM

## 2018-08-09 DIAGNOSIS — I25.10 ATHEROSCLEROTIC HEART DISEASE OF NATIVE CORONARY ARTERY WITHOUT ANGINA PECTORIS: ICD-10-CM

## 2018-08-09 DIAGNOSIS — I11.0 HYPERTENSIVE HEART DISEASE WITH HEART FAILURE: ICD-10-CM

## 2018-08-09 DIAGNOSIS — E66.01 MORBID (SEVERE) OBESITY DUE TO EXCESS CALORIES: ICD-10-CM

## 2018-08-13 ENCOUNTER — OUTPATIENT (OUTPATIENT)
Dept: OUTPATIENT SERVICES | Facility: HOSPITAL | Age: 78
LOS: 1 days | Discharge: HOME | End: 2018-08-13

## 2018-08-13 DIAGNOSIS — Z79.01 LONG TERM (CURRENT) USE OF ANTICOAGULANTS: ICD-10-CM

## 2018-08-13 DIAGNOSIS — Z96.651 PRESENCE OF RIGHT ARTIFICIAL KNEE JOINT: Chronic | ICD-10-CM

## 2018-08-13 DIAGNOSIS — Z95.5 PRESENCE OF CORONARY ANGIOPLASTY IMPLANT AND GRAFT: Chronic | ICD-10-CM

## 2018-08-20 ENCOUNTER — OUTPATIENT (OUTPATIENT)
Dept: OUTPATIENT SERVICES | Facility: HOSPITAL | Age: 78
LOS: 1 days | Discharge: HOME | End: 2018-08-20

## 2018-08-20 DIAGNOSIS — I82.91 CHRONIC EMBOLISM AND THROMBOSIS OF UNSPECIFIED VEIN: ICD-10-CM

## 2018-08-20 DIAGNOSIS — Z79.01 LONG TERM (CURRENT) USE OF ANTICOAGULANTS: ICD-10-CM

## 2018-08-20 DIAGNOSIS — Z95.5 PRESENCE OF CORONARY ANGIOPLASTY IMPLANT AND GRAFT: Chronic | ICD-10-CM

## 2018-08-20 DIAGNOSIS — Z96.651 PRESENCE OF RIGHT ARTIFICIAL KNEE JOINT: Chronic | ICD-10-CM

## 2018-08-27 ENCOUNTER — RX RENEWAL (OUTPATIENT)
Age: 78
End: 2018-08-27

## 2018-09-07 ENCOUNTER — OUTPATIENT (OUTPATIENT)
Dept: OUTPATIENT SERVICES | Facility: HOSPITAL | Age: 78
LOS: 1 days | Discharge: HOME | End: 2018-09-07

## 2018-09-07 DIAGNOSIS — I82.91 CHRONIC EMBOLISM AND THROMBOSIS OF UNSPECIFIED VEIN: ICD-10-CM

## 2018-09-07 DIAGNOSIS — Z95.5 PRESENCE OF CORONARY ANGIOPLASTY IMPLANT AND GRAFT: Chronic | ICD-10-CM

## 2018-09-07 DIAGNOSIS — Z79.01 LONG TERM (CURRENT) USE OF ANTICOAGULANTS: ICD-10-CM

## 2018-09-07 DIAGNOSIS — Z96.651 PRESENCE OF RIGHT ARTIFICIAL KNEE JOINT: Chronic | ICD-10-CM

## 2018-09-07 LAB
POCT INR: 3.4 RATIO — HIGH (ref 0.9–1.2)
POCT PT: 41.2 SEC — HIGH (ref 10–13.4)

## 2018-09-24 ENCOUNTER — OUTPATIENT (OUTPATIENT)
Dept: OUTPATIENT SERVICES | Facility: HOSPITAL | Age: 78
LOS: 1 days | Discharge: HOME | End: 2018-09-24

## 2018-09-24 DIAGNOSIS — I25.10 ATHEROSCLEROTIC HEART DISEASE OF NATIVE CORONARY ARTERY WITHOUT ANGINA PECTORIS: ICD-10-CM

## 2018-09-24 DIAGNOSIS — I48.91 UNSPECIFIED ATRIAL FIBRILLATION: ICD-10-CM

## 2018-09-24 DIAGNOSIS — Z96.651 PRESENCE OF RIGHT ARTIFICIAL KNEE JOINT: Chronic | ICD-10-CM

## 2018-09-24 DIAGNOSIS — Z95.5 PRESENCE OF CORONARY ANGIOPLASTY IMPLANT AND GRAFT: Chronic | ICD-10-CM

## 2018-09-24 DIAGNOSIS — I10 ESSENTIAL (PRIMARY) HYPERTENSION: ICD-10-CM

## 2018-09-27 ENCOUNTER — APPOINTMENT (OUTPATIENT)
Dept: CARDIOLOGY | Facility: CLINIC | Age: 78
End: 2018-09-27

## 2018-09-27 VITALS
SYSTOLIC BLOOD PRESSURE: 120 MMHG | DIASTOLIC BLOOD PRESSURE: 80 MMHG | WEIGHT: 235 LBS | HEART RATE: 38 BPM | BODY MASS INDEX: 29.22 KG/M2 | HEIGHT: 75 IN

## 2018-09-27 DIAGNOSIS — L02.31 CUTANEOUS ABSCESS OF BUTTOCK: ICD-10-CM

## 2018-09-27 RX ORDER — BUDESONIDE AND FORMOTEROL FUMARATE DIHYDRATE 160; 4.5 UG/1; UG/1
160-4.5 AEROSOL RESPIRATORY (INHALATION)
Refills: 0 | Status: ACTIVE | COMMUNITY
Start: 2017-06-28

## 2018-09-28 ENCOUNTER — APPOINTMENT (OUTPATIENT)
Dept: CARDIOLOGY | Facility: CLINIC | Age: 78
End: 2018-09-28

## 2018-09-28 ENCOUNTER — OUTPATIENT (OUTPATIENT)
Dept: OUTPATIENT SERVICES | Facility: HOSPITAL | Age: 78
LOS: 1 days | Discharge: HOME | End: 2018-09-28

## 2018-09-28 DIAGNOSIS — I82.91 CHRONIC EMBOLISM AND THROMBOSIS OF UNSPECIFIED VEIN: ICD-10-CM

## 2018-09-28 DIAGNOSIS — Z96.651 PRESENCE OF RIGHT ARTIFICIAL KNEE JOINT: Chronic | ICD-10-CM

## 2018-09-28 DIAGNOSIS — Z95.5 PRESENCE OF CORONARY ANGIOPLASTY IMPLANT AND GRAFT: Chronic | ICD-10-CM

## 2018-09-28 DIAGNOSIS — Z79.01 LONG TERM (CURRENT) USE OF ANTICOAGULANTS: ICD-10-CM

## 2018-09-28 LAB
POCT INR: 2.4 RATIO — HIGH (ref 0.9–1.2)
POCT PT: 28.8 SEC — HIGH (ref 10–13.4)

## 2018-10-02 ENCOUNTER — OUTPATIENT (OUTPATIENT)
Dept: OUTPATIENT SERVICES | Facility: HOSPITAL | Age: 78
LOS: 1 days | Discharge: HOME | End: 2018-10-02

## 2018-10-02 DIAGNOSIS — J44.1 CHRONIC OBSTRUCTIVE PULMONARY DISEASE WITH (ACUTE) EXACERBATION: ICD-10-CM

## 2018-10-02 DIAGNOSIS — Z95.5 PRESENCE OF CORONARY ANGIOPLASTY IMPLANT AND GRAFT: Chronic | ICD-10-CM

## 2018-10-02 DIAGNOSIS — Z96.651 PRESENCE OF RIGHT ARTIFICIAL KNEE JOINT: Chronic | ICD-10-CM

## 2018-10-04 ENCOUNTER — OUTPATIENT (OUTPATIENT)
Dept: OUTPATIENT SERVICES | Facility: HOSPITAL | Age: 78
LOS: 1 days | Discharge: HOME | End: 2018-10-04

## 2018-10-04 DIAGNOSIS — D64.9 ANEMIA, UNSPECIFIED: ICD-10-CM

## 2018-10-04 DIAGNOSIS — Z96.651 PRESENCE OF RIGHT ARTIFICIAL KNEE JOINT: Chronic | ICD-10-CM

## 2018-10-04 DIAGNOSIS — Z95.5 PRESENCE OF CORONARY ANGIOPLASTY IMPLANT AND GRAFT: Chronic | ICD-10-CM

## 2018-10-10 ENCOUNTER — APPOINTMENT (OUTPATIENT)
Dept: CARDIOLOGY | Facility: CLINIC | Age: 78
End: 2018-10-10

## 2018-10-10 VITALS — BODY MASS INDEX: 28.25 KG/M2 | WEIGHT: 226 LBS

## 2018-10-10 VITALS — HEART RATE: 49 BPM

## 2018-10-26 ENCOUNTER — OUTPATIENT (OUTPATIENT)
Dept: OUTPATIENT SERVICES | Facility: HOSPITAL | Age: 78
LOS: 1 days | Discharge: HOME | End: 2018-10-26

## 2018-10-26 DIAGNOSIS — Z79.01 LONG TERM (CURRENT) USE OF ANTICOAGULANTS: ICD-10-CM

## 2018-10-26 DIAGNOSIS — Z96.651 PRESENCE OF RIGHT ARTIFICIAL KNEE JOINT: Chronic | ICD-10-CM

## 2018-10-26 DIAGNOSIS — Z95.5 PRESENCE OF CORONARY ANGIOPLASTY IMPLANT AND GRAFT: Chronic | ICD-10-CM

## 2018-10-26 DIAGNOSIS — I82.91 CHRONIC EMBOLISM AND THROMBOSIS OF UNSPECIFIED VEIN: ICD-10-CM

## 2018-10-26 LAB
POCT INR: 1.3 RATIO — HIGH (ref 0.9–1.2)
POCT PT: 15.3 SEC — HIGH (ref 10–13.4)

## 2018-11-15 ENCOUNTER — MEDICATION RENEWAL (OUTPATIENT)
Age: 78
End: 2018-11-15

## 2018-11-23 ENCOUNTER — OUTPATIENT (OUTPATIENT)
Dept: OUTPATIENT SERVICES | Facility: HOSPITAL | Age: 78
LOS: 1 days | Discharge: HOME | End: 2018-11-23

## 2018-11-23 DIAGNOSIS — Z95.5 PRESENCE OF CORONARY ANGIOPLASTY IMPLANT AND GRAFT: Chronic | ICD-10-CM

## 2018-11-23 DIAGNOSIS — I82.91 CHRONIC EMBOLISM AND THROMBOSIS OF UNSPECIFIED VEIN: ICD-10-CM

## 2018-11-23 DIAGNOSIS — Z79.01 LONG TERM (CURRENT) USE OF ANTICOAGULANTS: ICD-10-CM

## 2018-11-23 DIAGNOSIS — Z96.651 PRESENCE OF RIGHT ARTIFICIAL KNEE JOINT: Chronic | ICD-10-CM

## 2018-11-23 LAB
POCT INR: 1.7 RATIO — HIGH (ref 0.9–1.2)
POCT PT: 20.1 SEC — HIGH (ref 10–13.4)

## 2018-12-12 LAB
ANION GAP SERPL CALC-SCNC: 13 MMOL/L
BUN SERPL-MCNC: 22 MG/DL
CALCIUM SERPL-MCNC: 8.5 MG/DL
CHLORIDE SERPL-SCNC: 101 MMOL/L
CO2 SERPL-SCNC: 29 MMOL/L
CREAT SERPL-MCNC: 1.2 MG/DL
GLUCOSE SERPL-MCNC: 99 MG/DL
POTASSIUM SERPL-SCNC: 4 MMOL/L
SODIUM SERPL-SCNC: 143 MMOL/L

## 2018-12-21 ENCOUNTER — OUTPATIENT (OUTPATIENT)
Dept: OUTPATIENT SERVICES | Facility: HOSPITAL | Age: 78
LOS: 1 days | Discharge: HOME | End: 2018-12-21

## 2018-12-21 DIAGNOSIS — Z95.5 PRESENCE OF CORONARY ANGIOPLASTY IMPLANT AND GRAFT: Chronic | ICD-10-CM

## 2018-12-21 DIAGNOSIS — I82.91 CHRONIC EMBOLISM AND THROMBOSIS OF UNSPECIFIED VEIN: ICD-10-CM

## 2018-12-21 DIAGNOSIS — Z79.01 LONG TERM (CURRENT) USE OF ANTICOAGULANTS: ICD-10-CM

## 2018-12-21 DIAGNOSIS — Z96.651 PRESENCE OF RIGHT ARTIFICIAL KNEE JOINT: Chronic | ICD-10-CM

## 2018-12-21 LAB
POCT INR: 1.8 RATIO — HIGH (ref 0.9–1.2)
POCT PT: 21.1 SEC — HIGH (ref 10–13.4)

## 2019-01-16 ENCOUNTER — APPOINTMENT (OUTPATIENT)
Dept: CARDIOLOGY | Facility: CLINIC | Age: 79
End: 2019-01-16

## 2019-01-16 VITALS
DIASTOLIC BLOOD PRESSURE: 78 MMHG | WEIGHT: 222 LBS | HEART RATE: 40 BPM | BODY MASS INDEX: 27.6 KG/M2 | SYSTOLIC BLOOD PRESSURE: 124 MMHG | HEIGHT: 75 IN

## 2019-01-16 DIAGNOSIS — D23.9 OTHER BENIGN NEOPLASM OF SKIN, UNSPECIFIED: ICD-10-CM

## 2019-01-16 RX ORDER — FLUTICASONE FUROATE AND VILANTEROL TRIFENATATE 100; 25 UG/1; UG/1
100-25 POWDER RESPIRATORY (INHALATION) TWICE DAILY
Refills: 0 | Status: ACTIVE | COMMUNITY
Start: 2018-08-14

## 2019-01-16 RX ORDER — METOPROLOL SUCCINATE 25 MG/1
25 TABLET, EXTENDED RELEASE ORAL DAILY
Refills: 5 | Status: ACTIVE | COMMUNITY

## 2019-01-16 NOTE — REASON FOR VISIT
[Follow-Up - Clinic] : a clinic follow-up of [Atrial Fibrillation] : atrial fibrillation [FreeTextEntry2] : chf

## 2019-01-16 NOTE — HISTORY OF PRESENT ILLNESS
[FreeTextEntry1] : a fib\par cad\par chf\par sob class 3\par chest pain class 1\par \par The patient was recently hospitalized on [facility] hospital for congestive heart failure.\par He gained approximately 20 pounds.\par He was Treated with IV diuretics  he he lost approximately 20 pounds\par He underwent cardiac catheterization which revealed an ejection fraction of 45-50%\par No significant coronary artery disease.\par  No significant valvular heart disease.\par \par patient c/o marked sob class 3\par on entresto\par \par paient much improved on entresto\par no chf\par no edema\par less sob\par \par no weight gain \par decrease lasix to bid\par \par patient is in slow a fib \par probably has sinus and av rika dysfunction\par will stop metropolol\par \par \par

## 2019-01-16 NOTE — PHYSICAL EXAM
[General Appearance - Well Developed] : well developed [Normal Appearance] : normal appearance [Well Groomed] : well groomed [General Appearance - Well Nourished] : well nourished [No Deformities] : no deformities [General Appearance - In No Acute Distress] : no acute distress [Normal Conjunctiva] : the conjunctiva exhibited no abnormalities [Eyelids - No Xanthelasma] : the eyelids demonstrated no xanthelasmas [Normal Oral Mucosa] : normal oral mucosa [No Oral Pallor] : no oral pallor [No Oral Cyanosis] : no oral cyanosis [Normal Jugular Venous A Waves Present] : normal jugular venous A waves present [Normal Jugular Venous V Waves Present] : normal jugular venous V waves present [No Jugular Venous Mei A Waves] : no jugular venous mei A waves [Respiration, Rhythm And Depth] : normal respiratory rhythm and effort [Exaggerated Use Of Accessory Muscles For Inspiration] : no accessory muscle use [Auscultation Breath Sounds / Voice Sounds] : lungs were clear to auscultation bilaterally [Heart Rate And Rhythm] : heart rate and rhythm were normal [Heart Sounds] : normal S1 and S2 [Murmurs] : no murmurs present [Abdomen Soft] : soft [Abdomen Tenderness] : non-tender [Abdomen Mass (___ Cm)] : no abdominal mass palpated [Abnormal Walk] : normal gait [Gait - Sufficient For Exercise Testing] : the gait was sufficient for exercise testing [Nail Clubbing] : no clubbing of the fingernails [Cyanosis, Localized] : no localized cyanosis [Petechial Hemorrhages (___cm)] : no petechial hemorrhages [Skin Color & Pigmentation] : normal skin color and pigmentation [] : no rash [No Venous Stasis] : no venous stasis [Skin Lesions] : no skin lesions [No Skin Ulcers] : no skin ulcer [No Xanthoma] : no  xanthoma was observed [Oriented To Time, Place, And Person] : oriented to person, place, and time [Affect] : the affect was normal [Mood] : the mood was normal [No Anxiety] : not feeling anxious

## 2019-01-18 ENCOUNTER — OUTPATIENT (OUTPATIENT)
Dept: OUTPATIENT SERVICES | Facility: HOSPITAL | Age: 79
LOS: 1 days | Discharge: HOME | End: 2019-01-18

## 2019-01-18 DIAGNOSIS — Z79.01 LONG TERM (CURRENT) USE OF ANTICOAGULANTS: ICD-10-CM

## 2019-01-18 DIAGNOSIS — I82.91 CHRONIC EMBOLISM AND THROMBOSIS OF UNSPECIFIED VEIN: ICD-10-CM

## 2019-01-18 DIAGNOSIS — Z96.651 PRESENCE OF RIGHT ARTIFICIAL KNEE JOINT: Chronic | ICD-10-CM

## 2019-01-18 DIAGNOSIS — Z95.5 PRESENCE OF CORONARY ANGIOPLASTY IMPLANT AND GRAFT: Chronic | ICD-10-CM

## 2019-01-18 LAB
POCT INR: 1.4 RATIO — HIGH (ref 0.9–1.2)
POCT PT: 16.8 SEC — HIGH (ref 10–13.4)

## 2019-02-08 ENCOUNTER — OUTPATIENT (OUTPATIENT)
Dept: OUTPATIENT SERVICES | Facility: HOSPITAL | Age: 79
LOS: 1 days | Discharge: HOME | End: 2019-02-08

## 2019-02-08 DIAGNOSIS — Z79.01 LONG TERM (CURRENT) USE OF ANTICOAGULANTS: ICD-10-CM

## 2019-02-08 DIAGNOSIS — I82.91 CHRONIC EMBOLISM AND THROMBOSIS OF UNSPECIFIED VEIN: ICD-10-CM

## 2019-02-08 DIAGNOSIS — Z96.651 PRESENCE OF RIGHT ARTIFICIAL KNEE JOINT: Chronic | ICD-10-CM

## 2019-02-08 DIAGNOSIS — Z95.5 PRESENCE OF CORONARY ANGIOPLASTY IMPLANT AND GRAFT: Chronic | ICD-10-CM

## 2019-02-08 LAB
POCT INR: 1.9 RATIO — HIGH (ref 0.9–1.2)
POCT PT: 23.3 SEC — HIGH (ref 10–13.4)

## 2019-02-14 ENCOUNTER — MEDICATION RENEWAL (OUTPATIENT)
Age: 79
End: 2019-02-14

## 2019-02-27 ENCOUNTER — APPOINTMENT (OUTPATIENT)
Dept: CARDIOLOGY | Facility: CLINIC | Age: 79
End: 2019-02-27

## 2019-02-27 VITALS
SYSTOLIC BLOOD PRESSURE: 130 MMHG | HEIGHT: 75 IN | BODY MASS INDEX: 27.6 KG/M2 | WEIGHT: 222 LBS | HEART RATE: 66 BPM | DIASTOLIC BLOOD PRESSURE: 60 MMHG

## 2019-02-27 NOTE — HISTORY OF PRESENT ILLNESS
[FreeTextEntry1] : a fib\par cad\par chf\par sob class 3\par chest pain class 1\par \par The patient was recently hospitalized on [facility] hospital for congestive heart failure.\par He gained approximately 20 pounds.\par He was Treated with IV diuretics  he he lost approximately 20 pounds\par He underwent cardiac catheterization which revealed an ejection fraction of 45-50%\par No significant coronary artery disease.\par  No significant valvular heart disease.\par \par patient c/o marked sob class 3\par on entresto\par \par paient much improved on entresto\par no chf\par no edema\par less sob\par \par no weight gain \par decrease lasix to bid\par \par patient is in slow a fib \par probably has sinus and av rika dysfunction\par will stop metropolol\par \par since his last visit, the patient has stopped his beta blocker\par today, his ekg still shows slow a fib\par denies pre-syncope, syncope or lightheadness\par 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

## 2019-03-01 ENCOUNTER — OUTPATIENT (OUTPATIENT)
Dept: OUTPATIENT SERVICES | Facility: HOSPITAL | Age: 79
LOS: 1 days | Discharge: HOME | End: 2019-03-01

## 2019-03-01 DIAGNOSIS — I82.91 CHRONIC EMBOLISM AND THROMBOSIS OF UNSPECIFIED VEIN: ICD-10-CM

## 2019-03-01 DIAGNOSIS — Z95.5 PRESENCE OF CORONARY ANGIOPLASTY IMPLANT AND GRAFT: Chronic | ICD-10-CM

## 2019-03-01 DIAGNOSIS — Z79.01 LONG TERM (CURRENT) USE OF ANTICOAGULANTS: ICD-10-CM

## 2019-03-01 DIAGNOSIS — Z96.651 PRESENCE OF RIGHT ARTIFICIAL KNEE JOINT: Chronic | ICD-10-CM

## 2019-03-01 LAB
POCT INR: 1.8 RATIO — HIGH (ref 0.9–1.2)
POCT PT: 22 SEC — HIGH (ref 10–13.4)

## 2019-03-13 ENCOUNTER — APPOINTMENT (OUTPATIENT)
Dept: CARDIOLOGY | Facility: CLINIC | Age: 79
End: 2019-03-13

## 2019-03-22 ENCOUNTER — OUTPATIENT (OUTPATIENT)
Dept: OUTPATIENT SERVICES | Facility: HOSPITAL | Age: 79
LOS: 1 days | Discharge: HOME | End: 2019-03-22

## 2019-03-22 DIAGNOSIS — I82.91 CHRONIC EMBOLISM AND THROMBOSIS OF UNSPECIFIED VEIN: ICD-10-CM

## 2019-03-22 DIAGNOSIS — Z95.5 PRESENCE OF CORONARY ANGIOPLASTY IMPLANT AND GRAFT: Chronic | ICD-10-CM

## 2019-03-22 DIAGNOSIS — Z79.01 LONG TERM (CURRENT) USE OF ANTICOAGULANTS: ICD-10-CM

## 2019-03-22 DIAGNOSIS — Z96.651 PRESENCE OF RIGHT ARTIFICIAL KNEE JOINT: Chronic | ICD-10-CM

## 2019-03-22 LAB
POCT INR: 2.9 RATIO — HIGH (ref 0.9–1.2)
POCT PT: 35.2 SEC — HIGH (ref 10–13.4)

## 2019-03-27 ENCOUNTER — APPOINTMENT (OUTPATIENT)
Dept: CARDIOLOGY | Facility: CLINIC | Age: 79
End: 2019-03-27

## 2019-03-27 VITALS
HEIGHT: 75 IN | BODY MASS INDEX: 28.1 KG/M2 | HEART RATE: 62 BPM | DIASTOLIC BLOOD PRESSURE: 56 MMHG | WEIGHT: 226 LBS | SYSTOLIC BLOOD PRESSURE: 124 MMHG

## 2019-03-31 NOTE — HISTORY OF PRESENT ILLNESS
[FreeTextEntry1] : a fib\par cad\par chf\par sob class 3\par chest pain class 1\par recently hospitalized on [facility] hospital for congestive heart failure.\par He gained approximately 20 pounds.\par treated with IV diuretics  he he lost approximately 20 pounds\par He underwent cardiac catheterization which revealed an ejection fraction of 45-50%\par No significant coronary artery disease.\par  No significant valvular heart disease.\par \par patient c/o marked sob class 3\par on entresto\par paient much improved on entresto\par no chf\par no edema\par less sob\par \par no weight gain \par decrease lasix to bid\par \par patient is in slow a fib \par probably has sinus and av rika dysfunction\par \par since his last visit, the patient has stopped his beta blocker\par today, his ekg still shows slow a fib\par denies pre-syncope, syncope or lightheadness\par 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

## 2019-04-19 ENCOUNTER — OUTPATIENT (OUTPATIENT)
Dept: OUTPATIENT SERVICES | Facility: HOSPITAL | Age: 79
LOS: 1 days | Discharge: HOME | End: 2019-04-19

## 2019-04-19 DIAGNOSIS — Z95.5 PRESENCE OF CORONARY ANGIOPLASTY IMPLANT AND GRAFT: Chronic | ICD-10-CM

## 2019-04-19 DIAGNOSIS — Z96.651 PRESENCE OF RIGHT ARTIFICIAL KNEE JOINT: Chronic | ICD-10-CM

## 2019-04-19 DIAGNOSIS — I82.91 CHRONIC EMBOLISM AND THROMBOSIS OF UNSPECIFIED VEIN: ICD-10-CM

## 2019-04-19 DIAGNOSIS — Z79.01 LONG TERM (CURRENT) USE OF ANTICOAGULANTS: ICD-10-CM

## 2019-04-19 LAB
POCT INR: 2.9 RATIO — HIGH (ref 0.9–1.2)
POCT PT: 34.3 SEC — HIGH (ref 10–13.4)

## 2019-05-28 ENCOUNTER — OUTPATIENT (OUTPATIENT)
Dept: OUTPATIENT SERVICES | Facility: HOSPITAL | Age: 79
LOS: 1 days | Discharge: HOME | End: 2019-05-28

## 2019-05-28 DIAGNOSIS — Z95.5 PRESENCE OF CORONARY ANGIOPLASTY IMPLANT AND GRAFT: Chronic | ICD-10-CM

## 2019-05-28 DIAGNOSIS — Z79.01 LONG TERM (CURRENT) USE OF ANTICOAGULANTS: ICD-10-CM

## 2019-05-28 DIAGNOSIS — Z96.651 PRESENCE OF RIGHT ARTIFICIAL KNEE JOINT: Chronic | ICD-10-CM

## 2019-05-28 DIAGNOSIS — I82.91 CHRONIC EMBOLISM AND THROMBOSIS OF UNSPECIFIED VEIN: ICD-10-CM

## 2019-05-28 LAB
POCT INR: 2.3 RATIO — HIGH (ref 0.9–1.2)
POCT PT: 28.1 SEC — HIGH (ref 10–13.4)

## 2019-07-02 ENCOUNTER — OUTPATIENT (OUTPATIENT)
Dept: OUTPATIENT SERVICES | Facility: HOSPITAL | Age: 79
LOS: 1 days | Discharge: HOME | End: 2019-07-02

## 2019-07-02 DIAGNOSIS — Z96.651 PRESENCE OF RIGHT ARTIFICIAL KNEE JOINT: Chronic | ICD-10-CM

## 2019-07-02 DIAGNOSIS — Z79.01 LONG TERM (CURRENT) USE OF ANTICOAGULANTS: ICD-10-CM

## 2019-07-02 DIAGNOSIS — Z95.5 PRESENCE OF CORONARY ANGIOPLASTY IMPLANT AND GRAFT: Chronic | ICD-10-CM

## 2019-07-02 DIAGNOSIS — I82.91 CHRONIC EMBOLISM AND THROMBOSIS OF UNSPECIFIED VEIN: ICD-10-CM

## 2019-07-02 LAB
POCT INR: 1.9 RATIO — HIGH (ref 0.9–1.2)
POCT PT: 22.5 SEC — HIGH (ref 10–13.4)

## 2019-07-30 ENCOUNTER — OUTPATIENT (OUTPATIENT)
Dept: OUTPATIENT SERVICES | Facility: HOSPITAL | Age: 79
LOS: 1 days | Discharge: HOME | End: 2019-07-30

## 2019-07-30 DIAGNOSIS — Z79.01 LONG TERM (CURRENT) USE OF ANTICOAGULANTS: ICD-10-CM

## 2019-07-30 DIAGNOSIS — I82.91 CHRONIC EMBOLISM AND THROMBOSIS OF UNSPECIFIED VEIN: ICD-10-CM

## 2019-07-30 DIAGNOSIS — Z95.5 PRESENCE OF CORONARY ANGIOPLASTY IMPLANT AND GRAFT: Chronic | ICD-10-CM

## 2019-07-30 DIAGNOSIS — Z96.651 PRESENCE OF RIGHT ARTIFICIAL KNEE JOINT: Chronic | ICD-10-CM

## 2019-07-30 LAB
POCT INR: 2.5 RATIO — HIGH (ref 0.9–1.2)
POCT PT: 29.5 SEC — HIGH (ref 10–13.4)

## 2019-07-31 ENCOUNTER — APPOINTMENT (OUTPATIENT)
Dept: CARDIOLOGY | Facility: CLINIC | Age: 79
End: 2019-07-31
Payer: MEDICARE

## 2019-07-31 VITALS
DIASTOLIC BLOOD PRESSURE: 80 MMHG | BODY MASS INDEX: 28.47 KG/M2 | WEIGHT: 229 LBS | HEIGHT: 75 IN | SYSTOLIC BLOOD PRESSURE: 136 MMHG | HEART RATE: 67 BPM

## 2019-07-31 PROCEDURE — 93000 ELECTROCARDIOGRAM COMPLETE: CPT

## 2019-07-31 PROCEDURE — 99214 OFFICE O/P EST MOD 30 MIN: CPT

## 2019-07-31 NOTE — HISTORY OF PRESENT ILLNESS
[FreeTextEntry1] : a fib\par cad\par chf\par sob class 3\par chest pain class 1\par recently hospitalized on [facility] hospital for congestive heart failure.\par no weight gain\par weighs approx. 229 lbs.\par \par He underwent cardiac catheterization which revealed an ejection fraction of 45-50%\par No significant coronary artery disease.\par  No significant valvular heart disease.\par \par patient c/o marked sob class 3\par on entresto\par paient much improved on entresto\par no chf\par no edema\par less sob\par no weight gain \par decrease lasix to bid\par \par patient is in slow a fib \par probably has sinus and av rika dysfunction\par since his last visit, the patient has stopped his beta blocker\par today, his ekg still shows slow a fib\par denies pre-syncope, syncope or lightheadness\par 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

## 2019-07-31 NOTE — PHYSICAL EXAM
[General Appearance - Well Developed] : well developed [Normal Appearance] : normal appearance [Well Groomed] : well groomed [General Appearance - Well Nourished] : well nourished [No Deformities] : no deformities [General Appearance - In No Acute Distress] : no acute distress [Normal Conjunctiva] : the conjunctiva exhibited no abnormalities [Normal Oral Mucosa] : normal oral mucosa [Eyelids - No Xanthelasma] : the eyelids demonstrated no xanthelasmas [No Oral Pallor] : no oral pallor [No Oral Cyanosis] : no oral cyanosis [Normal Jugular Venous A Waves Present] : normal jugular venous A waves present [Normal Jugular Venous V Waves Present] : normal jugular venous V waves present [No Jugular Venous Mei A Waves] : no jugular venous mei A waves [Respiration, Rhythm And Depth] : normal respiratory rhythm and effort [Exaggerated Use Of Accessory Muscles For Inspiration] : no accessory muscle use [Heart Rate And Rhythm] : heart rate and rhythm were normal [Heart Sounds] : normal S1 and S2 [Auscultation Breath Sounds / Voice Sounds] : lungs were clear to auscultation bilaterally [Abdomen Soft] : soft [Murmurs] : no murmurs present [Abdomen Tenderness] : non-tender [Abdomen Mass (___ Cm)] : no abdominal mass palpated [Gait - Sufficient For Exercise Testing] : the gait was sufficient for exercise testing [Abnormal Walk] : normal gait [Nail Clubbing] : no clubbing of the fingernails [Cyanosis, Localized] : no localized cyanosis [Petechial Hemorrhages (___cm)] : no petechial hemorrhages [Skin Color & Pigmentation] : normal skin color and pigmentation [] : no rash [No Venous Stasis] : no venous stasis [Skin Lesions] : no skin lesions [No Skin Ulcers] : no skin ulcer [Oriented To Time, Place, And Person] : oriented to person, place, and time [No Xanthoma] : no  xanthoma was observed [Affect] : the affect was normal [Mood] : the mood was normal [No Anxiety] : not feeling anxious

## 2019-08-21 ENCOUNTER — MEDICATION RENEWAL (OUTPATIENT)
Age: 79
End: 2019-08-21

## 2019-09-03 ENCOUNTER — OUTPATIENT (OUTPATIENT)
Dept: OUTPATIENT SERVICES | Facility: HOSPITAL | Age: 79
LOS: 1 days | Discharge: HOME | End: 2019-09-03

## 2019-09-03 DIAGNOSIS — I82.91 CHRONIC EMBOLISM AND THROMBOSIS OF UNSPECIFIED VEIN: ICD-10-CM

## 2019-09-03 DIAGNOSIS — Z96.651 PRESENCE OF RIGHT ARTIFICIAL KNEE JOINT: Chronic | ICD-10-CM

## 2019-09-03 DIAGNOSIS — Z79.01 LONG TERM (CURRENT) USE OF ANTICOAGULANTS: ICD-10-CM

## 2019-09-03 DIAGNOSIS — Z95.5 PRESENCE OF CORONARY ANGIOPLASTY IMPLANT AND GRAFT: Chronic | ICD-10-CM

## 2019-09-03 LAB
POCT INR: 3 RATIO — HIGH (ref 0.9–1.2)
POCT PT: 35.9 SEC — HIGH (ref 10–13.4)

## 2019-10-08 ENCOUNTER — OUTPATIENT (OUTPATIENT)
Dept: OUTPATIENT SERVICES | Facility: HOSPITAL | Age: 79
LOS: 1 days | Discharge: HOME | End: 2019-10-08

## 2019-10-08 DIAGNOSIS — Z95.5 PRESENCE OF CORONARY ANGIOPLASTY IMPLANT AND GRAFT: Chronic | ICD-10-CM

## 2019-10-08 DIAGNOSIS — I82.91 CHRONIC EMBOLISM AND THROMBOSIS OF UNSPECIFIED VEIN: ICD-10-CM

## 2019-10-08 DIAGNOSIS — Z79.01 LONG TERM (CURRENT) USE OF ANTICOAGULANTS: ICD-10-CM

## 2019-10-08 DIAGNOSIS — Z96.651 PRESENCE OF RIGHT ARTIFICIAL KNEE JOINT: Chronic | ICD-10-CM

## 2019-10-08 LAB
POCT INR: 3.7 RATIO — HIGH (ref 0.9–1.2)
POCT PT: 44.7 SEC — HIGH (ref 10–13.4)

## 2019-11-01 ENCOUNTER — OUTPATIENT (OUTPATIENT)
Dept: OUTPATIENT SERVICES | Facility: HOSPITAL | Age: 79
LOS: 1 days | Discharge: HOME | End: 2019-11-01

## 2019-11-01 DIAGNOSIS — I82.91 CHRONIC EMBOLISM AND THROMBOSIS OF UNSPECIFIED VEIN: ICD-10-CM

## 2019-11-01 DIAGNOSIS — Z79.01 LONG TERM (CURRENT) USE OF ANTICOAGULANTS: ICD-10-CM

## 2019-11-01 DIAGNOSIS — Z96.651 PRESENCE OF RIGHT ARTIFICIAL KNEE JOINT: Chronic | ICD-10-CM

## 2019-11-01 DIAGNOSIS — Z95.5 PRESENCE OF CORONARY ANGIOPLASTY IMPLANT AND GRAFT: Chronic | ICD-10-CM

## 2019-11-01 LAB
POCT INR: 3.1 RATIO — HIGH (ref 0.9–1.2)
POCT PT: 37.2 SEC — HIGH (ref 10–13.4)

## 2019-11-06 ENCOUNTER — APPOINTMENT (OUTPATIENT)
Dept: CARDIOLOGY | Facility: CLINIC | Age: 79
End: 2019-11-06
Payer: MEDICARE

## 2019-11-06 VITALS
BODY MASS INDEX: 28.47 KG/M2 | DIASTOLIC BLOOD PRESSURE: 52 MMHG | WEIGHT: 229 LBS | SYSTOLIC BLOOD PRESSURE: 126 MMHG | HEART RATE: 61 BPM | HEIGHT: 75 IN

## 2019-11-06 PROCEDURE — 99214 OFFICE O/P EST MOD 30 MIN: CPT

## 2019-11-06 PROCEDURE — 93000 ELECTROCARDIOGRAM COMPLETE: CPT

## 2019-11-06 NOTE — PHYSICAL EXAM
[General Appearance - Well Developed] : well developed [Normal Appearance] : normal appearance [Well Groomed] : well groomed [General Appearance - Well Nourished] : well nourished [No Deformities] : no deformities [General Appearance - In No Acute Distress] : no acute distress [Normal Conjunctiva] : the conjunctiva exhibited no abnormalities [Eyelids - No Xanthelasma] : the eyelids demonstrated no xanthelasmas [Normal Oral Mucosa] : normal oral mucosa [No Oral Pallor] : no oral pallor [No Oral Cyanosis] : no oral cyanosis [Normal Jugular Venous A Waves Present] : normal jugular venous A waves present [Normal Jugular Venous V Waves Present] : normal jugular venous V waves present [No Jugular Venous Mei A Waves] : no jugular venous mei A waves [Heart Rate And Rhythm] : heart rate and rhythm were normal [Heart Sounds] : normal S1 and S2 [Murmurs] : no murmurs present [Respiration, Rhythm And Depth] : normal respiratory rhythm and effort [Exaggerated Use Of Accessory Muscles For Inspiration] : no accessory muscle use [Auscultation Breath Sounds / Voice Sounds] : lungs were clear to auscultation bilaterally [Abdomen Soft] : soft [Abdomen Tenderness] : non-tender [Abdomen Mass (___ Cm)] : no abdominal mass palpated [Abnormal Walk] : normal gait [Gait - Sufficient For Exercise Testing] : the gait was sufficient for exercise testing [Nail Clubbing] : no clubbing of the fingernails [Cyanosis, Localized] : no localized cyanosis [Petechial Hemorrhages (___cm)] : no petechial hemorrhages [Skin Color & Pigmentation] : normal skin color and pigmentation [] : no rash [No Venous Stasis] : no venous stasis [Skin Lesions] : no skin lesions [No Skin Ulcers] : no skin ulcer [No Xanthoma] : no  xanthoma was observed [Oriented To Time, Place, And Person] : oriented to person, place, and time [Affect] : the affect was normal [Mood] : the mood was normal [No Anxiety] : not feeling anxious

## 2019-11-06 NOTE — HISTORY OF PRESENT ILLNESS
[FreeTextEntry1] : a fib\par cad\par chf\par sob class 3\par chest pain class 1\par recently hospitalized on [facility] hospital for congestive heart failure.\par no weight gain\par weighs approx. 229 lbs.\par \par He underwent cardiac catheterization which revealed an ejection fraction of 45-50%\par No significant coronary artery disease.\par  No significant valvular heart disease.\par \par patient c/o marked sob class 3\par on entresto\par paient much improved on entresto\par no chf\par no edema\par less sob\par no weight gain \par decrease lasix to bid\par \par patient is in slow a fib \par probably has sinus and av rika dysfunction\par since his last visit, the patient has stopped his beta blocker\par today, his ekg still shows slow a fib\par denies pre-syncope, syncope or lightheadness\par 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

## 2019-12-11 NOTE — DISCHARGE NOTE ADULT - NS MD DC PLAN IMMU FLU REF OTH
Detail Level: Zone Tetracycline Pregnancy And Lactation Text: This medication is Pregnancy Category D and not consider safe during pregnancy. It is also excreted in breast milk. Isotretinoin Pregnancy And Lactation Text: This medication is Pregnancy Category X and is considered extremely dangerous during pregnancy. It is unknown if it is excreted in breast milk. Doxycycline Pregnancy And Lactation Text: This medication is Pregnancy Category D and not consider safe during pregnancy. It is also excreted in breast milk but is considered safe for shorter treatment courses. Topical Clindamycin Pregnancy And Lactation Text: This medication is Pregnancy Category B and is considered safe during pregnancy. It is unknown if it is excreted in breast milk. Azithromycin Counseling:  I discussed with the patient the risks of azithromycin including but not limited to GI upset, allergic reaction, drug rash, diarrhea, and yeast infections. Use Enhanced Medication Counseling?: No Topical Retinoid Pregnancy And Lactation Text: This medication is Pregnancy Category C. It is unknown if this medication is excreted in breast milk. Doxycycline Counseling:  Patient counseled regarding possible photosensitivity and increased risk for sunburn. Patient instructed to avoid sunlight, if possible. When exposed to sunlight, patients should wear protective clothing, sunglasses, and sunscreen. The patient was instructed to call the office immediately if the following severe adverse effects occur:  hearing changes, easy bruising/bleeding, severe headache, or vision changes. The patient verbalized understanding of the proper use and possible adverse effects of doxycycline. All of the patient's questions and concerns were addressed. High Dose Vitamin A Counseling: Side effects reviewed, pt to contact office should one occur. Spironolactone Counseling: Patient advised regarding risks of diarrhea, abdominal pain, hyperkalemia, birth defects (for female patients), liver toxicity and renal toxicity. The patient may need blood work to monitor liver and kidney function and potassium levels while on therapy. The patient verbalized understanding of the proper use and possible adverse effects of spironolactone. All of the patient's questions and concerns were addressed. Benzoyl Peroxide Counseling: Patient counseled that medicine may cause skin irritation and bleach clothing. In the event of skin irritation, the patient was advised to reduce the amount of the drug applied or use it less frequently. The patient verbalized understanding of the proper use and possible adverse effects of benzoyl peroxide. All of the patient's questions and concerns were addressed. Tazorac Counseling:  Patient advised that medication is irritating and drying. Patient may need to apply sparingly and wash off after an hour before eventually leaving it on overnight. The patient verbalized understanding of the proper use and possible adverse effects of tazorac. All of the patient's questions and concerns were addressed. Topical Sulfur Applications Counseling: Topical Sulfur Counseling: Patient counseled that this medication may cause skin irritation or allergic reactions. In the event of skin irritation, the patient was advised to reduce the amount of the drug applied or use it less frequently. The patient verbalized understanding of the proper use and possible adverse effects of topical sulfur application. All of the patient's questions and concerns were addressed. Azithromycin Pregnancy And Lactation Text: This medication is considered safe during pregnancy and is also secreted in breast milk. Birth Control Pills Pregnancy And Lactation Text: This medication should be avoided if pregnant and for the first 30 days post-partum. Benzoyl Peroxide Pregnancy And Lactation Text: This medication is Pregnancy Category C. It is unknown if benzoyl peroxide is excreted in breast milk. Dapsone Pregnancy And Lactation Text: This medication is Pregnancy Category C and is not considered safe during pregnancy or breast feeding. Erythromycin Pregnancy And Lactation Text: This medication is Pregnancy Category B and is considered safe during pregnancy. It is also excreted in breast milk. Topical Sulfur Applications Pregnancy And Lactation Text: This medication is Pregnancy Category C and has an unknown safety profile during pregnancy. It is unknown if this topical medication is excreted in breast milk. Tazorac Pregnancy And Lactation Text: This medication is not safe during pregnancy. It is unknown if this medication is excreted in breast milk. Spironolactone Pregnancy And Lactation Text: This medication can cause feminization of the male fetus and should be avoided during pregnancy. The active metabolite is also found in breast milk. Bactrim Counseling:  I discussed with the patient the risks of sulfa antibiotics including but not limited to GI upset, allergic reaction, drug rash, diarrhea, dizziness, photosensitivity, and yeast infections. Rarely, more serious reactions can occur including but not limited to aplastic anemia, agranulocytosis, methemoglobinemia, blood dyscrasias, liver or kidney failure, lung infiltrates or desquamative/blistering drug rashes. Erythromycin Counseling:  I discussed with the patient the risks of erythromycin including but not limited to GI upset, allergic reaction, drug rash, diarrhea, increase in liver enzymes, and yeast infections. Dapsone Counseling: I discussed with the patient the risks of dapsone including but not limited to hemolytic anemia, agranulocytosis, rashes, methemoglobinemia, kidney failure, peripheral neuropathy, headaches, GI upset, and liver toxicity. Patients who start dapsone require monitoring including baseline LFTs and weekly CBCs for the first month, then every month thereafter. The patient verbalized understanding of the proper use and possible adverse effects of dapsone. All of the patient's questions and concerns were addressed. Minocycline Counseling: Patient advised regarding possible photosensitivity and discoloration of the teeth, skin, lips, tongue and gums. Patient instructed to avoid sunlight, if possible. When exposed to sunlight, patients should wear protective clothing, sunglasses, and sunscreen. The patient was instructed to call the office immediately if the following severe adverse effects occur:  hearing changes, easy bruising/bleeding, severe headache, or vision changes. The patient verbalized understanding of the proper use and possible adverse effects of minocycline. All of the patient's questions and concerns were addressed. Isotretinoin Counseling: Patient should get monthly blood tests, not donate blood, not drive at night if vision affected, not share medication, and not undergo elective surgery for 6 months after tx completed. Side effects reviewed, pt to contact office should one occur. Tetracycline Counseling: Patient counseled regarding possible photosensitivity and increased risk for sunburn. Patient instructed to avoid sunlight, if possible. When exposed to sunlight, patients should wear protective clothing, sunglasses, and sunscreen. The patient was instructed to call the office immediately if the following severe adverse effects occur:  hearing changes, easy bruising/bleeding, severe headache, or vision changes. The patient verbalized understanding of the proper use and possible adverse effects of tetracycline. All of the patient's questions and concerns were addressed. Patient understands to avoid pregnancy while on therapy due to potential birth defects. Birth Control Pills Counseling: Birth Control Pill Counseling: I discussed with the patient the potential side effects of OCPs including but not limited to increased risk of stroke, heart attack, thrombophlebitis, deep venous thrombosis, hepatic adenomas, breast changes, GI upset, headaches, and depression. The patient verbalized understanding of the proper use and possible adverse effects of OCPs. All of the patient's questions and concerns were addressed. Topical Clindamycin Counseling: Patient counseled that this medication may cause skin irritation or allergic reactions. In the event of skin irritation, the patient was advised to reduce the amount of the drug applied or use it less frequently. The patient verbalized understanding of the proper use and possible adverse effects of clindamycin. All of the patient's questions and concerns were addressed. Topical Retinoid counseling:  Patient advised to apply a pea-sized amount only at bedtime and wait 30 minutes after washing their face before applying. If too drying, patient may add a non-comedogenic moisturizer. The patient verbalized understanding of the proper use and possible adverse effects of retinoids. All of the patient's questions and concerns were addressed. High Dose Vitamin A Pregnancy And Lactation Text: High dose vitamin A therapy is contraindicated during pregnancy and breast feeding. Bactrim Pregnancy And Lactation Text: This medication is Pregnancy Category D and is known to cause fetal risk. It is also excreted in breast milk. Out of season

## 2019-12-16 ENCOUNTER — RX RENEWAL (OUTPATIENT)
Age: 79
End: 2019-12-16

## 2019-12-27 ENCOUNTER — OUTPATIENT (OUTPATIENT)
Dept: OUTPATIENT SERVICES | Facility: HOSPITAL | Age: 79
LOS: 1 days | Discharge: HOME | End: 2019-12-27

## 2019-12-27 DIAGNOSIS — Z96.651 PRESENCE OF RIGHT ARTIFICIAL KNEE JOINT: Chronic | ICD-10-CM

## 2019-12-27 DIAGNOSIS — Z79.01 LONG TERM (CURRENT) USE OF ANTICOAGULANTS: ICD-10-CM

## 2019-12-27 DIAGNOSIS — Z95.5 PRESENCE OF CORONARY ANGIOPLASTY IMPLANT AND GRAFT: Chronic | ICD-10-CM

## 2019-12-27 DIAGNOSIS — I48.91 UNSPECIFIED ATRIAL FIBRILLATION: ICD-10-CM

## 2019-12-27 LAB
POCT INR: 3 RATIO — HIGH (ref 0.9–1.2)
POCT PT: 45.2 SEC — HIGH (ref 10–13.4)

## 2020-01-24 ENCOUNTER — OUTPATIENT (OUTPATIENT)
Dept: OUTPATIENT SERVICES | Facility: HOSPITAL | Age: 80
LOS: 1 days | Discharge: HOME | End: 2020-01-24

## 2020-01-24 DIAGNOSIS — I82.91 CHRONIC EMBOLISM AND THROMBOSIS OF UNSPECIFIED VEIN: ICD-10-CM

## 2020-01-24 DIAGNOSIS — Z95.5 PRESENCE OF CORONARY ANGIOPLASTY IMPLANT AND GRAFT: Chronic | ICD-10-CM

## 2020-01-24 DIAGNOSIS — Z79.01 LONG TERM (CURRENT) USE OF ANTICOAGULANTS: ICD-10-CM

## 2020-01-24 DIAGNOSIS — Z96.651 PRESENCE OF RIGHT ARTIFICIAL KNEE JOINT: Chronic | ICD-10-CM

## 2020-01-24 LAB
POCT INR: 2.7 RATIO — HIGH (ref 0.9–1.2)
POCT PT: 32.2 SEC — HIGH (ref 10–13.4)

## 2020-02-28 ENCOUNTER — OUTPATIENT (OUTPATIENT)
Dept: OUTPATIENT SERVICES | Facility: HOSPITAL | Age: 80
LOS: 1 days | Discharge: HOME | End: 2020-02-28

## 2020-02-28 DIAGNOSIS — Z95.5 PRESENCE OF CORONARY ANGIOPLASTY IMPLANT AND GRAFT: Chronic | ICD-10-CM

## 2020-02-28 DIAGNOSIS — Z79.01 LONG TERM (CURRENT) USE OF ANTICOAGULANTS: ICD-10-CM

## 2020-02-28 DIAGNOSIS — Z96.651 PRESENCE OF RIGHT ARTIFICIAL KNEE JOINT: Chronic | ICD-10-CM

## 2020-02-28 DIAGNOSIS — I48.91 UNSPECIFIED ATRIAL FIBRILLATION: ICD-10-CM

## 2020-02-28 LAB
POCT INR: 2.2 RATIO — HIGH (ref 0.9–1.2)
POCT PT: 26.1 SEC — HIGH (ref 10–13.4)

## 2020-03-03 LAB
INR PPP: 2.2 RATIO
POCT-PROTHROMBIN TIME: 26.1 SECS
QUALITY CONTROL: YES

## 2020-04-10 ENCOUNTER — OUTPATIENT (OUTPATIENT)
Dept: OUTPATIENT SERVICES | Facility: HOSPITAL | Age: 80
LOS: 1 days | Discharge: HOME | End: 2020-04-10

## 2020-04-10 DIAGNOSIS — Z95.5 PRESENCE OF CORONARY ANGIOPLASTY IMPLANT AND GRAFT: Chronic | ICD-10-CM

## 2020-04-10 DIAGNOSIS — Z96.651 PRESENCE OF RIGHT ARTIFICIAL KNEE JOINT: Chronic | ICD-10-CM

## 2020-04-10 DIAGNOSIS — Z79.01 LONG TERM (CURRENT) USE OF ANTICOAGULANTS: ICD-10-CM

## 2020-04-10 DIAGNOSIS — I48.91 UNSPECIFIED ATRIAL FIBRILLATION: ICD-10-CM

## 2020-04-10 LAB
POCT INR: 3 RATIO — HIGH (ref 0.9–1.2)
POCT PT: 36.4 SEC — HIGH (ref 10–13.4)

## 2020-04-13 LAB
INR PPP: 3 RATIO
POCT-PROTHROMBIN TIME: 36.4 SECS
QUALITY CONTROL: YES

## 2020-05-13 ENCOUNTER — APPOINTMENT (OUTPATIENT)
Dept: CARDIOLOGY | Facility: CLINIC | Age: 80
End: 2020-05-13
Payer: MEDICARE

## 2020-05-13 PROCEDURE — 99443: CPT

## 2020-05-13 NOTE — HISTORY OF PRESENT ILLNESS
[Verbal consent obtained from patient] : the patient, [unfilled] [FreeTextEntry1] : a fib\par cad\par chf\par sob class 3\par chest pain class 1\par recently hospitalized on [facility] hospital for congestive heart failure.\par no weight gain\par weighs approx. 229 lbs.\par \par He underwent cardiac catheterization which revealed an ejection fraction of 45-50%\par No significant coronary artery disease.\par  No significant valvular heart disease.\par \par patient c/o marked sob class 3\par on entresto\par paient much improved on entresto\par no chf\par no edema\par less sob\par no weight gain \par decrease lasix to bid\par \par patient is in slow a fib \par probably has sinus and av rika dysfunction\par since his last visit, the patient has stopped his beta blocker\par today, his ekg still shows slow a fib\par denies pre-syncope, syncope or lightheadness\par 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 patient offers no new cardiac complaints\par chf is stable\par weight is 236, 6 to 8 lb. weight gain\par no pre-syncope or syncope\par on coumadin for a fib which is controlled\par \par plan : ov in 3 months\par continue same meds\par \par  [Time Spent: ___ minutes] : I have spent [unfilled] minutes with the patient on the telephone

## 2020-05-22 ENCOUNTER — OUTPATIENT (OUTPATIENT)
Dept: OUTPATIENT SERVICES | Facility: HOSPITAL | Age: 80
LOS: 1 days | Discharge: HOME | End: 2020-05-22

## 2020-05-22 DIAGNOSIS — Z79.01 LONG TERM (CURRENT) USE OF ANTICOAGULANTS: ICD-10-CM

## 2020-05-22 DIAGNOSIS — I48.91 UNSPECIFIED ATRIAL FIBRILLATION: ICD-10-CM

## 2020-05-22 DIAGNOSIS — Z95.5 PRESENCE OF CORONARY ANGIOPLASTY IMPLANT AND GRAFT: Chronic | ICD-10-CM

## 2020-05-22 DIAGNOSIS — Z96.651 PRESENCE OF RIGHT ARTIFICIAL KNEE JOINT: Chronic | ICD-10-CM

## 2020-05-22 LAB
INR PPP: 3.8 RATIO
POCT INR: 3.8 RATIO — HIGH (ref 0.9–1.2)
POCT PT: 45.1 SEC — HIGH (ref 10–13.4)
POCT-PROTHROMBIN TIME: 45.1 SECS
QUALITY CONTROL: YES

## 2020-06-26 ENCOUNTER — OUTPATIENT (OUTPATIENT)
Dept: OUTPATIENT SERVICES | Facility: HOSPITAL | Age: 80
LOS: 1 days | Discharge: HOME | End: 2020-06-26

## 2020-06-26 DIAGNOSIS — Z96.651 PRESENCE OF RIGHT ARTIFICIAL KNEE JOINT: Chronic | ICD-10-CM

## 2020-06-26 DIAGNOSIS — I48.91 UNSPECIFIED ATRIAL FIBRILLATION: ICD-10-CM

## 2020-06-26 DIAGNOSIS — Z79.01 LONG TERM (CURRENT) USE OF ANTICOAGULANTS: ICD-10-CM

## 2020-06-26 DIAGNOSIS — Z95.5 PRESENCE OF CORONARY ANGIOPLASTY IMPLANT AND GRAFT: Chronic | ICD-10-CM

## 2020-06-26 LAB
INR PPP: 3.5 RATIO
POCT INR: 3.5 RATIO — HIGH (ref 0.9–1.2)
POCT PT: 41.6 SEC — HIGH (ref 10–13.4)
POCT-PROTHROMBIN TIME: 41.6 SECS
QUALITY CONTROL: YES

## 2020-07-24 ENCOUNTER — OUTPATIENT (OUTPATIENT)
Dept: OUTPATIENT SERVICES | Facility: HOSPITAL | Age: 80
LOS: 1 days | Discharge: HOME | End: 2020-07-24

## 2020-07-24 DIAGNOSIS — Z96.651 PRESENCE OF RIGHT ARTIFICIAL KNEE JOINT: Chronic | ICD-10-CM

## 2020-07-24 DIAGNOSIS — I48.91 UNSPECIFIED ATRIAL FIBRILLATION: ICD-10-CM

## 2020-07-24 DIAGNOSIS — Z79.01 LONG TERM (CURRENT) USE OF ANTICOAGULANTS: ICD-10-CM

## 2020-07-24 DIAGNOSIS — Z95.5 PRESENCE OF CORONARY ANGIOPLASTY IMPLANT AND GRAFT: Chronic | ICD-10-CM

## 2020-07-24 LAB
INR PPP: 4.2 RATIO
POCT INR: 4.2 RATIO — HIGH (ref 0.9–1.2)
POCT PT: 50.5 SEC — HIGH (ref 10–13.4)
POCT-PROTHROMBIN TIME: 50.5 SECS
QUALITY CONTROL: YES

## 2020-07-30 ENCOUNTER — RECORD ABSTRACTING (OUTPATIENT)
Age: 80
End: 2020-07-30

## 2020-07-30 DIAGNOSIS — Z86.79 PERSONAL HISTORY OF OTHER DISEASES OF THE CIRCULATORY SYSTEM: ICD-10-CM

## 2020-08-01 ENCOUNTER — OUTPATIENT (OUTPATIENT)
Dept: OUTPATIENT SERVICES | Facility: HOSPITAL | Age: 80
LOS: 1 days | Discharge: HOME | End: 2020-08-01
Payer: MEDICARE

## 2020-08-01 DIAGNOSIS — Z96.651 PRESENCE OF RIGHT ARTIFICIAL KNEE JOINT: Chronic | ICD-10-CM

## 2020-08-01 DIAGNOSIS — R91.8 OTHER NONSPECIFIC ABNORMAL FINDING OF LUNG FIELD: ICD-10-CM

## 2020-08-01 DIAGNOSIS — Z95.5 PRESENCE OF CORONARY ANGIOPLASTY IMPLANT AND GRAFT: Chronic | ICD-10-CM

## 2020-08-01 PROCEDURE — 71046 X-RAY EXAM CHEST 2 VIEWS: CPT | Mod: 26

## 2020-08-18 ENCOUNTER — APPOINTMENT (OUTPATIENT)
Dept: MEDICATION MANAGEMENT | Facility: CLINIC | Age: 80
End: 2020-08-18

## 2020-08-18 ENCOUNTER — OUTPATIENT (OUTPATIENT)
Dept: OUTPATIENT SERVICES | Facility: HOSPITAL | Age: 80
LOS: 1 days | Discharge: HOME | End: 2020-08-18

## 2020-08-18 VITALS — HEART RATE: 55 BPM | RESPIRATION RATE: 14 BRPM | OXYGEN SATURATION: 92 %

## 2020-08-18 DIAGNOSIS — Z51.81 ENCOUNTER FOR THERAPEUTIC DRUG LVL MONITORING: ICD-10-CM

## 2020-08-18 DIAGNOSIS — Z79.01 LONG TERM (CURRENT) USE OF ANTICOAGULANTS: ICD-10-CM

## 2020-08-18 DIAGNOSIS — I48.91 UNSPECIFIED ATRIAL FIBRILLATION: ICD-10-CM

## 2020-08-18 DIAGNOSIS — Z79.01 ENCOUNTER FOR THERAPEUTIC DRUG LVL MONITORING: ICD-10-CM

## 2020-08-18 DIAGNOSIS — Z96.651 PRESENCE OF RIGHT ARTIFICIAL KNEE JOINT: Chronic | ICD-10-CM

## 2020-08-18 DIAGNOSIS — Z95.5 PRESENCE OF CORONARY ANGIOPLASTY IMPLANT AND GRAFT: Chronic | ICD-10-CM

## 2020-08-18 LAB
INR PPP: 2.4 RATIO
POCT-PROTHROMBIN TIME: 28.3 SECS
QUALITY CONTROL: YES

## 2020-09-09 ENCOUNTER — APPOINTMENT (OUTPATIENT)
Dept: CARDIOLOGY | Facility: CLINIC | Age: 80
End: 2020-09-09
Payer: MEDICARE

## 2020-09-09 VITALS
WEIGHT: 235 LBS | HEART RATE: 71 BPM | SYSTOLIC BLOOD PRESSURE: 140 MMHG | HEIGHT: 75 IN | TEMPERATURE: 97.1 F | BODY MASS INDEX: 29.22 KG/M2 | DIASTOLIC BLOOD PRESSURE: 60 MMHG

## 2020-09-09 PROCEDURE — 93000 ELECTROCARDIOGRAM COMPLETE: CPT

## 2020-09-09 PROCEDURE — 99214 OFFICE O/P EST MOD 30 MIN: CPT

## 2020-09-09 NOTE — HISTORY OF PRESENT ILLNESS
[FreeTextEntry1] : a fib\par cad\par chf\par sob class 3\par chest pain class 1\par recently hospitalized on [facility] hospital for congestive heart failure.\par no weight gain\par weighs approx. 229 lbs.\par \par He underwent cardiac catheterization which revealed an ejection fraction of 45-50%\par No significant coronary artery disease.\par  No significant valvular heart disease.\par \par patient c/o marked sob class 3\par on entresto\par paient much improved on entresto\par no chf\par no edema\par less sob\par no weight gain \par decrease lasix to bid\par \par patient is in slow a fib \par probably has sinus and av rika dysfunction\par since his last visit, the patient has stopped his beta blocker\par today, his ekg still shows slow a fib\par denies pre-syncope, syncope or lightheadness\par 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 patient offers no new cardiac complaints\par chf is stable\par weight is 236, 6 to 8 lb. weight gain\par no pre-syncope or syncope\par on coumadin for a fib which is controlled\par \par plan : ov in 3 months\par continue same meds\par \par  [Verbal consent obtained from patient] : the patient, [unfilled] [Time Spent: ___ minutes] : I have spent [unfilled] minutes with the patient on the telephone

## 2020-09-09 NOTE — PHYSICAL EXAM
[General Appearance - Well Developed] : well developed [Normal Appearance] : normal appearance [Well Groomed] : well groomed [General Appearance - In No Acute Distress] : no acute distress [General Appearance - Well Nourished] : well nourished [No Deformities] : no deformities [Normal Conjunctiva] : the conjunctiva exhibited no abnormalities [Eyelids - No Xanthelasma] : the eyelids demonstrated no xanthelasmas [Normal Oral Mucosa] : normal oral mucosa [No Oral Pallor] : no oral pallor [Normal Jugular Venous V Waves Present] : normal jugular venous V waves present [Normal Jugular Venous A Waves Present] : normal jugular venous A waves present [No Oral Cyanosis] : no oral cyanosis [No Jugular Venous Mei A Waves] : no jugular venous mei A waves [Respiration, Rhythm And Depth] : normal respiratory rhythm and effort [Exaggerated Use Of Accessory Muscles For Inspiration] : no accessory muscle use [Auscultation Breath Sounds / Voice Sounds] : lungs were clear to auscultation bilaterally [Heart Rate And Rhythm] : heart rate and rhythm were normal [Heart Sounds] : normal S1 and S2 [Abdomen Soft] : soft [Murmurs] : no murmurs present [Abdomen Tenderness] : non-tender [Abdomen Mass (___ Cm)] : no abdominal mass palpated [Gait - Sufficient For Exercise Testing] : the gait was sufficient for exercise testing [Abnormal Walk] : normal gait [Cyanosis, Localized] : no localized cyanosis [Nail Clubbing] : no clubbing of the fingernails [Petechial Hemorrhages (___cm)] : no petechial hemorrhages [Skin Color & Pigmentation] : normal skin color and pigmentation [] : no rash [No Venous Stasis] : no venous stasis [Skin Lesions] : no skin lesions [No Xanthoma] : no  xanthoma was observed [No Skin Ulcers] : no skin ulcer

## 2020-09-15 ENCOUNTER — OUTPATIENT (OUTPATIENT)
Dept: OUTPATIENT SERVICES | Facility: HOSPITAL | Age: 80
LOS: 1 days | Discharge: HOME | End: 2020-09-15

## 2020-09-15 ENCOUNTER — APPOINTMENT (OUTPATIENT)
Dept: MEDICATION MANAGEMENT | Facility: CLINIC | Age: 80
End: 2020-09-15

## 2020-09-15 VITALS — HEART RATE: 64 BPM | RESPIRATION RATE: 14 BRPM | OXYGEN SATURATION: 94 %

## 2020-09-15 DIAGNOSIS — Z86.39 PERSONAL HISTORY OF OTHER ENDOCRINE, NUTRITIONAL AND METABOLIC DISEASE: ICD-10-CM

## 2020-09-15 DIAGNOSIS — Z96.651 PRESENCE OF RIGHT ARTIFICIAL KNEE JOINT: Chronic | ICD-10-CM

## 2020-09-15 DIAGNOSIS — Z95.5 PRESENCE OF CORONARY ANGIOPLASTY IMPLANT AND GRAFT: Chronic | ICD-10-CM

## 2020-09-15 DIAGNOSIS — Z79.01 LONG TERM (CURRENT) USE OF ANTICOAGULANTS: ICD-10-CM

## 2020-09-15 DIAGNOSIS — I48.91 UNSPECIFIED ATRIAL FIBRILLATION: ICD-10-CM

## 2020-09-15 LAB
INR PPP: 3.1 RATIO
POCT-PROTHROMBIN TIME: 36.8 SECS
QUALITY CONTROL: YES

## 2020-09-29 ENCOUNTER — RX RENEWAL (OUTPATIENT)
Age: 80
End: 2020-09-29

## 2020-09-29 RX ORDER — WARFARIN 4 MG/1
4 TABLET ORAL
Qty: 90 | Refills: 3 | Status: ACTIVE | COMMUNITY
Start: 2017-12-14 | End: 1900-01-01

## 2020-10-20 ENCOUNTER — APPOINTMENT (OUTPATIENT)
Dept: MEDICATION MANAGEMENT | Facility: CLINIC | Age: 80
End: 2020-10-20

## 2020-10-20 ENCOUNTER — OUTPATIENT (OUTPATIENT)
Dept: OUTPATIENT SERVICES | Facility: HOSPITAL | Age: 80
LOS: 1 days | Discharge: HOME | End: 2020-10-20

## 2020-10-20 VITALS — HEART RATE: 64 BPM | RESPIRATION RATE: 14 BRPM | OXYGEN SATURATION: 85 %

## 2020-10-20 DIAGNOSIS — Z96.651 PRESENCE OF RIGHT ARTIFICIAL KNEE JOINT: Chronic | ICD-10-CM

## 2020-10-20 DIAGNOSIS — Z79.01 LONG TERM (CURRENT) USE OF ANTICOAGULANTS: ICD-10-CM

## 2020-10-20 DIAGNOSIS — Z95.5 PRESENCE OF CORONARY ANGIOPLASTY IMPLANT AND GRAFT: Chronic | ICD-10-CM

## 2020-10-20 DIAGNOSIS — I48.91 UNSPECIFIED ATRIAL FIBRILLATION: ICD-10-CM

## 2020-10-20 LAB
INR PPP: 3.7 RATIO
POCT-PROTHROMBIN TIME: 44.4 SECS
QUALITY CONTROL: YES

## 2020-10-27 ENCOUNTER — TRANSCRIPTION ENCOUNTER (OUTPATIENT)
Age: 80
End: 2020-10-27

## 2020-10-27 ENCOUNTER — EMERGENCY (EMERGENCY)
Facility: HOSPITAL | Age: 80
LOS: 0 days | Discharge: HOME | End: 2020-10-27
Attending: EMERGENCY MEDICINE | Admitting: EMERGENCY MEDICINE
Payer: MEDICARE

## 2020-10-27 VITALS
SYSTOLIC BLOOD PRESSURE: 131 MMHG | HEART RATE: 97 BPM | RESPIRATION RATE: 18 BRPM | OXYGEN SATURATION: 97 % | DIASTOLIC BLOOD PRESSURE: 102 MMHG | TEMPERATURE: 96 F

## 2020-10-27 VITALS — HEIGHT: 75 IN

## 2020-10-27 DIAGNOSIS — Z95.5 PRESENCE OF CORONARY ANGIOPLASTY IMPLANT AND GRAFT: Chronic | ICD-10-CM

## 2020-10-27 DIAGNOSIS — I50.9 HEART FAILURE, UNSPECIFIED: ICD-10-CM

## 2020-10-27 DIAGNOSIS — I48.91 UNSPECIFIED ATRIAL FIBRILLATION: ICD-10-CM

## 2020-10-27 DIAGNOSIS — I11.0 HYPERTENSIVE HEART DISEASE WITH HEART FAILURE: ICD-10-CM

## 2020-10-27 DIAGNOSIS — E78.5 HYPERLIPIDEMIA, UNSPECIFIED: ICD-10-CM

## 2020-10-27 DIAGNOSIS — Z96.651 PRESENCE OF RIGHT ARTIFICIAL KNEE JOINT: Chronic | ICD-10-CM

## 2020-10-27 DIAGNOSIS — U07.1 COVID-19: ICD-10-CM

## 2020-10-27 DIAGNOSIS — Z87.891 PERSONAL HISTORY OF NICOTINE DEPENDENCE: ICD-10-CM

## 2020-10-27 PROCEDURE — 99284 EMERGENCY DEPT VISIT MOD MDM: CPT

## 2020-10-27 PROCEDURE — 71045 X-RAY EXAM CHEST 1 VIEW: CPT | Mod: 26

## 2020-10-27 NOTE — ED PROVIDER NOTE - OBJECTIVE STATEMENT
Patient is a 81 yo M w/ hx of COPD on 2L home O2 as needed, CHF, HLD, a-fib on coumadin, DVT, HTN, former smoker, baseline pulse ox around 91-92% on RA at rest referred to ED from [facility] as patient tested positive for COVID this AM. Patient denies change of SOB, chest pain, cough, fever. Patient states he feels fine, only got tested as his son was COVID+ 1 week prior. Has pulse ox and oxygen at home.

## 2020-10-27 NOTE — ED PROVIDER NOTE - CLINICAL SUMMARY MEDICAL DECISION MAKING FREE TEXT BOX
81 yo M PMH COPD on 2L home O2 as needed, CHF, HLD, A fib on coumadin, DVT, HTN, former smoker, baseline pulse ox around 91-92% on RA at rest [place] [facility] [place] tested positive for COVID this AM. + COVID exposure, which is why he was tested. Patient feels fine and denies change of SOB, chest pain, cough, fever. Patient states he feels fine, only got tested as his son was COVID+ 1 week prior. Has pulse ox and oxygen at home.    On exam, VS reviewed. O2 sat my entire time in the room was 97-98%. Lung exam clear.  CXR shows no acute infiltrates. Patient has no signs of fluid overload.  COVID education discussed in detail.  Patient knows he is higher risk for decompensation and severe COVID. He will continue to closely monitor his symptoms and his oxygen saturation at home.    Patient is a good candidate to attempt outpatient management. Supportive care and home care discussed in detail. Patient aware they may have to return for re-evaluation and possible admission if outpatient treatment fails. Strict return precautions discussed.

## 2020-10-27 NOTE — ED ADULT TRIAGE NOTE - CHIEF COMPLAINT QUOTE
Pt swabbed positive today for covid, sent in by PMD for further eval, pt denies cp/sob/cough/fever/chills

## 2020-10-27 NOTE — ED PROVIDER NOTE - PHYSICAL EXAMINATION
CONSTITUTIONAL: Well-developed; well-nourished; in no acute distress.   SKIN: warm, dry.  HEAD: Normocephalic; atraumatic.  EYES: PERRL, EOMI, no conjunctival erythema.  ENT: No nasal discharge; airway clear.  NECK: Supple; non tender.  CARD: S1, S2 normal; no murmurs, gallops, or rubs. irregular.   RESP: normal work of breathing; bibasilar crackles. 94% O2 sat on RA at rest; with exertion, lowest saturation 90%.   ABD: soft ntnd.  EXT: Normal ROM.  1+ pitting edema to the LEs bilaterally.   NEURO: Alert, oriented, grossly unremarkable.  PSYCH: Cooperative, appropriate.

## 2020-10-27 NOTE — ED PROVIDER NOTE - CARE PROVIDER_API CALL
John Page  [address] [place] [address] [place] [id number]  Phone: [phone]  Fax: [phone]  Follow Up Time: 1-3 Days

## 2020-10-27 NOTE — ED PROVIDER NOTE - NS ED ROS FT
Constitutional: No fevers.   Eyes:  No visual changes, eye pain or discharge.  ENMT:  No sore throat or runny nose.  Cardiac:  No chest pain, SOB or edema.   Respiratory:  hx of COPD.   GI:  No nausea, vomiting, diarrhea or abdominal pain.  :  No dysuria, frequency or burning.  MS:  No myalgia, muscle weakness, joint pain or back pain.  Neuro:  No headache or weakness.  No LOC.  Skin:  No skin rash.   Endocrine: No history of thyroid disease or diabetes.

## 2020-10-27 NOTE — ED PROVIDER NOTE - PATIENT PORTAL LINK FT
You can access the FollowMyHealth Patient Portal offered by  by registering at the following website: [email or web address]. By joining [facility]’s FollowMyHealth portal, you will also be able to view your health information using other applications (apps) compatible with our system.

## 2020-10-27 NOTE — ED PROVIDER NOTE - ATTENDING CONTRIBUTION TO CARE
I personally evaluated patient. I agree with the findings and plan with all documentation on chart except as documented  in my note.    79 yo M PMH COPD on 2L home O2 as needed, CHF, HLD, A fib on coumadin, DVT, HTN, former smoker, baseline pulse ox around 91-92% on RA at rest referred to ED from [facility] as patient tested positive for COVID this AM. + COVID exposure, which is why he was tested. Patient feels fine and denies change of SOB, chest pain, cough, fever. Patient states he feels fine, only got tested as his son was COVID+ 1 week prior. Has pulse ox and oxygen at home.    On exam, VS reviewed. O2 sat my entire time in the room was 97-98%. Lung exam clear.  CXR shows no acute infiltrates. Patient has no signs of fluid overload.  COVID education discussed in detail.  Patient knows he is higher risk for decompensation and severe COVID. He will continue to closely monitor his symptoms and his oxygen saturation at home.    Patient is a good candidate to attempt outpatient management. Supportive care and home care discussed in detail. Patient aware they may have to return for re-evaluation and possible admission if outpatient treatment fails. Strict return precautions discussed.    Full DC instructions discussed and patient knows when to seek immediate medical attention.  Patient has proper follow up.  All results discussed and patient aware they may require further work up.  Proper follow up ensured. Limitations of ED work up discussed.  Medications administered and prescribed/OTC home meds discussed.  All questions and concerns from patient or family addressed. Understanding of instructions verbalized.

## 2020-10-27 NOTE — ED PROVIDER NOTE - PROGRESS NOTE DETAILS
Strict return precautions discussed with patient including desaturation below 90% on RA, fever, cough, chest pain, new/concerning symptoms. Patient prefers to go home and understands the need to return to ED if symptoms develop. Has a pulse ox and oxygen at home. Need to self-quarantine discussed in detail. -DC

## 2020-10-27 NOTE — ED PROVIDER NOTE - NSFOLLOWUPINSTRUCTIONS_ED_ALL_ED_FT
Novel Coronavirus (COVID-19)  The Facts  What is a coronavirus?  Coronaviruses are a large family of viruses that cause illnesses ranging from the common cold  to more severe diseases such as Middle East Respiratory Syndrome (MERS) and Severe Acute  Respiratory Syndrome (SARS).  What is Novel Coronavirus (COVID-19)?  COVID-19 is a new strain of Coronavirus that has not been previously identified in humans. COVID-19  was identified in [place], [place], [place] in December 2019 (COVID-19). COVID-19 has  since been identified outside of [place], in a growing number of countries internationally, including  the [place].  Where can I find the most recent information about COVID-19?  The Centers for Disease Control and Prevention (CDC) is closely monitoring the outbreak caused by the  COVID-19. For the latest information about COVID- 19, visit the CDC website at  https://www.cdc.gov/coronavirus/index.html  How are coronaviruses spread?  Coronaviruses can be transmitted from person-to- person, usually after close contact with an infected person,  for example, in a household, workplace, or healthcare setting via droplets that become airborne after a cough  or sneeze by an affected person. These droplets can then infect a nearby person. It is likely transmission also  occurs by touching recently contaminated surfaces.  What are the symptoms of coronavirus infection?  It depends on the virus, but common signs include fever and/or respiratory symptoms such as  cough and shortness of breath. In more severe cases, infection can cause pneumonia, severe acute  respiratory syndrome, kidney failure and even death. Fortunately, most cases of COVID-19 have an  illness no different than the influenza “flu”. With a majority of these patients having mild symptoms  and overall mortality which appears to be not much different than the flu.  Is there a treatment for a COVID-19?  There is no specific treatment for disease caused by COVID-19. However, many of the symptoms can  be treated based on the patient’s clinical condition. Supportive care for infected persons can be highly  effective.  What can I do to protect myself?  Washing your hands, covering your cough, and disinfecting surfaces are the best precautionary  measures. It is also advisable to avoid close contact with anyone showing symptoms of respiratory  illness such as coughing and sneezing. Those with symptoms should wear a surgical mask when  around others.  What can I do to protect those around me?  If you have been identified as someone who may be infected with COVID-19, we recommend you  follow the self-isolation procedures outlined below to protect those around you and limit the spread  of this virus.   March 3, 2020  Recommendations for Patients Advised to Self-Isolate  for Possible COVID-19 Exposure  We recommend the below precautionary steps from now until 14 days from when you  returned from your travel or date of your last known possible contact:  - Do not go to work, school, or public areas. Avoid using public transportation, ride-sharing, or  taxis.  - As much as possible, separate yourself from other people in your home. If you can, you should  stay in a room and away from other people in your home. Also, you should use a separate  bathroom, if available.  - Wear the supplied mask whenever you are around other people.  - If you have a non-urgent medical appointment, please reschedule for a later date. If the  appointment is urgent, please call the healthcare provider and tell them that you are on selfisolation for possible COVID-19. This will help the healthcare provider’s office take steps to keep  other people from getting infected or exposed. If you can reschedule routine appointments, do  so.  - Wash your hands often with soap and water for at least 15 to 20 seconds or clean your hands  with an alcohol-based hand  that contains 60 to 95% alcohol, covering all surfaces of  your hands and rubbing them together until they feel dry. Soap and water should be used  preferentially if hands are visibly dirty.  - Cover your mouth and nose with a tissue when you cough or sneeze. Throw used tissues in a  lined trash can; immediately wash your hands.  - Avoid touching your eyes, nose, and mouth with your hands.  - Avoid sharing personal household items. You should not share dishes, drinking glasses, cups,  eating utensils, towels, or bedding with other people or pets in your home. After using these  items, they should be washed thoroughly with soap and water.  - Clean and disinfect all “high-touch” surfaces every day. High touch surfaces include counters,  tabletops, doorknobs, light switches, remote controls, bathroom fixtures, toilets, phones,  keyboards, tablets, and bedside tables. Also, clean any surfaces that may have blood, stool, or  body fluids on them.       If you develop worsening symptoms:  - If you develop worsening symptoms, such as severe shortness of breath, please call [phone] option #9. They will assist you in determining your next steps.  During your time on self-isolation do the following:  - Work from home if you are able to so.  - Limit social isolation by talking with friends and family on the phone or with face-time  - Talk with friends and relatives who don’t live with you about supporting each other if one  household has to be quarantined. For example, agree to drop groceries or other supplies at the  front door.  - Exercise and spend time outdoors away from others if able to do so.    Why didn’t I get tested for novel coronavirus (COVID-19)?  The number of available tests is very limited so strict rules exist for who is allowed to be tested.  [facility] has been authorized to perform testing and is currently working hard to be  able to start providing the test. Such testing is currently reserved for patients who have had  contact with someone infected with the virus, or those who are very sick a plus those who have  traveled to areas identified by the Centers for Disease Control and Prevention (CD) and will  require hospitalization.  What should I do now?  If you are well enough to be discharged home and are not in a high risk group to have  contracted the COVID-10, you should care for yourself at home exactly like you would if you  have Influenza “flu”. Follow all the standard guidelines about washing your hands, covering  your cough, etc.  You should return to the Emergency Department if you develop worse symptoms, trouble  breathing, chest pain, and/or a fever that doesn’t improve with over the counter  acetaminophen or ibuprofen.

## 2020-11-11 ENCOUNTER — TRANSCRIPTION ENCOUNTER (OUTPATIENT)
Age: 80
End: 2020-11-11

## 2020-11-17 ENCOUNTER — OUTPATIENT (OUTPATIENT)
Dept: OUTPATIENT SERVICES | Facility: HOSPITAL | Age: 80
LOS: 1 days | Discharge: HOME | End: 2020-11-17

## 2020-11-17 ENCOUNTER — APPOINTMENT (OUTPATIENT)
Dept: MEDICATION MANAGEMENT | Facility: CLINIC | Age: 80
End: 2020-11-17

## 2020-11-17 VITALS — RESPIRATION RATE: 14 BRPM | OXYGEN SATURATION: 94 % | HEART RATE: 66 BPM

## 2020-11-17 DIAGNOSIS — Z96.651 PRESENCE OF RIGHT ARTIFICIAL KNEE JOINT: Chronic | ICD-10-CM

## 2020-11-17 DIAGNOSIS — Z95.5 PRESENCE OF CORONARY ANGIOPLASTY IMPLANT AND GRAFT: Chronic | ICD-10-CM

## 2020-11-17 DIAGNOSIS — Z79.01 LONG TERM (CURRENT) USE OF ANTICOAGULANTS: ICD-10-CM

## 2020-11-17 DIAGNOSIS — I48.91 UNSPECIFIED ATRIAL FIBRILLATION: ICD-10-CM

## 2020-11-17 LAB
INR PPP: 3.7 RATIO
POCT-PROTHROMBIN TIME: 44.4 SECS
QUALITY CONTROL: YES

## 2020-11-17 RX ORDER — WARFARIN 1 MG/1
1 TABLET ORAL
Qty: 180 | Refills: 3 | Status: ACTIVE | COMMUNITY
Start: 1900-01-01 | End: 1900-01-01

## 2020-12-07 RX ORDER — SACUBITRIL AND VALSARTAN 24; 26 MG/1; MG/1
24-26 TABLET, FILM COATED ORAL TWICE DAILY
Qty: 180 | Refills: 3 | Status: ACTIVE | COMMUNITY
Start: 2018-04-04 | End: 1900-01-01

## 2020-12-15 ENCOUNTER — APPOINTMENT (OUTPATIENT)
Dept: MEDICATION MANAGEMENT | Facility: CLINIC | Age: 80
End: 2020-12-15

## 2020-12-15 ENCOUNTER — OUTPATIENT (OUTPATIENT)
Dept: OUTPATIENT SERVICES | Facility: HOSPITAL | Age: 80
LOS: 1 days | Discharge: HOME | End: 2020-12-15

## 2020-12-15 VITALS — RESPIRATION RATE: 14 BRPM | OXYGEN SATURATION: 90 % | HEART RATE: 56 BPM

## 2020-12-15 DIAGNOSIS — Z95.5 PRESENCE OF CORONARY ANGIOPLASTY IMPLANT AND GRAFT: Chronic | ICD-10-CM

## 2020-12-15 DIAGNOSIS — Z79.01 LONG TERM (CURRENT) USE OF ANTICOAGULANTS: ICD-10-CM

## 2020-12-15 DIAGNOSIS — I48.91 UNSPECIFIED ATRIAL FIBRILLATION: ICD-10-CM

## 2020-12-15 DIAGNOSIS — Z96.651 PRESENCE OF RIGHT ARTIFICIAL KNEE JOINT: Chronic | ICD-10-CM

## 2020-12-15 LAB
INR PPP: 2 RATIO
POCT-PROTHROMBIN TIME: 24.1 SECS
QUALITY CONTROL: YES

## 2020-12-18 NOTE — ED PROVIDER NOTE - NS ED MD DISPO DISCHARGE CCDA
Patient/Caregiver provided printed discharge information. Arrives from home for worsening SOB with minimal activity, is COVID + has had symptoms for 10 days. Denies other PMH

## 2021-01-12 ENCOUNTER — APPOINTMENT (OUTPATIENT)
Dept: MEDICATION MANAGEMENT | Facility: CLINIC | Age: 81
End: 2021-01-12

## 2021-01-12 ENCOUNTER — OUTPATIENT (OUTPATIENT)
Dept: OUTPATIENT SERVICES | Facility: HOSPITAL | Age: 81
LOS: 1 days | Discharge: HOME | End: 2021-01-12

## 2021-01-12 VITALS — RESPIRATION RATE: 14 BRPM

## 2021-01-12 DIAGNOSIS — I48.91 UNSPECIFIED ATRIAL FIBRILLATION: ICD-10-CM

## 2021-01-12 DIAGNOSIS — Z79.01 LONG TERM (CURRENT) USE OF ANTICOAGULANTS: ICD-10-CM

## 2021-01-12 DIAGNOSIS — Z95.5 PRESENCE OF CORONARY ANGIOPLASTY IMPLANT AND GRAFT: Chronic | ICD-10-CM

## 2021-01-12 DIAGNOSIS — Z96.651 PRESENCE OF RIGHT ARTIFICIAL KNEE JOINT: Chronic | ICD-10-CM

## 2021-01-12 LAB
INR PPP: 2.5 RATIO
POCT-PROTHROMBIN TIME: 29.4 SECS
QUALITY CONTROL: YES

## 2021-01-15 RX ORDER — FUROSEMIDE 40 MG/1
40 TABLET ORAL
Qty: 180 | Refills: 2 | Status: ACTIVE | COMMUNITY
Start: 1900-01-01 | End: 1900-01-01

## 2021-02-08 ENCOUNTER — RX RENEWAL (OUTPATIENT)
Age: 81
End: 2021-02-08

## 2021-02-16 ENCOUNTER — APPOINTMENT (OUTPATIENT)
Dept: MEDICATION MANAGEMENT | Facility: CLINIC | Age: 81
End: 2021-02-16

## 2021-02-16 ENCOUNTER — OUTPATIENT (OUTPATIENT)
Dept: OUTPATIENT SERVICES | Facility: HOSPITAL | Age: 81
LOS: 1 days | Discharge: HOME | End: 2021-02-16

## 2021-02-16 VITALS — HEART RATE: 50 BPM | OXYGEN SATURATION: 90 % | RESPIRATION RATE: 14 BRPM

## 2021-02-16 DIAGNOSIS — Z95.5 PRESENCE OF CORONARY ANGIOPLASTY IMPLANT AND GRAFT: Chronic | ICD-10-CM

## 2021-02-16 DIAGNOSIS — Z79.01 LONG TERM (CURRENT) USE OF ANTICOAGULANTS: ICD-10-CM

## 2021-02-16 DIAGNOSIS — Z96.651 PRESENCE OF RIGHT ARTIFICIAL KNEE JOINT: Chronic | ICD-10-CM

## 2021-02-16 DIAGNOSIS — I48.91 UNSPECIFIED ATRIAL FIBRILLATION: ICD-10-CM

## 2021-02-16 LAB
INR PPP: 2.4 RATIO
POCT-PROTHROMBIN TIME: 28.7 SECS
QUALITY CONTROL: YES

## 2021-03-18 ENCOUNTER — APPOINTMENT (OUTPATIENT)
Dept: CARDIOLOGY | Facility: CLINIC | Age: 81
End: 2021-03-18
Payer: MEDICARE

## 2021-03-18 VITALS
TEMPERATURE: 97.5 F | DIASTOLIC BLOOD PRESSURE: 80 MMHG | BODY MASS INDEX: 30 KG/M2 | HEART RATE: 77 BPM | WEIGHT: 240 LBS | SYSTOLIC BLOOD PRESSURE: 160 MMHG

## 2021-03-18 DIAGNOSIS — I20.9 ANGINA PECTORIS, UNSPECIFIED: ICD-10-CM

## 2021-03-18 DIAGNOSIS — I25.10 ATHEROSCLEROTIC HEART DISEASE OF NATIVE CORONARY ARTERY W/OUT ANGINA PECTORIS: ICD-10-CM

## 2021-03-18 DIAGNOSIS — I48.91 UNSPECIFIED ATRIAL FIBRILLATION: ICD-10-CM

## 2021-03-18 DIAGNOSIS — I50.9 HEART FAILURE, UNSPECIFIED: ICD-10-CM

## 2021-03-18 PROCEDURE — 99214 OFFICE O/P EST MOD 30 MIN: CPT

## 2021-03-18 PROCEDURE — 93000 ELECTROCARDIOGRAM COMPLETE: CPT

## 2021-03-18 PROCEDURE — 99072 ADDL SUPL MATRL&STAF TM PHE: CPT

## 2021-03-18 RX ORDER — COLLAGENASE SANTYL 250 [ARB'U]/G
250 OINTMENT TOPICAL
Qty: 60 | Refills: 0 | Status: DISCONTINUED | COMMUNITY
Start: 2019-10-28 | End: 2021-03-18

## 2021-03-18 RX ORDER — MUPIROCIN 20 MG/G
2 OINTMENT TOPICAL
Qty: 44 | Refills: 0 | Status: DISCONTINUED | COMMUNITY
Start: 2019-07-19 | End: 2021-03-18

## 2021-03-18 RX ORDER — FUROSEMIDE 40 MG/1
40 TABLET ORAL TWICE DAILY
Qty: 180 | Refills: 3 | Status: DISCONTINUED | COMMUNITY
End: 2021-03-18

## 2021-03-18 RX ORDER — AMOXICILLIN AND CLAVULANATE POTASSIUM 875; 125 MG/1; MG/1
875-125 TABLET, COATED ORAL
Qty: 20 | Refills: 0 | Status: DISCONTINUED | COMMUNITY
Start: 2019-08-19 | End: 2021-03-18

## 2021-03-18 RX ORDER — HALOBETASOL PROPIONATE 0.5 MG/G
0.05 OINTMENT TOPICAL
Qty: 60 | Refills: 0 | Status: DISCONTINUED | COMMUNITY
Start: 2019-08-02 | End: 2021-03-18

## 2021-03-18 RX ORDER — ATORVASTATIN CALCIUM 20 MG/1
20 TABLET, FILM COATED ORAL DAILY
Refills: 0 | Status: DISCONTINUED | COMMUNITY
End: 2021-03-18

## 2021-03-18 RX ORDER — TRIAMCINOLONE ACETONIDE 1 MG/G
0.1 OINTMENT TOPICAL
Qty: 90 | Refills: 0 | Status: DISCONTINUED | COMMUNITY
Start: 2019-09-06 | End: 2021-03-18

## 2021-03-18 RX ORDER — MOMETASONE FUROATE 1 MG/G
0.1 CREAM TOPICAL
Qty: 45 | Refills: 0 | Status: DISCONTINUED | COMMUNITY
Start: 2019-10-28 | End: 2021-03-18

## 2021-03-18 NOTE — HISTORY OF PRESENT ILLNESS
[FreeTextEntry1] : a fib\par cad\par chf\par sob class 3\par chest pain class 1\par recently hospitalized on [facility] hospital for congestive heart failure.\par no weight gain\par weighs approx. 229 lbs.\par \par He underwent cardiac catheterization which revealed an ejection fraction of 45-50%\par No significant coronary artery disease.\par  No significant valvular heart disease.\par \par patient c/o marked sob class 3\par on entresto\par paient much improved on entresto\par no chf\par no edema\par less sob\par no weight gain \par decrease lasix to bid\par \par patient is in slow a fib \par probably has sinus and av rika dysfunction\par since his last visit, the patient has stopped his beta blocker\par today, his ekg still shows slow a fib\par denies pre-syncope, syncope or lightheadness\par 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 patient offers no new cardiac complaints\par chf is stable\par weight is 236, 6 to 8 lb. weight gain\par no pre-syncope or syncope\par on coumadin for a fib which is controlled\par \par plan : ov in 3 months\par continue same meds\par \par  [Verbal consent obtained from patient] : the patient, [unfilled] [Time Spent: ___ minutes] : I have spent [unfilled] minutes with the patient on the telephone

## 2021-03-18 NOTE — PHYSICAL EXAM
[General Appearance - Well Developed] : well developed [Normal Appearance] : normal appearance [Well Groomed] : well groomed [General Appearance - Well Nourished] : well nourished [No Deformities] : no deformities [General Appearance - In No Acute Distress] : no acute distress [Normal Conjunctiva] : the conjunctiva exhibited no abnormalities [Eyelids - No Xanthelasma] : the eyelids demonstrated no xanthelasmas [Normal Oral Mucosa] : normal oral mucosa [No Oral Pallor] : no oral pallor [No Oral Cyanosis] : no oral cyanosis [Normal Jugular Venous A Waves Present] : normal jugular venous A waves present [Normal Jugular Venous V Waves Present] : normal jugular venous V waves present [No Jugular Venous Mei A Waves] : no jugular venous mei A waves [Respiration, Rhythm And Depth] : normal respiratory rhythm and effort [Exaggerated Use Of Accessory Muscles For Inspiration] : no accessory muscle use [Auscultation Breath Sounds / Voice Sounds] : lungs were clear to auscultation bilaterally [Heart Rate And Rhythm] : heart rate and rhythm were normal [Heart Sounds] : normal S1 and S2 [Murmurs] : no murmurs present [Abdomen Soft] : soft [Abdomen Tenderness] : non-tender [Abdomen Mass (___ Cm)] : no abdominal mass palpated [Abnormal Walk] : normal gait [Gait - Sufficient For Exercise Testing] : the gait was sufficient for exercise testing [Nail Clubbing] : no clubbing of the fingernails [Cyanosis, Localized] : no localized cyanosis [Petechial Hemorrhages (___cm)] : no petechial hemorrhages [Skin Color & Pigmentation] : normal skin color and pigmentation [] : no rash [No Venous Stasis] : no venous stasis [Skin Lesions] : no skin lesions [No Skin Ulcers] : no skin ulcer [No Xanthoma] : no  xanthoma was observed

## 2021-03-20 ENCOUNTER — TRANSCRIPTION ENCOUNTER (OUTPATIENT)
Age: 81
End: 2021-03-20

## 2021-03-23 ENCOUNTER — APPOINTMENT (OUTPATIENT)
Dept: MEDICATION MANAGEMENT | Facility: CLINIC | Age: 81
End: 2021-03-23

## 2021-03-23 ENCOUNTER — OUTPATIENT (OUTPATIENT)
Dept: OUTPATIENT SERVICES | Facility: HOSPITAL | Age: 81
LOS: 1 days | Discharge: HOME | End: 2021-03-23

## 2021-03-23 VITALS — HEART RATE: 52 BPM | OXYGEN SATURATION: 90 % | RESPIRATION RATE: 14 BRPM

## 2021-03-23 DIAGNOSIS — Z79.01 LONG TERM (CURRENT) USE OF ANTICOAGULANTS: ICD-10-CM

## 2021-03-23 DIAGNOSIS — I48.91 UNSPECIFIED ATRIAL FIBRILLATION: ICD-10-CM

## 2021-03-23 DIAGNOSIS — Z95.5 PRESENCE OF CORONARY ANGIOPLASTY IMPLANT AND GRAFT: Chronic | ICD-10-CM

## 2021-03-23 DIAGNOSIS — Z96.651 PRESENCE OF RIGHT ARTIFICIAL KNEE JOINT: Chronic | ICD-10-CM

## 2021-03-23 LAB
INR PPP: 2.9 RATIO
POCT-PROTHROMBIN TIME: 34.3 SECS
QUALITY CONTROL: YES

## 2021-04-04 ENCOUNTER — INPATIENT (INPATIENT)
Facility: HOSPITAL | Age: 81
LOS: 3 days | Discharge: HOME | End: 2021-04-08
Attending: HOSPITALIST | Admitting: HOSPITALIST
Payer: MEDICARE

## 2021-04-04 VITALS
TEMPERATURE: 98 F | OXYGEN SATURATION: 88 % | DIASTOLIC BLOOD PRESSURE: 58 MMHG | HEIGHT: 75 IN | HEART RATE: 61 BPM | RESPIRATION RATE: 18 BRPM | WEIGHT: 242.07 LBS | SYSTOLIC BLOOD PRESSURE: 143 MMHG

## 2021-04-04 DIAGNOSIS — Z95.5 PRESENCE OF CORONARY ANGIOPLASTY IMPLANT AND GRAFT: Chronic | ICD-10-CM

## 2021-04-04 DIAGNOSIS — Z96.651 PRESENCE OF RIGHT ARTIFICIAL KNEE JOINT: Chronic | ICD-10-CM

## 2021-04-04 LAB
ALBUMIN SERPL ELPH-MCNC: 3.5 G/DL — SIGNIFICANT CHANGE UP (ref 3.5–5.2)
ALP SERPL-CCNC: 117 U/L — HIGH (ref 30–115)
ALT FLD-CCNC: 12 U/L — SIGNIFICANT CHANGE UP (ref 0–41)
ANION GAP SERPL CALC-SCNC: 10 MMOL/L — SIGNIFICANT CHANGE UP (ref 7–14)
APTT BLD: 60.9 SEC — HIGH (ref 27–39.2)
AST SERPL-CCNC: 19 U/L — SIGNIFICANT CHANGE UP (ref 0–41)
BASOPHILS # BLD AUTO: 0.02 K/UL — SIGNIFICANT CHANGE UP (ref 0–0.2)
BASOPHILS NFR BLD AUTO: 0.5 % — SIGNIFICANT CHANGE UP (ref 0–1)
BILIRUB SERPL-MCNC: 1.4 MG/DL — HIGH (ref 0.2–1.2)
BUN SERPL-MCNC: 20 MG/DL — SIGNIFICANT CHANGE UP (ref 10–20)
CALCIUM SERPL-MCNC: 8.1 MG/DL — LOW (ref 8.5–10.1)
CHLORIDE SERPL-SCNC: 99 MMOL/L — SIGNIFICANT CHANGE UP (ref 98–110)
CO2 SERPL-SCNC: 26 MMOL/L — SIGNIFICANT CHANGE UP (ref 17–32)
CREAT SERPL-MCNC: 1.4 MG/DL — SIGNIFICANT CHANGE UP (ref 0.7–1.5)
EOSINOPHIL # BLD AUTO: 0.04 K/UL — SIGNIFICANT CHANGE UP (ref 0–0.7)
EOSINOPHIL NFR BLD AUTO: 1 % — SIGNIFICANT CHANGE UP (ref 0–8)
GLUCOSE SERPL-MCNC: 97 MG/DL — SIGNIFICANT CHANGE UP (ref 70–99)
HCT VFR BLD CALC: 31.4 % — LOW (ref 42–52)
HGB BLD-MCNC: 9.8 G/DL — LOW (ref 14–18)
IMM GRANULOCYTES NFR BLD AUTO: 0.3 % — SIGNIFICANT CHANGE UP (ref 0.1–0.3)
INR BLD: 7.66 RATIO — CRITICAL HIGH (ref 0.65–1.3)
LYMPHOCYTES # BLD AUTO: 0.52 K/UL — LOW (ref 1.2–3.4)
LYMPHOCYTES # BLD AUTO: 13.5 % — LOW (ref 20.5–51.1)
MCHC RBC-ENTMCNC: 27.3 PG — SIGNIFICANT CHANGE UP (ref 27–31)
MCHC RBC-ENTMCNC: 31.2 G/DL — LOW (ref 32–37)
MCV RBC AUTO: 87.5 FL — SIGNIFICANT CHANGE UP (ref 80–94)
MONOCYTES # BLD AUTO: 0.5 K/UL — SIGNIFICANT CHANGE UP (ref 0.1–0.6)
MONOCYTES NFR BLD AUTO: 13 % — HIGH (ref 1.7–9.3)
NEUTROPHILS # BLD AUTO: 2.75 K/UL — SIGNIFICANT CHANGE UP (ref 1.4–6.5)
NEUTROPHILS NFR BLD AUTO: 71.7 % — SIGNIFICANT CHANGE UP (ref 42.2–75.2)
NRBC # BLD: 0 /100 WBCS — SIGNIFICANT CHANGE UP (ref 0–0)
NT-PROBNP SERPL-SCNC: HIGH PG/ML (ref 0–300)
PLATELET # BLD AUTO: 166 K/UL — SIGNIFICANT CHANGE UP (ref 130–400)
POTASSIUM SERPL-MCNC: 3.6 MMOL/L — SIGNIFICANT CHANGE UP (ref 3.5–5)
POTASSIUM SERPL-SCNC: 3.6 MMOL/L — SIGNIFICANT CHANGE UP (ref 3.5–5)
PROT SERPL-MCNC: 6.8 G/DL — SIGNIFICANT CHANGE UP (ref 6–8)
PROTHROM AB SERPL-ACNC: >40 SEC — HIGH (ref 9.95–12.87)
RBC # BLD: 3.59 M/UL — LOW (ref 4.7–6.1)
RBC # FLD: 19 % — HIGH (ref 11.5–14.5)
SARS-COV-2 RNA SPEC QL NAA+PROBE: SIGNIFICANT CHANGE UP
SODIUM SERPL-SCNC: 135 MMOL/L — SIGNIFICANT CHANGE UP (ref 135–146)
TROPONIN T SERPL-MCNC: <0.01 NG/ML — SIGNIFICANT CHANGE UP
WBC # BLD: 3.84 K/UL — LOW (ref 4.8–10.8)
WBC # FLD AUTO: 3.84 K/UL — LOW (ref 4.8–10.8)

## 2021-04-04 PROCEDURE — 99285 EMERGENCY DEPT VISIT HI MDM: CPT

## 2021-04-04 PROCEDURE — 99497 ADVNCD CARE PLAN 30 MIN: CPT | Mod: 25

## 2021-04-04 PROCEDURE — 93010 ELECTROCARDIOGRAM REPORT: CPT

## 2021-04-04 PROCEDURE — 71045 X-RAY EXAM CHEST 1 VIEW: CPT | Mod: 26

## 2021-04-04 PROCEDURE — 99223 1ST HOSP IP/OBS HIGH 75: CPT

## 2021-04-04 RX ORDER — FUROSEMIDE 40 MG
60 TABLET ORAL
Refills: 0 | Status: DISCONTINUED | OUTPATIENT
Start: 2021-04-04 | End: 2021-04-06

## 2021-04-04 RX ORDER — FUROSEMIDE 40 MG
40 TABLET ORAL ONCE
Refills: 0 | Status: COMPLETED | OUTPATIENT
Start: 2021-04-04 | End: 2021-04-04

## 2021-04-04 RX ORDER — CHLORHEXIDINE GLUCONATE 213 G/1000ML
1 SOLUTION TOPICAL
Refills: 0 | Status: DISCONTINUED | OUTPATIENT
Start: 2021-04-04 | End: 2021-04-08

## 2021-04-04 RX ORDER — ATORVASTATIN CALCIUM 80 MG/1
20 TABLET, FILM COATED ORAL AT BEDTIME
Refills: 0 | Status: DISCONTINUED | OUTPATIENT
Start: 2021-04-04 | End: 2021-04-08

## 2021-04-04 RX ORDER — SACUBITRIL AND VALSARTAN 24; 26 MG/1; MG/1
1 TABLET, FILM COATED ORAL
Refills: 0 | Status: DISCONTINUED | OUTPATIENT
Start: 2021-04-04 | End: 2021-04-08

## 2021-04-04 RX ADMIN — ATORVASTATIN CALCIUM 20 MILLIGRAM(S): 80 TABLET, FILM COATED ORAL at 22:07

## 2021-04-04 RX ADMIN — SACUBITRIL AND VALSARTAN 1 TABLET(S): 24; 26 TABLET, FILM COATED ORAL at 18:06

## 2021-04-04 RX ADMIN — Medication 40 MILLIGRAM(S): at 15:47

## 2021-04-04 NOTE — H&P ADULT - NSICDXPASTSURGICALHX_GEN_ALL_CORE_FT
PAST SURGICAL HISTORY:  History of total right knee replacement     S/P coronary artery stent placement

## 2021-04-04 NOTE — ED ADULT NURSE NOTE - OBJECTIVE STATEMENT
Patient c/o SOB and chest pain since yesterday, patient has pmh of CHF and COPD. PRN home O2 2L. On assessment no s/s of resp distress noted, patient on 2L NC sating 97%. Patient states pain comes and goes and denies alleviant or aggravating factors. CM in place.

## 2021-04-04 NOTE — H&P ADULT - ASSESSMENT
79 y/o male with PMH CAD s/p PCI and stent 2007, COPD on 2L home O2, CHFpEF, DLD, atrial fibrillation on coumadin presents to [facility] with chief complaint of chest pain and shortness of breath.    #) Acute HFpEF exacerbation   - Patient's signs and symptoms likely point to CHF exacerbation   - BNP noted to be >13,000  - Appears fluid overloaded on exam   - Cardiology consult appreciated (follows with Dr. Scherer)  - will start Lasix 60mg IV Q12  - Echo ordered  - Low salt diet  - Strict I/O   - Daily weight     #) Atypical chest discomfort in setting of known CAD  - Likely related to CHF rather than acute ACS  - Initial troponin set negative  - No acute ischemic signs on EKG  - Can trend serial troponin and am EKG    #) Elevated INR   - No overt signs of bleeding at this time  - Will hold coumadin   - F/U AM INR + CBC  - Can give vitamin K if patient develops signs of bleeding     #) A-fib w/ Bradycardia  - EP consult placed  - Holding coumadin given elevated INR     #) COPD   - Continue 2L O2 as per his normal home amount  - Outpatient follow-up     #) Normocytic anemia   - Follow-up Vitamin B12, Folate, and iron studies  - No signs of bleeding at this time    #) DLD  - Continue Statin     #) Diet- DASH  #) DVT prophylaxis - None (supratherapeutic INR)   #) Disposition- Telemetry   #) Activity- increase as tolerated  #) Code status - Full

## 2021-04-04 NOTE — ED PROVIDER NOTE - NS ED ROS FT
Constitutional: no fever, chills, no recent weight loss, change in appetite or malaise  Eyes: no redness/discharge/pain/vision changes  ENT: no rhinorrhea/ear pain/sore throat  Cardiac: N+ cp, sob and b/l le edema  Respiratory: No cough or respiratory distress  GI: No nausea, vomiting, diarrhea or abdominal pain.  : No dysuria, frequency, urgency or hematuria  MS: no pain to back or extremities, no loss of ROM, no weakness  Neuro: No headache or weakness. No LOC.  Skin: No skin rash.  Endocrine: No history of thyroid disease or diabetes.  Except as documented in the HPI, all other systems are negative.

## 2021-04-04 NOTE — ED PROVIDER NOTE - TOBACCO USE
After Visit Summary   8/24/2017    Jazz Berman    MRN: [id number]           Patient Information     Date Of Birth          1953        Visit Information        Provider Department      8/24/2017 9:00 AM Andie Hyde, JEAN [facility] Ophthalmology        Today's Diagnoses     Type 2 diabetes mellitus without complication, without long-term current use of insulin (H)    -  1    Dry eyes, bilateral        Hyperopic astigmatism of both eyes           Follow-ups after your visit        Your next 10 appointments already scheduled     Sep 05, 2017  9:00 AM CDT   LAB with  LAB   [facility] Lab ([facility])    [address] [place] [id number] [phone]           Patient must bring picture ID. Patient should be prepared to give a urine specimen  Please do not eat 10-12 hours before your appointment if you are coming in fasting for labs on lipids, cholesterol, or glucose (sugar). Pregnant women should follow their Care Team instructions. Water with medications is okay. Do not drink coffee or other fluids. If you have concerns about taking  your medications, please ask at office or if scheduling via [employer], send a message by clicking on Secure Messaging, Message Your Care Team.            Sep 05, 2017  9:40 AM CDT   (Arrive by 9:25 AM)   CT CHEST ABDOMEN PELVIS W/O & W CONTRAST with CT2   [facility] CT ([facility])    [address] [place] [id number] [phone]           Please bring any scans or X-rays taken at other hospitals, if similar tests were done. Also bring a list of your medicines, including vitamins, minerals and over-the-counter drugs. It is safest to leave personal items at home.  Be sure to tell your doctor:   If you have any allergies.   If there s any chance you are pregnant.   If you are breastfeeding.   If you have any special needs.  You may have contrast  for this exam. To prepare:   Do not eat or drink for 2 hours before your exam. If you need to take medicine, you may take it with small sips of water. (We may ask you to take liquid medicine as well.)   The day before your exam, drink extra fluids at least six 8-ounce glasses (unless your doctor tells you to restrict your fluids).  Patients over 70 or patients with diabetes or kidney problems:   If you haven t had a blood test (creatinine test) within the last 30 days, go to your clinic or Diagnostic Imaging Department for this test.  If you have diabetes:   If your kidney function is normal, continue taking your metformin (Avandamet, Glucophage, Glucovance, Metaglip) on the day of your exam.   If your kidney function is abnormal, wait 48 hours before restarting this medicine.  You will have oral contrast for this exam:   You will drink the contrast at home. Get this from your clinic or Diagnostic Imaging Department. Please follow the directions given.  Please wear loose clothing, such as a sweat suit or jogging clothes. Avoid snaps, zippers and other metal. We may ask you to undress and put on a hospital gown.  If you have any questions, please call the Imaging Department where you will have your exam.            Sep 07, 2017 10:00 AM CDT   (Arrive by 9:45 AM)   Return Visit with Sean Freed MD   [facility] ([facility])    [address] [place] [address]  2nd [facility] [id number] [phone]            Feb 14, 2018  8:00 AM CST   Lab with  LAB   [facility])    [address] [place] [address]  1st [facility] [id number] [phone]            Feb 14, 2018  8:30 AM CST   (Arrive by 8:15 AM)   CORE RETURN with DLUCE Pimentel [facility])    [address] [place] [address]  3rd [facility] [id number] [phone]              Who to contact     Please call  your clinic at [phone] to:    Ask questions about your health    Make or cancel appointments    Discuss your medicines    Learn about your test results    Speak to your doctor   If you have compliments or concerns about an experience at your clinic, or if you wish to file a complaint, please contact [facility] Patient Relations at [phone] or email us at [email or web address]         Additional Information About Your Visit        MyChart Information     [employer] gives you secure access to your electronic health record. If you see a primary care provider, you can also send messages to your care team and make appointments. If you have questions, please call your primary care clinic.  If you do not have a primary care provider, please call [phone] and they will assist you.      [employer] is an electronic gateway that provides easy, online access to your medical records. With [employer], you can request a clinic appointment, read your test results, renew a prescription or communicate with your care team.     To access your existing account, please contact your [facility] or call [phone] for assistance.        Care EveryWhere ID     This is your Care EveryWhere ID. This could be used by other organizations to access your [facility] medical records  [id number]         Blood Pressure from Last 3 Encounters:   08/14/17 128/53   06/07/17 (!) 133/91   04/24/17 126/83    Weight from Last 3 Encounters:   08/14/17 122.8 kg (270 lb 12.8 oz)   06/07/17 119.9 kg (264 lb 6.4 oz)   04/24/17 120.3 kg (265 lb 3.2 oz)              Today, you had the following     No orders found for display       Primary Care Provider Office Phone # Fax #    Rabia Carmona [phone]       [facility] [address]  [facility] [address]        Equal Access to Services     GERTRUDE RAJAN AH: janet Ng,  latia galvan ah. So [facility] [phone].    ATENCIÓN: Si sabas farrar, tiene a tapia disposición servicios gratuitos de asistencia lingüística. Kayy al [phone].    We comply with applicable federal civil rights laws and [place] laws. We do not discriminate on the basis of race, color, national origin, age, disability sex, sexual orientation or gender identity.            Thank you!     Thank you for choosing [facility] OPHTHALMOLOGY  for your care. Our goal is always to provide you with excellent care. Hearing back from our patients is one way we can continue to improve our services. Please take a few minutes to complete the written survey that you may receive in the mail after your visit with us. Thank you!             Your Updated Medication List - Protect others around you: Learn how to safely use, store and throw away your medicines at www.disposemymeds.org.          This list is accurate as of: 8/24/17  9:49 AM.  Always use your most recent med list.                   Brand Name Dispense Instructions for use Diagnosis    acetaminophen 500 MG tablet    TYLENOL    100 tablet    Take 2 tablets (1,000 mg) by mouth 3 times daily    Gastrointestinal stromal sarcoma of stomach (H)       amLODIPine 10 MG tablet    NORVASC    30 tablet    Take 1 tablet (10 mg) by mouth daily    Essential hypertension with goal blood pressure less than 140/90       blood glucose monitoring lancets     100 each    Use to test blood sugars 2 times daily or as directed.    Diabetes mellitus (H)       blood glucose monitoring test strip    no brand specified    100 each    Use to test blood sugars 2 times daily or as directed    Diabetes mellitus (H)       cycloSPORINE 0.05 % ophthalmic emulsion    RESTASIS    1 Box    Place 1 drop into both eyes 2 times daily    Dry eyes, bilateral, Punctate keratitis, bilateral       ferrous sulfate 325 (65 FE) MG tablet    IRON    90 tablet    Take 1  tablet (325 mg) by mouth 2 times daily    Iron deficiency anemia, unspecified iron deficiency anemia type       furosemide 40 MG tablet    LASIX    60 tablet    Take 1 tablet (40 mg) by mouth daily    Diastolic congestive heart failure, unspecified congestive heart failure chronicity (H)       lisinopril 20 MG tablet    PRINIVIL/ZESTRIL    180 tablet    Take 2 tablets (40 mg) by mouth daily    (HFpEF) heart failure with preserved ejection fraction (H)       metFORMIN 850 MG tablet    GLUCOPHAGE    90 tablet    Take 1 tablet (850 mg) by mouth daily (with breakfast) AM    Type 2 diabetes mellitus without complication, without long-term current use of insulin (H)       metoprolol 100 MG 24 hr tablet    TOPROL-XL    60 tablet    Take 2 tablets (200 mg) by mouth daily    Benign essential hypertension       omeprazole 40 MG capsule    priLOSEC    30 capsule    Take 1 capsule (40 mg) by mouth daily Take 30-60 minutes before a meal.    Acute gastrointestinal hemorrhage       order for DME     1 Units    Equipment being ordered: BP cuff, large    Hypertension, essential       polyethylene glycol Packet    MIRALAX/GLYCOLAX    24 packet    Take 17 g by mouth daily    Constipation, unspecified constipation type       potassium chloride SA 10 MEQ CR tablet    K-DUR/KLOR-CON M    180 tablet    Take 2 tablets (20 mEq) by mouth daily    (HFpEF) heart failure with preserved ejection fraction (H)       rivaroxaban ANTICOAGULANT 20 MG Tabs tablet    XARELTO    30 tablet    Take 1 tablet (20 mg) by mouth daily (with dinner)    Paroxysmal atrial fibrillation (H)       sildenafil 100 MG cap/tab    VIAGRA    30 tablet    Take 100mg tablet as directed.  -Rx prescribed via [employer] connection to care program-    Erectile dysfunction, unspecified erectile dysfunction type       solifenacin 10 MG tablet    VESICARE    60 tablet    Take 1 tablet (10 mg) by mouth daily AM    Urinary urgency       spironolactone 25 MG tablet    ALDACTONE    90  tablet    Take 1 tablet (25 mg) by mouth daily    Chronic diastolic heart failure (H)       vitamin D 2000 UNITS tablet     60 tablet    Take 2,000 Units by mouth daily    Vitamin D deficiency          Former smoker

## 2021-04-04 NOTE — ED PROVIDER NOTE - CARE PLAN
Principal Discharge DX:	Fluid overload  Secondary Diagnosis:	Chest pain  Secondary Diagnosis:	INR (international normal ratio) abnormal

## 2021-04-04 NOTE — H&P ADULT - NSICDXFAMILYHX_GEN_ALL_CORE_FT
FAMILY HISTORY:  No pertinent family history in first degree relatives, No signifecant h/o chf in the family

## 2021-04-04 NOTE — ED PROVIDER NOTE - OBJECTIVE STATEMENT
80 year old M with hx of copd on 2 L home O2 as needed, CHF, a fib on coumadin, HLD, DVT, HTN, former smoker presenting to ED for cp/sob. Pt sts for the last few weeks has had worsening MOORE. Sts walks 50 ft and is out of breath. + b/l worsening edema. Pt also notes since yesterday has had midsternal sharp shooting cp lasting few seconds at a time. Pt follows with Dr Scherer. No fever/chills, abd pain, nausea, vomiting calf pain, cough, recent travel, weakness.

## 2021-04-04 NOTE — ED ADULT TRIAGE NOTE - CHIEF COMPLAINT QUOTE
Pt c/o chest pain and shortness of breath since yesterday, denies fevers, pt recently finished medications for shingles

## 2021-04-04 NOTE — ED PROVIDER NOTE - CLINICAL SUMMARY MEDICAL DECISION MAKING FREE TEXT BOX
79 y/o M pmh Afib on coumadin, DVT, HTN DLD, COPD on 2 L home O2 PRN, CHF, p/w progressive SOB and CP. No fever cough.    ROS PE above. + rales at bases and some b/l pedal edema.     Trop neg. BNP elevated. EKG no stemi.  IMP: Fluid overload/ acs.  Pt admitted to [facility], [place].

## 2021-04-04 NOTE — CONSULT NOTE ADULT - ASSESSMENT
81 YO M with PMH of COPD on 2L O2 at home, HTN, DLD, H/o DVT of LLE, chronic A.Fib on coumadin, CAD s/p PCI and stent x 1 in 2007 with last cath in 2017 showing non-obstructive CAD with mild in-stent restenosis, HFpEF (EF of 55-65%), presented from home with cc of sob and chest pain.     Impression:    Acute decompensated heart failure / HFpEF (EF of 55-65% in 2019) with h/o low EF in the past, last office visit last month with Dr. Arnulfo Scherer  CAD s/p PCI and stent x 1 in 2007 with last cath in 2017 showing non-obstructive CAD with mild in-stent restenosis  A.Fib with slow ventricular response on coumadin, [id number] of 5, now with supratherapeutic INR (INR 7.66)    Recommend:    - Monitor strict input and output, daily weights  - Start lasix 60mg IV Q12  - Hold metoprolol, and coumadin for now  - No need for Vit K, as no overt signs of bleeding, monitor Hb/Hct and monitor for bleeding  - Continue with Lipitor and Entresto for now  - Obtain TTE  - Will follow

## 2021-04-04 NOTE — H&P ADULT - HISTORY OF PRESENT ILLNESS
79 y/o male with PMH CAD s/p PCI and stent 2007, COPD on 2L home O2, HFpEF, DLD, atrial fibrillation on coumadin presents to [facility] with chief complaint of chest pain and shortness of breath. Patient states his symptoms started about 2 days ago while he was sitting at rest. He developed a dull pain in his chest that he describes as someone "sitting on my chest". Over the next few days he developed some shortness of breath. The chest pain became intermittent. He has a visiting nurse who comes to take care of him - she told him that he had some "water in the lungs". He denies any fevers, chills, cough, headaches, dizziness, nausea, vomiting. He does note that his legs have become swollen. He has also felt these symptoms before (several years ago for which he was treated for congestive heart failure).     In the ED, vitals were /58, HR 61, RR 18, T 98, SPO2 88% on RA. Patient admitted to medicine for further management.

## 2021-04-04 NOTE — ED PROVIDER NOTE - PHYSICAL EXAMINATION
CONSTITUTIONAL: Well-appearing; well-nourished; in no apparent distress.   EYES: PERRL; EOM intact.   ENT: normal nose; no rhinorrhea; normal pharynx with no tonsillar hypertrophy.   NECK: Supple; non-tender; no cervical lymphadenopathy.   CARDIOVASCULAR: Normal S1, S2; no murmurs, rubs, or gallops. + b/l peripheral pitting edema  CHEST: no chest wall ttp  RESPIRATORY: + b/l rales at bases. No signs pf resp distress. No tachypnea. Normal chest excursion with respiration; breath sounds clear and equal bilaterally;  GI/: Normal bowel sounds; non-distended; non-tender; no palpable organomegaly.   MS: No evidence of trauma or deformity. Normal ROM in all four extremities; non-tender to palpation; distal pulses are normal.   SKIN: Normal for age and race; warm; dry; good turgor; no apparent lesions or exudate.   NEURO/PSYCH: A & O x 4; grossly unremarkable. mood and manner are appropriate. Grooming and personal hygiene are appropriate. No apparent thoughts of harm to self or others.

## 2021-04-04 NOTE — CONSULT NOTE ADULT - SUBJECTIVE AND OBJECTIVE BOX
HPI:  81 YO M with PMH of COPD on 2L O2 at home, HTN, DLD, H/o DVT of LLE, chronic A.Fib on coumadin, CAD s/p PCI and stent x 1 in 2007 with last cath in 2017 showing non-obstructive CAD with mild in-stent restenosis, HFpEF (EF of 55-65%), presented from home with cc of sob and chest pain. Pt. reports that he has been having worsening sob, weight gain and orthopnea for the past 2-3 weeks. Pt. reports that he also had few episodes of chest pressure lasting few seconds at night. Denies any active chest pain, but reports having sob.    Denies any palpitations, syncope, loc, hematemesis, melena, hematochezia.    PAST MEDICAL & SURGICAL HISTORY  COPD (chronic obstructive pulmonary disease)    Hypertension    Deep vein thrombosis (DVT) of left lower extremity, unspecified chronicity, unspecified vein    Cellulitis of left lower extremity    Chronic atrial fibrillation    Mitral valve insufficiency, unspecified etiology  Moderate    S/P coronary artery stent placement    History of total right knee replacement        FAMILY HISTORY:  FAMILY HISTORY:  No pertinent family history in first degree relatives  No significant h/o chf in the family        SOCIAL HISTORY:  []smoker  []Alcohol  []Drug    ALLERGIES:  No Known Allergies      MEDICATIONS:  MEDICATIONS  (STANDING):    MEDICATIONS  (PRN):      HOME MEDICATIONS:  Home Medications:  acetaminophen 325 mg oral tablet: 2 tab(s) orally every 6 hours, As needed, Mild Pain (1 - 3) (31 Jul 2018 13:18)  budesonide-formoterol 80 mcg-4.5 mcg/inh inhalation aerosol: 1 puff(s) inhaled 2 times a day (20 Apr 2018 14:44)  Lipitor 20 mg oral tablet: 1 tab(s) orally once a day (at bedtime) (20 Apr 2018 14:44)  metoprolol succinate 25 mg oral tablet, extended release: 1 tab(s) orally once a day (31 Jul 2018 13:18)  nystatin 100,000 units/g topical powder: 1 application topically 2 times a day (31 Jul 2018 13:18)  oxyCODONE-acetaminophen 5 mg-325 mg oral tablet: 1 tab(s) orally every 6 hours, As needed, Severe Pain (7 - 10) (31 Jul 2018 13:18)  sacubitril-valsartan 24 mg-26 mg oral tablet: 1 tab(s) orally 2 times a day (20 Apr 2018 14:44)      VITALS:   T(F): 97.8 (04-04 @ 15:39), Max: 98 (04-04 @ 12:05)  HR: 60 (04-04 @ 15:39) (60 - 61)  BP: 132/57 (04-04 @ 15:39) (132/57 - 143/58)  BP(mean): --  RR: 19 (04-04 @ 15:39) (18 - 20)  SpO2: 97% (04-04 @ 15:39) (88% - 97%)    I&O's Summary      REVIEW OF SYSTEMS:  CONSTITUTIONAL: No weakness, fevers or chills  EYES/ENT: No visual changes;  No vertigo or throat pain   NECK: No pain or stiffness  RESPIRATORY: No cough, wheezing, hemoptysis  CARDIOVASCULAR: No chest pain or palpitations  GASTROINTESTINAL: No abdominal or epigastric pain. No nausea, vomiting, or hematemesis; No diarrhea or constipation. No melena or hematochezia.  GENITOURINARY: No dysuria, frequency or hematuria  NEUROLOGICAL: No numbness or weakness  SKIN: No itching, no rashes    PHYSICAL EXAM:  NEURO: patient is awake , alert and oriented  GEN: Not in acute distress  NECK: no thyroid enlargement, no JVD  LUNGS: Rales on auscultation bilaterally   CARDIOVASCULAR: S1/S2 present, RRR , no murmurs or rubs, no carotid bruits, + PP bilaterally  ABD: Soft, non-tender, non-distended, +BS  EXT: bilateral NADIA  SKIN: Intact    LABS:                        9.8    3.84  )-----------( 166      ( 04 Apr 2021 13:19 )             31.4     04-04    135  |  99  |  20  ----------------------------<  97  3.6   |  26  |  1.4    Ca    8.1<L>      04 Apr 2021 13:19    TPro  6.8  /  Alb  3.5  /  TBili  1.4<H>  /  DBili  x   /  AST  19  /  ALT  12  /  AlkPhos  117<H>  04-04    PT/INR - ( 04 Apr 2021 13:19 )   PT: >40.00 sec;   INR: 7.66 ratio         PTT - ( 04 Apr 2021 13:19 )  PTT:60.9 sec  Troponin T, Serum: <0.01 ng/mL (04-04-21 @ 13:19)    CARDIAC MARKERS ( 04 Apr 2021 13:19 )  x     / <0.01 ng/mL / x     / x     / x            Troponin trend:    Serum Pro-Brain Natriuretic Peptide: 02011 pg/mL (04-04-21 @ 13:19)          RADIOLOGY:  -CXR:  -TTE:  -CCTA:  -STRESS TEST:  -CATHETERIZATION:    ECG:    TELEMETRY EVENTS:   HPI:  79 YO M with PMH of COPD on 2L O2 at home, HTN, DLD, H/o DVT of LLE, chronic A.Fib on coumadin, CAD s/p PCI and stent x 1 in 2007 with last cath in 2017 showing non-obstructive CAD with mild in-stent restenosis, HFpEF (EF of 55-65%), presented from home with cc of sob and chest pain. Pt. reports that he has been having worsening sob, weight gain and orthopnea for the past 2-3 weeks. Pt. reports that he also had few episodes of chest pressure lasting few seconds at night. Denies any active chest pain, but reports having sob.    Denies any palpitations, syncope, loc, hematemesis, melena, hematochezia.    PAST MEDICAL & SURGICAL HISTORY  COPD (chronic obstructive pulmonary disease)    Hypertension    Deep vein thrombosis (DVT) of left lower extremity, unspecified chronicity, unspecified vein    Cellulitis of left lower extremity    Chronic atrial fibrillation    Mitral valve insufficiency, unspecified etiology  Moderate    S/P coronary artery stent placement    History of total right knee replacement      No pertinent family history of premature CAD in first degree relatives    SOCIAL HISTORY: Denies EtOH, smoking or drug use    ALLERGIES:  No Known Allergies      MEDICATIONS  (STANDING):  atorvastatin 20 milliGRAM(s) Oral at bedtime  chlorhexidine 4% Liquid 1 Application(s) Topical <User Schedule>  furosemide   Injectable 60 milliGRAM(s) IV Push two times a day  sacubitril 24 mG/valsartan 26 mG 1 Tablet(s) Oral two times a day      Home Medications:  acetaminophen 325 mg oral tablet: 2 tab(s) orally every 6 hours, As needed, Mild Pain (1 - 3) (31 Jul 2018 13:18)  budesonide-formoterol 80 mcg-4.5 mcg/inh inhalation aerosol: 1 puff(s) inhaled 2 times a day (20 Apr 2018 14:44)  Lipitor 20 mg oral tablet: 1 tab(s) orally once a day (at bedtime) (20 Apr 2018 14:44)  metoprolol succinate 25 mg oral tablet, extended release: 1 tab(s) orally once a day (31 Jul 2018 13:18)  nystatin 100,000 units/g topical powder: 1 application topically 2 times a day (31 Jul 2018 13:18)  oxyCODONE-acetaminophen 5 mg-325 mg oral tablet: 1 tab(s) orally every 6 hours, As needed, Severe Pain (7 - 10) (31 Jul 2018 13:18)  sacubitril-valsartan 24 mg-26 mg oral tablet: 1 tab(s) orally 2 times a day (20 Apr 2018 14:44)      VITALS:   T(F): 97.8 (04-04 @ 15:39), Max: 98 (04-04 @ 12:05)  HR: 60 (04-04 @ 15:39) (60 - 61)  BP: 132/57 (04-04 @ 15:39) (132/57 - 143/58)  BP(mean): --  RR: 19 (04-04 @ 15:39) (18 - 20)  SpO2: 97% (04-04 @ 15:39) (88% - 97%)      REVIEW OF SYSTEMS:  CONSTITUTIONAL: No weakness, fevers or chills  EYES/ENT: No visual changes;  No vertigo or throat pain   NECK: No pain or stiffness  RESPIRATORY: No cough, wheezing, hemoptysis  CARDIOVASCULAR: No chest pain or palpitations  GASTROINTESTINAL: No abdominal or epigastric pain. No nausea, vomiting, or hematemesis; No diarrhea or constipation. No melena or hematochezia.  GENITOURINARY: No dysuria, frequency or hematuria  NEUROLOGICAL: No numbness or weakness  SKIN: No itching, no rashes    PHYSICAL EXAM:  NEURO: patient is awake , alert and oriented  GEN: Not in acute distress  NECK: no thyroid enlargement, no JVD  LUNGS: Rales on auscultation bilaterally   CARDIOVASCULAR: S1/S2 present, RRR , no murmurs or rubs, no carotid bruits, + PP bilaterally  ABD: Soft, non-tender, non-distended, +BS  EXT: bilateral NADIA  SKIN: Intact      LABS:                        9.8    3.84  )-----------( 166      ( 04 Apr 2021 13:19 )             31.4     04-04    135  |  99  |  20  ----------------------------<  97  3.6   |  26  |  1.4    Ca    8.1<L>      04 Apr 2021 13:19    TPro  6.8  /  Alb  3.5  /  TBili  1.4<H>  /  DBili  x   /  AST  19  /  ALT  12  /  AlkPhos  117<H>  04-04    PT/INR - ( 04 Apr 2021 13:19 )   PT: >40.00 sec;   INR: 7.66 ratio    PTT - ( 04 Apr 2021 13:19 )  PTT:60.9 sec      Troponin T, Serum: <0.01 ng/mL (04-04-21 @ 13:19)    Serum Pro-Brain Natriuretic Peptide: 49542 pg/mL (04-04-21 @ 13:19)

## 2021-04-04 NOTE — H&P ADULT - ATTENDING COMMENTS
81 YO M with a PMH of obesity, CAD s/p PCI and stent, COPD (2L home O2), HFpEF, DLD, and chronic Afib atrial (coumadin) who presents to the hospital with a c/o progressively worsening SOB for the past x 1.5 weeks. Associated with B/L LE swelling. + orthopnea, - non-productive cough. Takes medications daily. Follows strict fluid/salt restriction diet. Denies any CP, palpitations, N/V/D, ABD pain, dysuria, or rashes.     In the ED, Chest X-ray shows pulm vascular congestion. BNP elevated. Started on IV Lasix. Pt hypoxic to 88% on RA, started on NC. Cardio consulted and wants pt to be diuresed, tele, and echo.    Physical exam shows obese pt in NAD. Bradycardic to 50's, afebrile, not hypoxic on 4L NC. A&Ox3. Non-focal neuro exam. Muscle strength/sensation intact. Crackles noted in B/L lung fields. Irregular, no M/G/R. ABD is soft, obese, and non-tender, normoactive BSs. B/L LEs with 2+ pitting edema that extends to the mid-shin. Resolving left flank rash with crusting, dermatomal. Labs and radiology as above.    Dyspnea due to acute on chronic HFpEF, suspect medication sub-optimization. Tele. IV Lasix and transition to PO. Echo. Monitor daily weights, Is&Os, and diet/fluid restriction. BMP in the AM. Restart home meds.     Normocytic anemia, at baseline. Pt denies bleeding symptoms. Replace as necessary.     Supratherapeutic INR. Pt denies any bleeding symptoms. Hold Coumadin. No current indication for Vit K. Repeat Coags in the AM.     HX of obesity, CAD s/p PCI and stent, COPD (2L home O2), DLD, and chronic Afib atrial (coumadin). Restart home meds, except as stated above. DVT PPX. Inform PCP of pt's admission to hospital. My note supersedes the residents note.

## 2021-04-04 NOTE — H&P ADULT - NSICDXPASTMEDICALHX_GEN_ALL_CORE_FT
PAST MEDICAL HISTORY:  Cellulitis of left lower extremity     Chronic atrial fibrillation     COPD (chronic obstructive pulmonary disease)     Deep vein thrombosis (DVT) of left lower extremity, unspecified chronicity, unspecified vein     Hypertension     Mitral valve insufficiency, unspecified etiology Moderate

## 2021-04-04 NOTE — H&P ADULT - NSHPPHYSICALEXAM_GEN_ALL_CORE
VITALS:   T(F): 97.8  HR: 60  BP: 132/57  RR: 19  SpO2: 97%    PHYSICAL EXAM:  GENERAL: Obese, NAD, speaks in full sentences, no signs of respiratory distress  HEAD: Atraumatic  NECK: Supple  CHEST/LUNG: Crackles noted bilaterally.   HEART: S1, S2; RRR; No murmurs, rubs, or gallops  ABDOMEN: BS+; Soft, Non-tender, Non-distended  EXTREMITIES:  2+ Peripheral Pulses, No clubbing, cyanosis. Lower extremity edema noted bilaterally  PSYCH: AAOx3  NEUROLOGY: non-focal  SKIN: No rashes or lesions

## 2021-04-04 NOTE — H&P ADULT - NSHPLABSRESULTS_GEN_ALL_CORE
LABS:                        9.8    3.84  )-----------( 166      ( 04 Apr 2021 13:19 )             31.4     04-04    135  |  99  |  20  ----------------------------<  97  3.6   |  26  |  1.4    Ca    8.1<L>      04 Apr 2021 13:19    TPro  6.8  /  Alb  3.5  /  TBili  1.4<H>  /  DBili  x   /  AST  19  /  ALT  12  /  AlkPhos  117<H>  04-04    PT/INR - ( 04 Apr 2021 13:19 )   PT: >40.00 sec;   INR: 7.66 ratio         PTT - ( 04 Apr 2021 13:19 )  PTT:60.9 sec      Troponin T, Serum: <0.01 ng/mL (04-04-21 @ 13:19)      CARDIAC MARKERS ( 04 Apr 2021 13:19 )  x     / <0.01 ng/mL / x     / x     / x

## 2021-04-05 LAB
ALBUMIN SERPL ELPH-MCNC: 3.7 G/DL — SIGNIFICANT CHANGE UP (ref 3.5–5.2)
ALP SERPL-CCNC: 130 U/L — HIGH (ref 30–115)
ALT FLD-CCNC: 13 U/L — SIGNIFICANT CHANGE UP (ref 0–41)
ANION GAP SERPL CALC-SCNC: 10 MMOL/L — SIGNIFICANT CHANGE UP (ref 7–14)
APTT BLD: 60.2 SEC — HIGH (ref 27–39.2)
AST SERPL-CCNC: 23 U/L — SIGNIFICANT CHANGE UP (ref 0–41)
BILIRUB SERPL-MCNC: 1.2 MG/DL — SIGNIFICANT CHANGE UP (ref 0.2–1.2)
BUN SERPL-MCNC: 22 MG/DL — HIGH (ref 10–20)
CALCIUM SERPL-MCNC: 8.6 MG/DL — SIGNIFICANT CHANGE UP (ref 8.5–10.1)
CHLORIDE SERPL-SCNC: 102 MMOL/L — SIGNIFICANT CHANGE UP (ref 98–110)
CO2 SERPL-SCNC: 30 MMOL/L — SIGNIFICANT CHANGE UP (ref 17–32)
CREAT SERPL-MCNC: 1.2 MG/DL — SIGNIFICANT CHANGE UP (ref 0.7–1.5)
GLUCOSE SERPL-MCNC: 90 MG/DL — SIGNIFICANT CHANGE UP (ref 70–99)
MAGNESIUM SERPL-MCNC: 2.2 MG/DL — SIGNIFICANT CHANGE UP (ref 1.8–2.4)
POTASSIUM SERPL-MCNC: 3.9 MMOL/L — SIGNIFICANT CHANGE UP (ref 3.5–5)
POTASSIUM SERPL-SCNC: 3.9 MMOL/L — SIGNIFICANT CHANGE UP (ref 3.5–5)
PROT SERPL-MCNC: 7.3 G/DL — SIGNIFICANT CHANGE UP (ref 6–8)
SODIUM SERPL-SCNC: 142 MMOL/L — SIGNIFICANT CHANGE UP (ref 135–146)

## 2021-04-05 PROCEDURE — 99222 1ST HOSP IP/OBS MODERATE 55: CPT

## 2021-04-05 PROCEDURE — 93306 TTE W/DOPPLER COMPLETE: CPT | Mod: 26

## 2021-04-05 PROCEDURE — 99233 SBSQ HOSP IP/OBS HIGH 50: CPT

## 2021-04-05 PROCEDURE — 99223 1ST HOSP IP/OBS HIGH 75: CPT

## 2021-04-05 RX ADMIN — Medication 60 MILLIGRAM(S): at 17:32

## 2021-04-05 RX ADMIN — Medication 60 MILLIGRAM(S): at 05:21

## 2021-04-05 RX ADMIN — SACUBITRIL AND VALSARTAN 1 TABLET(S): 24; 26 TABLET, FILM COATED ORAL at 17:33

## 2021-04-05 RX ADMIN — ATORVASTATIN CALCIUM 20 MILLIGRAM(S): 80 TABLET, FILM COATED ORAL at 21:43

## 2021-04-05 RX ADMIN — SACUBITRIL AND VALSARTAN 1 TABLET(S): 24; 26 TABLET, FILM COATED ORAL at 05:21

## 2021-04-05 NOTE — PROGRESS NOTE ADULT - ASSESSMENT
79 y/o male with PMH CAD s/p PCI and stent 2007, COPD on 2L home O2, CHFpEF, DLD, atrial fibrillation on coumadin presents to [facility] with chief complaint of chest pain and shortness of breath.    A/P:   Acute HFpEF exacerbation: right heart failure. Core Pulmonale.   Patient with SOB, LE edema. Pro-BNP 13K.   CXR showed bilateral mild to mo pleural effusion, interstitial infiltrates lower lobes.   Echo showed LVEF LVEF 55-60%, grade III diastolic dysfunction, severe LAE, mod MR, mod TR, severe pulmonary HTN.   Continue Lasix 60mg IV BID. Continue Metoprolol. Start on Entresto per cardiology.   Daily weight, fluid restriction.     Chronic Atrial fibrillation:   Rate is slow. INR is supratherapeutic, hold coumadin.     COPD: stable  Continue home O2.     CAD s/p PCI:   Continue  Lipitor.     Normocytic anemia   Follow-up Vitamin B12, Folate, and iron studies    #Progress Note Handoff:  Pending (specify):  improving volume overload.   Family discussion: with patient. Continue IV diuresis.  Disposition: Home.

## 2021-04-05 NOTE — PROGRESS NOTE ADULT - SUBJECTIVE AND OBJECTIVE BOX
RODNEY SATNO  80y  Male      Patient is a 80y old  Male who presents with a chief complaint of Chest pain, SOB (05 Apr 2021 11:33)      INTERVAL HPI/OVERNIGHT EVENTS:  He feels better, SOB is improving, still with LE edema.   Vital Signs Last 24 Hrs  T(C): 35.6 (05 Apr 2021 04:00), Max: 36.6 (04 Apr 2021 15:39)  T(F): 96 (05 Apr 2021 04:00), Max: 97.8 (04 Apr 2021 15:39)  HR: 53 (05 Apr 2021 04:00) (50 - 60)  BP: 144/66 (05 Apr 2021 04:00) (132/57 - 153/70)  BP(mean): --  RR: 18 (05 Apr 2021 04:00) (18 - 19)  SpO2: 96% (05 Apr 2021 08:00) (88% - 100%)      04-04-21 @ 07:01  -  04-05-21 @ 07:00  --------------------------------------------------------  IN: 250 mL / OUT: 1050 mL / NET: -800 mL    04-05-21 @ 07:01  -  04-05-21 @ 12:11  --------------------------------------------------------  IN: 120 mL / OUT: 400 mL / NET: -280 mL            Consultant(s) Notes Reviewed:  [x ] YES  [ ] NO          MEDICATIONS  (STANDING):  atorvastatin 20 milliGRAM(s) Oral at bedtime  chlorhexidine 4% Liquid 1 Application(s) Topical <User Schedule>  furosemide   Injectable 60 milliGRAM(s) IV Push two times a day  sacubitril 24 mG/valsartan 26 mG 1 Tablet(s) Oral two times a day    MEDICATIONS  (PRN):      LABS                          10.2   4.94  )-----------( 177      ( 05 Apr 2021 06:26 )             32.5     04-05    142  |  102  |  22<H>  ----------------------------<  90  3.9   |  30  |  1.2    Ca    8.6      05 Apr 2021 06:26  Mg     2.2     04-05    TPro  7.3  /  Alb  3.7  /  TBili  1.2  /  DBili  x   /  AST  23  /  ALT  13  /  AlkPhos  130<H>  04-05        PT/INR - ( 05 Apr 2021 06:26 )   PT: >40.00 sec;   INR: 7.14 ratio         PTT - ( 05 Apr 2021 06:26 )  PTT:60.2 sec  Lactate Trend    CARDIAC MARKERS ( 05 Apr 2021 06:26 )  x     / <0.01 ng/mL / x     / x     / x      CARDIAC MARKERS ( 04 Apr 2021 13:19 )  x     / <0.01 ng/mL / x     / x     / x          CAPILLARY BLOOD GLUCOSE            RADIOLOGY & ADDITIONAL TESTS:    Imaging Personally Reviewed:  [ ] YES  [ ] NO    HEALTH ISSUES - PROBLEM Dx:          PHYSICAL EXAM:  GENERAL: NAD, well-developed.  HEAD:  Atraumatic, Normocephalic.  EYES: EOMI, PERRLA, conjunctiva and sclera clear.  NECK: Supple, No JVD.  CHEST/LUNG: Bibasilar rales.   HEART: Irregular rate and rhythm; S1 S2.   ABDOMEN: Soft, Nontender, Nondistended; Bowel sounds present.  EXTREMITIES: LE edema 1+  PSYCH: AAOx3.  NEUROLOGY: non-focal.  SKIN: No rashes or lesions.

## 2021-04-05 NOTE — PROGRESS NOTE ADULT - SUBJECTIVE AND OBJECTIVE BOX
RODNEY SANTO   [id number]  80y   Male   SUBJECTIVE:  Patient is a 80y old Male who presents with a chief complaint of Chest pain, SOB (05 Apr 2021 12:00)    Today is hospital day 1d. This morning he is resting comfortably in bed and reports no new issues or overnight events.   Currently admitted to medicine with the primary diagnosis of Fluid overload       PAST MEDICAL & SURGICAL HISTORY  COPD (chronic obstructive pulmonary disease)    Hypertension    Deep vein thrombosis (DVT) of left lower extremity, unspecified chronicity, unspecified vein    Cellulitis of left lower extremity    Chronic atrial fibrillation    Mitral valve insufficiency, unspecified etiology  Moderate    S/P coronary artery stent placement    History of total right knee replacement      FAMILY HISTORY:  No pertinent family history in first degree relatives  No signifecant h/o chf in the family      SOCIAL HISTORY:  Marital Status:  Living Situation:  Occupation:  Tobacco Use:  Alcohol Use:  Drug Use:  Sexual History:    ALLERGIES:  No Known Allergies    MEDICATIONS:  STANDING MEDICATIONS  atorvastatin 20 milliGRAM(s) Oral at bedtime  chlorhexidine 4% Liquid 1 Application(s) Topical <User Schedule>  furosemide   Injectable 60 milliGRAM(s) IV Push two times a day  sacubitril 24 mG/valsartan 26 mG 1 Tablet(s) Oral two times a day    PRN MEDICATIONS    VITALS:   T(F): 96 (04-05-21 @ 04:00)  HR: 53 (04-05-21 @ 04:00)  BP: 144/66 (04-05-21 @ 04:00)  BP(mean): --  RR: 18 (04-05-21 @ 04:00)  SpO2: 96% (04-05-21 @ 08:00)    LABS:                        10.2   4.94  )-----------( 177      ( 05 Apr 2021 06:26 )             32.5     Hemoglobin: 10.2 g/dL (04-05 @ 06:26)  Hemoglobin: 9.8 g/dL (04-04 @ 13:19)    04-05    142  |  102  |  22<H>  ----------------------------<  90  3.9   |  30  |  1.2    Ca    8.6      05 Apr 2021 06:26  Mg     2.2     04-05    TPro  7.3  /  Alb  3.7  /  TBili  1.2  /  DBili  x   /  AST  23  /  ALT  13  /  AlkPhos  130<H>  04-05    Potassium Trend: 3.9<--, 3.6<--  SODIUM TREND:  Sodium 142 [04-05 @ 06:26]  Sodium 135 [04-04 @ 13:19]    Creatinine Trend: 1.2<--, 1.4<--  PT/INR - ( 05 Apr 2021 06:26 )   PT: >40.00 sec;   INR: 7.14 ratio         PTT - ( 05 Apr 2021 06:26 )  PTT:60.2 sec      Troponin T, Serum: <0.01 ng/mL (04-05-21 @ 06:26)  Calcium, Total Serum: 8.6 mg/dL (04-05-21 @ 06:26)  Magnesium, Serum: 2.2 mg/dL (04-05-21 @ 06:26)  Calcium, Total Serum: 8.1 mg/dL *L* (04-04-21 @ 13:19)  Troponin T, Serum: <0.01 ng/mL (04-04-21 @ 13:19)      CARDIAC MARKERS ( 05 Apr 2021 06:26 )  x     / <0.01 ng/mL / x     / x     / x      CARDIAC MARKERS ( 04 Apr 2021 13:19 )  x     / <0.01 ng/mL / x     / x     / x          COVID  04-04-21 @ 13:19  COVID -   [facility]      04-04-21 @ 07:01  -  04-05-21 @ 07:00  --------------------------------------------------------  IN: 250 mL / OUT: 1050 mL / NET: -800 mL    04-05-21 @ 07:01  -  04-05-21 @ 12:50  --------------------------------------------------------  IN: 120 mL / OUT: 400 mL / NET: -280 mL      RADIOLOGY:    PHYSICAL EXAM:  GEN: No acute distress  HEENT: normocephalic, atraumatic, aniceteric  LUNGS: Clear to auscultation bilaterally, no rales/wheezing/ rhonchi  HEART: S1/S2 present. RRR, no murmurs  ABD: Soft, non-tender, non-distended. Bowel sounds present  EXT: Warm, well perfused, skin color appropriate for ethnicity, +distal pulses, +motor/sensory fnxn intact x all 4 ext's.   NEURO: difficult to rouse, but awakens to voice       ASSESSMENT:    PLAN/ACTIVE PROBLEMS:    DISPOSITION: RODNEY SANTO   [id number]  80y   Male   SUBJECTIVE:  Patient is a 80y old Male who presents with a chief complaint of Chest pain, SOB (05 Apr 2021 12:00)    Today is hospital day 1d. This morning he is resting comfortably in bed and assigned RN reports no new issues or overnight events.   Currently admitted to medicine with the primary diagnosis of Fluid overload       PAST MEDICAL & SURGICAL HISTORY  COPD (chronic obstructive pulmonary disease)    Hypertension    Deep vein thrombosis (DVT) of left lower extremity, unspecified chronicity, unspecified vein    Cellulitis of left lower extremity    Chronic atrial fibrillation    Mitral valve insufficiency, unspecified etiology  Moderate    S/P coronary artery stent placement    History of total right knee replacement      FAMILY HISTORY:  No pertinent family history in first degree relatives  No signifecant h/o chf in the family    ALLERGIES:  No Known Allergies    MEDICATIONS:  STANDING MEDICATIONS  atorvastatin 20 milliGRAM(s) Oral at bedtime  chlorhexidine 4% Liquid 1 Application(s) Topical <User Schedule>  furosemide   Injectable 60 milliGRAM(s) IV Push two times a day  sacubitril 24 mG/valsartan 26 mG 1 Tablet(s) Oral two times a day    PRN MEDICATIONS    VITALS:   T(F): 96 (04-05-21 @ 04:00)  HR: 53 (04-05-21 @ 04:00)  BP: 144/66 (04-05-21 @ 04:00)  RR: 18 (04-05-21 @ 04:00)  SpO2: 96% (04-05-21 @ 08:00)    LABS:                        10.2   4.94  )-----------( 177      ( 05 Apr 2021 06:26 )             32.5     Hemoglobin: 10.2 g/dL (04-05 @ 06:26)  Hemoglobin: 9.8 g/dL (04-04 @ 13:19)    04-05    142  |  102  |  22<H>  ----------------------------<  90  3.9   |  30  |  1.2    Ca    8.6      05 Apr 2021 06:26  Mg     2.2     04-05    TPro  7.3  /  Alb  3.7  /  TBili  1.2  /  DBili  x   /  AST  23  /  ALT  13  /  AlkPhos  130<H>  04-05    Potassium Trend: 3.9<--, 3.6<--  SODIUM TREND:  Sodium 142 [04-05 @ 06:26]  Sodium 135 [04-04 @ 13:19]    Creatinine Trend: 1.2<--, 1.4<--  PT/INR - ( 05 Apr 2021 06:26 )   PT: >40.00 sec;   INR: 7.14 ratio         PTT - ( 05 Apr 2021 06:26 )  PTT:60.2 sec      Troponin T, Serum: <0.01 ng/mL (04-05-21 @ 06:26)  Calcium, Total Serum: 8.6 mg/dL (04-05-21 @ 06:26)  Magnesium, Serum: 2.2 mg/dL (04-05-21 @ 06:26)  Calcium, Total Serum: 8.1 mg/dL *L* (04-04-21 @ 13:19)  Troponin T, Serum: <0.01 ng/mL (04-04-21 @ 13:19)      CARDIAC MARKERS ( 05 Apr 2021 06:26 )  x     / <0.01 ng/mL / x     / x     / x      CARDIAC MARKERS ( 04 Apr 2021 13:19 )  x     / <0.01 ng/mL / x     / x     / x          COVID  04-04-21 @ 13:19  [facility]   [facility]      04-04-21 @ 07:01  -  04-05-21 @ 07:00  --------------------------------------------------------  IN: 250 mL / OUT: 1050 mL / NET: -800 mL    04-05-21 @ 07:01  -  04-05-21 @ 12:50  --------------------------------------------------------  IN: 120 mL / OUT: 400 mL / NET: -280 mL      RADIOLOGY:    PHYSICAL EXAM:  GEN: No acute distress  HEENT: normocephalic, atraumatic, aniceteric  LUNGS: Clear to auscultation bilaterally, no rales/wheezing/ rhonchi  HEART: S1/S2 present. RRR, no murmurs  ABD: Soft, non-tender, non-distended. Bowel sounds present  EXT: Warm, well perfused, skin color appropriate for ethnicity, +distal pulses, +motor/sensory fnxn intact x all 4 ext's.   NEURO: difficult to rouse, but awakens to voice        RODNEY SANTO   [id number]  80y   Male   SUBJECTIVE:  Patient is a 80y old Male who presents with a chief complaint of Chest pain, SOB (05 Apr 2021 12:00)    Today is hospital day 1d. This morning he is resting comfortably in bed and assigned RN reports no new issues or overnight events.   Currently admitted to medicine with the primary diagnosis of Fluid overload       PAST MEDICAL & SURGICAL HISTORY  COPD (chronic obstructive pulmonary disease)    Hypertension    Deep vein thrombosis (DVT) of left lower extremity, unspecified chronicity, unspecified vein    Cellulitis of left lower extremity    Chronic atrial fibrillation    Mitral valve insufficiency, unspecified etiology  Moderate    S/P coronary artery stent placement    History of total right knee replacement      FAMILY HISTORY:  No pertinent family history in first degree relatives  No signifecant h/o chf in the family    ALLERGIES:  No Known Allergies    MEDICATIONS:  STANDING MEDICATIONS  atorvastatin 20 milliGRAM(s) Oral at bedtime  chlorhexidine 4% Liquid 1 Application(s) Topical <User Schedule>  furosemide   Injectable 60 milliGRAM(s) IV Push two times a day  sacubitril 24 mG/valsartan 26 mG 1 Tablet(s) Oral two times a day    PRN MEDICATIONS    VITALS:   T(F): 96 (04-05-21 @ 04:00)  HR: 53 (04-05-21 @ 04:00)  BP: 144/66 (04-05-21 @ 04:00)  RR: 18 (04-05-21 @ 04:00)  SpO2: 96% (04-05-21 @ 08:00)    LABS:                        10.2   4.94  )-----------( 177      ( 05 Apr 2021 06:26 )             32.5     Hemoglobin: 10.2 g/dL (04-05 @ 06:26)  Hemoglobin: 9.8 g/dL (04-04 @ 13:19)    04-05    142  |  102  |  22<H>  ----------------------------<  90  3.9   |  30  |  1.2    Ca    8.6      05 Apr 2021 06:26  Mg     2.2     04-05    TPro  7.3  /  Alb  3.7  /  TBili  1.2  /  DBili  x   /  AST  23  /  ALT  13  /  AlkPhos  130<H>  04-05    Potassium Trend: 3.9<--, 3.6<--  SODIUM TREND:  Sodium 142 [04-05 @ 06:26]  Sodium 135 [04-04 @ 13:19]    Creatinine Trend: 1.2<--, 1.4<--  PT/INR - ( 05 Apr 2021 06:26 )   PT: >40.00 sec;   INR: 7.14 ratio         PTT - ( 05 Apr 2021 06:26 )  PTT:60.2 sec      Troponin T, Serum: <0.01 ng/mL (04-05-21 @ 06:26)  Calcium, Total Serum: 8.6 mg/dL (04-05-21 @ 06:26)  Magnesium, Serum: 2.2 mg/dL (04-05-21 @ 06:26)  Calcium, Total Serum: 8.1 mg/dL *L* (04-04-21 @ 13:19)  Troponin T, Serum: <0.01 ng/mL (04-04-21 @ 13:19)      CARDIAC MARKERS ( 05 Apr 2021 06:26 )  x     / <0.01 ng/mL / x     / x     / x      CARDIAC MARKERS ( 04 Apr 2021 13:19 )  x     / <0.01 ng/mL / x     / x     / x          COVID  04-04-21 @ 13:19  COVID -   [facility]      04-04-21 @ 07:01  -  04-05-21 @ 07:00  --------------------------------------------------------  IN: 250 mL / OUT: 1050 mL / NET: -800 mL    04-05-21 @ 07:01  -  04-05-21 @ 12:50  --------------------------------------------------------  IN: 120 mL / OUT: 400 mL / NET: -280 mL      RADIOLOGY:    CARDIOLOGY:  < from: TTE Echo Complete w/o Contrast w/ Doppler (04.05.21 @ 07:21) >  Summary:   1. Left ventricular ejection fraction, by visual estimation, is 55-65%.   2. Normal global left ventricular systolic function.   3. Spectral Doppler shows restrictive pattern of left ventricular myocardial filling (Grade III diastolic dysfunction).   4. Severely enlarged left atrium.   5. Moderate mitral regurgitation.   6. Moderate tricuspid regurgitation.   7. Estimated pulmonary artery systolic pressure is 70.4 mmHg assuming a right atrial pressure of 8 mmHg, which is consistent with severe pulmonary hypertension.   8. LA volume Index is 78.0 ml/m² ml/m2.   9. Mild dilatation of the ascending aorta (4.3 cm).    < end of copied text >    PHYSICAL EXAM:  GEN: No acute distress  HEENT: normocephalic, atraumatic, aniceteric  LUNGS: Clear to auscultation bilaterally, no rales/wheezing/ rhonchi  HEART: S1/S2 present. RRR, no murmurs  ABD: Soft, non-tender, non-distended. Bowel sounds present  EXT: Warm, well perfused, skin color appropriate for ethnicity, +distal pulses, +motor/sensory fnxn intact x all 4 ext's.   NEURO: difficult to rouse, but awakens to voice

## 2021-04-05 NOTE — CONSULT NOTE ADULT - ASSESSMENT
Cardiologist: Dr. Arnulfo Scherer  EP: None    79 y/o male with PMH CAD s/p PCI and stent 2007, COPD on 2L home O2, HFpEF, DLD, atrial fibrillation on coumadin admitted with HF exacerbation, being diuresed.     Impression:  Acute on chronic HFpEF exacerbation, EF 55-60%  Echo with  LA enlargement, mod MR and TR  Hx PAF with slow ventricular response, on coumadin, asymptomatic  Supratherapeutic INR 7.6, coumadin on hold  Hx CAD s/p PCI 2007  Hx COPD - home O2 2L    Plan:  - No PPM indicated at present time, pt asymptomatic   - Cont current management  - F/u with cardiology  - Please re call if needed

## 2021-04-05 NOTE — CONSULT NOTE ADULT - SUBJECTIVE AND OBJECTIVE BOX
Patient is a 80y old  Male who presents with a chief complaint of Chest pain, SOB (04 Apr 2021 16:45)    HPI:  81 y/o male with PMH CAD s/p PCI and stent 2007, COPD on 2L home O2, HFpEF, DLD, atrial fibrillation on coumadin presents to [facility] with chief complaint of chest pain and shortness of breath. Patient states his symptoms started about 2 days ago while he was sitting at rest. He developed a dull pain in his chest that he describes as someone "sitting on my chest". Over the next few days he developed some shortness of breath. The chest pain became intermittent. He has a visiting nurse who comes to take care of him - she told him that he had some "water in the lungs". He denies any fevers, chills, cough, headaches, dizziness, nausea, vomiting. He does note that his legs have become swollen. He has also felt these symptoms before (several years ago for which he was treated for congestive heart failure).     In the ED, vitals were /58, HR 61, RR 18, T 98, SPO2 88% on RA. Patient admitted to medicine for further management.  (04 Apr 2021 16:45)      EP consulted for evaluation of AF with slow ventricular response.     REVIEW OF SYSTEMS    [x] A ten-point review of systems was otherwise negative except as noted.  [ ] Due to altered mental status/intubation, subjective information were not able to be obtained from the patient. History was obtained, to the extent possible, from review of the chart and collateral sources of information.      PAST MEDICAL & SURGICAL HISTORY:  COPD (chronic obstructive pulmonary disease)    Hypertension    Deep vein thrombosis (DVT) of left lower extremity, unspecified chronicity, unspecified vein    Cellulitis of left lower extremity    Chronic atrial fibrillation    Mitral valve insufficiency, unspecified etiology  Moderate    S/P coronary artery stent placement    History of total right knee replacement        Home Medications:  atorvastatin 20 mg oral tablet: 1 tab(s) orally once a day (04 Apr 2021 16:43)  Entresto 24 mg-26 mg oral tablet: 1 tab(s) orally 2 times a day (04 Apr 2021 16:43)  Lasix 40 mg oral tablet: 1 tab(s) orally 2 times a day; 3 times if needed (04 Apr 2021 16:43)  warfarin 2 mg oral tablet: 1 tab(s) orally once a day; total 12mg per week (04 Apr 2021 16:43)      Allergies:    No Known Allergies      PREVIOUS DIAGNOSTIC TESTING:      ECHO  FINDINGS: < from: TTE Echo Complete w/o Contrast w/ Doppler (04.05.21 @ 07:21) >  Summary:   1. Left ventricular ejection fraction, by visual estimation, is 55-65%.   2. Normal global left ventricular systolic function.   3. Spectral Doppler shows restrictive pattern of left ventricular myocardial filling (Grade III diastolic dysfunction).   4. Severely enlarged left atrium.   5. Moderate mitral regurgitation.   6. Moderate tricuspid regurgitation.   7. Estimated pulmonary artery systolic pressure is 70.4 mmHg assuming a right atrial pressure of 8 mmHg, which is consistent with severe pulmonary hypertension.   8. LA volume Index is 78.0 ml/m² ml/m2.   9. Mild dilatation of the ascending aorta (4.3 cm).    PHYSICIAN INTERPRETATION:  Left Ventricle: The left ventricular internal cavity size is moderately increased. Left ventricular wall thickness is normal. Global LV systolic function was normal. Left ventricular ejection fraction, by visual estimation, is 55-65%. Spectral Doppler shows restrictive pattern of left ventricular myocardial filling (Grade IIIdiastolic dysfunction).  Right Ventricle: RV systolic function is normal.  Left Atrium: Severely enlarged left atrium. LA volume Index is 78.0 ml/m² ml/m2.  Right Atrium: Right atrial enlargement.  Pericardium: There is no evidence of pericardial effusion.  Mitral Valve: The mitral valve is normal in structure. There is mild mitral annular calcification. No evidence of mitral stenosis. Moderate mitral regurgitation.  Tricuspid Valve: The tricuspid valve is normal in structure. Moderate tricuspid regurgitation is visualized. Estimated pulmonary artery systolic pressure is 70.4 mmHg assuming a right atrial pressure of 8 mmHg, which is consistent with severe pulmonary hypertension.  Aortic Valve: The aortic valve is trileaflet. No evidence of aortic stenosis. Peak transaortic gradient equals 9.0 mmHg, mean transaortic gradient equals 4.3 mmHg, the calculated aortic valve area equals 3.59 cm² by the continuity equation consistent with normally opening aortic valve. No aortic regurgitation.  Pulmonic Valve: The pulmonic valve was not well visualized.  Aorta: There is dilatation of the ascending aorta.  Pulmonary Artery: The main pulmonary artery is normal in size.  Venous: The inferior vena cava was dilated, with respiratory size variationless than 50%.    < end of copied text >    FAMILY HISTORY:  No pertinent family history in first degree relatives  No significant h/o chf in the family      SOCIAL HISTORY: Former smoker, quit about 10 years ago. Smoked 1 pack per day prior to quitting.    MEDICATIONS  (STANDING):  atorvastatin 20 milliGRAM(s) Oral at bedtime  chlorhexidine 4% Liquid 1 Application(s) Topical <User Schedule>  furosemide   Injectable 60 milliGRAM(s) IV Push two times a day  sacubitril 24 mG/valsartan 26 mG 1 Tablet(s) Oral two times a day    MEDICATIONS  (PRN):      Vital Signs Last 24 Hrs  T(C): 35.6 (05 Apr 2021 04:00), Max: 36.7 (04 Apr 2021 12:05)  T(F): 96 (05 Apr 2021 04:00), Max: 98 (04 Apr 2021 12:05)  HR: 53 (05 Apr 2021 04:00) (50 - 61)  BP: 144/66 (05 Apr 2021 04:00) (132/57 - 153/70)  BP(mean): --  RR: 18 (05 Apr 2021 04:00) (18 - 20)  SpO2: 96% (05 Apr 2021 08:00) (88% - 100%)    PHYSICAL EXAM:    GENERAL: Elderly male, In no apparent distress, well nourished, and hydrated.  HEART: Regular rate and rhythm; No murmurs, rubs, or gallops.  PULMONARY: B/L rales at the bases  ABDOMEN: Soft, Nontender, Nondistended; Bowel sounds present  EXTREMITIES:  2+ Peripheral Pulses, No clubbing, cyanosis, or edema  NEUROLOGICAL: AO x4, SAPP< speech clear    INTERPRETATION OF TELEMETRY: AF 53 BPM    ECG: < from: 12 Lead ECG (04.04.21 @ 12:11) >  Ventricular Rate 56 BPM    QRS Duration 90 ms    Q-T Interval 450 ms    QTC Calculation(Bazett) 434 ms    R Axis -37 degrees    T Axis 53 degrees    Diagnosis Line Atrial fibrillation with slow ventricular response with premature ventricular  or aberrantly conducted complexes  Left axis deviation  Low voltage QRS  Nonspecific ST and T wave abnormality  Abnormal ECG    < end of copied text >      I&O's Detail    04 Apr 2021 07:01  -  05 Apr 2021 07:00  --------------------------------------------------------  IN:    Oral Fluid: 250 mL  Total IN: 250 mL    OUT:    Voided (mL): 1050 mL  Total OUT: 1050 mL    Total NET: -800 mL      05 Apr 2021 07:01  -  05 Apr 2021 11:34  --------------------------------------------------------  IN:    Oral Fluid: 120 mL  Total IN: 120 mL    OUT:    Voided (mL): 400 mL  Total OUT: 400 mL    Total NET: -280 mL    LABS:                        10.2   4.94  )-----------( 177      ( 05 Apr 2021 06:26 )             32.5     04-05    142  |  102  |  22<H>  ----------------------------<  90  3.9   |  30  |  1.2    Ca    8.6      05 Apr 2021 06:26  Mg     2.2     04-05    TPro  7.3  /  Alb  3.7  /  TBili  1.2  /  DBili  x   /  AST  23  /  ALT  13  /  AlkPhos  130<H>  04-05    CARDIAC MARKERS ( 05 Apr 2021 06:26 )  x     / <0.01 ng/mL / x     / x     / x      CARDIAC MARKERS ( 04 Apr 2021 13:19 )  x     / <0.01 ng/mL / x     / x     / x          PT/INR - ( 05 Apr 2021 06:26 )   PT: >40.00 sec;   INR: 7.14 ratio         PTT - ( 05 Apr 2021 06:26 )  PTT:60.2 sec    BNPSerum Pro-Brain Natriuretic Peptide: [id number] pg/mL (04-04 @ 13:19)        I&O's Detail    04 Apr 2021 07:01  -  05 Apr 2021 07:00  --------------------------------------------------------  IN:    Oral Fluid: 250 mL  Total IN: 250 mL    OUT:    Voided (mL): 1050 mL  Total OUT: 1050 mL    Total NET: -800 mL      05 Apr 2021 07:01  -  05 Apr 2021 11:34  --------------------------------------------------------  IN:    Oral Fluid: 120 mL  Total IN: 120 mL    OUT:    Voided (mL): 400 mL  Total OUT: 400 mL    Total NET: -280 mL    Daily Height in cm: 183.64 (04 Apr 2021 23:11)    Daily     RADIOLOGY & ADDITIONAL STUDIES:

## 2021-04-05 NOTE — CONSULT NOTE ADULT - ATTENDING COMMENTS
## Persistent atrial fibrillation  ## Bradycardia    - CHADSVASC: 5+ (Age, HTN, CAD, HF). On warfarin. INR f-up  - INR supra-therapeutic. No bleeding. Continue to hold warfarin. Reverse as needed  - Patient denies any symptoms. If he is symptomatic (fatigue, MOORE), he will benefit from PPM. At this time, patient doesn't want to undergo PPM.  - Will sign off for now. Recall as needed.  - Cardiology f-up  - OP fup in 1 months.
Cardiologist:  Dr. Arnulfo Scherer    Acute on chronic HFpEF, Decompensated, hemodynamically stable  Persistent AF with slow RVR and supratherapeutic INR  CAD s/p PCI LCx (2007)    CXR:  PVC with R pleural effusion    Taking Lasix 40 mg TID at home    Lasix 60 mg IV q12  Will consider changing to oral Torsemide on discharge if inadequate response to Furosemide  Continue Entresto (recently FDA-approved for HFpEF)  Hold Coumadin and Metoprolol for now  Echo to r/o new LV dysfunction

## 2021-04-05 NOTE — PROGRESS NOTE ADULT - ASSESSMENT
ASSESSMENT:  79 y/o male with PMH CAD s/p PCI and stent 2007, COPD on 2L home O2, CHFpEF, DLD, atrial fibrillation on coumadin presents to [facility] with chief complaint of chest pain and shortness of breath.    PLAN/ACTIVE PROBLEMS:  # Acute on Chronic HFpEF exacerbation, right heart failure. Cor Pulmonale LVEF 55-60%, grade III diastolic dysfunction, severe LAE, mod MR, mod TR, severe pulmonary HTN.   - Continue Lasix 60mg IV BID. Continue Metoprolol. Start on Entresto per cardiology.   - Daily weight, fluid restriction   - Trop negative x 2     # Chronic Paroxysmal Atrial fibrillation:   - not in a fib, bradycardic this AM resolved spontaneously     # Supratherapeutic INR  - INR: 7.14  - continue holding coumadin     # h/o COPD, not in exacerbation on Home O2  - c/w supplemental O2 goal spo2 92%    # CAD s/p PCI:   - Continue Lipitor    # Normocytic anemia   Follow-up Vitamin B12, Folate, and iron studies    DISPOSITION: acute

## 2021-04-06 LAB
ALBUMIN SERPL ELPH-MCNC: 3.5 G/DL — SIGNIFICANT CHANGE UP (ref 3.5–5.2)
ALP SERPL-CCNC: 118 U/L — HIGH (ref 30–115)
ALT FLD-CCNC: 11 U/L — SIGNIFICANT CHANGE UP (ref 0–41)
ANION GAP SERPL CALC-SCNC: 8 MMOL/L — SIGNIFICANT CHANGE UP (ref 7–14)
AST SERPL-CCNC: 22 U/L — SIGNIFICANT CHANGE UP (ref 0–41)
BASOPHILS # BLD AUTO: 0.02 K/UL — SIGNIFICANT CHANGE UP (ref 0–0.2)
BASOPHILS NFR BLD AUTO: 0.5 % — SIGNIFICANT CHANGE UP (ref 0–1)
BILIRUB SERPL-MCNC: 1.3 MG/DL — HIGH (ref 0.2–1.2)
BUN SERPL-MCNC: 20 MG/DL — SIGNIFICANT CHANGE UP (ref 10–20)
CALCIUM SERPL-MCNC: 8.8 MG/DL — SIGNIFICANT CHANGE UP (ref 8.5–10.1)
CHLORIDE SERPL-SCNC: 98 MMOL/L — SIGNIFICANT CHANGE UP (ref 98–110)
CO2 SERPL-SCNC: 32 MMOL/L — SIGNIFICANT CHANGE UP (ref 17–32)
COVID-19 SPIKE DOMAIN AB INTERP: POSITIVE
COVID-19 SPIKE DOMAIN ANTIBODY RESULT: >250 U/ML — HIGH
CREAT SERPL-MCNC: 1.2 MG/DL — SIGNIFICANT CHANGE UP (ref 0.7–1.5)
EOSINOPHIL # BLD AUTO: 0.12 K/UL — SIGNIFICANT CHANGE UP (ref 0–0.7)
EOSINOPHIL NFR BLD AUTO: 2.9 % — SIGNIFICANT CHANGE UP (ref 0–8)
FOLATE SERPL-MCNC: 7 NG/ML — SIGNIFICANT CHANGE UP
GLUCOSE SERPL-MCNC: 96 MG/DL — SIGNIFICANT CHANGE UP (ref 70–99)
HCT VFR BLD CALC: 33.1 % — LOW (ref 42–52)
HGB BLD-MCNC: 10.1 G/DL — LOW (ref 14–18)
IMM GRANULOCYTES NFR BLD AUTO: 0.2 % — SIGNIFICANT CHANGE UP (ref 0.1–0.3)
INR BLD: 7.41 RATIO — CRITICAL HIGH (ref 0.65–1.3)
LYMPHOCYTES # BLD AUTO: 0.68 K/UL — LOW (ref 1.2–3.4)
LYMPHOCYTES # BLD AUTO: 16.2 % — LOW (ref 20.5–51.1)
MAGNESIUM SERPL-MCNC: 2.2 MG/DL — SIGNIFICANT CHANGE UP (ref 1.8–2.4)
MCHC RBC-ENTMCNC: 27.2 PG — SIGNIFICANT CHANGE UP (ref 27–31)
MCHC RBC-ENTMCNC: 30.5 G/DL — LOW (ref 32–37)
MCV RBC AUTO: 89 FL — SIGNIFICANT CHANGE UP (ref 80–94)
MONOCYTES # BLD AUTO: 0.59 K/UL — SIGNIFICANT CHANGE UP (ref 0.1–0.6)
MONOCYTES NFR BLD AUTO: 14 % — HIGH (ref 1.7–9.3)
NEUTROPHILS # BLD AUTO: 2.78 K/UL — SIGNIFICANT CHANGE UP (ref 1.4–6.5)
NEUTROPHILS NFR BLD AUTO: 66.2 % — SIGNIFICANT CHANGE UP (ref 42.2–75.2)
NRBC # BLD: 0 /100 WBCS — SIGNIFICANT CHANGE UP (ref 0–0)
PHOSPHATE SERPL-MCNC: 3 MG/DL — SIGNIFICANT CHANGE UP (ref 2.1–4.9)
PLATELET # BLD AUTO: 173 K/UL — SIGNIFICANT CHANGE UP (ref 130–400)
POTASSIUM SERPL-MCNC: 4.3 MMOL/L — SIGNIFICANT CHANGE UP (ref 3.5–5)
POTASSIUM SERPL-SCNC: 4.3 MMOL/L — SIGNIFICANT CHANGE UP (ref 3.5–5)
PROT SERPL-MCNC: 6.7 G/DL — SIGNIFICANT CHANGE UP (ref 6–8)
PROTHROM AB SERPL-ACNC: >40 SEC — HIGH (ref 9.95–12.87)
RBC # BLD: 3.72 M/UL — LOW (ref 4.7–6.1)
RBC # FLD: 19 % — HIGH (ref 11.5–14.5)
SARS-COV-2 IGG+IGM SERPL QL IA: >250 U/ML — HIGH
SARS-COV-2 IGG+IGM SERPL QL IA: POSITIVE
SODIUM SERPL-SCNC: 138 MMOL/L — SIGNIFICANT CHANGE UP (ref 135–146)
T4 FREE SERPL-MCNC: 1.3 NG/DL — SIGNIFICANT CHANGE UP (ref 0.9–1.8)
TSH SERPL-MCNC: 3.27 UIU/ML — SIGNIFICANT CHANGE UP (ref 0.27–4.2)
VIT B12 SERPL-MCNC: 252 PG/ML — SIGNIFICANT CHANGE UP (ref 232–1245)
WBC # BLD: 4.2 K/UL — LOW (ref 4.8–10.8)
WBC # FLD AUTO: 4.2 K/UL — LOW (ref 4.8–10.8)

## 2021-04-06 PROCEDURE — 99233 SBSQ HOSP IP/OBS HIGH 50: CPT

## 2021-04-06 RX ORDER — FUROSEMIDE 40 MG
40 TABLET ORAL ONCE
Refills: 0 | Status: COMPLETED | OUTPATIENT
Start: 2021-04-06 | End: 2021-04-06

## 2021-04-06 RX ORDER — FUROSEMIDE 40 MG
40 TABLET ORAL
Refills: 0 | Status: DISCONTINUED | OUTPATIENT
Start: 2021-04-07 | End: 2021-04-08

## 2021-04-06 RX ADMIN — Medication 40 MILLIGRAM(S): at 17:49

## 2021-04-06 RX ADMIN — Medication 60 MILLIGRAM(S): at 05:45

## 2021-04-06 RX ADMIN — SACUBITRIL AND VALSARTAN 1 TABLET(S): 24; 26 TABLET, FILM COATED ORAL at 17:47

## 2021-04-06 RX ADMIN — ATORVASTATIN CALCIUM 20 MILLIGRAM(S): 80 TABLET, FILM COATED ORAL at 21:24

## 2021-04-06 RX ADMIN — SACUBITRIL AND VALSARTAN 1 TABLET(S): 24; 26 TABLET, FILM COATED ORAL at 05:46

## 2021-04-06 NOTE — MEDICAL STUDENT PROGRESS NOTE(EDUCATION) - NS MD HP STUD ASPLAN PLAN FT
#Acute on chronic HFpEF exacerbation, likely cor pulmonale (LVEF 55%-65% on 4/5/21), grade III diastolic dysfunction, severely enlarged left atrium, mod MR, mod TR, severe pulmonary HTN  - c/w Lasix 60 mg IV BID for diuresis  - hold metoprolol  - c/w Entresto per cardiology rec  - strict I&Os, fluid restriction    #chronic paroxysmal a fib  - f/u EKG    #supratherapeutic INR  - 7.41<7.14<7.66  - continue to hold warfarin    #COPD, on home O2  - c/w supplemental O2, goal for SpO2 99-92%    #CAD s/p PCI:  - c/w atorvastatin 20 mg    #normocytic anemia   - Vitamin B12, folate levels WNL     Disposition: Home.

## 2021-04-06 NOTE — PROGRESS NOTE ADULT - SUBJECTIVE AND OBJECTIVE BOX
RODNEY SANTO  80y  Male      Patient is a 80y old  Male who presents with a chief complaint of Chest pain, SOB (05 Apr 2021 12:50)      INTERVAL HPI/OVERNIGHT EVENTS:  He feels ok, no chest pain or SOB, telemetry still showing bradycardia.   Vital Signs Last 24 Hrs  T(C): 35.7 (06 Apr 2021 05:00), Max: 36.4 (05 Apr 2021 19:34)  T(F): 96.3 (06 Apr 2021 05:00), Max: 97.6 (05 Apr 2021 19:34)  HR: 72 (06 Apr 2021 05:00) (60 - 72)  BP: 155/88 (06 Apr 2021 05:00) (124/60 - 155/88)  BP(mean): --  RR: 18 (06 Apr 2021 05:00) (18 - 18)  SpO2: 100% (05 Apr 2021 21:03) (100% - 100%)      04-05-21 @ 07:01  -  04-06-21 @ 07:00  --------------------------------------------------------  IN: 920 mL / OUT: 2925 mL / NET: -2005 mL    04-06-21 @ 07:01  -  04-06-21 @ 13:53  --------------------------------------------------------  IN: 240 mL / OUT: 450 mL / NET: -210 mL            Consultant(s) Notes Reviewed:  [x ] YES  [ ] NO          MEDICATIONS  (STANDING):  atorvastatin 20 milliGRAM(s) Oral at bedtime  chlorhexidine 4% Liquid 1 Application(s) Topical <User Schedule>  furosemide   Injectable 40 milliGRAM(s) IV Push once  sacubitril 24 mG/valsartan 26 mG 1 Tablet(s) Oral two times a day    MEDICATIONS  (PRN):      LABS                          10.1   4.20  )-----------( 173      ( 06 Apr 2021 07:35 )             33.1     04-06    138  |  98  |  20  ----------------------------<  96  4.3   |  32  |  1.2    Ca    8.8      06 Apr 2021 07:35  Phos  3.0     04-06  Mg     2.2     04-06    TPro  6.7  /  Alb  3.5  /  TBili  1.3<H>  /  DBili  x   /  AST  22  /  ALT  11  /  AlkPhos  118<H>  04-06        PT/INR - ( 06 Apr 2021 07:35 )   PT: >40.00 sec;   INR: 7.41 ratio         PTT - ( 05 Apr 2021 06:26 )  PTT:60.2 sec  Lactate Trend    CARDIAC MARKERS ( 05 Apr 2021 06:26 )  x     / <0.01 ng/mL / x     / x     / x          CAPILLARY BLOOD GLUCOSE            RADIOLOGY & ADDITIONAL TESTS:    Imaging Personally Reviewed:  [ ] YES  [ ] NO    HEALTH ISSUES - PROBLEM Dx:          PHYSICAL EXAM:  GENERAL: NAD, well-developed.  HEAD:  Atraumatic, Normocephalic.  EYES: EOMI, PERRLA, conjunctiva and sclera clear.  NECK: Supple, No JVD.  CHEST/LUNG: Bibasilar rales.   HEART: Irregular rate and rhythm; S1 S2.   ABDOMEN: Soft, Nontender, Nondistended; Bowel sounds present.  EXTREMITIES: LE edema 1+  PSYCH: AAOx3.  NEUROLOGY: non-focal.  SKIN: No rashes or lesions.

## 2021-04-06 NOTE — MEDICAL STUDENT PROGRESS NOTE(EDUCATION) - NS MD HP STUD ASPLAN ASSES FT
79 yo M with pmhx of CAD s/p PCI and stent (2007), HFpEF (EF of 55-65% on 4/5/21), COPD on 2L home O2, DLD, HTN, atrial fibrillation on warfarin with acute on chronic HFpEF exacerbation.

## 2021-04-06 NOTE — PROGRESS NOTE ADULT - ASSESSMENT
79 y/o male with PMH CAD s/p PCI and stent 2007, COPD on 2L home O2, CHFpEF, DLD, atrial fibrillation on coumadin presents to [facility] with chief complaint of chest pain and shortness of breath.    A/P:   Acute HFpEF exacerbation: right heart failure. Core Pulmonale.   Patient with SOB, LE edema. Pro-BNP 13K.   CXR showed bilateral mild to mo pleural effusion, interstitial infiltrates lower lobes.   Echo showed LVEF LVEF 55-60%, grade III diastolic dysfunction, severe LAE, mod MR, mod TR, severe pulmonary HTN.   Continue Lasix 60mg IV BID. Can switch to PO tomorrow,  Metoprolol on hold due to bradycardia. Start on Entresto per cardiology.   Daily weight, fluid restriction.     Chronic Atrial fibrillation:   Rate is slow. INR is supratherapeutic, hold coumadin.     COPD: stable  Continue home O2.     CAD s/p PCI:   Continue  Lipitor.     Normocytic anemia   B12 252 borderline low, check hemocystein, Folate 7, send iron studies    #Progress Note Handoff:  Pending (specify):  improving volume overload.   Family discussion: with patient. Continue IV diuresis.  Disposition: Home in 24-48 hrs.

## 2021-04-06 NOTE — MEDICAL STUDENT PROGRESS NOTE(EDUCATION) - SUBJECTIVE AND OBJECTIVE BOX
SUBJECTIVE:    CC: "chest pain and SOB"     HPI: 79 yo M with pmhx of CAD s/p PCI and stent (2007), HFpEF (EF of 55-65% on 4/5/21), COPD on 2L home O2, DLD, HTN, atrial fibrillation on warfarin who presented with chest pain for two days and worsening dyspnea on exertion for past few weeks PTA, admitted for acute HFpEF exacerbation, seen and evaluated on HOD #2. Patient is resting comfortably in bed, reports improvement in symptoms, no acute complaints. This morning, he     The patient reports experiencing intermittent sharp midsternal chest pain lasting a few seconds at a time, 6/10 in severity and SOB when walking, associated with b/l worsening LE edema.       Patient reports experiencing       PAST MEDICAL & SURGICAL HISTORY  COPD (chronic obstructive pulmonary disease)    Hypertension    Deep vein thrombosis (DVT) of left lower extremity, unspecified chronicity, unspecified vein    Cellulitis of left lower extremity    Chronic atrial fibrillation    Mitral valve insufficiency, unspecified etiology  Moderate    S/P coronary artery stent placement    History of total right knee replacement    FAMILY HISTORY:  No pertinent family history in first degree relatives  No significant h/o CHF in the family.    ALLERGIES:  No Known Allergies    MEDICATIONS:  MEDICATIONS  (STANDING):  atorvastatin 20 milliGRAM(s) Oral at bedtime  chlorhexidine 4% Liquid 1 Application(s) Topical <User Schedule>  furosemide   Injectable 40 milliGRAM(s) IV Push once  sacubitril 24 mG/valsartan 26 mG 1 Tablet(s) Oral two times a day    VITALS:   T(F): 96.3 (04-06-21 @ 05:00)  HR: 72 (04-06-21 @ 05:00)  BP: 155/88 (04-06-21 @ 05:00)  BP(mean): --  RR: 18 (04-06-21 @ 05:00)  SpO2: 100% (04-05-21 @ 21:03)    LABS:                        10.1   4.20  )-----------( 173      ( 06 Apr 2021 07:35 )             33.1     Hemoglobin: 10.1 g/dL (04-06 @ 07:35)  Hemoglobin: 10.2 g/dL (04-05 @ 06:26)  Hemoglobin: 9.8 g/dL (04-04 @ 13:19)    04-06    138  |  98  |  20  ----------------------------<  96  4.3   |  32  |  1.2    Ca    8.8      06 Apr 2021 07:35  Phos  3.0     04-06  Mg     2.2     04-06    TPro  6.7  /  Alb  3.5  /  TBili  1.3<H>  /  DBili  x   /  AST  22  /  ALT  11  /  AlkPhos  118<H>  04-06    Potassium Trend: 4.3<--, 3.9<--, 3.6<--  SODIUM TREND:  Sodium 138 [04-06 @ 07:35]  Sodium 142 [04-05 @ 06:26]  Sodium 135 [04-04 @ 13:19]    Creatinine Trend: 1.2<--, 1.2<--, 1.4<--  PT/INR - ( 06 Apr 2021 07:35 )   PT: >40.00 sec;   INR: 7.41 ratio       PTT - ( 05 Apr 2021 06:26 )  PTT:60.2 sec    Calcium, Total Serum: 8.8 mg/dL (04-06-21 @ 07:35)  Phosphorus Level, Serum: 3.0 mg/dL (04-06-21 @ 07:35)  Magnesium, Serum: 2.2 mg/dL (04-06-21 @ 07:35)    CARDIAC MARKERS ( 05 Apr 2021 06:26 )  x     / <0.01 ng/mL / x     / x     / x      CARDIAC MARKERS ( 04 Apr 2021 13:19 )  x     / <0.01 ng/mL / x     / x     / x        COVID  04-04-21 @ 13:19  COVID -   [facility]    04-05-21 @ 07:01  -  04-06-21 @ 07:00  --------------------------------------------------------  IN: 920 mL / OUT: 2925 mL / NET: -2005 mL    04-06-21 @ 07:01  -  04-06-21 @ 11:11  --------------------------------------------------------  IN: 240 mL / OUT: 450 mL / NET: -210 mL    RADIOLOGY:    < from: TTE Echo Complete w/o Contrast w/ Doppler (04.05.21 @ 07:21) >  Summary:   1. Left ventricular ejection fraction, by visual estimation, is 55-65%.   2. Normal global left ventricular systolic function.   3. Spectral Doppler shows restrictive pattern of left ventricular myocardial filling (Grade III diastolic dysfunction).   4. Severely enlarged left atrium.   5. Moderate mitral regurgitation.   6. Moderate tricuspid regurgitation.   7. Estimated pulmonary artery systolic pressure is 70.4 mmHg assuming a right atrial pressure of 8 mmHg, which is consistent with severe pulmonary hypertension.   8. LA volume Index is 78.0 ml/m² ml/m2.   9. Mild dilatation of the ascending aorta (4.3 cm).    < end of copied text >    PHYSICAL EXAM:  GEN: No acute distress  HEENT: normocephalic, atraumatic, aniceteric  LUNGS: Clear to auscultation bilaterally, no rales/wheezing/ rhonchi  HEART: S1/S2 present. RRR, no murmurs  ABD: Soft, non-tender, non-distended. Bowel sounds present  EXT: Warm, well perfused, skin color appropriate for ethnicity, +distal pulses, +motor/sensory fnxn intact x all 4 ext's.   NEURO: AAOX3, normal affect      ASSESSMENT:    PLAN/ACTIVE PROBLEMS:    DISPOSITION: SUBJECTIVE:    CC: "chest pain and SOB"     HPI: 79 yo M with pmhx of CAD s/p PCI and stent (2007), HFpEF (EF of 55-65% on 4/5/21), COPD on 2L home O2, DLD, HTN, atrial fibrillation on warfarin who presented with chest pain for two days and worsening dyspnea on exertion for past few weeks PTA, admitted for acute HFpEF exacerbation, seen and evaluated on HOD #2. The patient reports experiencing intermittent sharp midsternal chest pain lasting a few seconds at a time, 6/10 in severity and SOB when walking, associated with b/l worsening LE edema. This morning, he is resting comfortably in bed, reports improvement in symptoms, no acute complaints. Patient continues to be bradycardic, had episode of a fib seen on telemetry.     PAST MEDICAL & SURGICAL HISTORY  COPD (chronic obstructive pulmonary disease)    Hypertension    Deep vein thrombosis (DVT) of left lower extremity, unspecified chronicity, unspecified vein    Cellulitis of left lower extremity    Chronic atrial fibrillation    Mitral valve insufficiency, unspecified etiology  Moderate    S/P coronary artery stent placement    History of total right knee replacement    FAMILY HISTORY:  No pertinent family history in first degree relatives  No significant h/o CHF in the family.    ALLERGIES:  No Known Allergies    MEDICATIONS:  MEDICATIONS  (STANDING):  atorvastatin 20 milliGRAM(s) Oral at bedtime  chlorhexidine 4% Liquid 1 Application(s) Topical <User Schedule>  furosemide   Injectable 40 milliGRAM(s) IV Push once  sacubitril 24 mG/valsartan 26 mG 1 Tablet(s) Oral two times a day    VITALS:   T(F): 96.3 (04-06-21 @ 05:00)  HR: 72 (04-06-21 @ 05:00)  BP: 155/88 (04-06-21 @ 05:00)  BP(mean): --  RR: 18 (04-06-21 @ 05:00)  SpO2: 100% (04-05-21 @ 21:03)    LABS:                        10.1   4.20  )-----------( 173      ( 06 Apr 2021 07:35 )             33.1     Hemoglobin: 10.1 g/dL (04-06 @ 07:35)  Hemoglobin: 10.2 g/dL (04-05 @ 06:26)  Hemoglobin: 9.8 g/dL (04-04 @ 13:19)    04-06    138  |  98  |  20  ----------------------------<  96  4.3   |  32  |  1.2    Ca    8.8      06 Apr 2021 07:35  Phos  3.0     04-06  Mg     2.2     04-06    TPro  6.7  /  Alb  3.5  /  TBili  1.3<H>  /  DBili  x   /  AST  22  /  ALT  11  /  AlkPhos  118<H>  04-06    Potassium Trend: 4.3<--, 3.9<--, 3.6<--  SODIUM TREND:  Sodium 138 [04-06 @ 07:35]  Sodium 142 [04-05 @ 06:26]  Sodium 135 [04-04 @ 13:19]    Creatinine Trend: 1.2<--, 1.2<--, 1.4<--  PT/INR - ( 06 Apr 2021 07:35 )   PT: >40.00 sec;   INR: 7.41 ratio       PTT - ( 05 Apr 2021 06:26 )  PTT:60.2 sec    Calcium, Total Serum: 8.8 mg/dL (04-06-21 @ 07:35)  Phosphorus Level, Serum: 3.0 mg/dL (04-06-21 @ 07:35)  Magnesium, Serum: 2.2 mg/dL (04-06-21 @ 07:35)    CARDIAC MARKERS ( 05 Apr 2021 06:26 )  x     / <0.01 ng/mL / x     / x     / x      CARDIAC MARKERS ( 04 Apr 2021 13:19 )  x     / <0.01 ng/mL / x     / x     / x        COVID  04-04-21 @ 13:19  COVID -   [facility]    04-05-21 @ 07:01  -  04-06-21 @ 07:00  --------------------------------------------------------  IN: 920 mL / OUT: 2925 mL / NET: -2005 mL    04-06-21 @ 07:01  -  04-06-21 @ 11:11  --------------------------------------------------------  IN: 240 mL / OUT: 450 mL / NET: -210 mL    RADIOLOGY:    < from: TTE Echo Complete w/o Contrast w/ Doppler (04.05.21 @ 07:21) >  Summary:   1. Left ventricular ejection fraction, by visual estimation, is 55-65%.   2. Normal global left ventricular systolic function.   3. Spectral Doppler shows restrictive pattern of left ventricular myocardial filling (Grade III diastolic dysfunction).   4. Severely enlarged left atrium.   5. Moderate mitral regurgitation.   6. Moderate tricuspid regurgitation.   7. Estimated pulmonary artery systolic pressure is 70.4 mmHg assuming a right atrial pressure of 8 mmHg, which is consistent with severe pulmonary hypertension.   8. LA volume Index is 78.0 ml/m² ml/m2.   9. Mild dilatation of the ascending aorta (4.3 cm).    < end of copied text >    PHYSICAL EXAM:  GEN: Pleasant, well developed, no acute distress  HEENT: NCAT, EOMI, anicteric   LUNGS: B/l bibasilar crackles   HEART: S1/S2 present, irregular rate and rhythm   ABD: +BS, soft, NTND  EXT: Warm, well perfused, LE pitting edema 2+   NEURO: AAOX3, normal affect  SKIN: No rashes or lesions

## 2021-04-07 LAB
ALBUMIN SERPL ELPH-MCNC: 3.2 G/DL — LOW (ref 3.5–5.2)
ALP SERPL-CCNC: 106 U/L — SIGNIFICANT CHANGE UP (ref 30–115)
ALT FLD-CCNC: 10 U/L — SIGNIFICANT CHANGE UP (ref 0–41)
ANION GAP SERPL CALC-SCNC: 6 MMOL/L — LOW (ref 7–14)
AST SERPL-CCNC: 18 U/L — SIGNIFICANT CHANGE UP (ref 0–41)
BASOPHILS # BLD AUTO: 0.01 K/UL — SIGNIFICANT CHANGE UP (ref 0–0.2)
BASOPHILS NFR BLD AUTO: 0.3 % — SIGNIFICANT CHANGE UP (ref 0–1)
BILIRUB SERPL-MCNC: 1 MG/DL — SIGNIFICANT CHANGE UP (ref 0.2–1.2)
BUN SERPL-MCNC: 20 MG/DL — SIGNIFICANT CHANGE UP (ref 10–20)
CALCIUM SERPL-MCNC: 8.4 MG/DL — LOW (ref 8.5–10.1)
CHLORIDE SERPL-SCNC: 96 MMOL/L — LOW (ref 98–110)
CO2 SERPL-SCNC: 34 MMOL/L — HIGH (ref 17–32)
CREAT SERPL-MCNC: 1.1 MG/DL — SIGNIFICANT CHANGE UP (ref 0.7–1.5)
EOSINOPHIL # BLD AUTO: 0.09 K/UL — SIGNIFICANT CHANGE UP (ref 0–0.7)
EOSINOPHIL NFR BLD AUTO: 2.3 % — SIGNIFICANT CHANGE UP (ref 0–8)
GLUCOSE SERPL-MCNC: 82 MG/DL — SIGNIFICANT CHANGE UP (ref 70–99)
HCT VFR BLD CALC: 32 % — LOW (ref 42–52)
HGB BLD-MCNC: 10 G/DL — LOW (ref 14–18)
IMM GRANULOCYTES NFR BLD AUTO: 0.3 % — SIGNIFICANT CHANGE UP (ref 0.1–0.3)
INR BLD: 7.14 RATIO — CRITICAL HIGH (ref 0.65–1.3)
LYMPHOCYTES # BLD AUTO: 0.7 K/UL — LOW (ref 1.2–3.4)
LYMPHOCYTES # BLD AUTO: 18.2 % — LOW (ref 20.5–51.1)
MAGNESIUM SERPL-MCNC: 2.1 MG/DL — SIGNIFICANT CHANGE UP (ref 1.8–2.4)
MCHC RBC-ENTMCNC: 27.2 PG — SIGNIFICANT CHANGE UP (ref 27–31)
MCHC RBC-ENTMCNC: 31.3 G/DL — LOW (ref 32–37)
MCV RBC AUTO: 87 FL — SIGNIFICANT CHANGE UP (ref 80–94)
MONOCYTES # BLD AUTO: 0.54 K/UL — SIGNIFICANT CHANGE UP (ref 0.1–0.6)
MONOCYTES NFR BLD AUTO: 14 % — HIGH (ref 1.7–9.3)
NEUTROPHILS # BLD AUTO: 2.5 K/UL — SIGNIFICANT CHANGE UP (ref 1.4–6.5)
NEUTROPHILS NFR BLD AUTO: 64.9 % — SIGNIFICANT CHANGE UP (ref 42.2–75.2)
NRBC # BLD: 0 /100 WBCS — SIGNIFICANT CHANGE UP (ref 0–0)
PHOSPHATE SERPL-MCNC: 2.9 MG/DL — SIGNIFICANT CHANGE UP (ref 2.1–4.9)
PLATELET # BLD AUTO: 177 K/UL — SIGNIFICANT CHANGE UP (ref 130–400)
POTASSIUM SERPL-MCNC: 3.5 MMOL/L — SIGNIFICANT CHANGE UP (ref 3.5–5)
POTASSIUM SERPL-SCNC: 3.5 MMOL/L — SIGNIFICANT CHANGE UP (ref 3.5–5)
PROT SERPL-MCNC: 6.4 G/DL — SIGNIFICANT CHANGE UP (ref 6–8)
PROTHROM AB SERPL-ACNC: >40 SEC — HIGH (ref 9.95–12.87)
RBC # BLD: 3.68 M/UL — LOW (ref 4.7–6.1)
RBC # FLD: 18.7 % — HIGH (ref 11.5–14.5)
SODIUM SERPL-SCNC: 136 MMOL/L — SIGNIFICANT CHANGE UP (ref 135–146)
WBC # BLD: 3.85 K/UL — LOW (ref 4.8–10.8)
WBC # FLD AUTO: 3.85 K/UL — LOW (ref 4.8–10.8)

## 2021-04-07 PROCEDURE — 99233 SBSQ HOSP IP/OBS HIGH 50: CPT

## 2021-04-07 RX ORDER — PHYTONADIONE (VIT K1) 5 MG
2.5 TABLET ORAL ONCE
Refills: 0 | Status: COMPLETED | OUTPATIENT
Start: 2021-04-07 | End: 2021-04-07

## 2021-04-07 RX ADMIN — Medication 40 MILLIGRAM(S): at 05:45

## 2021-04-07 RX ADMIN — SACUBITRIL AND VALSARTAN 1 TABLET(S): 24; 26 TABLET, FILM COATED ORAL at 05:45

## 2021-04-07 RX ADMIN — SACUBITRIL AND VALSARTAN 1 TABLET(S): 24; 26 TABLET, FILM COATED ORAL at 17:01

## 2021-04-07 RX ADMIN — CHLORHEXIDINE GLUCONATE 1 APPLICATION(S): 213 SOLUTION TOPICAL at 05:46

## 2021-04-07 RX ADMIN — ATORVASTATIN CALCIUM 20 MILLIGRAM(S): 80 TABLET, FILM COATED ORAL at 21:24

## 2021-04-07 RX ADMIN — Medication 40 MILLIGRAM(S): at 17:01

## 2021-04-07 RX ADMIN — Medication 2.5 MILLIGRAM(S): at 10:28

## 2021-04-07 NOTE — PROGRESS NOTE ADULT - SUBJECTIVE AND OBJECTIVE BOX
CHIEF COMPLAINT:    Patient is a 80y old  Male who presents with a chief complaint of Chest pain, SOB     INTERVAL HPI/OVERNIGHT EVENTS:    Patient seen and examined at bedside. No acute overnight events occurred.    ROS: All other systems are negative.    Vital Signs:    T(F): 97.6 (21 @ 13:43), Max: 97.6 (21 @ 13:43)  HR: 62 (21 @ 13:43) (49 - 88)  BP: 113/45 (21 @ 13:43) (113/45 - 143/62)  RR: 18 (21 @ 13:43) (18 - 18)  SpO2: --  I&O's Summary    2021 07:  -  2021 07:00  --------------------------------------------------------  IN: 450 mL / OUT: 2100 mL / NET: -1650 mL    2021 07:01  -  2021 16:17  --------------------------------------------------------  IN: 320 mL / OUT: 775 mL / NET: -455 mL      Daily     Daily Weight in k.9 (2021 05:07)  CAPILLARY BLOOD GLUCOSE      PHYSICAL EXAM:  GENERAL:  NAD  SKIN: No rashes or lesions  HEENT: Atraumatic. Normocephalic. Anicteric  NECK:  No JVD.   PULMONARY: Clear to ausculation bilaterally. No wheezing. No rales  CVS: Normal S1, S2. Regular rate and rhythm. No murmurs.  ABDOMEN/GI: Soft, Nontender, Nondistended; Bowel sounds are present  EXTREMITIES:  No edema B/L LE.  NEUROLOGIC:  No motor deficit.  PSYCH: Alert & oriented x 3, normal affect    LABS:                        10.0   3.85  )-----------( 177      ( 2021 05:53 )             32.0     04-07    136  |  96<L>  |  20  ----------------------------<  82  3.5   |  34<H>  |  1.1    Ca    8.4<L>      2021 05:53  Phos  2.9       Mg     2.1         TPro  6.4  /  Alb  3.2<L>  /  TBili  1.0  /  DBili  x   /  AST  18  /  ALT  10  /  AlkPhos  106      PT/INR - ( 2021 05:53 )   PT: >40.00 sec;   INR: 7.14 ratio           Serum Pro-Brain Natriuretic Peptide: 52425 pg/mL (21 @ 13:19)    Trop <0.01, CKMB --, CK --, 21 @ 06:26        RADIOLOGY & ADDITIONAL TESTS:  Imaging or report Personally Reviewed:  [ ] YES  [ ] NO    Telemetry reviewed independently - sinus selvin with PVC    Medications:  Standing  atorvastatin 20 milliGRAM(s) Oral at bedtime  chlorhexidine 4% Liquid 1 Application(s) Topical <User Schedule>  furosemide    Tablet 40 milliGRAM(s) Oral two times a day  sacubitril 24 mG/valsartan 26 mG 1 Tablet(s) Oral two times a day    PRN Meds      Case discussed with resident  Care discussed with pt

## 2021-04-07 NOTE — PROGRESS NOTE ADULT - SUBJECTIVE AND OBJECTIVE BOX
DAILY PROGRESS NOTE  ===========================================================    Patient Information:  RODNEY SANTO  /  80y  /  Male  /  MRN#: [id number]    Hospital Day: 3d     |:::::::::::::::::::::::::::| SUBJECTIVE |:::::::::::::::::::::::::::|    OVERNIGHT EVENTS: None  TODAY: Patient was seen today at bedside. Patient is feeling well today, says swelling is much improved.    |:::::::::::::::::::::::::::| OBJECTIVE |:::::::::::::::::::::::::::|    VITAL SIGNS: Last 24 Hours  T(C): 36.4 (07 Apr 2021 13:43), Max: 36.4 (07 Apr 2021 05:07)  T(F): 97.6 (07 Apr 2021 13:43), Max: 97.6 (07 Apr 2021 13:43)  HR: 62 (07 Apr 2021 13:43) (49 - 88)  BP: 113/45 (07 Apr 2021 13:43) (113/45 - 143/62)  BP(mean): --  RR: 18 (07 Apr 2021 13:43) (18 - 18)  SpO2: --    04-06-21 @ 07:01  -  04-07-21 @ 07:00  --------------------------------------------------------  IN: 450 mL / OUT: 2100 mL / NET: -1650 mL    04-07-21 @ 07:01 - 04-07-21 @ 14:52  --------------------------------------------------------  IN: 320 mL / OUT: 775 mL / NET: -455 mL      PHYSICAL EXAM:  GENERAL:   Awake, alert; NAD.  HEENT:  Head NC/AT; Conjunctivae pink, Sclera anicteric; Oral mucosa moist.  CARDIO:   Regular rate; Regular rhythm; S1 & S2.  RESP:   No rales, wheezing, or rhonchi appreciated.  GI:   Soft; NT/ND; BS; No guarding; No rebound tenderness.  EXT:   Strength UE 5/5; Strength LE 5/5; No edema.   SKIN:   Intact.    LAB RESULTS:                        10.0   3.85  )-----------( 177      ( 07 Apr 2021 05:53 )             32.0     04-07    136  |  96<L>  |  20  ----------------------------<  82  3.5   |  34<H>  |  1.1    Ca    8.4<L>      07 Apr 2021 05:53  Phos  2.9     04-07  Mg     2.1     04-07    TPro  6.4  /  Alb  3.2<L>  /  TBili  1.0  /  DBili  x   /  AST  18  /  ALT  10  /  AlkPhos  106  04-07    PT/INR - ( 07 Apr 2021 05:53 )   PT: >40.00 sec;   INR: 7.14 ratio                     MICROBIOLOGY:    RADIOLOGY:    ALLERGIES: No Known Allergies      ===========================================================

## 2021-04-07 NOTE — PHYSICAL THERAPY INITIAL EVALUATION ADULT - SPECIFY REASON(S)
Attempted to see pt for PT Initial Evaluation, pt's current INR is 7.14 therefore recommend to hold PT. Spoke with Dr. Luong, made aware. Will f/u when medically appropriate. Attempted to see pt for PT Initial Evaluation, pt's current INR is 7.14 therefore recommend to hold PT. Spoke with Dr. Calloway, made aware. Will f/u when medically appropriate.

## 2021-04-07 NOTE — PROGRESS NOTE ADULT - ASSESSMENT
81 yo M with pmhx of CAD s/p PCI and stent (2007), HFpEF (EF of 55-65% on 4/5/21), COPD on 2L home O2, DLD, HTN, atrial fibrillation on warfarin with acute on chronic HFpEF exacerbation.    #Acute on chronic HFpEF exacerbation  #Likely cor pulmonale   - LVEF 55%-65% on 4/5/21, grade III diastolic dysfunction, severely enlarged left atrium, mod MR, mod TR, severe pulmonary HTN  - Started po lasix 40 bid  - Holding metoprolol  - c/w Entresto per cardiology rec  - strict I&Os, fluid restriction  - Daily weights 120 ->115 -> 118    #Chronic AFib  - EKG: Afib with slow ventricular response  - Holding coumadin for supratherapeutic INR    #Supratherapeutic INR  - 7.41<7.14<7.66  - continue to hold warfarin  - S/p Vitamin K 2.5 x1 dose    #COPD, on home O2  - c/w supplemental O2, goal for SpO2 99-92%    #CAD s/p PCI  - c/w atorvastatin 20 mg    #Normocytic anemia   - Vitamin B12, folate levels WNL     Disposition: Home

## 2021-04-07 NOTE — PROGRESS NOTE ADULT - ASSESSMENT
81 yo M PMHx CAD s/p PCI (2007), COPD on 2L home O2, chronic HFpEF, DLD, atrial fibrillation on coumadin presents to [facility] with chief complaint of chest pain and shortness of breath.    Acute HFpEF exacerbation  - due to right heart failure  - Pro-BNP 13K.   - Echo showed LVEF LVEF 55-60%, grade III diastolic dysfunction, severe LAE, mod MR, mod TR, severe pulmonary HTN.   - changed to PO lasix today  - Entresto started    Chronic Atrial fibrillation  - rate controlled  - coumadin on hold for supratherapeutic INR    Supratherapeutic INR  - INR 7  - INR too high for PT to work with pt  - vitamin K given    Debility  - pending pt eval once INR controlled      COPD  - stable  - Continue home O2.     CAD s/p PCI  - Continue  Lipitor.     Normocytic anemia   - homocystein pending  - b12 borderline low  - normal folate  - iron pending    Progress Note Handoff:  Pending (specify): monitoring on PO lasix, lower INR levels so pt can work with PT  Family discussion: discussed monitoring INR/tolerating PO lasix with pt and family at bedside  Disposition: Home in 24-48 hrs.

## 2021-04-08 ENCOUNTER — TRANSCRIPTION ENCOUNTER (OUTPATIENT)
Age: 81
End: 2021-04-08

## 2021-04-08 VITALS
RESPIRATION RATE: 18 BRPM | SYSTOLIC BLOOD PRESSURE: 140 MMHG | HEART RATE: 83 BPM | DIASTOLIC BLOOD PRESSURE: 66 MMHG | TEMPERATURE: 98 F

## 2021-04-08 LAB
ALBUMIN SERPL ELPH-MCNC: 3.3 G/DL — LOW (ref 3.5–5.2)
ALP SERPL-CCNC: 102 U/L — SIGNIFICANT CHANGE UP (ref 30–115)
ALT FLD-CCNC: 10 U/L — SIGNIFICANT CHANGE UP (ref 0–41)
ANION GAP SERPL CALC-SCNC: 6 MMOL/L — LOW (ref 7–14)
APTT BLD: 39.6 SEC — HIGH (ref 27–39.2)
AST SERPL-CCNC: 17 U/L — SIGNIFICANT CHANGE UP (ref 0–41)
BASOPHILS # BLD AUTO: 0.02 K/UL — SIGNIFICANT CHANGE UP (ref 0–0.2)
BASOPHILS NFR BLD AUTO: 0.5 % — SIGNIFICANT CHANGE UP (ref 0–1)
BILIRUB SERPL-MCNC: 1 MG/DL — SIGNIFICANT CHANGE UP (ref 0.2–1.2)
BUN SERPL-MCNC: 20 MG/DL — SIGNIFICANT CHANGE UP (ref 10–20)
CALCIUM SERPL-MCNC: 8.2 MG/DL — LOW (ref 8.5–10.1)
CHLORIDE SERPL-SCNC: 100 MMOL/L — SIGNIFICANT CHANGE UP (ref 98–110)
CO2 SERPL-SCNC: 35 MMOL/L — HIGH (ref 17–32)
CREAT SERPL-MCNC: 1.1 MG/DL — SIGNIFICANT CHANGE UP (ref 0.7–1.5)
EOSINOPHIL # BLD AUTO: 0.11 K/UL — SIGNIFICANT CHANGE UP (ref 0–0.7)
EOSINOPHIL NFR BLD AUTO: 2.5 % — SIGNIFICANT CHANGE UP (ref 0–8)
GLUCOSE SERPL-MCNC: 89 MG/DL — SIGNIFICANT CHANGE UP (ref 70–99)
HCT VFR BLD CALC: 31.9 % — LOW (ref 42–52)
HGB BLD-MCNC: 9.9 G/DL — LOW (ref 14–18)
IMM GRANULOCYTES NFR BLD AUTO: 0.5 % — HIGH (ref 0.1–0.3)
INR BLD: 2.41 RATIO — HIGH (ref 0.65–1.3)
LYMPHOCYTES # BLD AUTO: 0.92 K/UL — LOW (ref 1.2–3.4)
LYMPHOCYTES # BLD AUTO: 21 % — SIGNIFICANT CHANGE UP (ref 20.5–51.1)
MAGNESIUM SERPL-MCNC: 2.1 MG/DL — SIGNIFICANT CHANGE UP (ref 1.8–2.4)
MCHC RBC-ENTMCNC: 27.3 PG — SIGNIFICANT CHANGE UP (ref 27–31)
MCHC RBC-ENTMCNC: 31 G/DL — LOW (ref 32–37)
MCV RBC AUTO: 87.9 FL — SIGNIFICANT CHANGE UP (ref 80–94)
MONOCYTES # BLD AUTO: 0.6 K/UL — SIGNIFICANT CHANGE UP (ref 0.1–0.6)
MONOCYTES NFR BLD AUTO: 13.7 % — HIGH (ref 1.7–9.3)
NEUTROPHILS # BLD AUTO: 2.71 K/UL — SIGNIFICANT CHANGE UP (ref 1.4–6.5)
NEUTROPHILS NFR BLD AUTO: 61.8 % — SIGNIFICANT CHANGE UP (ref 42.2–75.2)
NRBC # BLD: 0 /100 WBCS — SIGNIFICANT CHANGE UP (ref 0–0)
PHOSPHATE SERPL-MCNC: 2.8 MG/DL — SIGNIFICANT CHANGE UP (ref 2.1–4.9)
PLATELET # BLD AUTO: 170 K/UL — SIGNIFICANT CHANGE UP (ref 130–400)
POTASSIUM SERPL-MCNC: 3.7 MMOL/L — SIGNIFICANT CHANGE UP (ref 3.5–5)
POTASSIUM SERPL-SCNC: 3.7 MMOL/L — SIGNIFICANT CHANGE UP (ref 3.5–5)
PROT SERPL-MCNC: 6.2 G/DL — SIGNIFICANT CHANGE UP (ref 6–8)
PROTHROM AB SERPL-ACNC: 27.7 SEC — HIGH (ref 9.95–12.87)
RBC # BLD: 3.63 M/UL — LOW (ref 4.7–6.1)
RBC # FLD: 18.7 % — HIGH (ref 11.5–14.5)
SODIUM SERPL-SCNC: 141 MMOL/L — SIGNIFICANT CHANGE UP (ref 135–146)
WBC # BLD: 4.38 K/UL — LOW (ref 4.8–10.8)
WBC # FLD AUTO: 4.38 K/UL — LOW (ref 4.8–10.8)

## 2021-04-08 PROCEDURE — 99239 HOSP IP/OBS DSCHRG MGMT >30: CPT

## 2021-04-08 RX ORDER — NYSTATIN CREAM 100000 [USP'U]/G
1 CREAM TOPICAL
Refills: 0 | Status: DISCONTINUED | OUTPATIENT
Start: 2021-04-08 | End: 2021-04-08

## 2021-04-08 RX ADMIN — NYSTATIN CREAM 1 APPLICATION(S): 100000 CREAM TOPICAL at 08:12

## 2021-04-08 RX ADMIN — CHLORHEXIDINE GLUCONATE 1 APPLICATION(S): 213 SOLUTION TOPICAL at 05:35

## 2021-04-08 RX ADMIN — Medication 40 MILLIGRAM(S): at 05:35

## 2021-04-08 RX ADMIN — SACUBITRIL AND VALSARTAN 1 TABLET(S): 24; 26 TABLET, FILM COATED ORAL at 05:35

## 2021-04-08 NOTE — DISCHARGE NOTE PROVIDER - HOSPITAL COURSE
HPI:  79 y/o male with PMH CAD s/p PCI and stent 2007, COPD on 2L home O2, HFpEF, DLD, atrial fibrillation on coumadin presents to [facility] with chief complaint of chest pain and shortness of breath. Patient states his symptoms started about 2 days ago while he was sitting at rest. He developed a dull pain in his chest that he describes as someone "sitting on my chest". Over the next few days he developed some shortness of breath. The chest pain became intermittent. He has a visiting nurse who comes to take care of him - she told him that he had some "water in the lungs". He denies any fevers, chills, cough, headaches, dizziness, nausea, vomiting. He does note that his legs have become swollen. He has also felt these symptoms before (several years ago for which he was treated for congestive heart failure).     In the ED, vitals were /58, HR 61, RR 18, T 98, SPO2 88% on RA. Patient admitted to medicine for further management.  (04 Apr 2021 16:45)    Patient was admitted to tele with working diagnosis of HFpEF exacerbation. Patient was diuresed. INR was supratherapeutic and patient given vitamin K 2.5 x 1 dose. INR returned to therapeutic range and patient stable for discharge with outpatient follow up.

## 2021-04-08 NOTE — PROGRESS NOTE ADULT - SUBJECTIVE AND OBJECTIVE BOX
DAILY PROGRESS NOTE  ===========================================================    Patient Information:  RODNEY SANTO  /  80y  /  Male  /  MRN#: [id number]    Hospital Day: 4d     |:::::::::::::::::::::::::::| SUBJECTIVE |:::::::::::::::::::::::::::|    OVERNIGHT EVENTS: None  TODAY: Patient was seen today at bedside. Patient feels well today and has no complaints. He would like to go home today.    |:::::::::::::::::::::::::::| OBJECTIVE |:::::::::::::::::::::::::::|    VITAL SIGNS: Last 24 Hours  T(C): 36.1 (08 Apr 2021 05:23), Max: 36.4 (07 Apr 2021 13:43)  T(F): 97 (08 Apr 2021 05:23), Max: 97.6 (07 Apr 2021 13:43)  HR: 83 (08 Apr 2021 05:23) (51 - 83)  BP: 128/58 (08 Apr 2021 05:23) (111/47 - 128/58)  BP(mean): --  RR: 18 (08 Apr 2021 05:23) (18 - 18)  SpO2: --    04-07-21 @ 07:01  -  04-08-21 @ 07:00  --------------------------------------------------------  IN: 770 mL / OUT: 1825 mL / NET: -1055 mL    04-08-21 @ 07:01  - 04-08-21 @ 10:23  --------------------------------------------------------  IN: 200 mL / OUT: 470 mL / NET: -270 mL      PHYSICAL EXAM:  GENERAL:   Awake, alert; NAD.  HEENT:  Head NC/AT; Conjunctivae pink, Sclera anicteric; Oral mucosa moist.  CARDIO:   Regular rate; Regular rhythm; S1 & S2.  RESP:   No rales, wheezing, or rhonchi appreciated.  GI:   Soft; NT/ND; BS; No guarding; No rebound tenderness.  EXT:   Strength UE 5/5; Strength LE 5/5; No edema.   SKIN:   Intact.    LAB RESULTS:                        9.9    4.38  )-----------( 170      ( 08 Apr 2021 06:34 )             31.9     04-08    141  |  100  |  20  ----------------------------<  89  3.7   |  35<H>  |  1.1    Ca    8.2<L>      08 Apr 2021 06:34  Phos  2.8     04-08  Mg     2.1     04-08    TPro  6.2  /  Alb  3.3<L>  /  TBili  1.0  /  DBili  x   /  AST  17  /  ALT  10  /  AlkPhos  102  04-08    PT/INR - ( 08 Apr 2021 06:34 )   PT: 27.70 sec;   INR: 2.41 ratio         PTT - ( 08 Apr 2021 06:34 )  PTT:39.6 sec            MICROBIOLOGY:    RADIOLOGY:    ALLERGIES: No Known Allergies      ===========================================================     DAILY PROGRESS NOTE  ===========================================================    Patient Information:  RODNEY SANTO  /  80y  /  Male  /  MRN#: [id number]    Hospital Day: 4d     |:::::::::::::::::::::::::::| SUBJECTIVE |:::::::::::::::::::::::::::|    OVERNIGHT EVENTS: None  TODAY: Patient was seen today at bedside. Patient feels well today and has no complaints. He would like to go home today.    |:::::::::::::::::::::::::::| OBJECTIVE |:::::::::::::::::::::::::::|    VITAL SIGNS: Last 24 Hours  T(C): 36.1 (08 Apr 2021 05:23), Max: 36.4 (07 Apr 2021 13:43)  T(F): 97 (08 Apr 2021 05:23), Max: 97.6 (07 Apr 2021 13:43)  HR: 83 (08 Apr 2021 05:23) (51 - 83)  BP: 128/58 (08 Apr 2021 05:23) (111/47 - 128/58)  BP(mean): --  RR: 18 (08 Apr 2021 05:23) (18 - 18)  SpO2: --    04-07-21 @ 07:01  -  04-08-21 @ 07:00  --------------------------------------------------------  IN: 770 mL / OUT: 1825 mL / NET: -1055 mL    04-08-21 @ 07:01  - 04-08-21 @ 10:23  --------------------------------------------------------  IN: 200 mL / OUT: 470 mL / NET: -270 mL      PHYSICAL EXAM:  GENERAL:   Awake, alert; NAD.  HEENT:  Head NC/AT; Conjunctivae pink, Sclera anicteric; Oral mucosa moist.  CARDIO:   Regular rate; Regular rhythm; S1 & S2.  RESP:   No rales, wheezing, or rhonchi appreciated.  GI:   Soft; NT/ND; BS; No guarding; No rebound tenderness.  EXT:   Strength UE 5/5; Strength LE 5/5; No edema.   SKIN:   Intact.    LAB RESULTS:                        9.9    4.38  )-----------( 170      ( 08 Apr 2021 06:34 )             31.9     04-08    141  |  100  |  20  ----------------------------<  89  3.7   |  35<H>  |  1.1    Ca    8.2<L>      08 Apr 2021 06:34  Phos  2.8     04-08  Mg     2.1     04-08    TPro  6.2  /  Alb  3.3<L>  /  TBili  1.0  /  DBili  x   /  AST  17  /  ALT  10  /  AlkPhos  102  04-08    PT/INR - ( 08 Apr 2021 06:34 )   PT: 27.70 sec;   INR: 2.41 ratio         PTT - ( 08 Apr 2021 06:34 )  PTT:39.6 sec    MICROBIOLOGY:    RADIOLOGY:    ALLERGIES: No Known Allergies      ===========================================================

## 2021-04-08 NOTE — PROGRESS NOTE ADULT - ASSESSMENT
79 yo M with pmhx of CAD s/p PCI and stent (2007), HFpEF (EF of 55-65% on 4/5/21), COPD on 2L home O2, DLD, HTN, atrial fibrillation on warfarin with acute on chronic HFpEF exacerbation.    #Acute on chronic HFpEF exacerbation  #Likely cor pulmonale   - LVEF 55%-65% on 4/5/21, grade III diastolic dysfunction, severely enlarged left atrium, mod MR, mod TR, severe pulmonary HTN  - Started po lasix 40 bid  - Holding metoprolol  - c/w Entresto per cardiology  - strict I&Os, fluid restriction  - Daily weights 120 ->115 -> 118 -> 117    #Chronic AFib  - EKG: Afib with slow ventricular response  - Holding coumadin for supratherapeutic INR    #Supratherapeutic INR  - 7.41<7.14<7.66  - continue to hold warfarin  - S/p Vitamin K 2.5 x1 dose    #COPD, on home O2  - c/w supplemental O2, goal for SpO2 99-92%    #CAD s/p PCI  - c/w atorvastatin 20 mg    #Normocytic anemia   - Vitamin B12, folate levels WNL     Disposition: Home   81 yo M with pmhx of CAD s/p PCI and stent (2007), HFpEF (EF of 55-65% on 4/5/21), COPD on 2L home O2, DLD, HTN, atrial fibrillation on warfarin with acute on chronic HFpEF exacerbation.    #Acute on chronic HFpEF exacerbation  #Likely cor pulmonale   - LVEF 55%-65% on 4/5/21, grade III diastolic dysfunction, severely enlarged left atrium, mod MR, mod TR, severe pulmonary HTN  - Started po lasix 40 bid  - Holding metoprolol  - c/w Entresto per cardiology  - strict I&Os, fluid restriction  - Daily weights 120 ->115 -> 118 -> 117    #Chronic AFib  - EKG: Afib with slow ventricular response  - Holding coumadin for supratherapeutic INR    #Supratherapeutic INR, resolved  - 2.41 < 7.41<7.14<7.66  - S/p Vitamin K 2.5 x1 dose  - Restart coumadin, outpt follow up to monitor INR    #COPD, on home O2  - c/w supplemental O2, goal for SpO2 99-92%    #CAD s/p PCI  - c/w atorvastatin 20 mg    #Normocytic anemia   - Vitamin B12, folate levels WNL     Disposition: Home

## 2021-04-08 NOTE — CHART NOTE - NSCHARTNOTEFT_GEN_A_CORE
Pt seen and examined at bedside. Pt states he feels well. Pt remains euvolemic on PO Lasix. INR 2.14 today. Did well with physical therapy. Medically stable for dc home.    PHYSICAL EXAM:  GENERAL: NAD, speaks in full sentences, no signs of respiratory distress  HEAD:  Atraumatic, Normocephalic  EYES: Anicteric  NECK: Supple, No JVD  CHEST/LUNG: Clear to auscultation bilaterally; No wheeze; No crackles; No accessory muscles used  HEART: Regular rate and rhythm; No murmurs;   ABDOMEN: Soft, Nontender, Nondistended; Bowel sounds present; No guarding  EXTREMITIES:  2+ Peripheral Pulses, No cyanosis or edema  PSYCH: AAOx3  NEUROLOGY: non-focal  SKIN: No rashes or lesions    Time spent coordinating discharge 34 minutes

## 2021-04-08 NOTE — DISCHARGE NOTE PROVIDER - CARE PROVIDER_API CALL
John Page)  [address] [facility]  [address] [place] [address]  Phone: [phone]  Fax: [phone]  Follow Up Time: Routine    Anali Scherer)  Cardiovascular Disease; Internal Medicine  [address] [place] [address]  Phone: [phone]  Fax: [phone]  Follow Up Time: 2 weeks

## 2021-04-08 NOTE — PHYSICAL THERAPY INITIAL EVALUATION ADULT - PERTINENT HX OF CURRENT PROBLEM, REHAB EVAL
pt is a 81 y/o male admitted for 81 y/o male with PMH CAD s/p PCI and stent 2007, COPD on 2L home O2, HFpEF, DLD, atrial fibrillation on coumadin presents to [facility] with chief complaint of chest pain and shortness of breath. Patient states his symptoms started about 2 days ago while he was sitting at rest. He developed a dull pain in his chest that he describes as someone "sitting on my chest". Over the next few days he developed some shortness of breath.

## 2021-04-08 NOTE — DISCHARGE NOTE PROVIDER - NSDCFUSCHEDAPPT_GEN_ALL_CORE_FT
RODNEY SANTO ; 04/27/2021 ; [facility] RODNEY Diallo ; 06/17/2021 ; [facility] [address]

## 2021-04-08 NOTE — DISCHARGE NOTE PROVIDER - NSFOLLOWUPCLINICS_GEN_ALL_ED_FT
[facility]  Coumadin  [address] [place] [address]  Phone: [phone]  Fax:   Follow Up Time: 1-3 days

## 2021-04-08 NOTE — PHYSICAL THERAPY INITIAL EVALUATION ADULT - GAIT DISTANCE, PT EVAL
20 ft x 1 to bathroom, 50 ft x 1 after sitting on toilet. SPO2 on room air after ambulation 87-88%, O2 at 2 lpm reapplied, SPO2 improved to 90-91%

## 2021-04-08 NOTE — DISCHARGE NOTE NURSING/CASE MANAGEMENT/SOCIAL WORK - PATIENT PORTAL LINK FT
You can access the FollowMyHealth Patient Portal offered by [facility] by registering at the following website: [email or web address]. By joining [facility]’s FollowMyHealth portal, you will also be able to view your health information using other applications (apps) compatible with our system.

## 2021-04-08 NOTE — DISCHARGE NOTE PROVIDER - INSTRUCTIONS
Eat more vegetables and fruits.  Swap refined grains for whole grains.  Choose fat-free or low-fat dairy products.  Choose lean protein sources like fish, poultry and beans.  Cook with vegetable oils.  Limit your intake of foods high in added sugars, like soda and candy.

## 2021-04-08 NOTE — DISCHARGE NOTE PROVIDER - NSDCCPCAREPLAN_GEN_ALL_CORE_FT
PRINCIPAL DISCHARGE DIAGNOSIS  Diagnosis: Acute on chronic diastolic congestive heart failure  Assessment and Plan of Treatment: Congestive heart failure is a chronic condition in which the heart has trouble pumping blood. In some cases of heart failure, fluid may back up into your lungs or you may have swelling (edema) in your lower legs. There are many causes of heart failure including high blood pressure, coronary artery disease, abnormal heart valves, heart muscle disease, lung disease, diabetes, etc. Symptoms include shortness of breath with activity or when lying flat, cough, swelling of the legs, fatigue, or increased urination during the night.   Treatment is aimed at managing the symptoms of heart failure and may include lifestyle changes, medications, or surgical procedures. Take medicines only as directed by your health care provider and do not stop unless instructed to do so. Eat heart-healthy foods with low or no trans/saturated fats, cholesterol and salt. Weigh yourself every day for early recognition of fluid accumulation.  SEEK IMMEDIATE MEDICAL CARE IF YOU HAVE ANY OF THE FOLLOWING SYMPTOMS: shortness of breath, change in mental status, chest pain, lightheadedness/dizziness/fainting, or worsening of symptoms including not being able to conduct normal physical activity.        SECONDARY DISCHARGE DIAGNOSES  Diagnosis: INR (international normal ratio) abnormal  Assessment and Plan of Treatment: Your coumadn level (INR) was found to be high when you were admitted. Your levels have now returned to the correct level. Please follow up with your primary care doctor or the coumadin clinic to have your INR checked in the next 1-3 days.

## 2021-04-08 NOTE — DISCHARGE NOTE PROVIDER - CARE PROVIDERS DIRECT ADDRESSES
[email or web address]

## 2021-04-08 NOTE — PHYSICAL THERAPY INITIAL EVALUATION ADULT - GENERAL OBSERVATIONS, REHAB EVAL
9:00 - 9:35 Pt encountered semifowler in bed in NAD. INR is now within therapeutic range. + O2 at 2 lpm.  Pt requesting to use bathroom. SPO2 on room air 91-92%.

## 2021-04-08 NOTE — DISCHARGE NOTE PROVIDER - PROVIDER TOKENS
PROVIDER:[TOKEN:[[id number]],FOLLOWUP:[Routine]],PROVIDER:[TOKEN:[[id number]],FOLLOWUP:[2 weeks]]

## 2021-04-09 LAB — HCYS SERPL-MCNC: 38.1 UMOL/L — HIGH

## 2021-04-13 DIAGNOSIS — Z95.5 PRESENCE OF CORONARY ANGIOPLASTY IMPLANT AND GRAFT: ICD-10-CM

## 2021-04-13 DIAGNOSIS — I07.1 RHEUMATIC TRICUSPID INSUFFICIENCY: ICD-10-CM

## 2021-04-13 DIAGNOSIS — R07.9 CHEST PAIN, UNSPECIFIED: ICD-10-CM

## 2021-04-13 DIAGNOSIS — I34.0 NONRHEUMATIC MITRAL (VALVE) INSUFFICIENCY: ICD-10-CM

## 2021-04-13 DIAGNOSIS — D64.9 ANEMIA, UNSPECIFIED: ICD-10-CM

## 2021-04-13 DIAGNOSIS — I50.33 ACUTE ON CHRONIC DIASTOLIC (CONGESTIVE) HEART FAILURE: ICD-10-CM

## 2021-04-13 DIAGNOSIS — Z96.651 PRESENCE OF RIGHT ARTIFICIAL KNEE JOINT: ICD-10-CM

## 2021-04-13 DIAGNOSIS — R00.1 BRADYCARDIA, UNSPECIFIED: ICD-10-CM

## 2021-04-13 DIAGNOSIS — Z99.81 DEPENDENCE ON SUPPLEMENTAL OXYGEN: ICD-10-CM

## 2021-04-13 DIAGNOSIS — I27.81 COR PULMONALE (CHRONIC): ICD-10-CM

## 2021-04-13 DIAGNOSIS — I25.10 ATHEROSCLEROTIC HEART DISEASE OF NATIVE CORONARY ARTERY WITHOUT ANGINA PECTORIS: ICD-10-CM

## 2021-04-13 DIAGNOSIS — J44.9 CHRONIC OBSTRUCTIVE PULMONARY DISEASE, UNSPECIFIED: ICD-10-CM

## 2021-04-13 DIAGNOSIS — I48.19 OTHER PERSISTENT ATRIAL FIBRILLATION: ICD-10-CM

## 2021-04-13 DIAGNOSIS — E78.5 HYPERLIPIDEMIA, UNSPECIFIED: ICD-10-CM

## 2021-04-13 DIAGNOSIS — I11.0 HYPERTENSIVE HEART DISEASE WITH HEART FAILURE: ICD-10-CM

## 2021-04-13 DIAGNOSIS — R53.81 OTHER MALAISE: ICD-10-CM

## 2021-04-13 DIAGNOSIS — I10 ESSENTIAL (PRIMARY) HYPERTENSION: ICD-10-CM

## 2021-04-13 DIAGNOSIS — Z87.891 PERSONAL HISTORY OF NICOTINE DEPENDENCE: ICD-10-CM

## 2021-04-13 DIAGNOSIS — R79.1 ABNORMAL COAGULATION PROFILE: ICD-10-CM

## 2021-04-13 DIAGNOSIS — E66.9 OBESITY, UNSPECIFIED: ICD-10-CM

## 2021-04-13 DIAGNOSIS — Z86.718 PERSONAL HISTORY OF OTHER VENOUS THROMBOSIS AND EMBOLISM: ICD-10-CM

## 2021-04-13 DIAGNOSIS — I27.29 OTHER SECONDARY PULMONARY HYPERTENSION: ICD-10-CM

## 2021-04-27 ENCOUNTER — OUTPATIENT (OUTPATIENT)
Dept: OUTPATIENT SERVICES | Facility: HOSPITAL | Age: 81
LOS: 1 days | Discharge: HOME | End: 2021-04-27

## 2021-04-27 ENCOUNTER — APPOINTMENT (OUTPATIENT)
Dept: MEDICATION MANAGEMENT | Facility: CLINIC | Age: 81
End: 2021-04-27

## 2021-04-27 VITALS — RESPIRATION RATE: 14 BRPM | OXYGEN SATURATION: 85 % | HEART RATE: 50 BPM

## 2021-04-27 DIAGNOSIS — Z79.01 LONG TERM (CURRENT) USE OF ANTICOAGULANTS: ICD-10-CM

## 2021-04-27 DIAGNOSIS — Z95.5 PRESENCE OF CORONARY ANGIOPLASTY IMPLANT AND GRAFT: Chronic | ICD-10-CM

## 2021-04-27 DIAGNOSIS — Z96.651 PRESENCE OF RIGHT ARTIFICIAL KNEE JOINT: Chronic | ICD-10-CM

## 2021-04-27 DIAGNOSIS — I48.91 UNSPECIFIED ATRIAL FIBRILLATION: ICD-10-CM

## 2021-04-27 LAB
INR PPP: 3.7 RATIO
POCT-PROTHROMBIN TIME: 44.2 SECS
QUALITY CONTROL: YES

## 2021-04-28 ENCOUNTER — OUTPATIENT (OUTPATIENT)
Dept: OUTPATIENT SERVICES | Facility: HOSPITAL | Age: 81
LOS: 1 days | Discharge: HOME | End: 2021-04-28
Payer: MEDICARE

## 2021-04-28 DIAGNOSIS — R07.9 CHEST PAIN, UNSPECIFIED: ICD-10-CM

## 2021-04-28 DIAGNOSIS — Z96.651 PRESENCE OF RIGHT ARTIFICIAL KNEE JOINT: Chronic | ICD-10-CM

## 2021-04-28 DIAGNOSIS — Z95.5 PRESENCE OF CORONARY ANGIOPLASTY IMPLANT AND GRAFT: Chronic | ICD-10-CM

## 2021-04-28 PROCEDURE — 71046 X-RAY EXAM CHEST 2 VIEWS: CPT | Mod: 26

## 2021-05-25 ENCOUNTER — APPOINTMENT (OUTPATIENT)
Dept: MEDICATION MANAGEMENT | Facility: CLINIC | Age: 81
End: 2021-05-25

## 2021-05-25 ENCOUNTER — OUTPATIENT (OUTPATIENT)
Dept: OUTPATIENT SERVICES | Facility: HOSPITAL | Age: 81
LOS: 1 days | Discharge: HOME | End: 2021-05-25

## 2021-05-25 VITALS — RESPIRATION RATE: 14 BRPM | HEART RATE: 50 BPM | OXYGEN SATURATION: 85 %

## 2021-05-25 DIAGNOSIS — Z95.5 PRESENCE OF CORONARY ANGIOPLASTY IMPLANT AND GRAFT: Chronic | ICD-10-CM

## 2021-05-25 DIAGNOSIS — Z79.01 LONG TERM (CURRENT) USE OF ANTICOAGULANTS: ICD-10-CM

## 2021-05-25 DIAGNOSIS — I48.91 UNSPECIFIED ATRIAL FIBRILLATION: ICD-10-CM

## 2021-05-25 DIAGNOSIS — Z96.651 PRESENCE OF RIGHT ARTIFICIAL KNEE JOINT: Chronic | ICD-10-CM

## 2021-05-25 LAB
INR PPP: 3.5 RATIO
POCT-PROTHROMBIN TIME: 42.3 SECS
QUALITY CONTROL: YES

## 2021-06-17 ENCOUNTER — APPOINTMENT (OUTPATIENT)
Dept: CARDIOLOGY | Facility: CLINIC | Age: 81
End: 2021-06-17

## 2021-07-01 NOTE — DISCHARGE NOTE ADULT - PATIENT PORTAL LINK FT
"Chief Complaint   Patient presents with   • Body Aches     At the [facility] yesterday for the same body aches and was discharged     Pt ambulatory to triage for above complaint although he does not currently have pain. During interview the patient admitted that \"I drink too much.\" The patient reports that his last drink was over 24 hours ago. The patient presents with hand tremors and difficulty focusing on the triage assessment questions. No SI/HI.  Pt is AO x 4, follows commands, and responds appropriately to questions. Patient's breathing is unlabored and pain is currently 0/10 on the 0-10 pain scale.  Pt placed in lobby. Patient educated on triage process and encouraged to alert staff for any changes.    "
You can access the [facility] Portal, offered by [facility], by registering with the following website: [email or web address]

## 2021-07-06 ENCOUNTER — APPOINTMENT (OUTPATIENT)
Dept: MEDICATION MANAGEMENT | Facility: CLINIC | Age: 81
End: 2021-07-06

## 2021-07-06 ENCOUNTER — OUTPATIENT (OUTPATIENT)
Dept: OUTPATIENT SERVICES | Facility: HOSPITAL | Age: 81
LOS: 1 days | Discharge: HOME | End: 2021-07-06

## 2021-07-06 VITALS — HEART RATE: 45 BPM | RESPIRATION RATE: 16 BRPM | OXYGEN SATURATION: 92 %

## 2021-07-06 DIAGNOSIS — I48.91 UNSPECIFIED ATRIAL FIBRILLATION: ICD-10-CM

## 2021-07-06 DIAGNOSIS — Z95.5 PRESENCE OF CORONARY ANGIOPLASTY IMPLANT AND GRAFT: Chronic | ICD-10-CM

## 2021-07-06 DIAGNOSIS — Z79.01 LONG TERM (CURRENT) USE OF ANTICOAGULANTS: ICD-10-CM

## 2021-07-06 DIAGNOSIS — Z96.651 PRESENCE OF RIGHT ARTIFICIAL KNEE JOINT: Chronic | ICD-10-CM

## 2021-07-06 LAB
INR PPP: 3.6 RATIO
POCT-PROTHROMBIN TIME: 43.5 SECS
QUALITY CONTROL: YES

## 2021-07-07 ENCOUNTER — INPATIENT (INPATIENT)
Facility: HOSPITAL | Age: 81
LOS: 14 days | Discharge: NURSING, REHAB OR LONG-TERM CARE | End: 2021-07-22
Attending: HOSPITALIST | Admitting: HOSPITALIST
Payer: MEDICARE

## 2021-07-07 VITALS
TEMPERATURE: 97 F | RESPIRATION RATE: 19 BRPM | DIASTOLIC BLOOD PRESSURE: 56 MMHG | WEIGHT: 259.93 LBS | SYSTOLIC BLOOD PRESSURE: 126 MMHG | OXYGEN SATURATION: 92 % | HEART RATE: 67 BPM | HEIGHT: 72.3 IN

## 2021-07-07 DIAGNOSIS — R09.89 OTHER SPECIFIED SYMPTOMS AND SIGNS INVOLVING THE CIRCULATORY AND RESPIRATORY SYSTEMS: ICD-10-CM

## 2021-07-07 DIAGNOSIS — Z96.651 PRESENCE OF RIGHT ARTIFICIAL KNEE JOINT: Chronic | ICD-10-CM

## 2021-07-07 DIAGNOSIS — Z95.5 PRESENCE OF CORONARY ANGIOPLASTY IMPLANT AND GRAFT: Chronic | ICD-10-CM

## 2021-07-07 LAB
ALBUMIN SERPL ELPH-MCNC: 3.6 G/DL — SIGNIFICANT CHANGE UP (ref 3.5–5.2)
ALP SERPL-CCNC: 137 U/L — HIGH (ref 30–115)
ALT FLD-CCNC: 10 U/L — SIGNIFICANT CHANGE UP (ref 0–41)
ANION GAP SERPL CALC-SCNC: 9 MMOL/L — SIGNIFICANT CHANGE UP (ref 7–14)
APTT BLD: 43.5 SEC — HIGH (ref 27–39.2)
AST SERPL-CCNC: 21 U/L — SIGNIFICANT CHANGE UP (ref 0–41)
BASOPHILS # BLD AUTO: 0.01 K/UL — SIGNIFICANT CHANGE UP (ref 0–0.2)
BASOPHILS NFR BLD AUTO: 0.3 % — SIGNIFICANT CHANGE UP (ref 0–1)
BILIRUB SERPL-MCNC: 1.6 MG/DL — HIGH (ref 0.2–1.2)
BUN SERPL-MCNC: 28 MG/DL — HIGH (ref 10–20)
CALCIUM SERPL-MCNC: 8.5 MG/DL — SIGNIFICANT CHANGE UP (ref 8.5–10.1)
CHLORIDE SERPL-SCNC: 102 MMOL/L — SIGNIFICANT CHANGE UP (ref 98–110)
CO2 SERPL-SCNC: 28 MMOL/L — SIGNIFICANT CHANGE UP (ref 17–32)
CREAT SERPL-MCNC: 1.5 MG/DL — SIGNIFICANT CHANGE UP (ref 0.7–1.5)
EOSINOPHIL # BLD AUTO: 0.1 K/UL — SIGNIFICANT CHANGE UP (ref 0–0.7)
EOSINOPHIL NFR BLD AUTO: 2.7 % — SIGNIFICANT CHANGE UP (ref 0–8)
GLUCOSE SERPL-MCNC: 92 MG/DL — SIGNIFICANT CHANGE UP (ref 70–99)
HCT VFR BLD CALC: 30 % — LOW (ref 42–52)
HGB BLD-MCNC: 9.4 G/DL — LOW (ref 14–18)
IMM GRANULOCYTES NFR BLD AUTO: 0.3 % — SIGNIFICANT CHANGE UP (ref 0.1–0.3)
INR BLD: 3.82 RATIO — HIGH (ref 0.65–1.3)
LYMPHOCYTES # BLD AUTO: 0.73 K/UL — LOW (ref 1.2–3.4)
LYMPHOCYTES # BLD AUTO: 19.8 % — LOW (ref 20.5–51.1)
MAGNESIUM SERPL-MCNC: 2.2 MG/DL — SIGNIFICANT CHANGE UP (ref 1.8–2.4)
MCHC RBC-ENTMCNC: 27.7 PG — SIGNIFICANT CHANGE UP (ref 27–31)
MCHC RBC-ENTMCNC: 31.3 G/DL — LOW (ref 32–37)
MCV RBC AUTO: 88.5 FL — SIGNIFICANT CHANGE UP (ref 80–94)
MONOCYTES # BLD AUTO: 0.53 K/UL — SIGNIFICANT CHANGE UP (ref 0.1–0.6)
MONOCYTES NFR BLD AUTO: 14.4 % — HIGH (ref 1.7–9.3)
NEUTROPHILS # BLD AUTO: 2.31 K/UL — SIGNIFICANT CHANGE UP (ref 1.4–6.5)
NEUTROPHILS NFR BLD AUTO: 62.5 % — SIGNIFICANT CHANGE UP (ref 42.2–75.2)
NRBC # BLD: 0 /100 WBCS — SIGNIFICANT CHANGE UP (ref 0–0)
NT-PROBNP SERPL-SCNC: HIGH PG/ML (ref 0–300)
PLATELET # BLD AUTO: 154 K/UL — SIGNIFICANT CHANGE UP (ref 130–400)
POTASSIUM SERPL-MCNC: 3.9 MMOL/L — SIGNIFICANT CHANGE UP (ref 3.5–5)
POTASSIUM SERPL-SCNC: 3.9 MMOL/L — SIGNIFICANT CHANGE UP (ref 3.5–5)
PROT SERPL-MCNC: 6.8 G/DL — SIGNIFICANT CHANGE UP (ref 6–8)
PROTHROM AB SERPL-ACNC: >40 SEC — HIGH (ref 9.95–12.87)
RBC # BLD: 3.39 M/UL — LOW (ref 4.7–6.1)
RBC # FLD: 19.9 % — HIGH (ref 11.5–14.5)
SARS-COV-2 RNA SPEC QL NAA+PROBE: SIGNIFICANT CHANGE UP
SODIUM SERPL-SCNC: 139 MMOL/L — SIGNIFICANT CHANGE UP (ref 135–146)
TROPONIN T SERPL-MCNC: 0.01 NG/ML — SIGNIFICANT CHANGE UP
TROPONIN T SERPL-MCNC: <0.01 NG/ML — SIGNIFICANT CHANGE UP
WBC # BLD: 3.69 K/UL — LOW (ref 4.8–10.8)
WBC # FLD AUTO: 3.69 K/UL — LOW (ref 4.8–10.8)

## 2021-07-07 PROCEDURE — 71045 X-RAY EXAM CHEST 1 VIEW: CPT | Mod: 26

## 2021-07-07 PROCEDURE — 93010 ELECTROCARDIOGRAM REPORT: CPT | Mod: 76

## 2021-07-07 PROCEDURE — 99223 1ST HOSP IP/OBS HIGH 75: CPT

## 2021-07-07 PROCEDURE — 99285 EMERGENCY DEPT VISIT HI MDM: CPT

## 2021-07-07 RX ORDER — ALBUTEROL 90 UG/1
2 AEROSOL, METERED ORAL EVERY 4 HOURS
Refills: 0 | Status: DISCONTINUED | OUTPATIENT
Start: 2021-07-07 | End: 2021-07-22

## 2021-07-07 RX ORDER — CHLORHEXIDINE GLUCONATE 213 G/1000ML
1 SOLUTION TOPICAL
Refills: 0 | Status: DISCONTINUED | OUTPATIENT
Start: 2021-07-07 | End: 2021-07-22

## 2021-07-07 RX ORDER — FUROSEMIDE 40 MG
40 TABLET ORAL EVERY 12 HOURS
Refills: 0 | Status: DISCONTINUED | OUTPATIENT
Start: 2021-07-07 | End: 2021-07-08

## 2021-07-07 RX ORDER — WARFARIN SODIUM 2.5 MG/1
1 TABLET ORAL
Qty: 0 | Refills: 0 | DISCHARGE

## 2021-07-07 RX ORDER — NYSTATIN CREAM 100000 [USP'U]/G
1 CREAM TOPICAL EVERY 12 HOURS
Refills: 0 | Status: DISCONTINUED | OUTPATIENT
Start: 2021-07-07 | End: 2021-07-22

## 2021-07-07 RX ORDER — FUROSEMIDE 40 MG
40 TABLET ORAL ONCE
Refills: 0 | Status: COMPLETED | OUTPATIENT
Start: 2021-07-07 | End: 2021-07-07

## 2021-07-07 RX ORDER — WARFARIN SODIUM 2.5 MG/1
6 TABLET ORAL ONCE
Refills: 0 | Status: COMPLETED | OUTPATIENT
Start: 2021-07-07 | End: 2021-07-07

## 2021-07-07 RX ORDER — BUDESONIDE AND FORMOTEROL FUMARATE DIHYDRATE 160; 4.5 UG/1; UG/1
2 AEROSOL RESPIRATORY (INHALATION)
Refills: 0 | Status: DISCONTINUED | OUTPATIENT
Start: 2021-07-07 | End: 2021-07-22

## 2021-07-07 RX ORDER — ATORVASTATIN CALCIUM 80 MG/1
20 TABLET, FILM COATED ORAL AT BEDTIME
Refills: 0 | Status: DISCONTINUED | OUTPATIENT
Start: 2021-07-07 | End: 2021-07-22

## 2021-07-07 RX ADMIN — Medication 40 MILLIGRAM(S): at 13:55

## 2021-07-07 RX ADMIN — Medication 40 MILLIGRAM(S): at 21:33

## 2021-07-07 RX ADMIN — WARFARIN SODIUM 6 MILLIGRAM(S): 2.5 TABLET ORAL at 21:33

## 2021-07-07 RX ADMIN — ATORVASTATIN CALCIUM 20 MILLIGRAM(S): 80 TABLET, FILM COATED ORAL at 21:33

## 2021-07-07 NOTE — CONSULT NOTE ADULT - SUBJECTIVE AND OBJECTIVE BOX
Patient is a 80y old  Male who presents with a chief complaint of shortness of breath (07 Jul 2021 14:33)      HPI: Patient is a 81 y/o male with PMH CAD s/p PCI and stent 2007, COPD on 2L home O2, HFpEF TTE in 4/21 EF 55-65, DLD, atrial fibrillation on coumadin presents for shortness of breath. Patient has been having a gradual onset of worsening shortness of breath over the past few days. He is adherent to his medications as directed. He was following at the Coumadin clinic for his coumadin dose and was told yesterday that he had a significant increase in his weight compared to 5 weeks earlier. He also endorses worsening bilateral lower extremities edema reaching his thighs along with a left sided chest pain, dull in nature, mild in intensity, persistent for the past day. he denied palpitations, fever, chills, cough, sputum production, abdominal or urinary symptoms. In the ED patient found to be in AF with HR 50-60s. He was seen for this same issue during last admission in April and PPM was not indicated at that time as patient was not symptomatic. Denies any dizziness or syncope.    PAST MEDICAL & SURGICAL HISTORY:  COPD (chronic obstructive pulmonary disease)    Hypertension    Deep vein thrombosis (DVT) of left lower extremity, unspecified chronicity, unspecified vein    Cellulitis of left lower extremity    Chronic atrial fibrillation    Mitral valve insufficiency, unspecified etiology  Moderate    CHF (congestive heart failure)    S/P coronary artery stent placement    History of total right knee replacement      PREVIOUS DIAGNOSTIC TESTING:      ECHO  FINDINGS:  < from: TTE Echo Complete w/o Contrast w/ Doppler (04.05.21 @ 07:21) >  Summary:   1. Left ventricular ejection fraction, by visual estimation, is 55-65%.   2. Normal global left ventricular systolic function.   3. Spectral Doppler shows restrictive pattern of left ventricular myocardial filling (Grade III diastolic dysfunction).   4. Severely enlarged left atrium.   5. Moderate mitral regurgitation.   6. Moderate tricuspid regurgitation.   7. Estimated pulmonary artery systolic pressure is 70.4 mmHg assuming a right atrial pressure of 8 mmHg, which is consistent with severe pulmonary hypertension.   8. LA volume Index is 78.0 ml/m² ml/m2.   9. Mild dilatation of the ascending aorta (4.3 cm).    < end of copied text >    STRESS  FINDINGS:    CATHETERIZATION  FINDINGS:    ELECTROPHYSIOLOGY STUDY  FINDINGS:    CAROTID ULTRASOUND:  FINDINGS    VENOUS DUPLEX SCAN:  FINDINGS:    CHEST CT PULMONARY ANGIO with IV Contrast:  FINDINGS:    MEDICATIONS  (STANDING):  atorvastatin 20 milliGRAM(s) Oral at bedtime  budesonide 160 MICROgram(s)/formoterol 4.5 MICROgram(s) Inhaler 2 Puff(s) Inhalation two times a day  chlorhexidine 4% Liquid 1 Application(s) Topical <User Schedule>  furosemide   Injectable 40 milliGRAM(s) IV Push every 12 hours  warfarin 6 milliGRAM(s) Oral once    MEDICATIONS  (PRN):  ALBUTerol  90 MICROgram(s) HFA Inhaler - Peds 2 Puff(s) Inhalation every 4 hours PRN Shortness of Breath and/or Wheezing      FAMILY HISTORY: No significant hx    SOCIAL HISTORY: No smoking, ETOH or illicit drug use    Past Surgical History:  S/P coronary artery stent placement    History of total right knee replacement    Allergies:  No Known Allergies      REVIEW OF SYSTEMS:  CONSTITUTIONAL: No fever, weight loss, chills, shakes, or fatigue  RESPIRATORY: No cough, wheezing, hemoptysis, + shortness of breath  CARDIOVASCULAR: No chest pain, dyspnea, palpitations, dizziness, syncope  GASTROINTESTINAL: No abdominal  or epigastric pain, nausea, vomiting, hematemesis, diarrhea, constipation, melena or bright red blood.  NEUROLOGICAL: No headaches, memory loss, slurred speech, limb weakness, loss of strength, numbness, or tremors  MUSCULOSKELETAL: +LE swelling      Vital Signs Last 24 Hrs  T(C): 36.2 (07 Jul 2021 12:06), Max: 36.2 (07 Jul 2021 12:06)  T(F): 97.2 (07 Jul 2021 12:06), Max: 97.2 (07 Jul 2021 12:06)  HR: 67 (07 Jul 2021 12:06) (67 - 67)  BP: 126/56 (07 Jul 2021 12:06) (126/56 - 126/56)  BP(mean): --  RR: 19 (07 Jul 2021 12:06) (19 - 19)  SpO2: 92% (07 Jul 2021 12:06) (92% - 92%)    PHYSICAL EXAM:  GENERAL: In no apparent distress, well nourished, and hydrated.  EYES: EOMI, PERRLA, conjunctiva and sclera clear  ENMT: No tonsillar erythema, exudates, or enlargements; ist mucous membranes, Good dentition, No lesions  NECK: Supple and normal thyroid.  No JVD or carotid bruit.  Carotid pulse is 2+ bilaterally.  HEART: Irregular rate and rhythm; No murmurs, rubs, or gallops.  PULMONARY: Clear to auscultation and perfusion.  No rales, wheezing, or rhonchi bilaterally.  ABDOMEN: Soft, Nontender, Nondistended; Bowel sounds present  EXTREMITIES:  2+ Peripheral Pulses, 2+ pitting edema BL  NEUROLOGICAL: Grossly nonfocal      INTERPRETATION OF TELEMETRY: AF 50-60s bpm    ECG:  < from: 12 Lead ECG (04.04.21 @ 12:11) >  Ventricular Rate 56 BPM    QRS Duration 90 ms    Q-T Interval 450 ms    QTC Calculation(Bazett) 434 ms    R Axis -37 degrees    T Axis 53 degrees    Diagnosis Line Atrial fibrillation with slow ventricular response with premature ventricular  or aberrantly conducted complexes  Left axis deviation  Low voltage QRS  Nonspecific ST and T wave abnormality  Abnormal ECG    Confirmed by Anali Scherer MD ([id number]) on 4/4/2021 3:04:29 PM    < end of copied text >      I&O's Detail    07 Jul 2021 07:01  -  07 Jul 2021 15:47  --------------------------------------------------------  IN:  Total IN: 0 mL    OUT:    Voided (mL): 150 mL  Total OUT: 150 mL    Total NET: -150 mL          LABS:                        9.4    3.69  )-----------( 154      ( 07 Jul 2021 12:40 )             30.0     07-07    139  |  102  |  28<H>  ----------------------------<  92  3.9   |  28  |  1.5    Ca    8.5      07 Jul 2021 12:40  Mg     2.2     07-07    TPro  6.8  /  Alb  3.6  /  TBili  1.6<H>  /  DBili  x   /  AST  21  /  ALT  10  /  AlkPhos  137<H>  07-07    CARDIAC MARKERS ( 07 Jul 2021 12:40 )  x     / 0.01 ng/mL / x     / x     / x          PT/INR - ( 07 Jul 2021 12:40 )   PT: >40.00 sec;   INR: 3.82 ratio         PTT - ( 07 Jul 2021 12:40 )  PTT:43.5 sec    BNPSerum Pro-Brain Natriuretic Peptide: [id number] pg/mL (07-07 @ 12:40)    I&O's Detail    07 Jul 2021 07:01  -  07 Jul 2021 15:47  --------------------------------------------------------  IN:  Total IN: 0 mL    OUT:    Voided (mL): 150 mL  Total OUT: 150 mL    Total NET: -150 mL        Daily Height in cm: 183.64 (07 Jul 2021 12:06)    Daily     RADIOLOGY & ADDITIONAL STUDIES: Patient is a 80y old  Male who presents with a chief complaint of shortness of breath (07 Jul 2021 14:33)      HPI: Patient is a 79 y/o male with PMH CAD s/p PCI and stent 2007, COPD on 2L home O2, HFpEF TTE in 4/21 EF 55-65, DLD, atrial fibrillation on coumadin presents for shortness of breath. Patient has been having a gradual onset of worsening shortness of breath over the past few days. He is adherent to his medications as directed. He was following at the Coumadin clinic for his coumadin dose and was told yesterday that he had a significant increase in his weight compared to 5 weeks earlier. He also endorses worsening bilateral lower extremities edema reaching his thighs along with a left sided chest pain, dull in nature, mild in intensity, persistent for the past day. he denied palpitations, fever, chills, cough, sputum production, abdominal or urinary symptoms. In the ED patient found to be in AF with HR 50-60s. He was seen for this same issue during last admission in April and PPM was not indicated at that time as patient was not symptomatic. Denies any dizziness or syncope.    PAST MEDICAL & SURGICAL HISTORY:  COPD (chronic obstructive pulmonary disease)    Hypertension    Deep vein thrombosis (DVT) of left lower extremity, unspecified chronicity, unspecified vein    Cellulitis of left lower extremity    Chronic atrial fibrillation    Mitral valve insufficiency, unspecified etiology  Moderate    CHF (congestive heart failure)    S/P coronary artery stent placement    History of total right knee replacement      PREVIOUS DIAGNOSTIC TESTING:      ECHO  FINDINGS:  < from: TTE Echo Complete w/o Contrast w/ Doppler (04.05.21 @ 07:21) >  Summary:   1. Left ventricular ejection fraction, by visual estimation, is 55-65%.   2. Normal global left ventricular systolic function.   3. Spectral Doppler shows restrictive pattern of left ventricular myocardial filling (Grade III diastolic dysfunction).   4. Severely enlarged left atrium.   5. Moderate mitral regurgitation.   6. Moderate tricuspid regurgitation.   7. Estimated pulmonary artery systolic pressure is 70.4 mmHg assuming a right atrial pressure of 8 mmHg, which is consistent with severe pulmonary hypertension.   8. LA volume Index is 78.0 ml/m² ml/m2.   9. Mild dilatation of the ascending aorta (4.3 cm).    < end of copied text >    STRESS  FINDINGS:    CATHETERIZATION  FINDINGS:    ELECTROPHYSIOLOGY STUDY  FINDINGS:    CAROTID ULTRASOUND:  FINDINGS    VENOUS DUPLEX SCAN:  FINDINGS:    CHEST CT PULMONARY ANGIO with IV Contrast:  FINDINGS:    MEDICATIONS  (STANDING):  atorvastatin 20 milliGRAM(s) Oral at bedtime  budesonide 160 MICROgram(s)/formoterol 4.5 MICROgram(s) Inhaler 2 Puff(s) Inhalation two times a day  chlorhexidine 4% Liquid 1 Application(s) Topical <User Schedule>  furosemide   Injectable 40 milliGRAM(s) IV Push every 12 hours  warfarin 6 milliGRAM(s) Oral once    MEDICATIONS  (PRN):  ALBUTerol  90 MICROgram(s) HFA Inhaler - Peds 2 Puff(s) Inhalation every 4 hours PRN Shortness of Breath and/or Wheezing      FAMILY HISTORY: No significant hx    SOCIAL HISTORY: No smoking, ETOH or illicit drug use    Past Surgical History:  S/P coronary artery stent placement    History of total right knee replacement    Allergies:  No Known Allergies      REVIEW OF SYSTEMS:  CONSTITUTIONAL: No fever, weight loss, chills, shakes, or fatigue  RESPIRATORY: No cough, wheezing, hemoptysis, + shortness of breath  CARDIOVASCULAR: No chest pain, dyspnea, palpitations, dizziness, syncope  GASTROINTESTINAL: No abdominal  or epigastric pain, nausea, vomiting, hematemesis, diarrhea, constipation, melena or bright red blood.  NEUROLOGICAL: No headaches, memory loss, slurred speech, limb weakness, loss of strength, numbness, or tremors  MUSCULOSKELETAL: +LE swelling      Vital Signs Last 24 Hrs  T(C): 36.2 (07 Jul 2021 12:06), Max: 36.2 (07 Jul 2021 12:06)  T(F): 97.2 (07 Jul 2021 12:06), Max: 97.2 (07 Jul 2021 12:06)  HR: 67 (07 Jul 2021 12:06) (67 - 67)  BP: 126/56 (07 Jul 2021 12:06) (126/56 - 126/56)  BP(mean): --  RR: 19 (07 Jul 2021 12:06) (19 - 19)  SpO2: 92% (07 Jul 2021 12:06) (92% - 92%)    PHYSICAL EXAM:  GENERAL: In no apparent distress, well nourished, and hydrated.  EYES: EOMI, PERRLA, conjunctiva and sclera clear  ENMT: No tonsillar erythema, exudates, or enlargements; ist mucous membranes, Good dentition, No lesions  NECK: Supple and normal thyroid.  No JVD or carotid bruit.  Carotid pulse is 2+ bilaterally.  HEART: Irregular rate and rhythm; No murmurs, rubs, or gallops.  PULMONARY: Clear to auscultation and perfusion.  No rales, wheezing, or rhonchi bilaterally.  ABDOMEN: Soft, Nontender, Nondistended; Bowel sounds present  EXTREMITIES:  2+ Peripheral Pulses, 2+ pitting edema BL  NEUROLOGICAL: Grossly nonfocal      INTERPRETATION OF TELEMETRY: AF 50-60s bpm    ECG:  < from: 12 Lead ECG (04.04.21 @ 12:11) >  Ventricular Rate 56 BPM    QRS Duration 90 ms    Q-T Interval 450 ms    QTC Calculation(Bazett) 434 ms    R Axis -37 degrees    T Axis 53 degrees    Diagnosis Line Atrial fibrillation with slow ventricular response with premature ventricular  or aberrantly conducted complexes  Left axis deviation  Low voltage QRS  Nonspecific ST and T wave abnormality  Abnormal ECG    Confirmed by Anali Scherer MD ([id number]) on 4/4/2021 3:04:29 PM    < end of copied text >      I&O's Detail    07 Jul 2021 07:01  -  07 Jul 2021 15:47  --------------------------------------------------------  IN:  Total IN: 0 mL    OUT:    Voided (mL): 150 mL  Total OUT: 150 mL    Total NET: -150 mL          LABS:                        9.4    3.69  )-----------( 154      ( 07 Jul 2021 12:40 )             30.0     07-07    139  |  102  |  28<H>  ----------------------------<  92  3.9   |  28  |  1.5    Ca    8.5      07 Jul 2021 12:40  Mg     2.2     07-07    TPro  6.8  /  Alb  3.6  /  TBili  1.6<H>  /  DBili  x   /  AST  21  /  ALT  10  /  AlkPhos  137<H>  07-07    CARDIAC MARKERS ( 07 Jul 2021 12:40 )  x     / 0.01 ng/mL / x     / x     / x          PT/INR - ( 07 Jul 2021 12:40 )   PT: >40.00 sec;   INR: 3.82 ratio         PTT - ( 07 Jul 2021 12:40 )  PTT:43.5 sec    BNPSerum Pro-Brain Natriuretic Peptide: [id number] pg/mL (07-07 @ 12:40)    I&O's Detail    07 Jul 2021 07:01  -  07 Jul 2021 15:47  --------------------------------------------------------  IN:  Total IN: 0 mL    OUT:    Voided (mL): 150 mL  Total OUT: 150 mL    Total NET: -150 mL        Daily Height in cm: 183.64 (07 Jul 2021 12:06)    Daily     RADIOLOGY & ADDITIONAL STUDIES:

## 2021-07-07 NOTE — ED PROVIDER NOTE - NS ED ROS FT
Constitutional: (-) fever, (-) chills  Eyes: (-) visual changes  ENT: (-) nasal congestions  Cardiovascular: (-) chest pain, (-) syncope  Respiratory: (-) cough, (+) shortness of breath, (-) dyspnea,   Gastrointestinal: (-) vomiting, (-) diarrhea, (-)nausea,  Musculoskeletal: (-) neck pain, (-) back pain, (-) joint pain,  Integumentary: (-) rash, (-) edema, (-) bruises  Neurological: (-) headache, (-) loc, (-) dizziness, (-) tingling, (-)numbness,  Peripheral Vascular: (+) leg swelling  :  (-)dysuria,  (-) hematuria  Allergic/Immunologic: (-) pruritus

## 2021-07-07 NOTE — CONSULT NOTE ADULT - ASSESSMENT
Cardiologist: Dr Scherer    Assessment: 79 y/o male with PMH CAD s/p PCI and stent 2007, COPD on 2L home O2, HFpEF TTE in 4/21 EF 55-65, DLD, atrial fibrillation on coumadin presents for shortness of breath and increase in his NADIA along with 1 day history of chest pain.    Impression:  Acute on Chronic HFpEF  AF with Slow Ventricular Response (50-60s)  CAD sp PCI  COPD on 2L O2  HLD    Plan:  - Avoid AVN blocking agents  - Consult Dr Leon for HF management  - Will monitor patient on telemetry to assess if he requires PPM however his symptoms appear to be related to HF exacerbation at this time  - Cont Coumadin for stroke prevention  - 2D Echo  - Monitor electrolytes, maintain WNL  - Will follow  Cardiologist: Dr Scherer    Assessment: 79 y/o male with PMH CAD s/p PCI and stent 2007, COPD on 2L home O2, HFpEF TTE in 4/21 EF 55-65, DLD, atrial fibrillation on coumadin presents for shortness of breath and increase in his NADIA along with 1 day history of chest pain.    Impression:  Acute on Chronic HFpEF  AF with Slow Ventricular Response (50-60s)  CAD sp PCI  COPD on 2L O2  HLD    Plan:  - Avoid AVN blocking agents  - Consult Dr Leon for HF management  - Patient will need PPM prior to discharge, needs to be fully diuresed and ready for discharge before PPM can be placed  - Cont Coumadin for stroke prevention  - 2D Echo  - Monitor electrolytes, maintain WNL  - Will follow

## 2021-07-07 NOTE — H&P ADULT - NSHPSOCIALHISTORY_GEN_ALL_CORE
lives at home with his wife and kids.  Ex smoker, 3-4 glasses of alcohol weekly, no illicit of drug abuse.

## 2021-07-07 NOTE — H&P ADULT - HISTORY OF PRESENT ILLNESS
81 y/o male with PMH CAD s/p PCI and stent 2007, COPD on 2L home O2, HFpEF TTE in 4/21 EF 55-65, DLD, atrial fibrillation on coumadin presents for shortness of breath. Patient has been having a gradual onset of worsening shortness of breath over the past few days. He is adherent to his medications as directed. He was following at the Coumadin clinic for his coumadin dose and was told yesterday that he had a significant increase in his weight compared to 5 weeks earlier. He also endorses worsening bilateral lower extremities edema reaching his thighs along with a left sided chest pain, dull in nature, mild in intensity, persistent 81 y/o male with PMH CAD s/p PCI and stent 2007, COPD on 2L home O2, HFpEF TTE in 4/21 EF 55-65, DLD, atrial fibrillation on coumadin presents for shortness of breath. Patient has been having a gradual onset of worsening shortness of breath over the past few days. He is adherent to his medications as directed. He was following at the Coumadin clinic for his coumadin dose and was told yesterday that he had a significant increase in his weight compared to 5 weeks earlier. He also endorses worsening bilateral lower extremities edema reaching his thighs along with a left sided chest pain, dull in nature, mild in intensity, persistent for the past day. he denied palpitations, fever, chills, cough, sputum production, abdominal or urinary symptoms.    VS on admission were within normal range. He was saturating well on 3 L NC.  CXR showed bilateral pleural effusions with  vascular congestion.  He received 40 mg of iv lasix.

## 2021-07-07 NOTE — ED PROVIDER NOTE - ATTENDING CONTRIBUTION TO CARE
79 yo M PMH CAD s/p PCI and stent 2007, COPD on 2L home O2, HFpEF, DLD, atrial fibrillation on coumadin presents with worsening shortness of breath for the last few weeks. States that he has been having worsening leg swelling and shortness of breath. States that it feels like his chf. Normally uses oxygen at night but has needed it during the day. Also states that today he started to feel some chest pressure. no dizziness or syncopal episodes. States that he has a hx of bradycardia. no fevers or recent illness. no n/v/d, no abdominal pain.     CONSTITUTIONAL: Well-developed; well-nourished; in no acute distress.   SKIN: warm, dry  HEAD: Normocephalic; atraumatic.  EYES: PERRL, EOMI, no conjunctival erythema  ENT: No nasal discharge; airway clear.  NECK: Supple; non tender.  CARD: S1, S2 normal;  Regular rate and rhythm.   RESP: No wheezes, rales or rhonchi. dec lung sounds at bilateral bases.   ABD: soft non tender, non distended, no rebound or guarding  EXT: Normal ROM.  5/5 strength in all 4 extremities. 1+ pedal edema bilaterally.   LYMPH: No acute cervical adenopathy.  NEURO: Alert, oriented, grossly unremarkable. neurovascularly intact  PSYCH: Cooperative, appropriate.

## 2021-07-07 NOTE — H&P ADULT - LV FUNCTION ASSESSMENT
yes H Plasty Text: Given the location of the defect, shape of the defect and the proximity to free margins a H-plasty was deemed most appropriate for repair.  Using a sterile surgical marker, the appropriate advancement arms of the H-plasty were drawn incorporating the defect and placing the expected incisions within the relaxed skin tension lines where possible. The area thus outlined was incised deep to adipose tissue with a #15 scalpel blade. The skin margins were undermined to an appropriate distance in all directions utilizing iris scissors.  The opposing advancement arms were then advanced into place in opposite direction and anchored with interrupted buried subcutaneous sutures.

## 2021-07-07 NOTE — H&P ADULT - ATTENDING COMMENTS
HPI:  81 y/o gentleman with a PMH CAD s/p PCI and stent 2007, COPD on 2L home O2, HFpEF TTE in 4/21 EF 55-65, DLD, atrial fibrillation on coumadin, hx of DVT, admitted for acute heart failure exacerbation. He has been having a gradual onset of worsening shortness of breath over the past few days  with increasing b/l lower extremity edema extending up to his thighs. He also notes occasional palpitations/irregular heart beats, and well as some slight chest heaviness but no overt pain. He was been taking lasix PO 40mg BID with an additionally 40mg PRN for years. Over the last three days he has been on lasix TID but hasn't noted a significant decrease in LE edema. He has not noted a decrease in his urinary output but also reports that the extra doses of lasix haven't seemed to significantly help nor increase his urination.     EPS evaluated him for A-Fib with slow ventricular response, currently cannot undergo PPM placement until euvolemic. They will reassess once fluid status improved    REVIEW OF SYSTEMS:  CONSTITUTIONAL:  No weakness, fevers, chills, night sweats, weight loss  EYES/ENT: No visual changes. No vertigo or dysphagia  NECK: No neck pain or stiffness  RESPIRATORY: No cough, wheezing, hemoptysis. + shortness of breath  CARDIOVASCULAR: No chest pain. + palpitations. +lower extremity edema  GASTROINTESTINAL: No abdominal pain. No nausea, vomiting, diarrhea, or hematemesis  GENITOURINARY: No dysuria or hematuria   NEUROLOGICAL: No focal numbness or weakness  SKIN: No rashes or itching  HEMATOLOGIC: No easy bruising or prolonged bleeding.      PHYSICAL EXAM:  GENERAL: NAD, well-developed, Non-toxic, stated age   HEAD:  Atraumatic, Normocephalic  EYES: EOMI, Sclera White   NECK: Supple, No JVD  CHEST/LUNG: b/l crackles and mild occasional wheezing, no rhonchi. Breathing and speaking comfortably on 3L NC   HEART: mild bradycardia and irregular rhythm; s1, s2, No murmurs, rubs, or gallops  ABDOMEN: Soft, Nontender, Nondistended; Bowel sounds present, No rebound or guarding noted   EXTREMITIES: b/l +2-3 pitting lower extremity edema up waist.  + b/l calf tenderness to palpation.  No clubbing or cyanosis  PSYCH: AAOx3, pleasant, cooperative, not anxious  NEUROLOGY: non-focal  SKIN: No rashes or lesions      ASSESSMENT AND PLAN:  Acute on chronic hypoxic respiratory failure secondary to acute HFw/PEF exacerbation: ?developing lasix tolerance  -Cont with IV lasix 40mg BID. Consider metolazone if inadequate urinary output with IV lasix.   -Cardio and HF team evaluation    -Cont tele monitoring, trend cardiac enzymes, check 2D Echo.   -I/O, daily weights, and monitor electrolytes carefully while aggressively diuresing.     Chronic atrial fibrillation w/slow ventricular response: on coumadin, INR therapeutic   -EPS following: likely to need PPM placed once euvolemic on this hospitalization.   -Patient wants to speak with Dr Arnulfo Scherer before having any procedures done  -Avoid any AVN blockers      DORON on CKD II, ?CRS (doubt pre-renal, BUN:Cr 18)   -Cont with IV lasix 40mg BID   -Cont with Entresto, if Cr worsens then hold it.  -Trend Cr, monitor strict I/O, if worsening then check Urine Na, Cl, urea, urine Pr:Cr, Renal US and will need a Nephrology consult.   -Avoid nephrotoxic medications.     COPD on 2L O2: clinical picture in favor of CHF exacerbation (wheezing is likely cardiac in nature)   -Cont with symbicort q12 and albuterol prn  -If respiratory status not improving despite adequate diuresis then will consider course of steroids     Hx of DVT + b/l calf pain: on coumadin  -Check LE venous duplex    HLD: cont statin    DVT ppx: coumadin, INR therapeutic   GI ppx: Not indicated  GOC: Full code for now, discussed it with him at bedside. See GOC for further details.   My note supersedes the residents in the event of a discrepancy.

## 2021-07-07 NOTE — ED PROVIDER NOTE - PHYSICAL EXAMINATION
Physical Exam    Vital Signs: I have reviewed the initial vital signs.  Constitutional: well-nourished, appears stated age, no acute distress  Eyes: Conjunctiva pink, Sclera clear  Cardiovascular: S1 and S2, regular rate, regular rhythm, well-perfused extremities, radial pulses equal and 2+, pedal pulses 2+ and equal   Respiratory: unlabored respiratory effort, bibasilar rales, dec air entry at bases   Gastrointestinal: soft, non-tender abdomen, no pulsatile mass, normal bowl sounds  Musculoskeletal: supple neck, 2+ lower extremity edema, no midline tenderness  Integumentary: warm, dry, no rash  Neurologic: awake, alert, nvi

## 2021-07-07 NOTE — ED PROVIDER NOTE - CLINICAL SUMMARY MEDICAL DECISION MAKING FREE TEXT BOX
Patient presents with worsening shortness of breath and fluid overload over the last few weeks. States that today developed some chest pain as well. labs, ekg, cxr done. Found to be bradycardic, similar to prior evaluations. routine EP consult placed. Elevated BNP, lasix given. Patient admitted to tele for further management.

## 2021-07-07 NOTE — ED ADULT NURSE NOTE - NS ED NURSE REPORT GIVEN DT
- resolved  - Troponin on admission mildly elevated at 0.130 with ekg changes consistent with right heart strain, in the setting of respiratory failure/pulmonary emboli   - full dose AC - xarelto  - enzymes trended down  - Dr. Muro cardio following  - likely for eventual ischemic evaluation 07-Jul-2021 20:22

## 2021-07-07 NOTE — ED PROVIDER NOTE - OBJECTIVE STATEMENT
79 yo male, pmh of cad s/p pci stent, afib on coumadin, htn, hld, chf seen by ep in 4/21 was evaluated for ppm at that time, held off at that time has Hx PAF with slow ventricular response, at this time presenting to ed for le swelling and llamas for several days, mild, worse with movement, no pain or radiation, on lasix 40 bid. denies fever, chills, cp, nvd, syncope, dizziness.

## 2021-07-07 NOTE — H&P ADULT - ASSESSMENT
79 y/o male with PMH CAD s/p PCI and stent 2007, COPD on 2L home O2, HFpEF TTE in 4/21 EF 55-65, DLD, atrial fibrillation on coumadin presents for shortness of breath and increase in his NADIA along with 1 day history of chest pain.    # HFpEF exacerbation  - CXR b/l pleural effusions and vascular congestion  - TTE 4/21 EF 55-65, BNP 14K  - ECG afib in SVR  - troponin 1set negative, f/u 2nd set  - s/p 40 mg of iv lasix in the ED  - c/w lasix IV  - daily weight, strict I/Os, fluid restriction  - Admit to telemetry  - c/s EP    # DORON on CKD 2  - creatinine on admission 1.5, baseline 1.1  - Most likely secondary to cardio renal syndrome  - c/w diuresis  - Hold entresto for now, resume once DORON resolves    # CAD  # AFib  - on coumadin 8 mg at home, mild supra therapeutic INR 3.8  - will give coumadin 6 mg today  - INR daily and adjust coumadin dose accordingly  - in SVR, not on rate control therapy at home  - admit to telemetry  - f/u EP recs    # COPD  - 2 l o2 at baseline  - not in exacerbation  - c/w symbicort and ventolin as needed     # DVT ppx: coumadin  # PPI ppx: no need  # DIET: DASH, fluid restriction  # ACTIVITY: IAT  # DISPO: From home

## 2021-07-08 ENCOUNTER — APPOINTMENT (OUTPATIENT)
Dept: CARDIOLOGY | Facility: CLINIC | Age: 81
End: 2021-07-08

## 2021-07-08 LAB
ALBUMIN SERPL ELPH-MCNC: 3.3 G/DL — LOW (ref 3.5–5.2)
ALP SERPL-CCNC: 130 U/L — HIGH (ref 30–115)
ALT FLD-CCNC: 10 U/L — SIGNIFICANT CHANGE UP (ref 0–41)
ANION GAP SERPL CALC-SCNC: 6 MMOL/L — LOW (ref 7–14)
AST SERPL-CCNC: 19 U/L — SIGNIFICANT CHANGE UP (ref 0–41)
BASOPHILS # BLD AUTO: 0.02 K/UL — SIGNIFICANT CHANGE UP (ref 0–0.2)
BASOPHILS NFR BLD AUTO: 0.6 % — SIGNIFICANT CHANGE UP (ref 0–1)
BILIRUB SERPL-MCNC: 1.5 MG/DL — HIGH (ref 0.2–1.2)
BUN SERPL-MCNC: 25 MG/DL — HIGH (ref 10–20)
CALCIUM SERPL-MCNC: 8.4 MG/DL — LOW (ref 8.5–10.1)
CHLORIDE SERPL-SCNC: 104 MMOL/L — SIGNIFICANT CHANGE UP (ref 98–110)
CO2 SERPL-SCNC: 32 MMOL/L — SIGNIFICANT CHANGE UP (ref 17–32)
COVID-19 SPIKE DOMAIN AB INTERP: POSITIVE
COVID-19 SPIKE DOMAIN ANTIBODY RESULT: >250 U/ML — HIGH
CREAT SERPL-MCNC: 1.3 MG/DL — SIGNIFICANT CHANGE UP (ref 0.7–1.5)
EOSINOPHIL # BLD AUTO: 0.13 K/UL — SIGNIFICANT CHANGE UP (ref 0–0.7)
EOSINOPHIL NFR BLD AUTO: 3.9 % — SIGNIFICANT CHANGE UP (ref 0–8)
GLUCOSE SERPL-MCNC: 84 MG/DL — SIGNIFICANT CHANGE UP (ref 70–99)
HCT VFR BLD CALC: 28.7 % — LOW (ref 42–52)
HGB BLD-MCNC: 8.8 G/DL — LOW (ref 14–18)
IMM GRANULOCYTES NFR BLD AUTO: 0.3 % — SIGNIFICANT CHANGE UP (ref 0.1–0.3)
INR BLD: 3.82 RATIO — HIGH (ref 0.65–1.3)
IRON SATN MFR SERPL: 20 % — SIGNIFICANT CHANGE UP (ref 15–50)
IRON SATN MFR SERPL: 55 UG/DL — SIGNIFICANT CHANGE UP (ref 35–150)
LYMPHOCYTES # BLD AUTO: 0.69 K/UL — LOW (ref 1.2–3.4)
LYMPHOCYTES # BLD AUTO: 20.8 % — SIGNIFICANT CHANGE UP (ref 20.5–51.1)
MAGNESIUM SERPL-MCNC: 2.2 MG/DL — SIGNIFICANT CHANGE UP (ref 1.8–2.4)
MCHC RBC-ENTMCNC: 27.5 PG — SIGNIFICANT CHANGE UP (ref 27–31)
MCHC RBC-ENTMCNC: 30.7 G/DL — LOW (ref 32–37)
MCV RBC AUTO: 89.7 FL — SIGNIFICANT CHANGE UP (ref 80–94)
MONOCYTES # BLD AUTO: 0.5 K/UL — SIGNIFICANT CHANGE UP (ref 0.1–0.6)
MONOCYTES NFR BLD AUTO: 15.1 % — HIGH (ref 1.7–9.3)
NEUTROPHILS # BLD AUTO: 1.97 K/UL — SIGNIFICANT CHANGE UP (ref 1.4–6.5)
NEUTROPHILS NFR BLD AUTO: 59.3 % — SIGNIFICANT CHANGE UP (ref 42.2–75.2)
NRBC # BLD: 0 /100 WBCS — SIGNIFICANT CHANGE UP (ref 0–0)
PLATELET # BLD AUTO: 146 K/UL — SIGNIFICANT CHANGE UP (ref 130–400)
POTASSIUM SERPL-MCNC: 3.9 MMOL/L — SIGNIFICANT CHANGE UP (ref 3.5–5)
POTASSIUM SERPL-SCNC: 3.9 MMOL/L — SIGNIFICANT CHANGE UP (ref 3.5–5)
PROT SERPL-MCNC: 6.4 G/DL — SIGNIFICANT CHANGE UP (ref 6–8)
PROTHROM AB SERPL-ACNC: >40 SEC — HIGH (ref 9.95–12.87)
RBC # BLD: 3.19 M/UL — LOW (ref 4.7–6.1)
RBC # BLD: 3.2 M/UL — LOW (ref 4.7–6.1)
RBC # FLD: 20.1 % — HIGH (ref 11.5–14.5)
RETICS #: 48.2 K/UL — SIGNIFICANT CHANGE UP (ref 25–125)
RETICS/RBC NFR: 1.5 % — SIGNIFICANT CHANGE UP (ref 0.5–1.5)
SARS-COV-2 IGG+IGM SERPL QL IA: >250 U/ML — HIGH
SARS-COV-2 IGG+IGM SERPL QL IA: POSITIVE
SODIUM SERPL-SCNC: 142 MMOL/L — SIGNIFICANT CHANGE UP (ref 135–146)
TIBC SERPL-MCNC: 279 UG/DL — SIGNIFICANT CHANGE UP (ref 220–430)
TRANSFERRIN SERPL-MCNC: 228 MG/DL — SIGNIFICANT CHANGE UP (ref 200–360)
UIBC SERPL-MCNC: 224 UG/DL — SIGNIFICANT CHANGE UP (ref 110–370)
WBC # BLD: 3.32 K/UL — LOW (ref 4.8–10.8)
WBC # FLD AUTO: 3.32 K/UL — LOW (ref 4.8–10.8)

## 2021-07-08 PROCEDURE — 93970 EXTREMITY STUDY: CPT | Mod: 26

## 2021-07-08 PROCEDURE — 71250 CT THORAX DX C-: CPT | Mod: 26

## 2021-07-08 PROCEDURE — 99233 SBSQ HOSP IP/OBS HIGH 50: CPT

## 2021-07-08 PROCEDURE — 99222 1ST HOSP IP/OBS MODERATE 55: CPT

## 2021-07-08 PROCEDURE — 99223 1ST HOSP IP/OBS HIGH 75: CPT

## 2021-07-08 PROCEDURE — 99497 ADVNCD CARE PLAN 30 MIN: CPT | Mod: 25

## 2021-07-08 RX ORDER — WARFARIN SODIUM 2.5 MG/1
4 TABLET ORAL ONCE
Refills: 0 | Status: DISCONTINUED | OUTPATIENT
Start: 2021-07-08 | End: 2021-07-08

## 2021-07-08 RX ORDER — FUROSEMIDE 40 MG
60 TABLET ORAL EVERY 12 HOURS
Refills: 0 | Status: DISCONTINUED | OUTPATIENT
Start: 2021-07-08 | End: 2021-07-10

## 2021-07-08 RX ORDER — POTASSIUM CHLORIDE 20 MEQ
20 PACKET (EA) ORAL ONCE
Refills: 0 | Status: COMPLETED | OUTPATIENT
Start: 2021-07-08 | End: 2021-07-08

## 2021-07-08 RX ORDER — WARFARIN SODIUM 2.5 MG/1
2 TABLET ORAL
Qty: 0 | Refills: 0 | DISCHARGE

## 2021-07-08 RX ORDER — ASPIRIN/CALCIUM CARB/MAGNESIUM 324 MG
81 TABLET ORAL DAILY
Refills: 0 | Status: DISCONTINUED | OUTPATIENT
Start: 2021-07-08 | End: 2021-07-22

## 2021-07-08 RX ADMIN — ATORVASTATIN CALCIUM 20 MILLIGRAM(S): 80 TABLET, FILM COATED ORAL at 21:41

## 2021-07-08 RX ADMIN — NYSTATIN CREAM 1 APPLICATION(S): 100000 CREAM TOPICAL at 18:27

## 2021-07-08 RX ADMIN — Medication 20 MILLIEQUIVALENT(S): at 12:54

## 2021-07-08 RX ADMIN — BUDESONIDE AND FORMOTEROL FUMARATE DIHYDRATE 2 PUFF(S): 160; 4.5 AEROSOL RESPIRATORY (INHALATION) at 08:00

## 2021-07-08 RX ADMIN — Medication 81 MILLIGRAM(S): at 12:54

## 2021-07-08 RX ADMIN — Medication 60 MILLIGRAM(S): at 18:27

## 2021-07-08 RX ADMIN — Medication 40 MILLIGRAM(S): at 05:45

## 2021-07-08 NOTE — CONSULT NOTE ADULT - ASSESSMENT
79 y/o male with PMH of:  1. CAD s/p PCI and stent to LCX in 2007  2. COPD on 2L home O2  3. HFpEF TTE in 4/21 EF 55-65  4. DL  5. Persistent atrial fibrillation on coumadin     Presented for shortness of breath and increase in his NADIA along with 1 day history of chest pain.    Impression:  - Acute on Chronic HFpEF  - AF with Slow Ventricular Response (50-60s)  - CAD s/p PCI  - COPD on 2L O2  - DL    Plan:  - Lasix 40mg IV BID  - Accurate I/O  - Diuresis as per HF team  - Avoid AVN blocking agents  - F/u echo  - Patient may need PPM vs ICD prior to discharge depending on EF  - Cont Coumadin for stroke prevention  - Monitor electrolytes, keep K>4 and Mg>2  - Pending attending finalized recs   81 y/o male with PMH of:    1. CAD s/p PCI and stent to LCX in 2007  2. COPD on 2L home O2  3. HFpEF TTE in 4/21 EF 55-65  4. DL  5. Persistent atrial fibrillation on coumadin     Presented for shortness of breath and increase in his NADIA along with 1 day history of chest pain.    Impression:    - Acute on Chronic HFpEF  - AF with Slow Ventricular Response (50-60s)  - CAD s/p PCI  - COPD on 2L O2  - DL  - Pleural effusions    Plan:  - Lasix  IV BID  - Accurate I/O  - Diuresis as per HF team  - Avoid AVN blocking agents  - F/u echo  - Patient may need PPM - EP follow-up  - Cont Coumadin for stroke prevention  - Monitor electrolytes

## 2021-07-08 NOTE — PROGRESS NOTE ADULT - ASSESSMENT
Assessment and Plan:   Assessment/Plan:  · Assessment	81 yo M with pmhx of CAD s/p PCI and stent (2007), HFpEF (EF of 55-65% on 4/5/21), COPD on 2L home O2, DLD, HTN, atrial fibrillation on warfarin with acute on chronic HFpEF exacerbation.  · Plan	  #Acute on chronic HFpEF exacerbation, likely cor pulmonale (LVEF 55%-65% on 4/5/21), grade III diastolic dysfunction, severely enlarged left atrium, mod MR, mod TR, severe pulmonary HTN  - c/w Lasix 60 mg IV BID for diuresis  - hold metoprolol  - c/w Entresto per cardiology rec  - strict I&Os, fluid restriction    #DORON  Creatinine=1.5 (baseline was 1.1)  secondary to cardiorenal syndrome  c/w Lasix    #chronic paroxysmal a fib  - f/u EKG/ Electrophysiology    #supratherapeutic INR  - 7.41<7.14<7.66  - continue to hold warfarin    #COPD, on home O2  - c/w supplemental O2, goal for SpO2 99-92%    #CAD s/p PCI:  - c/w atorvastatin 20 mg    #normocytic anemia   - Vitamin B12, folate levels WNL          Assessment and Plan:   · Assessment	  79 y/o male with PMH CAD s/p PCI and stent 2007, COPD on 2L home O2, HFpEF TTE in 4/21 EF 55-65, DLD, atrial fibrillation on coumadin presents for shortness of breath and increase in his NADIA along with 1 day history of chest pain.    # HFpEF exacerbation  # Afib SVR # Bradycardia # NSVT  # CAD s/p stent 2007  # Severe Pulmonary HTN    - CXR b/l pleural effusions and vascular congestion  - TTE 4/21 EF 55-65 PSAP 70 mmhg; get new TTE  - BNP 14K  - ECG afib in SVR  - Last 24 hrs Tele events : Bradycardia ; NSVT 4 beats  - troponin x2 negative  - c/w lasix 60 mg iv q 12  - Cw ASA 81 md daily  - Cw atorvastatin 20 mg daily  - FU lipid panel and A1cc  - iron profile WNL  - daily weight, strict I/Os keep I < O  - DASH TLC diet  fluid restriction 1000 ml daily  - continue tele monitoring  - EP on board he will benefit from a pacemaker/ICD (depending of EF). 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  - c/w  lasix 60 q 12  - Hold entresto for now    # Normocytic anemia- chronic  # leukopenia    - at baseline  - Iron studies WNL   - FU folate b12 and TSH  - retic count WNL  - Stool sample for guaiac  - needs OP colonoscopy  - HIv screening  - active type and screen keep Hb > 7    # COPD not in exacerbation  # PSAP 70 mmhg    - on  2 LPM NC at home during night   - c/w symbicort and ventolin as needed  - CXR bilateral opacities basilar; underlying neoplasm not excluded  - Will order chest CT  - FU D-dimer; DVT ppx    # HO DVT    - Chronic deep venous thrombosis right common femoral, femoral and   popliteal veins 2018  - coumadin on hold INR > 3  - SQLs bilateral  - ambulation when possible      # DVT ppx: coumadin  # DIET: DASH TLC  fluid restriction  # ACTIVITY: IAT  # Full code

## 2021-07-08 NOTE — GOALS OF CARE CONVERSATION - ADVANCED CARE PLANNING - CONVERSATION DETAILS
Discussed GOC in the event of an emergency,  He would like to avoid being placed on a ventilator, and knows of the pharyngeal damage and erosion  it can cause (christian is currently vented here) but desires cardiac resuscitation.   We discussed the necessity of ventilation should CPR be attempted, he understood and for now would like to remain full code, though he needs more time to think about his options.

## 2021-07-08 NOTE — PROGRESS NOTE ADULT - SUBJECTIVE AND OBJECTIVE BOX
24H events:    Patient is a 80y old Male who presents with a chief complaint of shortness of breath (07 Jul 2021 15:45)    Primary diagnosis of Bradycardia       Today is hospital day 1d. This morning patient was seen and examined at bedside, resting comfortably in bed.    no major events overnight  having Bradycardia 35 HR with NSVT 4 beats on Tele  no major complaints  sating well on 2 LPM NC    PAST MEDICAL & SURGICAL HISTORY  COPD (chronic obstructive pulmonary disease)    Hypertension    Deep vein thrombosis (DVT) of left lower extremity, unspecified chronicity, unspecified vein    Cellulitis of left lower extremity    Chronic atrial fibrillation    Mitral valve insufficiency, unspecified etiology  Moderate    CHF (congestive heart failure)    S/P coronary artery stent placement    History of total right knee replacement      SOCIAL HISTORY:  Social History:  lives at home with his wife and kids.  Ex smoker, 3-4 glasses of alcohol weekly, no illicit of drug abuse. (07 Jul 2021 14:33)      ALLERGIES:  No Known Allergies    MEDICATIONS:  STANDING MEDICATIONS  aspirin enteric coated 81 milliGRAM(s) Oral daily  atorvastatin 20 milliGRAM(s) Oral at bedtime  budesonide 160 MICROgram(s)/formoterol 4.5 MICROgram(s) Inhaler 2 Puff(s) Inhalation two times a day  chlorhexidine 4% Liquid 1 Application(s) Topical <User Schedule>  furosemide   Injectable 40 milliGRAM(s) IV Push every 12 hours  nystatin Powder 1 Application(s) Topical every 12 hours  potassium chloride   Powder 20 milliEquivalent(s) Oral once  warfarin 4 milliGRAM(s) Oral once    PRN MEDICATIONS  ALBUTerol  90 MICROgram(s) HFA Inhaler - Peds 2 Puff(s) Inhalation every 4 hours PRN    VITALS:   T(F): 97.6  HR: 58  BP: 141/67  RR: 18  SpO2: 99%    PHYSICAL EXAM:  GENERAL: NAD  NERVOUS SYSTEM:  Alert & Oriented X3, non focal   CHEST/LUNG: bilateral crackles  HEART: irregular rate and rhythm;   ABDOMEN: Soft, Nontender, Nondistended; Bowel sounds present  EXTREMITIES:   No clubbing, cyanosis, bilateral pitting edema R> L 2+    LABS:                        8.8    3.32  )-----------( 146      ( 08 Jul 2021 06:09 )             28.7     07-08    142  |  104  |  25<H>  ----------------------------<  84  3.9   |  32  |  1.3    Ca    8.4<L>      08 Jul 2021 06:09  Mg     2.2     07-08    TPro  6.4  /  Alb  3.3<L>  /  TBili  1.5<H>  /  DBili  x   /  AST  19  /  ALT  10  /  AlkPhos  130<H>  07-08    PT/INR - ( 08 Jul 2021 06:09 )   PT: >40.00 sec;   INR: 3.82 ratio         PTT - ( 07 Jul 2021 12:40 )  PTT:43.5 sec      Troponin T, Serum: <0.01 ng/mL (07-07-21 @ 16:06)  Troponin T, Serum: 0.01 ng/mL (07-07-21 @ 12:40)      CARDIAC MARKERS ( 07 Jul 2021 16:06 )  x     / <0.01 ng/mL / x     / x     / x      CARDIAC MARKERS ( 07 Jul 2021 12:40 )  x     / 0.01 ng/mL / x     / x     / x          RADIOLOGY:    < from: TTE Echo Complete w/o Contrast w/ Doppler (04.05.21 @ 07:21) >   1. Left ventricular ejection fraction, by visual estimation, is 55-65%.   2. Normal global left ventricular systolic function.   3. Spectral Doppler shows restrictive pattern of left ventricular myocardial filling (Grade III diastolic dysfunction).   4. Severely enlarged left atrium.   5. Moderate mitral regurgitation.   6. Moderate tricuspid regurgitation.   7. Estimated pulmonary artery systolic pressure is 70.4 mmHg assuming a right atrial pressure of 8 mmHg, which is consistent with severe pulmonary hypertension.   8. LA volume Index is 78.0 ml/m² ml/m2.   9. Mild dilatation of the ascending aorta (4.3 cm).    < end of copied text >         24H events:    Patient is a 80y old Male who presents with a chief complaint of shortness of breath (07 Jul 2021 15:45)    Primary diagnosis of Bradycardia       Today is hospital day 1d. This morning patient was seen and examined at bedside, resting comfortably in bed.    no major events overnight  having Bradycardia 35 HR with NSVT 4 beats on Tele  no major complaints  sating well on 2 LPM NC    PAST MEDICAL & SURGICAL HISTORY  COPD (chronic obstructive pulmonary disease)    Hypertension    Deep vein thrombosis (DVT) of left lower extremity, unspecified chronicity, unspecified vein    Cellulitis of left lower extremity    Chronic atrial fibrillation    Mitral valve insufficiency, unspecified etiology  Moderate    CHF (congestive heart failure)    S/P coronary artery stent placement    History of total right knee replacement      SOCIAL HISTORY:  Social History:  lives at home with his wife and kids.  Ex smoker, 3-4 glasses of alcohol weekly, no illicit of drug abuse. (07 Jul 2021 14:33)      ALLERGIES:  No Known Allergies    MEDICATIONS:  STANDING MEDICATIONS  aspirin enteric coated 81 milliGRAM(s) Oral daily  atorvastatin 20 milliGRAM(s) Oral at bedtime  budesonide 160 MICROgram(s)/formoterol 4.5 MICROgram(s) Inhaler 2 Puff(s) Inhalation two times a day  chlorhexidine 4% Liquid 1 Application(s) Topical <User Schedule>  furosemide   Injectable 40 milliGRAM(s) IV Push every 12 hours  nystatin Powder 1 Application(s) Topical every 12 hours  potassium chloride   Powder 20 milliEquivalent(s) Oral once  warfarin 4 milliGRAM(s) Oral once    PRN MEDICATIONS  ALBUTerol  90 MICROgram(s) HFA Inhaler - Peds 2 Puff(s) Inhalation every 4 hours PRN    VITALS:   T(F): 97.6  HR: 58  BP: 141/67  RR: 18  SpO2: 99%    PHYSICAL EXAM:  GENERAL: NAD  NERVOUS SYSTEM:  Alert & Oriented X3, non focal   CHEST/LUNG: bilateral crackles  HEART: irregular rate and rhythm;   ABDOMEN: Soft, Nontender, Nondistended; Bowel sounds present  EXTREMITIES:   No clubbing, cyanosis, bilateral pitting edema R> L 2+    LABS:                        8.8    3.32  )-----------( 146      ( 08 Jul 2021 06:09 )             28.7     07-08    142  |  104  |  25<H>  ----------------------------<  84  3.9   |  32  |  1.3    Ca    8.4<L>      08 Jul 2021 06:09  Mg     2.2     07-08    TPro  6.4  /  Alb  3.3<L>  /  TBili  1.5<H>  /  DBili  x   /  AST  19  /  ALT  10  /  AlkPhos  130<H>  07-08    PT/INR - ( 08 Jul 2021 06:09 )   PT: >40.00 sec;   INR: 3.82 ratio         PTT - ( 07 Jul 2021 12:40 )  PTT:43.5 sec      Troponin T, Serum: <0.01 ng/mL (07-07-21 @ 16:06)  Troponin T, Serum: 0.01 ng/mL (07-07-21 @ 12:40)      CARDIAC MARKERS ( 07 Jul 2021 16:06 )  x     / <0.01 ng/mL / x     / x     / x      CARDIAC MARKERS ( 07 Jul 2021 12:40 )  x     / 0.01 ng/mL / x     / x     / x          RADIOLOGY:    < from: TTE Echo Complete w/o Contrast w/ Doppler (04.05.21 @ 07:21) >   1. Left ventricular ejection fraction, by visual estimation, is 55-65%.   2. Normal global left ventricular systolic function.   3. Spectral Doppler shows restrictive pattern of left ventricular myocardial filling (Grade III diastolic dysfunction).   4. Severely enlarged left atrium.   5. Moderate mitral regurgitation.   6. Moderate tricuspid regurgitation.   7. Estimated pulmonary artery systolic pressure is 70.4 mmHg assuming a right atrial pressure of 8 mmHg, which is consistent with severe pulmonary hypertension.   8. LA volume Index is 78.0 ml/m² ml/m2.   9. Mild dilatation of the ascending aorta (4.3 cm).    < end of copied text >      < from: Xray Chest 1 View-PORTABLE IMMEDIATE (07.07.21 @ 13:32) >  New right hilar and right heart border opacity.    Underlying neoplasm is not excluded.    Again seen are bibasilar lung opacities.    Further evaluation with a postcontrast chest CT is recommended         24H events:    Patient is a 80y old Male who presents with a chief complaint of shortness of breath (07 Jul 2021 15:45)    Primary diagnosis of Bradycardia       Today is hospital day 1d. This morning patient was seen and examined at bedside    no major events overnight  having Bradycardia 35 HR with NSVT 4 beats on Tele  SOB labored breathing  sating well on 2 LPM NC    PAST MEDICAL & SURGICAL HISTORY  COPD (chronic obstructive pulmonary disease)    Hypertension    Deep vein thrombosis (DVT) of left lower extremity, unspecified chronicity, unspecified vein    Cellulitis of left lower extremity    Chronic atrial fibrillation    Mitral valve insufficiency, unspecified etiology  Moderate    CHF (congestive heart failure)    S/P coronary artery stent placement    History of total right knee replacement      SOCIAL HISTORY:  Social History:  lives at home with his wife and kids.  Ex smoker, 3-4 glasses of alcohol weekly, no illicit of drug abuse. (07 Jul 2021 14:33)      ALLERGIES:  No Known Allergies    MEDICATIONS:  STANDING MEDICATIONS  aspirin enteric coated 81 milliGRAM(s) Oral daily  atorvastatin 20 milliGRAM(s) Oral at bedtime  budesonide 160 MICROgram(s)/formoterol 4.5 MICROgram(s) Inhaler 2 Puff(s) Inhalation two times a day  chlorhexidine 4% Liquid 1 Application(s) Topical <User Schedule>  furosemide   Injectable 40 milliGRAM(s) IV Push every 12 hours  nystatin Powder 1 Application(s) Topical every 12 hours  potassium chloride   Powder 20 milliEquivalent(s) Oral once  warfarin 4 milliGRAM(s) Oral once    PRN MEDICATIONS  ALBUTerol  90 MICROgram(s) HFA Inhaler - Peds 2 Puff(s) Inhalation every 4 hours PRN    VITALS:   T(F): 97.6  HR: 58  BP: 141/67  RR: 18  SpO2: 99%    PHYSICAL EXAM:  GENERAL: NAD  NERVOUS SYSTEM:  Alert & Oriented X3, non focal   CHEST/LUNG: bilateral crackles  HEART: irregular rate and rhythm;   ABDOMEN: Soft, Nontender, Nondistended; Bowel sounds present  EXTREMITIES:   No clubbing, cyanosis, bilateral pitting edema R> L 2+    LABS:                        8.8    3.32  )-----------( 146      ( 08 Jul 2021 06:09 )             28.7     07-08    142  |  104  |  25<H>  ----------------------------<  84  3.9   |  32  |  1.3    Ca    8.4<L>      08 Jul 2021 06:09  Mg     2.2     07-08    TPro  6.4  /  Alb  3.3<L>  /  TBili  1.5<H>  /  DBili  x   /  AST  19  /  ALT  10  /  AlkPhos  130<H>  07-08    PT/INR - ( 08 Jul 2021 06:09 )   PT: >40.00 sec;   INR: 3.82 ratio         PTT - ( 07 Jul 2021 12:40 )  PTT:43.5 sec      Troponin T, Serum: <0.01 ng/mL (07-07-21 @ 16:06)  Troponin T, Serum: 0.01 ng/mL (07-07-21 @ 12:40)      CARDIAC MARKERS ( 07 Jul 2021 16:06 )  x     / <0.01 ng/mL / x     / x     / x      CARDIAC MARKERS ( 07 Jul 2021 12:40 )  x     / 0.01 ng/mL / x     / x     / x          RADIOLOGY:    < from: TTE Echo Complete w/o Contrast w/ Doppler (04.05.21 @ 07:21) >   1. Left ventricular ejection fraction, by visual estimation, is 55-65%.   2. Normal global left ventricular systolic function.   3. Spectral Doppler shows restrictive pattern of left ventricular myocardial filling (Grade III diastolic dysfunction).   4. Severely enlarged left atrium.   5. Moderate mitral regurgitation.   6. Moderate tricuspid regurgitation.   7. Estimated pulmonary artery systolic pressure is 70.4 mmHg assuming a right atrial pressure of 8 mmHg, which is consistent with severe pulmonary hypertension.   8. LA volume Index is 78.0 ml/m² ml/m2.   9. Mild dilatation of the ascending aorta (4.3 cm).    < end of copied text >      < from: Xray Chest 1 View-PORTABLE IMMEDIATE (07.07.21 @ 13:32) >  New right hilar and right heart border opacity.    Underlying neoplasm is not excluded.    Again seen are bibasilar lung opacities.    Further evaluation with a postcontrast chest CT is recommended

## 2021-07-08 NOTE — PROGRESS NOTE ADULT - ASSESSMENT
Cardiologist: Dr Scherer    Assessment: 81 y/o male with PMH CAD s/p PCI and stent 2007, COPD on 2L home O2, HFpEF TTE in 4/21 EF 55-65, DLD, atrial fibrillation on coumadin presents for shortness of breath and increase in his NADIA along with 1 day history of chest pain.    Impression:  Acute on Chronic HFpEF  AF with Slow Ventricular Response (50-60s)  CAD sp PCI  COPD on 2L O2  HLD    Plan:  - Avoid AVN blocking agents  - Consult Dr Leon for HF management  - Patient will need PPM prior to discharge, needs to be fully diuresed and ready for discharge before PPM can be placed  - Cont Coumadin for stroke prevention  - 2D Echo  - Monitor electrolytes, maintain WNL  - Will follow  Cardiologist: Dr Arnulfo Scherer    Assessment: 79 y/o male with PMH CAD s/p PCI and stent 2007, COPD on 2L home O2, HFpEF TTE in 4/21 EF 55-65, DLD, atrial fibrillation on coumadin presents for shortness of breath and increase in his NADIA along with 1 day history of chest pain.    Impression:  Acute on Chronic HFpEF  Chronic AF with Slow Ventricular Response (50-60s) with fatigue and dyspnea with CHADS VASc of at least 4 (CHF, Age > 75, CAD)  CAD sp PCI  COPD on 2L O2  HLD    Plan:  - Avoid AVN blocking agents  - Consult Dr Leon for HF management  - Patient will need PPM prior to discharge, needs to be fully diuresed and ready for discharge before PPM can be placed  - Cont Coumadin for stroke prevention  - 2D Echo  - Monitor electrolytes, maintain WNL  - Will follow

## 2021-07-08 NOTE — PROGRESS NOTE ADULT - ASSESSMENT
81 y/o male with PMH CAD s/p PCI and stent 2007, COPD on 2L home O2, HFpEF TTE in 4/21 EF 55-65, DLD, atrial fibrillation on coumadin presents for shortness of breath and increase in his NADIA along with 1 day history of chest pain.    # HFpEF exacerbation  # Afib SVR # Bradycardia # NSVT  # CAD s/p stent 2007  # Severe Pulmonary HTN    - CXR b/l pleural effusions and vascular congestion  - TTE 4/21 EF 55-65 PSAP 70 mmhg; get new TTE  - BNP 14K  - ECG afib in SVR  - Last 24 hrs Tele events : Bradycardia ; NSVT 4 beats  - troponin x2 negative  - c/w lasix 60 mg iv q 12  - Cw ASA 81 md daily  - Cw atorvastatin 20 mg daily  - FU lipid panel and A1cc  - iron profile WNL  - daily weight, strict I/Os keep I < O  - DASH TLC diet  fluid restriction 1000 ml daily  - continue tele monitoring  - EP on board he will benefit from a pacemaker/ICD (depending of EF). 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  - c/w  lasix 60 q 12  - Hold entresto for now    # Normocytic anemia- chronic  # leukopenia    - at baseline  - Iron studies WNL   - FU folate b12 and TSH  - retic count WNL  - Stool sample for guaiac  - needs OP colonoscopy  - HIv screening  - active type and screen keep Hb > 7    # COPD not in exacerbation  # PSAP 70 mmhg    - on  2 LPM NC at home during night   - c/w symbicort and ventolin as needed  - CXR bilateral opacities basilar; underlying neoplasm not excluded  - Will order chest CT  - FU D-dimer; DVT ppx    # HO DVT    - Chronic deep venous thrombosis right common femoral, femoral and   popliteal veins 2018  - coumadin on hold INR > 3  - SQLs bilateral  - ambulation when possible      # DVT ppx: coumadin  # DIET: DASH TLC  fluid restriction  # ACTIVITY: IAT  # Full code

## 2021-07-08 NOTE — PROGRESS NOTE ADULT - SUBJECTIVE AND OBJECTIVE BOX
INTERVAL HPI/OVERNIGHT EVENTS: Patient still in AF 50s bpm    MEDICATIONS  (STANDING):  aspirin enteric coated 81 milliGRAM(s) Oral daily  atorvastatin 20 milliGRAM(s) Oral at bedtime  budesonide 160 MICROgram(s)/formoterol 4.5 MICROgram(s) Inhaler 2 Puff(s) Inhalation two times a day  chlorhexidine 4% Liquid 1 Application(s) Topical <User Schedule>  furosemide   Injectable 60 milliGRAM(s) IV Push every 12 hours  nystatin Powder 1 Application(s) Topical every 12 hours    MEDICATIONS  (PRN):  ALBUTerol  90 MICROgram(s) HFA Inhaler - Peds 2 Puff(s) Inhalation every 4 hours PRN Shortness of Breath and/or Wheezing      Allergies  No Known Allergies    Intolerances        REVIEW OF SYSTEMS: +SOB on exertion. No dizziness or syncope    Vital Signs Last 24 Hrs  T(C): 35.9 (08 Jul 2021 12:56), Max: 36.4 (08 Jul 2021 04:44)  T(F): 96.7 (08 Jul 2021 12:56), Max: 97.6 (08 Jul 2021 04:44)  HR: 60 (08 Jul 2021 12:56) (58 - 73)  BP: 124/60 (08 Jul 2021 12:56) (124/60 - 154/64)  BP(mean): --  RR: 18 (08 Jul 2021 12:56) (18 - 18)  SpO2: --    07-07-21 @ 07:01  -  07-08-21 @ 07:00  --------------------------------------------------------  IN: 240 mL / OUT: 700 mL / NET: -460 mL    07-08-21 @ 07:01  -  07-08-21 @ 17:28  --------------------------------------------------------  IN: 330 mL / OUT: 600 mL / NET: -270 mL        Physical Exam  GENERAL: In no apparent distress, well nourished, and hydrated.  EYES: EOMI, PERRLA, conjunctiva and sclera clear  ENMT: No tonsillar erythema, exudates, or enlargements; ist mucous membranes, Good dentition, No lesions  NECK: Supple   HEART: Regular rate and rhythm; No murmurs, rubs, or gallops.  PULMONARY: Clear to auscultation and perfusion.  No rales, wheezing, or rhonchi bilaterally.  ABDOMEN: Soft, Nontender, Nondistended; Bowel sounds present  EXTREMITIES:  2+ Peripheral Pulses, 2+ pitting edema in BL LE's  NEUROLOGICAL: Grossly nonfocal    LABS:                        8.8    3.32  )-----------( 146      ( 08 Jul 2021 06:09 )             28.7     07-08    142  |  104  |  25<H>  ----------------------------<  84  3.9   |  32  |  1.3    Ca    8.4<L>      08 Jul 2021 06:09  Mg     2.2     07-08    TPro  6.4  /  Alb  3.3<L>  /  TBili  1.5<H>  /  DBili  x   /  AST  19  /  ALT  10  /  AlkPhos  130<H>  07-08    PT/INR - ( 08 Jul 2021 06:09 )   PT: >40.00 sec;   INR: 3.82 ratio         PTT - ( 07 Jul 2021 12:40 )  PTT:43.5 sec      RADIOLOGY & ADDITIONAL TESTS:  < from: 12 Lead ECG (07.07.21 @ 13:26) >  Ventricular Rate 59 BPM    Atrial Rate 59 BPM    QRS Duration 92 ms    Q-T Interval 408 ms    QTC Calculation(Bazett) 403 ms    R Axis -29 degrees    T Axis -37 degrees    Diagnosis Line Atrial fibrillation  Premature ventricular complexes  Low voltage QRS  Incomplete right bundle branch block  Nonspecific T wave abnormality  Abnormal ECG    Confirmed by Galdino Lim ([id number]) on 7/7/2021 5:15:23 PM    < end of copied text >     INTERVAL HPI/OVERNIGHT EVENTS: Patient still in AF 50s bpm    MEDICATIONS  (STANDING):  aspirin enteric coated 81 milliGRAM(s) Oral daily  atorvastatin 20 milliGRAM(s) Oral at bedtime  budesonide 160 MICROgram(s)/formoterol 4.5 MICROgram(s) Inhaler 2 Puff(s) Inhalation two times a day  chlorhexidine 4% Liquid 1 Application(s) Topical <User Schedule>  furosemide   Injectable 60 milliGRAM(s) IV Push every 12 hours  nystatin Powder 1 Application(s) Topical every 12 hours    MEDICATIONS  (PRN):  ALBUTerol  90 MICROgram(s) HFA Inhaler - Peds 2 Puff(s) Inhalation every 4 hours PRN Shortness of Breath and/or Wheezing      Allergies  No Known Allergies    Intolerances        REVIEW OF SYSTEMS: +SOB on exertion. No dizziness or syncope    Vital Signs Last 24 Hrs  T(C): 35.9 (08 Jul 2021 12:56), Max: 36.4 (08 Jul 2021 04:44)  T(F): 96.7 (08 Jul 2021 12:56), Max: 97.6 (08 Jul 2021 04:44)  HR: 60 (08 Jul 2021 12:56) (58 - 73)  BP: 124/60 (08 Jul 2021 12:56) (124/60 - 154/64)  BP(mean): --  RR: 18 (08 Jul 2021 12:56) (18 - 18)  SpO2: --    07-07-21 @ 07:01  -  07-08-21 @ 07:00  --------------------------------------------------------  IN: 240 mL / OUT: 700 mL / NET: -460 mL    07-08-21 @ 07:01  -  07-08-21 @ 17:28  --------------------------------------------------------  IN: 330 mL / OUT: 600 mL / NET: -270 mL        Physical Exam  GENERAL: In no apparent distress, well nourished, and hydrated.  EYES: EOMI, PERRLA, conjunctiva and sclera clear  ENMT: MMM  NECK: Supple   HEART: Irregular rate and rhythm, bradycardic  PULMONARY: Clear to auscultation and perfusion.   ABDOMEN: Soft   EXTREMITIES:  2+ Peripheral Pulses, 2+ pitting edema in BL LE's  NEUROLOGICAL: Grossly nonfocal    LABS:                        8.8    3.32  )-----------( 146      ( 08 Jul 2021 06:09 )             28.7     07-08    142  |  104  |  25<H>  ----------------------------<  84  3.9   |  32  |  1.3    Ca    8.4<L>      08 Jul 2021 06:09  Mg     2.2     07-08    TPro  6.4  /  Alb  3.3<L>  /  TBili  1.5<H>  /  DBili  x   /  AST  19  /  ALT  10  /  AlkPhos  130<H>  07-08    PT/INR - ( 08 Jul 2021 06:09 )   PT: >40.00 sec;   INR: 3.82 ratio         PTT - ( 07 Jul 2021 12:40 )  PTT:43.5 sec      RADIOLOGY & ADDITIONAL TESTS:  < from: 12 Lead ECG (07.07.21 @ 13:26) >  Ventricular Rate 59 BPM    Atrial Rate 59 BPM    QRS Duration 92 ms    Q-T Interval 408 ms    QTC Calculation(Bazett) 403 ms    R Axis -29 degrees    T Axis -37 degrees    Diagnosis Line Atrial fibrillation  Premature ventricular complexes  Low voltage QRS  Incomplete right bundle branch block  Nonspecific T wave abnormality  Abnormal ECG    Confirmed by Galdino Lim ([id number]) on 7/7/2021 5:15:23 PM    < end of copied text >    < from: TTE Echo Complete w/o Contrast w/ Doppler (04.05.21 @ 07:21) >  Summary:   1. Left ventricular ejection fraction, by visual estimation, is 55-65%.   2. Normal global left ventricular systolic function.   3. Spectral Doppler shows restrictive pattern of left ventricular myocardial filling (Grade III diastolic dysfunction).   4. Severely enlarged left atrium.   5. Moderate mitral regurgitation.   6. Moderate tricuspid regurgitation.   7. Estimated pulmonary artery systolic pressure is 70.4 mmHg assuming a right atrial pressure of 8 mmHg, which is consistent with severe pulmonary hypertension.   8. LA volume Index is 78.0 ml/m² ml/m2.   9. Mild dilatation of the ascending aorta (4.3 cm).    < end of copied text >

## 2021-07-08 NOTE — PROGRESS NOTE ADULT - SUBJECTIVE AND OBJECTIVE BOX
· Subjective and Objective:   SUBJECTIVE:    CC: "chest pain and SOB"     HPI: 81 yo M with pmhx of CAD s/p PCI and stent (2007), HFpEF (EF of 55-65% on 4/5/21), COPD on 2L home O2, DLD, HTN, atrial fibrillation on warfarin who presented with shortness of breath for the past few days. He was admitted for acute HFpEF exacerbation, seen and evaluated on HOD #2. The patient reports experiencing intermittent sharp midsternal chest pain lasting a few seconds at a time, 6/10 in severity and endorses b/l worsening LE edema. This morning, he is resting comfortably in bed, reports improvement in symptoms, no acute complaints. Patient continues to be bradycardic, had episode of a fib seen on telemetry.     PAST MEDICAL & SURGICAL HISTORY  COPD (chronic obstructive pulmonary disease)    Hypertension    Deep vein thrombosis (DVT) of left lower extremity, unspecified chronicity, unspecified vein    Cellulitis of left lower extremity    Chronic atrial fibrillation    Mitral valve insufficiency, unspecified etiology  Moderate    S/P coronary artery stent placement    History of total right knee replacement    FAMILY HISTORY:  No pertinent family history in first degree relatives  No significant h/o CHF in the family.    ALLERGIES:  No Known Allergies    MEDICATIONS:  MEDICATIONS  (STANDING):  atorvastatin 20 milliGRAM(s) Oral at bedtime  chlorhexidine 4% Liquid 1 Application(s) Topical <User Schedule>  furosemide   Injectable 40 milliGRAM(s) IV Push once  sacubitril 24 mG/valsartan 26 mG 1 Tablet(s) Oral two times a day    VITALS:   T(F): 96.3 (04-06-21 @ 05:00)  HR: 72 (04-06-21 @ 05:00)  BP: 155/88 (04-06-21 @ 05:00)  BP(mean): --  RR: 18 (04-06-21 @ 05:00)  SpO2: 100% (04-05-21 @ 21:03)    LABS:                        10.1   4.20  )-----------( 173      ( 06 Apr 2021 07:35 )             33.1     Hemoglobin: 10.1 g/dL (04-06 @ 07:35)  Hemoglobin: 10.2 g/dL (04-05 @ 06:26)  Hemoglobin: 9.8 g/dL (04-04 @ 13:19)    04-06    138  |  98  |  20  ----------------------------<  96  4.3   |  32  |  1.2    Ca    8.8      06 Apr 2021 07:35  Phos  3.0     04-06  Mg     2.2     04-06    TPro  6.7  /  Alb  3.5  /  TBili  1.3<H>  /  DBili  x   /  AST  22  /  ALT  11  /  AlkPhos  118<H>  04-06    Potassium Trend: 4.3<--, 3.9<--, 3.6<--  SODIUM TREND:  Sodium 138 [04-06 @ 07:35]  Sodium 142 [04-05 @ 06:26]  Sodium 135 [04-04 @ 13:19]    Creatinine Trend: 1.2<--, 1.2<--, 1.4<--  PT/INR - ( 06 Apr 2021 07:35 )   PT: >40.00 sec;   INR: 7.41 ratio       PTT - ( 05 Apr 2021 06:26 )  PTT:60.2 sec    Calcium, Total Serum: 8.8 mg/dL (04-06-21 @ 07:35)  Phosphorus Level, Serum: 3.0 mg/dL (04-06-21 @ 07:35)  Magnesium, Serum: 2.2 mg/dL (04-06-21 @ 07:35)    CARDIAC MARKERS ( 05 Apr 2021 06:26 )  x     / <0.01 ng/mL / x     / x     / x      CARDIAC MARKERS ( 04 Apr 2021 13:19 )  x     / <0.01 ng/mL / x     / x     / x        COVID  04-04-21 @ 13:19  [facility]   [facility]    04-05-21 @ 07:01  -  04-06-21 @ 07:00  --------------------------------------------------------  IN: 920 mL / OUT: 2925 mL / NET: -2005 mL    04-06-21 @ 07:01  -  04-06-21 @ 11:11  --------------------------------------------------------  IN: 240 mL / OUT: 450 mL / NET: -210 mL    RADIOLOGY:    < from: TTE Echo Complete w/o Contrast w/ Doppler (04.05.21 @ 07:21) >  Summary:   1. Left ventricular ejection fraction, by visual estimation, is 55-65%.   2. Normal global left ventricular systolic function.   3. Spectral Doppler shows restrictive pattern of left ventricular myocardial filling (Grade III diastolic dysfunction).   4. Severely enlarged left atrium.   5. Moderate mitral regurgitation.   6. Moderate tricuspid regurgitation.   7. Estimated pulmonary artery systolic pressure is 70.4 mmHg assuming a right atrial pressure of 8 mmHg, which is consistent with severe pulmonary hypertension.   8. LA volume Index is 78.0 ml/m² ml/m2.   9. Mild dilatation of the ascending aorta (4.3 cm).    < end of copied text >    PHYSICAL EXAM:  GEN: Pleasant, well developed, no acute distress  HEENT: NCAT, EOMI, anicteric   LUNGS: B/l bibasilar crackles   HEART: S1/S2 present, irregular rate and rhythm   ABD: +BS, soft, NTND  EXT: Warm, well perfused, LE pitting edema 2+   NEURO: AAOX3, normal affect  SKIN: No rashes or lesions  · Subjective and Objective:   SUBJECTIVE:    CC: "chest pain and SOB"     HPI: 81 yo M with pmhx of CAD s/p PCI and stent (2007), HFpEF (EF of 55-65% on 4/5/21), COPD on 2L home O2, DLD, HTN, atrial fibrillation on coumadin presents with shortness of breath and B/L extremity edema for the past few days. The patient reports experiencing dull left sided chest pain in the last day. He endorses weight gain about 8-10 Ibs but denies palpitations, sputum discharge and cough.   PAST MEDICAL & SURGICAL HISTORY  COPD (chronic obstructive pulmonary disease)    Hypertension    Deep vein thrombosis (DVT) of left lower extremity, unspecified chronicity, unspecified vein    Cellulitis of left lower extremity    Chronic atrial fibrillation    Mitral valve insufficiency, unspecified etiology  Moderate    S/P coronary artery stent placement    History of total right knee replacement    FAMILY HISTORY:  No pertinent family history in first degree relatives  No significant h/o CHF in the family.    ALLERGIES:  No Known Allergies    MEDICATIONS:  MEDICATIONS  (STANDING):  atorvastatin 20 milliGRAM(s) Oral at bedtime  chlorhexidine 4% Liquid 1 Application(s) Topical <User Schedule>  furosemide   Injectable 40 milliGRAM(s) IV Push once  sacubitril 24 mG/valsartan 26 mG 1 Tablet(s) Oral two times a day    VITALS:   T(F): 96.3 (04-06-21 @ 05:00)  HR: 72 (04-06-21 @ 05:00)  BP: 155/88 (04-06-21 @ 05:00)  BP(mean): --  RR: 18 (04-06-21 @ 05:00)  SpO2: 100% (04-05-21 @ 21:03)    LABS:                        10.1   4.20  )-----------( 173      ( 06 Apr 2021 07:35 )             33.1     Hemoglobin: 10.1 g/dL (04-06 @ 07:35)  Hemoglobin: 10.2 g/dL (04-05 @ 06:26)  Hemoglobin: 9.8 g/dL (04-04 @ 13:19)    04-06    138  |  98  |  20  ----------------------------<  96  4.3   |  32  |  1.2    Ca    8.8      06 Apr 2021 07:35  Phos  3.0     04-06  Mg     2.2     04-06    TPro  6.7  /  Alb  3.5  /  TBili  1.3<H>  /  DBili  x   /  AST  22  /  ALT  11  /  AlkPhos  118<H>  04-06    Potassium Trend: 4.3<--, 3.9<--, 3.6<--  SODIUM TREND:  Sodium 138 [04-06 @ 07:35]  Sodium 142 [04-05 @ 06:26]  Sodium 135 [04-04 @ 13:19]    Creatinine Trend: 1.2<--, 1.2<--, 1.4<--  PT/INR - ( 06 Apr 2021 07:35 )   PT: >40.00 sec;   INR: 7.41 ratio       PTT - ( 05 Apr 2021 06:26 )  PTT:60.2 sec    Calcium, Total Serum: 8.8 mg/dL (04-06-21 @ 07:35)  Phosphorus Level, Serum: 3.0 mg/dL (04-06-21 @ 07:35)  Magnesium, Serum: 2.2 mg/dL (04-06-21 @ 07:35)    CARDIAC MARKERS ( 05 Apr 2021 06:26 )  x     / <0.01 ng/mL / x     / x     / x      CARDIAC MARKERS ( 04 Apr 2021 13:19 )  x     / <0.01 ng/mL / x     / x     / x        COVID  04-04-21 @ 13:19  COVID -   [facility]    04-05-21 @ 07:01  -  04-06-21 @ 07:00  --------------------------------------------------------  IN: 920 mL / OUT: 2925 mL / NET: -2005 mL    04-06-21 @ 07:01  -  04-06-21 @ 11:11  --------------------------------------------------------  IN: 240 mL / OUT: 450 mL / NET: -210 mL    RADIOLOGY:    < from: TTE Echo Complete w/o Contrast w/ Doppler (04.05.21 @ 07:21) >  Summary:   1. Left ventricular ejection fraction, by visual estimation, is 55-65%.   2. Normal global left ventricular systolic function.   3. Spectral Doppler shows restrictive pattern of left ventricular myocardial filling (Grade III diastolic dysfunction).   4. Severely enlarged left atrium.   5. Moderate mitral regurgitation.   6. Moderate tricuspid regurgitation.   7. Estimated pulmonary artery systolic pressure is 70.4 mmHg assuming a right atrial pressure of 8 mmHg, which is consistent with severe pulmonary hypertension.   8. LA volume Index is 78.0 ml/m² ml/m2.   9. Mild dilatation of the ascending aorta (4.3 cm).    < end of copied text >    PHYSICAL EXAM:  GEN: Pleasant, well developed, no acute distress  HEENT: NCAT, EOMI, anicteric   LUNGS: B/l bibasilar crackles   HEART: S1/S2 present, irregular rate and rhythm   ABD: +BS, soft, NTND  EXT: Warm, well perfused, LE pitting edema 2+   NEURO: AAOX3, normal affect  SKIN: No rashes or lesions

## 2021-07-08 NOTE — CONSULT NOTE ADULT - SUBJECTIVE AND OBJECTIVE BOX
HPI:  81 y/o male with PMH CAD s/p PCI and stent 2007, COPD on 2L home O2, HFpEF TTE in 4/21 EF 55-65, DLD, atrial fibrillation on coumadin presents for shortness of breath. Patient has been having a gradual onset of worsening shortness of breath over the past few days. He is adherent to his medications as directed. He was following at the Coumadin clinic for his coumadin dose and was told yesterday that he had a significant increase in his weight compared to 5 weeks earlier. He also endorses worsening bilateral lower extremities edema reaching his thighs along with a left sided chest pain, dull in nature, mild in intensity, persistent for the past day. he denied palpitations, fever, chills, cough, sputum production, abdominal or urinary symptoms.    VS on admission were within normal range. He was saturating well on 3 L NC.  CXR showed bilateral pleural effusions with  vascular congestion.  He received 40 mg of iv lasix.  (07 Jul 2021 14:33)      PAST MEDICAL & SURGICAL HISTORY  COPD (chronic obstructive pulmonary disease)  Hypertension  Deep vein thrombosis (DVT) of left lower extremity, unspecified chronicity, unspecified vein  Cellulitis of left lower extremity  Chronic atrial fibrillation  Mitral valve insufficiency, unspecified etiology  Moderate  CHF (congestive heart failure)  S/P coronary artery stent placement  History of total right knee replacement    FAMILY HISTORY:  FAMILY HISTORY:    SOCIAL HISTORY:  [X] Ex-smoker. 100 pack year. quit >15 years ago  [X] Alcohol - occassionally  []Drug    ALLERGIES:  No Known Allergies    MEDICATIONS:  MEDICATIONS  (STANDING):  aspirin enteric coated 81 milliGRAM(s) Oral daily  atorvastatin 20 milliGRAM(s) Oral at bedtime  budesonide 160 MICROgram(s)/formoterol 4.5 MICROgram(s) Inhaler 2 Puff(s) Inhalation two times a day  chlorhexidine 4% Liquid 1 Application(s) Topical <User Schedule>  furosemide   Injectable 60 milliGRAM(s) IV Push every 12 hours  nystatin Powder 1 Application(s) Topical every 12 hours    MEDICATIONS  (PRN):  ALBUTerol  90 MICROgram(s) HFA Inhaler - Peds 2 Puff(s) Inhalation every 4 hours PRN Shortness of Breath and/or Wheezing      HOME MEDICATIONS:  Home Medications:  atorvastatin 20 mg oral tablet: 1 tab(s) orally once a day (07 Jul 2021 14:50)  Entresto 24 mg-26 mg oral tablet: 1 tab(s) orally 2 times a day (07 Jul 2021 14:50)  Lasix 40 mg oral tablet: 1 tab(s) orally 2 times a day; 3 times if needed (07 Jul 2021 14:50)  Symbicort 160 mcg-4.5 mcg/inh inhalation aerosol: 2 puff(s) inhaled 2 times a day (07 Jul 2021 14:50)  Ventolin HFA 90 mcg/inh inhalation aerosol: 2 puff(s) inhaled every 6 hours as neede for shortness of breath (07 Jul 2021 14:50)  warfarin 4 mg oral tablet: 2 tab(s) orally once a day (07 Jul 2021 14:50)      VITALS:   T(F): 96.7 (07-08 @ 12:56), Max: 97.6 (07-08 @ 04:44)  HR: 60 (07-08 @ 12:56) (57 - 73)  BP: 124/60 (07-08 @ 12:56) (124/60 - 154/64)  BP(mean): --  RR: 18 (07-08 @ 12:56) (18 - 19)  SpO2: 99% (07-07 @ 15:55) (92% - 99%)    I&O's Summary    07 Jul 2021 07:01  -  08 Jul 2021 07:00  --------------------------------------------------------  IN: 240 mL / OUT: 700 mL / NET: -460 mL    08 Jul 2021 07:01  -  08 Jul 2021 13:32  --------------------------------------------------------  IN: 330 mL / OUT: 600 mL / NET: -270 mL        REVIEW OF SYSTEMS:  CONSTITUTIONAL: No weakness, fevers or chills  EYES: No visual changes  ENT: No vertigo or throat pain   NECK: No pain or stiffness    GASTROINTESTINAL: No abdominal or epigastric pain. No nausea, vomiting, or hematemesis; No diarrhea or constipation. No melena or hematochezia.  GENITOURINARY: No dysuria, frequency or hematuria  NEUROLOGICAL: No numbness or weakness  SKIN: No itching, no rashes  MSK: no    PHYSICAL EXAM:  NEURO: patient is awake , alert and oriented  GEN: Not in acute distress  NECK: no thyroid enlargement, no JVD  LUNGS: Clear to auscultation bilaterally   CARDIOVASCULAR: S1/S2 present, RRR , no murmurs or rubs, no carotid bruits,  + PP bilaterally  ABD: Soft, non-tender, non-distended, +BS  EXT: +2 lower extremity edema  SKIN: Intact    LABS:                        8.8    3.32  )-----------( 146      ( 08 Jul 2021 06:09 )             28.7     07-08    142  |  104  |  25<H>  ----------------------------<  84  3.9   |  32  |  1.3    Ca    8.4<L>      08 Jul 2021 06:09  Mg     2.2     07-08    TPro  6.4  /  Alb  3.3<L>  /  TBili  1.5<H>  /  DBili  x   /  AST  19  /  ALT  10  /  AlkPhos  130<H>  07-08    PT/INR - ( 08 Jul 2021 06:09 )   PT: >40.00 sec;   INR: 3.82 ratio         PTT - ( 07 Jul 2021 12:40 )  PTT:43.5 sec  Troponin T, Serum: <0.01 ng/mL (07-07-21 @ 16:06)    CARDIAC MARKERS ( 07 Jul 2021 16:06 )  x     / <0.01 ng/mL / x     / x     / x      CARDIAC MARKERS ( 07 Jul 2021 12:40 )  x     / 0.01 ng/mL / x     / x     / x            Troponin trend:    Serum Pro-Brain Natriuretic Peptide: 41324 pg/mL (07-07-21 @ 12:40)    RADIOLOGY:  -CXR (7/7/21):  New right hilar and right heart border opacity.  Underlying neoplasm is not excluded.  Again seen are bibasilar lung opacities.  Further evaluation with a postcontrast chest CT is recommended    -TTE: (04.05.21 @ 07:21)    1. Left ventricular ejection fraction, by visual estimation, is 55-65%.   2. Normal global left ventricular systolic function.   3. Spectral Doppler shows restrictive pattern of left ventricular myocardial filling (Grade III diastolic dysfunction).   4. Severely enlarged left atrium.   5. Moderate mitral regurgitation.   6. Moderate tricuspid regurgitation.   7. Estimated pulmonary artery systolic pressure is 70.4 mmHg assuming a right atrial pressure of 8 mmHg, which is consistent with severe pulmonary hypertension.   8. LA volume Index is 78.0 ml/m² ml/m2.   9. Mild dilatation of the ascending aorta (4.3 cm).      -CCTA:  -STRESS TEST:  -CATHETERIZATION:   last cath in 2017 showing non-obstructive CAD with mild in-stent restenosis    ECG:  Ventricular Rate 59 BPM  Atrial Rate 59 BPM  QRS Duration 92 ms  Q-T Interval 408 ms  QTC Calculation(Bazett) 403 ms  R Axis -29 degrees  T Axis -37 degrees    Diagnosis Line Atrial fibrillation  Premature ventricular complexes  Low voltage QRS  Incomplete right bundle branch block  Nonspecific T wave abnormality  Abnormal ECG        TELEMETRY EVENTS:   HPI:  81 y/o male with PMH CAD s/p PCI and stent 2007, COPD on 2L home O2, HFpEF TTE in 4/21 EF 55-65, DLD, atrial fibrillation on coumadin presents for shortness of breath. Patient has been having a gradual onset of worsening shortness of breath over the past few days. He is adherent to his medications as directed. He was following at the Coumadin clinic for his coumadin dose and was told yesterday that he had a significant increase in his weight compared to 5 weeks earlier. He also endorses worsening bilateral lower extremities edema reaching his thighs along with a left sided chest pain, dull in nature, mild in intensity, persistent for the past day. he denied palpitations, fever, chills, cough, sputum production, abdominal or urinary symptoms.    VS on admission were within normal range. He was saturating well on 3 L NC.  CXR showed bilateral pleural effusions with  vascular congestion.  He received 40 mg of iv lasix.  (07 Jul 2021 14:33)      PAST MEDICAL & SURGICAL HISTORY:    COPD (chronic obstructive pulmonary disease)  Hypertension  Deep vein thrombosis (DVT) of left lower extremity, unspecified chronicity, unspecified vein  Cellulitis of left lower extremity  Chronic atrial fibrillation  Mitral valve insufficiency, unspecified etiology  Moderate  CHF (congestive heart failure)  S/P coronary artery stent placement  History of total right knee replacement    FAMILY HISTORY:  FAMILY HISTORY:    SOCIAL HISTORY:  [X] Ex-smoker. 100 pack year. quit >15 years ago  [X] Alcohol - occassionally  []Drug    ALLERGIES:  No Known Allergies    MEDICATIONS:  MEDICATIONS  (STANDING):  aspirin enteric coated 81 milliGRAM(s) Oral daily  atorvastatin 20 milliGRAM(s) Oral at bedtime  budesonide 160 MICROgram(s)/formoterol 4.5 MICROgram(s) Inhaler 2 Puff(s) Inhalation two times a day  chlorhexidine 4% Liquid 1 Application(s) Topical <User Schedule>  furosemide   Injectable 60 milliGRAM(s) IV Push every 12 hours  nystatin Powder 1 Application(s) Topical every 12 hours    MEDICATIONS  (PRN):  ALBUTerol  90 MICROgram(s) HFA Inhaler - Peds 2 Puff(s) Inhalation every 4 hours PRN Shortness of Breath and/or Wheezing      HOME MEDICATIONS:  Home Medications:  atorvastatin 20 mg oral tablet: 1 tab(s) orally once a day (07 Jul 2021 14:50)  Entresto 24 mg-26 mg oral tablet: 1 tab(s) orally 2 times a day (07 Jul 2021 14:50)  Lasix 40 mg oral tablet: 1 tab(s) orally 2 times a day; 3 times if needed (07 Jul 2021 14:50)  Symbicort 160 mcg-4.5 mcg/inh inhalation aerosol: 2 puff(s) inhaled 2 times a day (07 Jul 2021 14:50)  Ventolin HFA 90 mcg/inh inhalation aerosol: 2 puff(s) inhaled every 6 hours as neede for shortness of breath (07 Jul 2021 14:50)  warfarin 4 mg oral tablet: 2 tab(s) orally once a day (07 Jul 2021 14:50)      VITALS:   T(F): 96.7 (07-08 @ 12:56), Max: 97.6 (07-08 @ 04:44)  HR: 60 (07-08 @ 12:56) (57 - 73)  BP: 124/60 (07-08 @ 12:56) (124/60 - 154/64)  BP(mean): --  RR: 18 (07-08 @ 12:56) (18 - 19)  SpO2: 99% (07-07 @ 15:55) (92% - 99%)    I&O's Summary    07 Jul 2021 07:01  -  08 Jul 2021 07:00  --------------------------------------------------------  IN: 240 mL / OUT: 700 mL / NET: -460 mL    08 Jul 2021 07:01  -  08 Jul 2021 13:32  --------------------------------------------------------  IN: 330 mL / OUT: 600 mL / NET: -270 mL        REVIEW OF SYSTEMS:  CONSTITUTIONAL: No weakness, fevers or chills  EYES: No visual changes  ENT: No vertigo or throat pain   NECK: No pain or stiffness    GASTROINTESTINAL: No abdominal or epigastric pain. No nausea, vomiting, or hematemesis; No diarrhea or constipation. No melena or hematochezia.  GENITOURINARY: No dysuria, frequency or hematuria  NEUROLOGICAL: No numbness or weakness  SKIN: No itching, no rashes  MSK: no    PHYSICAL EXAM:  NEURO: patient is awake , alert and oriented  GEN: Not in acute distress  NECK: no thyroid enlargement, no JVD  LUNGS: Clear to auscultation bilaterally   CARDIOVASCULAR: S1/S2 present, RRR , no murmurs or rubs, no carotid bruits,  + PP bilaterally  ABD: Soft, non-tender, non-distended, +BS  EXT: +2 lower extremity edema  SKIN: Intact    LABS:                        8.8    3.32  )-----------( 146      ( 08 Jul 2021 06:09 )             28.7     07-08    142  |  104  |  25<H>  ----------------------------<  84  3.9   |  32  |  1.3    Ca    8.4<L>      08 Jul 2021 06:09  Mg     2.2     07-08    TPro  6.4  /  Alb  3.3<L>  /  TBili  1.5<H>  /  DBili  x   /  AST  19  /  ALT  10  /  AlkPhos  130<H>  07-08    PT/INR - ( 08 Jul 2021 06:09 )   PT: >40.00 sec;   INR: 3.82 ratio         PTT - ( 07 Jul 2021 12:40 )  PTT:43.5 sec  Troponin T, Serum: <0.01 ng/mL (07-07-21 @ 16:06)    CARDIAC MARKERS ( 07 Jul 2021 16:06 )  x     / <0.01 ng/mL / x     / x     / x      CARDIAC MARKERS ( 07 Jul 2021 12:40 )  x     / 0.01 ng/mL / x     / x     / x            Troponin trend:    Serum Pro-Brain Natriuretic Peptide: 08655 pg/mL (07-07-21 @ 12:40)    RADIOLOGY:  -CXR (7/7/21):  New right hilar and right heart border opacity.  Underlying neoplasm is not excluded.  Again seen are bibasilar lung opacities.  Further evaluation with a postcontrast chest CT is recommended    -TTE: (04.05.21 @ 07:21)    1. Left ventricular ejection fraction, by visual estimation, is 55-65%.   2. Normal global left ventricular systolic function.   3. Spectral Doppler shows restrictive pattern of left ventricular myocardial filling (Grade III diastolic dysfunction).   4. Severely enlarged left atrium.   5. Moderate mitral regurgitation.   6. Moderate tricuspid regurgitation.   7. Estimated pulmonary artery systolic pressure is 70.4 mmHg assuming a right atrial pressure of 8 mmHg, which is consistent with severe pulmonary hypertension.   8. LA volume Index is 78.0 ml/m² ml/m2.   9. Mild dilatation of the ascending aorta (4.3 cm).      -CCTA:  -STRESS TEST:  -CATHETERIZATION:   last cath in 2017 showing non-obstructive CAD with mild in-stent restenosis    ECG:  Ventricular Rate 59 BPM  Atrial Rate 59 BPM  QRS Duration 92 ms  Q-T Interval 408 ms  QTC Calculation(Bazett) 403 ms  R Axis -29 degrees  T Axis -37 degrees    Diagnosis Line Atrial fibrillation  Premature ventricular complexes  Low voltage QRS  Incomplete right bundle branch block  Nonspecific T wave abnormality  Abnormal ECG        TELEMETRY EVENTS:

## 2021-07-08 NOTE — CHART NOTE - NSCHARTNOTEFT_GEN_A_CORE
Pt was seen and examined at bedside independently, he was admitted for acute on chronic diastolic CHF, worsening dyspnea and arrhythmia.    Pt has severe PHTN, on CHF protocol, will increase Lasix to 60 mg IV Q 12 hours today, keep negative balance, monitor pulse Ox.  Pt was consulted by cardiology, will follow up official recommendations, needs heart failure team evaluation and EP.  Case d/w medical resident.

## 2021-07-08 NOTE — CONSULT NOTE ADULT - ASSESSMENT
Acute on chronic diastolic HF/ Anemia / CKD/ DVT    Continue furosemide 60 mg iv BID   Keep Mg >2.2 and K>4.0  BMP daily   Start iv iron sucrose for Iron deficiency anemia   Get arterial duplex of LE  Venous duplex showed DVT on RLE  Work up for TONJA - Get FOBT   Continue A/C for DVT/ afib   Counseling provided regarding low sodium diet and monitoring weight and BP at home   PT evaluation   Discussed with primary team

## 2021-07-08 NOTE — CONSULT NOTE ADULT - SUBJECTIVE AND OBJECTIVE BOX
81 y/o M with h/o CAD s/p PCI, COPD on home O2, HFpEF, afib admitted with worsening SOB. Patient reports having worsening SOB over past few days along with b/l LE edema and chest pain. No LOC, N/V. He claims to be compliant with medications.       PMH/PSH: CAD s/p PCI, COPD, HFpEF            81 y/o M with h/o CAD s/p PCI, COPD on home O2, HFpEF, afib admitted with worsening SOB. Patient reports having worsening SOB over past few days along with b/l LE edema and chest pain. No LOC, N/V. He claims to be compliant with medications.           PMH/PSH: CAD s/p PCI, COPD, HFpEF      FH: No early CAD or SCD       REVIEW OF SYSTEMS     Constitutional: Denies fever, chills   Eyes: No blurred vision   ENT: No ear discharge or sore throat   Respiratory: Denies cough, hemoptysis   Cardiac: Denies palpitations, Syncope, chest pain   GI: Denies nausea, vomiting  Hem/onc: Denies bleeding   : Denies dysuria  MSK: No back pain  Neuro: No dizziness   Psychiatry: No anxiety      PHYSICAL EXAM     General: AAO x3, no acute distress   Eyes: Clear eyes   ENT: No ear discharge  Neck: Trachea is central, JVD -ve  Lungs: CTA, no crackles   Heart: Regular heart sounds, no murmurs   GI: Abdomen is soft, NT  Neuro: Normal strength  Psych: Calm affect   Integument: No skin bruises

## 2021-07-09 LAB
A1C WITH ESTIMATED AVERAGE GLUCOSE RESULT: 5.6 % — SIGNIFICANT CHANGE UP (ref 4–5.6)
ALBUMIN SERPL ELPH-MCNC: 3.3 G/DL — LOW (ref 3.5–5.2)
ALP SERPL-CCNC: 132 U/L — HIGH (ref 30–115)
ALT FLD-CCNC: 10 U/L — SIGNIFICANT CHANGE UP (ref 0–41)
ANION GAP SERPL CALC-SCNC: 6 MMOL/L — LOW (ref 7–14)
APTT BLD: 50.5 SEC — HIGH (ref 27–39.2)
AST SERPL-CCNC: 20 U/L — SIGNIFICANT CHANGE UP (ref 0–41)
BASOPHILS # BLD AUTO: 0.02 K/UL — SIGNIFICANT CHANGE UP (ref 0–0.2)
BASOPHILS NFR BLD AUTO: 0.5 % — SIGNIFICANT CHANGE UP (ref 0–1)
BILIRUB SERPL-MCNC: 1.5 MG/DL — HIGH (ref 0.2–1.2)
BUN SERPL-MCNC: 24 MG/DL — HIGH (ref 10–20)
CALCIUM SERPL-MCNC: 8.5 MG/DL — SIGNIFICANT CHANGE UP (ref 8.5–10.1)
CHLORIDE SERPL-SCNC: 103 MMOL/L — SIGNIFICANT CHANGE UP (ref 98–110)
CHOLEST SERPL-MCNC: 80 MG/DL — SIGNIFICANT CHANGE UP
CO2 SERPL-SCNC: 32 MMOL/L — SIGNIFICANT CHANGE UP (ref 17–32)
CREAT SERPL-MCNC: 1.3 MG/DL — SIGNIFICANT CHANGE UP (ref 0.7–1.5)
D DIMER BLD IA.RAPID-MCNC: 342 NG/ML DDU — HIGH (ref 0–230)
EOSINOPHIL # BLD AUTO: 0.11 K/UL — SIGNIFICANT CHANGE UP (ref 0–0.7)
EOSINOPHIL NFR BLD AUTO: 2.5 % — SIGNIFICANT CHANGE UP (ref 0–8)
ESTIMATED AVERAGE GLUCOSE: 114 MG/DL — SIGNIFICANT CHANGE UP (ref 68–114)
FERRITIN SERPL-MCNC: 41 NG/ML — SIGNIFICANT CHANGE UP (ref 30–400)
FOLATE SERPL-MCNC: 6.1 NG/ML — SIGNIFICANT CHANGE UP
GLUCOSE SERPL-MCNC: 95 MG/DL — SIGNIFICANT CHANGE UP (ref 70–99)
HCT VFR BLD CALC: 29 % — LOW (ref 42–52)
HDLC SERPL-MCNC: 39 MG/DL — LOW
HGB BLD-MCNC: 9 G/DL — LOW (ref 14–18)
HIV 1+2 AB+HIV1 P24 AG SERPL QL IA: SIGNIFICANT CHANGE UP
IMM GRANULOCYTES NFR BLD AUTO: 0.5 % — HIGH (ref 0.1–0.3)
INR BLD: 4.97 RATIO — HIGH (ref 0.65–1.3)
LIPID PNL WITH DIRECT LDL SERPL: 26 MG/DL — SIGNIFICANT CHANGE UP
LYMPHOCYTES # BLD AUTO: 0.56 K/UL — LOW (ref 1.2–3.4)
LYMPHOCYTES # BLD AUTO: 12.6 % — LOW (ref 20.5–51.1)
MAGNESIUM SERPL-MCNC: 2.2 MG/DL — SIGNIFICANT CHANGE UP (ref 1.8–2.4)
MCHC RBC-ENTMCNC: 28.4 PG — SIGNIFICANT CHANGE UP (ref 27–31)
MCHC RBC-ENTMCNC: 31 G/DL — LOW (ref 32–37)
MCV RBC AUTO: 91.5 FL — SIGNIFICANT CHANGE UP (ref 80–94)
MONOCYTES # BLD AUTO: 0.6 K/UL — SIGNIFICANT CHANGE UP (ref 0.1–0.6)
MONOCYTES NFR BLD AUTO: 13.5 % — HIGH (ref 1.7–9.3)
NEUTROPHILS # BLD AUTO: 3.13 K/UL — SIGNIFICANT CHANGE UP (ref 1.4–6.5)
NEUTROPHILS NFR BLD AUTO: 70.4 % — SIGNIFICANT CHANGE UP (ref 42.2–75.2)
NON HDL CHOLESTEROL: 41 MG/DL — SIGNIFICANT CHANGE UP
NRBC # BLD: 0 /100 WBCS — SIGNIFICANT CHANGE UP (ref 0–0)
PLATELET # BLD AUTO: 125 K/UL — LOW (ref 130–400)
POTASSIUM SERPL-MCNC: 3.8 MMOL/L — SIGNIFICANT CHANGE UP (ref 3.5–5)
POTASSIUM SERPL-SCNC: 3.8 MMOL/L — SIGNIFICANT CHANGE UP (ref 3.5–5)
PROT SERPL-MCNC: 6.6 G/DL — SIGNIFICANT CHANGE UP (ref 6–8)
PROTHROM AB SERPL-ACNC: >40 SEC — HIGH (ref 9.95–12.87)
RBC # BLD: 3.17 M/UL — LOW (ref 4.7–6.1)
RBC # BLD: 3.17 M/UL — LOW (ref 4.7–6.1)
RBC # FLD: 20.1 % — HIGH (ref 11.5–14.5)
RETICS #: 51.4 K/UL — SIGNIFICANT CHANGE UP (ref 25–125)
RETICS/RBC NFR: 1.6 % — HIGH (ref 0.5–1.5)
SODIUM SERPL-SCNC: 141 MMOL/L — SIGNIFICANT CHANGE UP (ref 135–146)
TRIGL SERPL-MCNC: 36 MG/DL — SIGNIFICANT CHANGE UP
TSH SERPL-MCNC: 4.34 UIU/ML — HIGH (ref 0.27–4.2)
VIT B12 SERPL-MCNC: 376 PG/ML — SIGNIFICANT CHANGE UP (ref 232–1245)
WBC # BLD: 4.44 K/UL — LOW (ref 4.8–10.8)
WBC # FLD AUTO: 4.44 K/UL — LOW (ref 4.8–10.8)

## 2021-07-09 PROCEDURE — 99233 SBSQ HOSP IP/OBS HIGH 50: CPT

## 2021-07-09 PROCEDURE — 71045 X-RAY EXAM CHEST 1 VIEW: CPT | Mod: 26

## 2021-07-09 PROCEDURE — 99232 SBSQ HOSP IP/OBS MODERATE 35: CPT

## 2021-07-09 PROCEDURE — 93925 LOWER EXTREMITY STUDY: CPT | Mod: 26

## 2021-07-09 RX ORDER — POTASSIUM CHLORIDE 20 MEQ
40 PACKET (EA) ORAL ONCE
Refills: 0 | Status: COMPLETED | OUTPATIENT
Start: 2021-07-09 | End: 2021-07-09

## 2021-07-09 RX ADMIN — NYSTATIN CREAM 1 APPLICATION(S): 100000 CREAM TOPICAL at 05:37

## 2021-07-09 RX ADMIN — Medication 40 MILLIEQUIVALENT(S): at 13:50

## 2021-07-09 RX ADMIN — Medication 60 MILLIGRAM(S): at 17:35

## 2021-07-09 RX ADMIN — Medication 60 MILLIGRAM(S): at 05:36

## 2021-07-09 RX ADMIN — Medication 81 MILLIGRAM(S): at 11:18

## 2021-07-09 RX ADMIN — NYSTATIN CREAM 1 APPLICATION(S): 100000 CREAM TOPICAL at 17:35

## 2021-07-09 RX ADMIN — BUDESONIDE AND FORMOTEROL FUMARATE DIHYDRATE 2 PUFF(S): 160; 4.5 AEROSOL RESPIRATORY (INHALATION) at 07:39

## 2021-07-09 RX ADMIN — CHLORHEXIDINE GLUCONATE 1 APPLICATION(S): 213 SOLUTION TOPICAL at 05:37

## 2021-07-09 RX ADMIN — ATORVASTATIN CALCIUM 20 MILLIGRAM(S): 80 TABLET, FILM COATED ORAL at 21:32

## 2021-07-09 NOTE — PROGRESS NOTE ADULT - SUBJECTIVE AND OBJECTIVE BOX
Patient is a 80y old  Male who presents with a chief complaint of shortness of breath (09 Jul 2021 07:26)    HPI:  81 y/o male with PMH CAD s/p PCI and stent 2007, COPD on 2L home O2, HFpEF TTE in 4/21 EF 55-65, DLD, atrial fibrillation on coumadin presents for shortness of breath. Patient has been having a gradual onset of worsening shortness of breath over the past few days. He is adherent to his medications as directed. He was following at the Coumadin clinic for his coumadin dose and was told yesterday that he had a significant increase in his weight compared to 5 weeks earlier. He also endorses worsening bilateral lower extremities edema reaching his thighs along with a left sided chest pain, dull in nature, mild in intensity, persistent for the past day. he denied palpitations, fever, chills, cough, sputum production, abdominal or urinary symptoms.    VS on admission were within normal range. He was saturating well on 3 L NC.  CXR showed bilateral pleural effusions with  vascular congestion.  He received 40 mg of iv lasix.  (07 Jul 2021 14:33)      SUBJ:  Patient seen and examined. No events overnight.      MEDICATIONS  (STANDING):  aspirin enteric coated 81 milliGRAM(s) Oral daily  atorvastatin 20 milliGRAM(s) Oral at bedtime  budesonide 160 MICROgram(s)/formoterol 4.5 MICROgram(s) Inhaler 2 Puff(s) Inhalation two times a day  chlorhexidine 4% Liquid 1 Application(s) Topical <User Schedule>  furosemide   Injectable 60 milliGRAM(s) IV Push every 12 hours  nystatin Powder 1 Application(s) Topical every 12 hours    MEDICATIONS  (PRN):  ALBUTerol  90 MICROgram(s) HFA Inhaler - Peds 2 Puff(s) Inhalation every 4 hours PRN Shortness of Breath and/or Wheezing            Vital Signs Last 24 Hrs  T(C): 37.2 (09 Jul 2021 05:48), Max: 37.2 (09 Jul 2021 05:48)  T(F): 99 (09 Jul 2021 05:48), Max: 99 (09 Jul 2021 05:48)  HR: 61 (09 Jul 2021 05:48) (60 - 65)  BP: 118/58 (09 Jul 2021 05:48) (118/58 - 124/60)  BP(mean): --  RR: 20 (09 Jul 2021 07:40) (18 - 20)  SpO2: 93% (09 Jul 2021 07:40) (93% - 93%)      PHYSICAL EXAM:    GEN:  NAD  HEENT: NC/AT  Neck: No JVD  CV: irreg, S1-S2  Lungs: CTAB  Ext: + edema        07-08-21 @ 07:01  -  07-09-21 @ 07:00  --------------------------------------------------------  IN: 330 mL / OUT: 950 mL / NET: -620 mL    07-09-21 @ 07:01  -  07-09-21 @ 09:03  --------------------------------------------------------  IN: 400 mL / OUT: 300 mL / NET: 100 mL        I&O's Summary    08 Jul 2021 07:01  -  09 Jul 2021 07:00  --------------------------------------------------------  IN: 330 mL / OUT: 950 mL / NET: -620 mL    09 Jul 2021 07:01  -  09 Jul 2021 09:03  --------------------------------------------------------  IN: 400 mL / OUT: 300 mL / NET: 100 mL    	        ECG:  < from: 12 Lead ECG (07.07.21 @ 13:26) >   Atrial fibrillation  Premature ventricular complexes  Low voltage QRS  Incomplete right bundle branch block  Nonspecific T wave abnormality  Abnormal ECG    < end of copied text >    TTE:    < from: TTE Echo Complete w/o Contrast w/ Doppler (04.05.21 @ 07:21) >    Summary:   1. Left ventricular ejection fraction, by visual estimation, is 55-65%.   2. Normal global left ventricular systolic function.   3. Spectral Doppler shows restrictive pattern of left ventricular myocardial filling (Grade III diastolic dysfunction).   4. Severely enlarged left atrium.   5. Moderate mitral regurgitation.   6. Moderate tricuspid regurgitation.   7. Estimated pulmonary artery systolic pressure is 70.4 mmHg assuming a right atrial pressure of 8 mmHg, which is consistent with severe pulmonary hypertension.   8. LA volume Index is 78.0 ml/m² ml/m2.   9. Mild dilatation of the ascending aorta (4.3 cm).    < end of copied text >        LABS:                        9.0    4.44  )-----------( 125      ( 09 Jul 2021 06:54 )             29.0     07-09    141  |  103  |  24<H>  ----------------------------<  95  3.8   |  32  |  1.3    Ca    8.5      09 Jul 2021 06:54  Mg     2.2     07-09    TPro  6.6  /  Alb  3.3<L>  /  TBili  1.5<H>  /  DBili  x   /  AST  20  /  ALT  10  /  AlkPhos  132<H>  07-09    CARDIAC MARKERS ( 07 Jul 2021 16:06 )  x     / <0.01 ng/mL / x     / x     / x      CARDIAC MARKERS ( 07 Jul 2021 12:40 )  x     / 0.01 ng/mL / x     / x     / x          PT/INR - ( 09 Jul 2021 06:54 )   PT: >40.00 sec;   INR: 4.97 ratio         PTT - ( 09 Jul 2021 06:54 )  PTT:50.5 sec      BNP  RADIOLOGY & ADDITIONAL STUDIES:      IMPRESSION AND PLAN:

## 2021-07-09 NOTE — PROGRESS NOTE ADULT - ASSESSMENT
CAD, COPD, CHF, AF, b/l pleural effusions  C/w diuresis  No b-blocker  C/w anticoagulation  EP f/u for PPM  F/u CT chest

## 2021-07-09 NOTE — PROGRESS NOTE ADULT - NUTRITIONAL ASSESSMENT
ASSESSMENT:    #Chronic CHF  #HO SOB, and dry cough  PMHx of HTN, KYLIE and tobacco use  CT shows Chronic bilateral pleural effusions with atelectasis   I&O about -3.5 L. Cont with IV lasix 40mg BID.   Cont tele monitoring, trend cardiac enzymes,   check Echo to check EF.     #Chronic afib with bradycardia  Monitor tele    DORON on CKD II,  -Cont with IV lasix 40mg BID   -Cont with Entresto, if Cr worsens then hold it.  -Trend Cr, monitor strict I/O, if worsening then check Urine Na, Cl, urea, urine Pr:Cr,   -Avoid nephrotoxic medications.     COPD on 2L O2: clinical picture in favor of CHF exacerbation (wheezing is likely cardiac in nature)   -Cont with symbicort q12 and albuterol prn  -If respiratory status not improving despite adequate diuresis then will consider course of steroids     Hx of DVT + chronic b/l calf pain: on coumadin  -Duplex scan shows chronic thrombus in Right common femoral and popliteal vein.  Check INR; before giving coumadin.     HLD  : cont statin

## 2021-07-09 NOTE — PROGRESS NOTE ADULT - ASSESSMENT
81 y/o male with PMH CAD s/p PCI and stent 2007, COPD on 2L home O2, HFpEF TTE in 4/21 EF 55-65, DLD, atrial fibrillation on coumadin presents for shortness of breath and increase in his NADIA along with 1 day history of chest pain.    # HFpEF exacerbation  # Afib SVR # Bradycardia # NSVT  # CAD s/p stent 2007  # Severe Pulmonary HTN    - CXR b/l pleural effusions and vascular congestion  - TTE 4/21 EF 55-65 PSAP 70 mmhg; get new TTE  - BNP 14K  - ECG afib in SVR  - Last 24 hrs Tele events : Bradycardia ; NSVT 4 beats  - troponin x2 negative  - c/w lasix 60 mg iv q 12  - Cw ASA 81 md daily  - Cw atorvastatin 20 mg daily  -  lipid panel WNL except HDL 39 and A1c 5.6  - iron profile WNL  - daily weight, strict I/Os keep I < O  - DASH TLC diet  fluid restriction 1000 ml daily  - continue tele monitoring  - EP on board he will benefit from a pacemaker/ICD (depending of EF). We will consider prior to DC- to be decided once he is near-euvolemic  - Avoid AVN blockers  - INR supra therapeutic hold coumadin; FU daily INR resume coumadin when inr 2-3  - Cardiology Cs doctor Niesha; Heart failure team consult  - FU TSH  - keep K > 4 and Mg > 2.1  - NC 2 LPM; HOB > 35 titrate oxygen keep saO2 89-94%  - Get arterial duplex of LE    # DORON on CKD II    - creatinine on admission 1.5 baseline 1.1  - Most likely secondary to cardio renal syndrome  - c/w  lasix 60 q 12  - Hold entresto for now    # Normocytic anemia- chronic  # leukopenia    - at baseline  - Iron studies WNL   - FU folate b12 and TSH  - retic count WNL  - Stool sample for guaiac  - needs OP colonoscopy  - HIv screening  - active type and screen keep Hb > 7    # COPD not in exacerbation  # PSAP 70 mmhg    - on  2 LPM NC at home during night   - c/w symbicort and ventolin as needed  - CXR bilateral opacities basilar; underlying neoplasm not excluded  - chest CT    Interlobular septal thickening. Right lung groundglass opacities and bilateral lower lung predominant consolidation/atelectasis. Small right and moderate left pleural effusions. Findings compatible with pulmonary edema.    No definite mass is identified. However partial obscuration of the bilateral lower lobes as described as well as incomplete evaluation of the bilateral ronel on noncontrast CT chest. Follow-up is recommended clinically warranted.      - D-dimer 342    # HO DVT    - Chronic deep venous thrombosis right common femoral, femoral and   popliteal veins 2018 seen on repeated US  - coumadin on hold INR > 3  - SQLs bilateral  - ambulation when possible      # DVT ppx: coumadin  # DIET: DASH TLC  fluid restriction  # ACTIVITY: IAT  # Full code

## 2021-07-09 NOTE — PROGRESS NOTE ADULT - SUBJECTIVE AND OBJECTIVE BOX
RODNEY SANTO  80y Male    CHIEF COMPLAINT:    Patient is a 80y old  Male who presents with a chief complaint of shortness of breath (2021 17:28)      INTERVAL HPI/OVERNIGHT EVENTS:    Patient seen and examined.    ROS: All other systems are negative.    Vital Signs:    T(F): 99 (21 @ 05:48), Max: 99 (21 @ 05:48)  HR: 61 (21 @ 05:48) (60 - 65)  BP: 118/58 (21 @ 05:48) (118/58 - 124/60)  RR: 18 (21 @ 05:48) (18 - 18)  SpO2: --  I&O's Summary    2021 07:01  -  2021 07:00  --------------------------------------------------------  IN: 330 mL / OUT: 950 mL / NET: -620 mL      Daily     Daily Weight in k (2021 05:48)  CAPILLARY BLOOD GLUCOSE          PHYSICAL EXAM:    GENERAL:  NAD  SKIN: No rashes or lesions  HENT: Atraumatic. Normocephalic. PERRL. Moist membranes.  NECK: Supple, No JVD. No lymphadenopathy.  PULMONARY: CTA B/L. No wheezing. No rales  CVS: Normal S1, S2. Rate and Rhythm are regular. No murmurs.  ABDOMEN/GI: Soft, Nontender, Nondistended; BS present  EXTREMITIES: Peripheral pulses intact. No edema B/L LE.  NEUROLOGIC:  No motor or sensory deficit.  PSYCH: Alert & oriented x 3    Consultant(s) Notes Reviewed:  [x ] YES  [ ] NO  Care Discussed with Consultants/Other Providers [ x] YES  [ ] NO    EKG reviewed  Telemetry reviewed    LABS:                        8.8    3.32  )-----------( 146      ( 2021 06:09 )             28.7         142  |  104  |  25<H>  ----------------------------<  84  3.9   |  32  |  1.3    Ca    8.4<L>      2021 06:09  Mg     2.2         TPro  6.4  /  Alb  3.3<L>  /  TBili  1.5<H>  /  DBili  x   /  AST  19  /  ALT  10  /  AlkPhos  130<H>  07    PT/INR - ( 2021 06:09 )   PT: >40.00 sec;   INR: 3.82 ratio         PTT - ( 2021 12:40 )  PTT:43.5 sec  Serum Pro-Brain Natriuretic Peptide: 32121 pg/mL (21 @ 12:40)    Trop <0.01, CKMB --, CK --, 21 @ 16:06  Trop 0.01, CKMB --, CK --, 21 @ 12:40        RADIOLOGY & ADDITIONAL TESTS:      Imaging or report Personally Reviewed:  [ ] YES  [ ] NO    Medications:  Standing  aspirin enteric coated 81 milliGRAM(s) Oral daily  atorvastatin 20 milliGRAM(s) Oral at bedtime  budesonide 160 MICROgram(s)/formoterol 4.5 MICROgram(s) Inhaler 2 Puff(s) Inhalation two times a day  chlorhexidine 4% Liquid 1 Application(s) Topical <User Schedule>  furosemide   Injectable 60 milliGRAM(s) IV Push every 12 hours  nystatin Powder 1 Application(s) Topical every 12 hours    PRN Meds  ALBUTerol  90 MICROgram(s) HFA Inhaler - Peds 2 Puff(s) Inhalation every 4 hours PRN      Case discussed with resident    Care discussed with pt/family           RODNEY SANTO  80y Male    CHIEF COMPLAINT:    Patient is a 80y old  Male who presents with a chief complaint of shortness of breath (2021 17:28)      INTERVAL HPI/OVERNIGHT EVENTS:    Patient seen and examined. Denies palpitations. No sob on 3L NC. Saturation is 93%.     ROS: All other systems are negative.    Vital Signs:    T(F): 99 (21 @ 05:48), Max: 99 (21 @ 05:48)  HR: 61 (21 @ 05:48) (60 - 65)  BP: 118/58 (21 @ 05:48) (118/58 - 124/60)  RR: 18 (21 @ 05:48) (18 - 18)  SpO2: --  I&O's Summary    2021 07:01  -  2021 07:00  --------------------------------------------------------  IN: 330 mL / OUT: 950 mL / NET: -620 mL      Daily     Daily Weight in k (2021 05:48)  CAPILLARY BLOOD GLUCOSE          PHYSICAL EXAM:    GENERAL:  NAD  SKIN: No rashes or lesions  HENT: Atraumatic. Normocephalic. PERRL. Moist membranes.  NECK: Supple, No JVD. No lymphadenopathy.  PULMONARY: Decreased BS in the bases B/L. No wheezing. No rales  CVS: Normal S1, S2. Rate and Rhythm are IRIR. No murmurs.  ABDOMEN/GI: Soft, Nontender, Nondistended; BS present  EXTREMITIES: Peripheral pulses intact. 2+ pittine edema B/L LE.  NEUROLOGIC:  No motor or sensory deficit.  PSYCH: Alert & oriented x 3    Consultant(s) Notes Reviewed:  [x ] YES  [ ] NO  Care Discussed with Consultants/Other Providers [ x] YES  [ ] NO    EKG reviewed  Telemetry reviewed    LABS:                        8.8    3.32  )-----------( 146      ( 2021 06:09 )             28.7     07-08    142  |  104  |  25<H>  ----------------------------<  84  Creatinine Trend: 1.3<--, 1.3<--, 1.5<--  3.9   |  32  |  1.3    Ca    8.4<L>      2021 06:09  Mg     2.2         TPro  6.4  /  Alb  3.3<L>  /  TBili  1.5<H>  /  DBili  x   /  AST  19  /  ALT  10  /  AlkPhos  130<H>      PT/INR - ( 2021 06:09 )   PT: >40.00 sec;   INR: 3.82 ratio         PTT - ( 2021 12:40 )  PTT:43.5 sec  Serum Pro-Brain Natriuretic Peptide: 80013 pg/mL (21 @ 12:40)    Trop <0.01, CKMB --, CK --, 21 @ 16:06  Trop 0.01, CKMB --, CK --, 21 @ 12:40        RADIOLOGY & ADDITIONAL TESTS:      Imaging or report Personally Reviewed:  [ ] YES  [ ] NO    Medications:  Standing  aspirin enteric coated 81 milliGRAM(s) Oral daily  atorvastatin 20 milliGRAM(s) Oral at bedtime  budesonide 160 MICROgram(s)/formoterol 4.5 MICROgram(s) Inhaler 2 Puff(s) Inhalation two times a day  chlorhexidine 4% Liquid 1 Application(s) Topical <User Schedule>  furosemide   Injectable 60 milliGRAM(s) IV Push every 12 hours  nystatin Powder 1 Application(s) Topical every 12 hours    PRN Meds  ALBUTerol  90 MICROgram(s) HFA Inhaler - Peds 2 Puff(s) Inhalation every 4 hours PRN      Case discussed with resident    Care discussed with pt/family

## 2021-07-09 NOTE — PROGRESS NOTE ADULT - SUBJECTIVE AND OBJECTIVE BOX
24H events:    Patient is a 80y old Male who presents with a chief complaint of shortness of breath (09 Jul 2021 10:04)    Primary diagnosis of Bradycardia       Today is hospital day 2d. This morning patient was seen and examined at bedside, resting comfortably in bed.    Bradycardia and PVCs on tele  no major complaints  reports improvement of symptoms    PAST MEDICAL & SURGICAL HISTORY  COPD (chronic obstructive pulmonary disease)    Hypertension    Deep vein thrombosis (DVT) of left lower extremity, unspecified chronicity, unspecified vein    Cellulitis of left lower extremity    Chronic atrial fibrillation    Mitral valve insufficiency, unspecified etiology  Moderate    CHF (congestive heart failure)    S/P coronary artery stent placement    History of total right knee replacement      SOCIAL HISTORY:  Social History:  lives at home with his wife and kids.  Ex smoker, 3-4 glasses of alcohol weekly, no illicit of drug abuse. (07 Jul 2021 14:33)      ALLERGIES:  No Known Allergies    MEDICATIONS:  STANDING MEDICATIONS  aspirin enteric coated 81 milliGRAM(s) Oral daily  atorvastatin 20 milliGRAM(s) Oral at bedtime  budesonide 160 MICROgram(s)/formoterol 4.5 MICROgram(s) Inhaler 2 Puff(s) Inhalation two times a day  chlorhexidine 4% Liquid 1 Application(s) Topical <User Schedule>  furosemide   Injectable 60 milliGRAM(s) IV Push every 12 hours  nystatin Powder 1 Application(s) Topical every 12 hours  potassium chloride   Powder 40 milliEquivalent(s) Oral once    PRN MEDICATIONS  ALBUTerol  90 MICROgram(s) HFA Inhaler - Peds 2 Puff(s) Inhalation every 4 hours PRN    VITALS:   T(F): 99  HR: 61  BP: 118/58  RR: 20  SpO2: 93%    PHYSICAL EXAM:  GENERAL: NAD  NERVOUS SYSTEM:  Alert & Oriented X3, non focal   CHEST/LUNG: bilateral crackles  HEART: irregular rate and rhythm;   ABDOMEN: Soft, Nontender, Nondistended; Bowel sounds present  EXTREMITIES:   No clubbing, cyanosis, bilateral pitting edema R> L 2+  LABS:                        9.0    4.44  )-----------( 125      ( 09 Jul 2021 06:54 )             29.0     07-09    141  |  103  |  24<H>  ----------------------------<  95  3.8   |  32  |  1.3    Ca    8.5      09 Jul 2021 06:54  Mg     2.2     07-09    TPro  6.6  /  Alb  3.3<L>  /  TBili  1.5<H>  /  DBili  x   /  AST  20  /  ALT  10  /  AlkPhos  132<H>  07-09    PT/INR - ( 09 Jul 2021 06:54 )   PT: >40.00 sec;   INR: 4.97 ratio         PTT - ( 09 Jul 2021 06:54 )  PTT:50.5 sec          CARDIAC MARKERS ( 07 Jul 2021 16:06 )  x     / <0.01 ng/mL / x     / x     / x      CARDIAC MARKERS ( 07 Jul 2021 12:40 )  x     / 0.01 ng/mL / x     / x     / x          RADIOLOGY:

## 2021-07-09 NOTE — PROGRESS NOTE ADULT - ASSESSMENT
79 y/o male with PMH CAD s/p PCI and stent 2007, COPD on 2L home O2, HFpEF TTE in 4/21 EF 55-65, DLD, atrial fibrillation on coumadin presented with progressively worsening sob associated with increasing leg swelling for the last few days. Also C/O intermittent cp for the last one day.     Acute HFpEF  DORON  COPD on home O2  CAD S/P PCI in 2007  A-Fib with Slow Ventricular Response               PLAN:    ·	Cont tele 81 y/o male with PMH CAD s/p PCI and stent 2007, COPD on 2L home O2, HFpEF TTE in 4/21 EF 55-65, DLD, atrial fibrillation on coumadin presented with progressively worsening sob associated with increasing leg swelling for the last few days. Also C/O intermittent cp for the last one day.     Acute HFpEF  DORON  COPD on home O2  CAD S/P PCI in 2007  Chronic A-Fib with Slow Ventricular Response               PLAN:    ·	Cont tele  ·	Cont Lasix 60 mg ivp q 12h  ·	Check i's and o's and daily wt  ·	Low salt diet and water restriction to 1.5 L/D  ·	HF F/U  ·	Monitor renal function. Improving Cr.   ·	Bradycardiac. EP F/U. May need PPM  ·	CE x 2 are negative.   ·	Cont Symbicort    Progress Note Handoff    Pending (specify):  Consults_________, Tests________, Test Results_______, Other__On IV diuretics. Bradycardia. Needs PPM_______  Family discussion:  Disposition: Home___/SNF___/Other________/Unknown at this time________    Kyle Pritchett MD  Spectra: [phone]

## 2021-07-10 LAB
ALBUMIN SERPL ELPH-MCNC: 3.5 G/DL — SIGNIFICANT CHANGE UP (ref 3.5–5.2)
ALP SERPL-CCNC: 122 U/L — HIGH (ref 30–115)
ALT FLD-CCNC: 9 U/L — SIGNIFICANT CHANGE UP (ref 0–41)
ANION GAP SERPL CALC-SCNC: 5 MMOL/L — LOW (ref 7–14)
ANION GAP SERPL CALC-SCNC: 8 MMOL/L — SIGNIFICANT CHANGE UP (ref 7–14)
APTT BLD: 50.1 SEC — HIGH (ref 27–39.2)
AST SERPL-CCNC: 17 U/L — SIGNIFICANT CHANGE UP (ref 0–41)
BASOPHILS # BLD AUTO: 0.01 K/UL — SIGNIFICANT CHANGE UP (ref 0–0.2)
BASOPHILS NFR BLD AUTO: 0.2 % — SIGNIFICANT CHANGE UP (ref 0–1)
BILIRUB SERPL-MCNC: 1.8 MG/DL — HIGH (ref 0.2–1.2)
BUN SERPL-MCNC: 25 MG/DL — HIGH (ref 10–20)
BUN SERPL-MCNC: 27 MG/DL — HIGH (ref 10–20)
CALCIUM SERPL-MCNC: 8.1 MG/DL — LOW (ref 8.5–10.1)
CALCIUM SERPL-MCNC: 8.4 MG/DL — LOW (ref 8.5–10.1)
CHLORIDE SERPL-SCNC: 100 MMOL/L — SIGNIFICANT CHANGE UP (ref 98–110)
CHLORIDE SERPL-SCNC: 99 MMOL/L — SIGNIFICANT CHANGE UP (ref 98–110)
CO2 SERPL-SCNC: 32 MMOL/L — SIGNIFICANT CHANGE UP (ref 17–32)
CO2 SERPL-SCNC: 34 MMOL/L — HIGH (ref 17–32)
CREAT SERPL-MCNC: 1.2 MG/DL — SIGNIFICANT CHANGE UP (ref 0.7–1.5)
CREAT SERPL-MCNC: 1.3 MG/DL — SIGNIFICANT CHANGE UP (ref 0.7–1.5)
EOSINOPHIL # BLD AUTO: 0.06 K/UL — SIGNIFICANT CHANGE UP (ref 0–0.7)
EOSINOPHIL NFR BLD AUTO: 1.5 % — SIGNIFICANT CHANGE UP (ref 0–8)
FERRITIN SERPL-MCNC: 44 NG/ML — SIGNIFICANT CHANGE UP (ref 30–400)
GLUCOSE SERPL-MCNC: 84 MG/DL — SIGNIFICANT CHANGE UP (ref 70–99)
GLUCOSE SERPL-MCNC: 95 MG/DL — SIGNIFICANT CHANGE UP (ref 70–99)
HCT VFR BLD CALC: 27.8 % — LOW (ref 42–52)
HGB BLD-MCNC: 8.5 G/DL — LOW (ref 14–18)
IMM GRANULOCYTES NFR BLD AUTO: 0.7 % — HIGH (ref 0.1–0.3)
INR BLD: 5.46 RATIO — CRITICAL HIGH (ref 0.65–1.3)
LYMPHOCYTES # BLD AUTO: 0.47 K/UL — LOW (ref 1.2–3.4)
LYMPHOCYTES # BLD AUTO: 11.6 % — LOW (ref 20.5–51.1)
MAGNESIUM SERPL-MCNC: 2.1 MG/DL — SIGNIFICANT CHANGE UP (ref 1.8–2.4)
MAGNESIUM SERPL-MCNC: 2.1 MG/DL — SIGNIFICANT CHANGE UP (ref 1.8–2.4)
MCHC RBC-ENTMCNC: 27.5 PG — SIGNIFICANT CHANGE UP (ref 27–31)
MCHC RBC-ENTMCNC: 30.6 G/DL — LOW (ref 32–37)
MCV RBC AUTO: 90 FL — SIGNIFICANT CHANGE UP (ref 80–94)
MONOCYTES # BLD AUTO: 0.69 K/UL — HIGH (ref 0.1–0.6)
MONOCYTES NFR BLD AUTO: 17 % — HIGH (ref 1.7–9.3)
NEUTROPHILS # BLD AUTO: 2.8 K/UL — SIGNIFICANT CHANGE UP (ref 1.4–6.5)
NEUTROPHILS NFR BLD AUTO: 69 % — SIGNIFICANT CHANGE UP (ref 42.2–75.2)
NRBC # BLD: 0 /100 WBCS — SIGNIFICANT CHANGE UP (ref 0–0)
PLATELET # BLD AUTO: 111 K/UL — LOW (ref 130–400)
POTASSIUM SERPL-MCNC: 4 MMOL/L — SIGNIFICANT CHANGE UP (ref 3.5–5)
POTASSIUM SERPL-MCNC: 4.1 MMOL/L — SIGNIFICANT CHANGE UP (ref 3.5–5)
POTASSIUM SERPL-SCNC: 4 MMOL/L — SIGNIFICANT CHANGE UP (ref 3.5–5)
POTASSIUM SERPL-SCNC: 4.1 MMOL/L — SIGNIFICANT CHANGE UP (ref 3.5–5)
PROT SERPL-MCNC: 6.2 G/DL — SIGNIFICANT CHANGE UP (ref 6–8)
PROTHROM AB SERPL-ACNC: >40 SEC — HIGH (ref 9.95–12.87)
RBC # BLD: 3.09 M/UL — LOW (ref 4.7–6.1)
RBC # FLD: 20.2 % — HIGH (ref 11.5–14.5)
SARS-COV-2 RNA SPEC QL NAA+PROBE: SIGNIFICANT CHANGE UP
SODIUM SERPL-SCNC: 138 MMOL/L — SIGNIFICANT CHANGE UP (ref 135–146)
SODIUM SERPL-SCNC: 140 MMOL/L — SIGNIFICANT CHANGE UP (ref 135–146)
WBC # BLD: 4.06 K/UL — LOW (ref 4.8–10.8)
WBC # FLD AUTO: 4.06 K/UL — LOW (ref 4.8–10.8)

## 2021-07-10 PROCEDURE — 99232 SBSQ HOSP IP/OBS MODERATE 35: CPT

## 2021-07-10 PROCEDURE — 73560 X-RAY EXAM OF KNEE 1 OR 2: CPT | Mod: 26,LT

## 2021-07-10 PROCEDURE — 71045 X-RAY EXAM CHEST 1 VIEW: CPT | Mod: 26

## 2021-07-10 PROCEDURE — 99233 SBSQ HOSP IP/OBS HIGH 50: CPT

## 2021-07-10 RX ORDER — BUMETANIDE 0.25 MG/ML
2 INJECTION INTRAMUSCULAR; INTRAVENOUS EVERY 8 HOURS
Refills: 0 | Status: DISCONTINUED | OUTPATIENT
Start: 2021-07-10 | End: 2021-07-11

## 2021-07-10 RX ORDER — ACETAMINOPHEN 500 MG
650 TABLET ORAL ONCE
Refills: 0 | Status: COMPLETED | OUTPATIENT
Start: 2021-07-10 | End: 2021-07-10

## 2021-07-10 RX ORDER — OXYCODONE AND ACETAMINOPHEN 5; 325 MG/1; MG/1
1 TABLET ORAL ONCE
Refills: 0 | Status: DISCONTINUED | OUTPATIENT
Start: 2021-07-10 | End: 2021-07-10

## 2021-07-10 RX ORDER — SODIUM CHLORIDE 5 G/100ML
150 INJECTION, SOLUTION INTRAVENOUS
Refills: 0 | Status: DISCONTINUED | OUTPATIENT
Start: 2021-07-10 | End: 2021-07-11

## 2021-07-10 RX ORDER — SODIUM CHLORIDE 5 G/100ML
150 INJECTION, SOLUTION INTRAVENOUS
Refills: 0 | Status: DISCONTINUED | OUTPATIENT
Start: 2021-07-10 | End: 2021-07-14

## 2021-07-10 RX ORDER — SODIUM CHLORIDE 5 G/100ML
150 INJECTION, SOLUTION INTRAVENOUS
Refills: 0 | Status: DISCONTINUED | OUTPATIENT
Start: 2021-07-10 | End: 2021-07-10

## 2021-07-10 RX ADMIN — BUDESONIDE AND FORMOTEROL FUMARATE DIHYDRATE 2 PUFF(S): 160; 4.5 AEROSOL RESPIRATORY (INHALATION) at 22:29

## 2021-07-10 RX ADMIN — BUMETANIDE 2 MILLIGRAM(S): 0.25 INJECTION INTRAMUSCULAR; INTRAVENOUS at 13:16

## 2021-07-10 RX ADMIN — OXYCODONE AND ACETAMINOPHEN 1 TABLET(S): 5; 325 TABLET ORAL at 05:55

## 2021-07-10 RX ADMIN — SODIUM CHLORIDE 50 MILLILITER(S): 5 INJECTION, SOLUTION INTRAVENOUS at 13:18

## 2021-07-10 RX ADMIN — Medication 60 MILLIGRAM(S): at 05:12

## 2021-07-10 RX ADMIN — OXYCODONE AND ACETAMINOPHEN 1 TABLET(S): 5; 325 TABLET ORAL at 06:25

## 2021-07-10 RX ADMIN — Medication 650 MILLIGRAM(S): at 14:02

## 2021-07-10 RX ADMIN — NYSTATIN CREAM 1 APPLICATION(S): 100000 CREAM TOPICAL at 05:12

## 2021-07-10 RX ADMIN — SODIUM CHLORIDE 50 MILLILITER(S): 5 INJECTION, SOLUTION INTRAVENOUS at 22:29

## 2021-07-10 RX ADMIN — Medication 650 MILLIGRAM(S): at 13:17

## 2021-07-10 RX ADMIN — Medication 81 MILLIGRAM(S): at 11:09

## 2021-07-10 RX ADMIN — CHLORHEXIDINE GLUCONATE 1 APPLICATION(S): 213 SOLUTION TOPICAL at 05:12

## 2021-07-10 RX ADMIN — NYSTATIN CREAM 1 APPLICATION(S): 100000 CREAM TOPICAL at 17:10

## 2021-07-10 RX ADMIN — BUMETANIDE 2 MILLIGRAM(S): 0.25 INJECTION INTRAMUSCULAR; INTRAVENOUS at 22:30

## 2021-07-10 RX ADMIN — ATORVASTATIN CALCIUM 20 MILLIGRAM(S): 80 TABLET, FILM COATED ORAL at 22:29

## 2021-07-10 RX ADMIN — BUDESONIDE AND FORMOTEROL FUMARATE DIHYDRATE 2 PUFF(S): 160; 4.5 AEROSOL RESPIRATORY (INHALATION) at 07:25

## 2021-07-10 NOTE — PROGRESS NOTE ADULT - SUBJECTIVE AND OBJECTIVE BOX
INTERVAL HPI/OVERNIGHT EVENTS:  No acute events overnight. Pt in AF 40-60s range. Frequent PVCs and several episodes NSVT on tele. Pt c/o LLE weakness.   Patient denies fever, chills, dizziness, syncope, chest pain, palpitations, SOB.     MEDICATIONS  (STANDING):  aspirin enteric coated 81 milliGRAM(s) Oral daily  atorvastatin 20 milliGRAM(s) Oral at bedtime  budesonide 160 MICROgram(s)/formoterol 4.5 MICROgram(s) Inhaler 2 Puff(s) Inhalation two times a day  buMETAnide Injectable 2 milliGRAM(s) IV Push every 8 hours  chlorhexidine 4% Liquid 1 Application(s) Topical <User Schedule>  nystatin Powder 1 Application(s) Topical every 12 hours  sodium chloride 3%. 150 milliLiter(s) (50 mL/Hr) IV Continuous <Continuous>  sodium chloride 3%. 150 milliLiter(s) (50 mL/Hr) IV Continuous <Continuous>    MEDICATIONS  (PRN):  ALBUTerol  90 MICROgram(s) HFA Inhaler - Peds 2 Puff(s) Inhalation every 4 hours PRN Shortness of Breath and/or Wheezing      Allergies    No Known Allergies    Intolerances        REVIEW OF SYSTEMS    [x] A ten-point review of systems was otherwise negative except as noted.  [ ] Due to altered mental status/intubation, subjective information were not able to be obtained from the patient. History was obtained, to the extent possible, from review of the chart and collateral sources of information.      Vital Signs Last 24 Hrs  T(C): 36.5 (10 Jul 2021 05:14), Max: 36.5 (10 Jul 2021 05:14)  T(F): 97.7 (10 Jul 2021 05:14), Max: 97.7 (10 Jul 2021 05:14)  HR: 61 (10 Jul 2021 05:14) (61 - 62)  BP: 122/57 (10 Jul 2021 05:14) (115/56 - 122/57)  BP(mean): --  RR: 18 (10 Jul 2021 08:00) (18 - 18)  SpO2: 96% (10 Jul 2021 08:00) (95% - 96%)    07-09-21 @ 07:01  -  07-10-21 @ 07:00  --------------------------------------------------------  IN: 1050 mL / OUT: 1350 mL / NET: -300 mL    07-10-21 @ 07:01  -  07-10-21 @ 13:50  --------------------------------------------------------  IN: 450 mL / OUT: 200 mL / NET: 250 mL        Physical Exam  GENERAL: Elderly male,  In no apparent distress, well nourished, and hydrated.  HEART: Irregular rate and rhythm; No murmurs, rubs, or gallops.  PULMONARY: Clear to auscultation and perfusion.  No rales, wheezing, or rhonchi bilaterally.  ABDOMEN: Soft, Nontender, Nondistended; Bowel sounds present  EXTREMITIES:  + LE edema. 2+ Peripheral Pulses, No clubbing, cyanosis  NEUROLOGICAL: AO x3    LABS:                        8.5    4.06  )-----------( 111      ( 10 Jul 2021 06:20 )             27.8     07-10    140  |  100  |  25<H>  ----------------------------<  84  4.0   |  32  |  1.2    Ca    8.4<L>      10 Jul 2021 06:20  Mg     2.1     07-10    TPro  6.2  /  Alb  3.5  /  TBili  1.8<H>  /  DBili  x   /  AST  17  /  ALT  9   /  AlkPhos  122<H>  07-10    PT/INR - ( 10 Jul 2021 06:20 )   PT: >40.00 sec;   INR: 5.46 ratio         PTT - ( 10 Jul 2021 06:20 )  PTT:50.1 sec      07-09-21 @ 07:01  -  07-10-21 @ 07:00  --------------------------------------------------------  IN: 1050 mL / OUT: 1350 mL / NET: -300 mL    07-10-21 @ 07:01  -  07-10-21 @ 13:50  --------------------------------------------------------  IN: 450 mL / OUT: 200 mL / NET: 250 mL        07-09-21 @ 07:01  -  07-10-21 @ 07:00  --------------------------------------------------------  IN: 1050 mL / OUT: 1350 mL / NET: -300 mL    07-10-21 @ 07:01  -  07-10-21 @ 13:50  --------------------------------------------------------  IN: 450 mL / OUT: 200 mL / NET: 250 mL      RADIOLOGY & ADDITIONAL TESTS:  < from: TTE Echo Complete w/o Contrast w/ Doppler (04.05.21 @ 07:21) >  Summary:   1. Left ventricular ejection fraction, by visual estimation, is 55-65%.   2. Normal global left ventricular systolic function.   3. Spectral Doppler shows restrictive pattern of left ventricular myocardial filling (Grade III diastolic dysfunction).   4. Severely enlarged left atrium.   5. Moderate mitral regurgitation.   6. Moderate tricuspid regurgitation.   7. Estimated pulmonary artery systolic pressure is 70.4 mmHg assuming a right atrial pressure of 8 mmHg, which is consistent with severe pulmonary hypertension.   8. LA volume Index is 78.0 ml/m² ml/m2.   9. Mild dilatation of the ascending aorta (4.3 cm).    PHYSICIAN INTERPRETATION:  Left Ventricle: The left ventricular internal cavity size is moderately increased. Left ventricular wall thickness is normal. Global LV systolic function was normal. Left ventricular ejection fraction, by visual estimation, is 55-65%. Spectral Doppler shows restrictive pattern of left ventricular myocardial filling (Grade IIIdiastolic dysfunction).  Right Ventricle: RV systolic function is normal.  Left Atrium: Severely enlarged left atrium. LA volume Index is 78.0 ml/m² ml/m2.  Right Atrium: Right atrial enlargement.  Pericardium: There is no evidence of pericardial effusion.  Mitral Valve: The mitral valve is normal in structure. There is mild mitral annular calcification. No evidence of mitral stenosis. Moderate mitral regurgitation.  Tricuspid Valve: The tricuspid valve is normal in structure. Moderate tricuspid regurgitation is visualized. Estimated pulmonary artery systolic pressure is 70.4 mmHg assuming a right atrial pressure of 8 mmHg, which is consistent with severe pulmonary hypertension.  Aortic Valve: The aortic valve is trileaflet. No evidence of aortic stenosis. Peak transaortic gradient equals 9.0 mmHg, mean transaortic gradient equals 4.3 mmHg, the calculated aortic valve area equals 3.59 cm² by the continuity equation consistent with normally opening aortic valve. No aortic regurgitation.  Pulmonic Valve: The pulmonic valve was not well visualized.  Aorta: There is dilatation of the ascending aorta.  Pulmonary Artery: The main pulmonary artery is normal in size.  Venous: The inferior vena cava was dilated, with respiratory size variationless than 50%.    < end of copied text >    < from: 12 Lead ECG (07.07.21 @ 13:26) >    Ventricular Rate 59 BPM    Atrial Rate 59 BPM    QRS Duration 92 ms    Q-T Interval 408 ms    QTC Calculation(Bazett) 403 ms    R Axis -29 degrees    T Axis -37 degrees    Diagnosis Line Atrial fibrillation  Premature ventricular complexes  Low voltage QRS  Incomplete right bundle branch block  Nonspecific T wave abnormality  Abnormal ECG    < end of copied text >

## 2021-07-10 NOTE — PROGRESS NOTE ADULT - ASSESSMENT
Cardiologist: Dr Arnulfo Scherer    Assessment: 79 y/o male with PMH CAD s/p PCI and stent 2007, COPD on 2L home O2, HFpEF TTE in 4/21 EF 55-65, DLD, atrial fibrillation on coumadin presents for shortness of breath and increase in his NADIA along with 1 day history of chest pain.    Impression:  Acute on Chronic HFpEF  Chronic AF (coumadin) with Slow Ventricular Response (50-60s) with fatigue and dyspnea with CHADS VASc of at least 4 (CHF, Age > 75, CAD)  NSVT on tele  CAD sp PCI  COPD on 2L O2  HLD    Plan:  - Recommend leadless PM for slow ventricular rate. Would also benefit from BB given NSVT - use is limited by bradycardia  - OR tentatively Monday  - Avoid AVN blocking agents  - Appreciate HF recommendations  - Cont Coumadin for stroke prevention  - Monitor electrolytes, maintain WNL  - COVID sent today  - Cont diuresis  - Will follow

## 2021-07-10 NOTE — PROGRESS NOTE ADULT - ASSESSMENT
81 y/o male with PMH CAD s/p PCI and stent 2007, COPD on 2L home O2, HFpEF TTE in 4/21 EF 55-65, DLD, atrial fibrillation on coumadin presented with progressively worsening sob associated with increasing leg swelling for the last few days. Also C/O intermittent cp for the last one day.     Acute HFpEF  DORON  COPD on home O2  CAD S/P PCI in 2007  Chronic A-Fib with Slow Ventricular Response   NSVT              PLAN:    ·	Cont tele  ·	NSVT on tele and bradycardia. EP F/U noted. Planning pacemaker on Monday  ·	Negative balance is 300 cc only.  ·	Called HF specialist and discussed care with him. Switch him to Bumex 2 mg ivp q 8h and start him on hypertonic saline 150 cc twice a day  ·	CXR reviewed. B/L opacities and small pleural effusions.   ·	Check i's and o's and daily wt  ·	Low salt diet and water restriction to 1.5 L/D  ·	Monitor renal function. Improving Cr.   ·	CE x 2 are negative.   ·	Cont Symbicort    Progress Note Handoff    Pending (specify):  Consults_________, Tests________, Test Results_______, Other__On IV diuretics. Bradycardia. Needs PPM_______  Family discussion:  Disposition: Home___/SNF___/Other________/Unknown at this time________    Kyle Pritchett MD  Spectra: [phone]

## 2021-07-10 NOTE — PROGRESS NOTE ADULT - SUBJECTIVE AND OBJECTIVE BOX
RODNEY SANTO 80y Male  MRN#: [id number]   CODE STATUS: full code     Hospital Day: 3d    Pt is currently admitted with the primary diagnosis of HF exacerbation     SUBJECTIVE  Hospital Course  81 y/o male with PMH CAD s/p PCI and stent 2007, COPD on 2L home O2, HFpEF TTE in 4/21 EF 55-65, DLD, atrial fibrillation on coumadin presents for shortness of breath. Patient has been having a gradual onset of worsening shortness of breath over the past few days. He is adherent to his medications as directed. He was following at the Coumadin clinic for his coumadin dose and was told yesterday that he had a significant increase in his weight compared to 5 weeks earlier. He also endorses worsening bilateral lower extremities edema reaching his thighs along with a left sided chest pain, dull in nature, mild in intensity, persistent for the past day. he denied palpitations, fever, chills, cough, sputum production, abdominal or urinary symptoms.    VS on admission were within normal range. He was saturating well on 3 L NC.  CXR showed bilateral pleural effusions with  vascular congestion.  He received 40 mg of iv lasix.     Managed on Tele floor for HF with plan for pacemaker     Overnight events   None     Subjective complaints   Patient complains of severe knee pain L side                                               ----------------------------------------------------------  OBJECTIVE  PAST MEDICAL & SURGICAL HISTORY  COPD (chronic obstructive pulmonary disease)    Hypertension    Deep vein thrombosis (DVT) of left lower extremity, unspecified chronicity, unspecified vein    Cellulitis of left lower extremity    Chronic atrial fibrillation    Mitral valve insufficiency, unspecified etiology  Moderate    CHF (congestive heart failure)    S/P coronary artery stent placement    History of total right knee replacement                                              -----------------------------------------------------------  ALLERGIES:  No Known Allergies                                            ------------------------------------------------------------    HOME MEDICATIONS  Home Medications:  atorvastatin 20 mg oral tablet: 1 tab(s) orally once a day (08 Jul 2021 14:49)  Entresto 24 mg-26 mg oral tablet: 1 tab(s) orally 2 times a day (08 Jul 2021 14:49)  Lasix 40 mg oral tablet: 1 tab(s) orally 2 times a day; 3 times if needed (08 Jul 2021 14:49)  Symbicort 160 mcg-4.5 mcg/inh inhalation aerosol: 2 puff(s) inhaled 2 times a day (08 Jul 2021 14:49)  Ventolin HFA 90 mcg/inh inhalation aerosol: 2 puff(s) inhaled every 6 hours as neede for shortness of breath (08 Jul 2021 14:49)  warfarin 2 mg oral tablet: Take half a tablet Sun, Tues, Wed, Thurs, Fri, Sat.  Take a whole tablet on Mon (08 Jul 2021 14:49)                           MEDICATIONS:  STANDING MEDICATIONS  aspirin enteric coated 81 milliGRAM(s) Oral daily  atorvastatin 20 milliGRAM(s) Oral at bedtime  budesonide 160 MICROgram(s)/formoterol 4.5 MICROgram(s) Inhaler 2 Puff(s) Inhalation two times a day  buMETAnide Injectable 2 milliGRAM(s) IV Push every 8 hours  chlorhexidine 4% Liquid 1 Application(s) Topical <User Schedule>  nystatin Powder 1 Application(s) Topical every 12 hours  sodium chloride 3%. 150 milliLiter(s) IV Continuous <Continuous>  sodium chloride 3%. 150 milliLiter(s) IV Continuous <Continuous>    PRN MEDICATIONS  ALBUTerol  90 MICROgram(s) HFA Inhaler - Peds 2 Puff(s) Inhalation every 4 hours PRN                                            ------------------------------------------------------------  VITAL SIGNS: Last 24 Hours  T(C): 36.6 (10 Jul 2021 14:42), Max: 36.6 (10 Jul 2021 14:42)  T(F): 97.8 (10 Jul 2021 14:42), Max: 97.8 (10 Jul 2021 14:42)  HR: 54 (10 Jul 2021 14:42) (54 - 62)  BP: 111/53 (10 Jul 2021 14:42) (111/53 - 122/57)  BP(mean): --  RR: 18 (10 Jul 2021 14:42) (18 - 18)  SpO2: 96% (10 Jul 2021 08:00) (96% - 96%)      07-09-21 @ 07:01  -  07-10-21 @ 07:00  --------------------------------------------------------  IN: 1050 mL / OUT: 1350 mL / NET: -300 mL    07-10-21 @ 07:01  -  07-10-21 @ 16:18  --------------------------------------------------------  IN: 600 mL / OUT: 450 mL / NET: 150 mL                                             --------------------------------------------------------------  LABS:                        8.5    4.06  )-----------( 111      ( 10 Jul 2021 06:20 )             27.8     07-10    140  |  100  |  25<H>  ----------------------------<  84  4.0   |  32  |  1.2    Ca    8.4<L>      10 Jul 2021 06:20  Mg     2.1     07-10    TPro  6.2  /  Alb  3.5  /  TBili  1.8<H>  /  DBili  x   /  AST  17  /  ALT  9   /  AlkPhos  122<H>  07-10    PT/INR - ( 10 Jul 2021 06:20 )   PT: >40.00 sec;   INR: 5.46 ratio         PTT - ( 10 Jul 2021 06:20 )  PTT:50.1 sec                                              -------------------------------------------------------------  RADIOLOGY:  < from: Xray Chest 1 View-PORTABLE IMMEDIATE (07.07.21 @ 13:32) >  New right hilar and right heart border opacity.    Underlying neoplasm is not excluded.    Again seen are bibasilar lung opacities.    Further evaluation with a postcontrast chest CT is recommended    < end of copied text >  < from: [facility] Duplex Lower Ext Vein Scan, Bilat (07.08.21 @ 09:35) >  No evidence of deep venous thrombosis in the left leg  Chronic thrombus noted in the right common femoral, popliteal, femoral vein.    < end of copied text >  < from: CT Chest No Cont (07.08.21 @ 18:10) >  Interlobular septal thickening. Right lung groundglass opacities and bilateral lower lung predominant consolidation/atelectasis. Small right and moderate left pleural effusions. Findings compatible with pulmonary edema.    No definite mass is identified. However partial obscuration of the bilateral lower lobes as described as well as incomplete evaluation of the bilateral ronel on noncontrast CT chest. Follow-up is recommended as clinically warranted.    < end of copied text >  < from: Xray Chest 1 View- PORTABLE-Routine (Xray Chest 1 View- PORTABLE-Routine in AM.) (07.09.21 @ 07:57) >  Increased bilateral opacities and effusions. No pneumothorax.    < end of copied text >  < from: [facility] Duplex Lower Extrem Arterial, Bilat (07.09.21 @ 17:20) >  Normal arterial flow in the bilateral lower extremities, right peroneal artery was not visualized.  Homogenous fluid collection anterior to the left patella.    < end of copied text >  < from: Xray Chest 1 View-PORTABLE IMMEDIATE (Xray Chest 1 View-PORTABLE IMMEDIATE .) (07.10.21 @ 11:06) >  Cardiomegaly, unchanged    Stable bilateral parenchymal opacities with small effusions. No pneumothorax    < end of copied text >                                            --------------------------------------------------------------    PHYSICAL EXAM:  GENERAL: sleeping in bed comfortably, dyspneic on waking   EYES: EOMI, PERRLA, conjunctiva and sclera clear  CHEST/LUNG: Clear to auscultation bilaterally  HEART: Irregular rhythm   ABDOMEN: Soft, Nontender, Nondistended.   EXTREMITIES:  2+ Peripheral Pulses, piotting edema BL legs , L knee painful to ambulation and tender   NERVOUS SYSTEM:  Alert & Oriented X3, speech clear. No deficits

## 2021-07-10 NOTE — PROGRESS NOTE ADULT - SUBJECTIVE AND OBJECTIVE BOX
RODNEY SANTO  80y Male    CHIEF COMPLAINT:    Patient is a 80y old  Male who presents with a chief complaint of shortness of breath (10 Jul 2021 13:50)      INTERVAL HPI/OVERNIGHT EVENTS:    Patient seen and examined. Complains that he is having sob. On 3L NC. Saturating 96%. Still fluid overload    ROS: All other systems are negative.    Vital Signs:    T(F): 97.7 (07-10-21 @ 05:14), Max: 97.7 (07-10-21 @ 05:14)  HR: 61 (07-10-21 @ 05:14) (61 - 62)  BP: 122/57 (07-10-21 @ 05:14) (115/56 - 122/57)  RR: 18 (07-10-21 @ 08:00) (18 - 18)  SpO2: 96% (07-10-21 @ 08:00) (96% - 96%)  I&O's Summary    09 Jul 2021 07:01  -  10 Jul 2021 07:00  --------------------------------------------------------  IN: 1050 mL / OUT: 1350 mL / NET: -300 mL    10 Jul 2021 07:01  -  10 Jul 2021 14:11  --------------------------------------------------------  IN: 600 mL / OUT: 200 mL / NET: 400 mL      Daily     Daily   CAPILLARY BLOOD GLUCOSE          PHYSICAL EXAM:    GENERAL:  NAD  SKIN: No rashes or lesions  HENT: Atraumatic. Normocephalic. PERRL. Moist membranes.  NECK: Supple, No JVD. No lymphadenopathy.  PULMONARY: Decreased BS in the bases B/L. No wheezing. No rales  CVS: Normal S1, S2. Rate and Rhythm are regular. No murmurs.  ABDOMEN/GI: Soft, Nontender, Nondistended; BS present  EXTREMITIES: Peripheral pulses intact. 2+ pitting edema B/L LE.  NEUROLOGIC:  No motor or sensory deficit.  PSYCH: Alert & oriented x 3    Consultant(s) Notes Reviewed:  [x ] YES  [ ] NO  Care Discussed with Consultants/Other Providers [ x] YES  [ ] NO    EKG reviewed  Telemetry reviewed    LABS:                        8.5    4.06  )-----------( 111      ( 10 Jul 2021 06:20 )             27.8     07-10    140  |  100  |  25<H>  ----------------------------<  84  4.0   |  32  |  1.2    Ca    8.4<L>      10 Jul 2021 06:20  Mg     2.1     07-10    TPro  6.2  /  Alb  3.5  /  TBili  1.8<H>  /  DBili  x   /  AST  17  /  ALT  9   /  AlkPhos  122<H>  07-10    PT/INR - ( 10 Jul 2021 06:20 )   PT: >40.00 sec;   INR: 5.46 ratio         PTT - ( 10 Jul 2021 06:20 )  PTT:50.1 sec  Serum Pro-Brain Natriuretic Peptide: 53577 pg/mL (07-07-21 @ 12:40)    Trop <0.01, CKMB --, CK --, 07-07-21 @ 16:06        RADIOLOGY & ADDITIONAL TESTS:      Imaging or report Personally Reviewed:  [ ] YES  [ ] NO    Medications:  Standing  aspirin enteric coated 81 milliGRAM(s) Oral daily  atorvastatin 20 milliGRAM(s) Oral at bedtime  budesonide 160 MICROgram(s)/formoterol 4.5 MICROgram(s) Inhaler 2 Puff(s) Inhalation two times a day  buMETAnide Injectable 2 milliGRAM(s) IV Push every 8 hours  chlorhexidine 4% Liquid 1 Application(s) Topical <User Schedule>  nystatin Powder 1 Application(s) Topical every 12 hours  sodium chloride 3%. 150 milliLiter(s) IV Continuous <Continuous>  sodium chloride 3%. 150 milliLiter(s) IV Continuous <Continuous>    PRN Meds  ALBUTerol  90 MICROgram(s) HFA Inhaler - Peds 2 Puff(s) Inhalation every 4 hours PRN      Case discussed with resident    Care discussed with pt/family

## 2021-07-10 NOTE — CONSULT NOTE ADULT - ASSESSMENT
ORTHOPEDIC SURGERY CONSULT    80y Male PMH CAD s/p PCI and stent 2007, COPD on 2L home O2, HF, HLD, atrial fibrillation on coumadin, hx of DVT, admitted for acute heart failure exacerbation w/ L knee pain for the past 2 days. No hx of trauma. Reports pain in the back of the knee.     Denies numbness/tingling.  Denies f/c/n/v/cp/sob.    Medications:  ALBUTerol  90 MICROgram(s) HFA Inhaler - Peds 2 Puff(s) Inhalation every 4 hours PRN  aspirin enteric coated 81 milliGRAM(s) Oral daily  atorvastatin 20 milliGRAM(s) Oral at bedtime  budesonide 160 MICROgram(s)/formoterol 4.5 MICROgram(s) Inhaler 2 Puff(s) Inhalation two times a day  buMETAnide Injectable 2 milliGRAM(s) IV Push every 8 hours  chlorhexidine 4% Liquid 1 Application(s) Topical <User Schedule>  nystatin Powder 1 Application(s) Topical every 12 hours  sodium chloride 3%. 150 milliLiter(s) IV Continuous <Continuous>  sodium chloride 3%. 150 milliLiter(s) IV Continuous <Continuous>    No Known Allergies      PMH/PSH:  COPD (chronic obstructive pulmonary disease)  Blood clot in vein  Hypertension  High cholesterol  Deep vein thrombosis (DVT) of left lower extremity, unspecified chronicity, unspecified vein  Cellulitis of left lower extremity  Chronic atrial fibrillation  Mitral valve insufficiency, unspecified etiology  CHF (congestive heart failure)  No significant past surgical history  S/P coronary artery stent placement  History of total right knee replacement    Exam:  T(C): 36.6 (07-10-21 @ 14:42), Max: 36.6 (07-10-21 @ 14:42)  HR: 54 (07-10-21 @ 14:42) (54 - 62)  BP: 111/53 (07-10-21 @ 14:42) (111/53 - 122/57)  RR: 18 (07-10-21 @ 14:42) (18 - 18)  SpO2: 96% (07-10-21 @ 08:00) (96% - 96%)  General: Awake, alert, NAD    LLE:   No gross deformity, TTP medial and lateral joint lines  Moderate effusion noted, no increased warmth compared to contralateral  AROM 0-45, limited by effusion  No pain with ROM  Palpable Baker's cyst  SILT s/s/sp/dp/t  Motor intact EHL, TA, Gastroc  Able to SLR   wwp     Remainder of MSK exam reveals no acute deformity or other areas of pain    Labs:                      8.5    4.06  )-----------( 111      ( 10 Jul 2021 06:20 )             27.8     07-10    140  |  100  |  25<H>  ----------------------------<  84  4.0   |  32  |  1.2    Ca    8.4<L>      10 Jul 2021 06:20  Mg     2.1     07-10    TPro  6.2  /  Alb  3.5  /  TBili  1.8<H>  /  DBili  x   /  AST  17  /  ALT  9   /  AlkPhos  122<H>  07-10    PT/INR - ( 10 Jul 2021 06:20 )   PT: >40.00 sec;   INR: 5.46 ratio         PTT - ( 10 Jul 2021 06:20 )  PTT:50.1 sec    Imaging: XR imaging demonstrates no acute fractures or dislocations  Mild-moderate OA noted     A/P:  80yMale with L knee pain and effusion, secondary to OA    - WBAT  - Pain control  - No acute orthopedic intervention at this time  - Discussed XR findings with patient  - When discharged, can follow up with Dr. Pulido at [facility] - [phone]

## 2021-07-10 NOTE — PROGRESS NOTE ADULT - ASSESSMENT
81 y/o male with PMH CAD s/p PCI and stent 2007, COPD on 2L home O2, HFpEF TTE in 4/21 EF 55-65, DLD, atrial fibrillation on coumadin presented with progressively worsening sob associated with increasing leg swelling for the last few days. Also C/O intermittent cp for the last one day.     Acute HFpEF  DORON  COPD on home O2  CAD S/P PCI in 2007  Chronic A-Fib with Slow Ventricular Response   NSVT              PLAN:    ·	Cont tele  ·	NSVT on tele and bradycardia. EP F/U noted. Planning pacemaker on Monday  ·	Negative balance is 300 cc only.  ·	Called HF specialist and discussed care with him. Switch him to Bumex 2 mg ivp q 8h and start him on hypertonic saline 150 cc twice a day  ·	CXR reviewed. B/L opacities and small pleural effusions.   ·	Check i's and o's and daily wt  ·	Low salt diet and water restriction to 1.5 L/D  ·	Monitor renal function. Improving Cr.   ·	CE x 2 are negative.   Cont Symbicort

## 2021-07-11 LAB
ALBUMIN SERPL ELPH-MCNC: 3.5 G/DL — SIGNIFICANT CHANGE UP (ref 3.5–5.2)
ALP SERPL-CCNC: 121 U/L — HIGH (ref 30–115)
ALT FLD-CCNC: 10 U/L — SIGNIFICANT CHANGE UP (ref 0–41)
ANION GAP SERPL CALC-SCNC: 9 MMOL/L — SIGNIFICANT CHANGE UP (ref 7–14)
ANION GAP SERPL CALC-SCNC: 9 MMOL/L — SIGNIFICANT CHANGE UP (ref 7–14)
APTT BLD: 51.4 SEC — HIGH (ref 27–39.2)
APTT BLD: 54.7 SEC — HIGH (ref 27–39.2)
AST SERPL-CCNC: 19 U/L — SIGNIFICANT CHANGE UP (ref 0–41)
BASOPHILS # BLD AUTO: 0.01 K/UL — SIGNIFICANT CHANGE UP (ref 0–0.2)
BASOPHILS NFR BLD AUTO: 0.2 % — SIGNIFICANT CHANGE UP (ref 0–1)
BILIRUB SERPL-MCNC: 1.9 MG/DL — HIGH (ref 0.2–1.2)
BUN SERPL-MCNC: 29 MG/DL — HIGH (ref 10–20)
BUN SERPL-MCNC: 31 MG/DL — HIGH (ref 10–20)
CALCIUM SERPL-MCNC: 8.2 MG/DL — LOW (ref 8.5–10.1)
CALCIUM SERPL-MCNC: 8.4 MG/DL — LOW (ref 8.5–10.1)
CHLORIDE SERPL-SCNC: 100 MMOL/L — SIGNIFICANT CHANGE UP (ref 98–110)
CHLORIDE SERPL-SCNC: 99 MMOL/L — SIGNIFICANT CHANGE UP (ref 98–110)
CO2 SERPL-SCNC: 31 MMOL/L — SIGNIFICANT CHANGE UP (ref 17–32)
CO2 SERPL-SCNC: 31 MMOL/L — SIGNIFICANT CHANGE UP (ref 17–32)
CREAT SERPL-MCNC: 1.3 MG/DL — SIGNIFICANT CHANGE UP (ref 0.7–1.5)
CREAT SERPL-MCNC: 1.4 MG/DL — SIGNIFICANT CHANGE UP (ref 0.7–1.5)
EOSINOPHIL # BLD AUTO: 0.07 K/UL — SIGNIFICANT CHANGE UP (ref 0–0.7)
EOSINOPHIL NFR BLD AUTO: 1.6 % — SIGNIFICANT CHANGE UP (ref 0–8)
GLUCOSE SERPL-MCNC: 80 MG/DL — SIGNIFICANT CHANGE UP (ref 70–99)
GLUCOSE SERPL-MCNC: 91 MG/DL — SIGNIFICANT CHANGE UP (ref 70–99)
HCT VFR BLD CALC: 29.3 % — LOW (ref 42–52)
HGB BLD-MCNC: 9.1 G/DL — LOW (ref 14–18)
IMM GRANULOCYTES NFR BLD AUTO: 0.2 % — SIGNIFICANT CHANGE UP (ref 0.1–0.3)
INR BLD: 4.78 RATIO — HIGH (ref 0.65–1.3)
INR BLD: 5.22 RATIO — CRITICAL HIGH (ref 0.65–1.3)
LYMPHOCYTES # BLD AUTO: 0.59 K/UL — LOW (ref 1.2–3.4)
LYMPHOCYTES # BLD AUTO: 13.3 % — LOW (ref 20.5–51.1)
MAGNESIUM SERPL-MCNC: 2.1 MG/DL — SIGNIFICANT CHANGE UP (ref 1.8–2.4)
MAGNESIUM SERPL-MCNC: 2.1 MG/DL — SIGNIFICANT CHANGE UP (ref 1.8–2.4)
MCHC RBC-ENTMCNC: 28.6 PG — SIGNIFICANT CHANGE UP (ref 27–31)
MCHC RBC-ENTMCNC: 31.1 G/DL — LOW (ref 32–37)
MCV RBC AUTO: 92.1 FL — SIGNIFICANT CHANGE UP (ref 80–94)
MONOCYTES # BLD AUTO: 0.72 K/UL — HIGH (ref 0.1–0.6)
MONOCYTES NFR BLD AUTO: 16.3 % — HIGH (ref 1.7–9.3)
NEUTROPHILS # BLD AUTO: 3.02 K/UL — SIGNIFICANT CHANGE UP (ref 1.4–6.5)
NEUTROPHILS NFR BLD AUTO: 68.4 % — SIGNIFICANT CHANGE UP (ref 42.2–75.2)
NRBC # BLD: 0 /100 WBCS — SIGNIFICANT CHANGE UP (ref 0–0)
PLATELET # BLD AUTO: 124 K/UL — LOW (ref 130–400)
POTASSIUM SERPL-MCNC: 4 MMOL/L — SIGNIFICANT CHANGE UP (ref 3.5–5)
POTASSIUM SERPL-MCNC: 4.3 MMOL/L — SIGNIFICANT CHANGE UP (ref 3.5–5)
POTASSIUM SERPL-SCNC: 4 MMOL/L — SIGNIFICANT CHANGE UP (ref 3.5–5)
POTASSIUM SERPL-SCNC: 4.3 MMOL/L — SIGNIFICANT CHANGE UP (ref 3.5–5)
PROT SERPL-MCNC: 6.5 G/DL — SIGNIFICANT CHANGE UP (ref 6–8)
PROTHROM AB SERPL-ACNC: >40 SEC — HIGH (ref 9.95–12.87)
PROTHROM AB SERPL-ACNC: >40 SEC — HIGH (ref 9.95–12.87)
RBC # BLD: 3.18 M/UL — LOW (ref 4.7–6.1)
RBC # FLD: 20.1 % — HIGH (ref 11.5–14.5)
SODIUM SERPL-SCNC: 139 MMOL/L — SIGNIFICANT CHANGE UP (ref 135–146)
SODIUM SERPL-SCNC: 140 MMOL/L — SIGNIFICANT CHANGE UP (ref 135–146)
WBC # BLD: 4.42 K/UL — LOW (ref 4.8–10.8)
WBC # FLD AUTO: 4.42 K/UL — LOW (ref 4.8–10.8)

## 2021-07-11 PROCEDURE — 99232 SBSQ HOSP IP/OBS MODERATE 35: CPT

## 2021-07-11 PROCEDURE — 99233 SBSQ HOSP IP/OBS HIGH 50: CPT

## 2021-07-11 PROCEDURE — 93306 TTE W/DOPPLER COMPLETE: CPT | Mod: 26

## 2021-07-11 RX ORDER — SODIUM CHLORIDE 5 G/100ML
150 INJECTION, SOLUTION INTRAVENOUS
Refills: 0 | Status: DISCONTINUED | OUTPATIENT
Start: 2021-07-13 | End: 2021-07-16

## 2021-07-11 RX ORDER — SODIUM CHLORIDE 5 G/100ML
150 INJECTION, SOLUTION INTRAVENOUS
Refills: 0 | Status: DISCONTINUED | OUTPATIENT
Start: 2021-07-14 | End: 2021-07-16

## 2021-07-11 RX ORDER — SODIUM CHLORIDE 5 G/100ML
150 INJECTION, SOLUTION INTRAVENOUS
Refills: 0 | Status: DISCONTINUED | OUTPATIENT
Start: 2021-07-12 | End: 2021-07-16

## 2021-07-11 RX ORDER — PHYTONADIONE (VIT K1) 5 MG
4 TABLET ORAL ONCE
Refills: 0 | Status: COMPLETED | OUTPATIENT
Start: 2021-07-11 | End: 2021-07-11

## 2021-07-11 RX ORDER — PHYTONADIONE (VIT K1) 5 MG
5 TABLET ORAL ONCE
Refills: 0 | Status: DISCONTINUED | OUTPATIENT
Start: 2021-07-11 | End: 2021-07-11

## 2021-07-11 RX ORDER — PHYTONADIONE (VIT K1) 5 MG
3 TABLET ORAL ONCE
Refills: 0 | Status: COMPLETED | OUTPATIENT
Start: 2021-07-11 | End: 2021-07-11

## 2021-07-11 RX ORDER — BUMETANIDE 0.25 MG/ML
2 INJECTION INTRAMUSCULAR; INTRAVENOUS ONCE
Refills: 0 | Status: COMPLETED | OUTPATIENT
Start: 2021-07-11 | End: 2021-07-11

## 2021-07-11 RX ORDER — BUMETANIDE 0.25 MG/ML
1 INJECTION INTRAMUSCULAR; INTRAVENOUS
Qty: 20 | Refills: 0 | Status: DISCONTINUED | OUTPATIENT
Start: 2021-07-11 | End: 2021-07-16

## 2021-07-11 RX ORDER — PHYTONADIONE (VIT K1) 5 MG
2.5 TABLET ORAL ONCE
Refills: 0 | Status: DISCONTINUED | OUTPATIENT
Start: 2021-07-11 | End: 2021-07-11

## 2021-07-11 RX ORDER — SODIUM CHLORIDE 5 G/100ML
150 INJECTION, SOLUTION INTRAVENOUS
Refills: 0 | Status: DISCONTINUED | OUTPATIENT
Start: 2021-07-11 | End: 2021-07-17

## 2021-07-11 RX ORDER — SODIUM CHLORIDE 5 G/100ML
150 INJECTION, SOLUTION INTRAVENOUS
Refills: 0 | Status: DISCONTINUED | OUTPATIENT
Start: 2021-07-11 | End: 2021-07-11

## 2021-07-11 RX ADMIN — ATORVASTATIN CALCIUM 20 MILLIGRAM(S): 80 TABLET, FILM COATED ORAL at 21:51

## 2021-07-11 RX ADMIN — SODIUM CHLORIDE 50 MILLILITER(S): 5 INJECTION, SOLUTION INTRAVENOUS at 05:41

## 2021-07-11 RX ADMIN — NYSTATIN CREAM 1 APPLICATION(S): 100000 CREAM TOPICAL at 17:36

## 2021-07-11 RX ADMIN — Medication 3 MILLIGRAM(S): at 10:42

## 2021-07-11 RX ADMIN — NYSTATIN CREAM 1 APPLICATION(S): 100000 CREAM TOPICAL at 05:41

## 2021-07-11 RX ADMIN — CHLORHEXIDINE GLUCONATE 1 APPLICATION(S): 213 SOLUTION TOPICAL at 05:42

## 2021-07-11 RX ADMIN — Medication 81 MILLIGRAM(S): at 12:25

## 2021-07-11 RX ADMIN — BUMETANIDE 2 MILLIGRAM(S): 0.25 INJECTION INTRAMUSCULAR; INTRAVENOUS at 12:45

## 2021-07-11 RX ADMIN — BUMETANIDE 2 MILLIGRAM(S): 0.25 INJECTION INTRAMUSCULAR; INTRAVENOUS at 05:41

## 2021-07-11 RX ADMIN — BUMETANIDE 5 MG/HR: 0.25 INJECTION INTRAMUSCULAR; INTRAVENOUS at 15:45

## 2021-07-11 RX ADMIN — Medication 4 MILLIGRAM(S): at 20:51

## 2021-07-11 RX ADMIN — BUDESONIDE AND FORMOTEROL FUMARATE DIHYDRATE 2 PUFF(S): 160; 4.5 AEROSOL RESPIRATORY (INHALATION) at 21:53

## 2021-07-11 RX ADMIN — SODIUM CHLORIDE 50 MILLILITER(S): 5 INJECTION, SOLUTION INTRAVENOUS at 18:01

## 2021-07-11 NOTE — PROGRESS NOTE ADULT - SUBJECTIVE AND OBJECTIVE BOX
24H events:    Patient is a 80y old Male who presents with a chief complaint of shortness of breath (10 Jul 2021 16:18)    Primary diagnosis of Bradycardia       Today is hospital day 4d. This morning patient was seen and examined at bedside, resting comfortably in bed.    no major event overnight  stable clinically    PAST MEDICAL & SURGICAL HISTORY  COPD (chronic obstructive pulmonary disease)    Hypertension    Deep vein thrombosis (DVT) of left lower extremity, unspecified chronicity, unspecified vein    Cellulitis of left lower extremity    Chronic atrial fibrillation    Mitral valve insufficiency, unspecified etiology  Moderate    CHF (congestive heart failure)    S/P coronary artery stent placement    History of total right knee replacement      SOCIAL HISTORY:  Social History:  lives at home with his wife and kids.  Ex smoker, 3-4 glasses of alcohol weekly, no illicit of drug abuse. (07 Jul 2021 14:33)      ALLERGIES:  No Known Allergies    MEDICATIONS:  STANDING MEDICATIONS  aspirin enteric coated 81 milliGRAM(s) Oral daily  atorvastatin 20 milliGRAM(s) Oral at bedtime  budesonide 160 MICROgram(s)/formoterol 4.5 MICROgram(s) Inhaler 2 Puff(s) Inhalation two times a day  buMETAnide Injectable 2 milliGRAM(s) IV Push every 8 hours  chlorhexidine 4% Liquid 1 Application(s) Topical <User Schedule>  nystatin Powder 1 Application(s) Topical every 12 hours  sodium chloride 3%. 150 milliLiter(s) IV Continuous <Continuous>  sodium chloride 3%. 150 milliLiter(s) IV Continuous <Continuous>    PRN MEDICATIONS  ALBUTerol  90 MICROgram(s) HFA Inhaler - Peds 2 Puff(s) Inhalation every 4 hours PRN    VITALS:   T(F): 96  HR: 55  BP: 115/57  RR: 18  SpO2: 96%    PHYSICAL EXAM:  GENERAL: NAD  NERVOUS SYSTEM:  Alert & Oriented X3, non focal   CHEST/LUNG: bilateral crackles  HEART: irregular rate and rhythm;   ABDOMEN: Soft, Nontender, Nondistended; Bowel sounds present  EXTREMITIES:   No clubbing, cyanosis, bilateral pitting edema R> L 2+  LABS:                        8.5    4.06  )-----------( 111      ( 10 Jul 2021 06:20 )             27.8     07-10    138  |  99  |  27<H>  ----------------------------<  95  4.1   |  34<H>  |  1.3    Ca    8.1<L>      10 Jul 2021 16:45  Mg     2.1     07-10    TPro  6.2  /  Alb  3.5  /  TBili  1.8<H>  /  DBili  x   /  AST  17  /  ALT  9   /  AlkPhos  122<H>  07-10    PT/INR - ( 10 Jul 2021 06:20 )   PT: >40.00 sec;   INR: 5.46 ratio         PTT - ( 10 Jul 2021 06:20 )  PTT:50.1 sec              RADIOLOGY:           24H events:    Patient is a 80y old Male who presents with a chief complaint of shortness of breath (10 Jul 2021 16:18)    Primary diagnosis of Bradycardia       Today is hospital day 4d. This morning patient was seen and examined at bedside, resting comfortably in bed.    no major event overnight  stable clinically  bradycardia with pvcs  selvin w nsvt on tele    PAST MEDICAL & SURGICAL HISTORY  COPD (chronic obstructive pulmonary disease)    Hypertension    Deep vein thrombosis (DVT) of left lower extremity, unspecified chronicity, unspecified vein    Cellulitis of left lower extremity    Chronic atrial fibrillation    Mitral valve insufficiency, unspecified etiology  Moderate    CHF (congestive heart failure)    S/P coronary artery stent placement    History of total right knee replacement      SOCIAL HISTORY:  Social History:  lives at home with his wife and kids.  Ex smoker, 3-4 glasses of alcohol weekly, no illicit of drug abuse. (07 Jul 2021 14:33)      ALLERGIES:  No Known Allergies    MEDICATIONS:  STANDING MEDICATIONS  aspirin enteric coated 81 milliGRAM(s) Oral daily  atorvastatin 20 milliGRAM(s) Oral at bedtime  budesonide 160 MICROgram(s)/formoterol 4.5 MICROgram(s) Inhaler 2 Puff(s) Inhalation two times a day  buMETAnide Injectable 2 milliGRAM(s) IV Push every 8 hours  chlorhexidine 4% Liquid 1 Application(s) Topical <User Schedule>  nystatin Powder 1 Application(s) Topical every 12 hours  sodium chloride 3%. 150 milliLiter(s) IV Continuous <Continuous>  sodium chloride 3%. 150 milliLiter(s) IV Continuous <Continuous>    PRN MEDICATIONS  ALBUTerol  90 MICROgram(s) HFA Inhaler - Peds 2 Puff(s) Inhalation every 4 hours PRN    VITALS:   T(F): 96  HR: 55  BP: 115/57  RR: 18  SpO2: 96%    PHYSICAL EXAM:  GENERAL: NAD  NERVOUS SYSTEM:  Alert & Oriented X3, non focal   CHEST/LUNG: bilateral crackles  HEART: irregular rate and rhythm;   ABDOMEN: Soft, Nontender, Nondistended; Bowel sounds present  EXTREMITIES:   No clubbing, cyanosis, bilateral pitting edema R> L 2+  LABS:                        8.5    4.06  )-----------( 111      ( 10 Jul 2021 06:20 )             27.8     07-10    138  |  99  |  27<H>  ----------------------------<  95  4.1   |  34<H>  |  1.3    Ca    8.1<L>      10 Jul 2021 16:45  Mg     2.1     07-10    TPro  6.2  /  Alb  3.5  /  TBili  1.8<H>  /  DBili  x   /  AST  17  /  ALT  9   /  AlkPhos  122<H>  07-10    PT/INR - ( 10 Jul 2021 06:20 )   PT: >40.00 sec;   INR: 5.46 ratio         PTT - ( 10 Jul 2021 06:20 )  PTT:50.1 sec              RADIOLOGY:

## 2021-07-11 NOTE — PROGRESS NOTE ADULT - SUBJECTIVE AND OBJECTIVE BOX
INTERVAL HPI/OVERNIGHT EVENTS:  still borderline selvin 50- low 60s  dyspnea improved, able to tolerate to lie flat   INR 5.22 this AM    MEDICATIONS  (STANDING):  aspirin enteric coated 81 milliGRAM(s) Oral daily  atorvastatin 20 milliGRAM(s) Oral at bedtime  budesonide 160 MICROgram(s)/formoterol 4.5 MICROgram(s) Inhaler 2 Puff(s) Inhalation two times a day  buMETAnide Injectable 2 milliGRAM(s) IV Push every 8 hours  chlorhexidine 4% Liquid 1 Application(s) Topical <User Schedule>  nystatin Powder 1 Application(s) Topical every 12 hours  sodium chloride 3%. 150 milliLiter(s) (50 mL/Hr) IV Continuous <Continuous>  sodium chloride 3%. 150 milliLiter(s) (50 mL/Hr) IV Continuous <Continuous>    MEDICATIONS  (PRN):  ALBUTerol  90 MICROgram(s) HFA Inhaler - Peds 2 Puff(s) Inhalation every 4 hours PRN Shortness of Breath and/or Wheezing      Allergies    No Known Allergies    Intolerances        REVIEW OF SYSTEMS    [x ] A ten-point review of systems was otherwise negative except as noted.  [ ] Due to altered mental status/intubation, subjective information were not able to be obtained from the patient. History was obtained, to the extent possible, from review of the chart and collateral sources of information.      Vital Signs Last 24 Hrs  T(C): 35.8 (11 Jul 2021 05:09), Max: 36.6 (10 Jul 2021 14:42)  T(F): 96.5 (11 Jul 2021 05:09), Max: 97.8 (10 Jul 2021 14:42)  HR: 65 (11 Jul 2021 05:09) (54 - 65)  BP: 100/67 (11 Jul 2021 05:09) (100/67 - 115/57)  BP(mean): --  RR: 18 (11 Jul 2021 05:09) (18 - 18)  SpO2: 97% (11 Jul 2021 05:09) (96% - 97%)    07-10-21 @ 07:01  -  07-11-21 @ 07:00  --------------------------------------------------------  IN: 1380 mL / OUT: 1350 mL / NET: 30 mL    07-11-21 @ 07:01  -  07-11-21 @ 12:03  --------------------------------------------------------  IN: 420 mL / OUT: 440 mL / NET: -20 mL        PHYSICAL EXAM:    GENERAL: In no apparent distress, well nourished, and hydrated.  HEART: IRRegular rate and rhythm; No murmur; NO rubs, or gallops.  PULMONARY: bibasilar rales.  Normal expansion/effort. No wheezing, or rhonchi bilaterally.  ABDOMEN: Soft, Nontender, Nondistended; Bowel sounds present  EXTREMITIES:  Extremities warm, pink, well-perfused, 2+ Peripheral Pulses, No clubbing, cyanosis, or edema  NEUROLOGICAL: alert & oriented x 3, no focal deficits, PERRLA, EOMI    LABS:                        9.1    4.42  )-----------( 124      ( 11 Jul 2021 04:30 )             29.3     07-11    140  |  100  |  29<H>  ----------------------------<  80  4.3   |  31  |  1.3    Ca    8.4<L>      11 Jul 2021 04:30  Mg     2.1     07-11    TPro  6.5  /  Alb  3.5  /  TBili  1.9<H>  /  DBili  x   /  AST  19  /  ALT  10  /  AlkPhos  121<H>  07-11    PT/INR - ( 11 Jul 2021 04:30 )   PT: >40.00 sec;   INR: 5.22 ratio         PTT - ( 11 Jul 2021 04:30 )  PTT:54.7 sec    COVID-19 PCR: NotDetec: (07.10.21 @ 15:11)          RADIOLOGY & ADDITIONAL TESTS:    < from: Xray Chest 1 View-PORTABLE IMMEDIATE (Xray Chest 1 View-PORTABLE IMMEDIATE .) (07.10.21 @ 11:06) >  Findings:    Technique/Positioning:  Frontal portable radiograph of the chest.    Support devices:  none    Cardiac/mediastinum/hilum: Cardiomegaly, unchanged    Lung parenchyma/ Pleura: Stable bilateral parenchymal opacities with small effusions. No pneumothorax      Skeleton/soft tissues: Stable      Impression:    Cardiomegaly, unchanged    Stable bilateral parenchymal opacities with small effusions. No pneumothorax    < end of copied text >

## 2021-07-11 NOTE — PROGRESS NOTE ADULT - ASSESSMENT
Cardiologist: Dr Arnulfo Scherer    81 y/o male with PMH CAD s/p PCI and stent 2007, COPD on 2L home O2, HFpEF TTE in 4/21 EF 55-65, DLD, atrial fibrillation wit slow VR on coumadin presents for shortness of breath and increase in his NADIA along with 1 day history of chest pain.    Acute on Chronic HFpEF  Chronic AF (coumadin) with Slow Ventricular Response (50-60s) with fatigue and dyspnea with CHADS VASc of at least 4 (CHF, Age > 75, CAD)  NSVT on tele  CAD sp PCI  COPD on 2L O2  HLD      Recommend leadless PM for slow ventricular rate. Would also benefit from BB given NSVT - use is limited by bradycardia  elevated INR yesterday and today, Vit K3mg given, recheck INR in afternoon and AM  for PPM tomorrow if INR 2-3  NPO after MN  awaiting anesthesia preop eval  Avoid AVN blocking agents  Monitor electrolytes, maintain WNL  Cont diuresis, sticked I/O, daily weights  limit PO intake   Will follow

## 2021-07-11 NOTE — PROGRESS NOTE ADULT - SUBJECTIVE AND OBJECTIVE BOX
RODNEY SANTO  80y Male    CHIEF COMPLAINT:    Patient is a 80y old  Male who presents with a chief complaint of shortness of breath (2021 12:02)      INTERVAL HPI/OVERNIGHT EVENTS:    Patient seen and examined. Still having sob and is fluid overload. In positive balance.     ROS: All other systems are negative.    Vital Signs:    T(F): 96.5 (21 @ 05:09), Max: 97.8 (07-10-21 @ 14:42)  HR: 65 (21 @ 05:09) (54 - 65)  BP: 100/67 (21 @ 05:09) (100/67 - 115/57)  RR: 18 (21 @ 05:09) (18 - 18)  SpO2: 97% (21 @ 05:09) (96% - 97%)  I&O's Summary    10 Jul 2021 07:  -  2021 07:00  --------------------------------------------------------  IN: 1380 mL / OUT: 1350 mL / NET: 30 mL    2021 07:01  -  2021 12:51  --------------------------------------------------------  IN: 420 mL / OUT: 440 mL / NET: -20 mL      Daily     Daily Weight in k (2021 05:09)  CAPILLARY BLOOD GLUCOSE          PHYSICAL EXAM:    GENERAL:  NAD  SKIN: No rashes or lesions  HENT: Atraumatic. Normocephalic. PERRL. Moist membranes.  NECK: Supple, No JVD. No lymphadenopathy.  PULMONARY: Decreased BS in the lower chest post B/L. No wheezing. No rales  CVS: Normal S1, S2. Rate and Rhythm are regular. No murmurs.  ABDOMEN/GI: Soft, Nontender, Nondistended; BS present  EXTREMITIES: Peripheral pulses intact. 2+ pitting edema B/L LE.  NEUROLOGIC:  No motor or sensory deficit.  PSYCH: Alert & oriented x 3    Consultant(s) Notes Reviewed:  [x ] YES  [ ] NO  Care Discussed with Consultants/Other Providers [ x] YES  [ ] NO    EKG reviewed  Telemetry reviewed    LABS:                        9.1    4.42  )-----------( 124      ( 2021 04:30 )             29.3     07-11    140  |  100  |  29<H>  ----------------------------<  80  4.3   |  31  |  1.3    Ca    8.4<L>      2021 04:30  Mg     2.1     -11    TPro  6.5  /  Alb  3.5  /  TBili  1.9<H>  /  DBili  x   /  AST  19  /  ALT  10  /  AlkPhos  121<H>  0711    PT/INR - ( 2021 04:30 )   PT: >40.00 sec;   INR: 5.22 ratio         PTT - ( 2021 04:30 )  PTT:54.7 sec  Serum Pro-Brain Natriuretic Peptide: 19693 pg/mL (21 @ 12:40)          RADIOLOGY & ADDITIONAL TESTS:      Imaging or report Personally Reviewed:  [ ] YES  [ ] NO    Medications:  Standing  aspirin enteric coated 81 milliGRAM(s) Oral daily  atorvastatin 20 milliGRAM(s) Oral at bedtime  budesonide 160 MICROgram(s)/formoterol 4.5 MICROgram(s) Inhaler 2 Puff(s) Inhalation two times a day  buMETAnide Infusion 1 mG/Hr IV Continuous <Continuous>  chlorhexidine 4% Liquid 1 Application(s) Topical <User Schedule>  metolazone 2.5 milliGRAM(s) Oral daily  metolazone 2.5 milliGRAM(s) Oral at bedtime  nystatin Powder 1 Application(s) Topical every 12 hours  sodium chloride 3%. 150 milliLiter(s) IV Continuous <Continuous>  sodium chloride 3%. 150 milliLiter(s) IV Continuous <Continuous>    PRN Meds  ALBUTerol  90 MICROgram(s) HFA Inhaler - Peds 2 Puff(s) Inhalation every 4 hours PRN      Case discussed with resident    Care discussed with pt/family

## 2021-07-11 NOTE — PROGRESS NOTE ADULT - ASSESSMENT
81 y/o male with PMH CAD s/p PCI and stent 2007, COPD on 2L home O2, HFpEF TTE in 4/21 EF 55-65, DLD, atrial fibrillation on coumadin presents for shortness of breath and increase in his NADIA along with 1 day history of chest pain.    # HFpEF exacerbation  # Afib SVR # Bradycardia # NSVT  # CAD s/p stent 2007  # Severe Pulmonary HTN    - CXR b/l pleural effusions and vascular congestion  - TTE 4/21 EF 55-65 PSAP 70 mmhg; get new TTE  - BNP 14K  - ECG afib in SVR  - Tele events : Bradycardia ; NSVT 4 beats  - troponin x2 negative  - bumex 2 mg q 8  - Cw ASA 81 md daily  - Cw atorvastatin 20 mg daily  -  lipid panel WNL except HDL 39 and A1c 5.6  - iron profile WNL  - daily weight, strict I/Os keep I < O  - DASH TLC diet  fluid restriction 1000 ml daily  - continue tele monitoring  - EP on board : PPM on monday  - Avoid AVN blockers  - INR supra therapeutic hold coumadin; FU daily INR resume coumadin when inr 2-3  - Cardiology Cs doctor Niesha; Heart failure team consult  - TSH 4.34  - keep K > 4 and Mg > 2.1  - NC 2 LPM; HOB > 35 titrate oxygen keep saO2 89-94%  - arterial duplex of LE normal flow    # DORON on CKD II    - creatinine on admission 1.5 baseline 1.1  - Most likely secondary to cardio renal syndrome  - bumex 2 mg q 8  - hypertonic saline  - Hold entresto for now    # Normocytic anemia- chronic  # leukopenia    - at baseline  - Iron studies WNL   - FU folate b12 and TSH  - retic count WNL  - Stool sample for guaiac  - needs OP colonoscopy  - HIv screening negative  - active type and screen keep Hb > 7    # COPD not in exacerbation  # PSAP 70 mmhg    - on  2 LPM NC at home during night   - c/w symbicort and ventolin as needed  - CXR bilateral opacities basilar; underlying neoplasm not excluded  - chest CT    Interlobular septal thickening. Right lung groundglass opacities and bilateral lower lung predominant consolidation/atelectasis. Small right and moderate left pleural effusions. Findings compatible with pulmonary edema.    No definite mass is identified. However partial obscuration of the bilateral lower lobes as described as well as incomplete evaluation of the bilateral ronel on noncontrast CT chest. Follow-up is recommended clinically warranted.      - D-dimer 342    # HO DVT    - Chronic deep venous thrombosis right common femoral, femoral and   popliteal veins 2018 seen on repeated US  - coumadin on hold INR > 3  - SQLs bilateral        # DVT ppx: coumadin on hold  # DIET: DASH TLC  fluid restriction  # ACTIVITY: IAT  # Full code   81 y/o male with PMH CAD s/p PCI and stent 2007, COPD on 2L home O2, HFpEF TTE in 4/21 EF 55-65, DLD, atrial fibrillation on coumadin presents for shortness of breath and increase in his NADIA along with 1 day history of chest pain.    # HFpEF exacerbation  # Afib SVR # Bradycardia # NSVT  # CAD s/p stent 2007  # Severe Pulmonary HTN    - CXR b/l pleural effusions and vascular congestion  - TTE 4/21 EF 55-65 PSAP 70 mmhg; get new TTE  - BNP 14K  - ECG afib in SVR  - Tele events : Bradycardia ; NSVT last 24 hrs  - troponin x2 negative  - bumex 2 mg q 8  - hypertonic saline  - Cw ASA 81 md daily  - Cw atorvastatin 20 mg daily  -  lipid panel WNL except HDL 39 and A1c 5.6  - iron profile WNL  - daily weight, strict I/Os keep I < O  - DASH TLC diet  fluid restriction 1000 ml daily  - continue tele monitoring  - EP on board : PPM on monday  - Avoid AVN blockers  - INR supra therapeutic hold coumadin; FU daily INR resume coumadin when inr 2-3  - Cardiology Cs doctor Niesha; Heart failure team consult  - TSH 4.34  - keep K > 4 and Mg > 2.1  - NC 2 LPM; HOB > 35 titrate oxygen keep saO2 89-94%  - arterial duplex of LE normal flow    # DORON on CKD II    - creatinine on admission 1.5 baseline 1.1  - Most likely secondary to cardio renal syndrome  - Hold entresto for now    # Normocytic anemia- chronic  # leukopenia    - at baseline  - Iron studies WNL   - FU folate b12 and TSH  - retic count WNL  - Stool sample for guaiac  - needs OP colonoscopy  - HIv screening negative  - active type and screen keep Hb > 7    # COPD not in exacerbation  # PSAP 70 mmhg    - on  2 LPM NC at home during night   - c/w symbicort and ventolin as needed  - CXR bilateral opacities basilar; underlying neoplasm not excluded  - chest CT    Interlobular septal thickening. Right lung groundglass opacities and bilateral lower lung predominant consolidation/atelectasis. Small right and moderate left pleural effusions. Findings compatible with pulmonary edema.    No definite mass is identified. However partial obscuration of the bilateral lower lobes as described as well as incomplete evaluation of the bilateral ronel on noncontrast CT chest. Follow-up is recommended clinically warranted.      - D-dimer 342    # HO DVT    - Chronic deep venous thrombosis right common femoral, femoral and   popliteal veins 2018 seen on repeated US  - coumadin on hold INR > 3  - SQLs bilateral        # DVT ppx: coumadin on hold  # DIET: DASH TLC  fluid restriction  # ACTIVITY: IAT  # Full code

## 2021-07-11 NOTE — PROGRESS NOTE ADULT - ASSESSMENT
79 y/o male with PMH CAD s/p PCI and stent 2007, COPD on 2L home O2, HFpEF TTE in 4/21 EF 55-65, DLD, atrial fibrillation on coumadin presented with progressively worsening sob associated with increasing leg swelling for the last few days. Also C/O intermittent cp for the last one day.     Acute HFpEF  DORON  COPD on home O2  CAD S/P PCI in 2007  Chronic A-Fib with Slow Ventricular Response   NSVT              PLAN:    ·	Cont tele  ·	In positive balance. Called HF specialist and discussed care with him. Recommended to give him Bumex 2 mg ivp x 1 dose and then start him on Bumex infusion 1 mg/hr. Also start him on Metolazone 2.5 mg po q 12h. Give before hypertonic saline.   ·	NSVT on tele and bradycardia. EP F/U noted. Planning pacemaker on Monday if the INR goes down  ·	INR still elevated. Vit K 3 mg po given. Follow INR  ·	CXR reviewed. B/L opacities and small pleural effusions.   ·	Check i's and o's and daily wt  ·	Low salt diet and water restriction to 1.5 L/D  ·	Monitor renal function. Improving Cr.   ·	CE x 2 are negative.   ·	Cont Symbicort    Progress Note Handoff    Pending (specify):  Consults_________, Tests________, Test Results_______, Other__On Bumex infusion. Pacemaker on Mon______  Family discussion:  Disposition: Home___/SNF___/Other________/Unknown at this time________    Kyle Pritchett MD  Spectra: [phone]

## 2021-07-12 LAB
ALBUMIN SERPL ELPH-MCNC: 3.5 G/DL — SIGNIFICANT CHANGE UP (ref 3.5–5.2)
ALP SERPL-CCNC: 115 U/L — SIGNIFICANT CHANGE UP (ref 30–115)
ALT FLD-CCNC: 9 U/L — SIGNIFICANT CHANGE UP (ref 0–41)
ANION GAP SERPL CALC-SCNC: 7 MMOL/L — SIGNIFICANT CHANGE UP (ref 7–14)
ANION GAP SERPL CALC-SCNC: 9 MMOL/L — SIGNIFICANT CHANGE UP (ref 7–14)
APTT BLD: 41.3 SEC — HIGH (ref 27–39.2)
AST SERPL-CCNC: 18 U/L — SIGNIFICANT CHANGE UP (ref 0–41)
BASOPHILS # BLD AUTO: 0.01 K/UL — SIGNIFICANT CHANGE UP (ref 0–0.2)
BASOPHILS NFR BLD AUTO: 0.2 % — SIGNIFICANT CHANGE UP (ref 0–1)
BILIRUB DIRECT SERPL-MCNC: 1.2 MG/DL — HIGH (ref 0–0.2)
BILIRUB INDIRECT FLD-MCNC: 0.9 MG/DL — SIGNIFICANT CHANGE UP (ref 0.2–1.2)
BILIRUB SERPL-MCNC: 2.1 MG/DL — HIGH (ref 0.2–1.2)
BUN SERPL-MCNC: 30 MG/DL — HIGH (ref 10–20)
BUN SERPL-MCNC: 31 MG/DL — HIGH (ref 10–20)
CALCIUM SERPL-MCNC: 8.6 MG/DL — SIGNIFICANT CHANGE UP (ref 8.5–10.1)
CALCIUM SERPL-MCNC: 9.1 MG/DL — SIGNIFICANT CHANGE UP (ref 8.5–10.1)
CHLORIDE SERPL-SCNC: 94 MMOL/L — LOW (ref 98–110)
CHLORIDE SERPL-SCNC: 97 MMOL/L — LOW (ref 98–110)
CO2 SERPL-SCNC: 32 MMOL/L — SIGNIFICANT CHANGE UP (ref 17–32)
CO2 SERPL-SCNC: 37 MMOL/L — HIGH (ref 17–32)
CREAT SERPL-MCNC: 1.3 MG/DL — SIGNIFICANT CHANGE UP (ref 0.7–1.5)
CREAT SERPL-MCNC: 1.3 MG/DL — SIGNIFICANT CHANGE UP (ref 0.7–1.5)
EOSINOPHIL # BLD AUTO: 0.07 K/UL — SIGNIFICANT CHANGE UP (ref 0–0.7)
EOSINOPHIL NFR BLD AUTO: 1.6 % — SIGNIFICANT CHANGE UP (ref 0–8)
GLUCOSE SERPL-MCNC: 117 MG/DL — HIGH (ref 70–99)
GLUCOSE SERPL-MCNC: 86 MG/DL — SIGNIFICANT CHANGE UP (ref 70–99)
HCT VFR BLD CALC: 26.8 % — LOW (ref 42–52)
HGB BLD-MCNC: 8.3 G/DL — LOW (ref 14–18)
IMM GRANULOCYTES NFR BLD AUTO: 0.2 % — SIGNIFICANT CHANGE UP (ref 0.1–0.3)
INR BLD: 2.5 RATIO — HIGH (ref 0.65–1.3)
LYMPHOCYTES # BLD AUTO: 0.6 K/UL — LOW (ref 1.2–3.4)
LYMPHOCYTES # BLD AUTO: 13.8 % — LOW (ref 20.5–51.1)
MAGNESIUM SERPL-MCNC: 2 MG/DL — SIGNIFICANT CHANGE UP (ref 1.8–2.4)
MAGNESIUM SERPL-MCNC: 2.3 MG/DL — SIGNIFICANT CHANGE UP (ref 1.8–2.4)
MCHC RBC-ENTMCNC: 27.5 PG — SIGNIFICANT CHANGE UP (ref 27–31)
MCHC RBC-ENTMCNC: 31 G/DL — LOW (ref 32–37)
MCV RBC AUTO: 88.7 FL — SIGNIFICANT CHANGE UP (ref 80–94)
MONOCYTES # BLD AUTO: 0.67 K/UL — HIGH (ref 0.1–0.6)
MONOCYTES NFR BLD AUTO: 15.4 % — HIGH (ref 1.7–9.3)
NEUTROPHILS # BLD AUTO: 3 K/UL — SIGNIFICANT CHANGE UP (ref 1.4–6.5)
NEUTROPHILS NFR BLD AUTO: 68.8 % — SIGNIFICANT CHANGE UP (ref 42.2–75.2)
NRBC # BLD: 0 /100 WBCS — SIGNIFICANT CHANGE UP (ref 0–0)
PLATELET # BLD AUTO: 122 K/UL — LOW (ref 130–400)
POTASSIUM SERPL-MCNC: 3.9 MMOL/L — SIGNIFICANT CHANGE UP (ref 3.5–5)
POTASSIUM SERPL-MCNC: 4.5 MMOL/L — SIGNIFICANT CHANGE UP (ref 3.5–5)
POTASSIUM SERPL-SCNC: 3.9 MMOL/L — SIGNIFICANT CHANGE UP (ref 3.5–5)
POTASSIUM SERPL-SCNC: 4.5 MMOL/L — SIGNIFICANT CHANGE UP (ref 3.5–5)
PROT SERPL-MCNC: 6.3 G/DL — SIGNIFICANT CHANGE UP (ref 6–8)
PROTHROM AB SERPL-ACNC: 28.8 SEC — HIGH (ref 9.95–12.87)
RBC # BLD: 3.02 M/UL — LOW (ref 4.7–6.1)
RBC # FLD: 20.1 % — HIGH (ref 11.5–14.5)
SODIUM SERPL-SCNC: 138 MMOL/L — SIGNIFICANT CHANGE UP (ref 135–146)
SODIUM SERPL-SCNC: 138 MMOL/L — SIGNIFICANT CHANGE UP (ref 135–146)
WBC # BLD: 4.36 K/UL — LOW (ref 4.8–10.8)
WBC # FLD AUTO: 4.36 K/UL — LOW (ref 4.8–10.8)

## 2021-07-12 PROCEDURE — 99233 SBSQ HOSP IP/OBS HIGH 50: CPT

## 2021-07-12 PROCEDURE — 99232 SBSQ HOSP IP/OBS MODERATE 35: CPT | Mod: 25

## 2021-07-12 PROCEDURE — 33285 INSJ SUBQ CAR RHYTHM MNTR: CPT

## 2021-07-12 PROCEDURE — 93010 ELECTROCARDIOGRAM REPORT: CPT

## 2021-07-12 RX ORDER — MAGNESIUM SULFATE 500 MG/ML
2 VIAL (ML) INJECTION ONCE
Refills: 0 | Status: COMPLETED | OUTPATIENT
Start: 2021-07-12 | End: 2021-07-12

## 2021-07-12 RX ORDER — POTASSIUM CHLORIDE 20 MEQ
40 PACKET (EA) ORAL ONCE
Refills: 0 | Status: COMPLETED | OUTPATIENT
Start: 2021-07-12 | End: 2021-07-12

## 2021-07-12 RX ORDER — CEPHALEXIN 500 MG
500 CAPSULE ORAL ONCE
Refills: 0 | Status: COMPLETED | OUTPATIENT
Start: 2021-07-12 | End: 2021-07-12

## 2021-07-12 RX ORDER — CYCLOBENZAPRINE HYDROCHLORIDE 10 MG/1
5 TABLET, FILM COATED ORAL ONCE
Refills: 0 | Status: COMPLETED | OUTPATIENT
Start: 2021-07-12 | End: 2021-07-12

## 2021-07-12 RX ORDER — POTASSIUM CHLORIDE 20 MEQ
20 PACKET (EA) ORAL ONCE
Refills: 0 | Status: COMPLETED | OUTPATIENT
Start: 2021-07-12 | End: 2021-07-12

## 2021-07-12 RX ORDER — SPIRONOLACTONE 25 MG/1
25 TABLET, FILM COATED ORAL DAILY
Refills: 0 | Status: DISCONTINUED | OUTPATIENT
Start: 2021-07-12 | End: 2021-07-13

## 2021-07-12 RX ORDER — WARFARIN SODIUM 2.5 MG/1
2 TABLET ORAL ONCE
Refills: 0 | Status: COMPLETED | OUTPATIENT
Start: 2021-07-12 | End: 2021-07-12

## 2021-07-12 RX ORDER — OXYCODONE AND ACETAMINOPHEN 5; 325 MG/1; MG/1
1 TABLET ORAL ONCE
Refills: 0 | Status: DISCONTINUED | OUTPATIENT
Start: 2021-07-12 | End: 2021-07-12

## 2021-07-12 RX ADMIN — Medication 500 MILLIGRAM(S): at 15:43

## 2021-07-12 RX ADMIN — SODIUM CHLORIDE 50 MILLILITER(S): 5 INJECTION, SOLUTION INTRAVENOUS at 06:01

## 2021-07-12 RX ADMIN — BUDESONIDE AND FORMOTEROL FUMARATE DIHYDRATE 2 PUFF(S): 160; 4.5 AEROSOL RESPIRATORY (INHALATION) at 21:27

## 2021-07-12 RX ADMIN — CYCLOBENZAPRINE HYDROCHLORIDE 5 MILLIGRAM(S): 10 TABLET, FILM COATED ORAL at 21:27

## 2021-07-12 RX ADMIN — OXYCODONE AND ACETAMINOPHEN 1 TABLET(S): 5; 325 TABLET ORAL at 05:17

## 2021-07-12 RX ADMIN — OXYCODONE AND ACETAMINOPHEN 1 TABLET(S): 5; 325 TABLET ORAL at 05:47

## 2021-07-12 RX ADMIN — Medication 50 GRAM(S): at 14:30

## 2021-07-12 RX ADMIN — Medication 20 MILLIEQUIVALENT(S): at 10:34

## 2021-07-12 RX ADMIN — ATORVASTATIN CALCIUM 20 MILLIGRAM(S): 80 TABLET, FILM COATED ORAL at 21:27

## 2021-07-12 RX ADMIN — SODIUM CHLORIDE 50 MILLILITER(S): 5 INJECTION, SOLUTION INTRAVENOUS at 19:25

## 2021-07-12 RX ADMIN — WARFARIN SODIUM 2 MILLIGRAM(S): 2.5 TABLET ORAL at 21:27

## 2021-07-12 RX ADMIN — NYSTATIN CREAM 1 APPLICATION(S): 100000 CREAM TOPICAL at 05:18

## 2021-07-12 RX ADMIN — SPIRONOLACTONE 25 MILLIGRAM(S): 25 TABLET, FILM COATED ORAL at 14:37

## 2021-07-12 RX ADMIN — Medication 40 MILLIEQUIVALENT(S): at 14:37

## 2021-07-12 RX ADMIN — CHLORHEXIDINE GLUCONATE 1 APPLICATION(S): 213 SOLUTION TOPICAL at 05:19

## 2021-07-12 RX ADMIN — BUDESONIDE AND FORMOTEROL FUMARATE DIHYDRATE 2 PUFF(S): 160; 4.5 AEROSOL RESPIRATORY (INHALATION) at 08:08

## 2021-07-12 RX ADMIN — Medication 81 MILLIGRAM(S): at 12:12

## 2021-07-12 RX ADMIN — NYSTATIN CREAM 1 APPLICATION(S): 100000 CREAM TOPICAL at 18:11

## 2021-07-12 NOTE — PROGRESS NOTE ADULT - SUBJECTIVE AND OBJECTIVE BOX
Electrophysiology Brief Post-Op Note    I have personally seen and examined the patient.  I agree with the history and physical which I have reviewed and noted any changes below.  07-12-21 @ 16:00    PRE-OP DIAGNOSIS: Bradycardia    POST-OP DIAGNOSIS: Bradycardia    PROCEDURE: Loop Implant    Physician: Dr. Colon  Assistant: DELISA Gilmore    ESTIMATED BLOOD LOSS:  2    mL    ANESTHESIA TYPE:  [  ]General Anesthesia  [  ] Sedation  [X  ] Local/Regional    CONDITION  [  ] Critical  [  ] Serious  [  ]Fair  [ X ]Good    SPECIMENS REMOVED (IF APPLICABLE):  none    IMPLANTS (IF APPLICABLE)  Loop Recorder (Medtronic)    FINDINGS  PLAN OF CARE  - F/U 3-4 weeks  - May remove bandaid tomorrow  - May shower in 48 hours

## 2021-07-12 NOTE — CHART NOTE - NSCHARTNOTEFT_GEN_A_CORE
EP PROCEDURE NOTE    Date of Procedure: 07-12-21  EP Attending: Dr. Colon  Assistant: DELISA Gilmore  Referring Physician: Dr. Arnulfo Scherer  Procedure: Loop Recorder Implant    Indication: 80y Male with history of atrial fibrillation with slow ventricular response referred for implantable loop recorder.     Anesthesia: Local    EQUIPMENT IMPLANTED  : Medtronic Linq  Model Number: [id number]  Serial Number: [id number]    PROCEDURE DESCRIPTION  The patient was brought to the electrophysiology procedure area in a non-sedated state and received preoperative antibiotics. Informed, written consent was obtained prior to the procedure. The left anterior chest region was prepped and cleaned from the nipple to the upper left clavicular border with serial applications of Chlorhexidine. Patient was then covered with sterile drapes in the usual manner. Blood pressure, oxygenation, and level of comfort were stable throughout.     Following infiltration with local anesthetic, a 1-cm incision was made along the left sternal border at the fourth intercostal space. Using the tunneling device the implantable loop recorder was inserted into the subcutaneous tissue. Direct pressure was applied to the wound to obtain hemostasis. The wound was approximated with Dermabond. Steri-strips and a dry, sterile dressing were placed over the wound. R waves were noted to be 0.64 mV.     The patient tolerated the procedure well.     COMPLICATIONS  No immediate complications    CONCLUSIONS  Successful loop recorder implant    EBL: 2 cc EP PROCEDURE NOTE    Date of Procedure: 07-12-21  EP Attending: Dr. Colon  Assistant: DELISA Gilmore  Referring Physician: Dr. Arnulfo Scherer  Procedure: Loop Recorder Implant    Indication: 80y Male with history of atrial fibrillation with slow ventricular response referred for implantable loop recorder.     Anesthesia: Local    EQUIPMENT IMPLANTED  : Medtronic Linq  Model Number: [id number]  Serial Number: [id number]    PROCEDURE DESCRIPTION  The patient was brought to the electrophysiology procedure area in a non-sedated state and received preoperative antibiotics. Informed, written consent was obtained prior to the procedure. The left anterior chest region was prepped and cleaned from the nipple to the upper left clavicular border with serial applications of Chlorhexidine. Patient was then covered with sterile drapes in the usual manner. Blood pressure, oxygenation, and level of comfort were stable throughout.     Following infiltration with local anesthetic, a 1-cm incision was made along the left sternal border at the fourth intercostal space. Using the tunneling device the implantable loop recorder was inserted into the subcutaneous tissue. Direct pressure was applied to the wound to obtain hemostasis. The wound was approximated with Dermabond. Steri-strips and a dry, sterile dressing were placed over the wound. R waves were noted to be 0.64 mV.     The patient tolerated the procedure well.     COMPLICATIONS  No immediate complications    CONCLUSIONS  Successful loop recorder implant    EBL: 2 cc    Attending Attestation  I was physically present for the key portion of the evaluation and management service provide.

## 2021-07-12 NOTE — PROGRESS NOTE ADULT - ASSESSMENT
81 y/o male with PMH CAD s/p PCI and stent 2007, COPD on 2L home O2, HFpEF TTE in 4/21 EF 55-65, DLD, atrial fibrillation on coumadin presents for shortness of breath and increase in his NADIA along with 1 day history of chest pain.    # HFpEF exacerbation  # Afib SVR # Bradycardia # NSVT  # CAD s/p stent 2007  # Severe Pulmonary HTN    - CXR b/l pleural effusions and vascular congestion  - TTE 4/21 EF 55-65 PSAP 70 mmhg; get new TTE  - BNP 14K  - ECG afib in SVR  - Tele events ; NSVT last 24 hrs  - troponin negative   - bumex 20 mg IV 1mg/hr   -Spirinolactone 25 mg daily   -Metalozone 2.5 mg daily   - hypertonic saline  - Cw ASA 81 md daily  - Cw atorvastatin 20 mg daily  -  lipid panel WNL except HDL 39 and A1c 5.6  - iron profile WNL  - daily weight, strict I/Os keep I < O  - DASH TLC diet  fluid restriction 1000 ml daily  - continue tele monitoring  - EP on board : scheduled for loop recorder   - Avoid AVN blockers  -  FU daily INR resume coumadin when inr 2-3  - Cardiology Cs doctor Niesha; Heart failure team consult  - TSH 4.34  - keep K > 4 and Mg > 2.1  - NC 2 LPM; HOB > 35 titrate oxygen keep saO2 89-94%  - arterial duplex of LE normal flow    # DORON on CKD II    - creatinine on admission 1.5 baseline 1.1  - Most likely secondary to cardio renal syndrome  - Hold entresto for now    # Normocytic anemia- chronic  # leukopenia    - at baseline  - Iron studies WNL   - FU folate b12 and TSH  - retic count WNL  - Stool sample for guaiac  - needs OP colonoscopy  - HIv screening negative  - active type and screen keep Hb > 7    # COPD not in exacerbation  # PSAP 70 mmhg    - on  2 LPM NC at home during night   - c/w symbicort and ventolin as needed  - CXR bilateral opacities basilar; underlying neoplasm not excluded  - chest CT    Interlobular septal thickening. Right lung groundglass opacities and bilateral lower lung predominant consolidation/atelectasis. Small right and moderate left pleural effusions. Findings compatible with pulmonary edema.    No definite mass is identified. However partial obscuration of the bilateral lower lobes as described as well as incomplete evaluation of the bilateral ronel on noncontrast CT chest. Follow-up is recommended clinically warranted.      - D-dimer 342    # HO DVT    - Chronic deep venous thrombosis right common femoral, femoral and   popliteal veins 2018 seen on repeated US  - coumadin on hold INR > 3  - SQLs bilateral        # DVT ppx: coumadin on hold  # DIET: DASH TLC  fluid restriction  # ACTIVITY: IAT  # Full code   81 y/o male with PMH CAD s/p PCI and stent 2007, COPD on 2L home O2, HFpEF TTE in 4/21 EF 55-65, DLD, atrial fibrillation on coumadin presents for shortness of breath and increase in his NADIA along with 1 day history of chest pain.    # HFpEF exacerbation  # Afib SVR # Bradycardia # NSVT  # CAD s/p stent 2007  # Severe Pulmonary HTN    - CXR b/l pleural effusions and vascular congestion  - TTE 4/21 EF 55-65 PSAP 70 mmhg; get new TTE  - BNP 14K  - ECG afib in SVR  - Tele events ; NSVT last 24 hrs  - troponin negative   - bumex 20 mg IV 1mg/hr   -Spirinolactone 25 mg daily   -Metalozone 2.5 mg daily   - hypertonic saline  - Cw ASA 81 md daily  - Cw atorvastatin 20 mg daily  -  lipid panel WNL except HDL 39 and A1c 5.6  - iron profile WNL  - daily weight, strict I/Os keep I < O  - DASH TLC diet  fluid restriction 1000 ml daily  - continue tele monitoring  - EP on board : scheduled for loop recorder   - Avoid AVN blockers  -  Restart coumadin. INR in AM  - Cardiology Cs doctor Niesha; Heart failure team consult  - TSH 4.34  - keep K > 4 and Mg > 2.1  - NC 2 LPM; HOB > 35 titrate oxygen keep saO2 89-94%  - arterial duplex of LE normal flow    # DORON on CKD II    - creatinine on admission 1.5 baseline 1.1  - Most likely secondary to cardio renal syndrome  - Hold entresto for now    # Normocytic anemia- chronic  # leukopenia    - at baseline  - Iron studies WNL   - FU folate b12 and TSH  - retic count WNL  - Stool sample for guaiac  - needs OP colonoscopy  - HIv screening negative  - active type and screen keep Hb > 7    # COPD not in exacerbation  # PSAP 70 mmhg    - on  2 LPM NC at home during night   - c/w symbicort and ventolin as needed  - CXR bilateral opacities basilar; underlying neoplasm not excluded  - chest CT    Interlobular septal thickening. Right lung groundglass opacities and bilateral lower lung predominant consolidation/atelectasis. Small right and moderate left pleural effusions. Findings compatible with pulmonary edema.    No definite mass is identified. However partial obscuration of the bilateral lower lobes as described as well as incomplete evaluation of the bilateral ronel on noncontrast CT chest. Follow-up is recommended clinically warranted.      - D-dimer 342    # HO DVT    - Chronic deep venous thrombosis right common femoral, femoral and   popliteal veins 2018 seen on repeated US  - coumadin on hold INR > 3  - SQLs bilateral        # DVT ppx: coumadin on hold  # DIET: DASH TLC  fluid restriction  # ACTIVITY: IAT  # Full code

## 2021-07-12 NOTE — PROGRESS NOTE ADULT - ASSESSMENT
Cardiologist: Dr Arnulfo Scherer    79 y/o male with PMH CAD s/p PCI and stent 2007, COPD on 2L home O2, HFpEF TTE in 4/21 EF 55-65, DLD, atrial fibrillation wit slow VR on coumadin presents for shortness of breath and increase in his NADIA along with 1 day history of chest pain.  Diuresed agressively  now AF within normal rate    Acute on Chronic HFpEF  Chronic AF (coumadin) with Slow Ventricular Response (50-60s) with fatigue and dyspnea with CHADS VASc of at least 4 (CHF, Age > 75, CAD)  NSVT on tele  CAD sp PCI  COPD on 2L O2  HLD      Patient HR improved tremendesly sinve admission.  Does not appear to require PPM at this time  Recommend implantable loop recorder to monitor for bradycardia and pauses. D/w patient and family, will proceed with procedure today  Not a lot of ectopy on tele  consider low dose metoprolol  Monitor electrolytes, maintain WNL  Cont diuresis, sticked I/O, daily weights  limit PO intake   Will follow

## 2021-07-12 NOTE — PROGRESS NOTE ADULT - SUBJECTIVE AND OBJECTIVE BOX
INTERVAL HPI/OVERNIGHT EVENTS:  pt is doing well,  euvolemic, able to lie flat  off AVN blockers, HR 60s-70s, no tachycardia episodes  received Vit K 3mg  x2 doses yesterday, INR 2.5      MEDICATIONS  (STANDING):  aspirin enteric coated 81 milliGRAM(s) Oral daily  atorvastatin 20 milliGRAM(s) Oral at bedtime  budesonide 160 MICROgram(s)/formoterol 4.5 MICROgram(s) Inhaler 2 Puff(s) Inhalation two times a day  buMETAnide Infusion 1 mG/Hr (5 mL/Hr) IV Continuous <Continuous>  chlorhexidine 4% Liquid 1 Application(s) Topical <User Schedule>  magnesium sulfate  IVPB 2 Gram(s) IV Intermittent once  metolazone 2.5 milliGRAM(s) Oral daily  metolazone 2.5 milliGRAM(s) Oral at bedtime  nystatin Powder 1 Application(s) Topical every 12 hours  potassium chloride    Tablet ER 40 milliEquivalent(s) Oral once  sodium chloride 3%. 150 milliLiter(s) (50 mL/Hr) IV Continuous <Continuous>  sodium chloride 3%. 150 milliLiter(s) (50 mL/Hr) IV Continuous <Continuous>  sodium chloride 3%. 150 milliLiter(s) (50 mL/Hr) IV Continuous <Continuous>  sodium chloride 3%. 150 milliLiter(s) (50 mL/Hr) IV Continuous <Continuous>  spironolactone 25 milliGRAM(s) Oral daily  warfarin 2 milliGRAM(s) Oral once    MEDICATIONS  (PRN):  ALBUTerol  90 MICROgram(s) HFA Inhaler - Peds 2 Puff(s) Inhalation every 4 hours PRN Shortness of Breath and/or Wheezing      Allergies    No Known Allergies    Intolerances        REVIEW OF SYSTEMS    [x ] A ten-point review of systems was otherwise negative except as noted.  [ ] Due to altered mental status/intubation, subjective information were not able to be obtained from the patient. History was obtained, to the extent possible, from review of the chart and collateral sources of information.      Vital Signs Last 24 Hrs  T(C): 36.2 (12 Jul 2021 10:15), Max: 36.6 (11 Jul 2021 21:02)  T(F): 97.1 (12 Jul 2021 10:15), Max: 97.8 (11 Jul 2021 21:02)  HR: 68 (12 Jul 2021 13:00) (63 - 83)  BP: 121/59 (12 Jul 2021 13:00) (121/59 - 180/60)  BP(mean): --  RR: 16 (12 Jul 2021 10:15) (16 - 18)  SpO2: 92% (12 Jul 2021 08:00) (90% - 92%)    07-11-21 @ 07:01  -  07-12-21 @ 07:00  --------------------------------------------------------  IN: 860 mL / OUT: 3095 mL / NET: -2235 mL    07-12-21 @ 07:01  -  07-12-21 @ 14:06  --------------------------------------------------------  IN: 140 mL / OUT: 1250 mL / NET: -1110 mL        PHYSICAL EXAM:    GENERAL: In no apparent distress, well nourished, and hydrated.  HEART: IRRegular rate and rhythm; No murmur; NO rubs, or gallops.  PULMONARY: minimal bibasilar rales.  Normal expansion/effort. No  wheezing, or rhonchi bilaterally.  ABDOMEN: Soft, Nontender, Nondistended; Bowel sounds present  EXTREMITIES:  Extremities warm, pink, well-perfused, 2+ Peripheral Pulses, No clubbing, cyanosis, or edema  NEUROLOGICAL: alert & oriented x 3, no focal deficits, PERRLA, EOMI    LABS:                        8.3    4.36  )-----------( 122      ( 12 Jul 2021 05:40 )             26.8     07-12    138  |  97<L>  |  30<H>  ----------------------------<  86  3.9   |  32  |  1.3    Ca    8.6      12 Jul 2021 05:40  Mg     2.0     07-12    TPro  6.3  /  Alb  3.5  /  TBili  2.1<H>  /  DBili  1.2<H>  /  AST  18  /  ALT  9   /  AlkPhos  115  07-12    PT/INR - ( 12 Jul 2021 05:40 )   PT: 28.80 sec;   INR: 2.50 ratio         PTT - ( 12 Jul 2021 05:40 )  PTT:41.3 sec          RADIOLOGY & ADDITIONAL TESTS:

## 2021-07-12 NOTE — PROGRESS NOTE ADULT - SUBJECTIVE AND OBJECTIVE BOX
RODNEY SANTO  80y  Male      Patient is a 80y old  Male who presents with a chief complaint of shortness of breath (12 Jul 2021 12:22)      INTERVAL HPI/OVERNIGHT EVENTS:  Patient is lying in bed comfortably . Says that he's no longer SOB, and has been taking the NC off intermittently. Also complains of polyuria due to the diuretics.       REVIEW OF SYSTEMS:  CONSTITUTIONAL: No fever, weight loss, or fatigue  EYES: No eye pain, visual disturbances, or discharge  ENMT:  No difficulty hearing, tinnitus, vertigo; No sinus or throat pain  NECK: No pain or stiffness  RESPIRATORY: No cough, wheezing, chills or hemoptysis; No shortness of breath  CARDIOVASCULAR: No chest pain, palpitations, dizziness, or leg swelling  GASTROINTESTINAL: No abdominal or epigastric pain. No diarrhea or constipation. No nausea, vomiting, or hematemesis. No melena or hematochezia.  GENITOURINARY: No dysuria, frequency, hematuria, or incontinence  NEUROLOGICAL: No headaches, memory loss, loss of strength, numbness, or tremors  MUSCULOSKELETAL: No joint pain or swelling; No muscle, back, or extremity pain    Vital Signs Last 24 Hrs  T(C): 36.2 (12 Jul 2021 10:15), Max: 36.6 (11 Jul 2021 21:02)  T(F): 97.1 (12 Jul 2021 10:15), Max: 97.8 (11 Jul 2021 21:02)  HR: 68 (12 Jul 2021 10:15) (63 - 83)  BP: 180/60 (12 Jul 2021 10:15) (126/58 - 180/60)  BP(mean): --  RR: 16 (12 Jul 2021 10:15) (16 - 18)  SpO2: 92% (12 Jul 2021 08:00) (90% - 92%)    PHYSICAL EXAM:  GENERAL: NAD, well-groomed, well-developed  HEAD:  Atraumatic, Normocephalic  EYES: EOMI, PERRLA, conjunctiva and sclera clear  ENMT: Moist mucous membranes, Good dentition, No lesions  NECK: Supple, No JVD, Normal thyroid  NERVOUS SYSTEM:  Alert & Oriented X3, Good concentration; Motor Strength 5/5 B/L upper and lower extremities; DTRs 2+ intact and symmetric  CHEST/LUNG: Clear to auscultation bilaterally; No rales, rhonchi, wheezing, or rubs  HEART: Regular rate and rhythm; No murmurs, rubs, or gallops  ABDOMEN: Soft, Nontender, Nondistended; Bowel sounds present  EXTREMITIES:  2+ Peripheral Pulses, No clubbing, cyanosis, or edema  LYMPH: No lymphadenopathy noted  SKIN: No rashes or lesions    Consultant(s) Notes Reviewed:  [x ] YES  [ ] NO  Care Discussed with Consultants/Other Providers [ x] YES  [ ] NO    LABS:                        8.3    4.36  )-----------( 122      ( 12 Jul 2021 05:40 )             26.8     07-12    138  |  97<L>  |  30<H>  ----------------------------<  86  3.9   |  32  |  1.3    Ca    8.6      12 Jul 2021 05:40  Mg     2.0     07-12    TPro  6.3  /  Alb  3.5  /  TBili  2.1<H>  /  DBili  1.2<H>  /  AST  18  /  ALT  9   /  AlkPhos  115  07-12    PT/INR - ( 12 Jul 2021 05:40 )   PT: 28.80 sec;   INR: 2.50 ratio         PTT - ( 12 Jul 2021 05:40 )  PTT:41.3 sec      CAPILLARY BLOOD GLUCOSE          RADIOLOGY & ADDITIONAL TESTS:    Imaging Personally Reviewed:  [ ] YES  [ ] NO

## 2021-07-12 NOTE — PROGRESS NOTE ADULT - SUBJECTIVE AND OBJECTIVE BOX
RODNEY SANTO  80y Male    CHIEF COMPLAINT:    Patient is a 80y old  Male who presents with a chief complaint of shortness of breath (2021 12:51)      INTERVAL HPI/OVERNIGHT EVENTS:    Patient seen and examined. Breathing has been improving. Still fluid overload. Now on Bumex infusion. 92% on 2L NC.     ROS: All other systems are negative.    Vital Signs:    T(F): 97.1 (21 @ 10:15), Max: 97.8 (21 @ 21:02)  HR: 68 (21 @ 10:15) (63 - 83)  BP: 180/60 (21 @ 10:15) (126/58 - 180/60)  RR: 16 (21 @ 10:15) (16 - 18)  SpO2: 92% (21 @ 08:00) (90% - 92%)  I&O's Summary    2021 07:  -  2021 07:00  --------------------------------------------------------  IN: 860 mL / OUT: 3095 mL / NET: -2235 mL    2021 07:01  -  2021 11:54  --------------------------------------------------------  IN: 20 mL / OUT: 1250 mL / NET: -1230 mL      Daily Height in cm: 190.5 (2021 16:43)    Daily Weight in k.3 (2021 03:59)  CAPILLARY BLOOD GLUCOSE          PHYSICAL EXAM:    GENERAL:  NAD  SKIN: No rashes or lesions  HENT: Atraumatic. Normocephalic. PERRL. Moist membranes.  NECK: Supple, No JVD. No lymphadenopathy.  PULMONARY: Decreased BS in the bases B/L. No wheezing. No rales  CVS: Normal S1, S2. Rate and Rhythm are regular. No murmurs.  ABDOMEN/GI: Soft, Nontender, Nondistended; BS present  EXTREMITIES: Peripheral pulses intact. +ve pitting edema B/L LE.  NEUROLOGIC:  No motor or sensory deficit.  PSYCH: Alert & oriented x 3    Consultant(s) Notes Reviewed:  [x ] YES  [ ] NO  Care Discussed with Consultants/Other Providers [ x] YES  [ ] NO    EKG reviewed  Telemetry reviewed    LABS:                        8.3    4.36  )-----------( 122      ( 2021 05:40 )             26.8     07-    138  |  97<L>  |  30<H>  ----------------------------<  86   Creatinine Trend: 1.3<--, 1.4<--, 1.3<--, 1.3<--, 1.2<--, 1.3<--  3.9   |  32  |  1.3    Ca    8.6      2021 05:40  Mg     2.0         TPro  6.3  /  Alb  3.5  /  TBili  2.1<H>  /  DBili  1.2<H>  /  AST  18  /  ALT  9   /  AlkPhos  115  07-12    PT/INR - ( 2021 05:40 )   PT: 28.80 sec;   INR: 2.50 ratio         PTT - ( 2021 05:40 )  PTT:41.3 sec  Serum Pro-Brain Natriuretic Peptide: 71991 pg/mL (21 @ 12:40)          RADIOLOGY & ADDITIONAL TESTS:      Imaging or report Personally Reviewed:  [ ] YES  [ ] NO    Medications:  Standing  aspirin enteric coated 81 milliGRAM(s) Oral daily  atorvastatin 20 milliGRAM(s) Oral at bedtime  budesonide 160 MICROgram(s)/formoterol 4.5 MICROgram(s) Inhaler 2 Puff(s) Inhalation two times a day  buMETAnide Infusion 1 mG/Hr IV Continuous <Continuous>  chlorhexidine 4% Liquid 1 Application(s) Topical <User Schedule>  metolazone 2.5 milliGRAM(s) Oral daily  metolazone 2.5 milliGRAM(s) Oral at bedtime  nystatin Powder 1 Application(s) Topical every 12 hours  sodium chloride 3%. 150 milliLiter(s) IV Continuous <Continuous>  sodium chloride 3%. 150 milliLiter(s) IV Continuous <Continuous>  sodium chloride 3%. 150 milliLiter(s) IV Continuous <Continuous>  sodium chloride 3%. 150 milliLiter(s) IV Continuous <Continuous>    PRN Meds  ALBUTerol  90 MICROgram(s) HFA Inhaler - Peds 2 Puff(s) Inhalation every 4 hours PRN      Case discussed with resident    Care discussed with pt/family

## 2021-07-12 NOTE — PROGRESS NOTE ADULT - NSPROGADDITIONALINFOA_GEN_ALL_CORE
Bradycardia  - patient's HR had improved over night   - recommend loop implant for better monitoring. If patient continue o be bradycardia or have difficult time to control AF will recommend PPM

## 2021-07-12 NOTE — CHART NOTE - NSCHARTNOTEFT_GEN_A_CORE
Cardiac Electrophysiology Procedure Note     PROCEDURE:  Venogram  Fluoroscopy    INDICATION:  Increase in RV lead threshold       OPERATORS:  Attending      Mehdi Colon MD              DESCRIPTION OF PROCEDURE  The patient was brought to the Procedure Room in a nonsedated and fasting state, having received preoperative antibiotics. Informed, written consent was obtained prior to the procedure. Intermittent boluses of Versed was used to maintain comfort and analgesia throughout the procedure. Blood pressure, oxygenation and level of comfort were stable throughout. The left shoulder region was cleaned and prepped with serial applications of Chlorhexidine Scrub. Patient was then covered from head to toe with sterile drapes in the usual manner. The fluoroscopic anatomy of the left shoulder region was inspected, along with pacemaker and lead position and orientation.    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          COMPLICATIONS:  The patient tolerated the procedure well. There were no immediate complications.    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

## 2021-07-12 NOTE — PROGRESS NOTE ADULT - ASSESSMENT
79 y/o male with PMH CAD s/p PCI and stent 2007, COPD on 2L home O2, HFpEF TTE in 4/21 EF 55-65, DLD, atrial fibrillation on coumadin presented with progressively worsening sob associated with increasing leg swelling for the last few days. Also C/O intermittent cp for the last one day.     Acute HFpEF  DORON  COPD on home O2  CAD S/P PCI in 2007  Chronic A-Fib with Slow Ventricular Response   NSVT              PLAN:    ·	Cont tele  ·	A negative balance of 2235 over the last 24 hrs  ·	Care D/W the HF specialist. Cont Bumex infusion 1 mg/hr and Metolazone 2.5 mg po q 12h. Give before hypertonic saline.   ·	Add Spironolactone 25 mg po daily. Keep K >4 and Mg >2  ·	Give Kcl 40 meq po x 1 dose now and Magnesium sulfate 2 gm ivpb x 1 now. Monitor BMP and Mg twice a day  ·	NSVT on tele. Pt was bradycardiac on admission and now the HR is 50-60. Will hold on PPM as per cardiology  ·	INR 2.5. Will restart his Coumadin as the procedure was cancelled. Give Coumadin 2 mg po tonight. Check INR in aM  ·	CXR reviewed. B/L opacities and small pleural effusions.   ·	Check i's and o's and daily wt  ·	Low salt diet and water restriction to 1.5 L/D  ·	Monitor renal function. Improving Cr.   ·	CE x 2 are negative.   ·	Cont Symbicort    Progress Note Handoff    Pending (specify):  Consults_________, Tests________, Test Results_______, Other__On Bumex infusion. _____  Family discussion:  Disposition: Home___/SNF___/Other________/Unknown at this time________    Kyle Pritchett MD  Spectra: [phone]

## 2021-07-12 NOTE — PROGRESS NOTE ADULT - ASSESSMENT
Acute on chronic diastolic HF/ Anemia / CKD/ DVT    Patient remains fluid overloaded on exam  Continue Bumex gtt at 1 mg/hr  Continue 3% Hypertonic Saline 150ml BID  Keep Mg >2.2 and K>4.0  BMP daily   Venous duplex showed DVT on RLE  Work up for TONJA - Get FOBT   Continue A/C for DVT/ afib   Counseling provided regarding low sodium diet and monitoring weight and BP at home   PT evaluation   Discussed with primary team    Acute on chronic diastolic HF/ Anemia / CKD/ DVT    Patient remains fluid overloaded on exam  Continue Bumex gtt at 1 mg/hr  Continue 3% Hypertonic Saline 150ml BID and metolazone 2.5 mg BID  Add spironolactone 25 mg BID  Keep Mg >2.2 and K>4.0  BMP daily   Venous duplex showed DVT on RLE  Start iv iron sucrose 200 mg daily x5  Work up for TONJA - Get FOBT   Continue A/C for DVT/ afib   PT evaluation   Discussed with primary team

## 2021-07-13 PROBLEM — I50.9 HEART FAILURE, UNSPECIFIED: Chronic | Status: ACTIVE | Noted: 2021-07-07

## 2021-07-13 LAB
ALBUMIN SERPL ELPH-MCNC: 3.5 G/DL — SIGNIFICANT CHANGE UP (ref 3.5–5.2)
ALP SERPL-CCNC: 117 U/L — HIGH (ref 30–115)
ALT FLD-CCNC: 9 U/L — SIGNIFICANT CHANGE UP (ref 0–41)
ANION GAP SERPL CALC-SCNC: 10 MMOL/L — SIGNIFICANT CHANGE UP (ref 7–14)
ANION GAP SERPL CALC-SCNC: 6 MMOL/L — LOW (ref 7–14)
APPEARANCE UR: CLEAR — SIGNIFICANT CHANGE UP
AST SERPL-CCNC: 19 U/L — SIGNIFICANT CHANGE UP (ref 0–41)
BACTERIA # UR AUTO: NEGATIVE — SIGNIFICANT CHANGE UP
BASOPHILS # BLD AUTO: 0.02 K/UL — SIGNIFICANT CHANGE UP (ref 0–0.2)
BASOPHILS NFR BLD AUTO: 0.4 % — SIGNIFICANT CHANGE UP (ref 0–1)
BILIRUB SERPL-MCNC: 2.1 MG/DL — HIGH (ref 0.2–1.2)
BILIRUB UR-MCNC: NEGATIVE — SIGNIFICANT CHANGE UP
BUN SERPL-MCNC: 32 MG/DL — HIGH (ref 10–20)
BUN SERPL-MCNC: 33 MG/DL — HIGH (ref 10–20)
CALCIUM SERPL-MCNC: 9.1 MG/DL — SIGNIFICANT CHANGE UP (ref 8.5–10.1)
CALCIUM SERPL-MCNC: 9.3 MG/DL — SIGNIFICANT CHANGE UP (ref 8.5–10.1)
CHLORIDE SERPL-SCNC: 90 MMOL/L — LOW (ref 98–110)
CHLORIDE SERPL-SCNC: 92 MMOL/L — LOW (ref 98–110)
CO2 SERPL-SCNC: 39 MMOL/L — HIGH (ref 17–32)
CO2 SERPL-SCNC: 43 MMOL/L — CRITICAL HIGH (ref 17–32)
COLOR SPEC: SIGNIFICANT CHANGE UP
CREAT SERPL-MCNC: 1.3 MG/DL — SIGNIFICANT CHANGE UP (ref 0.7–1.5)
CREAT SERPL-MCNC: 1.3 MG/DL — SIGNIFICANT CHANGE UP (ref 0.7–1.5)
DIFF PNL FLD: ABNORMAL
EOSINOPHIL # BLD AUTO: 0.08 K/UL — SIGNIFICANT CHANGE UP (ref 0–0.7)
EOSINOPHIL NFR BLD AUTO: 1.5 % — SIGNIFICANT CHANGE UP (ref 0–8)
EPI CELLS # UR: 1 /HPF — SIGNIFICANT CHANGE UP (ref 0–5)
GLUCOSE SERPL-MCNC: 110 MG/DL — HIGH (ref 70–99)
GLUCOSE SERPL-MCNC: 98 MG/DL — SIGNIFICANT CHANGE UP (ref 70–99)
GLUCOSE UR QL: NEGATIVE — SIGNIFICANT CHANGE UP
HCT VFR BLD CALC: 29.2 % — LOW (ref 42–52)
HGB BLD-MCNC: 9.2 G/DL — LOW (ref 14–18)
HYALINE CASTS # UR AUTO: 2 /LPF — SIGNIFICANT CHANGE UP (ref 0–7)
IMM GRANULOCYTES NFR BLD AUTO: 0.4 % — HIGH (ref 0.1–0.3)
INR BLD: 1.51 RATIO — HIGH (ref 0.65–1.3)
KETONES UR-MCNC: NEGATIVE — SIGNIFICANT CHANGE UP
LEUKOCYTE ESTERASE UR-ACNC: ABNORMAL
LYMPHOCYTES # BLD AUTO: 0.49 K/UL — LOW (ref 1.2–3.4)
LYMPHOCYTES # BLD AUTO: 9.1 % — LOW (ref 20.5–51.1)
MAGNESIUM SERPL-MCNC: 2.1 MG/DL — SIGNIFICANT CHANGE UP (ref 1.8–2.4)
MCHC RBC-ENTMCNC: 27.6 PG — SIGNIFICANT CHANGE UP (ref 27–31)
MCHC RBC-ENTMCNC: 31.5 G/DL — LOW (ref 32–37)
MCV RBC AUTO: 87.7 FL — SIGNIFICANT CHANGE UP (ref 80–94)
MONOCYTES # BLD AUTO: 0.76 K/UL — HIGH (ref 0.1–0.6)
MONOCYTES NFR BLD AUTO: 14 % — HIGH (ref 1.7–9.3)
NEUTROPHILS # BLD AUTO: 4.04 K/UL — SIGNIFICANT CHANGE UP (ref 1.4–6.5)
NEUTROPHILS NFR BLD AUTO: 74.6 % — SIGNIFICANT CHANGE UP (ref 42.2–75.2)
NITRITE UR-MCNC: NEGATIVE — SIGNIFICANT CHANGE UP
NRBC # BLD: 0 /100 WBCS — SIGNIFICANT CHANGE UP (ref 0–0)
PH UR: 6.5 — SIGNIFICANT CHANGE UP (ref 5–8)
PLATELET # BLD AUTO: 127 K/UL — LOW (ref 130–400)
POTASSIUM SERPL-MCNC: 3.9 MMOL/L — SIGNIFICANT CHANGE UP (ref 3.5–5)
POTASSIUM SERPL-MCNC: 4.1 MMOL/L — SIGNIFICANT CHANGE UP (ref 3.5–5)
POTASSIUM SERPL-SCNC: 3.9 MMOL/L — SIGNIFICANT CHANGE UP (ref 3.5–5)
POTASSIUM SERPL-SCNC: 4.1 MMOL/L — SIGNIFICANT CHANGE UP (ref 3.5–5)
PROT SERPL-MCNC: 6.5 G/DL — SIGNIFICANT CHANGE UP (ref 6–8)
PROT UR-MCNC: NEGATIVE — SIGNIFICANT CHANGE UP
PROTHROM AB SERPL-ACNC: 17.4 SEC — HIGH (ref 9.95–12.87)
RBC # BLD: 3.33 M/UL — LOW (ref 4.7–6.1)
RBC # FLD: 20.3 % — HIGH (ref 11.5–14.5)
RBC CASTS # UR COMP ASSIST: 147 /HPF — HIGH (ref 0–4)
SODIUM SERPL-SCNC: 139 MMOL/L — SIGNIFICANT CHANGE UP (ref 135–146)
SODIUM SERPL-SCNC: 141 MMOL/L — SIGNIFICANT CHANGE UP (ref 135–146)
SP GR SPEC: 1.01 — LOW (ref 1.01–1.03)
UROBILINOGEN FLD QL: SIGNIFICANT CHANGE UP
WBC # BLD: 5.41 K/UL — SIGNIFICANT CHANGE UP (ref 4.8–10.8)
WBC # FLD AUTO: 5.41 K/UL — SIGNIFICANT CHANGE UP (ref 4.8–10.8)
WBC UR QL: 5 /HPF — SIGNIFICANT CHANGE UP (ref 0–5)

## 2021-07-13 PROCEDURE — 99233 SBSQ HOSP IP/OBS HIGH 50: CPT

## 2021-07-13 PROCEDURE — 76770 US EXAM ABDO BACK WALL COMP: CPT | Mod: 26

## 2021-07-13 RX ORDER — SPIRONOLACTONE 25 MG/1
25 TABLET, FILM COATED ORAL
Refills: 0 | Status: DISCONTINUED | OUTPATIENT
Start: 2021-07-13 | End: 2021-07-16

## 2021-07-13 RX ORDER — ACETAZOLAMIDE 250 MG/1
125 TABLET ORAL ONCE
Refills: 0 | Status: COMPLETED | OUTPATIENT
Start: 2021-07-13 | End: 2021-07-13

## 2021-07-13 RX ORDER — WARFARIN SODIUM 2.5 MG/1
1 TABLET ORAL ONCE
Refills: 0 | Status: COMPLETED | OUTPATIENT
Start: 2021-07-13 | End: 2021-07-13

## 2021-07-13 RX ORDER — ACETAZOLAMIDE 250 MG/1
125 TABLET ORAL ONCE
Refills: 0 | Status: DISCONTINUED | OUTPATIENT
Start: 2021-07-13 | End: 2021-07-13

## 2021-07-13 RX ORDER — OXYCODONE AND ACETAMINOPHEN 5; 325 MG/1; MG/1
1 TABLET ORAL EVERY 8 HOURS
Refills: 0 | Status: DISCONTINUED | OUTPATIENT
Start: 2021-07-13 | End: 2021-07-20

## 2021-07-13 RX ORDER — MORPHINE SULFATE 50 MG/1
0.5 CAPSULE, EXTENDED RELEASE ORAL ONCE
Refills: 0 | Status: DISCONTINUED | OUTPATIENT
Start: 2021-07-13 | End: 2021-07-13

## 2021-07-13 RX ORDER — IRON SUCROSE 20 MG/ML
200 INJECTION, SOLUTION INTRAVENOUS EVERY 24 HOURS
Refills: 0 | Status: COMPLETED | OUTPATIENT
Start: 2021-07-13 | End: 2021-07-17

## 2021-07-13 RX ORDER — WARFARIN SODIUM 2.5 MG/1
3 TABLET ORAL ONCE
Refills: 0 | Status: DISCONTINUED | OUTPATIENT
Start: 2021-07-13 | End: 2021-07-13

## 2021-07-13 RX ADMIN — NYSTATIN CREAM 1 APPLICATION(S): 100000 CREAM TOPICAL at 06:04

## 2021-07-13 RX ADMIN — Medication 81 MILLIGRAM(S): at 12:12

## 2021-07-13 RX ADMIN — BUDESONIDE AND FORMOTEROL FUMARATE DIHYDRATE 2 PUFF(S): 160; 4.5 AEROSOL RESPIRATORY (INHALATION) at 20:26

## 2021-07-13 RX ADMIN — SPIRONOLACTONE 25 MILLIGRAM(S): 25 TABLET, FILM COATED ORAL at 17:07

## 2021-07-13 RX ADMIN — MORPHINE SULFATE 0.5 MILLIGRAM(S): 50 CAPSULE, EXTENDED RELEASE ORAL at 06:32

## 2021-07-13 RX ADMIN — MORPHINE SULFATE 0.5 MILLIGRAM(S): 50 CAPSULE, EXTENDED RELEASE ORAL at 13:18

## 2021-07-13 RX ADMIN — ATORVASTATIN CALCIUM 20 MILLIGRAM(S): 80 TABLET, FILM COATED ORAL at 21:23

## 2021-07-13 RX ADMIN — WARFARIN SODIUM 1 MILLIGRAM(S): 2.5 TABLET ORAL at 21:23

## 2021-07-13 RX ADMIN — ACETAZOLAMIDE 102.5 MILLIGRAM(S): 250 TABLET ORAL at 22:29

## 2021-07-13 RX ADMIN — SODIUM CHLORIDE 50 MILLILITER(S): 5 INJECTION, SOLUTION INTRAVENOUS at 06:01

## 2021-07-13 RX ADMIN — MORPHINE SULFATE 0.5 MILLIGRAM(S): 50 CAPSULE, EXTENDED RELEASE ORAL at 13:45

## 2021-07-13 RX ADMIN — MORPHINE SULFATE 0.5 MILLIGRAM(S): 50 CAPSULE, EXTENDED RELEASE ORAL at 07:06

## 2021-07-13 RX ADMIN — CHLORHEXIDINE GLUCONATE 1 APPLICATION(S): 213 SOLUTION TOPICAL at 06:02

## 2021-07-13 RX ADMIN — SPIRONOLACTONE 25 MILLIGRAM(S): 25 TABLET, FILM COATED ORAL at 06:01

## 2021-07-13 RX ADMIN — IRON SUCROSE 110 MILLIGRAM(S): 20 INJECTION, SOLUTION INTRAVENOUS at 10:18

## 2021-07-13 RX ADMIN — BUMETANIDE 5 MG/HR: 0.25 INJECTION INTRAMUSCULAR; INTRAVENOUS at 18:28

## 2021-07-13 RX ADMIN — MORPHINE SULFATE 0.5 MILLIGRAM(S): 50 CAPSULE, EXTENDED RELEASE ORAL at 22:24

## 2021-07-13 RX ADMIN — MORPHINE SULFATE 0.5 MILLIGRAM(S): 50 CAPSULE, EXTENDED RELEASE ORAL at 20:22

## 2021-07-13 RX ADMIN — NYSTATIN CREAM 1 APPLICATION(S): 100000 CREAM TOPICAL at 17:07

## 2021-07-13 RX ADMIN — OXYCODONE AND ACETAMINOPHEN 1 TABLET(S): 5; 325 TABLET ORAL at 15:45

## 2021-07-13 RX ADMIN — SODIUM CHLORIDE 50 MILLILITER(S): 5 INJECTION, SOLUTION INTRAVENOUS at 18:01

## 2021-07-13 NOTE — PROGRESS NOTE ADULT - ASSESSMENT
Acute on chronic diastolic HF/ Anemia / CKD/ DVT    Patient remains fluid overloaded on exam  Continue Bumex gtt at 1 mg/hr  Continue 3% Hypertonic Saline 150ml BID and metolazone 2.5 mg BID  Add spironolactone 25 mg BID  Keep Mg >2.2 and K>4.0  BMP daily   Venous duplex showed DVT on RLE  Start iv iron sucrose 200 mg daily x5  Work up for TONJA - Get FOBT   Continue A/C for DVT/ afib   PT evaluation   Discussed with primary team    Acute on chronic diastolic HF/ Anemia / CKD/ DVT    Patient remains fluid overloaded  Continue Bumex gtt at 1 mg/hr  Continue 3% Hypertonic Saline 150ml BID and metolazone 2.5 mg BID  Continue spironolactone 25 mg BID  Keep Mg >2.2 and K>4.0  BMP daily   Venous duplex showed DVT on RLE  Iron sucrose 200 mg daily x5  Work up for TONJA - Get FOBT   Continue A/C for DVT/ afib   PT evaluation   Discussed with primary team

## 2021-07-13 NOTE — PROGRESS NOTE ADULT - SUBJECTIVE AND OBJECTIVE BOX
Patient is a 80y old  Male who presents with a chief complaint of shortness of breath (2021 13:21)    HPI:  79 y/o male with PMH CAD s/p PCI and stent , COPD on 2L home O2, HFpEF TTE in  EF 55-65, DLD, atrial fibrillation on coumadin presents for shortness of breath. Patient has been having a gradual onset of worsening shortness of breath over the past few days. He is adherent to his medications as directed. He was following at the Coumadin clinic for his coumadin dose and was told yesterday that he had a significant increase in his weight compared to 5 weeks earlier. He also endorses worsening bilateral lower extremities edema reaching his thighs along with a left sided chest pain, dull in nature, mild in intensity, persistent for the past day. he denied palpitations, fever, chills, cough, sputum production, abdominal or urinary symptoms.    VS on admission were within normal range. He was saturating well on 3 L NC.  CXR showed bilateral pleural effusions with  vascular congestion.  He received 40 mg of iv lasix.  (2021 14:33)    PAST MEDICAL & SURGICAL HISTORY:  COPD (chronic obstructive pulmonary disease)  Hypertension  Deep vein thrombosis (DVT) of left lower extremity, unspecified chronicity, unspecified vein  Cellulitis of left lower extremity  Chronic atrial fibrillation  Mitral valve insufficiency, unspecified etiology  CHF (congestive heart failure)  S/P coronary artery stent placement  History of total right knee replacement    patient seen and examined independently on morning rounds for the first time today, chart reviewed and discussed with the medicine resident and on interdisciplinary rounds    no overnight events---started on iv bumex gtt with good urine output (q24 hrs net negative >1 L- hospital course net negative over 8 L)- c/o leg pain (chronic) relieved with low dose pain control- o2 sat 99% on 2L NC      Vital Signs Last 24 Hrs  T(C): 35.8 (2021 14:00), Max: 36.8 (2021 04:30)  T(F): 96.5 (2021 14:00), Max: 98.3 (2021 04:30)  HR: 62 (2021 14:00) (62 - 80)  BP: 110/57 (2021 14:00) (110/57 - 163/72)  BP(mean): --  RR: 18 (2021 14:00) (18 - 18)  SpO2: 99% (2021 05:23) (99% - 99%)             PE:  GEN-NAD, AAOx3 on 2L o2 via nc  PULM- fair air entry  CVS- +s1/s2 RRR no murmurs  GI- soft NT ND +bs, no rebound, no guarding  EXT- no edema               9.2    5.41  )-----------( 127      ( 2021 06:35 )             29.2     07-    141  |  92<L>  |  32<H>  ----------------------------<  98  4.1   |  39<H>  |  1.3    Ca    9.3      2021 06:35  Mg     2.1         TPro  6.5  /  Alb  3.5  /  TBili  2.1<H>  /  DBili  x   /  AST  19  /  ALT  9   /  AlkPhos  117<H>  0713        Urinalysis Basic - ( 2021 00:08 )    Color: Light Yellow / Appearance: Clear / S.007 / pH: x  Gluc: x / Ketone: Negative  / Bili: Negative / Urobili: <2 mg/dL   Blood: x / Protein: Negative / Nitrite: Negative   Leuk Esterase: Small / RBC: 147 /HPF / WBC 5 /HPF   Sq Epi: x / Non Sq Epi: 1 /HPF / Bacteria: Negative          MEDICATIONS  (STANDING):  aspirin enteric coated 81 milliGRAM(s) Oral daily  atorvastatin 20 milliGRAM(s) Oral at bedtime  budesonide 160 MICROgram(s)/formoterol 4.5 MICROgram(s) Inhaler 2 Puff(s) Inhalation two times a day  buMETAnide Infusion 1 mG/Hr (5 mL/Hr) IV Continuous <Continuous>  chlorhexidine 4% Liquid 1 Application(s) Topical <User Schedule>  iron sucrose IVPB 200 milliGRAM(s) IV Intermittent every 24 hours  metolazone 2.5 milliGRAM(s) Oral <User Schedule>  nystatin Powder 1 Application(s) Topical every 12 hours  sodium chloride 3%. 150 milliLiter(s) (50 mL/Hr) IV Continuous <Continuous>  sodium chloride 3%. 150 milliLiter(s) (50 mL/Hr) IV Continuous <Continuous>  sodium chloride 3%. 150 milliLiter(s) (50 mL/Hr) IV Continuous <Continuous>  sodium chloride 3%. 150 milliLiter(s) (50 mL/Hr) IV Continuous <Continuous>  sodium chloride 3%. 150 milliLiter(s) (50 mL/Hr) IV Continuous <Continuous>  sodium chloride 3%. 150 milliLiter(s) (50 mL/Hr) IV Continuous <Continuous>  spironolactone 25 milliGRAM(s) Oral two times a day  warfarin 1 milliGRAM(s) Oral once

## 2021-07-13 NOTE — CONSULT NOTE ADULT - ATTENDING COMMENTS
l knee pain  no evidence septic arthritis  good rom  h/o tka opp side  fu as outpt
## Persistent AF with slow ventricular rate  ## Recurrent HFpEF    - Aggressive diuresis. Consult HF team  - Tele  - Avoid AVN blockers  - In view of slow VR , he will benefit from a pacemaker/ICD (depending of EF). We will consider prior to DC- to be decided once he is near-euvolemic  - Continue warfarin  - TTE  - Recall us when near-euvolemic for discussion about PPM
please see my above recommendations

## 2021-07-13 NOTE — PROGRESS NOTE ADULT - ASSESSMENT
79 y/o male with PMH CAD s/p PCI and stent 2007, COPD on 2L home O2, HFpEF TTE in 4/21 EF 55-65, DLD, atrial fibrillation on coumadin presents for shortness of breath and increase in his NADIA along with 1 day history of chest pain.    # HFpEF exacerbation  # Afib SVR # Bradycardia # NSVT  # CAD s/p stent 2007  # Severe Pulmonary HTN    - CXR b/l pleural effusions and vascular congestion  - TTE 4/21 EF 55-65 PSAP 70 mmhg; get new TTE  - BNP 14K  - ECG afib in SVR  - Tele events ; NSVT last 24 hrs  - troponin negative   - bumex 20 mg IV 1mg/hr   -Spirinolactone 25 mg daily   -Metalozone 2.5 mg daily   - hypertonic saline  - Cw ASA 81 md daily  - Cw atorvastatin 20 mg daily  -  lipid panel WNL except HDL 39 and A1c 5.6  - iron profile WNL  - daily weight, strict I/Os keep I < O  - DASH TLC diet  fluid restriction 1000 ml daily  - continue tele monitoring  - EP on board : Loop recorder placed 7/12    - Avoid AVN blockers  -  Restart coumadin. INR in AM  - Cardiology Cs doctor Niesha; Heart failure team consult  - TSH 4.34  - keep K > 4 and Mg > 2.1  - NC 2 LPM; HOB > 35 titrate oxygen keep saO2 89-94%  - arterial duplex of LE normal flow    # DORON on CKD II    - creatinine on admission 1.5 baseline 1.1  - Most likely secondary to cardio renal syndrome  - Hold entresto for now  -    # Normocytic anemia- chronic      - at baseline  - Iron studies WNL   - FU folate b12 and TSH  - retic count WNL  - Stool sample for guaiac  - needs OP colonoscopy  - HIv screening negative  - active type and screen keep Hb > 7    # COPD not in exacerbation  # PSAP 70 mmhg    - on  2 LPM NC at home during night   - c/w symbicort and ventolin as needed  - CXR bilateral opacities basilar; underlying neoplasm not excluded  - chest CT    Interlobular septal thickening. Right lung groundglass opacities and bilateral lower lung predominant consolidation/atelectasis. Small right and moderate left pleural effusions. Findings compatible with pulmonary edema.    No definite mass is identified. However partial obscuration of the bilateral lower lobes as described as well as incomplete evaluation of the bilateral ronel on noncontrast CT chest. Follow-up is recommended clinically warranted.      - D-dimer 342    # HO DVT    - Chronic deep venous thrombosis right common femoral, femoral and   popliteal veins 2018 seen on repeated US  - coumadin on hold INR > 3  - SQLs bilateral        # DVT ppx: coumadin on hold  # DIET: DASH TLC  fluid restriction  # ACTIVITY: IAT  # Full code

## 2021-07-13 NOTE — PROGRESS NOTE ADULT - SUBJECTIVE AND OBJECTIVE BOX
RODNEY SANTO  80y  Male      Patient is a 80y old  Male who presents with a chief complaint of shortness of breath (2021 11:46)      INTERVAL HPI/OVERNIGHT EVENTS:  Overnight patient complained of polyuria. Condom cath did not fit appropriately and therefore a Russo catheter was placed. Patient began to have hematuria with clots associated with pain so Russo was removed. For the pain he was given 0.5 mg of morphine. This morning states that he no longer has pain. Urine is noticed to be a pink tinge color. Otherwise denies SOB, CP, wheezing, and palpitations      REVIEW OF SYSTEMS:  CONSTITUTIONAL: No fever, weight loss, or fatigue  EYES: No eye pain, visual disturbances, or discharge  ENMT:  No difficulty hearing, tinnitus, vertigo; No sinus or throat pain  NECK: No pain or stiffness  RESPIRATORY: No cough, wheezing, chills or hemoptysis; No shortness of breath  CARDIOVASCULAR: No chest pain, palpitations, dizziness, or leg swelling  GASTROINTESTINAL: No abdominal or epigastric pain. No diarrhea or constipation. No nausea, vomiting, or hematemesis. No melena or hematochezia.  GENITOURINARY: Frequency   NEUROLOGICAL: No headaches, memory loss, loss of strength, numbness, or tremors  MUSCULOSKELETAL: Swelling   PSYCHIATRIC: No depression, anxiety, mood swings, or difficulty sleeping    Vital Signs Last 24 Hrs  T(C): 36.8 (2021 04:30), Max: 36.8 (2021 04:30)  T(F): 98.3 (2021 04:30), Max: 98.3 (2021 04:30)  HR: 64 (2021 04:30) (64 - 80)  BP: 125/57 (2021 04:30) (125/57 - 163/72)  RR: 18 (2021 04:30) (18 - 18)  SpO2: 99% (2021 05:23) (99% - 99%)    PHYSICAL EXAM:  GENERAL: NAD, well-groomed, well-developed  HEAD:  Atraumatic, Normocephalic  EYES: EOMI, PERRLA  ENMT: Moist mucous membranes, Poor dentition   NECK: Supple  NERVOUS SYSTEM:  Alert & Oriented X3,   CHEST/LUNG: Clear to auscultation bilaterally; No rales, rhonchi, wheezing, or rubs  HEART: Regular rate and rhythm; No murmurs, rubs, or gallops  ABDOMEN: Soft, Nontender, Nondistended; Bowel sounds present  EXTREMITIES:  LE edema    Consultant(s) Notes Reviewed:  [x ] YES  [ ] NO  Care Discussed with Consultants/Other Providers [ x] YES  [ ] NO    LABS:                        9.2    5.41  )-----------( 127      ( 2021 06:35 )             29.2     07-    141  |  92<L>  |  32<H>  ----------------------------<  98  4.1   |  39<H>  |  1.3    Ca    9.3      2021 06:35  Mg     2.1         TPro  6.5  /  Alb  3.5  /  TBili  2.1<H>  /  DBili  x   /  AST  19  /  ALT  9   /  AlkPhos  117<H>      PT/INR - ( 2021 06:35 )   PT: 17.40 sec;   INR: 1.51 ratio         PTT - ( 2021 05:40 )  PTT:41.3 sec  Urinalysis Basic - ( 2021 00:08 )    Color: Light Yellow / Appearance: Clear / S.007 / pH: x  Gluc: x / Ketone: Negative  / Bili: Negative / Urobili: <2 mg/dL   Blood: x / Protein: Negative / Nitrite: Negative   Leuk Esterase: Small / RBC: 147 /HPF / WBC 5 /HPF   Sq Epi: x / Non Sq Epi: 1 /HPF / Bacteria: Negative        CAPILLARY BLOOD GLUCOSE          RADIOLOGY & ADDITIONAL TESTS:    Imaging Personally Reviewed:  [ ] YES  [ ] NO

## 2021-07-13 NOTE — PROGRESS NOTE ADULT - SUBJECTIVE AND OBJECTIVE BOX
Interval History:    - Patient resting in bed, no acute events over night  - Remains fluid overloaded, responding well  to diuretics      81 y/o M with h/o CAD s/p PCI, COPD on home O2, HFpEF, afib admitted with worsening SOB. Patient reports having worsening SOB over past few days along with b/l LE edema and chest pain. No LOC, N/V. He claims to be compliant with medications.           PMH/PSH: CAD s/p PCI, COPD, HFpEF      FH: No early CAD or SCD       PHYSICAL EXAM     General: AAO x3, no acute distress   Neck: Trachea is central, JVD -ve  Lungs: CTA, no crackles   Heart: Regular heart sounds, no murmurs   GI: Abdomen is soft, NT  Neuro: Normal strength  Psych: Calm affect   Integument: No skin bruises           Interval History:    - Patient resting in bed, no acute events overnight  - Remains fluid overloaded, responding well  to diuretics      81 y/o M with h/o CAD s/p PCI, COPD on home O2, HFpEF, afib admitted with worsening SOB. Patient reports having worsening SOB over past few days along with b/l LE edema and chest pain. No LOC, N/V. He claims to be compliant with medications.           PMH/PSH: CAD s/p PCI, COPD, HFpEF      FH: No early CAD or SCD       PHYSICAL EXAM     General: AAO x3, no acute distress   Neck: Trachea is central, JVD -ve  Lungs: CTA, no crackles   Heart: Regular heart sounds, no murmurs   GI: Abdomen is soft, NT  Neuro: Normal strength  Psych: Calm affect   Integument: No skin bruises

## 2021-07-13 NOTE — CONSULT NOTE ADULT - SUBJECTIVE AND OBJECTIVE BOX
UROLOGY: Pt is a 79y/o M with pmh AFib on ASA/Warfarin admitted with acute HF and fluid overload s/p loop recorder implant yesterday. Urology consulted for one episode of hematuria. Russo was placed post-op due to patient being on diuretics, for i&os; pt complained it was burning so it was removed for his comfort. It had been draining well. After it was removed, he had one episode of gross hematuria with clots as per medical team. Currently, patient admits to ++frequency; states he has been voiding a lot due to the diuretics, states his hematuria resolved after the first episode, has been voiding clear yellow and showed me his urinal with clear yellow urine; denies any fevers, chills, nausea, vomiting, dysuria, or any further hematuria at this time.    HPI:  81 y/o male with PMH CAD s/p PCI and stent , COPD on 2L home O2, HFpEF TTE in  EF 55-65, DLD, atrial fibrillation on coumadin presents for shortness of breath. Patient has been having a gradual onset of worsening shortness of breath over the past few days. He is adherent to his medications as directed. He was following at the Coumadin clinic for his coumadin dose and was told yesterday that he had a significant increase in his weight compared to 5 weeks earlier. He also endorses worsening bilateral lower extremities edema reaching his thighs along with a left sided chest pain, dull in nature, mild in intensity, persistent for the past day. he denied palpitations, fever, chills, cough, sputum production, abdominal or urinary symptoms.  VS on admission were within normal range. He was saturating well on 3 L NC.  CXR showed bilateral pleural effusions with  vascular congestion.  He received 40 mg of iv lasix.  (2021 14:33)    PAST MEDICAL & SURGICAL HISTORY:  COPD (chronic obstructive pulmonary disease)  Hypertension  Deep vein thrombosis (DVT) of left lower extremity, unspecified chronicity, unspecified vein  Cellulitis of left lower extremity  Chronic atrial fibrillation  Mitral valve insufficiency, unspecified etiology  Moderate  CHF (congestive heart failure)  S/P coronary artery stent placement  History of total right knee replacement      REVIEW OF SYSTEMS:  CONSTITUTIONAL:  No fevers or chills  HEENT: No visual changes  ENDO: No sweating  NECK: No pain or stiffness  MUSCULOSKELETAL: No back pain, no joint pain  RESPIRATORY: No shortness of breath  CARDIOVASCULAR: No chest pain  GASTROINTESTINAL: No abdominal or epigastric pain. No nausea, vomiting,  No diarrhea or constipation.   NEUROLOGICAL: No mental status changes  PSYCH: No depression, no mood changes  SKIN: No itching    MEDICATIONS  (STANDING):  aspirin enteric coated 81 milliGRAM(s) Oral daily  atorvastatin 20 milliGRAM(s) Oral at bedtime  budesonide 160 MICROgram(s)/formoterol 4.5 MICROgram(s) Inhaler 2 Puff(s) Inhalation two times a day  buMETAnide Infusion 1 mG/Hr (5 mL/Hr) IV Continuous <Continuous>  chlorhexidine 4% Liquid 1 Application(s) Topical <User Schedule>  metolazone 2.5 milliGRAM(s) Oral daily  metolazone 2.5 milliGRAM(s) Oral at bedtime  nystatin Powder 1 Application(s) Topical every 12 hours  sodium chloride 3%. 150 milliLiter(s) (50 mL/Hr) IV Continuous <Continuous>  sodium chloride 3%. 150 milliLiter(s) (50 mL/Hr) IV Continuous <Continuous>  sodium chloride 3%. 150 milliLiter(s) (50 mL/Hr) IV Continuous <Continuous>  sodium chloride 3%. 150 milliLiter(s) (50 mL/Hr) IV Continuous <Continuous>  sodium chloride 3%. 150 milliLiter(s) (50 mL/Hr) IV Continuous <Continuous>  sodium chloride 3%. 150 milliLiter(s) (50 mL/Hr) IV Continuous <Continuous>  spironolactone 25 milliGRAM(s) Oral daily    MEDICATIONS  (PRN):  ALBUTerol  90 MICROgram(s) HFA Inhaler - Peds 2 Puff(s) Inhalation every 4 hours PRN Shortness of Breath and/or Wheezing    Allergies  No Known Allergies    SOCIAL HISTORY: No illicit drug use    Vital Signs Last 24 Hrs  T(C): 36.8 (2021 04:30), Max: 36.8 (2021 04:30)  T(F): 98.3 (2021 04:30), Max: 98.3 (2021 04:30)  HR: 64 (2021 04:30) (63 - 80)  BP: 125/57 (2021 04:30) (121/59 - 180/60)  RR: 18 (2021 04:30) (16 - 18)  SpO2: 92% (2021 08:00) (92% - 92%)    PHYSICAL EXAM:  Constitutional: NAD, well-developed  HEENT: EOMI  Neck: no pain  Back: No CVA tenderness  Respiratory: No accessory respiratory muscle use  Abd: +obese abdomen, soft, nontender, nondistended  : circumcised male, mildly edematous scrotum, nontender to palpation, nonfluctuant. Bladder nonpalpable, no suprapubic tenderness.   Extremities: no edema  Neurological: A/O x 3  Psychiatric: Normal mood, normal affect  Skin: No rashes    I&O's Summary    2021 07:01  -  2021 07:00  --------------------------------------------------------  IN: 860 mL / OUT: 3095 mL / NET: -2235 mL    2021 07:01  -  2021 05:20  --------------------------------------------------------  IN: 495 mL / OUT: 4600 mL / NET: -4105 mL    LABS:               8.3    4.36  )-----------( 122      ( 2021 05:40 )             26.8     138  |  94<L>  |  31<H>  ----------------------------<  117<H>  4.5   |  37<H>  |  1.3    Ca    9.1      2021 17:34  Mg     2.3     12  TPro  6.3  /  Alb  3.5  /  TBili  2.1<H>  /  DBili  1.2<H>  /  AST  18  /  ALT  9   /  AlkPhos  115  07-12  PT/INR - ( 2021 05:40 )   PT: 28.80 sec;   INR: 2.50 ratio    PTT - ( 2021 05:40 )  PTT:41.3 sec    Urinalysis Basic - ( 2021 00:08 )  Color: Light Yellow / Appearance: Clear / S.007 / pH: x  Gluc: x / Ketone: Negative  / Bili: Negative / Urobili: <2 mg/dL   Blood: x / Protein: Negative / Nitrite: Negative   Leuk Esterase: Small / RBC: 147 /HPF / WBC 5 /HPF   Sq Epi: x / Non Sq Epi: 1 /HPF / Bacteria: Negative

## 2021-07-13 NOTE — CONSULT NOTE ADULT - ASSESSMENT
81y/o M on ASA/warfarin with one episode of hematuria s/p lowe placement/removal    - Would obtain repeat UA, UCx, Urine cytology   - Bedside bladder scan showed ~40cc, and the patient is voiding spontaneously, but due to large amount of ascites would recommend official renal bladder sonogram with PVR   - Flomax if no contraindications  - Will d/w attending.

## 2021-07-13 NOTE — PROGRESS NOTE ADULT - ASSESSMENT
a/p:    #Acute hypoxic respiratory failure 2/2 acute decom HFpEF  -cont tele monitoring  -iv bumex gtt---monitor i/o, daily weights and fluid restriction  -started on metalozone 2.5 mg bid and spirinolactone 25 mg bid  -monitor o2 sat and suppl o2 prn  -titrate off bumex gtt in next 24 hrs  -cardiology/heart failure following  -3% hypertonic saline and iv iron    #NSVT with bradycardia on admission  -resolved and hr remains 60-70  -cardiology following---no plan for ppm insertion at this time  -    #AF on AC  -inr 1.5 (was supratherapeutic initially with some hematuria (has since resolved and was seen by )  -coumadin 3 mg tonight  -monitor inr, cbc and for any signs of bleeding    #DVT LLE- chronic  -cont AC coumadin (goal inr 2-3)  -pain control (percocet 1 tab q12 hr prn oral)---given additional morphine 0.5 mg iv today with adequate relief  -leg elevation    #DVT/GI ppx  guarded prognosis    FULL CODE    #Progress Note Handoff  Pending (specify):  adequate diuresis---remains on bumex gtt and telemetry  Disposition: Unknown at this time X

## 2021-07-14 DIAGNOSIS — R31.9 HEMATURIA, UNSPECIFIED: ICD-10-CM

## 2021-07-14 LAB
ALBUMIN SERPL ELPH-MCNC: 3.3 G/DL — LOW (ref 3.5–5.2)
ALP SERPL-CCNC: 115 U/L — SIGNIFICANT CHANGE UP (ref 30–115)
ALT FLD-CCNC: 9 U/L — SIGNIFICANT CHANGE UP (ref 0–41)
ANION GAP SERPL CALC-SCNC: 7 MMOL/L — SIGNIFICANT CHANGE UP (ref 7–14)
ANION GAP SERPL CALC-SCNC: 8 MMOL/L — SIGNIFICANT CHANGE UP (ref 7–14)
ANION GAP SERPL CALC-SCNC: 8 MMOL/L — SIGNIFICANT CHANGE UP (ref 7–14)
AST SERPL-CCNC: 21 U/L — SIGNIFICANT CHANGE UP (ref 0–41)
BASOPHILS # BLD AUTO: 0.01 K/UL — SIGNIFICANT CHANGE UP (ref 0–0.2)
BASOPHILS NFR BLD AUTO: 0.2 % — SIGNIFICANT CHANGE UP (ref 0–1)
BILIRUB SERPL-MCNC: 2.3 MG/DL — HIGH (ref 0.2–1.2)
BUN SERPL-MCNC: 31 MG/DL — HIGH (ref 10–20)
BUN SERPL-MCNC: 34 MG/DL — HIGH (ref 10–20)
BUN SERPL-MCNC: 36 MG/DL — HIGH (ref 10–20)
CALCIUM SERPL-MCNC: 9.5 MG/DL — SIGNIFICANT CHANGE UP (ref 8.5–10.1)
CALCIUM SERPL-MCNC: 9.6 MG/DL — SIGNIFICANT CHANGE UP (ref 8.5–10.1)
CALCIUM SERPL-MCNC: 9.6 MG/DL — SIGNIFICANT CHANGE UP (ref 8.5–10.1)
CHLORIDE SERPL-SCNC: 87 MMOL/L — LOW (ref 98–110)
CO2 SERPL-SCNC: 43 MMOL/L — CRITICAL HIGH (ref 17–32)
CO2 SERPL-SCNC: 44 MMOL/L — CRITICAL HIGH (ref 17–32)
CO2 SERPL-SCNC: 45 MMOL/L — CRITICAL HIGH (ref 17–32)
CREAT SERPL-MCNC: 1.4 MG/DL — SIGNIFICANT CHANGE UP (ref 0.7–1.5)
CREAT SERPL-MCNC: 1.5 MG/DL — SIGNIFICANT CHANGE UP (ref 0.7–1.5)
CREAT SERPL-MCNC: 1.5 MG/DL — SIGNIFICANT CHANGE UP (ref 0.7–1.5)
CULTURE RESULTS: SIGNIFICANT CHANGE UP
EOSINOPHIL # BLD AUTO: 0.1 K/UL — SIGNIFICANT CHANGE UP (ref 0–0.7)
EOSINOPHIL NFR BLD AUTO: 2.2 % — SIGNIFICANT CHANGE UP (ref 0–8)
GLUCOSE SERPL-MCNC: 100 MG/DL — HIGH (ref 70–99)
GLUCOSE SERPL-MCNC: 95 MG/DL — SIGNIFICANT CHANGE UP (ref 70–99)
GLUCOSE SERPL-MCNC: 98 MG/DL — SIGNIFICANT CHANGE UP (ref 70–99)
HCT VFR BLD CALC: 31.8 % — LOW (ref 42–52)
HGB BLD-MCNC: 10.3 G/DL — LOW (ref 14–18)
IMM GRANULOCYTES NFR BLD AUTO: 0.4 % — HIGH (ref 0.1–0.3)
INR BLD: 1.58 RATIO — HIGH (ref 0.65–1.3)
LYMPHOCYTES # BLD AUTO: 0.63 K/UL — LOW (ref 1.2–3.4)
LYMPHOCYTES # BLD AUTO: 13.7 % — LOW (ref 20.5–51.1)
MAGNESIUM SERPL-MCNC: 2 MG/DL — SIGNIFICANT CHANGE UP (ref 1.8–2.4)
MAGNESIUM SERPL-MCNC: 2.7 MG/DL — HIGH (ref 1.8–2.4)
MCHC RBC-ENTMCNC: 28.1 PG — SIGNIFICANT CHANGE UP (ref 27–31)
MCHC RBC-ENTMCNC: 32.4 G/DL — SIGNIFICANT CHANGE UP (ref 32–37)
MCV RBC AUTO: 86.9 FL — SIGNIFICANT CHANGE UP (ref 80–94)
MONOCYTES # BLD AUTO: 0.71 K/UL — HIGH (ref 0.1–0.6)
MONOCYTES NFR BLD AUTO: 15.4 % — HIGH (ref 1.7–9.3)
NEUTROPHILS # BLD AUTO: 3.14 K/UL — SIGNIFICANT CHANGE UP (ref 1.4–6.5)
NEUTROPHILS NFR BLD AUTO: 68.1 % — SIGNIFICANT CHANGE UP (ref 42.2–75.2)
NRBC # BLD: 0 /100 WBCS — SIGNIFICANT CHANGE UP (ref 0–0)
PLATELET # BLD AUTO: 150 K/UL — SIGNIFICANT CHANGE UP (ref 130–400)
POTASSIUM SERPL-MCNC: 3.6 MMOL/L — SIGNIFICANT CHANGE UP (ref 3.5–5)
POTASSIUM SERPL-MCNC: 3.8 MMOL/L — SIGNIFICANT CHANGE UP (ref 3.5–5)
POTASSIUM SERPL-MCNC: 4.5 MMOL/L — SIGNIFICANT CHANGE UP (ref 3.5–5)
POTASSIUM SERPL-SCNC: 3.6 MMOL/L — SIGNIFICANT CHANGE UP (ref 3.5–5)
POTASSIUM SERPL-SCNC: 3.8 MMOL/L — SIGNIFICANT CHANGE UP (ref 3.5–5)
POTASSIUM SERPL-SCNC: 4.5 MMOL/L — SIGNIFICANT CHANGE UP (ref 3.5–5)
PROT SERPL-MCNC: 6.6 G/DL — SIGNIFICANT CHANGE UP (ref 6–8)
PROTHROM AB SERPL-ACNC: 18.2 SEC — HIGH (ref 9.95–12.87)
RBC # BLD: 3.66 M/UL — LOW (ref 4.7–6.1)
RBC # FLD: 20.2 % — HIGH (ref 11.5–14.5)
SODIUM SERPL-SCNC: 138 MMOL/L — SIGNIFICANT CHANGE UP (ref 135–146)
SODIUM SERPL-SCNC: 139 MMOL/L — SIGNIFICANT CHANGE UP (ref 135–146)
SODIUM SERPL-SCNC: 139 MMOL/L — SIGNIFICANT CHANGE UP (ref 135–146)
SPECIMEN SOURCE: SIGNIFICANT CHANGE UP
WBC # BLD: 4.61 K/UL — LOW (ref 4.8–10.8)
WBC # FLD AUTO: 4.61 K/UL — LOW (ref 4.8–10.8)

## 2021-07-14 PROCEDURE — 99233 SBSQ HOSP IP/OBS HIGH 50: CPT

## 2021-07-14 RX ORDER — MORPHINE SULFATE 50 MG/1
0.5 CAPSULE, EXTENDED RELEASE ORAL ONCE
Refills: 0 | Status: DISCONTINUED | OUTPATIENT
Start: 2021-07-14 | End: 2021-07-14

## 2021-07-14 RX ORDER — WARFARIN SODIUM 2.5 MG/1
1 TABLET ORAL ONCE
Refills: 0 | Status: DISCONTINUED | OUTPATIENT
Start: 2021-07-14 | End: 2021-07-14

## 2021-07-14 RX ORDER — WARFARIN SODIUM 2.5 MG/1
3 TABLET ORAL ONCE
Refills: 0 | Status: DISCONTINUED | OUTPATIENT
Start: 2021-07-14 | End: 2021-07-14

## 2021-07-14 RX ORDER — SENNA PLUS 8.6 MG/1
2 TABLET ORAL AT BEDTIME
Refills: 0 | Status: DISCONTINUED | OUTPATIENT
Start: 2021-07-14 | End: 2021-07-22

## 2021-07-14 RX ORDER — POTASSIUM CHLORIDE 20 MEQ
20 PACKET (EA) ORAL ONCE
Refills: 0 | Status: COMPLETED | OUTPATIENT
Start: 2021-07-14 | End: 2021-07-14

## 2021-07-14 RX ORDER — POLYETHYLENE GLYCOL 3350 17 G/17G
17 POWDER, FOR SOLUTION ORAL AT BEDTIME
Refills: 0 | Status: DISCONTINUED | OUTPATIENT
Start: 2021-07-14 | End: 2021-07-22

## 2021-07-14 RX ORDER — MAGNESIUM SULFATE 500 MG/ML
2 VIAL (ML) INJECTION
Refills: 0 | Status: COMPLETED | OUTPATIENT
Start: 2021-07-14 | End: 2021-07-14

## 2021-07-14 RX ORDER — WARFARIN SODIUM 2.5 MG/1
1 TABLET ORAL ONCE
Refills: 0 | Status: COMPLETED | OUTPATIENT
Start: 2021-07-14 | End: 2021-07-14

## 2021-07-14 RX ORDER — POTASSIUM CHLORIDE 20 MEQ
40 PACKET (EA) ORAL ONCE
Refills: 0 | Status: DISCONTINUED | OUTPATIENT
Start: 2021-07-14 | End: 2021-07-14

## 2021-07-14 RX ORDER — MORPHINE SULFATE 50 MG/1
1 CAPSULE, EXTENDED RELEASE ORAL ONCE
Refills: 0 | Status: DISCONTINUED | OUTPATIENT
Start: 2021-07-14 | End: 2021-07-15

## 2021-07-14 RX ORDER — HYDROMORPHONE HYDROCHLORIDE 2 MG/ML
1 INJECTION INTRAMUSCULAR; INTRAVENOUS; SUBCUTANEOUS ONCE
Refills: 0 | Status: DISCONTINUED | OUTPATIENT
Start: 2021-07-14 | End: 2021-07-14

## 2021-07-14 RX ORDER — SODIUM CHLORIDE 5 G/100ML
150 INJECTION, SOLUTION INTRAVENOUS
Refills: 0 | Status: DISCONTINUED | OUTPATIENT
Start: 2021-07-14 | End: 2021-07-17

## 2021-07-14 RX ORDER — POTASSIUM CHLORIDE 20 MEQ
40 PACKET (EA) ORAL ONCE
Refills: 0 | Status: COMPLETED | OUTPATIENT
Start: 2021-07-14 | End: 2021-07-14

## 2021-07-14 RX ADMIN — MORPHINE SULFATE 0.5 MILLIGRAM(S): 50 CAPSULE, EXTENDED RELEASE ORAL at 06:56

## 2021-07-14 RX ADMIN — SENNA PLUS 2 TABLET(S): 8.6 TABLET ORAL at 22:00

## 2021-07-14 RX ADMIN — CHLORHEXIDINE GLUCONATE 1 APPLICATION(S): 213 SOLUTION TOPICAL at 06:31

## 2021-07-14 RX ADMIN — BUDESONIDE AND FORMOTEROL FUMARATE DIHYDRATE 2 PUFF(S): 160; 4.5 AEROSOL RESPIRATORY (INHALATION) at 08:02

## 2021-07-14 RX ADMIN — Medication 81 MILLIGRAM(S): at 11:14

## 2021-07-14 RX ADMIN — NYSTATIN CREAM 1 APPLICATION(S): 100000 CREAM TOPICAL at 06:28

## 2021-07-14 RX ADMIN — MORPHINE SULFATE 0.5 MILLIGRAM(S): 50 CAPSULE, EXTENDED RELEASE ORAL at 07:36

## 2021-07-14 RX ADMIN — NYSTATIN CREAM 1 APPLICATION(S): 100000 CREAM TOPICAL at 17:09

## 2021-07-14 RX ADMIN — BUDESONIDE AND FORMOTEROL FUMARATE DIHYDRATE 2 PUFF(S): 160; 4.5 AEROSOL RESPIRATORY (INHALATION) at 20:31

## 2021-07-14 RX ADMIN — HYDROMORPHONE HYDROCHLORIDE 1 MILLIGRAM(S): 2 INJECTION INTRAMUSCULAR; INTRAVENOUS; SUBCUTANEOUS at 16:17

## 2021-07-14 RX ADMIN — SPIRONOLACTONE 25 MILLIGRAM(S): 25 TABLET, FILM COATED ORAL at 17:11

## 2021-07-14 RX ADMIN — Medication 50 GRAM(S): at 13:03

## 2021-07-14 RX ADMIN — POLYETHYLENE GLYCOL 3350 17 GRAM(S): 17 POWDER, FOR SOLUTION ORAL at 22:00

## 2021-07-14 RX ADMIN — SODIUM CHLORIDE 50 MILLILITER(S): 5 INJECTION, SOLUTION INTRAVENOUS at 06:28

## 2021-07-14 RX ADMIN — IRON SUCROSE 110 MILLIGRAM(S): 20 INJECTION, SOLUTION INTRAVENOUS at 10:49

## 2021-07-14 RX ADMIN — BUMETANIDE 5 MG/HR: 0.25 INJECTION INTRAMUSCULAR; INTRAVENOUS at 20:29

## 2021-07-14 RX ADMIN — Medication 40 MILLIEQUIVALENT(S): at 11:14

## 2021-07-14 RX ADMIN — Medication 50 GRAM(S): at 12:08

## 2021-07-14 RX ADMIN — ATORVASTATIN CALCIUM 20 MILLIGRAM(S): 80 TABLET, FILM COATED ORAL at 22:00

## 2021-07-14 RX ADMIN — SPIRONOLACTONE 25 MILLIGRAM(S): 25 TABLET, FILM COATED ORAL at 06:28

## 2021-07-14 RX ADMIN — Medication 50 MILLIEQUIVALENT(S): at 12:51

## 2021-07-14 RX ADMIN — HYDROMORPHONE HYDROCHLORIDE 1 MILLIGRAM(S): 2 INJECTION INTRAMUSCULAR; INTRAVENOUS; SUBCUTANEOUS at 17:03

## 2021-07-14 RX ADMIN — WARFARIN SODIUM 1 MILLIGRAM(S): 2.5 TABLET ORAL at 22:00

## 2021-07-14 RX ADMIN — SODIUM CHLORIDE 50 MILLILITER(S): 5 INJECTION, SOLUTION INTRAVENOUS at 18:01

## 2021-07-14 NOTE — PHYSICAL THERAPY INITIAL EVALUATION ADULT - ADDITIONAL COMMENTS
Pt is a poor historian, reports both walking and not walking prior to his hospital admission. Reports that his spouse helps him at home. Denies using a AD.

## 2021-07-14 NOTE — PROGRESS NOTE ADULT - ASSESSMENT
Acute on chronic diastolic HF/ Anemia / CKD/ DVT    Patient remains fluid overloaded IVC 2.2 cm non-collapsing  Continue Bumex gtt at 1 mg/hr  Continue 3% Hypertonic Saline 150ml BID and metolazone 2.5 mg BID  Continue spironolactone 25 mg BID  Keep Mg >2.2 and K>4.0  BMP daily   Venous duplex showed DVT on RLE  Iron sucrose 200 mg daily x5  Work up for TONJA - Get FOBT   Continue A/C for DVT/ afib   PT evaluation   Discussed with primary team    Acute on chronic diastolic HF/ Anemia / CKD/ DVT    Patient remains fluid overloaded IVC 2.2 cm non-collapsing  Continue Bumex gtt at 1 mg/hr  Continue 3% Hypertonic Saline 150ml BID and metolazone 2.5 mg BID  Continue spironolactone 25 mg BID  Give 2 gm Magnesium IV now  and one in the afternoon  Give IV potassium 10 mEq IV  Keep Mg >2.2 and K>4.0  Repeat BMP this afternoon  Venous duplex showed DVT on RLE  Iron sucrose 200 mg daily x5  Work up for TONJA - Get FOBT   Continue A/C for DVT/ afib   PT evaluation   Discussed with primary team    Acute on chronic diastolic HF/ Anemia / CKD/ DVT    Patient remains fluid overloaded IVC 2.2 cm non-collapsing  Continue Bumex gtt at 1 mg/hr  Continue 3% Hypertonic Saline 150ml BID and metolazone 2.5 mg BID  Continue spironolactone 25 mg BID  Give 2 gm Magnesium IV now  and one in the afternoon  Give IV potassium 10 mEq IV  Keep Mg >2.2 and K>4.0  Repeat BMP this afternoon  Venous duplex showed DVT on RLE  Iron sucrose 200 mg daily x5  Work up for TONJA - Get FOBT   Continue A/C for DVT/ afib   PT evaluation   Discussed with primary team

## 2021-07-14 NOTE — PHYSICAL THERAPY INITIAL EVALUATION ADULT - GENERAL OBSERVATIONS, REHAB EVAL
Pt encountered in bed sleeping, easily arousable, +b/l IVs, O2 @2L via NC. Agreeable for PT, elizabeth. poorly. Pt reported 9/10 pain in LLE that increases with movement. Pt left as encountered in bed, TOOTIE Allison made aware.

## 2021-07-14 NOTE — PHYSICAL THERAPY INITIAL EVALUATION ADULT - LEVEL OF INDEPENDENCE: SUPINE/SIT, REHAB EVAL
unable to attain full sitting position as pt refused to continue activity senior living through sitting/dependent (less than 25% patients effort)

## 2021-07-14 NOTE — PROGRESS NOTE ADULT - SUBJECTIVE AND OBJECTIVE BOX
Interval History:    - Patient resting in bed, c/o BLE leg pain  - Remains fluid overloaded      79 y/o M with h/o CAD s/p PCI, COPD on home O2, HFpEF, afib admitted with worsening SOB. Patient reports having worsening SOB over past few days along with b/l LE edema and chest pain. No LOC, N/V. He claims to be compliant with medications.           PMH/PSH: CAD s/p PCI, COPD, HFpEF      FH: No early CAD or SCD       PHYSICAL EXAM     General: AAO x3, no acute distress   Neck: Trachea is central, JVD -ve  Lungs: CTA, no crackles   Heart: Regular heart sounds, no murmurs   GI: Abdomen is soft, NT  Neuro: Normal strength  Psych: Calm affect   Integument: No skin bruises           Interval History:    - Patient resting in bed, c/o BLE leg pain  - Remains fluid overloaded      81 y/o M with h/o CAD s/p PCI, COPD on home O2, HFpEF, afib admitted with worsening SOB. Patient reports having worsening SOB over past few days along with b/l LE edema and chest pain. No LOC, N/V. He claims to be compliant with medications.           PMH/PSH: CAD s/p PCI, COPD, HFpEF      FH: No early CAD or SCD       PHYSICAL EXAM     General: AAO x3, no acute distress   Neck: Trachea is central, JVD -ve  Lungs: CTA, no crackles   Heart: Regular heart sounds, no murmurs   GI: Abdomen is soft, NT  Neuro: Normal strength  Psych: Calm affect   Integument: No skin bruises

## 2021-07-14 NOTE — PHYSICAL THERAPY INITIAL EVALUATION ADULT - BED MOBILITY LIMITATIONS, REHAB EVAL
yes
decreased ability to use arms for pushing/pulling/decreased ability to use legs for bridging/pushing/impaired ability to control trunk for mobility

## 2021-07-14 NOTE — PROGRESS NOTE ADULT - SUBJECTIVE AND OBJECTIVE BOX
RODNEY SANTO  80y Male    CHIEF COMPLAINT:    Patient is a 80y old  Male who presents with a chief complaint of shortness of breath (2021 11:24)      INTERVAL HPI/OVERNIGHT EVENTS:    Patient seen and examined. C/O severe leg cramps. On 2L NC saturating 98%. Denies sob. On Bumex infusion    ROS: All other systems are negative.    Vital Signs:    T(F): 97.6 (21 @ 05:08), Max: 97.6 (21 @ 05:08)  HR: 71 (21 @ 13:00) (51 - 73)  BP: 130/56 (21 @ 13:00) (103/53 - 130/56)  RR: 18 (21 @ 05:08) (18 - 18)  SpO2: 98% (21 @ 08:16) (95% - 100%)  I&O's Summary    2021 07:  -  2021 07:00  --------------------------------------------------------  IN: 1460 mL / OUT: 4970 mL / NET: -3510 mL    2021 07:01  -  2021 15:44  --------------------------------------------------------  IN: 540 mL / OUT: 875 mL / NET: -335 mL      Daily     Daily Weight in k.2 (2021 05:08)  CAPILLARY BLOOD GLUCOSE          PHYSICAL EXAM:    GENERAL:  NAD  SKIN: No rashes or lesions  HENT: Atraumatic. Normocephalic. PERRL. Moist membranes.  NECK: Supple, No JVD. No lymphadenopathy.  PULMONARY: Decreased BS in the bases B/L. No wheezing. No rales  CVS: Normal S1, S2. Rate and Rhythm are regular. No murmurs.  ABDOMEN/GI: Soft, Nontender, Nondistended; BS present  EXTREMITIES: Peripheral pulses intact. +ve pitting edema B/L LE.  NEUROLOGIC:  No motor or sensory deficit.  PSYCH: Alert & oriented x 3    Consultant(s) Notes Reviewed:  [x ] YES  [ ] NO  Care Discussed with Consultants/Other Providers [ x] YES  [ ] NO    EKG reviewed  Telemetry reviewed    LABS:                        10.3   4.61  )-----------( 150      ( 2021 06:41 )             31.8     07-14    139  |  87<L>  |  34<H>  ----------------------------<  98   Creatinine Trend: 1.5<--, 1.4<--, 1.3<--, 1.3<--, 1.3<--, 1.3<--  3.8   |  44<HH>  |  1.5    Ca    9.6      2021 06:41  Mg     2.0     07-14    TPro  6.6  /  Alb  3.3<L>  /  TBili  2.3<H>  /  DBili  x   /  AST  21  /  ALT  9   /  AlkPhos  115  07-14    PT/INR - ( 2021 06:35 )   PT: 17.40 sec;   INR: 1.51 ratio                 Culture - Urine (collected 2021 23:26)  Source: .Urine Catheterized  Final Report (2021 09:09):    <10,000 CFU/mL Normal Urogenital Guerita        RADIOLOGY & ADDITIONAL TESTS:      Imaging or report Personally Reviewed:  [ ] YES  [ ] NO    Medications:  Standing  aspirin enteric coated 81 milliGRAM(s) Oral daily  atorvastatin 20 milliGRAM(s) Oral at bedtime  budesonide 160 MICROgram(s)/formoterol 4.5 MICROgram(s) Inhaler 2 Puff(s) Inhalation two times a day  buMETAnide Infusion 1 mG/Hr IV Continuous <Continuous>  chlorhexidine 4% Liquid 1 Application(s) Topical <User Schedule>  iron sucrose IVPB 200 milliGRAM(s) IV Intermittent every 24 hours  metolazone 2.5 milliGRAM(s) Oral <User Schedule>  nystatin Powder 1 Application(s) Topical every 12 hours  sodium chloride 3%. 150 milliLiter(s) IV Continuous <Continuous>  sodium chloride 3%. 150 milliLiter(s) IV Continuous <Continuous>  sodium chloride 3%. 150 milliLiter(s) IV Continuous <Continuous>  sodium chloride 3%. 150 milliLiter(s) IV Continuous <Continuous>  sodium chloride 3%. 150 milliLiter(s) IV Continuous <Continuous>  sodium chloride 3%. 150 milliLiter(s) IV Continuous <Continuous>  sodium chloride 3%. 150 milliLiter(s) IV Continuous <Continuous>  sodium chloride 3%. 150 milliLiter(s) IV Continuous <Continuous>  spironolactone 25 milliGRAM(s) Oral two times a day    PRN Meds  ALBUTerol  90 MICROgram(s) HFA Inhaler - Peds 2 Puff(s) Inhalation every 4 hours PRN  oxycodone    5 mG/acetaminophen 325 mG 1 Tablet(s) Oral every 8 hours PRN      Case discussed with resident    Care discussed with pt/family

## 2021-07-14 NOTE — PROGRESS NOTE ADULT - ASSESSMENT
79 y/o male with PMH CAD s/p PCI and stent 2007, COPD on 2L home O2, HFpEF TTE in 4/21 EF 55-65, DLD, atrial fibrillation on coumadin presents for shortness of breath and increase in his NADIA along with 1 day history of chest pain.    # HFpEF exacerbation  # Afib SVR # Bradycardia # NSVT  # CAD s/p stent 2007  # Severe Pulmonary HTN  - CXR b/l pleural effusions and vascular congestion  - TTE 4/21 EF 55-65 PSAP 70 mmhg; get new TTE  - BNP 14K  - ECG afib in SVR  - troponin negative   - bumex 20 mg IV 1mg/hr   -Spironolactone 25 mg daily   -Metolazone 2.5 mg daily   - hypertonic saline  - Cw ASA 81 md daily  - Cw atorvastatin 20 mg daily  -  lipid panel WNL except HDL 39 and A1c 5.6  - iron profile WNL  - daily weight, strict I/Os keep I < O  - DASH TLC diet  fluid restriction 1000 ml daily  - continue tele monitoring  - EP on board : Loop recorder placed 7/12    - Avoid AVN blockers  -  INR 1.5, coumadin 3mg tonight   - Cardiology Cs doctor Niesha; Heart failure team consult  - TSH 4.34  - keep K > 4 and Mg > 2.1  - NC 2 LPM; HOB > 35 titrate oxygen keep saO2 89-94%  - arterial duplex of LE normal flow  -Diamox PRN for Elevated bicarb      Chronic Afib  #INR 1.5, Coumadin 3mg tonight       # DORON on CKD II  - creatinine on admission 1.5 baseline 1.1  - Most likely secondary to cardio renal syndrome  - Hold entresto for now  -    # Normocytic anemia- chronic  - at baseline  - -Iron sucrose    - FU folate b12 and TSH  - retic count WNL  - Stool sample for guaiac  - needs OP colonoscopy  - HIv screening negative  - active type and screen keep Hb > 7    # COPD not in exacerbation  # PSAP 70 mmhg  - on  2 LPM NC at home during night   - c/w symbicort and ventolin as needed  - CXR bilateral opacities basilar; underlying neoplasm not excluded  - chest CT

## 2021-07-14 NOTE — PROGRESS NOTE ADULT - SUBJECTIVE AND OBJECTIVE BOX
RODNEY SANTO  80y  Male      Patient is a 80y old  Male who presents with a chief complaint of shortness of breath (2021 15:42)      INTERVAL HPI/OVERNIGHT EVENTS:    Patient is lying in bed comfortably. States that he was unable to work with PT because of leg pain/spasm.     REVIEW OF SYSTEMS:  CONSTITUTIONAL: No fever, weight loss, or fatigue  EYES: No eye pain, visual disturbances, or discharge  ENMT:  No difficulty hearing, tinnitus, vertigo; No sinus or throat pain  NECK: No pain or stiffness  RESPIRATORY: No cough, wheezing, chills or hemoptysis; No shortness of breath  CARDIOVASCULAR: No chest pain, palpitations, dizziness, or leg swelling  GASTROINTESTINAL: No abdominal or epigastric pain. No diarrhea or constipation. No nausea, vomiting, or hematemesis. No melena or hematochezia.  GENITOURINARY: No dysuria, frequency, hematuria, or incontinence  NEUROLOGICAL: No headaches, memory loss, loss of strength, numbness, or tremors  MUSCULOSKELETAL: No joint pain or swelling; No muscle, back, or extremity pain        Vital Signs Last 24 Hrs  T(C): 36.1 (2021 17:00), Max: 36.4 (2021 05:08)  T(F): 96.9 (2021 17:00), Max: 97.6 (2021 05:08)  HR: 74 (2021 17:00) (51 - 74)  BP: 116/57 (2021 17:00) (103/53 - 130/56)  BP(mean): --  RR: 18 (2021 17:00) (18 - 18)  SpO2: 98% (2021 08:16) (95% - 100%)    PHYSICAL EXAM:  GENERAL: NAD  HEAD:  Atraumatic, Normocephalic  EYES: EOMI, PERRLA, conjunctiva and sclera clear  ENMT: Moist mucous membranes  NECK: Supple,   NERVOUS SYSTEM:  Alert & Oriented X3, Good concentration; Motor Strength 5/5 B/L upper and lower extremities; DTRs 2+ intact and symmetric  CHEST/LUNG: Clear to auscultation bilaterally; No rales, rhonchi, wheezing, or rubs  HEART: Regular rate and rhythm; No murmurs, rubs, or gallops  ABDOMEN: Soft, Nontender, Nondistended; Bowel sounds present  EXTREMITIES:  LE edema improving     Consultant(s) Notes Reviewed:  [x ] YES  [ ] NO  Care Discussed with Consultants/Other Providers [ x] YES  [ ] NO    LABS:                        10.3   4.61  )-----------( 150      ( 2021 06:41 )             31.8     07-14    139  |  87<L>  |  34<H>  ----------------------------<  98  3.8   |  44<HH>  |  1.5    Ca    9.6      2021 06:41  Mg     2.0     07-14    TPro  6.6  /  Alb  3.3<L>  /  TBili  2.3<H>  /  DBili  x   /  AST  21  /  ALT  9   /  AlkPhos  115  07-14    PT/INR - ( 2021 06:35 )   PT: 17.40 sec;   INR: 1.51 ratio           Urinalysis Basic - ( 2021 00:08 )    Color: Light Yellow / Appearance: Clear / S.007 / pH: x  Gluc: x / Ketone: Negative  / Bili: Negative / Urobili: <2 mg/dL   Blood: x / Protein: Negative / Nitrite: Negative   Leuk Esterase: Small / RBC: 147 /HPF / WBC 5 /HPF   Sq Epi: x / Non Sq Epi: 1 /HPF / Bacteria: Negative          EXAM:  DUPLEX SCAN EXT VEINS LOWER BI            PROCEDURE DATE:  2021            INTERPRETATION:  CLINICAL INFORMATION: 80-year-old male with leg pain and swelling    COMPARISON: None available.    TECHNIQUE: Duplex sonography of the BILATERAL LOWER extremity veins with color and spectral Doppler, with and without compression.    FINDINGS:    LEFT:  Normal compressibility of the RIGHT common femoral, femoral and popliteal veins.  Doppler examination shows normal spontaneous and phasic flow.  No LEFT calf vein thrombosis is detected.    RIGHT:  no compressibility of the LEFT common femoral, femoral and popliteal veins.  Doppler examination shows deep venous thrombosis.  No RIGHT calf vein thrombosis is detected.    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

## 2021-07-14 NOTE — PROGRESS NOTE ADULT - ASSESSMENT
79 y/o male with PMH CAD s/p PCI and stent 2007, COPD on 2L home O2, HFpEF TTE in 4/21 EF 55-65, DLD, atrial fibrillation on coumadin presented with progressively worsening sob associated with increasing leg swelling for the last few days. Also C/O intermittent cp for the last one day.     Acute HFpEF  DORON  COPD on home O2  CAD S/P PCI in 2007  Chronic A-Fib with Slow Ventricular Response   NSVT              PLAN:    ·	Cont tele  ·	Having leg cramps. HF recommended to Magnesium sulfate 2 gm iv x 2 doses. Also give Kcl 10 meq. Check electrolytes twice a day.   ·	A negative balance of 3510 over the last 24 hrs  ·	Care D/W the HF specialist. Cont Bumex infusion 1 mg/hr and Metolazone 2.5 mg po q 12h. Give before hypertonic saline.   ·	Cont Spironolactone 25 mg po twice a day. Keep K >4 and Mg >2  ·	NSVT on tele. Pt was bradycardiac on admission and now the HR is 50-70. Will hold on PPM as per cardiology  ·	INR 1.51. Give Coumadin 3 mg po tonight. Check INR in aM   ·	Check i's and o's and daily wt  ·	Low salt diet and water restriction to 1.5 L/D  ·	Monitor renal function.   ·	CE x 2 are negative.   ·	Cont Symbicort    Progress Note Handoff    Pending (specify):  Consults_________, Tests________, Test Results_______, Other__On Bumex infusion. _____  Family discussion:  Disposition: Home___/SNF___/Other________/Unknown at this time________    Kyle Pritchett MD  Spectra: [phone]

## 2021-07-14 NOTE — PHYSICAL THERAPY INITIAL EVALUATION ADULT - PERTINENT HX OF CURRENT PROBLEM, REHAB EVAL
11 y/o male with PMH CAD s/p PCI and stent 2007, COPD on 2L home O2, HFpEF TTE in 4/21 EF 55-65, DLD, atrial fibrillation on coumadin presents for shortness of breath. was told yesterday that he had a significant increase in his weight compared to 5 weeks earlier. Also endorses worsening bilateral lower extremities edema reaching his thighs along with a left sided chest pain, dull in nature, mild in intensity, persistent for the past day.

## 2021-07-15 LAB
ALBUMIN SERPL ELPH-MCNC: 3 G/DL — LOW (ref 3.5–5.2)
ALP SERPL-CCNC: 105 U/L — SIGNIFICANT CHANGE UP (ref 30–115)
ALT FLD-CCNC: 8 U/L — SIGNIFICANT CHANGE UP (ref 0–41)
ANION GAP SERPL CALC-SCNC: 12 MMOL/L — SIGNIFICANT CHANGE UP (ref 7–14)
ANION GAP SERPL CALC-SCNC: 8 MMOL/L — SIGNIFICANT CHANGE UP (ref 7–14)
AST SERPL-CCNC: 24 U/L — SIGNIFICANT CHANGE UP (ref 0–41)
BASOPHILS # BLD AUTO: 0.02 K/UL — SIGNIFICANT CHANGE UP (ref 0–0.2)
BASOPHILS NFR BLD AUTO: 0.5 % — SIGNIFICANT CHANGE UP (ref 0–1)
BILIRUB SERPL-MCNC: 1.9 MG/DL — HIGH (ref 0.2–1.2)
BUN SERPL-MCNC: 39 MG/DL — HIGH (ref 10–20)
BUN SERPL-MCNC: 42 MG/DL — HIGH (ref 10–20)
CALCIUM SERPL-MCNC: 9.4 MG/DL — SIGNIFICANT CHANGE UP (ref 8.5–10.1)
CALCIUM SERPL-MCNC: 9.5 MG/DL — SIGNIFICANT CHANGE UP (ref 8.5–10.1)
CHLORIDE SERPL-SCNC: 83 MMOL/L — LOW (ref 98–110)
CHLORIDE SERPL-SCNC: 84 MMOL/L — LOW (ref 98–110)
CO2 SERPL-SCNC: 41 MMOL/L — CRITICAL HIGH (ref 17–32)
CO2 SERPL-SCNC: 46 MMOL/L — CRITICAL HIGH (ref 17–32)
CREAT SERPL-MCNC: 1.4 MG/DL — SIGNIFICANT CHANGE UP (ref 0.7–1.5)
CREAT SERPL-MCNC: 1.6 MG/DL — HIGH (ref 0.7–1.5)
EOSINOPHIL # BLD AUTO: 0.1 K/UL — SIGNIFICANT CHANGE UP (ref 0–0.7)
EOSINOPHIL NFR BLD AUTO: 2.4 % — SIGNIFICANT CHANGE UP (ref 0–8)
GLUCOSE SERPL-MCNC: 132 MG/DL — HIGH (ref 70–99)
GLUCOSE SERPL-MCNC: 88 MG/DL — SIGNIFICANT CHANGE UP (ref 70–99)
HCT VFR BLD CALC: 31.2 % — LOW (ref 42–52)
HGB BLD-MCNC: 10.1 G/DL — LOW (ref 14–18)
IMM GRANULOCYTES NFR BLD AUTO: 0.2 % — SIGNIFICANT CHANGE UP (ref 0.1–0.3)
INR BLD: 1.78 RATIO — HIGH (ref 0.65–1.3)
LYMPHOCYTES # BLD AUTO: 0.58 K/UL — LOW (ref 1.2–3.4)
LYMPHOCYTES # BLD AUTO: 14.1 % — LOW (ref 20.5–51.1)
MAGNESIUM SERPL-MCNC: 2.5 MG/DL — HIGH (ref 1.8–2.4)
MCHC RBC-ENTMCNC: 28.4 PG — SIGNIFICANT CHANGE UP (ref 27–31)
MCHC RBC-ENTMCNC: 32.4 G/DL — SIGNIFICANT CHANGE UP (ref 32–37)
MCV RBC AUTO: 87.6 FL — SIGNIFICANT CHANGE UP (ref 80–94)
MONOCYTES # BLD AUTO: 0.66 K/UL — HIGH (ref 0.1–0.6)
MONOCYTES NFR BLD AUTO: 16.1 % — HIGH (ref 1.7–9.3)
NEUTROPHILS # BLD AUTO: 2.73 K/UL — SIGNIFICANT CHANGE UP (ref 1.4–6.5)
NEUTROPHILS NFR BLD AUTO: 66.7 % — SIGNIFICANT CHANGE UP (ref 42.2–75.2)
NRBC # BLD: 0 /100 WBCS — SIGNIFICANT CHANGE UP (ref 0–0)
PLATELET # BLD AUTO: 141 K/UL — SIGNIFICANT CHANGE UP (ref 130–400)
POTASSIUM SERPL-MCNC: 4.3 MMOL/L — SIGNIFICANT CHANGE UP (ref 3.5–5)
POTASSIUM SERPL-MCNC: 4.3 MMOL/L — SIGNIFICANT CHANGE UP (ref 3.5–5)
POTASSIUM SERPL-SCNC: 4.3 MMOL/L — SIGNIFICANT CHANGE UP (ref 3.5–5)
POTASSIUM SERPL-SCNC: 4.3 MMOL/L — SIGNIFICANT CHANGE UP (ref 3.5–5)
PROT SERPL-MCNC: 6.5 G/DL — SIGNIFICANT CHANGE UP (ref 6–8)
PROTHROM AB SERPL-ACNC: 20.5 SEC — HIGH (ref 9.95–12.87)
RBC # BLD: 3.56 M/UL — LOW (ref 4.7–6.1)
RBC # FLD: 20 % — HIGH (ref 11.5–14.5)
SODIUM SERPL-SCNC: 136 MMOL/L — SIGNIFICANT CHANGE UP (ref 135–146)
SODIUM SERPL-SCNC: 138 MMOL/L — SIGNIFICANT CHANGE UP (ref 135–146)
WBC # BLD: 4.1 K/UL — LOW (ref 4.8–10.8)
WBC # FLD AUTO: 4.1 K/UL — LOW (ref 4.8–10.8)

## 2021-07-15 PROCEDURE — 99233 SBSQ HOSP IP/OBS HIGH 50: CPT

## 2021-07-15 PROCEDURE — 93970 EXTREMITY STUDY: CPT | Mod: 26

## 2021-07-15 PROCEDURE — 99232 SBSQ HOSP IP/OBS MODERATE 35: CPT

## 2021-07-15 RX ORDER — SODIUM CHLORIDE 5 G/100ML
150 INJECTION, SOLUTION INTRAVENOUS
Refills: 0 | Status: DISCONTINUED | OUTPATIENT
Start: 2021-07-17 | End: 2021-07-17

## 2021-07-15 RX ORDER — SODIUM CHLORIDE 5 G/100ML
150 INJECTION, SOLUTION INTRAVENOUS
Refills: 0 | Status: DISCONTINUED | OUTPATIENT
Start: 2021-07-15 | End: 2021-07-16

## 2021-07-15 RX ORDER — ACETAZOLAMIDE 250 MG/1
125 TABLET ORAL ONCE
Refills: 0 | Status: COMPLETED | OUTPATIENT
Start: 2021-07-15 | End: 2021-07-15

## 2021-07-15 RX ORDER — SODIUM CHLORIDE 5 G/100ML
150 INJECTION, SOLUTION INTRAVENOUS
Refills: 0 | Status: DISCONTINUED | OUTPATIENT
Start: 2021-07-16 | End: 2021-07-16

## 2021-07-15 RX ORDER — HYDROMORPHONE HYDROCHLORIDE 2 MG/ML
2 INJECTION INTRAMUSCULAR; INTRAVENOUS; SUBCUTANEOUS ONCE
Refills: 0 | Status: DISCONTINUED | OUTPATIENT
Start: 2021-07-15 | End: 2021-07-15

## 2021-07-15 RX ORDER — SODIUM CHLORIDE 5 G/100ML
150 INJECTION, SOLUTION INTRAVENOUS
Refills: 0 | Status: COMPLETED | OUTPATIENT
Start: 2021-07-15 | End: 2021-07-15

## 2021-07-15 RX ORDER — WARFARIN SODIUM 2.5 MG/1
2 TABLET ORAL ONCE
Refills: 0 | Status: COMPLETED | OUTPATIENT
Start: 2021-07-15 | End: 2021-07-15

## 2021-07-15 RX ADMIN — CHLORHEXIDINE GLUCONATE 1 APPLICATION(S): 213 SOLUTION TOPICAL at 06:43

## 2021-07-15 RX ADMIN — SODIUM CHLORIDE 50 MILLILITER(S): 5 INJECTION, SOLUTION INTRAVENOUS at 07:49

## 2021-07-15 RX ADMIN — BUDESONIDE AND FORMOTEROL FUMARATE DIHYDRATE 2 PUFF(S): 160; 4.5 AEROSOL RESPIRATORY (INHALATION) at 21:35

## 2021-07-15 RX ADMIN — SODIUM CHLORIDE 50 MILLILITER(S): 5 INJECTION, SOLUTION INTRAVENOUS at 18:10

## 2021-07-15 RX ADMIN — ATORVASTATIN CALCIUM 20 MILLIGRAM(S): 80 TABLET, FILM COATED ORAL at 21:36

## 2021-07-15 RX ADMIN — BUDESONIDE AND FORMOTEROL FUMARATE DIHYDRATE 2 PUFF(S): 160; 4.5 AEROSOL RESPIRATORY (INHALATION) at 08:28

## 2021-07-15 RX ADMIN — IRON SUCROSE 110 MILLIGRAM(S): 20 INJECTION, SOLUTION INTRAVENOUS at 06:43

## 2021-07-15 RX ADMIN — SPIRONOLACTONE 25 MILLIGRAM(S): 25 TABLET, FILM COATED ORAL at 17:10

## 2021-07-15 RX ADMIN — POLYETHYLENE GLYCOL 3350 17 GRAM(S): 17 POWDER, FOR SOLUTION ORAL at 21:38

## 2021-07-15 RX ADMIN — MORPHINE SULFATE 1 MILLIGRAM(S): 50 CAPSULE, EXTENDED RELEASE ORAL at 05:54

## 2021-07-15 RX ADMIN — OXYCODONE AND ACETAMINOPHEN 1 TABLET(S): 5; 325 TABLET ORAL at 17:17

## 2021-07-15 RX ADMIN — ACETAZOLAMIDE 102.5 MILLIGRAM(S): 250 TABLET ORAL at 08:48

## 2021-07-15 RX ADMIN — NYSTATIN CREAM 1 APPLICATION(S): 100000 CREAM TOPICAL at 06:43

## 2021-07-15 RX ADMIN — HYDROMORPHONE HYDROCHLORIDE 2 MILLIGRAM(S): 2 INJECTION INTRAMUSCULAR; INTRAVENOUS; SUBCUTANEOUS at 13:29

## 2021-07-15 RX ADMIN — NYSTATIN CREAM 1 APPLICATION(S): 100000 CREAM TOPICAL at 17:10

## 2021-07-15 RX ADMIN — HYDROMORPHONE HYDROCHLORIDE 2 MILLIGRAM(S): 2 INJECTION INTRAMUSCULAR; INTRAVENOUS; SUBCUTANEOUS at 12:56

## 2021-07-15 RX ADMIN — Medication 81 MILLIGRAM(S): at 12:59

## 2021-07-15 RX ADMIN — SPIRONOLACTONE 25 MILLIGRAM(S): 25 TABLET, FILM COATED ORAL at 06:43

## 2021-07-15 RX ADMIN — SENNA PLUS 2 TABLET(S): 8.6 TABLET ORAL at 21:38

## 2021-07-15 RX ADMIN — WARFARIN SODIUM 2 MILLIGRAM(S): 2.5 TABLET ORAL at 21:35

## 2021-07-15 RX ADMIN — MORPHINE SULFATE 1 MILLIGRAM(S): 50 CAPSULE, EXTENDED RELEASE ORAL at 04:24

## 2021-07-15 NOTE — PROGRESS NOTE ADULT - ASSESSMENT
79 y/o male with PMH CAD s/p PCI and stent 2007, COPD on 2L home O2, HFpEF TTE in 4/21 EF 55-65, DLD, atrial fibrillation on coumadin presented with progressively worsening sob associated with increasing leg swelling for the last few days. Also C/O intermittent cp for the last one day.     Acute HFpEF  DORON  COPD on home O2  CAD S/P PCI in 2007  Chronic A-Fib with Slow Ventricular Response   NSVT              PLAN:    ·	Cont tele  ·	Care D/W the HF specialist. Pt is now euvolemic. D/C Bumex infusion. Start Torsemide 20 mg po daily  ·	A negative balance of 2425 over the last 24 hrs  ·	Cont Spironolactone 25 mg po twice a day. Keep K >4 and Mg >2  ·	NSVT on tele. Pt was bradycardiac on admission and now the HR is 57-80. No need for PPM as per cardiology  ·	INR 1.78. Give Coumadin 2 mg po tonight. Check INR in AM   ·	Check i's and o's and daily wt  ·	Low salt diet and water restriction to 1.5 L/D  ·	Monitor renal function.   ·	CE x 2 are negative.   ·	Cont Symbicort  ·	PT/Rehab eval    Progress Note Handoff    Pending (specify):  Consults__PT/Rehab_______, Tests________, Test Results_______, Other_Social services for D/C planning _____  Family discussion:  Disposition: Home___/SNF___/Other________/Unknown at this time________    Kyle Pritchett MD  Spectra: [phone]

## 2021-07-15 NOTE — PROGRESS NOTE ADULT - ASSESSMENT
Acute on chronic diastolic HF/ Anemia / CKD/ DVT    Close to euvolemic   DC Bumex, metolazone and hypertonic saline today   Start torsemide 20 mg daily on 7/16  Spironolactone 25 mg daily   Keep Mg >2.2 and K>4.0  Venous duplex showed DVT on RLE  Iron sucrose 200 mg daily x5  Work up for TONJA - Get FOBT   Continue A/C for DVT/ afib   PT evaluation

## 2021-07-15 NOTE — PROGRESS NOTE ADULT - SUBJECTIVE AND OBJECTIVE BOX
RODNEY SANTO  80y  Male      81 y/o male with PMH CAD s/p PCI and stent 2007, COPD on 2L home O2, HFpEF TTE in 4/21 EF 55-65, DLD, atrial fibrillation on coumadin presents for shortness of breath and increase in his NADIA along with 1 day history of chest pain.    INTERVAL HPI/OVERNIGHT EVENTS:  Patient still complains of leg pain this morning. States that he's unable to complete PT because of it. Otherwise no other complaints       Review of Systems   CONSTITUTIONAL: No fever, weight loss, or fatigue  EYES: No eye pain, visual disturbances, or discharge  ENMT:  No difficulty hearing, tinnitus, vertigo; No sinus or throat pain  NECK: No pain or stiffness  RESPIRATORY: No cough, wheezing, chills or hemoptysis; No shortness of breath  CARDIOVASCULAR: No chest pain, palpitations, dizziness, or leg swelling  GASTROINTESTINAL: No abdominal or epigastric pain. No diarrhea or constipation. No nausea, vomiting, or hematemesis. No melena or hematochezia.  GENITOURINARY: No dysuria, frequency, hematuria, or incontinence  NEUROLOGICAL: No headaches, memory loss, loss of strength, numbness, or tremors  MUSCULOSKELETAL: B/L lower extremity pain       Vital Signs Last 24 Hrs  T(C): 36.3 (15 Jul 2021 11:12), Max: 36.3 (14 Jul 2021 19:25)  T(F): 97.4 (15 Jul 2021 11:12), Max: 97.4 (15 Jul 2021 11:12)  HR: 70 (15 Jul 2021 11:12) (57 - 80)  BP: 116/56 (15 Jul 2021 11:12) (106/49 - 125/53)  BP(mean): --  RR: 18 (15 Jul 2021 11:12) (18 - 19)  SpO2: 97% (15 Jul 2021 07:50) (97% - 97%)    PHYSICAL EXAM:  GENERAL: NAD  HEAD:  Atraumatic, Normocephalic  EYES: EOMI, PERRLA, conjunctiva and sclera clear  ENMT: Moist mucous membranes  NECK: Supple,   NERVOUS SYSTEM:  Alert & Oriented X3, Good concentration; Motor Strength 5/5 B/L upper and lower extremities; DTRs 2+ intact and symmetric  CHEST/LUNG: Clear to auscultation bilaterally; No rales, rhonchi, wheezing, or rubs  HEART: Regular rate and rhythm; No murmurs, rubs, or gallops  ABDOMEN: Soft, Nontender, Nondistended; Bowel sounds present  EXTREMITIES:  LE edema improving         LABS:                        10.1   4.10  )-----------( 141      ( 15 Jul 2021 07:14 )             31.2     07-15    136  |  83<L>  |  39<H>  ----------------------------<  88  4.3   |  41<HH>  |  1.4    Ca    9.5      15 Jul 2021 07:14  Mg     2.5     07-15    TPro  6.5  /  Alb  3.0<L>  /  TBili  1.9<H>  /  DBili  x   /  AST  24  /  ALT  8   /  AlkPhos  105  07-15    PT/INR - ( 15 Jul 2021 11:10 )   PT: 20.50 sec;   INR: 1.78 ratio               CAPILLARY BLOOD GLUCOSE          RADIOLOGY & ADDITIONAL TESTS:    Imaging Personally Reviewed:  [ ] YES  [ ] NO

## 2021-07-15 NOTE — PROGRESS NOTE ADULT - ASSESSMENT
79 y/o male with PMH CAD s/p PCI and stent 2007, COPD on 2L home O2, HFpEF TTE in 4/21 EF 55-65, DLD, atrial fibrillation on coumadin presents for shortness of breath and increase in his NADIA along with 1 day history of chest pain.    # HFpEF exacerbation  # Afib SVR # Bradycardia # NSVT  # CAD s/p stent 2007  # Severe Pulmonary HTN  - CXR b/l pleural effusions and vascular congestion  - TTE 4/21 EF 55-65 PSAP 70 mmhg; get new TTE  - BNP 14K on admission   - ECG afib in SVR  - troponin negative   - bumex 20 mg IV 1mg/hr   -Spironolactone 25 mg daily   -Metolazone 2.5 mg daily   - hypertonic saline  - Cw ASA 81 md daily  - Cw atorvastatin 20 mg daily  -  lipid panel WNL except HDL 39 and A1c 5.6  - iron profile WNL  - daily weight, strict I/Os keep I < O  - DASH TLC diet  fluid restriction 1000 ml daily  - continue tele monitoring  - EP on board : Loop recorder placed 7/12    - Avoid AVN blockers  -  INR 1.5, coumadin 3mg tonight   - Cardiology Cs doctor Niesha; Heart failure team consult  - TSH 4.34  - keep K > 4 and Mg > 2.1  - NC 2 LPM; HOB > 35 titrate oxygen keep saO2 89-94%  - arterial duplex of LE normal flow  -Diamox PRN for Elevated bicarb      Chronic Afib  #INR 1.78, Coumadin 2mg tonight       # DORON on CKD II  - creatinine on admission 1.5 baseline 1.1  - Most likely secondary to cardio renal syndrome  - Hold entresto for now  -    # Normocytic anemia- chronic  - at baseline  - -Iron sucrose    - FU folate b12 and TSH  - retic count WNL  - Stool sample for guaiac  - needs OP colonoscopy  - HIv screening negative  - active type and screen keep Hb > 7    # COPD not in exacerbation  # PSAP 70 mmhg  - on  2 LPM NC at home during night   - c/w symbicort and ventolin as needed  - CXR bilateral opacities basilar; underlying neoplasm not excluded  - chest CT

## 2021-07-15 NOTE — PROGRESS NOTE ADULT - SUBJECTIVE AND OBJECTIVE BOX
Interval History:    - Patient resting in bed, LE pain better but still present         79 y/o M with h/o CAD s/p PCI, COPD on home O2, HFpEF, afib admitted with worsening SOB. Patient reports having worsening SOB over past few days along with b/l LE edema and chest pain. No LOC, N/V. He claims to be compliant with medications.           PMH/PSH: CAD s/p PCI, COPD, HFpEF      FH: No early CAD or SCD       PHYSICAL EXAM     General: AAO x3, no acute distress   Neck: Trachea is central, JVD -ve  Lungs: CTA, no crackles   Heart: Regular heart sounds, no murmurs   GI: Abdomen is soft, NT  Neuro: Normal strength  Psych: Calm affect   Integument: No skin bruises

## 2021-07-15 NOTE — PROGRESS NOTE ADULT - SUBJECTIVE AND OBJECTIVE BOX
RODNEY SANTO  80y Male    CHIEF COMPLAINT:    Patient is a 80y old  Male who presents with a chief complaint of Shortness of Breath (15 Jul 2021 13:47)      INTERVAL HPI/OVERNIGHT EVENTS:    Patient seen and examined. Complains that he is having severe leg pain. No sob on O2. No palpitations.     ROS: All other systems are negative.    Vital Signs:    T(F): 97.6 (07-15-21 @ 14:24), Max: 97.6 (07-15-21 @ 14:24)  HR: 66 (07-15-21 @ 14:24) (57 - 80)  BP: 119/55 (07-15-21 @ 14:24) (106/49 - 125/53)  RR: 18 (07-15-21 @ 14:24) (18 - 19)  SpO2: 97% (07-15-21 @ 07:50) (97% - 97%)  I&O's Summary    2021 07:01  -  15 Jul 2021 07:00  --------------------------------------------------------  IN: 880 mL / OUT: 3305 mL / NET: -2425 mL    15 Jul 2021 07:01  -  15 Jul 2021 14:38  --------------------------------------------------------  IN: 880 mL / OUT: 1385 mL / NET: -505 mL      Daily     Daily Weight in k.6 (15 Jul 2021 06:29)  CAPILLARY BLOOD GLUCOSE          PHYSICAL EXAM:    GENERAL:  NAD  SKIN: No rashes or lesions  HENT: Atraumatic. Normocephalic. PERRL. Moist membranes.  NECK: Supple, No JVD. No lymphadenopathy.  PULMONARY: CTA B/L. No wheezing. No rales  CVS: Normal S1, S2. Rate and Rhythm are regular. No murmurs.  ABDOMEN/GI: Soft, Nontender, Nondistended; BS present  EXTREMITIES: Peripheral pulses intact. No edema B/L LE.  NEUROLOGIC:  No motor or sensory deficit.  PSYCH: Alert & oriented x 3    Consultant(s) Notes Reviewed:  [x ] YES  [ ] NO  Care Discussed with Consultants/Other Providers [ x] YES  [ ] NO    EKG reviewed  Telemetry reviewed    LABS:                        10.1   4.10  )-----------( 141      ( 15 Jul 2021 07:14 )             31.2     07-15    136  |  83<L>  |  39<H>  ----------------------------<  88   Creatinine Trend: 1.4<--, 1.5<--, 1.5<--, 1.4<--, 1.3<--, 1.3<--  4.3   |  41<HH>  |  1.4    Ca    9.5      15 Jul 2021 07:14  Mg     2.5     07-15    TPro  6.5  /  Alb  3.0<L>  /  TBili  1.9<H>  /  DBili  x   /  AST  24  /  ALT  8   /  AlkPhos  105  07-15    PT/INR - ( 15 Jul 2021 11:10 )   PT: 20.50 sec;   INR: 1.78 ratio                 Culture - Urine (collected 2021 23:26)  Source: .Urine Catheterized  Final Report (2021 09:09):    <10,000 CFU/mL Normal Urogenital Guerita        RADIOLOGY & ADDITIONAL TESTS:      Imaging or report Personally Reviewed:  [ ] YES  [ ] NO    Medications:  Standing  aspirin enteric coated 81 milliGRAM(s) Oral daily  atorvastatin 20 milliGRAM(s) Oral at bedtime  budesonide 160 MICROgram(s)/formoterol 4.5 MICROgram(s) Inhaler 2 Puff(s) Inhalation two times a day  buMETAnide Infusion 1 mG/Hr IV Continuous <Continuous>  chlorhexidine 4% Liquid 1 Application(s) Topical <User Schedule>  iron sucrose IVPB 200 milliGRAM(s) IV Intermittent every 24 hours  metolazone 2.5 milliGRAM(s) Oral <User Schedule>  nystatin Powder 1 Application(s) Topical every 12 hours  polyethylene glycol 3350 17 Gram(s) Oral at bedtime  senna 2 Tablet(s) Oral at bedtime  sodium chloride 3%. 150 milliLiter(s) IV Continuous <Continuous>  sodium chloride 3%. 150 milliLiter(s) IV Continuous <Continuous>  sodium chloride 3%. 150 milliLiter(s) IV Continuous <Continuous>  sodium chloride 3%. 150 milliLiter(s) IV Continuous <Continuous>  sodium chloride 3%. 150 milliLiter(s) IV Continuous <Continuous>  sodium chloride 3%. 150 milliLiter(s) IV Continuous <Continuous>  sodium chloride 3%. 150 milliLiter(s) IV Continuous <Continuous>  sodium chloride 3%. 150 milliLiter(s) IV Continuous <Continuous>  sodium chloride 3%. 150 milliLiter(s) IV Continuous <Continuous>  sodium chloride 3%. 150 milliLiter(s) IV Continuous <Continuous>  spironolactone 25 milliGRAM(s) Oral two times a day  warfarin 2 milliGRAM(s) Oral once    PRN Meds  ALBUTerol  90 MICROgram(s) HFA Inhaler - Peds 2 Puff(s) Inhalation every 4 hours PRN  oxycodone    5 mG/acetaminophen 325 mG 1 Tablet(s) Oral every 8 hours PRN      Case discussed with resident    Care discussed with pt/family

## 2021-07-16 LAB
ALBUMIN SERPL ELPH-MCNC: 3.3 G/DL — LOW (ref 3.5–5.2)
ALP SERPL-CCNC: 109 U/L — SIGNIFICANT CHANGE UP (ref 30–115)
ALT FLD-CCNC: 8 U/L — SIGNIFICANT CHANGE UP (ref 0–41)
ANION GAP SERPL CALC-SCNC: 11 MMOL/L — SIGNIFICANT CHANGE UP (ref 7–14)
ANION GAP SERPL CALC-SCNC: 12 MMOL/L — SIGNIFICANT CHANGE UP (ref 7–14)
AST SERPL-CCNC: 22 U/L — SIGNIFICANT CHANGE UP (ref 0–41)
BASOPHILS # BLD AUTO: 0.02 K/UL — SIGNIFICANT CHANGE UP (ref 0–0.2)
BASOPHILS NFR BLD AUTO: 0.4 % — SIGNIFICANT CHANGE UP (ref 0–1)
BILIRUB SERPL-MCNC: 2.2 MG/DL — HIGH (ref 0.2–1.2)
BUN SERPL-MCNC: 48 MG/DL — HIGH (ref 10–20)
BUN SERPL-MCNC: 51 MG/DL — HIGH (ref 10–20)
CALCIUM SERPL-MCNC: 8.9 MG/DL — SIGNIFICANT CHANGE UP (ref 8.5–10.1)
CALCIUM SERPL-MCNC: 9.4 MG/DL — SIGNIFICANT CHANGE UP (ref 8.5–10.1)
CHLORIDE SERPL-SCNC: 83 MMOL/L — LOW (ref 98–110)
CHLORIDE SERPL-SCNC: 85 MMOL/L — LOW (ref 98–110)
CO2 SERPL-SCNC: 41 MMOL/L — CRITICAL HIGH (ref 17–32)
CO2 SERPL-SCNC: 45 MMOL/L — CRITICAL HIGH (ref 17–32)
CREAT SERPL-MCNC: 1.7 MG/DL — HIGH (ref 0.7–1.5)
CREAT SERPL-MCNC: 1.9 MG/DL — HIGH (ref 0.7–1.5)
EOSINOPHIL # BLD AUTO: 0.09 K/UL — SIGNIFICANT CHANGE UP (ref 0–0.7)
EOSINOPHIL NFR BLD AUTO: 1.8 % — SIGNIFICANT CHANGE UP (ref 0–8)
GLUCOSE BLDC GLUCOMTR-MCNC: 140 MG/DL — HIGH (ref 70–99)
GLUCOSE SERPL-MCNC: 100 MG/DL — HIGH (ref 70–99)
GLUCOSE SERPL-MCNC: 126 MG/DL — HIGH (ref 70–99)
HCT VFR BLD CALC: 34.3 % — LOW (ref 42–52)
HGB BLD-MCNC: 10.9 G/DL — LOW (ref 14–18)
IMM GRANULOCYTES NFR BLD AUTO: 0.4 % — HIGH (ref 0.1–0.3)
INR BLD: 2.12 RATIO — HIGH (ref 0.65–1.3)
LYMPHOCYTES # BLD AUTO: 0.59 K/UL — LOW (ref 1.2–3.4)
LYMPHOCYTES # BLD AUTO: 12 % — LOW (ref 20.5–51.1)
MAGNESIUM SERPL-MCNC: 2.5 MG/DL — HIGH (ref 1.8–2.4)
MCHC RBC-ENTMCNC: 27.7 PG — SIGNIFICANT CHANGE UP (ref 27–31)
MCHC RBC-ENTMCNC: 31.8 G/DL — LOW (ref 32–37)
MCV RBC AUTO: 87.1 FL — SIGNIFICANT CHANGE UP (ref 80–94)
MONOCYTES # BLD AUTO: 0.68 K/UL — HIGH (ref 0.1–0.6)
MONOCYTES NFR BLD AUTO: 13.9 % — HIGH (ref 1.7–9.3)
NEUTROPHILS # BLD AUTO: 3.5 K/UL — SIGNIFICANT CHANGE UP (ref 1.4–6.5)
NEUTROPHILS NFR BLD AUTO: 71.5 % — SIGNIFICANT CHANGE UP (ref 42.2–75.2)
NRBC # BLD: 0 /100 WBCS — SIGNIFICANT CHANGE UP (ref 0–0)
PLATELET # BLD AUTO: 163 K/UL — SIGNIFICANT CHANGE UP (ref 130–400)
POTASSIUM SERPL-MCNC: 4.1 MMOL/L — SIGNIFICANT CHANGE UP (ref 3.5–5)
POTASSIUM SERPL-MCNC: 4.3 MMOL/L — SIGNIFICANT CHANGE UP (ref 3.5–5)
POTASSIUM SERPL-SCNC: 4.1 MMOL/L — SIGNIFICANT CHANGE UP (ref 3.5–5)
POTASSIUM SERPL-SCNC: 4.3 MMOL/L — SIGNIFICANT CHANGE UP (ref 3.5–5)
PROT SERPL-MCNC: 6.9 G/DL — SIGNIFICANT CHANGE UP (ref 6–8)
PROTHROM AB SERPL-ACNC: 24.4 SEC — HIGH (ref 9.95–12.87)
RBC # BLD: 3.94 M/UL — LOW (ref 4.7–6.1)
RBC # FLD: 19.9 % — HIGH (ref 11.5–14.5)
SODIUM SERPL-SCNC: 136 MMOL/L — SIGNIFICANT CHANGE UP (ref 135–146)
SODIUM SERPL-SCNC: 141 MMOL/L — SIGNIFICANT CHANGE UP (ref 135–146)
WBC # BLD: 4.9 K/UL — SIGNIFICANT CHANGE UP (ref 4.8–10.8)
WBC # FLD AUTO: 4.9 K/UL — SIGNIFICANT CHANGE UP (ref 4.8–10.8)

## 2021-07-16 PROCEDURE — 99233 SBSQ HOSP IP/OBS HIGH 50: CPT

## 2021-07-16 RX ORDER — WARFARIN SODIUM 2.5 MG/1
1 TABLET ORAL ONCE
Refills: 0 | Status: DISCONTINUED | OUTPATIENT
Start: 2021-07-16 | End: 2021-07-16

## 2021-07-16 RX ORDER — POLYETHYLENE GLYCOL 3350 17 G/17G
17 POWDER, FOR SOLUTION ORAL
Qty: 0 | Refills: 0 | DISCHARGE
Start: 2021-07-16

## 2021-07-16 RX ORDER — SACUBITRIL AND VALSARTAN 24; 26 MG/1; MG/1
1 TABLET, FILM COATED ORAL
Qty: 0 | Refills: 0 | DISCHARGE

## 2021-07-16 RX ORDER — WARFARIN SODIUM 2.5 MG/1
1 TABLET ORAL ONCE
Refills: 0 | Status: COMPLETED | OUTPATIENT
Start: 2021-07-16 | End: 2021-07-16

## 2021-07-16 RX ORDER — SPIRONOLACTONE 25 MG/1
1 TABLET, FILM COATED ORAL
Qty: 0 | Refills: 0 | DISCHARGE
Start: 2021-07-16

## 2021-07-16 RX ORDER — SPIRONOLACTONE 25 MG/1
25 TABLET, FILM COATED ORAL DAILY
Refills: 0 | Status: DISCONTINUED | OUTPATIENT
Start: 2021-07-16 | End: 2021-07-18

## 2021-07-16 RX ORDER — WARFARIN SODIUM 2.5 MG/1
2 TABLET ORAL ONCE
Refills: 0 | Status: DISCONTINUED | OUTPATIENT
Start: 2021-07-16 | End: 2021-07-16

## 2021-07-16 RX ORDER — NYSTATIN CREAM 100000 [USP'U]/G
1 CREAM TOPICAL
Qty: 0 | Refills: 0 | DISCHARGE
Start: 2021-07-16

## 2021-07-16 RX ORDER — FUROSEMIDE 40 MG
1 TABLET ORAL
Qty: 0 | Refills: 0 | DISCHARGE

## 2021-07-16 RX ORDER — BUMETANIDE 0.25 MG/ML
2 INJECTION INTRAMUSCULAR; INTRAVENOUS ONCE
Refills: 0 | Status: DISCONTINUED | OUTPATIENT
Start: 2021-07-16 | End: 2021-07-16

## 2021-07-16 RX ORDER — HYDROMORPHONE HYDROCHLORIDE 2 MG/ML
2 INJECTION INTRAMUSCULAR; INTRAVENOUS; SUBCUTANEOUS ONCE
Refills: 0 | Status: DISCONTINUED | OUTPATIENT
Start: 2021-07-16 | End: 2021-07-16

## 2021-07-16 RX ADMIN — SPIRONOLACTONE 25 MILLIGRAM(S): 25 TABLET, FILM COATED ORAL at 17:55

## 2021-07-16 RX ADMIN — BUDESONIDE AND FORMOTEROL FUMARATE DIHYDRATE 2 PUFF(S): 160; 4.5 AEROSOL RESPIRATORY (INHALATION) at 12:23

## 2021-07-16 RX ADMIN — SPIRONOLACTONE 25 MILLIGRAM(S): 25 TABLET, FILM COATED ORAL at 05:40

## 2021-07-16 RX ADMIN — Medication 81 MILLIGRAM(S): at 12:23

## 2021-07-16 RX ADMIN — CHLORHEXIDINE GLUCONATE 1 APPLICATION(S): 213 SOLUTION TOPICAL at 05:40

## 2021-07-16 RX ADMIN — NYSTATIN CREAM 1 APPLICATION(S): 100000 CREAM TOPICAL at 05:42

## 2021-07-16 RX ADMIN — POLYETHYLENE GLYCOL 3350 17 GRAM(S): 17 POWDER, FOR SOLUTION ORAL at 21:40

## 2021-07-16 RX ADMIN — OXYCODONE AND ACETAMINOPHEN 1 TABLET(S): 5; 325 TABLET ORAL at 12:18

## 2021-07-16 RX ADMIN — NYSTATIN CREAM 1 APPLICATION(S): 100000 CREAM TOPICAL at 17:58

## 2021-07-16 RX ADMIN — SODIUM CHLORIDE 50 MILLILITER(S): 5 INJECTION, SOLUTION INTRAVENOUS at 06:01

## 2021-07-16 RX ADMIN — SODIUM CHLORIDE 50 MILLILITER(S): 5 INJECTION, SOLUTION INTRAVENOUS at 18:01

## 2021-07-16 RX ADMIN — OXYCODONE AND ACETAMINOPHEN 1 TABLET(S): 5; 325 TABLET ORAL at 12:23

## 2021-07-16 RX ADMIN — IRON SUCROSE 110 MILLIGRAM(S): 20 INJECTION, SOLUTION INTRAVENOUS at 06:01

## 2021-07-16 RX ADMIN — SENNA PLUS 2 TABLET(S): 8.6 TABLET ORAL at 21:40

## 2021-07-16 RX ADMIN — ATORVASTATIN CALCIUM 20 MILLIGRAM(S): 80 TABLET, FILM COATED ORAL at 21:36

## 2021-07-16 RX ADMIN — WARFARIN SODIUM 1 MILLIGRAM(S): 2.5 TABLET ORAL at 21:37

## 2021-07-16 RX ADMIN — BUDESONIDE AND FORMOTEROL FUMARATE DIHYDRATE 2 PUFF(S): 160; 4.5 AEROSOL RESPIRATORY (INHALATION) at 21:36

## 2021-07-16 RX ADMIN — HYDROMORPHONE HYDROCHLORIDE 2 MILLIGRAM(S): 2 INJECTION INTRAMUSCULAR; INTRAVENOUS; SUBCUTANEOUS at 17:54

## 2021-07-16 RX ADMIN — HYDROMORPHONE HYDROCHLORIDE 2 MILLIGRAM(S): 2 INJECTION INTRAMUSCULAR; INTRAVENOUS; SUBCUTANEOUS at 17:58

## 2021-07-16 NOTE — PROGRESS NOTE ADULT - SUBJECTIVE AND OBJECTIVE BOX
Patient feels well.  Patient seen with son and wife at bedside.  Denies gross hematuria.      ROS: All other systems are negative.    Vital Signs:    T(F): 97 (21 @ 09:56), Max: 97.6 (07-15-21 @ 14:24)  HR: 68 (21 @ 09:56) (61 - 78)  BP: 101/50 (21 @ 09:56) (100/48 - 119/55)  RR: 16 (21 @ 09:56) (16 - 18)  SpO2: 94% (07-15-21 @ 19:47) (94% - 94%)  I&O's Summary    15 Jul 2021 07:01  -  2021 07:00  --------------------------------------------------------  IN: 1535 mL / OUT: 2785 mL / NET: -1250 mL    2021 07:01  -  2021 12:17  --------------------------------------------------------  IN: 340 mL / OUT: 500 mL / NET: -160 mL      Daily     Daily Weight in k.6 (2021 04:00)  CAPILLARY BLOOD GLUCOSE          PHYSICAL EXAM:    GENERAL:  NAD  SKIN: No rashes or lesions  HENT: Atraumatic. Normocephalic. PERRL. Moist membranes.  NECK: Supple, No JVD. No lymphadenopathy.  PULMONARY: no wheezing  CVS: No JVD  ABDOMEN/GI: Soft, Nontender, Nondistended; BS present  :  No CVA tenderness, no suprapubic fullness, no phallic lesions.  EXTREMITIES: Peripheral pulses intact. No edema B/L LE.  NEUROLOGIC:  No motor or sensory deficit.  PSYCH: Alert & oriented x 3    Consultant(s) Notes Reviewed:  [x ] YES  [ ] NO  Care Discussed with Consultants/Other Providers [ x] YES  [ ] NO    EKG reviewed  Telemetry reviewed    LABS:                        10.9   4.90  )-----------( 163      ( 2021 06:10 )             34.3   Hemoglobin: 10.9 g/dL ( @ 06:10)  Hemoglobin: 10.1 g/dL (07-15 @ 07:14)  Hemoglobin: 10.3 g/dL ( @ 06:41)  Hemoglobin: 9.2 g/dL ( @ 06:35)  Hemoglobin: 8.3 g/dL ( @ 05:40)        141  |  85<L>  |  48<H>  ----------------------------<  100<H>    Creatinine Trend: 1.7<--, 1.6<--, 1.4<--, 1.5<--, 1.5<--, 1.4<--  4.1   |  45<HH>  |  1.7<H>    Ca    9.4      2021 06:10  Mg     2.5         TPro  6.9  /  Alb  3.3<L>  /  TBili  2.2<H>  /  DBili  x   /  AST  22  /  ALT  8   /  AlkPhos  109      PT/INR - ( 2021 06:10 )   PT: 24.40 sec;   INR: 2.12 ratio                   RADIOLOGY & ADDITIONAL TESTS:    < from: TTE Echo Complete w/o Contrast w/ Doppler (21 @ 09:57) >    Summary:   1. Left ventricular ejection fraction, by visual estimation, is 50 to 55%.   2. Normal global left ventricular systolic function.   3. Moderate to severe left atrial enlargement.   4. Mildly enlarged right ventricle.   5. Moderately reduced RV systolic function.   6. Moderately enlarged right atrium.   7. Moderate mitral valve regurgitation.   8. Moderate-severe tricuspid regurgitation.   9. Mild pulmonic valve regurgitation.  10. Estimated pulmonary artery systolic pressure is 59.7 mmHg assuming a right atrial pressure of 10 mmHg, which is consistent with moderate pulmonary hypertension.  11. There is mild aortic root calcification.    < end of copied text >    Imaging or report Personally Reviewed:  [ ] YES  [ ] NO    Medications:  Standing  aspirin enteric coated 81 milliGRAM(s) Oral daily  atorvastatin 20 milliGRAM(s) Oral at bedtime  budesonide 160 MICROgram(s)/formoterol 4.5 MICROgram(s) Inhaler 2 Puff(s) Inhalation two times a day  buMETAnide Infusion 1 mG/Hr IV Continuous <Continuous>  chlorhexidine 4% Liquid 1 Application(s) Topical <User Schedule>  iron sucrose IVPB 200 milliGRAM(s) IV Intermittent every 24 hours  metolazone 2.5 milliGRAM(s) Oral <User Schedule>  nystatin Powder 1 Application(s) Topical every 12 hours  polyethylene glycol 3350 17 Gram(s) Oral at bedtime  senna 2 Tablet(s) Oral at bedtime  sodium chloride 3%. 150 milliLiter(s) IV Continuous <Continuous>  sodium chloride 3%. 150 milliLiter(s) IV Continuous <Continuous>  sodium chloride 3%. 150 milliLiter(s) IV Continuous <Continuous>  sodium chloride 3%. 150 milliLiter(s) IV Continuous <Continuous>  sodium chloride 3%. 150 milliLiter(s) IV Continuous <Continuous>  sodium chloride 3%. 150 milliLiter(s) IV Continuous <Continuous>  sodium chloride 3%. 150 milliLiter(s) IV Continuous <Continuous>  sodium chloride 3%. 150 milliLiter(s) IV Continuous <Continuous>  sodium chloride 3%. 150 milliLiter(s) IV Continuous <Continuous>  sodium chloride 3%. 150 milliLiter(s) IV Continuous <Continuous>  sodium chloride 3%. 150 milliLiter(s) IV Continuous <Continuous>  spironolactone 25 milliGRAM(s) Oral two times a day  warfarin 1 milliGRAM(s) Oral once    PRN Meds  ALBUTerol  90 MICROgram(s) HFA Inhaler - Peds 2 Puff(s) Inhalation every 4 hours PRN  oxycodone    5 mG/acetaminophen 325 mG 1 Tablet(s) Oral every 8 hours PRN      Case discussed with resident

## 2021-07-16 NOTE — PROGRESS NOTE ADULT - ASSESSMENT
81 y/o male with PMH CAD s/p PCI and stent 2007, COPD on 2L home O2, HFpEF TTE in 4/21 EF 55-65, DLD, atrial fibrillation on coumadin presented with progressively worsening sob associated with increasing leg swelling for the last few days. Also C/O intermittent cp for the last one day.     Acute HFpEF  DORON  COPD on home O2  CAD S/P PCI in 2007  Chronic A-Fib with Slow Ventricular Response   NSVT  Anemia              PLAN:    ·	Can D/C tele  ·	Care D/W the HF specialist. Pt is now euvolemic. Cont Torsemide 20 mg po daily  ·	A negative balance of 1250 over the last 24 hrs  ·	Change Spironolactone to 25 mg po daily. Keep K >4 and Mg >2  ·	NSVT on tele. Pt was bradycardiac on admission and now the HR is 57-80. No need for PPM as per cardiology  ·	INR 2.12. Give Coumadin 2 mg po tonight. Check INR in AM   ·	Check i's and o's and daily wt  ·	Low salt diet and water restriction to 1.5 L/D  ·	Monitor renal function.   ·	CE x 2 are negative.   ·	Cont Symbicort  ·	PT/Rehab eval    Progress Note Handoff    Pending (specify):  Consults__PT/Rehab_______, Tests________, Test Results_______, Other_Social services for D/C planning _____  Family discussion:  Disposition: Home___/SNF___/Other________/Unknown at this time________    Kyle Pritchett MD  Spectra: [phone] 79 y/o male with PMH CAD s/p PCI and stent 2007, COPD on 2L home O2, HFpEF TTE in 4/21 EF 55-65, DLD, atrial fibrillation on coumadin presented with progressively worsening sob associated with increasing leg swelling for the last few days. Also C/O intermittent cp for the last one day.     Acute HFpEF  DORON  COPD on home O2  CAD S/P PCI in 2007  Chronic A-Fib with Slow Ventricular Response   NSVT  Anemia              PLAN:    ·	Can D/C tele  ·	Care D/W the HF specialist. Pt is now euvolemic. Cont Torsemide 20 mg po daily  ·	A negative balance of 1250 over the last 24 hrs  ·	ECHO showed EF is 50-55%. Entresto was stopped.   ·	Change Spironolactone to 25 mg po daily. Keep K >4 and Mg >2  ·	NSVT on tele. Pt was bradycardiac on admission and now the HR is 57-80. No need for PPM as per cardiology  ·	INR 2.12. Give Coumadin 2 mg po tonight. Check INR in AM   ·	Check i's and o's and daily wt  ·	Low salt diet and water restriction to 1.5 L/D  ·	Monitor renal function.   ·	CE x 2 are negative.   ·	Cont Symbicort  ·	PT/Rehab eval    Progress Note Handoff    Pending (specify):  Consults__PT/Rehab_______, Tests________, Test Results_______, Other_Social services for D/C planning _____  Family discussion:  Disposition: Home___/SNF___/Other________/Unknown at this time________    Kyle Pritchett MD  Spectra: [phone]

## 2021-07-16 NOTE — PROGRESS NOTE ADULT - SUBJECTIVE AND OBJECTIVE BOX
RODNEY SANTO  80y  Male      Patient is a 80y old  Male who presents with a chief complaint of shortness of breath (15 Jul 2021 14:38)      INTERVAL HPI/OVERNIGHT EVENTS:    REVIEW OF SYSTEMS:  CONSTITUTIONAL: No fever, weight loss, or fatigue  EYES: No eye pain, visual disturbances, or discharge  ENMT:  No difficulty hearing, tinnitus, vertigo; No sinus or throat pain  NECK: No pain or stiffness  RESPIRATORY: No cough, wheezing, chills or hemoptysis; No shortness of breath  CARDIOVASCULAR: No chest pain, palpitations, dizziness, or leg swelling  GASTROINTESTINAL: No abdominal or epigastric pain. No diarrhea or constipation. No nausea, vomiting, or hematemesis. No melena or hematochezia.  GENITOURINARY: No dysuria, frequency, hematuria, or incontinence  NEUROLOGICAL: No headaches, memory loss, loss of strength, numbness, or tremors  MUSCULOSKELETAL: No joint pain or swelling; No muscle, back, or extremity pain  SKIN: No itching, burning, rashes, or lesions   LYMPH NODES: No enlarged glands  ENDOCRINE: No heat or cold intolerance; No hair loss  PSYCHIATRIC: No depression, anxiety, mood swings, or difficulty sleeping  HEME/LYMPH: No easy bruising, or bleeding gums  ALLERY AND IMMUNOLOGIC: No hives or eczema    Vital Signs Last 24 Hrs  T(C): 36.4 (16 Jul 2021 04:00), Max: 36.4 (15 Jul 2021 14:24)  T(F): 97.5 (16 Jul 2021 04:00), Max: 97.6 (15 Jul 2021 14:24)  HR: 78 (16 Jul 2021 04:00) (59 - 78)  BP: 105/51 (16 Jul 2021 04:00) (100/48 - 119/58)  BP(mean): --  RR: 18 (16 Jul 2021 04:00) (18 - 18)  SpO2: 94% (15 Jul 2021 19:47) (94% - 97%)    PHYSICAL EXAM:  GENERAL: NAD, well-groomed, well-developed  HEAD:  Atraumatic, Normocephalic  EYES: EOMI, PERRLA, conjunctiva and sclera clear  ENMT: Moist mucous membranes, Good dentition, No lesions  NECK: Supple, No JVD, Normal thyroid  NERVOUS SYSTEM:  Alert & Oriented X3, Good concentration; Motor Strength 5/5 B/L upper and lower extremities; DTRs 2+ intact and symmetric  CHEST/LUNG: Clear to auscultation bilaterally; No rales, rhonchi, wheezing, or rubs  HEART: Regular rate and rhythm; No murmurs, rubs, or gallops  ABDOMEN: Soft, Nontender, Nondistended; Bowel sounds present  EXTREMITIES:  2+ Peripheral Pulses, No clubbing, cyanosis, or edema  LYMPH: No lymphadenopathy noted  SKIN: No rashes or lesions    Consultant(s) Notes Reviewed:  [x ] YES  [ ] NO  Care Discussed with Consultants/Other Providers [ x] YES  [ ] NO    LABS:                        10.1   4.10  )-----------( 141      ( 15 Jul 2021 07:14 )             31.2     07-15    138  |  84<L>  |  42<H>  ----------------------------<  132<H>  4.3   |  46<HH>  |  1.6<H>    Ca    9.4      15 Jul 2021 17:54  Mg     2.5     07-15    TPro  6.5  /  Alb  3.0<L>  /  TBili  1.9<H>  /  DBili  x   /  AST  24  /  ALT  8   /  AlkPhos  105  07-15    PT/INR - ( 15 Jul 2021 11:10 )   PT: 20.50 sec;   INR: 1.78 ratio               CAPILLARY BLOOD GLUCOSE          RADIOLOGY & ADDITIONAL TESTS:    Imaging Personally Reviewed:  [ ] YES  [ ] NO RODNEY SANTO  80y  Male    79 y/o male with PMH CAD s/p PCI and stent 2007, COPD on 2L home O2, HFpEF TTE in 4/21 EF 55-65, DLD, atrial fibrillation on coumadin presents for shortness of breath and increase in his NADIA along with 1 day history of chest pain.    INTERVAL HPI/OVERNIGHT EVENTS:  No acute events.  Resting in bed comfortably.       REVIEW OF SYSTEMS:  CONSTITUTIONAL: No fever, weight loss, or fatigue  EYES: No eye pain, visual disturbances, or discharge  ENMT:  No difficulty hearing, tinnitus, vertigo; No sinus or throat pain  NECK: No pain or stiffness  RESPIRATORY: No cough, wheezing, chills or hemoptysis; No shortness of breath  CARDIOVASCULAR: No chest pain, palpitations, dizziness, or leg swelling  GASTROINTESTINAL: No abdominal or epigastric pain. No diarrhea or constipation. No nausea, vomiting, or hematemesis. No melena or hematochezia.  GENITOURINARY: No dysuria, frequency, hematuria, or incontinence  NEUROLOGICAL: No headaches, memory loss, loss of strength, numbness, or tremors  MUSCULOSKELETAL: B/L lower extremity pain improving     Vital Signs Last 24 Hrs  T(C): 36.4 (16 Jul 2021 04:00), Max: 36.4 (15 Jul 2021 14:24)  T(F): 97.5 (16 Jul 2021 04:00), Max: 97.6 (15 Jul 2021 14:24)  HR: 78 (16 Jul 2021 04:00) (59 - 78)  BP: 105/51 (16 Jul 2021 04:00) (100/48 - 119/58)  BP(mean): --  RR: 18 (16 Jul 2021 04:00) (18 - 18)  SpO2: 94% (15 Jul 2021 19:47) (94% - 97%)    PHYSICAL EXAM:  GENERAL: NAD  HEAD:  Atraumatic, Normocephalic  EYES: EOMI, PERRLA, conjunctiva and sclera clear  ENMT: Moist mucous membranes  NECK: Supple,   NERVOUS SYSTEM:  Alert & Oriented X3, Good concentration; Motor Strength 5/5 B/L upper and lower extremities; DTRs 2+ intact and symmetric  CHEST/LUNG: Clear to auscultation bilaterally; No rales, rhonchi, wheezing, or rubs  HEART: Regular rate and rhythm; No murmurs, rubs, or gallops  ABDOMEN: Soft, Nontender, Nondistended; Bowel sounds present  EXTREMITIES:  LE edema improving         LABS:                        10.1   4.10  )-----------( 141      ( 15 Jul 2021 07:14 )             31.2     07-15    138  |  84<L>  |  42<H>  ----------------------------<  132<H>  4.3   |  46<HH>  |  1.6<H>    Ca    9.4      15 Jul 2021 17:54  Mg     2.5     07-15    TPro  6.5  /  Alb  3.0<L>  /  TBili  1.9<H>  /  DBili  x   /  AST  24  /  ALT  8   /  AlkPhos  105  07-15    PT/INR - ( 15 Jul 2021 11:10 )   PT: 20.50 sec;   INR: 1.78 ratio

## 2021-07-16 NOTE — PROGRESS NOTE ADULT - ASSESSMENT
81 y/o male with PMH CAD s/p PCI and stent 2007, COPD on 2L home O2, HFpEF TTE in 4/21 EF 55-65, DLD, atrial fibrillation on coumadin presents for shortness of breath and increase in his NADIA along with 1 day history of chest pain.    # HFpEF exacerbation  # Afib SVR # Bradycardia # NSVT  # CAD s/p stent 2007  # Severe Pulmonary HTN  - CXR b/l pleural effusions and vascular congestion  - TTE 4/21 EF 55-65 PSAP 70 mmhg; get new TTE  - BNP 14K on admission   - ECG afib in SVR  - troponin negative   - bumex 20 mg IV 1mg/hr   -Spironolactone 25 mg daily   -Metolazone 2.5 mg daily   - hypertonic saline  - Cw ASA 81 md daily  - Cw atorvastatin 20 mg daily  -  lipid panel WNL except HDL 39 and A1c 5.6  - iron profile WNL  - daily weight, strict I/Os keep I < O  - DASH TLC diet  fluid restriction 1000 ml daily  - continue tele monitoring  - EP on board : Loop recorder placed 7/12    - Avoid AVN blockers  -  INR 1.5, coumadin 3mg tonight   - Cardiology Cs doctor Niesha; Heart failure team consult  - TSH 4.34  - keep K > 4 and Mg > 2.1  - NC 2 LPM; HOB > 35 titrate oxygen keep saO2 89-94%  - arterial duplex of LE normal flow  -Diamox PRN for Elevated bicarb      Chronic Afib  #INR 1.78, Coumadin 2mg tonight       # DORON on CKD II  - creatinine on admission 1.5 baseline 1.1  - Most likely secondary to cardio renal syndrome  - Hold entresto for now  -    # Normocytic anemia- chronic  - at baseline  - -Iron sucrose    - FU folate b12 and TSH  - retic count WNL  - Stool sample for guaiac  - needs OP colonoscopy  - HIv screening negative  - active type and screen keep Hb > 7    # COPD not in exacerbation  # PSAP 70 mmhg  - on  2 LPM NC at home during night   - c/w symbicort and ventolin as needed  - CXR bilateral opacities basilar; underlying neoplasm not excluded  - chest CT 79 y/o male with PMH CAD s/p PCI and stent 2007, COPD on 2L home O2, HFpEF TTE in 4/21 EF 55-65, DLD, atrial fibrillation on coumadin presents for shortness of breath and increase in his NADIA along with 1 day history of chest pain.    # HFpEF exacerbation  # Afib SVR # Bradycardia # NSVT  # CAD s/p stent 2007  # Severe Pulmonary HTN  - CXR b/l pleural effusions and vascular congestion  - TTE 4/21 EF 55-65 PSAP 70 mmhg; get new TTE  - BNP 14K on admission   - ECG afib in SVR  - troponin negative   - bumex 20 mg IV 1mg/hr   -Spironolactone 25 mg daily   -Metolazone 2.5 mg daily   - hypertonic saline  - Cw ASA 81 md daily  - Cw atorvastatin 20 mg daily  -  lipid panel WNL except HDL 39 and A1c 5.6  - iron profile WNL  - daily weight, strict I/Os keep I < O  - DASH TLC diet  fluid restriction 1000 ml daily  - continue tele monitoring  - EP on board : Loop recorder placed 7/12    - Avoid AVN blockers  -  INR 1.5, coumadin 3mg tonight   - Cardiology Cs doctor Niesha; Heart failure team consult  - TSH 4.34  - keep K > 4 and Mg > 2.1  - NC 2 LPM; HOB > 35 titrate oxygen keep saO2 89-94%  - arterial duplex of LE normal flow  -Diamox PRN for Elevated bicarb      Chronic Afib  #INR 2.12 Coumadin 1mg tonight       # DORON on CKD II  - creatinine trensing back up 1.6.  baseline 1.1  - Most likely secondary to aggressive diuresis   - Hold entresto for now      # Normocytic anemia- chronic  - at baseline  - -Iron sucrose    - FU folate b12 and TSH  - retic count WNL  - Stool sample for guaiac  - needs OP colonoscopy  - HIv screening negative  - active type and screen keep Hb > 7    # COPD not in exacerbation  # PSAP 70 mmhg  - on  2 LPM NC at home during night   - c/w symbicort and ventolin as needed  - CXR bilateral opacities basilar; underlying neoplasm not excluded  - chest CT

## 2021-07-16 NOTE — PROGRESS NOTE ADULT - SUBJECTIVE AND OBJECTIVE BOX
RODNEY SANTO  80y Male    CHIEF COMPLAINT:    Patient is a 80y old  Male who presents with a chief complaint of shortness of breath (2021 10:45)      INTERVAL HPI/OVERNIGHT EVENTS:    Patient seen and examined. No sob on O2. Feels better. Still having leg cramps but improving    ROS: All other systems are negative.    Vital Signs:    T(F): 97 (21 @ 09:56), Max: 97.6 (07-15-21 @ 14:24)  HR: 68 (21 @ 09:56) (61 - 78)  BP: 101/50 (21 @ 09:56) (100/48 - 119/55)  RR: 16 (21 @ 09:56) (16 - 18)  SpO2: 94% (07-15-21 @ 19:47) (94% - 94%)  I&O's Summary    15 Jul 2021 07:01  -  2021 07:00  --------------------------------------------------------  IN: 1535 mL / OUT: 2785 mL / NET: -1250 mL    2021 07:01  -  2021 12:17  --------------------------------------------------------  IN: 340 mL / OUT: 500 mL / NET: -160 mL      Daily     Daily Weight in k.6 (2021 04:00)  CAPILLARY BLOOD GLUCOSE          PHYSICAL EXAM:    GENERAL:  NAD  SKIN: No rashes or lesions  HENT: Atraumatic. Normocephalic. PERRL. Moist membranes.  NECK: Supple, No JVD. No lymphadenopathy.  PULMONARY: CTA B/L. No wheezing. No rales  CVS: Normal S1, S2. Rate and Rhythm are regular. No murmurs.  ABDOMEN/GI: Soft, Nontender, Nondistended; BS present  EXTREMITIES: Peripheral pulses intact. No edema B/L LE.  NEUROLOGIC:  No motor or sensory deficit.  PSYCH: Alert & oriented x 3    Consultant(s) Notes Reviewed:  [x ] YES  [ ] NO  Care Discussed with Consultants/Other Providers [ x] YES  [ ] NO    EKG reviewed  Telemetry reviewed    LABS:                        10.9   4.90  )-----------( 163      ( 2021 06:10 )             34.3   Hemoglobin: 10.9 g/dL ( @ 06:10)  Hemoglobin: 10.1 g/dL (07-15 @ 07:14)  Hemoglobin: 10.3 g/dL ( @ 06:41)  Hemoglobin: 9.2 g/dL ( @ 06:35)  Hemoglobin: 8.3 g/dL ( @ 05:40)        141  |  85<L>  |  48<H>  ----------------------------<  100<H>    Creatinine Trend: 1.7<--, 1.6<--, 1.4<--, 1.5<--, 1.5<--, 1.4<--  4.1   |  45<HH>  |  1.7<H>    Ca    9.4      2021 06:10  Mg     2.5         TPro  6.9  /  Alb  3.3<L>  /  TBili  2.2<H>  /  DBili  x   /  AST  22  /  ALT  8   /  AlkPhos  109  -    PT/INR - ( 2021 06:10 )   PT: 24.40 sec;   INR: 2.12 ratio                   RADIOLOGY & ADDITIONAL TESTS:      Imaging or report Personally Reviewed:  [ ] YES  [ ] NO    Medications:  Standing  aspirin enteric coated 81 milliGRAM(s) Oral daily  atorvastatin 20 milliGRAM(s) Oral at bedtime  budesonide 160 MICROgram(s)/formoterol 4.5 MICROgram(s) Inhaler 2 Puff(s) Inhalation two times a day  buMETAnide Infusion 1 mG/Hr IV Continuous <Continuous>  chlorhexidine 4% Liquid 1 Application(s) Topical <User Schedule>  iron sucrose IVPB 200 milliGRAM(s) IV Intermittent every 24 hours  metolazone 2.5 milliGRAM(s) Oral <User Schedule>  nystatin Powder 1 Application(s) Topical every 12 hours  polyethylene glycol 3350 17 Gram(s) Oral at bedtime  senna 2 Tablet(s) Oral at bedtime  sodium chloride 3%. 150 milliLiter(s) IV Continuous <Continuous>  sodium chloride 3%. 150 milliLiter(s) IV Continuous <Continuous>  sodium chloride 3%. 150 milliLiter(s) IV Continuous <Continuous>  sodium chloride 3%. 150 milliLiter(s) IV Continuous <Continuous>  sodium chloride 3%. 150 milliLiter(s) IV Continuous <Continuous>  sodium chloride 3%. 150 milliLiter(s) IV Continuous <Continuous>  sodium chloride 3%. 150 milliLiter(s) IV Continuous <Continuous>  sodium chloride 3%. 150 milliLiter(s) IV Continuous <Continuous>  sodium chloride 3%. 150 milliLiter(s) IV Continuous <Continuous>  sodium chloride 3%. 150 milliLiter(s) IV Continuous <Continuous>  sodium chloride 3%. 150 milliLiter(s) IV Continuous <Continuous>  spironolactone 25 milliGRAM(s) Oral two times a day  warfarin 1 milliGRAM(s) Oral once    PRN Meds  ALBUTerol  90 MICROgram(s) HFA Inhaler - Peds 2 Puff(s) Inhalation every 4 hours PRN  oxycodone    5 mG/acetaminophen 325 mG 1 Tablet(s) Oral every 8 hours PRN      Case discussed with resident    Care discussed with pt/family           RODNEY SANTO  80y Male    CHIEF COMPLAINT:    Patient is a 80y old  Male who presents with a chief complaint of shortness of breath (2021 10:45)      INTERVAL HPI/OVERNIGHT EVENTS:    Patient seen and examined. No sob on O2. Feels better. Still having leg cramps but improving    ROS: All other systems are negative.    Vital Signs:    T(F): 97 (21 @ 09:56), Max: 97.6 (07-15-21 @ 14:24)  HR: 68 (21 @ 09:56) (61 - 78)  BP: 101/50 (21 @ 09:56) (100/48 - 119/55)  RR: 16 (21 @ 09:56) (16 - 18)  SpO2: 94% (07-15-21 @ 19:47) (94% - 94%)  I&O's Summary    15 Jul 2021 07:01  -  2021 07:00  --------------------------------------------------------  IN: 1535 mL / OUT: 2785 mL / NET: -1250 mL    2021 07:01  -  2021 12:17  --------------------------------------------------------  IN: 340 mL / OUT: 500 mL / NET: -160 mL      Daily     Daily Weight in k.6 (2021 04:00)  CAPILLARY BLOOD GLUCOSE          PHYSICAL EXAM:    GENERAL:  NAD  SKIN: No rashes or lesions  HENT: Atraumatic. Normocephalic. PERRL. Moist membranes.  NECK: Supple, No JVD. No lymphadenopathy.  PULMONARY: CTA B/L. No wheezing. No rales  CVS: Normal S1, S2. Rate and Rhythm are regular. No murmurs.  ABDOMEN/GI: Soft, Nontender, Nondistended; BS present  EXTREMITIES: Peripheral pulses intact. No edema B/L LE.  NEUROLOGIC:  No motor or sensory deficit.  PSYCH: Alert & oriented x 3    Consultant(s) Notes Reviewed:  [x ] YES  [ ] NO  Care Discussed with Consultants/Other Providers [ x] YES  [ ] NO    EKG reviewed  Telemetry reviewed    LABS:                        10.9   4.90  )-----------( 163      ( 2021 06:10 )             34.3   Hemoglobin: 10.9 g/dL ( @ 06:10)  Hemoglobin: 10.1 g/dL (07-15 @ 07:14)  Hemoglobin: 10.3 g/dL ( @ 06:41)  Hemoglobin: 9.2 g/dL ( @ 06:35)  Hemoglobin: 8.3 g/dL ( @ 05:40)        141  |  85<L>  |  48<H>  ----------------------------<  100<H>    Creatinine Trend: 1.7<--, 1.6<--, 1.4<--, 1.5<--, 1.5<--, 1.4<--  4.1   |  45<HH>  |  1.7<H>    Ca    9.4      2021 06:10  Mg     2.5         TPro  6.9  /  Alb  3.3<L>  /  TBili  2.2<H>  /  DBili  x   /  AST  22  /  ALT  8   /  AlkPhos  109      PT/INR - ( 2021 06:10 )   PT: 24.40 sec;   INR: 2.12 ratio                   RADIOLOGY & ADDITIONAL TESTS:    < from: TTE Echo Complete w/o Contrast w/ Doppler (21 @ 09:57) >    Summary:   1. Left ventricular ejection fraction, by visual estimation, is 50 to 55%.   2. Normal global left ventricular systolic function.   3. Moderate to severe left atrial enlargement.   4. Mildly enlarged right ventricle.   5. Moderately reduced RV systolic function.   6. Moderately enlarged right atrium.   7. Moderate mitral valve regurgitation.   8. Moderate-severe tricuspid regurgitation.   9. Mild pulmonic valve regurgitation.  10. Estimated pulmonary artery systolic pressure is 59.7 mmHg assuming a right atrial pressure of 10 mmHg, which is consistent with moderate pulmonary hypertension.  11. There is mild aortic root calcification.    < end of copied text >    Imaging or report Personally Reviewed:  [ ] YES  [ ] NO    Medications:  Standing  aspirin enteric coated 81 milliGRAM(s) Oral daily  atorvastatin 20 milliGRAM(s) Oral at bedtime  budesonide 160 MICROgram(s)/formoterol 4.5 MICROgram(s) Inhaler 2 Puff(s) Inhalation two times a day  buMETAnide Infusion 1 mG/Hr IV Continuous <Continuous>  chlorhexidine 4% Liquid 1 Application(s) Topical <User Schedule>  iron sucrose IVPB 200 milliGRAM(s) IV Intermittent every 24 hours  metolazone 2.5 milliGRAM(s) Oral <User Schedule>  nystatin Powder 1 Application(s) Topical every 12 hours  polyethylene glycol 3350 17 Gram(s) Oral at bedtime  senna 2 Tablet(s) Oral at bedtime  sodium chloride 3%. 150 milliLiter(s) IV Continuous <Continuous>  sodium chloride 3%. 150 milliLiter(s) IV Continuous <Continuous>  sodium chloride 3%. 150 milliLiter(s) IV Continuous <Continuous>  sodium chloride 3%. 150 milliLiter(s) IV Continuous <Continuous>  sodium chloride 3%. 150 milliLiter(s) IV Continuous <Continuous>  sodium chloride 3%. 150 milliLiter(s) IV Continuous <Continuous>  sodium chloride 3%. 150 milliLiter(s) IV Continuous <Continuous>  sodium chloride 3%. 150 milliLiter(s) IV Continuous <Continuous>  sodium chloride 3%. 150 milliLiter(s) IV Continuous <Continuous>  sodium chloride 3%. 150 milliLiter(s) IV Continuous <Continuous>  sodium chloride 3%. 150 milliLiter(s) IV Continuous <Continuous>  spironolactone 25 milliGRAM(s) Oral two times a day  warfarin 1 milliGRAM(s) Oral once    PRN Meds  ALBUTerol  90 MICROgram(s) HFA Inhaler - Peds 2 Puff(s) Inhalation every 4 hours PRN  oxycodone    5 mG/acetaminophen 325 mG 1 Tablet(s) Oral every 8 hours PRN      Case discussed with resident    Care discussed with pt/family

## 2021-07-16 NOTE — PROGRESS NOTE ADULT - SUBJECTIVE AND OBJECTIVE BOX
Interval History:    - Patient resting in bed, feels better today, no BLE leg pain        81 y/o M with h/o CAD s/p PCI, COPD on home O2, HFpEF, afib admitted with worsening SOB. Patient reports having worsening SOB over past few days along with b/l LE edema and chest pain. No LOC, N/V. He claims to be compliant with medications.           PMH/PSH: CAD s/p PCI, COPD, HFpEF      FH: No early CAD or SCD       PHYSICAL EXAM     General: AAO x3, no acute distress   Neck: Trachea is central, JVD -ve  Lungs: CTA, no crackles   Heart: Regular heart sounds, no murmurs   GI: Abdomen is soft, NT  Neuro: Normal strength  Psych: Calm affect   Integument: No skin bruises

## 2021-07-16 NOTE — PROGRESS NOTE ADULT - PROBLEM SELECTOR PLAN 1
Most likely due to lowe trauma  It is now resolved  RBUS:  WNL  f/up as outpatient. I will STOP taking the medications listed below when I get home from the hospital:  None

## 2021-07-16 NOTE — PROGRESS NOTE ADULT - ASSESSMENT
Acute on chronic diastolic HF/ Anemia / CKD/ DVT    Close to euvolemic   DC Bumex, metolazone and hypertonic saline today   ContinueTorsemide 20 mg daily   Spironolactone 25 mg daily   Keep Mg >2.2 and K>4.0  Venous duplex showed DVT on RLE  Iron sucrose 200 mg daily x5  Work up for TONJA - Get FOBT   Continue A/C for DVT/ afib   PT evaluation

## 2021-07-17 ENCOUNTER — TRANSCRIPTION ENCOUNTER (OUTPATIENT)
Age: 81
End: 2021-07-17

## 2021-07-17 LAB
ALBUMIN SERPL ELPH-MCNC: 3.4 G/DL — LOW (ref 3.5–5.2)
ALP SERPL-CCNC: 104 U/L — SIGNIFICANT CHANGE UP (ref 30–115)
ALT FLD-CCNC: 8 U/L — SIGNIFICANT CHANGE UP (ref 0–41)
ANION GAP SERPL CALC-SCNC: 10 MMOL/L — SIGNIFICANT CHANGE UP (ref 7–14)
ANION GAP SERPL CALC-SCNC: 11 MMOL/L — SIGNIFICANT CHANGE UP (ref 7–14)
AST SERPL-CCNC: 20 U/L — SIGNIFICANT CHANGE UP (ref 0–41)
BASOPHILS # BLD AUTO: 0.02 K/UL — SIGNIFICANT CHANGE UP (ref 0–0.2)
BASOPHILS NFR BLD AUTO: 0.4 % — SIGNIFICANT CHANGE UP (ref 0–1)
BILIRUB SERPL-MCNC: 1.6 MG/DL — HIGH (ref 0.2–1.2)
BUN SERPL-MCNC: 57 MG/DL — HIGH (ref 10–20)
BUN SERPL-MCNC: 58 MG/DL — HIGH (ref 10–20)
CALCIUM SERPL-MCNC: 8.7 MG/DL — SIGNIFICANT CHANGE UP (ref 8.5–10.1)
CALCIUM SERPL-MCNC: 9 MG/DL — SIGNIFICANT CHANGE UP (ref 8.5–10.1)
CHLORIDE SERPL-SCNC: 84 MMOL/L — LOW (ref 98–110)
CHLORIDE SERPL-SCNC: 85 MMOL/L — LOW (ref 98–110)
CO2 SERPL-SCNC: 43 MMOL/L — CRITICAL HIGH (ref 17–32)
CO2 SERPL-SCNC: 45 MMOL/L — CRITICAL HIGH (ref 17–32)
CREAT SERPL-MCNC: 2.1 MG/DL — HIGH (ref 0.7–1.5)
CREAT SERPL-MCNC: 2.2 MG/DL — HIGH (ref 0.7–1.5)
EOSINOPHIL # BLD AUTO: 0.13 K/UL — SIGNIFICANT CHANGE UP (ref 0–0.7)
EOSINOPHIL NFR BLD AUTO: 2.8 % — SIGNIFICANT CHANGE UP (ref 0–8)
GAS PNL BLDA: SIGNIFICANT CHANGE UP
GLUCOSE SERPL-MCNC: 100 MG/DL — HIGH (ref 70–99)
GLUCOSE SERPL-MCNC: 98 MG/DL — SIGNIFICANT CHANGE UP (ref 70–99)
HCT VFR BLD CALC: 32.2 % — LOW (ref 42–52)
HGB BLD-MCNC: 10.2 G/DL — LOW (ref 14–18)
IMM GRANULOCYTES NFR BLD AUTO: 0.4 % — HIGH (ref 0.1–0.3)
INR BLD: 3.32 RATIO — HIGH (ref 0.65–1.3)
LYMPHOCYTES # BLD AUTO: 0.67 K/UL — LOW (ref 1.2–3.4)
LYMPHOCYTES # BLD AUTO: 14.4 % — LOW (ref 20.5–51.1)
MAGNESIUM SERPL-MCNC: 2.6 MG/DL — HIGH (ref 1.8–2.4)
MCHC RBC-ENTMCNC: 28.3 PG — SIGNIFICANT CHANGE UP (ref 27–31)
MCHC RBC-ENTMCNC: 31.7 G/DL — LOW (ref 32–37)
MCV RBC AUTO: 89.2 FL — SIGNIFICANT CHANGE UP (ref 80–94)
MONOCYTES # BLD AUTO: 0.71 K/UL — HIGH (ref 0.1–0.6)
MONOCYTES NFR BLD AUTO: 15.2 % — HIGH (ref 1.7–9.3)
NEUTROPHILS # BLD AUTO: 3.11 K/UL — SIGNIFICANT CHANGE UP (ref 1.4–6.5)
NEUTROPHILS NFR BLD AUTO: 66.8 % — SIGNIFICANT CHANGE UP (ref 42.2–75.2)
NRBC # BLD: 0 /100 WBCS — SIGNIFICANT CHANGE UP (ref 0–0)
PLATELET # BLD AUTO: 176 K/UL — SIGNIFICANT CHANGE UP (ref 130–400)
POTASSIUM SERPL-MCNC: 3.9 MMOL/L — SIGNIFICANT CHANGE UP (ref 3.5–5)
POTASSIUM SERPL-MCNC: 4.1 MMOL/L — SIGNIFICANT CHANGE UP (ref 3.5–5)
POTASSIUM SERPL-SCNC: 3.9 MMOL/L — SIGNIFICANT CHANGE UP (ref 3.5–5)
POTASSIUM SERPL-SCNC: 4.1 MMOL/L — SIGNIFICANT CHANGE UP (ref 3.5–5)
PROT SERPL-MCNC: 6.6 G/DL — SIGNIFICANT CHANGE UP (ref 6–8)
PROTHROM AB SERPL-ACNC: 38.2 SEC — HIGH (ref 9.95–12.87)
RBC # BLD: 3.61 M/UL — LOW (ref 4.7–6.1)
RBC # FLD: 20.1 % — HIGH (ref 11.5–14.5)
SODIUM SERPL-SCNC: 138 MMOL/L — SIGNIFICANT CHANGE UP (ref 135–146)
SODIUM SERPL-SCNC: 140 MMOL/L — SIGNIFICANT CHANGE UP (ref 135–146)
WBC # BLD: 4.66 K/UL — LOW (ref 4.8–10.8)
WBC # FLD AUTO: 4.66 K/UL — LOW (ref 4.8–10.8)

## 2021-07-17 PROCEDURE — 99233 SBSQ HOSP IP/OBS HIGH 50: CPT

## 2021-07-17 RX ADMIN — NYSTATIN CREAM 1 APPLICATION(S): 100000 CREAM TOPICAL at 17:50

## 2021-07-17 RX ADMIN — CHLORHEXIDINE GLUCONATE 1 APPLICATION(S): 213 SOLUTION TOPICAL at 05:43

## 2021-07-17 RX ADMIN — OXYCODONE AND ACETAMINOPHEN 1 TABLET(S): 5; 325 TABLET ORAL at 22:30

## 2021-07-17 RX ADMIN — OXYCODONE AND ACETAMINOPHEN 1 TABLET(S): 5; 325 TABLET ORAL at 13:00

## 2021-07-17 RX ADMIN — POLYETHYLENE GLYCOL 3350 17 GRAM(S): 17 POWDER, FOR SOLUTION ORAL at 21:53

## 2021-07-17 RX ADMIN — BUDESONIDE AND FORMOTEROL FUMARATE DIHYDRATE 2 PUFF(S): 160; 4.5 AEROSOL RESPIRATORY (INHALATION) at 20:01

## 2021-07-17 RX ADMIN — Medication 20 MILLIGRAM(S): at 08:34

## 2021-07-17 RX ADMIN — BUDESONIDE AND FORMOTEROL FUMARATE DIHYDRATE 2 PUFF(S): 160; 4.5 AEROSOL RESPIRATORY (INHALATION) at 08:36

## 2021-07-17 RX ADMIN — IRON SUCROSE 110 MILLIGRAM(S): 20 INJECTION, SOLUTION INTRAVENOUS at 05:47

## 2021-07-17 RX ADMIN — OXYCODONE AND ACETAMINOPHEN 1 TABLET(S): 5; 325 TABLET ORAL at 12:33

## 2021-07-17 RX ADMIN — SPIRONOLACTONE 25 MILLIGRAM(S): 25 TABLET, FILM COATED ORAL at 05:43

## 2021-07-17 RX ADMIN — OXYCODONE AND ACETAMINOPHEN 1 TABLET(S): 5; 325 TABLET ORAL at 04:44

## 2021-07-17 RX ADMIN — OXYCODONE AND ACETAMINOPHEN 1 TABLET(S): 5; 325 TABLET ORAL at 22:00

## 2021-07-17 RX ADMIN — Medication 81 MILLIGRAM(S): at 11:39

## 2021-07-17 RX ADMIN — NYSTATIN CREAM 1 APPLICATION(S): 100000 CREAM TOPICAL at 05:43

## 2021-07-17 RX ADMIN — OXYCODONE AND ACETAMINOPHEN 1 TABLET(S): 5; 325 TABLET ORAL at 05:15

## 2021-07-17 RX ADMIN — ATORVASTATIN CALCIUM 20 MILLIGRAM(S): 80 TABLET, FILM COATED ORAL at 21:53

## 2021-07-17 RX ADMIN — SENNA PLUS 2 TABLET(S): 8.6 TABLET ORAL at 21:53

## 2021-07-17 NOTE — DISCHARGE NOTE PROVIDER - CARE PROVIDER_API CALL
John Page)  [address] [place], [place] [id number]  [address] [place] [address], [place] [address]  Phone: [phone]  Fax: [phone]  Follow Up Time: 1 week

## 2021-07-17 NOTE — PROGRESS NOTE ADULT - SUBJECTIVE AND OBJECTIVE BOX
RODNEY SANTO  80y  Male      Patient is a 80y old  Male who presents with a chief complaint of shortness of breath (16 Jul 2021 17:56)      INTERVAL HPI/OVERNIGHT EVENTS:    REVIEW OF SYSTEMS:  CONSTITUTIONAL: No fever, weight loss, or fatigue  EYES: No eye pain, visual disturbances, or discharge  ENMT:  No difficulty hearing, tinnitus, vertigo; No sinus or throat pain  NECK: No pain or stiffness  RESPIRATORY: No cough, wheezing, chills or hemoptysis; No shortness of breath  CARDIOVASCULAR: No chest pain, palpitations, dizziness, or leg swelling  GASTROINTESTINAL: No abdominal or epigastric pain. No diarrhea or constipation. No nausea, vomiting, or hematemesis. No melena or hematochezia.  GENITOURINARY: No dysuria, frequency, hematuria, or incontinence  NEUROLOGICAL: No headaches, memory loss, loss of strength, numbness, or tremors  MUSCULOSKELETAL: No joint pain or swelling; No muscle, back, or extremity pain  SKIN: No itching, burning, rashes, or lesions   LYMPH NODES: No enlarged glands  ENDOCRINE: No heat or cold intolerance; No hair loss  PSYCHIATRIC: No depression, anxiety, mood swings, or difficulty sleeping  HEME/LYMPH: No easy bruising, or bleeding gums  ALLERY AND IMMUNOLOGIC: No hives or eczema    Vital Signs Last 24 Hrs  T(C): 35.7 (17 Jul 2021 05:13), Max: 36.3 (16 Jul 2021 17:30)  T(F): 96.2 (17 Jul 2021 05:13), Max: 97.3 (16 Jul 2021 17:30)  HR: 52 (17 Jul 2021 05:13) (52 - 75)  BP: 108/49 (17 Jul 2021 05:13) (87/50 - 110/62)  BP(mean): --  RR: 18 (17 Jul 2021 05:13) (16 - 18)  SpO2: 95% (16 Jul 2021 19:31) (95% - 95%)    PHYSICAL EXAM:  GENERAL: NAD, well-groomed, well-developed  HEAD:  Atraumatic, Normocephalic  EYES: EOMI, PERRLA, conjunctiva and sclera clear  ENMT: Moist mucous membranes, Good dentition, No lesions  NECK: Supple, No JVD, Normal thyroid  NERVOUS SYSTEM:  Alert & Oriented X3, Good concentration; Motor Strength 5/5 B/L upper and lower extremities; DTRs 2+ intact and symmetric  CHEST/LUNG: Clear to auscultation bilaterally; No rales, rhonchi, wheezing, or rubs  HEART: Regular rate and rhythm; No murmurs, rubs, or gallops  ABDOMEN: Soft, Nontender, Nondistended; Bowel sounds present  EXTREMITIES:  2+ Peripheral Pulses, No clubbing, cyanosis, or edema  LYMPH: No lymphadenopathy noted  SKIN: No rashes or lesions    Consultant(s) Notes Reviewed:  [x ] YES  [ ] NO  Care Discussed with Consultants/Other Providers [ x] YES  [ ] NO    LABS:                        10.9   4.90  )-----------( 163      ( 16 Jul 2021 06:10 )             34.3     07-16    136  |  83<L>  |  51<H>  ----------------------------<  126<H>  4.3   |  41<HH>  |  1.9<H>    Ca    8.9      16 Jul 2021 17:07  Mg     2.5     07-16    TPro  6.9  /  Alb  3.3<L>  /  TBili  2.2<H>  /  DBili  x   /  AST  22  /  ALT  8   /  AlkPhos  109  07-16    PT/INR - ( 16 Jul 2021 06:10 )   PT: 24.40 sec;   INR: 2.12 ratio               CAPILLARY BLOOD GLUCOSE      POCT Blood Glucose.: 140 mg/dL (16 Jul 2021 16:52)      RADIOLOGY & ADDITIONAL TESTS:    Imaging Personally Reviewed:  [ ] YES  [ ] NO RODNEY SANTO  80y  Male    81 y/o male with PMH CAD s/p PCI and stent 2007, COPD on 2L home O2, HFpEF TTE in 4/21 EF 55-65, DLD, atrial fibrillation on coumadin presents for shortness of breath and increase in his NADIA along with 1 day history of chest pain.      INTERVAL HPI/OVERNIGHT EVENTS:  No significant events overnight.     REVIEW OF SYSTEMS:  GENERAL: NAD  HEAD:  Atraumatic, Normocephalic  EYES: EOMI, PERRLA, conjunctiva and sclera clear  ENMT: Moist mucous membranes  NECK: Supple,   NERVOUS SYSTEM:  Alert & Oriented X3, Good concentration; Motor Strength 5/5 B/L upper and lower extremities; DTRs 2+ intact and symmetric  CHEST/LUNG: Clear to auscultation bilaterally; No rales, rhonchi, wheezing, or rubs  HEART: Regular rate and rhythm; No murmurs, rubs, or gallops  ABDOMEN: Soft, Nontender, Nondistended; Bowel sounds present  EXTREMITIES:  LE edema improving     Vital Signs Last 24 Hrs  T(C): 35.7 (17 Jul 2021 05:13), Max: 36.3 (16 Jul 2021 17:30)  T(F): 96.2 (17 Jul 2021 05:13), Max: 97.3 (16 Jul 2021 17:30)  HR: 52 (17 Jul 2021 05:13) (52 - 75)  BP: 108/49 (17 Jul 2021 05:13) (87/50 - 110/62)  RR: 18 (17 Jul 2021 05:13) (16 - 18)  SpO2: 95% (16 Jul 2021 19:31) (95% - 95%)    PHYSICAL EXAM:  GENERAL: NAD  HEAD:  Atraumatic, Normocephalic  EYES: EOMI, PERRLA, conjunctiva and sclera clear  ENMT: Moist mucous membranes  NECK: Supple,   NERVOUS SYSTEM:  Alert & Oriented X3, Good concentration; Motor Strength 5/5 B/L upper and lower extremities; DTRs 2+ intact and symmetric  CHEST/LUNG: Clear to auscultation bilaterally; No rales, rhonchi, wheezing, or rubs  HEART: Regular rate and rhythm; No murmurs, rubs, or gallops  ABDOMEN: Soft, Nontender, Nondistended; Bowel sounds present  EXTREMITIES:  LE edema improving         Consultant(s) Notes Reviewed:  [x ] YES  [ ] NO  Care Discussed with Consultants/Other Providers [ x] YES  [ ] NO    LABS:                        10.9   4.90  )-----------( 163      ( 16 Jul 2021 06:10 )             34.3     07-16    136  |  83<L>  |  51<H>  ----------------------------<  126<H>  4.3   |  41<HH>  |  1.9<H>    Ca    8.9      16 Jul 2021 17:07  Mg     2.5     07-16    TPro  6.9  /  Alb  3.3<L>  /  TBili  2.2<H>  /  DBili  x   /  AST  22  /  ALT  8   /  AlkPhos  109  07-16    PT/INR - ( 16 Jul 2021 06:10 )   PT: 24.40 sec;   INR: 2.12 ratio               CAPILLARY BLOOD GLUCOSE      POCT Blood Glucose.: 140 mg/dL (16 Jul 2021 16:52)      RADIOLOGY & ADDITIONAL TESTS:    Imaging Personally Reviewed:  [ ] YES  [ ] NO

## 2021-07-17 NOTE — PROGRESS NOTE ADULT - ASSESSMENT
79 y/o male with PMH CAD s/p PCI and stent 2007, COPD on 2L home O2, HFpEF TTE in 4/21 EF 55-65, DLD, atrial fibrillation on coumadin presents for shortness of breath and increase in his NADIA along with 1 day history of chest pain.    # HFpEF exacerbation  # Afib SVR # Bradycardia # NSVT  # CAD s/p stent 2007  # Severe Pulmonary HTN  - CXR b/l pleural effusions and vascular congestion  - TTE 4/21 EF 55-65 PSAP 70 mmhg; get new TTE  - BNP 14K on admission   - ECG afib in SVR  - troponin negative   - bumex 20 mg IV 1mg/hr d/c   -Spironolactone 25 mg daily   -Metolazone d/c   - ASA, atorvastatin   -  lipid panel WNL except HDL 39 and A1c 5.6  - iron profile WNL  - daily weight, strict I/Os keep I < O  - DASH TLC diet  fluid restriction 1000 ml daily  - continue tele monitoring  - EP on board : Loop recorder placed 7/12    - Avoid AVN blockers  -  INR 3.32- No warfarin today  - Cardiology Cs doctor Niesha; Heart failure team consult  - TSH 4.34  - keep K > 4 and Mg > 2.1  - NC 2 LPM; HOB > 35 titrate oxygen keep saO2 89-94%  -Diamox PRN           # DORON on CKD II  - creatinine trensing back up 2.1  - Most likely secondary to aggressive diuresis   - Bumex d/c , possible fluid bolus  -once trending down patient ok for discharge      # Normocytic anemia- chronic  - at baseline  - -Iron sucrose  x 5 doses   - FU folate b12 and TSH  - retic count WNL  - Stool sample for guaiac  - needs OP colonoscopy  - HIv screening negative  - active type and screen keep Hb > 7    # COPD not in exacerbation  # PSAP 70 mmhg  - on  2 LPM NC at home during night   - c/w symbicort and ventolin as needed  - CXR bilateral opacities basilar; underlying neoplasm not excluded  - chest CT

## 2021-07-17 NOTE — DISCHARGE NOTE PROVIDER - NSDCFUSCHEDAPPT_GEN_ALL_CORE_FT
RODNEY SANTO ; 07/29/2021 ; [facility] [address] RODNEY Reis ; 08/10/2021 ; [facility] [address] RODNEY Reis ; 08/16/2021 ; [facility] [address] [facility]

## 2021-07-17 NOTE — DISCHARGE NOTE PROVIDER - HOSPITAL COURSE
81 y/o male with PMH CAD s/p PCI and stent 2007, COPD on 2L home O2, HFpEF TTE in 4/21 EF 55-65, DLD, atrial fibrillation on coumadin presents for shortness of breath. Has had worsening shortness of breath and was told at coumadin clinic he'd gain significant amount of weight. VS on admission were within normal range. He was saturating well on 3 L NC.  CXR showed bilateral pleural effusions with  vascular congestion.  81 y/o male with PMH CAD s/p PCI and stent 2007, COPD on 2L home O2, HFpEF TTE in 4/21 EF 55-65, DLD, atrial fibrillation on coumadin presents for shortness of breath and increase in his NADIA along with 1 day history of chest pain.    # HFpEF exacerbation  # Afib SVR # Bradycardia # NSVT  # CAD s/p stent 2007  # Severe Pulmonary HTN  - CXR b/l pleural effusions and vascular congestion  - TTE 4/21 EF 55-65 PSAP 70 mmhg; get new TTE  - BNP 14K on admission   - ECG afib in SVR  - troponin negative   - bumex 20 mg IV 1mg/hr d/c   -Spironolactone 25 mg daily   -Metolazone d/c   - ASA, atorvastatin   -  lipid panel WNL except HDL 39 and A1c 5.6  - iron profile WNL  - daily weight, strict I/Os keep I < O  - DASH TLC diet  fluid restriction 1000 ml daily  - continue tele monitoring  - EP on board : Loop recorder placed 7/12    - Avoid AVN blockers  -  INR 3.32- No warfarin today  - Cardiology Cs doctor Niesha; Heart failure team consult  - TSH 4.34  - keep K > 4 and Mg > 2.1  - NC 2 LPM; HOB > 35 titrate oxygen keep saO2 89-94%  -Diamox PRN         # DORON on CKD II  - creatinine trending back up to 2.1  - Most likely secondary to aggressive diuresis   - Bumex d/c , possible fluid bolus  -once cr  trending down towards his baseline of 1.1 patient ok for discharge      # Normocytic anemia- chronic  - at baseline  - -Iron sucrose  x 5 doses   - FU folate b12 and TSH  - retic count WNL  - Stool sample for guaiac  - needs OP colonoscopy  - HIv screening negative  - active type and screen keep Hb > 7    # COPD not in exacerbation  # PSAP 70 mmhg  - on  2 LPM NC at home during night   - c/w symbicort and ventolin as needed  - CXR bilateral opacities basilar; underlying neoplasm not excluded  - chest CT      Stable for D/C to [facility]

## 2021-07-17 NOTE — DISCHARGE NOTE PROVIDER - NSDCCPCAREPLAN_GEN_ALL_CORE_FT
PRINCIPAL DISCHARGE DIAGNOSIS  Diagnosis: Acute on chronic diastolic congestive heart failure  Assessment and Plan of Treatment:       SECONDARY DISCHARGE DIAGNOSES  Diagnosis: Fluid overload  Assessment and Plan of Treatment:      PRINCIPAL DISCHARGE DIAGNOSIS  Diagnosis: Acute on chronic diastolic congestive heart failure  Assessment and Plan of Treatment: Congestive heart failure (CHF) is a chronic condition in which the heart has trouble pumping blood. In some cases of heart failure, fluid may back up into your lungs or you may have swelling (edema) in your lower legs. There are many causes of heart failure including high blood pressure, coronary artery disease, abnormal heart valves, heart muscle disease, lung disease, diabetes, etc. Symptoms include shortness of breath with activity or when lying flat, cough, swelling of the legs, fatigue, or increased urination during the night.   Treatment is aimed at managing the symptoms of heart failure and may include lifestyle changes, medications, or surgical procedures. Take medicines only as directed by your health care provider and do not stop unless instructed to do so. Eat heart-healthy foods with low or no trans/saturated fats, cholesterol and salt. Weigh yourself every day for early recognition of fluid accumulation.  SEEK IMMEDIATE MEDICAL CARE IF YOU HAVE ANY OF THE FOLLOWING SYMPTOMS: shortness of breath, change in mental status, chest pain, lightheadedness/dizziness/fainting, or worsening of symptoms including not being able to conduct normal physical activity.        SECONDARY DISCHARGE DIAGNOSES  Diagnosis: Fluid overload  Assessment and Plan of Treatment:

## 2021-07-17 NOTE — DISCHARGE NOTE PROVIDER - NSDCMRMEDTOKEN_GEN_ALL_CORE_FT
atorvastatin 20 mg oral tablet: 1 tab(s) orally once a day  nystatin 100,000 units/g topical powder: 1 application topically every 12 hours  polyethylene glycol 3350 oral powder for reconstitution: 17 gram(s) orally once a day (at bedtime)  spironolactone 25 mg oral tablet: 1 tab(s) orally once a day  Symbicort 160 mcg-4.5 mcg/inh inhalation aerosol: 2 puff(s) inhaled 2 times a day  Ventolin HFA 90 mcg/inh inhalation aerosol: 2 puff(s) inhaled every 6 hours as neede for shortness of breath  warfarin 2 mg oral tablet: Take half a tablet Sun, Tues, Wed, Thurs, Fri, Sat.  Take a whole tablet on Mon   atorvastatin 20 mg oral tablet: 1 tab(s) orally once a day  nystatin 100,000 units/g topical powder: 1 application topically every 12 hours  polyethylene glycol 3350 oral powder for reconstitution: 17 gram(s) orally once a day (at bedtime)  spironolactone: 12.5 milligram(s) orally once a day  Symbicort 160 mcg-4.5 mcg/inh inhalation aerosol: 2 puff(s) inhaled 2 times a day  torsemide 20 mg oral tablet: 1 tab(s) orally once a day  Ventolin HFA 90 mcg/inh inhalation aerosol: 2 puff(s) inhaled every 6 hours as neede for shortness of breath  warfarin 2 mg oral tablet: Take half a tablet Sun, Tues, Wed, Thurs, Fri, Sat.  Take a whole tablet on Mon

## 2021-07-17 NOTE — PROGRESS NOTE ADULT - ASSESSMENT
80 M PMHx CAD s/p PCI and stent 2007, COPD on 2L home O2, HFpEF TTE in 4/21 EF 55-65, DLD, atrial fibrillation on coumadin presented with progressively worsening sob associated with increasing leg swelling with intermittent chest pain    Acute HFpEF  - euvolemic  - c/w toresmide, spironolaconte    Left heel pain  - off loading, local wound care  - appears to be DTI, likely present on admission    MIKE  - worsening today  - changed from IV to PO diuretics today  - suspect due to overdiuresis (especially since bicarb uptrending), hold toresmide today and if renal function down trending can dc in AM    COPD on home O2  - c/w symbicort, nebs    CAD S/P PCI in 2007  - c/w aspirin, lipitor    Chronic A-Fib with Slow Ventricular Response   - supratherapeutic, hold coumadin    NSVT  - no further episodes on monitor  - off metoprolol due to bradycardia    Anemia  - chronic  - 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  Disposition: Home___/SNF___/Other________/Unknown at this time________

## 2021-07-17 NOTE — PROGRESS NOTE ADULT - SUBJECTIVE AND OBJECTIVE BOX
CHIEF COMPLAINT:    Patient is a 80y old  Male who presents with a chief complaint of shortness of breath       INTERVAL HPI/OVERNIGHT EVENTS:    Patient seen and examined at bedside. No acute overnight events occurred.    ROS: Reports heel pain. All other systems are negative.    Vital Signs:    T(F): 96.2 (07-17-21 @ 05:13), Max: 97.3 (07-16-21 @ 17:30)  HR: 52 (07-17-21 @ 05:13) (52 - 74)  BP: 100/56 (07-17-21 @ 08:24) (87/50 - 108/49)  RR: 18 (07-17-21 @ 05:13) (17 - 18)  SpO2: 95% (07-17-21 @ 12:54) (95% - 95%)  I&O's Summary    16 Jul 2021 07:01  -  17 Jul 2021 07:00  --------------------------------------------------------  IN: 975 mL / OUT: 1775 mL / NET: -800 mL    17 Jul 2021 07:01  -  17 Jul 2021 13:38  --------------------------------------------------------  IN: 340 mL / OUT: 410 mL / NET: -70 mL      Daily     Daily   CAPILLARY BLOOD GLUCOSE      POCT Blood Glucose.: 140 mg/dL (16 Jul 2021 16:52)      PHYSICAL EXAM:  GENERAL:  NAD  SKIN: No rashes or lesions  HEENT: Atraumatic. Normocephalic. Anicteric  NECK:  No JVD.   PULMONARY: Clear to ausculation bilaterally. No wheezing. No rales  CVS: Normal S1, S2. Regular rate and rhythm. No murmurs.  ABDOMEN/GI: Soft, Nontender, Nondistended; Bowel sounds are present  EXTREMITIES:  Left heel DTI  NEUROLOGIC:  No motor deficit.  PSYCH: Alert & oriented x 3, normal affect    Consultant(s) Notes Reviewed:  [x ] YES  [ ] NO      LABS:                        10.2   4.66  )-----------( 176      ( 17 Jul 2021 05:39 )             32.2     07-17    140  |  85<L>  |  57<H>  ----------------------------<  98  4.1   |  45<HH>  |  2.1<H>    Ca    9.0      17 Jul 2021 05:39  Mg     2.6     07-17    TPro  6.6  /  Alb  3.4<L>  /  TBili  1.6<H>  /  DBili  x   /  AST  20  /  ALT  8   /  AlkPhos  104  07-17    PT/INR - ( 17 Jul 2021 05:39 )   PT: 38.20 sec;   INR: 3.32 ratio           RADIOLOGY & ADDITIONAL TESTS:  Imaging or report Personally Reviewed:  [ ] YES  [ ] NO    Telemetry reviewed independently - PVC, bradycardia    Medications:  Standing  aspirin enteric coated 81 milliGRAM(s) Oral daily  atorvastatin 20 milliGRAM(s) Oral at bedtime  budesonide 160 MICROgram(s)/formoterol 4.5 MICROgram(s) Inhaler 2 Puff(s) Inhalation two times a day  chlorhexidine 4% Liquid 1 Application(s) Topical <User Schedule>  nystatin Powder 1 Application(s) Topical every 12 hours  polyethylene glycol 3350 17 Gram(s) Oral at bedtime  senna 2 Tablet(s) Oral at bedtime  spironolactone 25 milliGRAM(s) Oral daily  torsemide 20 milliGRAM(s) Oral daily    PRN Meds  ALBUTerol  90 MICROgram(s) HFA Inhaler - Peds 2 Puff(s) Inhalation every 4 hours PRN  oxycodone    5 mG/acetaminophen 325 mG 1 Tablet(s) Oral every 8 hours PRN      Case discussed with resident  Care discussed with pt

## 2021-07-17 NOTE — DISCHARGE NOTE PROVIDER - CARE PROVIDERS DIRECT ADDRESSES
[email or web address]

## 2021-07-18 LAB
ALBUMIN SERPL ELPH-MCNC: 3.2 G/DL — LOW (ref 3.5–5.2)
ALP SERPL-CCNC: 105 U/L — SIGNIFICANT CHANGE UP (ref 30–115)
ALT FLD-CCNC: 9 U/L — SIGNIFICANT CHANGE UP (ref 0–41)
ANION GAP SERPL CALC-SCNC: 10 MMOL/L — SIGNIFICANT CHANGE UP (ref 7–14)
AST SERPL-CCNC: 24 U/L — SIGNIFICANT CHANGE UP (ref 0–41)
BASOPHILS # BLD AUTO: 0.02 K/UL — SIGNIFICANT CHANGE UP (ref 0–0.2)
BASOPHILS NFR BLD AUTO: 0.4 % — SIGNIFICANT CHANGE UP (ref 0–1)
BILIRUB SERPL-MCNC: 1.4 MG/DL — HIGH (ref 0.2–1.2)
BUN SERPL-MCNC: 62 MG/DL — CRITICAL HIGH (ref 10–20)
CALCIUM SERPL-MCNC: 8.8 MG/DL — SIGNIFICANT CHANGE UP (ref 8.5–10.1)
CHLORIDE SERPL-SCNC: 82 MMOL/L — LOW (ref 98–110)
CO2 SERPL-SCNC: 44 MMOL/L — CRITICAL HIGH (ref 17–32)
CREAT SERPL-MCNC: 2.3 MG/DL — HIGH (ref 0.7–1.5)
EOSINOPHIL # BLD AUTO: 0.12 K/UL — SIGNIFICANT CHANGE UP (ref 0–0.7)
EOSINOPHIL NFR BLD AUTO: 2.4 % — SIGNIFICANT CHANGE UP (ref 0–8)
GLUCOSE SERPL-MCNC: 87 MG/DL — SIGNIFICANT CHANGE UP (ref 70–99)
HCT VFR BLD CALC: 29.4 % — LOW (ref 42–52)
HGB BLD-MCNC: 9.6 G/DL — LOW (ref 14–18)
IMM GRANULOCYTES NFR BLD AUTO: 0.2 % — SIGNIFICANT CHANGE UP (ref 0.1–0.3)
INR BLD: 4.11 RATIO — HIGH (ref 0.65–1.3)
LYMPHOCYTES # BLD AUTO: 0.8 K/UL — LOW (ref 1.2–3.4)
LYMPHOCYTES # BLD AUTO: 16.1 % — LOW (ref 20.5–51.1)
MAGNESIUM SERPL-MCNC: 2.6 MG/DL — HIGH (ref 1.8–2.4)
MCHC RBC-ENTMCNC: 28.8 PG — SIGNIFICANT CHANGE UP (ref 27–31)
MCHC RBC-ENTMCNC: 32.7 G/DL — SIGNIFICANT CHANGE UP (ref 32–37)
MCV RBC AUTO: 88.3 FL — SIGNIFICANT CHANGE UP (ref 80–94)
MONOCYTES # BLD AUTO: 0.65 K/UL — HIGH (ref 0.1–0.6)
MONOCYTES NFR BLD AUTO: 13.1 % — HIGH (ref 1.7–9.3)
NEUTROPHILS # BLD AUTO: 3.38 K/UL — SIGNIFICANT CHANGE UP (ref 1.4–6.5)
NEUTROPHILS NFR BLD AUTO: 67.8 % — SIGNIFICANT CHANGE UP (ref 42.2–75.2)
NRBC # BLD: 0 /100 WBCS — SIGNIFICANT CHANGE UP (ref 0–0)
PLATELET # BLD AUTO: 188 K/UL — SIGNIFICANT CHANGE UP (ref 130–400)
POTASSIUM SERPL-MCNC: 3.7 MMOL/L — SIGNIFICANT CHANGE UP (ref 3.5–5)
POTASSIUM SERPL-SCNC: 3.7 MMOL/L — SIGNIFICANT CHANGE UP (ref 3.5–5)
PROT SERPL-MCNC: 6.5 G/DL — SIGNIFICANT CHANGE UP (ref 6–8)
PROTHROM AB SERPL-ACNC: >40 SEC — HIGH (ref 9.95–12.87)
RBC # BLD: 3.33 M/UL — LOW (ref 4.7–6.1)
RBC # FLD: 20.2 % — HIGH (ref 11.5–14.5)
SODIUM SERPL-SCNC: 136 MMOL/L — SIGNIFICANT CHANGE UP (ref 135–146)
WBC # BLD: 4.98 K/UL — SIGNIFICANT CHANGE UP (ref 4.8–10.8)
WBC # FLD AUTO: 4.98 K/UL — SIGNIFICANT CHANGE UP (ref 4.8–10.8)

## 2021-07-18 PROCEDURE — 99233 SBSQ HOSP IP/OBS HIGH 50: CPT

## 2021-07-18 RX ORDER — SODIUM CHLORIDE 9 MG/ML
500 INJECTION, SOLUTION INTRAVENOUS ONCE
Refills: 0 | Status: COMPLETED | OUTPATIENT
Start: 2021-07-18 | End: 2021-07-18

## 2021-07-18 RX ORDER — LANOLIN ALCOHOL/MO/W.PET/CERES
5 CREAM (GRAM) TOPICAL ONCE
Refills: 0 | Status: COMPLETED | OUTPATIENT
Start: 2021-07-18 | End: 2021-07-18

## 2021-07-18 RX ADMIN — CHLORHEXIDINE GLUCONATE 1 APPLICATION(S): 213 SOLUTION TOPICAL at 05:26

## 2021-07-18 RX ADMIN — Medication 81 MILLIGRAM(S): at 11:25

## 2021-07-18 RX ADMIN — POLYETHYLENE GLYCOL 3350 17 GRAM(S): 17 POWDER, FOR SOLUTION ORAL at 21:29

## 2021-07-18 RX ADMIN — OXYCODONE AND ACETAMINOPHEN 1 TABLET(S): 5; 325 TABLET ORAL at 14:15

## 2021-07-18 RX ADMIN — SENNA PLUS 2 TABLET(S): 8.6 TABLET ORAL at 21:29

## 2021-07-18 RX ADMIN — NYSTATIN CREAM 1 APPLICATION(S): 100000 CREAM TOPICAL at 17:53

## 2021-07-18 RX ADMIN — SPIRONOLACTONE 25 MILLIGRAM(S): 25 TABLET, FILM COATED ORAL at 05:26

## 2021-07-18 RX ADMIN — SODIUM CHLORIDE 333.33 MILLILITER(S): 9 INJECTION, SOLUTION INTRAVENOUS at 11:21

## 2021-07-18 RX ADMIN — OXYCODONE AND ACETAMINOPHEN 1 TABLET(S): 5; 325 TABLET ORAL at 13:49

## 2021-07-18 RX ADMIN — BUDESONIDE AND FORMOTEROL FUMARATE DIHYDRATE 2 PUFF(S): 160; 4.5 AEROSOL RESPIRATORY (INHALATION) at 20:09

## 2021-07-18 RX ADMIN — BUDESONIDE AND FORMOTEROL FUMARATE DIHYDRATE 2 PUFF(S): 160; 4.5 AEROSOL RESPIRATORY (INHALATION) at 11:25

## 2021-07-18 RX ADMIN — Medication 20 MILLIGRAM(S): at 05:26

## 2021-07-18 RX ADMIN — ATORVASTATIN CALCIUM 20 MILLIGRAM(S): 80 TABLET, FILM COATED ORAL at 21:29

## 2021-07-18 RX ADMIN — Medication 5 MILLIGRAM(S): at 01:49

## 2021-07-18 RX ADMIN — NYSTATIN CREAM 1 APPLICATION(S): 100000 CREAM TOPICAL at 05:25

## 2021-07-18 NOTE — CONSULT NOTE ADULT - ASSESSMENT
DORON   - progressive, prerenal, nosocomial DORON in setting of overdiuresis   - slowly worsening renal function over last week   - b/l normal sized kidneys without hydro on renal sono   - no proteinuria, RBC's on UA   - pt aggressively diuresed with bumx gtt, metolazone, hypertonic saline and aldactone  contraction metabolic acidosis (diuretics and compensation) and resp acidosis (COPD)  acute on chronic HFpEF  Afib with SVR / NSVT / CAD / PCI  COPD on home O2  hf of DVT  resolved gross hematuria from lowe trauma  anemia    plan:    diuretics held  500cc LR given today  not on ACE-I / ARB / entresto  renal function should stabilize with above measures  no need for diamox  f/u labs   check bladder scan for PVR  will follow, thanks

## 2021-07-18 NOTE — CONSULT NOTE ADULT - CONSULT REQUESTED DATE/TIME
18-Jul-2021 15:22
13-Jul-2021 05:20
08-Jul-2021 13:29
08-Jul-2021 08:12
07-Jul-2021 15:46
10-Jul-2021 15:06

## 2021-07-18 NOTE — CONSULT NOTE ADULT - SUBJECTIVE AND OBJECTIVE BOX
NEPHROLOGY CONSULTATION NOTE        PAST MEDICAL & SURGICAL HISTORY:  COPD (chronic obstructive pulmonary disease)    Hypertension    Deep vein thrombosis (DVT) of left lower extremity, unspecified chronicity, unspecified vein    Cellulitis of left lower extremity    Chronic atrial fibrillation    Mitral valve insufficiency, unspecified etiology  Moderate    CHF (congestive heart failure)    S/P coronary artery stent placement    History of total right knee replacement      Allergies:  No Known Allergies    Home Medications Reviewed    SOCIAL HISTORY:  Denies ETOH,Smoking,   FAMILY HISTORY:        REVIEW OF SYSTEMS:  CONSTITUTIONAL: No weakness, fevers or chills  EYES/ENT: No visual changes;  No vertigo or throat pain   NECK: No pain or stiffness  RESPIRATORY: No cough, wheezing, hemoptysis; No shortness of breath  CARDIOVASCULAR: No chest pain or palpitations.  GASTROINTESTINAL: No abdominal or epigastric pain. No nausea, vomiting, or hematemesis; No diarrhea or constipation. No melena or hematochezia.  GENITOURINARY: No dysuria, frequency, foamy urine, urinary urgency, incontinence or hematuria  NEUROLOGICAL: No numbness or weakness  SKIN: No itching, burning, rashes, or lesions   VASCULAR: No bilateral lower extremity edema.   All other review of systems is negative unless indicated above.    PHYSICAL EXAM:  Constitutional: NAD  HEENT: anicteric sclera, oropharynx clear, MMM  Neck: No JVD  Respiratory: decreased BS b/l  Cardiovascular: S1, S2, RRR  Gastrointestinal: BS+, soft, NT/ND  Extremities: No cyanosis or clubbing. No peripheral edema  Neurological: A/O x 3, no focal deficits  Psychiatric: Normal mood, normal affect  : No CVA tenderness. No lowe.   Skin: No rashes    Hospital Medications:   MEDICATIONS  (STANDING):  aspirin enteric coated 81 milliGRAM(s) Oral daily  atorvastatin 20 milliGRAM(s) Oral at bedtime  budesonide 160 MICROgram(s)/formoterol 4.5 MICROgram(s) Inhaler 2 Puff(s) Inhalation two times a day  chlorhexidine 4% Liquid 1 Application(s) Topical <User Schedule>  nystatin Powder 1 Application(s) Topical every 12 hours  polyethylene glycol 3350 17 Gram(s) Oral at bedtime  senna 2 Tablet(s) Oral at bedtime        VITALS:  T(F): 96.5 (21 @ 13:44), Max: 96.9 (21 @ 20:40)  HR: 60 (21 @ 13:44)  BP: 116/50 (21 @ 13:44)  RR: 17 (21 @ 13:44)  SpO2: --  Wt(kg): --     @ 07:01  -   @ 07:00  --------------------------------------------------------  IN: 975 mL / OUT: 1775 mL / NET: -800 mL     @ 07:01  -   @ 07:00  --------------------------------------------------------  IN: 700 mL / OUT: 1615 mL / NET: -915 mL     @ 07:01  -   @ 15:23  --------------------------------------------------------  IN: 880 mL / OUT: 850 mL / NET: 30 mL          LABS:      136  |  82<L>  |  62<HH>  ----------------------------<  87  3.7   |  44<HH>  |  2.3<H>    Ca    8.8      2021 05:45  Mg     2.6         TPro  6.5  /  Alb  3.2<L>  /  TBili  1.4<H>  /  DBili      /  AST  24  /  ALT  9   /  AlkPhos  105                            9.6    4.98  )-----------( 188      ( 2021 05:45 )             29.4       Urine Studies:  Urinalysis Basic - ( 2021 00:08 )    Color: Light Yellow / Appearance: Clear / S.007 / pH:   Gluc:  / Ketone: Negative  / Bili: Negative / Urobili: <2 mg/dL   Blood:  / Protein: Negative / Nitrite: Negative   Leuk Esterase: Small / RBC: 147 /HPF / WBC 5 /HPF   Sq Epi:  / Non Sq Epi: 1 /HPF / Bacteria: Negative          RADIOLOGY & ADDITIONAL STUDIES:   NEPHROLOGY CONSULTATION NOTE    81 yo male with PMH as below, including CKD, CHF, CAD, COPD, Afib, DVT admitted for CHF exacerbation.  Pt diuresed with good effect, but now with worsening renal function thus renal consulted.  Baseline Cr "low-mid 1's".    PAST MEDICAL & SURGICAL HISTORY:  COPD (chronic obstructive pulmonary disease)    Hypertension    Deep vein thrombosis (DVT) of left lower extremity, unspecified chronicity, unspecified vein    Cellulitis of left lower extremity    Chronic atrial fibrillation    Mitral valve insufficiency, unspecified etiology  Moderate    CHF (congestive heart failure)    S/P coronary artery stent placement    History of total right knee replacement      Allergies:  No Known Allergies    Home Medications Reviewed    SOCIAL HISTORY:  Denies ETOH,Smoking,   FAMILY HISTORY:        REVIEW OF SYSTEMS:  CONSTITUTIONAL: No weakness, fevers or chills  EYES/ENT: No visual changes;  No vertigo or throat pain   NECK: No pain or stiffness  RESPIRATORY: No cough, wheezing, hemoptysis; No shortness of breath  CARDIOVASCULAR: No chest pain or palpitations.  GASTROINTESTINAL: No abdominal or epigastric pain. No nausea, vomiting, or hematemesis; No diarrhea or constipation. No melena or hematochezia.  GENITOURINARY: No dysuria, frequency, foamy urine, urinary urgency, incontinence or hematuria  NEUROLOGICAL: No numbness or weakness  SKIN: No itching, burning, rashes, or lesions   VASCULAR: No bilateral lower extremity edema.   All other review of systems is negative unless indicated above.    PHYSICAL EXAM:  Constitutional: NAD  HEENT: anicteric sclera, oropharynx clear, MMM  Neck: No JVD  Respiratory: decreased BS b/l  Cardiovascular: S1, S2, RRR  Gastrointestinal: BS+, soft, NT/ND  Extremities: No cyanosis or clubbing. No peripheral edema  Neurological: A/O x 3, no focal deficits  Psychiatric: Normal mood, normal affect  : No CVA tenderness. No lowe.   Skin: No rashes    Hospital Medications:   MEDICATIONS  (STANDING):  aspirin enteric coated 81 milliGRAM(s) Oral daily  atorvastatin 20 milliGRAM(s) Oral at bedtime  budesonide 160 MICROgram(s)/formoterol 4.5 MICROgram(s) Inhaler 2 Puff(s) Inhalation two times a day  chlorhexidine 4% Liquid 1 Application(s) Topical <User Schedule>  nystatin Powder 1 Application(s) Topical every 12 hours  polyethylene glycol 3350 17 Gram(s) Oral at bedtime  senna 2 Tablet(s) Oral at bedtime        VITALS:  T(F): 96.5 (21 @ 13:44), Max: 96.9 (21 @ 20:40)  HR: 60 (21 @ 13:44)  BP: 116/50 (21 @ 13:44)  RR: 17 (21 @ 13:44)  SpO2: --  Wt(kg): --     @ 07:01  -   @ 07:00  --------------------------------------------------------  IN: 975 mL / OUT: 1775 mL / NET: -800 mL     @ 07:01  -   @ 07:00  --------------------------------------------------------  IN: 700 mL / OUT: 1615 mL / NET: -915 mL     @ 07:01  -   @ 15:23  --------------------------------------------------------  IN: 880 mL / OUT: 850 mL / NET: 30 mL          LABS:      136  |  82<L>  |  62<HH>  ----------------------------<  87  3.7   |  44<HH>  |  2.3<H>    Ca    8.8      2021 05:45  Mg     2.6         TPro  6.5  /  Alb  3.2<L>  /  TBili  1.4<H>  /  DBili      /  AST  24  /  ALT  9   /  AlkPhos  105                            9.6    4.98  )-----------( 188      ( 2021 05:45 )             29.4       Urine Studies:  Urinalysis Basic - ( 2021 00:08 )    Color: Light Yellow / Appearance: Clear / S.007 / pH:   Gluc:  / Ketone: Negative  / Bili: Negative / Urobili: <2 mg/dL   Blood:  / Protein: Negative / Nitrite: Negative   Leuk Esterase: Small / RBC: 147 /HPF / WBC 5 /HPF   Sq Epi:  / Non Sq Epi: 1 /HPF / Bacteria: Negative          RADIOLOGY & ADDITIONAL STUDIES:

## 2021-07-18 NOTE — PROGRESS NOTE ADULT - SUBJECTIVE AND OBJECTIVE BOX
CHIEF COMPLAINT:    Patient is a 80y old  Male who presents with a chief complaint of shortness of breath     INTERVAL HPI/OVERNIGHT EVENTS:    Patient seen and examined at bedside. No acute overnight events occurred.    ROS: Denies SOB, chest pain, abdominal pain. All other systems are negative.    Vital Signs:    T(F): 96.5 (07-18-21 @ 13:44), Max: 96.9 (07-17-21 @ 20:40)  HR: 60 (07-18-21 @ 13:44) (53 - 70)  BP: 116/50 (07-18-21 @ 13:44) (106/46 - 141/60)  RR: 17 (07-18-21 @ 13:44) (17 - 18)  SpO2: --  I&O's Summary    17 Jul 2021 07:01  -  18 Jul 2021 07:00  --------------------------------------------------------  IN: 700 mL / OUT: 1615 mL / NET: -915 mL    18 Jul 2021 07:01  -  18 Jul 2021 14:20  --------------------------------------------------------  IN: 700 mL / OUT: 850 mL / NET: -150 mL      Daily     Daily   CAPILLARY BLOOD GLUCOSE          PHYSICAL EXAM:  GENERAL:  NAD  SKIN: No rashes or lesions  HEENT: Atraumatic. Normocephalic. Anicteric  NECK:  No JVD.   PULMONARY: Clear to ausculation bilaterally. No wheezing. No rales  CVS: Normal S1, S2. Regular rate and rhythm. No murmurs.  ABDOMEN/GI: Soft, Nontender, Nondistended; Bowel sounds are present  EXTREMITIES:  No edema B/L LE.  NEUROLOGIC:  No motor deficit.  PSYCH: Alert & oriented x 3, normal affect      LABS:                        9.6    4.98  )-----------( 188      ( 18 Jul 2021 05:45 )             29.4     07-18    136  |  82<L>  |  62<HH>  ----------------------------<  87  3.7   |  44<HH>  |  2.3<H>    Ca    8.8      18 Jul 2021 05:45  Mg     2.6     07-18    TPro  6.5  /  Alb  3.2<L>  /  TBili  1.4<H>  /  DBili  x   /  AST  24  /  ALT  9   /  AlkPhos  105  07-18    PT/INR - ( 18 Jul 2021 05:45 )   PT: >40.00 sec;   INR: 4.11 ratio           RADIOLOGY & ADDITIONAL TESTS:      Medications:  Standing  aspirin enteric coated 81 milliGRAM(s) Oral daily  atorvastatin 20 milliGRAM(s) Oral at bedtime  budesonide 160 MICROgram(s)/formoterol 4.5 MICROgram(s) Inhaler 2 Puff(s) Inhalation two times a day  chlorhexidine 4% Liquid 1 Application(s) Topical <User Schedule>  nystatin Powder 1 Application(s) Topical every 12 hours  polyethylene glycol 3350 17 Gram(s) Oral at bedtime  senna 2 Tablet(s) Oral at bedtime  spironolactone 25 milliGRAM(s) Oral daily  torsemide 20 milliGRAM(s) Oral daily    PRN Meds  ALBUTerol  90 MICROgram(s) HFA Inhaler - Peds 2 Puff(s) Inhalation every 4 hours PRN  oxycodone    5 mG/acetaminophen 325 mG 1 Tablet(s) Oral every 8 hours PRN      Case discussed with resident  Care discussed with pt

## 2021-07-18 NOTE — PROGRESS NOTE ADULT - ASSESSMENT
80 M PMHx CAD s/p PCI and stent 2007, COPD on 2L home O2, HFpEF TTE in 4/21 EF 55-65, DLD, atrial fibrillation on coumadin presented with progressively worsening sob associated with increasing leg swelling with intermittent chest pain    Acute HFpEF  - euvolemic  - c/w toresmide, spironolaconte    Left heel pain  - off loading, local wound care  - appears to be DTI, likely present on admission    MIKE  - worsening today ago  - hold toresmide, spironolactone  - 500cc LR given  - check FeNa  - nephro  - likely due to over diuresis    Mild elevated in T. 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  Disposition: Home___/SNF___/Other________/Unknown at this time________

## 2021-07-19 LAB
ALBUMIN SERPL ELPH-MCNC: 3.3 G/DL — LOW (ref 3.5–5.2)
ALP SERPL-CCNC: 109 U/L — SIGNIFICANT CHANGE UP (ref 30–115)
ALT FLD-CCNC: 11 U/L — SIGNIFICANT CHANGE UP (ref 0–41)
ANION GAP SERPL CALC-SCNC: 10 MMOL/L — SIGNIFICANT CHANGE UP (ref 7–14)
ANION GAP SERPL CALC-SCNC: 6 MMOL/L — LOW (ref 7–14)
AST SERPL-CCNC: 28 U/L — SIGNIFICANT CHANGE UP (ref 0–41)
BASOPHILS # BLD AUTO: 0.01 K/UL — SIGNIFICANT CHANGE UP (ref 0–0.2)
BASOPHILS NFR BLD AUTO: 0.2 % — SIGNIFICANT CHANGE UP (ref 0–1)
BILIRUB SERPL-MCNC: 1.2 MG/DL — SIGNIFICANT CHANGE UP (ref 0.2–1.2)
BUN SERPL-MCNC: 65 MG/DL — CRITICAL HIGH (ref 10–20)
BUN SERPL-MCNC: 67 MG/DL — CRITICAL HIGH (ref 10–20)
CALCIUM SERPL-MCNC: 8.6 MG/DL — SIGNIFICANT CHANGE UP (ref 8.5–10.1)
CALCIUM SERPL-MCNC: 8.8 MG/DL — SIGNIFICANT CHANGE UP (ref 8.5–10.1)
CHLORIDE SERPL-SCNC: 85 MMOL/L — LOW (ref 98–110)
CHLORIDE SERPL-SCNC: 87 MMOL/L — LOW (ref 98–110)
CO2 SERPL-SCNC: 42 MMOL/L — CRITICAL HIGH (ref 17–32)
CO2 SERPL-SCNC: 47 MMOL/L — CRITICAL HIGH (ref 17–32)
CREAT ?TM UR-MCNC: 67 MG/DL — SIGNIFICANT CHANGE UP
CREAT SERPL-MCNC: 2 MG/DL — HIGH (ref 0.7–1.5)
CREAT SERPL-MCNC: 2.1 MG/DL — HIGH (ref 0.7–1.5)
EOSINOPHIL # BLD AUTO: 0.1 K/UL — SIGNIFICANT CHANGE UP (ref 0–0.7)
EOSINOPHIL NFR BLD AUTO: 2.4 % — SIGNIFICANT CHANGE UP (ref 0–8)
GLUCOSE BLDC GLUCOMTR-MCNC: 99 MG/DL — SIGNIFICANT CHANGE UP (ref 70–99)
GLUCOSE SERPL-MCNC: 82 MG/DL — SIGNIFICANT CHANGE UP (ref 70–99)
GLUCOSE SERPL-MCNC: 97 MG/DL — SIGNIFICANT CHANGE UP (ref 70–99)
HCT VFR BLD CALC: 32 % — LOW (ref 42–52)
HGB BLD-MCNC: 10.2 G/DL — LOW (ref 14–18)
IMM GRANULOCYTES NFR BLD AUTO: 0.2 % — SIGNIFICANT CHANGE UP (ref 0.1–0.3)
INR BLD: 4.3 RATIO — HIGH (ref 0.65–1.3)
LYMPHOCYTES # BLD AUTO: 0.72 K/UL — LOW (ref 1.2–3.4)
LYMPHOCYTES # BLD AUTO: 17.3 % — LOW (ref 20.5–51.1)
MAGNESIUM SERPL-MCNC: 2.7 MG/DL — HIGH (ref 1.8–2.4)
MCHC RBC-ENTMCNC: 28.1 PG — SIGNIFICANT CHANGE UP (ref 27–31)
MCHC RBC-ENTMCNC: 31.9 G/DL — LOW (ref 32–37)
MCV RBC AUTO: 88.2 FL — SIGNIFICANT CHANGE UP (ref 80–94)
MONOCYTES # BLD AUTO: 0.57 K/UL — SIGNIFICANT CHANGE UP (ref 0.1–0.6)
MONOCYTES NFR BLD AUTO: 13.7 % — HIGH (ref 1.7–9.3)
NEUTROPHILS # BLD AUTO: 2.76 K/UL — SIGNIFICANT CHANGE UP (ref 1.4–6.5)
NEUTROPHILS NFR BLD AUTO: 66.2 % — SIGNIFICANT CHANGE UP (ref 42.2–75.2)
NRBC # BLD: 0 /100 WBCS — SIGNIFICANT CHANGE UP (ref 0–0)
PLATELET # BLD AUTO: 196 K/UL — SIGNIFICANT CHANGE UP (ref 130–400)
POTASSIUM SERPL-MCNC: 3.8 MMOL/L — SIGNIFICANT CHANGE UP (ref 3.5–5)
POTASSIUM SERPL-MCNC: 4 MMOL/L — SIGNIFICANT CHANGE UP (ref 3.5–5)
POTASSIUM SERPL-SCNC: 3.8 MMOL/L — SIGNIFICANT CHANGE UP (ref 3.5–5)
POTASSIUM SERPL-SCNC: 4 MMOL/L — SIGNIFICANT CHANGE UP (ref 3.5–5)
PROT SERPL-MCNC: 6.7 G/DL — SIGNIFICANT CHANGE UP (ref 6–8)
PROTHROM AB SERPL-ACNC: >40 SEC — HIGH (ref 9.95–12.87)
RBC # BLD: 3.63 M/UL — LOW (ref 4.7–6.1)
RBC # FLD: 20.1 % — HIGH (ref 11.5–14.5)
SARS-COV-2 RNA SPEC QL NAA+PROBE: SIGNIFICANT CHANGE UP
SODIUM SERPL-SCNC: 138 MMOL/L — SIGNIFICANT CHANGE UP (ref 135–146)
SODIUM SERPL-SCNC: 139 MMOL/L — SIGNIFICANT CHANGE UP (ref 135–146)
SODIUM UR-SCNC: 84 MMOL/L — SIGNIFICANT CHANGE UP
WBC # BLD: 4.17 K/UL — LOW (ref 4.8–10.8)
WBC # FLD AUTO: 4.17 K/UL — LOW (ref 4.8–10.8)

## 2021-07-19 PROCEDURE — 99233 SBSQ HOSP IP/OBS HIGH 50: CPT

## 2021-07-19 RX ADMIN — CHLORHEXIDINE GLUCONATE 1 APPLICATION(S): 213 SOLUTION TOPICAL at 05:23

## 2021-07-19 RX ADMIN — BUDESONIDE AND FORMOTEROL FUMARATE DIHYDRATE 2 PUFF(S): 160; 4.5 AEROSOL RESPIRATORY (INHALATION) at 09:04

## 2021-07-19 RX ADMIN — OXYCODONE AND ACETAMINOPHEN 1 TABLET(S): 5; 325 TABLET ORAL at 17:30

## 2021-07-19 RX ADMIN — Medication 81 MILLIGRAM(S): at 11:04

## 2021-07-19 RX ADMIN — OXYCODONE AND ACETAMINOPHEN 1 TABLET(S): 5; 325 TABLET ORAL at 18:00

## 2021-07-19 RX ADMIN — SENNA PLUS 2 TABLET(S): 8.6 TABLET ORAL at 22:23

## 2021-07-19 RX ADMIN — NYSTATIN CREAM 1 APPLICATION(S): 100000 CREAM TOPICAL at 05:23

## 2021-07-19 RX ADMIN — ATORVASTATIN CALCIUM 20 MILLIGRAM(S): 80 TABLET, FILM COATED ORAL at 22:23

## 2021-07-19 RX ADMIN — POLYETHYLENE GLYCOL 3350 17 GRAM(S): 17 POWDER, FOR SOLUTION ORAL at 22:23

## 2021-07-19 RX ADMIN — BUDESONIDE AND FORMOTEROL FUMARATE DIHYDRATE 2 PUFF(S): 160; 4.5 AEROSOL RESPIRATORY (INHALATION) at 22:24

## 2021-07-19 RX ADMIN — NYSTATIN CREAM 1 APPLICATION(S): 100000 CREAM TOPICAL at 17:20

## 2021-07-19 NOTE — PROGRESS NOTE ADULT - SUBJECTIVE AND OBJECTIVE BOX
RODNEY SANTO  80y Male    CHIEF COMPLAINT:    Patient is a 80y old  Male who presents with a chief complaint of shortness of breath (19 Jul 2021 06:18)      INTERVAL HPI/OVERNIGHT EVENTS:    Patient seen and examined.    ROS: All other systems are negative.    Vital Signs:    T(F): 96.5 (07-19-21 @ 13:04), Max: 97.7 (07-19-21 @ 05:28)  HR: 39 (07-19-21 @ 13:04) (39 - 60)  BP: 98/53 (07-19-21 @ 13:04) (98/53 - 116/50)  RR: 18 (07-19-21 @ 13:04) (17 - 18)  SpO2: 95% (07-18-21 @ 18:15) (95% - 95%)  I&O's Summary    18 Jul 2021 07:01  -  19 Jul 2021 07:00  --------------------------------------------------------  IN: 1240 mL / OUT: 1850 mL / NET: -610 mL    19 Jul 2021 07:01  -  19 Jul 2021 13:35  --------------------------------------------------------  IN: 220 mL / OUT: 200 mL / NET: 20 mL      Daily     Daily   CAPILLARY BLOOD GLUCOSE      POCT Blood Glucose.: 99 mg/dL (19 Jul 2021 07:27)      PHYSICAL EXAM:    GENERAL:  NAD  SKIN: No rashes or lesions  HENT: Atraumatic. Normocephalic. PERRL. Moist membranes.  NECK: Supple, No JVD. No lymphadenopathy.  PULMONARY: CTA B/L. No wheezing. No rales  CVS: Normal S1, S2. Rate and Rhythm are regular. No murmurs.  ABDOMEN/GI: Soft, Nontender, Nondistended; BS present  EXTREMITIES: Peripheral pulses intact. No edema B/L LE.  NEUROLOGIC:  No motor or sensory deficit.  PSYCH: Alert & oriented x 3    Consultant(s) Notes Reviewed:  [x ] YES  [ ] NO  Care Discussed with Consultants/Other Providers [ x] YES  [ ] NO    EKG reviewed  Telemetry reviewed    LABS:                        10.2   4.17  )-----------( 196      ( 19 Jul 2021 05:57 )             32.0     07-19    138  |  85<L>  |  65<HH>  ----------------------------<  82   Creatinine Trend: 2.1<--, 2.3<--, 2.2<--, 2.1<--, 1.9<--, 1.7<--  3.8   |  47<HH>  |  2.1<H>    Ca    8.8      19 Jul 2021 05:57  Mg     2.7     07-19    TPro  6.7  /  Alb  3.3<L>  /  TBili  1.2  /  DBili  x   /  AST  28  /  ALT  11  /  AlkPhos  109  07-19    PT/INR - ( 19 Jul 2021 05:57 )   PT: >40.00 sec;   INR: 4.30 ratio                   RADIOLOGY & ADDITIONAL TESTS:      Imaging or report Personally Reviewed:  [ ] YES  [ ] NO    Medications:  Standing  aspirin enteric coated 81 milliGRAM(s) Oral daily  atorvastatin 20 milliGRAM(s) Oral at bedtime  budesonide 160 MICROgram(s)/formoterol 4.5 MICROgram(s) Inhaler 2 Puff(s) Inhalation two times a day  chlorhexidine 4% Liquid 1 Application(s) Topical <User Schedule>  nystatin Powder 1 Application(s) Topical every 12 hours  polyethylene glycol 3350 17 Gram(s) Oral at bedtime  senna 2 Tablet(s) Oral at bedtime    PRN Meds  ALBUTerol  90 MICROgram(s) HFA Inhaler - Peds 2 Puff(s) Inhalation every 4 hours PRN  oxycodone    5 mG/acetaminophen 325 mG 1 Tablet(s) Oral every 8 hours PRN      Case discussed with resident    Care discussed with pt/family           RODNEY SANTO  80y Male    CHIEF COMPLAINT:    Patient is a 80y old  Male who presents with a chief complaint of shortness of breath (19 Jul 2021 06:18)      INTERVAL HPI/OVERNIGHT EVENTS:    Patient seen and examined. No sob. No palpitations. Leg cramps has improved. INR is trending up    ROS: All other systems are negative.    Vital Signs:    T(F): 96.5 (07-19-21 @ 13:04), Max: 97.7 (07-19-21 @ 05:28)  HR: 39 (07-19-21 @ 13:04) (39 - 60)  BP: 98/53 (07-19-21 @ 13:04) (98/53 - 116/50)  RR: 18 (07-19-21 @ 13:04) (17 - 18)  SpO2: 95% (07-18-21 @ 18:15) (95% - 95%)  I&O's Summary    18 Jul 2021 07:01  -  19 Jul 2021 07:00  --------------------------------------------------------  IN: 1240 mL / OUT: 1850 mL / NET: -610 mL    19 Jul 2021 07:01  -  19 Jul 2021 13:35  --------------------------------------------------------  IN: 220 mL / OUT: 200 mL / NET: 20 mL      Daily     Daily   CAPILLARY BLOOD GLUCOSE      POCT Blood Glucose.: 99 mg/dL (19 Jul 2021 07:27)      PHYSICAL EXAM:    GENERAL:  NAD  SKIN: No rashes or lesions  HENT: Atraumatic. Normocephalic. PERRL. Moist membranes.  NECK: Supple, No JVD. No lymphadenopathy.  PULMONARY: CTA B/L. No wheezing. No rales  CVS: Normal S1, S2. Rate and Rhythm are regular. No murmurs.  ABDOMEN/GI: Soft, Nontender, Nondistended; BS present  EXTREMITIES: Peripheral pulses intact. No edema B/L LE.  NEUROLOGIC:  No motor or sensory deficit.  PSYCH: Alert & oriented x 3    Consultant(s) Notes Reviewed:  [x ] YES  [ ] NO  Care Discussed with Consultants/Other Providers [ x] YES  [ ] NO    EKG reviewed  Telemetry reviewed    LABS:                        10.2   4.17  )-----------( 196      ( 19 Jul 2021 05:57 )             32.0     07-19    138  |  85<L>  |  65<HH>  ----------------------------<  82   Creatinine Trend: 2.1<--, 2.3<--, 2.2<--, 2.1<--, 1.9<--, 1.7<--  3.8   |  47<HH>  |  2.1<H>    Ca    8.8      19 Jul 2021 05:57  Mg     2.7     07-19    TPro  6.7  /  Alb  3.3<L>  /  TBili  1.2  /  DBili  x   /  AST  28  /  ALT  11  /  AlkPhos  109  07-19    PT/INR - ( 19 Jul 2021 05:57 )   PT: >40.00 sec;   INR: 4.30 ratio                   RADIOLOGY & ADDITIONAL TESTS:      Imaging or report Personally Reviewed:  [ ] YES  [ ] NO    Medications:  Standing  aspirin enteric coated 81 milliGRAM(s) Oral daily  atorvastatin 20 milliGRAM(s) Oral at bedtime  budesonide 160 MICROgram(s)/formoterol 4.5 MICROgram(s) Inhaler 2 Puff(s) Inhalation two times a day  chlorhexidine 4% Liquid 1 Application(s) Topical <User Schedule>  nystatin Powder 1 Application(s) Topical every 12 hours  polyethylene glycol 3350 17 Gram(s) Oral at bedtime  senna 2 Tablet(s) Oral at bedtime    PRN Meds  ALBUTerol  90 MICROgram(s) HFA Inhaler - Peds 2 Puff(s) Inhalation every 4 hours PRN  oxycodone    5 mG/acetaminophen 325 mG 1 Tablet(s) Oral every 8 hours PRN      Case discussed with resident    Care discussed with pt/family

## 2021-07-19 NOTE — PROGRESS NOTE ADULT - SUBJECTIVE AND OBJECTIVE BOX
RODNEY SANTO  80y  Male      Patient is a 80y old  Male who presents with a chief complaint of shortness of breath (18 Jul 2021 15:21)      INTERVAL HPI/OVERNIGHT EVENTS:    REVIEW OF SYSTEMS:  CONSTITUTIONAL: No fever, weight loss, or fatigue  EYES: No eye pain, visual disturbances, or discharge  ENMT:  No difficulty hearing, tinnitus, vertigo; No sinus or throat pain  NECK: No pain or stiffness  RESPIRATORY: No cough, wheezing, chills or hemoptysis; No shortness of breath  CARDIOVASCULAR: No chest pain, palpitations, dizziness, or leg swelling  GASTROINTESTINAL: No abdominal or epigastric pain. No diarrhea or constipation. No nausea, vomiting, or hematemesis. No melena or hematochezia.  GENITOURINARY: No dysuria, frequency, hematuria, or incontinence  NEUROLOGICAL: No headaches, memory loss, loss of strength, numbness, or tremors  MUSCULOSKELETAL: No joint pain or swelling; No muscle, back, or extremity pain  SKIN: No itching, burning, rashes, or lesions   LYMPH NODES: No enlarged glands  ENDOCRINE: No heat or cold intolerance; No hair loss  PSYCHIATRIC: No depression, anxiety, mood swings, or difficulty sleeping  HEME/LYMPH: No easy bruising, or bleeding gums  ALLERY AND IMMUNOLOGIC: No hives or eczema    Vital Signs Last 24 Hrs  T(C): 36.5 (19 Jul 2021 05:28), Max: 36.5 (19 Jul 2021 05:28)  T(F): 97.7 (19 Jul 2021 05:28), Max: 97.7 (19 Jul 2021 05:28)  HR: 51 (19 Jul 2021 05:28) (44 - 60)  BP: 108/50 (19 Jul 2021 05:28) (106/46 - 116/50)  BP(mean): --  RR: 18 (19 Jul 2021 05:28) (17 - 18)  SpO2: 95% (18 Jul 2021 18:15) (95% - 95%)    PHYSICAL EXAM:  GENERAL: NAD, well-groomed, well-developed  HEAD:  Atraumatic, Normocephalic  EYES: EOMI, PERRLA, conjunctiva and sclera clear  ENMT: Moist mucous membranes, Good dentition, No lesions  NECK: Supple, No JVD, Normal thyroid  NERVOUS SYSTEM:  Alert & Oriented X3, Good concentration; Motor Strength 5/5 B/L upper and lower extremities; DTRs 2+ intact and symmetric  CHEST/LUNG: Clear to auscultation bilaterally; No rales, rhonchi, wheezing, or rubs  HEART: Regular rate and rhythm; No murmurs, rubs, or gallops  ABDOMEN: Soft, Nontender, Nondistended; Bowel sounds present  EXTREMITIES:  2+ Peripheral Pulses, No clubbing, cyanosis, or edema  LYMPH: No lymphadenopathy noted  SKIN: No rashes or lesions    Consultant(s) Notes Reviewed:  [x ] YES  [ ] NO  Care Discussed with Consultants/Other Providers [ x] YES  [ ] NO    LABS:                        9.6    4.98  )-----------( 188      ( 18 Jul 2021 05:45 )             29.4     07-18    136  |  82<L>  |  62<HH>  ----------------------------<  87  3.7   |  44<HH>  |  2.3<H>    Ca    8.8      18 Jul 2021 05:45  Mg     2.6     07-18    TPro  6.5  /  Alb  3.2<L>  /  TBili  1.4<H>  /  DBili  x   /  AST  24  /  ALT  9   /  AlkPhos  105  07-18    PT/INR - ( 18 Jul 2021 05:45 )   PT: >40.00 sec;   INR: 4.11 ratio             ABG - ( 17 Jul 2021 07:44 )  pH, Arterial: 7.52  pH, Blood: x     /  pCO2: 49    /  pO2: 115   / HCO3: x     / Base Excess: x     /  SaO2: 98                CAPILLARY BLOOD GLUCOSE          RADIOLOGY & ADDITIONAL TESTS:    Imaging Personally Reviewed:  [ ] YES  [ ] NO RODNEY SANTO  80y  Male    81 y/o male with PMH CAD s/p PCI and stent 2007, COPD on 2L home O2, HFpEF TTE in 4/21 EF 55-65, DLD, atrial fibrillation on coumadin presents for shortness of breath and increase in his NADIA along with 1 day history of chest pain.    INTERVAL HPI/OVERNIGHT EVENTS:  No acute events overnight. Patient resting in bed comfortably.     REVIEW OF SYSTEMS:  CONSTITUTIONAL: No fever, weight loss, or fatigue  EYES: No eye pain, visual disturbances, or discharge  ENMT:  No difficulty hearing, tinnitus, vertigo; No sinus or throat pain  NECK: No pain or stiffness  RESPIRATORY: No cough, wheezing, chills or hemoptysis; No shortness of breath  CARDIOVASCULAR: No chest pain, palpitations, dizziness, or leg swelling  GASTROINTESTINAL: No abdominal or epigastric pain. No diarrhea or constipation. No nausea, vomiting, or hematemesis. No melena or hematochezia.  GENITOURINARY: No dysuria, frequency, hematuria, or incontinence  NEUROLOGICAL: No headaches, memory loss, loss of strength, numbness, or tremors  MUSCULOSKELETAL: No complaints of B/L lower extremity pain this morning     Vital Signs Last 24 Hrs  T(C): 36.5 (19 Jul 2021 05:28), Max: 36.5 (19 Jul 2021 05:28)  T(F): 97.7 (19 Jul 2021 05:28), Max: 97.7 (19 Jul 2021 05:28)  HR: 51 (19 Jul 2021 05:28) (44 - 60)  BP: 108/50 (19 Jul 2021 05:28) (106/46 - 116/50)  RR: 18 (19 Jul 2021 05:28) (17 - 18)  SpO2: 95% (18 Jul 2021 18:15) (95% - 95%)    PHYSICAL EXAM:  GENERAL: NAD  HEAD:  Atraumatic, Normocephalic  EYES: EOMI, PERRLA, conjunctiva and sclera clear  ENMT: Moist mucous membranes  NECK: Supple,   NERVOUS SYSTEM:  Alert & Oriented X3, Good concentration; Motor Strength 5/5 B/L upper and lower extremities; DTRs 2+ intact and symmetric  CHEST/LUNG: Clear to auscultation bilaterally; No rales, rhonchi, wheezing, or rubs  HEART: Regular rate and rhythm; No murmurs, rubs, or gallops  ABDOMEN: Soft, Nontender, Nondistended; Bowel sounds present  EXTREMITIES:  LE edema       LABS:                        9.6    4.98  )-----------( 188      ( 18 Jul 2021 05:45 )             29.4     07-18    136  |  82<L>  |  62<HH>  ----------------------------<  87  3.7   |  44<HH>  |  2.3<H>    Ca    8.8      18 Jul 2021 05:45  Mg     2.6     07-18    TPro  6.5  /  Alb  3.2<L>  /  TBili  1.4<H>  /  DBili  x   /  AST  24  /  ALT  9   /  AlkPhos  105  07-18    PT/INR - ( 18 Jul 2021 05:45 )   PT: >40.00 sec;   INR: 4.11 ratio             ABG - ( 17 Jul 2021 07:44 )  pH, Arterial: 7.52  pH, Blood: x     /  pCO2: 49    /  pO2: 115   / HCO3: x     / Base Excess: x     /  SaO2: 98

## 2021-07-19 NOTE — PROGRESS NOTE ADULT - ASSESSMENT
81 y/o male with PMH CAD s/p PCI and stent 2007, COPD on 2L home O2, HFpEF TTE in 4/21 EF 55-65, DLD, atrial fibrillation on coumadin presents for shortness of breath and increase in his NADIA along with 1 day history of chest pain.    # HFpEF exacerbation  # Afib SVR # Bradycardia # NSVT  # CAD s/p stent 2007  # Severe Pulmonary HTN  - CXR b/l pleural effusions and vascular congestion on admission   - TTE 4/21 EF 55-65 PSAP 70 mmhg; get new TTE  - BNP 14K on admission   - ECG afib in SVR  - troponin negative   - d/c bumex 20 mg IV 1mg/hr   - d/c Spironolactone 25 mg daily   - d/c Metolazone   - ASA, atorvastatin   -  lipid panel WNL except HDL 39 and A1c 5.6  - iron profile WNL  - daily weight, strict I/Os keep I < O  - DASH TLC diet  fluid restriction 1000 ml daily  - continue tele monitoring  - EP on board : Loop recorder placed 7/12    - Avoid AVN blockers  -  INR 4.3- cont to hold Wafarin   - Cardiology Cs doctor Niesha; Heart failure team consult  - TSH 4.34  - keep K > 4 and Mg > 2.1  - NC 2 LPM; HOB > 35 titrate oxygen keep saO2 89-94%            # DORON on CKD II  - creatinine trending up 2.3  - Most likely secondary to aggressive diuresis   - Bumex d/c , possible fluid bolus  -once trending down patient ok for discharge    -Bladder scan   - d/c all diuretics     # Normocytic anemia- chronic  - at baseline  - -Iron sucrose  x 5 doses   - FU folate b12 and TSH  - retic count WNL  - Stool sample for guaiac  - needs OP colonoscopy  - HIv screening negative  - active type and screen keep Hb > 7    # COPD not in exacerbation  # PSAP 70 mmhg  - on  2 LPM NC at home during night   - c/w symbicort and ventolin as needed  - CXR bilateral opacities basilar; underlying neoplasm not excluded  - chest CT          dvt: on coumadin   diet: DASH/TLC   Dispo: [facility]

## 2021-07-19 NOTE — PROGRESS NOTE ADULT - ASSESSMENT
79 y/o male with PMH CAD s/p PCI and stent 2007, COPD on 2L home O2, HFpEF TTE in 4/21 EF 55-65, DLD, atrial fibrillation on coumadin presented with progressively worsening sob associated with increasing leg swelling for the last few days. Also C/O intermittent cp for the last one day.     Acute HFpEF  DORON  COPD on home O2  CAD S/P PCI in 2007  Chronic A-Fib with Slow Ventricular Response   NSVT  Anemia              PLAN:    ·	Can D/C tele  ·	Care D/W the HF specialist. Pt is now euvolemic. Cont Torsemide 20 mg po daily  ·	A negative balance of 1250 over the last 24 hrs  ·	ECHO showed EF is 50-55%. Entresto was stopped.   ·	Change Spironolactone to 25 mg po daily. Keep K >4 and Mg >2  ·	NSVT on tele. Pt was bradycardiac on admission and now the HR is 57-80. No need for PPM as per cardiology  ·	INR 2.12. Give Coumadin 2 mg po tonight. Check INR in AM   ·	Check i's and o's and daily wt  ·	Low salt diet and water restriction to 1.5 L/D  ·	Monitor renal function.   ·	CE x 2 are negative.   ·	Cont Symbicort  ·	PT/Rehab eval    Progress Note Handoff    Pending (specify):  Consults__PT/Rehab_______, Tests________, Test Results_______, Other_Social services for D/C planning _____  Family discussion:  Disposition: Home___/SNF___/Other________/Unknown at this time________    Kyle Pritchett MD  Spectra: [phone] 79 y/o male with PMH CAD s/p PCI and stent 2007, COPD on 2L home O2, HFpEF TTE in 4/21 EF 55-65, DLD, atrial fibrillation on coumadin presented with progressively worsening sob associated with increasing leg swelling for the last few days. Also C/O intermittent cp for the last one day.     Acute HFpEF  DORON  COPD on home O2  CAD S/P PCI in 2007  Chronic A-Fib with Slow Ventricular Response   NSVT  Anemia              PLAN:    ·	Cr is trending down. Care D/W the Nephrologist. Will restart Torsemide 20 mg po daily in AM  ·	Will cont to hold Spironolactone. Having contraction alkalosis. Will start him on Acetazolamide x 4 doses.   ·	INR is trending up. Cont to hold Coumadin. Check INR in AM  ·	ECHO showed EF is 50-55%. Entresto was stopped.   ·	NSVT on tele. Pt was bradycardiac on admission. No need for PPM as per cardiology  ·	Check i's and o's and daily wt  ·	Low salt diet and water restriction to 1.5 L/D  ·	Monitor renal function.   ·	CE x 2 are negative.   ·	Cont Symbicort  ·	If INR and Cr are trending down can D/C him to SNF in AM    Progress Note Handoff    Pending (specify):  Consults______, Tests_Check INR in AM_______, Test Results_______, Other_Anticipate D/C in AM_____  Family discussion: With the wife and son on bedside.   Disposition: Home___/SNF___/Other________/Unknown at this time________    Kyle Pritchett MD  Spectra: [phone] 79 y/o male with PMH CAD s/p PCI and stent 2007, COPD on 2L home O2, HFpEF TTE in 4/21 EF 55-65, DLD, atrial fibrillation on coumadin presented with progressively worsening sob associated with increasing leg swelling for the last few days. Also C/O intermittent cp for the last one day.     Acute HFpEF  DORON  COPD on home O2  CAD S/P PCI in 2007  Chronic A-Fib with Slow Ventricular Response   NSVT  Anemia              PLAN:    ·	Cr is trending down. Care D/W the Nephrologist. Will restart Torsemide 20 mg po daily in AM  ·	Will cont to hold Spironolactone. Having contraction alkalosis.   ·	INR is trending up. Cont to hold Coumadin. Check INR in AM  ·	ECHO showed EF is 50-55%. Entresto was stopped.   ·	NSVT on tele. Pt was bradycardiac on admission. No need for PPM as per cardiology  ·	Check i's and o's and daily wt  ·	Low salt diet and water restriction to 1.5 L/D  ·	Monitor renal function.   ·	CE x 2 are negative.   ·	Cont Symbicort  ·	If INR and Cr are trending down can D/C him to SNF in AM    Progress Note Handoff    Pending (specify):  Consults______, Tests_Check INR in AM_______, Test Results_______, Other_Anticipate D/C in AM_____  Family discussion: With the wife and son on bedside.   Disposition: Home___/SNF___/Other________/Unknown at this time________    Kyle Pritchett MD  Spectra: [phone]

## 2021-07-19 NOTE — PROGRESS NOTE ADULT - ASSESSMENT
DORON   - prerenal from overdiuresis   - improved Cr today   - b/l normal sized kidneys without hydro on renal sono   - no proteinuria, RBC's on UA   - pt aggressively diuresed with bumx gtt, metolazone, hypertonic saline and aldactone  contraction metabolic acidosis (diuretics and compensation) and resp acidosis (COPD)  acute on chronic HFpEF  Afib with SVR / NSVT / CAD / PCI  COPD on home O2  hf of DVT  resolved gross hematuria from lowe trauma  anemia    plan:    can resume po torsemide   consider diamox 500mg po x 1  off ACE-I / ARB / entresto  f/u labs   ckdeducation   chf educaiton and counselling  d/w team  full code  dc planning

## 2021-07-19 NOTE — PROGRESS NOTE ADULT - SUBJECTIVE AND OBJECTIVE BOX
NEPHROLOGY FOLLOW UP NOTE    pt seen and examined  cr better  + void  bp's fair  no worsening sob or cp  legs wrinkled from resolved edema    PAST MEDICAL & SURGICAL HISTORY:  COPD (chronic obstructive pulmonary disease)    Hypertension    Deep vein thrombosis (DVT) of left lower extremity, unspecified chronicity, unspecified vein    Cellulitis of left lower extremity    Chronic atrial fibrillation    Mitral valve insufficiency, unspecified etiology  Moderate    CHF (congestive heart failure)    S/P coronary artery stent placement    History of total right knee replacement      Allergies:  No Known Allergies    Home Medications Reviewed    SOCIAL HISTORY:  Denies ETOH,Smoking,   FAMILY HISTORY:        REVIEW OF SYSTEMS:  CONSTITUTIONAL: No weakness, fevers or chills  EYES/ENT: No visual changes;  No vertigo or throat pain   NECK: No pain or stiffness  RESPIRATORY: No cough, wheezing, hemoptysis; No shortness of breath  CARDIOVASCULAR: No chest pain or palpitations.  GASTROINTESTINAL: No abdominal or epigastric pain. No nausea, vomiting, or hematemesis; No diarrhea or constipation. No melena or hematochezia.  GENITOURINARY: No dysuria, frequency, foamy urine, urinary urgency, incontinence or hematuria  NEUROLOGICAL: No numbness or weakness  SKIN: No itching, burning, rashes, or lesions   VASCULAR: No bilateral lower extremity edema.   All other review of systems is negative unless indicated above.    advance care planning and directives reviewed and discussed    PHYSICAL EXAM:  Constitutional: NAD  HEENT: anicteric sclera, oropharynx clear, MMM  Neck: No JVD  Respiratory: decreased BS b/l  Cardiovascular: S1, S2, RRR  Gastrointestinal: BS+, soft, NT/ND  Extremities: No cyanosis or clubbing. No peripheral edema  Neurological: A/O x 3, no focal deficits  Psychiatric: Normal mood, normal affect  : No CVA tenderness. No lowe.   Skin: No rashes    Hospital Medications:   MEDICATIONS  (STANDING):  aspirin enteric coated 81 milliGRAM(s) Oral daily  atorvastatin 20 milliGRAM(s) Oral at bedtime  budesonide 160 MICROgram(s)/formoterol 4.5 MICROgram(s) Inhaler 2 Puff(s) Inhalation two times a day  chlorhexidine 4% Liquid 1 Application(s) Topical <User Schedule>  nystatin Powder 1 Application(s) Topical every 12 hours  polyethylene glycol 3350 17 Gram(s) Oral at bedtime  senna 2 Tablet(s) Oral at bedtime        VITALS:  T(F): 96.5 (21 @ 13:04), Max: 97.7 (21 @ 05:28)  HR: 39 (21 @ 13:04)  BP: 98/53 (21 @ 13:04)  RR: 18 (21 @ 13:04)  SpO2: 95% (21 @ 18:15)  Wt(kg): --     @ 07:01  -   @ 07:00  --------------------------------------------------------  IN: 700 mL / OUT: 1615 mL / NET: -915 mL     @ 07:01  -   @ 07:00  --------------------------------------------------------  IN: 1240 mL / OUT: 1850 mL / NET: -610 mL     @ 07:01  -   @ 14:19  --------------------------------------------------------  IN: 220 mL / OUT: 200 mL / NET: 20 mL          LABS:      138  |  85<L>  |  65<HH>  ----------------------------<  82  3.8   |  47<HH>  |  2.1<H>    Ca    8.8      2021 05:57  Mg     2.7         TPro  6.7  /  Alb  3.3<L>  /  TBili  1.2  /  DBili      /  AST  28  /  ALT  11  /  AlkPhos  109                            10.2   4.17  )-----------( 196      ( 2021 05:57 )             32.0       Urine Studies:  Urinalysis Basic - ( 2021 00:08 )    Color: Light Yellow / Appearance: Clear / S.007 / pH:   Gluc:  / Ketone: Negative  / Bili: Negative / Urobili: <2 mg/dL   Blood:  / Protein: Negative / Nitrite: Negative   Leuk Esterase: Small / RBC: 147 /HPF / WBC 5 /HPF   Sq Epi:  / Non Sq Epi: 1 /HPF / Bacteria: Negative      Sodium, Random Urine: 84.0 mmoL/L ( @ 00:01)  Creatinine, Random Urine: 67 mg/dL ( @ 00:01)      RADIOLOGY & ADDITIONAL STUDIES:

## 2021-07-20 LAB
ALBUMIN SERPL ELPH-MCNC: 3.1 G/DL — LOW (ref 3.5–5.2)
ALP SERPL-CCNC: 109 U/L — SIGNIFICANT CHANGE UP (ref 30–115)
ALT FLD-CCNC: 12 U/L — SIGNIFICANT CHANGE UP (ref 0–41)
ANION GAP SERPL CALC-SCNC: 10 MMOL/L — SIGNIFICANT CHANGE UP (ref 7–14)
ANION GAP SERPL CALC-SCNC: 11 MMOL/L — SIGNIFICANT CHANGE UP (ref 7–14)
AST SERPL-CCNC: 30 U/L — SIGNIFICANT CHANGE UP (ref 0–41)
BASOPHILS # BLD AUTO: 0.02 K/UL — SIGNIFICANT CHANGE UP (ref 0–0.2)
BASOPHILS NFR BLD AUTO: 0.4 % — SIGNIFICANT CHANGE UP (ref 0–1)
BILIRUB SERPL-MCNC: 1.4 MG/DL — HIGH (ref 0.2–1.2)
BUN SERPL-MCNC: 67 MG/DL — CRITICAL HIGH (ref 10–20)
BUN SERPL-MCNC: 68 MG/DL — CRITICAL HIGH (ref 10–20)
CALCIUM SERPL-MCNC: 8.4 MG/DL — LOW (ref 8.5–10.1)
CALCIUM SERPL-MCNC: 8.6 MG/DL — SIGNIFICANT CHANGE UP (ref 8.5–10.1)
CHLORIDE SERPL-SCNC: 86 MMOL/L — LOW (ref 98–110)
CHLORIDE SERPL-SCNC: 88 MMOL/L — LOW (ref 98–110)
CO2 SERPL-SCNC: 38 MMOL/L — HIGH (ref 17–32)
CO2 SERPL-SCNC: 41 MMOL/L — CRITICAL HIGH (ref 17–32)
CREAT SERPL-MCNC: 2 MG/DL — HIGH (ref 0.7–1.5)
CREAT SERPL-MCNC: 2.1 MG/DL — HIGH (ref 0.7–1.5)
EOSINOPHIL # BLD AUTO: 0.11 K/UL — SIGNIFICANT CHANGE UP (ref 0–0.7)
EOSINOPHIL NFR BLD AUTO: 2.1 % — SIGNIFICANT CHANGE UP (ref 0–8)
GLUCOSE SERPL-MCNC: 110 MG/DL — HIGH (ref 70–99)
GLUCOSE SERPL-MCNC: 87 MG/DL — SIGNIFICANT CHANGE UP (ref 70–99)
HCT VFR BLD CALC: 31.5 % — LOW (ref 42–52)
HGB BLD-MCNC: 10 G/DL — LOW (ref 14–18)
IMM GRANULOCYTES NFR BLD AUTO: 0.6 % — HIGH (ref 0.1–0.3)
INR BLD: 3.65 RATIO — HIGH (ref 0.65–1.3)
LYMPHOCYTES # BLD AUTO: 0.52 K/UL — LOW (ref 1.2–3.4)
LYMPHOCYTES # BLD AUTO: 9.8 % — LOW (ref 20.5–51.1)
MAGNESIUM SERPL-MCNC: 2.7 MG/DL — HIGH (ref 1.8–2.4)
MCHC RBC-ENTMCNC: 27.7 PG — SIGNIFICANT CHANGE UP (ref 27–31)
MCHC RBC-ENTMCNC: 31.7 G/DL — LOW (ref 32–37)
MCV RBC AUTO: 87.3 FL — SIGNIFICANT CHANGE UP (ref 80–94)
MONOCYTES # BLD AUTO: 0.68 K/UL — HIGH (ref 0.1–0.6)
MONOCYTES NFR BLD AUTO: 12.9 % — HIGH (ref 1.7–9.3)
NEUTROPHILS # BLD AUTO: 3.92 K/UL — SIGNIFICANT CHANGE UP (ref 1.4–6.5)
NEUTROPHILS NFR BLD AUTO: 74.2 % — SIGNIFICANT CHANGE UP (ref 42.2–75.2)
NRBC # BLD: 0 /100 WBCS — SIGNIFICANT CHANGE UP (ref 0–0)
PLATELET # BLD AUTO: 195 K/UL — SIGNIFICANT CHANGE UP (ref 130–400)
POTASSIUM SERPL-MCNC: 4 MMOL/L — SIGNIFICANT CHANGE UP (ref 3.5–5)
POTASSIUM SERPL-MCNC: 4 MMOL/L — SIGNIFICANT CHANGE UP (ref 3.5–5)
POTASSIUM SERPL-SCNC: 4 MMOL/L — SIGNIFICANT CHANGE UP (ref 3.5–5)
POTASSIUM SERPL-SCNC: 4 MMOL/L — SIGNIFICANT CHANGE UP (ref 3.5–5)
PROT SERPL-MCNC: 6.6 G/DL — SIGNIFICANT CHANGE UP (ref 6–8)
PROTHROM AB SERPL-ACNC: >40 SEC — HIGH (ref 9.95–12.87)
RBC # BLD: 3.61 M/UL — LOW (ref 4.7–6.1)
RBC # FLD: 20.3 % — HIGH (ref 11.5–14.5)
SODIUM SERPL-SCNC: 137 MMOL/L — SIGNIFICANT CHANGE UP (ref 135–146)
SODIUM SERPL-SCNC: 137 MMOL/L — SIGNIFICANT CHANGE UP (ref 135–146)
WBC # BLD: 5.28 K/UL — SIGNIFICANT CHANGE UP (ref 4.8–10.8)
WBC # FLD AUTO: 5.28 K/UL — SIGNIFICANT CHANGE UP (ref 4.8–10.8)

## 2021-07-20 PROCEDURE — 99232 SBSQ HOSP IP/OBS MODERATE 35: CPT

## 2021-07-20 PROCEDURE — 99233 SBSQ HOSP IP/OBS HIGH 50: CPT

## 2021-07-20 RX ORDER — LACTULOSE 10 G/15ML
10 SOLUTION ORAL
Refills: 0 | Status: DISCONTINUED | OUTPATIENT
Start: 2021-07-20 | End: 2021-07-22

## 2021-07-20 RX ORDER — SODIUM CHLORIDE 9 MG/ML
250 INJECTION INTRAMUSCULAR; INTRAVENOUS; SUBCUTANEOUS ONCE
Refills: 0 | Status: COMPLETED | OUTPATIENT
Start: 2021-07-20 | End: 2021-07-20

## 2021-07-20 RX ORDER — LANOLIN ALCOHOL/MO/W.PET/CERES
5 CREAM (GRAM) TOPICAL AT BEDTIME
Refills: 0 | Status: DISCONTINUED | OUTPATIENT
Start: 2021-07-20 | End: 2021-07-22

## 2021-07-20 RX ORDER — ACETAZOLAMIDE 250 MG/1
500 TABLET ORAL ONCE
Refills: 0 | Status: COMPLETED | OUTPATIENT
Start: 2021-07-20 | End: 2021-07-20

## 2021-07-20 RX ADMIN — SENNA PLUS 2 TABLET(S): 8.6 TABLET ORAL at 21:31

## 2021-07-20 RX ADMIN — ATORVASTATIN CALCIUM 20 MILLIGRAM(S): 80 TABLET, FILM COATED ORAL at 21:31

## 2021-07-20 RX ADMIN — BUDESONIDE AND FORMOTEROL FUMARATE DIHYDRATE 2 PUFF(S): 160; 4.5 AEROSOL RESPIRATORY (INHALATION) at 21:31

## 2021-07-20 RX ADMIN — SODIUM CHLORIDE 1500 MILLILITER(S): 9 INJECTION INTRAMUSCULAR; INTRAVENOUS; SUBCUTANEOUS at 10:36

## 2021-07-20 RX ADMIN — OXYCODONE AND ACETAMINOPHEN 1 TABLET(S): 5; 325 TABLET ORAL at 22:41

## 2021-07-20 RX ADMIN — POLYETHYLENE GLYCOL 3350 17 GRAM(S): 17 POWDER, FOR SOLUTION ORAL at 21:31

## 2021-07-20 RX ADMIN — CHLORHEXIDINE GLUCONATE 1 APPLICATION(S): 213 SOLUTION TOPICAL at 05:55

## 2021-07-20 RX ADMIN — BUDESONIDE AND FORMOTEROL FUMARATE DIHYDRATE 2 PUFF(S): 160; 4.5 AEROSOL RESPIRATORY (INHALATION) at 08:46

## 2021-07-20 RX ADMIN — NYSTATIN CREAM 1 APPLICATION(S): 100000 CREAM TOPICAL at 05:55

## 2021-07-20 RX ADMIN — Medication 5 MILLIGRAM(S): at 22:33

## 2021-07-20 RX ADMIN — OXYCODONE AND ACETAMINOPHEN 1 TABLET(S): 5; 325 TABLET ORAL at 04:25

## 2021-07-20 RX ADMIN — OXYCODONE AND ACETAMINOPHEN 1 TABLET(S): 5; 325 TABLET ORAL at 03:55

## 2021-07-20 RX ADMIN — Medication 81 MILLIGRAM(S): at 12:31

## 2021-07-20 RX ADMIN — NYSTATIN CREAM 1 APPLICATION(S): 100000 CREAM TOPICAL at 17:58

## 2021-07-20 RX ADMIN — OXYCODONE AND ACETAMINOPHEN 1 TABLET(S): 5; 325 TABLET ORAL at 23:11

## 2021-07-20 NOTE — PHARMACOTHERAPY INTERVENTION NOTE - COMMENTS
80yMale      Indication: A fib  INR Goal: 2-3  Home Dose: Warfarin 1 mg PO daily except on Monday 2 mg PO   Bridge Therapy: n/a      ALBUTerol  90 MICROgram(s) HFA Inhaler - Peds 2 Puff(s) Inhalation every 4 hours PRN  aspirin enteric coated 81 milliGRAM(s) Oral daily  atorvastatin 20 milliGRAM(s) Oral at bedtime  budesonide 160 MICROgram(s)/formoterol 4.5 MICROgram(s) Inhaler 2 Puff(s) Inhalation two times a day  buMETAnide Infusion 1 mG/Hr IV Continuous <Continuous>  chlorhexidine 4% Liquid 1 Application(s) Topical <User Schedule>  iron sucrose IVPB 200 milliGRAM(s) IV Intermittent every 24 hours  metolazone 2.5 milliGRAM(s) Oral <User Schedule>  nystatin Powder 1 Application(s) Topical every 12 hours  oxycodone    5 mG/acetaminophen 325 mG 1 Tablet(s) Oral every 8 hours PRN  sodium chloride 3%. 150 milliLiter(s) IV Continuous <Continuous>  sodium chloride 3%. 150 milliLiter(s) IV Continuous <Continuous>  sodium chloride 3%. 150 milliLiter(s) IV Continuous <Continuous>  sodium chloride 3%. 150 milliLiter(s) IV Continuous <Continuous>  sodium chloride 3%. 150 milliLiter(s) IV Continuous <Continuous>  sodium chloride 3%. 150 milliLiter(s) IV Continuous <Continuous>  spironolactone 25 milliGRAM(s) Oral two times a day  warfarin 1 milliGRAM(s) Oral once        Drug Interactions:       H/H:  9.2/29.2  PLT: 127  GFR: 52    Date---------------INR-----------------Dose  7/7----------------3.82------------------6 mg   7/8----------------3.82-------------------X   7/9----------------4.97------------------X  7/10---------------5.46---------------- -X  7/11---------------5.22 -----------------X  7/12---------------2.5------------------2 mg   7/13----------------1.51----------------     Patient admitted on 7/7 with SUPRAtherpaeutic and given 6 mg dose of warfarin (75%) of total WEEKLY dose in 1 day.  INR trended up as expected.  INR today subtherapeutic after starting warfarin yesterday.  Primary team entered warfarin 3 mg PO x 1 which is an increase from the patients home dose.  DIscussed with Dr. Gray and recommended re-initiating home dose as INR was only slightly elevated on admission.  MD agrees and requested order be changed        1. Recommend Warfarin  1    PO x 1   2. Obtain INR tomorrow AM
80yMale      Indication: A.fib  INR Goal: 2-3  Home Dose: Warfarin 1 mg PO Sun, Tues, Wed, Thurs, Fri, Sat, Warfarin 2 mg PO Mon  Bridge Therapy: n/a      ALBUTerol  90 MICROgram(s) HFA Inhaler - Peds 2 Puff(s) Inhalation every 4 hours PRN  aspirin enteric coated 81 milliGRAM(s) Oral daily  atorvastatin 20 milliGRAM(s) Oral at bedtime  budesonide 160 MICROgram(s)/formoterol 4.5 MICROgram(s) Inhaler 2 Puff(s) Inhalation two times a day  chlorhexidine 4% Liquid 1 Application(s) Topical <User Schedule>  furosemide   Injectable 60 milliGRAM(s) IV Push every 12 hours  nystatin Powder 1 Application(s) Topical every 12 hours        Drug Interactions: n/a      H/H:  8.8/28.7  PLT: 146      Date---------------INR-----------------Dose  7/7---------------3.82-------------------6 mg  7/8---------------3.82    Patient being treated with warfarin for atrial fibrillation.  Presented to the hospital with elevated INR of 3.82. Home dose listed incorrectly at 8 mg daily, team reduced dose based on this dose to 6 mg.  Patient actual home dose per anticoagulation clinic is a TOTAL WEEKLY dose of 8 mg.  INR remains elevated today and is expected to continue trending up as weekly dose was given in 1 day.      Home dose discussed with Dr. Alonso who stated it was okay to adjust home med list to reflect patient's actual home dose     1. Recommend holding Warfarin and monitoring INR closely  2. Obtain INR tomorrow AM
80yMale      Indication: Atrial fibrillation   INR Goal: 2-3  Home Dose: 1mg PO QD Tue-Sunday and 2mg PO Mon  Bridge Therapy: none      ALBUTerol  90 MICROgram(s) HFA Inhaler - Peds 2 Puff(s) Inhalation every 4 hours PRN  aspirin enteric coated 81 milliGRAM(s) Oral daily  atorvastatin 20 milliGRAM(s) Oral at bedtime  budesonide 160 MICROgram(s)/formoterol 4.5 MICROgram(s) Inhaler 2 Puff(s) Inhalation two times a day  buMETAnide Infusion 1 mG/Hr IV Continuous <Continuous>  chlorhexidine 4% Liquid 1 Application(s) Topical <User Schedule>  iron sucrose IVPB 200 milliGRAM(s) IV Intermittent every 24 hours  metolazone 2.5 milliGRAM(s) Oral <User Schedule>  nystatin Powder 1 Application(s) Topical every 12 hours  oxycodone    5 mG/acetaminophen 325 mG 1 Tablet(s) Oral every 8 hours PRN  polyethylene glycol 3350 17 Gram(s) Oral at bedtime  senna 2 Tablet(s) Oral at bedtime  sodium chloride 3%. 150 milliLiter(s) IV Continuous <Continuous>  sodium chloride 3%. 150 milliLiter(s) IV Continuous <Continuous>  sodium chloride 3%. 150 milliLiter(s) IV Continuous <Continuous>  sodium chloride 3%. 150 milliLiter(s) IV Continuous <Continuous>  sodium chloride 3%. 150 milliLiter(s) IV Continuous <Continuous>  sodium chloride 3%. 150 milliLiter(s) IV Continuous <Continuous>  sodium chloride 3%. 150 milliLiter(s) IV Continuous <Continuous>  sodium chloride 3%. 150 milliLiter(s) IV Continuous <Continuous>  sodium chloride 3%. 150 milliLiter(s) IV Continuous <Continuous>  sodium chloride 3%. 150 milliLiter(s) IV Continuous <Continuous>  sodium chloride 3%. 150 milliLiter(s) IV Continuous <Continuous>  spironolactone 25 milliGRAM(s) Oral two times a day  warfarin 2 milliGRAM(s) Oral once        Drug Interactions: none      H/H: 10.9/34.3  PLT: 163  GFR: 38    Date---------------INR-----------------Dose    7/9-----------------4.97----------------x  7/10----------------5.46---------------x  7/11----------------4.78---------------x  7/12----------------2.5----------------2mg  7/13----------------1.51---------------1mg  7/14----------------1.58---------------1mg  7/15----------------1.78---------------2mg  7/16----------------2.12---------------    Discussed with Dr. Gray, patient's INR within therapeutic range (2.12). Recommend to resume home dose and reduce to 1mg.    1. Recommend Warfarin  1mg  PO x 1   2. Obtain INR tomorrow AM
Confirmed with dr. Pritchett [id number] Metolazone 2.5mg po at 6am and 6pm before Hypertonic 3% is given.
coumadin 1 mg  inr= 1.58
Indication: A fib  INR Goal: 2-3  Home Dose: Warfarin 1 mg PO daily except on Monday 2 mg PO   Bridge Therapy: n/a      ALBUTerol  90 MICROgram(s) HFA Inhaler - Peds 2 Puff(s) Inhalation every 4 hours PRN  aspirin enteric coated 81 milliGRAM(s) Oral daily  atorvastatin 20 milliGRAM(s) Oral at bedtime  budesonide 160 MICROgram(s)/formoterol 4.5 MICROgram(s) Inhaler 2 Puff(s) Inhalation two times a day  buMETAnide Infusion 1 mG/Hr IV Continuous <Continuous>  chlorhexidine 4% Liquid 1 Application(s) Topical <User Schedule>  iron sucrose IVPB 200 milliGRAM(s) IV Intermittent every 24 hours  metolazone 2.5 milliGRAM(s) Oral <User Schedule>  nystatin Powder 1 Application(s) Topical every 12 hours  oxycodone    5 mG/acetaminophen 325 mG 1 Tablet(s) Oral every 8 hours PRN  sodium chloride 3%. 150 milliLiter(s) IV Continuous <Continuous>  sodium chloride 3%. 150 milliLiter(s) IV Continuous <Continuous>  sodium chloride 3%. 150 milliLiter(s) IV Continuous <Continuous>  sodium chloride 3%. 150 milliLiter(s) IV Continuous <Continuous>  sodium chloride 3%. 150 milliLiter(s) IV Continuous <Continuous>  sodium chloride 3%. 150 milliLiter(s) IV Continuous <Continuous>  spironolactone 25 milliGRAM(s) Oral two times a day  warfarin 1 milliGRAM(s) Oral once        Drug Interactions:       H/H:  9.2/29.2  PLT: 127  GFR: 52    Date---------------INR-----------------Dose  7/7----------------3.82------------------6 mg   7/8----------------3.82-------------------X   7/9----------------4.97------------------X  7/10---------------5.46---------------- -X  7/11---------------5.22 -----------------X  7/12---------------2.5------------------2 mg   7/13----------------1.51---------------- 1mg  7/14----------------1.58-----------------1mg  7/15----------------1.78-----------------    Patient admitted on 7/7 with SUPRAtherpaeutic and given 6 mg dose of warfarin (75%) of total WEEKLY dose in 1 day.  INR trended up as expected.  INR today 1.78 after being given 2 MG X 1 AND 1mg for two days. Recommended to medical team to keep patient on home dose as INR trend increasing as expected  and patient not at steady state.  MD stated they would like to give the patient a boost dose as INR is not yet at goal      1. Recommend Warfarin  1    PO x 1, team would like to give 2 mg PO x 1   2. Obtain INR tomorrow AM
80yMale      Indication: AFib  INR Goal: 2-3  Home Dose: 2mg Mon, 1mg Sun, Tues, Wed, Thurs, Fri, Sat  Bridge Therapy: none      ALBUTerol  90 MICROgram(s) HFA Inhaler - Peds 2 Puff(s) Inhalation every 4 hours PRN  aspirin enteric coated 81 milliGRAM(s) Oral daily  atorvastatin 20 milliGRAM(s) Oral at bedtime  budesonide 160 MICROgram(s)/formoterol 4.5 MICROgram(s) Inhaler 2 Puff(s) Inhalation two times a day  chlorhexidine 4% Liquid 1 Application(s) Topical <User Schedule>  lactulose Syrup 10 Gram(s) Oral every 3 hours PRN  nystatin Powder 1 Application(s) Topical every 12 hours  oxycodone    5 mG/acetaminophen 325 mG 1 Tablet(s) Oral every 8 hours PRN  polyethylene glycol 3350 17 Gram(s) Oral at bedtime  senna 2 Tablet(s) Oral at bedtime        Drug Interactions: none      H/H: 10/31.5  PLT: 195  GFR: 29  INR today: 3.65    Date---------------INR-----------------Dose    INR: 3.65 ratio (07-20-21 @ 05:43)  INR: 4.30 ratio (07-19-21 @ 05:57)  INR: 4.11 ratio (07-18-21 @ 05:45)  INR: 3.32 ratio (07-17-21 @ 05:39)  INR: 2.12 ratio (07-16-21 @ 06:10)  INR: 1.78 ratio (07-15-21 @ 11:10)  INR: 1.58 ratio (07-14-21 @ 18:11)      1. Recommend Warfarin  holding PO today  2. Obtain INR tomorrow AM
80yMale      Indication: a fib  INR Goal: 2-3  Home Dose: 1mg daily except monday, 2mg monday  Bridge Therapy: n/a      ALBUTerol  90 MICROgram(s) HFA Inhaler - Peds 2 Puff(s) Inhalation every 4 hours PRN  aspirin enteric coated 81 milliGRAM(s) Oral daily  atorvastatin 20 milliGRAM(s) Oral at bedtime  budesonide 160 MICROgram(s)/formoterol 4.5 MICROgram(s) Inhaler 2 Puff(s) Inhalation two times a day  buMETAnide Infusion 1 mG/Hr IV Continuous <Continuous>  chlorhexidine 4% Liquid 1 Application(s) Topical <User Schedule>  iron sucrose IVPB 200 milliGRAM(s) IV Intermittent every 24 hours  metolazone 2.5 milliGRAM(s) Oral <User Schedule>  nystatin Powder 1 Application(s) Topical every 12 hours  oxycodone    5 mG/acetaminophen 325 mG 1 Tablet(s) Oral every 8 hours PRN  sodium chloride 3%. 150 milliLiter(s) IV Continuous <Continuous>  sodium chloride 3%. 150 milliLiter(s) IV Continuous <Continuous>  sodium chloride 3%. 150 milliLiter(s) IV Continuous <Continuous>  sodium chloride 3%. 150 milliLiter(s) IV Continuous <Continuous>  sodium chloride 3%. 150 milliLiter(s) IV Continuous <Continuous>  sodium chloride 3%. 150 milliLiter(s) IV Continuous <Continuous>  sodium chloride 3%. 150 milliLiter(s) IV Continuous <Continuous>  sodium chloride 3%. 150 milliLiter(s) IV Continuous <Continuous>  spironolactone 25 milliGRAM(s) Oral two times a day  warfarin 3 milliGRAM(s) Oral once        Drug Interactions:       H/H: 10.3/31.8  PLT: 150  GFR: 44    Date---------------INR-----------------Dose    INR: 1.51 ratio (07-13-21 @ 06:35)  INR: 2.50 ratio (07-12-21 @ 05:40)  INR: 4.78 ratio (07-11-21 @ 16:46)  INR: 5.22 ratio (07-11-21 @ 04:30)  INR: 5.46 ratio (07-10-21 @ 06:20)  INR: 4.97 ratio (07-09-21 @ 06:54)  INR: 3.82 ratio (07-08-21 @ 06:09)    Spoke with MD [id number] to recommend restarting patient on home dose of 1mg daily except monday. 2mg monday. MD stated that she will wait for pending results and if INR is therapeutic, will decrease from current 3mg to 1mg.   1. Recommend Warfarin   1mg   PO x 1   2. Obtain INR tomorrow AM

## 2021-07-20 NOTE — PROGRESS NOTE ADULT - SUBJECTIVE AND OBJECTIVE BOX
NEPHROLOGY FOLLOW UP NOTE    cr stabilizing  bradycardic  no worsening sob or cp    PAST MEDICAL & SURGICAL HISTORY:  COPD (chronic obstructive pulmonary disease)    Hypertension    Deep vein thrombosis (DVT) of left lower extremity, unspecified chronicity, unspecified vein    Cellulitis of left lower extremity    Chronic atrial fibrillation    Mitral valve insufficiency, unspecified etiology  Moderate    CHF (congestive heart failure)    S/P coronary artery stent placement    History of total right knee replacement      Allergies:  No Known Allergies    Home Medications Reviewed    SOCIAL HISTORY:  Denies ETOH,Smoking,   FAMILY HISTORY:        REVIEW OF SYSTEMS:  CONSTITUTIONAL: No weakness, fevers or chills  EYES/ENT: No visual changes;  No vertigo or throat pain   NECK: No pain or stiffness  RESPIRATORY: No cough, wheezing, hemoptysis; No shortness of breath  CARDIOVASCULAR: No chest pain or palpitations.  GASTROINTESTINAL: No abdominal or epigastric pain. No nausea, vomiting, or hematemesis; No diarrhea or constipation. No melena or hematochezia.  GENITOURINARY: No dysuria, frequency, foamy urine, urinary urgency, incontinence or hematuria  NEUROLOGICAL: No numbness or weakness  SKIN: No itching, burning, rashes, or lesions   VASCULAR: No bilateral lower extremity edema.   All other review of systems is negative unless indicated above.      PHYSICAL EXAM:  Constitutional: NAD  HEENT: anicteric sclera, oropharynx clear, MMM  Neck: No JVD  Respiratory: decreased BS b/l  Cardiovascular: S1, S2, RRR  Gastrointestinal: BS+, soft, NT/ND  Extremities: No cyanosis or clubbing. No peripheral edema  Neurological: A/O x 3, no focal deficits  Psychiatric: Normal mood, normal affect  : No CVA tenderness. No lowe.   Skin: No rashes    Hospital Medications:   MEDICATIONS  (STANDING):  aspirin enteric coated 81 milliGRAM(s) Oral daily  atorvastatin 20 milliGRAM(s) Oral at bedtime  budesonide 160 MICROgram(s)/formoterol 4.5 MICROgram(s) Inhaler 2 Puff(s) Inhalation two times a day  chlorhexidine 4% Liquid 1 Application(s) Topical <User Schedule>  nystatin Powder 1 Application(s) Topical every 12 hours  polyethylene glycol 3350 17 Gram(s) Oral at bedtime  senna 2 Tablet(s) Oral at bedtime        VITALS:  T(F): 97 (07-20-21 @ 13:03), Max: 97.4 (07-20-21 @ 05:11)  HR: 50 (07-20-21 @ 13:03)  BP: 103/50 (07-20-21 @ 13:03)  RR: 18 (07-20-21 @ 13:03)  SpO2: 98% (07-19-21 @ 22:47)  Wt(kg): --    07-18 @ 07:01  -  07-19 @ 07:00  --------------------------------------------------------  IN: 1240 mL / OUT: 1850 mL / NET: -610 mL    07-19 @ 07:01  -  07-20 @ 07:00  --------------------------------------------------------  IN: 600 mL / OUT: 950 mL / NET: -350 mL    07-20 @ 07:01  -  07-20 @ 14:25  --------------------------------------------------------  IN: 540 mL / OUT: 1150 mL / NET: -610 mL          LABS:  07-20    137  |  86<L>  |  67<HH>  ----------------------------<  87  4.0   |  41<HH>  |  2.1<H>    Ca    8.6      20 Jul 2021 05:43  Mg     2.7     07-20    TPro  6.6  /  Alb  3.1<L>  /  TBili  1.4<H>  /  DBili      /  AST  30  /  ALT  12  /  AlkPhos  109  07-20                          10.0   5.28  )-----------( 195      ( 20 Jul 2021 05:43 )             31.5       Urine Studies:    Sodium, Random Urine: 84.0 mmoL/L (07-19 @ 00:01)  Creatinine, Random Urine: 67 mg/dL (07-19 @ 00:01)      RADIOLOGY & ADDITIONAL STUDIES:

## 2021-07-20 NOTE — PROGRESS NOTE ADULT - ASSESSMENT
Cardiologist: Dr Arnulfo Scherer    81 y/o male with PMH CAD s/p PCI and stent 2007, COPD on 2L home O2, HFpEF TTE in 4/21 EF 55-65, DLD, atrial fibrillation wit slow VR on coumadin presents for shortness of breath and increase in his NADIA along with 1 day history of chest pain.  Diuresed agressively  now AF within normal rate    Acute on Chronic HFpEF  Chronic AF (coumadin) with Slow Ventricular Response (50-60s) with fatigue and dyspnea with CHADS VASc of at least 4 (CHF, Age > 75, CAD)  NSVT on tele  CAD sp PCI  COPD on 2L O2  HLD      Patient HR improved tremendesly sinve admission.  Does not appear to require PPM at this time  Recommend implantable loop recorder to monitor for bradycardia and pauses. D/w patient and family, will proceed with procedure today  Not a lot of ectopy on tele  consider low dose metoprolol  Monitor electrolytes, maintain WNL  Cont diuresis, sticked I/O, daily weights  limit PO intake   Will follow Cardiologist: Dr Arnulfo Scherer    81 y/o male with PMH CAD s/p PCI and stent 2007, COPD on 2L home O2, HFpEF TTE in 4/21 EF 55-65, DLD, atrial fibrillation wit slow VR on coumadin presents for shortness of breath and increase in his NADIA along with 1 day history of chest pain.  Diuresed agressively  AF with slow VR improved off AVN blockers, now back to slow 40s with frequent PVCs    Acute on Chronic HFpEF  Chronic AF (coumadin) with Slow Ventricular Response (50-60s) with fatigue and dyspnea now improved  CHADS VASc of at least 4 (CHF, Age > 75, CAD)  NSVT on tele  CAD sp PCI  COPD on 2L O2  HLD    con't current management,  tolerate HR 40-50s as pt currently denies any symptoms  no plan for device at this time  no AVN blockers  follow up as out patient as scheduled to evaluate extend of bradycardia and need for device.  EP office Eryn Benjamin NP 8/16 9am  [address] [place] [address], [address]  [phone]

## 2021-07-20 NOTE — PROGRESS NOTE ADULT - SUBJECTIVE AND OBJECTIVE BOX
INTERVAL HPI/OVERNIGHT EVENTS:  Patient was seen earlier this admission for  AF with slow VR and frequent PVC  Once off beta blockers HR improved,   patient was implanted with loop recorder for long term monitoring  Called by team as patient noted to have more episodes of bradycardia while awake, today associated with hypotension  ILR interrogated     MEDICATIONS  (STANDING):  aspirin enteric coated 81 milliGRAM(s) Oral daily  atorvastatin 20 milliGRAM(s) Oral at bedtime  budesonide 160 MICROgram(s)/formoterol 4.5 MICROgram(s) Inhaler 2 Puff(s) Inhalation two times a day  chlorhexidine 4% Liquid 1 Application(s) Topical <User Schedule>  nystatin Powder 1 Application(s) Topical every 12 hours  polyethylene glycol 3350 17 Gram(s) Oral at bedtime  senna 2 Tablet(s) Oral at bedtime    MEDICATIONS  (PRN):  ALBUTerol  90 MICROgram(s) HFA Inhaler - Peds 2 Puff(s) Inhalation every 4 hours PRN Shortness of Breath and/or Wheezing  lactulose Syrup 10 Gram(s) Oral every 3 hours PRN constipation  oxycodone    5 mG/acetaminophen 325 mG 1 Tablet(s) Oral every 8 hours PRN Severe Pain (7 - 10)      Allergies    No Known Allergies    Intolerances        REVIEW OF SYSTEMS    [x ] A ten-point review of systems was otherwise negative except as noted.  [ ] Due to altered mental status/intubation, subjective information were not able to be obtained from the patient. History was obtained, to the extent possible, from review of the chart and collateral sources of information.      Vital Signs Last 24 Hrs  T(C): 36.1 (20 Jul 2021 13:03), Max: 36.3 (20 Jul 2021 05:11)  T(F): 97 (20 Jul 2021 13:03), Max: 97.4 (20 Jul 2021 05:11)  HR: 50 (20 Jul 2021 13:03) (47 - 60)  BP: 103/50 (20 Jul 2021 13:03) (78/49 - 132/70)  BP(mean): --  RR: 18 (20 Jul 2021 13:03) (16 - 19)  SpO2: 98% (19 Jul 2021 22:47) (98% - 98%)    07-19-21 @ 07:01  -  07-20-21 @ 07:00  --------------------------------------------------------  IN: 600 mL / OUT: 950 mL / NET: -350 mL    07-20-21 @ 07:01  -  07-20-21 @ 14:41  --------------------------------------------------------  IN: 540 mL / OUT: 1150 mL / NET: -610 mL        PHYSICAL EXAM:    GENERAL: In no apparent distress, well nourished, and hydrated.  HEART: selvin, IRRegular rate and rhythm; No murmur; NO rubs, or gallops.  PULMONARY: Clear to auscultation and percussion.  Normal expansion/effort. No rales, wheezing, or rhonchi bilaterally.  ABDOMEN: Soft, Nontender, Nondistended; Bowel sounds present  EXTREMITIES:  Extremities warm, pink, well-perfused, 2+ Peripheral Pulses, No clubbing, cyanosis, or edema  NEUROLOGICAL: alert & oriented x 3, no focal deficits, PERRLA, EOMI    LABS:                        10.0   5.28  )-----------( 195      ( 20 Jul 2021 05:43 )             31.5     07-20    137  |  86<L>  |  67<HH>  ----------------------------<  87  4.0   |  41<HH>  |  2.1<H>    Ca    8.6      20 Jul 2021 05:43  Mg     2.7     07-20    TPro  6.6  /  Alb  3.1<L>  /  TBili  1.4<H>  /  DBili  x   /  AST  30  /  ALT  12  /  AlkPhos  109  07-20    PT/INR - ( 20 Jul 2021 05:43 )   PT: >40.00 sec;   INR: 3.65 ratio                   RADIOLOGY & ADDITIONAL TESTS:

## 2021-07-20 NOTE — PROGRESS NOTE ADULT - ASSESSMENT
DORON   - prerenal from overdiuresis   - improved Cr today   - b/l normal sized kidneys without hydro on renal sono   - no proteinuria, RBC's on UA   - pt aggressively diuresed with bumx gtt, metolazone, hypertonic saline and aldactone  contraction metabolic acidosis (diuretics and compensation) and resp acidosis (COPD)  acute on chronic HFpEF  Afib with SVR / NSVT / CAD / PCI  COPD on home O2  hf of DVT  resolved gross hematuria from lowe trauma  anemia    plan:    can resume torsemide 20mg po qd  off ACE-I / ARB / entresto  f/u labs   ckd education   f/u EP  dc planning

## 2021-07-20 NOTE — PHARMACOTHERAPY INTERVENTION NOTE - INTERVENTION TYPE RECOOMEND
Pharmacokinetics Evaluation
Timing/Frequency of Administration Recommended
Pharmacokinetics Evaluation
Pharmacokinetics Evaluation

## 2021-07-20 NOTE — PROGRESS NOTE ADULT - SUBJECTIVE AND OBJECTIVE BOX
RODNEY SANTO  80y  Male      81 y/o male with PMH CAD s/p PCI and stent 2007, COPD on 2L home O2, HFpEF TTE in 4/21 EF 55-65, DLD, atrial fibrillation on coumadin presents for shortness of breath and increase in his NADIA along with 1 day history of chest pain.      INTERVAL HPI/OVERNIGHT EVENTS:  No acute events overnight. Patient is resting in bed comfortably.     REVIEW OF SYSTEMS:  CONSTITUTIONAL: No fever, weight loss, or fatigue  EYES: No eye pain, visual disturbances, or discharge  ENMT:  No difficulty hearing, tinnitus, vertigo; No sinus or throat pain  NECK: No pain or stiffness  RESPIRATORY: No cough, wheezing, chills or hemoptysis; No shortness of breath  CARDIOVASCULAR: No chest pain, palpitations, dizziness, or leg swelling  GASTROINTESTINAL: No abdominal or epigastric pain. No diarrhea or constipation. No nausea, vomiting, or hematemesis. No melena or hematochezia.  GENITOURINARY: No dysuria, frequency, hematuria, or incontinence  NEUROLOGICAL: No headaches, memory loss, loss of strength, numbness, or tremors  MUSCULOSKELETAL: No complaints of B/L lower extremity pain this morning       Vital Signs Last 24 Hrs  T(C): 36.3 (20 Jul 2021 05:11), Max: 36.3 (20 Jul 2021 05:11)  T(F): 97.4 (20 Jul 2021 05:11), Max: 97.4 (20 Jul 2021 05:11)  HR: 48 (20 Jul 2021 05:11) (39 - 60)  BP: 132/70 (20 Jul 2021 05:11) (98/53 - 132/70)  RR: 19 (20 Jul 2021 05:11) (16 - 19)  SpO2: 98% (19 Jul 2021 22:47) (98% - 98%)    PHYSICAL EXAM:  GENERAL: NAD  HEAD:  Atraumatic, Normocephalic  EYES: EOMI, PERRLA, conjunctiva and sclera clear  ENMT: Moist mucous membranes  NECK: Supple,   NERVOUS SYSTEM:  Alert & Oriented X3, Good concentration; Motor Strength 5/5 B/L upper and lower extremities; DTRs 2+ intact and symmetric  CHEST/LUNG: Clear to auscultation bilaterally; No rales, rhonchi, wheezing, or rubs  HEART: Regular rate and rhythm; No murmurs, rubs, or gallops  ABDOMEN: Soft, Nontender, Nondistended; Bowel sounds present  EXTREMITIES:  LE edema     Consultant(s) Notes Reviewed:  [x ] YES  [ ] NO  Care Discussed with Consultants/Other Providers [ x] YES  [ ] NO    LABS:                        10.0   5.28  )-----------( 195      ( 20 Jul 2021 05:43 )             31.5     07-20    137  |  86<L>  |  67<HH>  ----------------------------<  87  4.0   |  41<HH>  |  2.1<H>    Ca    8.6      20 Jul 2021 05:43  Mg     2.7     07-20    TPro  6.6  /  Alb  3.1<L>  /  TBili  1.4<H>  /  DBili  x   /  AST  30  /  ALT  12  /  AlkPhos  109  07-20    PT/INR - ( 20 Jul 2021 05:43 )   PT: >40.00 sec;   INR: 3.65 ratio               CAPILLARY BLOOD GLUCOSE          RADIOLOGY & ADDITIONAL TESTS:    Imaging Personally Reviewed:  [ ] YES  [ ] NO

## 2021-07-20 NOTE — PROGRESS NOTE ADULT - ATTENDING COMMENTS
Please see my recommendations above.
Pt seen and examined 7/8  Covering for Dr Pierce  Cardiologist: Dr Arnulfo Scherer    79 yo M with history of CAD s/p PCI (stent 2007), COPD on 2L home O2, HFpEF (TTE 4/21 EF 55-65%), DLD, chronic atrial fibrillation (severe LAE) on coumadin presents for shortness of breath and increase in his NADIA along with 1 day history of chest pain.    Acute on Chronic HFpEF  Chronic AF with Slow Ventricular Response (50-60s) with fatigue and dyspnea with CHADS VASc of at least 4 (CHF, Age > 75, CAD), incomplete RBBB, severe LAE  CAD s/p PCI (2007)  COPD on 2L O2  HLD    Plan:  - Avoid AVN blocking agents  - Diurese pt  - Patient will need PPM prior to discharge once euvolemic. Procedure can be done on coumadin as long as INR is < 3.   - Cont Coumadin for CHADS VASc of at least 4  - Monitor electrolytes and keep K>4 and Mg>2    Will discuss plan with Dr. Scherer re PPM  Discussed plan in detail with patient and family.
bradycardia
bradycardia  AF
Pt seen and examined. Pt was hypotensive today morning 78/49 with bradycardia. Hypotension likely due to overdiuresis. BP improved with 250cc IVF bolus. Will monitor today. Per EP, no need for intervention. Cont holding AVN blockers. Will monitor overnight. If patient's cr and BP are stable 24hr, plan to DC to STR.    #Progress Note Handoff  Pending (specify): Monitoring BP  Family discussion: d/w pt regarding monitoring BP and possible DC tomorrow  Disposition: STR
bradycardia    PPM Implant  I have explained the risks and benefits of the procedure to the patient.  I have explained the risks and benefits of the procedure to the patient.  There is approximately 1% chance of any major cardiovascular complication to occur. Complications include, but are not limited to infection, bleeding, damage to the vessels, pneumothorax, stroke, death and heart attack.  The patient understands the risk and would like to proceed with the procedure. Patient indicated that all of his questions were answered to his satisfaction and verbalized understanding.

## 2021-07-20 NOTE — PROGRESS NOTE ADULT - ASSESSMENT
81 y/o male with PMH CAD s/p PCI and stent 2007, COPD on 2L home O2, HFpEF TTE in 4/21 EF 55-65, DLD, atrial fibrillation on coumadin presents for shortness of breath and increase in his NADIA along with 1 day history of chest pain.    # HFpEF exacerbation  # Afib SVR # Bradycardia # NSVT  # CAD s/p stent 2007  # Severe Pulmonary HTN  - CXR b/l pleural effusions and vascular congestion on admission   - TTE 4/21 EF 55-65 PSAP 70 mmhg; get new TTE  - BNP 14K on admission   - ECG afib in SVR  - troponin negative   - d/c bumex 20 mg IV 1mg/hr   - d/c Spironolactone 25 mg daily   - d/c Metolazone   - Resumed po torsemide 7/20  - ASA, atorvastatin   -  lipid panel WNL except HDL 39 and A1c 5.6  - iron profile WNL  - daily weight, strict I/Os keep I < O  - DASH TLC diet  fluid restriction 1000 ml daily  - continue tele monitoring  - EP on board : Loop recorder placed 7/12    - Avoid AVN blockers  -  INR 3.65- cont to hold W[place]arin   - Cardiology Cs doctor Niesha; Heart failure team consult  - TSH 4.34  - keep K > 4 and Mg > 2.1  - NC 2 LPM; HOB > 35 titrate oxygen keep saO2 89-94%            # DORON on CKD II  - creatinine trending down  - Most likely secondary to aggressive diuresis   - Bumex d/c , possible fluid bolus  -once trending down patient ok for discharge    -Bladder scan - not retaining   - d/c all diuretics     # Normocytic anemia- chronic  - at baseline  - -Iron sucrose  x 5 doses   - FU folate b12 and TSH  - retic count WNL  - Stool sample for guaiac  - needs OP colonoscopy  - HIv screening negative  - active type and screen keep Hb > 7    # COPD not in exacerbation  # PSAP 70 mmhg  - on  2 LPM NC at home during night   - c/w symbicort and ventolin as needed  - CXR bilateral opacities basilar; underlying neoplasm not excluded  - chest CT          dvt: on coumadin   diet: DASH/TLC   Dispo: [facility]

## 2021-07-21 LAB
ACANTHOCYTES BLD QL SMEAR: SLIGHT — SIGNIFICANT CHANGE UP
ALBUMIN SERPL ELPH-MCNC: 3.3 G/DL — LOW (ref 3.5–5.2)
ALP SERPL-CCNC: 112 U/L — SIGNIFICANT CHANGE UP (ref 30–115)
ALT FLD-CCNC: 14 U/L — SIGNIFICANT CHANGE UP (ref 0–41)
ANION GAP SERPL CALC-SCNC: 10 MMOL/L — SIGNIFICANT CHANGE UP (ref 7–14)
ANION GAP SERPL CALC-SCNC: 11 MMOL/L — SIGNIFICANT CHANGE UP (ref 7–14)
ANISOCYTOSIS BLD QL: SLIGHT — SIGNIFICANT CHANGE UP
AST SERPL-CCNC: 32 U/L — SIGNIFICANT CHANGE UP (ref 0–41)
BASOPHILS # BLD AUTO: 0 K/UL — SIGNIFICANT CHANGE UP (ref 0–0.2)
BASOPHILS NFR BLD AUTO: 0 % — SIGNIFICANT CHANGE UP (ref 0–1)
BILIRUB SERPL-MCNC: 1.4 MG/DL — HIGH (ref 0.2–1.2)
BUN SERPL-MCNC: 67 MG/DL — CRITICAL HIGH (ref 10–20)
BUN SERPL-MCNC: 70 MG/DL — CRITICAL HIGH (ref 10–20)
BURR CELLS BLD QL SMEAR: PRESENT — SIGNIFICANT CHANGE UP
CALCIUM SERPL-MCNC: 8.3 MG/DL — LOW (ref 8.5–10.1)
CALCIUM SERPL-MCNC: 8.6 MG/DL — SIGNIFICANT CHANGE UP (ref 8.5–10.1)
CHLORIDE SERPL-SCNC: 87 MMOL/L — LOW (ref 98–110)
CHLORIDE SERPL-SCNC: 91 MMOL/L — LOW (ref 98–110)
CO2 SERPL-SCNC: 37 MMOL/L — HIGH (ref 17–32)
CO2 SERPL-SCNC: 40 MMOL/L — HIGH (ref 17–32)
CREAT SERPL-MCNC: 1.9 MG/DL — HIGH (ref 0.7–1.5)
CREAT SERPL-MCNC: 2.1 MG/DL — HIGH (ref 0.7–1.5)
EOSINOPHIL # BLD AUTO: 0.1 K/UL — SIGNIFICANT CHANGE UP (ref 0–0.7)
EOSINOPHIL NFR BLD AUTO: 2.6 % — SIGNIFICANT CHANGE UP (ref 0–8)
GIANT PLATELETS BLD QL SMEAR: PRESENT — SIGNIFICANT CHANGE UP
GLUCOSE BLDC GLUCOMTR-MCNC: 104 MG/DL — HIGH (ref 70–99)
GLUCOSE BLDC GLUCOMTR-MCNC: 108 MG/DL — HIGH (ref 70–99)
GLUCOSE BLDC GLUCOMTR-MCNC: 97 MG/DL — SIGNIFICANT CHANGE UP (ref 70–99)
GLUCOSE SERPL-MCNC: 97 MG/DL — SIGNIFICANT CHANGE UP (ref 70–99)
GLUCOSE SERPL-MCNC: 99 MG/DL — SIGNIFICANT CHANGE UP (ref 70–99)
HCT VFR BLD CALC: 32.8 % — LOW (ref 42–52)
HGB BLD-MCNC: 10.7 G/DL — LOW (ref 14–18)
HYPOCHROMIA BLD QL: SLIGHT — SIGNIFICANT CHANGE UP
INR BLD: 3.17 RATIO — HIGH (ref 0.65–1.3)
LYMPHOCYTES # BLD AUTO: 0.3 K/UL — LOW (ref 1.2–3.4)
LYMPHOCYTES # BLD AUTO: 7.8 % — LOW (ref 20.5–51.1)
MACROCYTES BLD QL: SLIGHT — SIGNIFICANT CHANGE UP
MAGNESIUM SERPL-MCNC: 2.8 MG/DL — HIGH (ref 1.8–2.4)
MANUAL SMEAR VERIFICATION: SIGNIFICANT CHANGE UP
MCHC RBC-ENTMCNC: 28.5 PG — SIGNIFICANT CHANGE UP (ref 27–31)
MCHC RBC-ENTMCNC: 32.6 G/DL — SIGNIFICANT CHANGE UP (ref 32–37)
MCV RBC AUTO: 87.5 FL — SIGNIFICANT CHANGE UP (ref 80–94)
MICROCYTES BLD QL: SLIGHT — SIGNIFICANT CHANGE UP
MONOCYTES # BLD AUTO: 0.37 K/UL — SIGNIFICANT CHANGE UP (ref 0.1–0.6)
MONOCYTES NFR BLD AUTO: 9.6 % — HIGH (ref 1.7–9.3)
NEUTROPHILS # BLD AUTO: 3.04 K/UL — SIGNIFICANT CHANGE UP (ref 1.4–6.5)
NEUTROPHILS NFR BLD AUTO: 78.2 % — HIGH (ref 42.2–75.2)
NEUTS BAND # BLD: 0.9 % — SIGNIFICANT CHANGE UP (ref 0–6)
PLAT MORPH BLD: ABNORMAL
PLATELET # BLD AUTO: 176 K/UL — SIGNIFICANT CHANGE UP (ref 130–400)
POIKILOCYTOSIS BLD QL AUTO: SIGNIFICANT CHANGE UP
POTASSIUM SERPL-MCNC: 3.3 MMOL/L — LOW (ref 3.5–5)
POTASSIUM SERPL-MCNC: 4.8 MMOL/L — SIGNIFICANT CHANGE UP (ref 3.5–5)
POTASSIUM SERPL-SCNC: 3.3 MMOL/L — LOW (ref 3.5–5)
POTASSIUM SERPL-SCNC: 4.8 MMOL/L — SIGNIFICANT CHANGE UP (ref 3.5–5)
PROT SERPL-MCNC: 6.6 G/DL — SIGNIFICANT CHANGE UP (ref 6–8)
PROTHROM AB SERPL-ACNC: 36.5 SEC — HIGH (ref 9.95–12.87)
RBC # BLD: 3.75 M/UL — LOW (ref 4.7–6.1)
RBC # FLD: 20.7 % — HIGH (ref 11.5–14.5)
RBC BLD AUTO: ABNORMAL
SCHISTOCYTES BLD QL AUTO: SLIGHT — SIGNIFICANT CHANGE UP
SODIUM SERPL-SCNC: 138 MMOL/L — SIGNIFICANT CHANGE UP (ref 135–146)
SODIUM SERPL-SCNC: 138 MMOL/L — SIGNIFICANT CHANGE UP (ref 135–146)
VARIANT LYMPHS # BLD: 0.9 % — SIGNIFICANT CHANGE UP (ref 0–5)
WBC # BLD: 3.84 K/UL — LOW (ref 4.8–10.8)
WBC # FLD AUTO: 3.84 K/UL — LOW (ref 4.8–10.8)

## 2021-07-21 PROCEDURE — 99232 SBSQ HOSP IP/OBS MODERATE 35: CPT

## 2021-07-21 RX ORDER — POTASSIUM CHLORIDE 20 MEQ
20 PACKET (EA) ORAL
Refills: 0 | Status: COMPLETED | OUTPATIENT
Start: 2021-07-21 | End: 2021-07-21

## 2021-07-21 RX ORDER — SODIUM CHLORIDE 9 MG/ML
250 INJECTION INTRAMUSCULAR; INTRAVENOUS; SUBCUTANEOUS ONCE
Refills: 0 | Status: COMPLETED | OUTPATIENT
Start: 2021-07-21 | End: 2021-07-21

## 2021-07-21 RX ORDER — OXYCODONE AND ACETAMINOPHEN 5; 325 MG/1; MG/1
1 TABLET ORAL EVERY 8 HOURS
Refills: 0 | Status: DISCONTINUED | OUTPATIENT
Start: 2021-07-21 | End: 2021-07-22

## 2021-07-21 RX ORDER — WARFARIN SODIUM 2.5 MG/1
1 TABLET ORAL AT BEDTIME
Refills: 0 | Status: COMPLETED | OUTPATIENT
Start: 2021-07-21 | End: 2021-07-21

## 2021-07-21 RX ADMIN — Medication 5 MILLIGRAM(S): at 21:25

## 2021-07-21 RX ADMIN — Medication 20 MILLIEQUIVALENT(S): at 10:02

## 2021-07-21 RX ADMIN — POLYETHYLENE GLYCOL 3350 17 GRAM(S): 17 POWDER, FOR SOLUTION ORAL at 21:25

## 2021-07-21 RX ADMIN — OXYCODONE AND ACETAMINOPHEN 1 TABLET(S): 5; 325 TABLET ORAL at 22:00

## 2021-07-21 RX ADMIN — NYSTATIN CREAM 1 APPLICATION(S): 100000 CREAM TOPICAL at 05:35

## 2021-07-21 RX ADMIN — SODIUM CHLORIDE 1500 MILLILITER(S): 9 INJECTION INTRAMUSCULAR; INTRAVENOUS; SUBCUTANEOUS at 16:28

## 2021-07-21 RX ADMIN — OXYCODONE AND ACETAMINOPHEN 1 TABLET(S): 5; 325 TABLET ORAL at 11:19

## 2021-07-21 RX ADMIN — OXYCODONE AND ACETAMINOPHEN 1 TABLET(S): 5; 325 TABLET ORAL at 21:27

## 2021-07-21 RX ADMIN — BUDESONIDE AND FORMOTEROL FUMARATE DIHYDRATE 2 PUFF(S): 160; 4.5 AEROSOL RESPIRATORY (INHALATION) at 08:08

## 2021-07-21 RX ADMIN — OXYCODONE AND ACETAMINOPHEN 1 TABLET(S): 5; 325 TABLET ORAL at 11:49

## 2021-07-21 RX ADMIN — ATORVASTATIN CALCIUM 20 MILLIGRAM(S): 80 TABLET, FILM COATED ORAL at 21:25

## 2021-07-21 RX ADMIN — SENNA PLUS 2 TABLET(S): 8.6 TABLET ORAL at 21:25

## 2021-07-21 RX ADMIN — BUDESONIDE AND FORMOTEROL FUMARATE DIHYDRATE 2 PUFF(S): 160; 4.5 AEROSOL RESPIRATORY (INHALATION) at 21:27

## 2021-07-21 RX ADMIN — NYSTATIN CREAM 1 APPLICATION(S): 100000 CREAM TOPICAL at 17:02

## 2021-07-21 RX ADMIN — WARFARIN SODIUM 1 MILLIGRAM(S): 2.5 TABLET ORAL at 21:25

## 2021-07-21 RX ADMIN — Medication 81 MILLIGRAM(S): at 12:36

## 2021-07-21 RX ADMIN — Medication 20 MILLIEQUIVALENT(S): at 11:19

## 2021-07-21 NOTE — PROGRESS NOTE ADULT - SUBJECTIVE AND OBJECTIVE BOX
NEPHROLOGY FOLLOW UP NOTE    cr same  bp's low and bradycardic  wants to go home    PAST MEDICAL & SURGICAL HISTORY:  COPD (chronic obstructive pulmonary disease)    Hypertension    Deep vein thrombosis (DVT) of left lower extremity, unspecified chronicity, unspecified vein    Cellulitis of left lower extremity    Chronic atrial fibrillation    Mitral valve insufficiency, unspecified etiology  Moderate    CHF (congestive heart failure)    S/P coronary artery stent placement    History of total right knee replacement      Allergies:  No Known Allergies    Home Medications Reviewed    SOCIAL HISTORY:  Denies ETOH,Smoking,   FAMILY HISTORY:        REVIEW OF SYSTEMS:  CONSTITUTIONAL: No weakness, fevers or chills  EYES/ENT: No visual changes;  No vertigo or throat pain   NECK: No pain or stiffness  RESPIRATORY: No cough, wheezing, hemoptysis; No shortness of breath  CARDIOVASCULAR: No chest pain or palpitations.  GASTROINTESTINAL: No abdominal or epigastric pain. No nausea, vomiting, or hematemesis; No diarrhea or constipation. No melena or hematochezia.  GENITOURINARY: No dysuria, frequency, foamy urine, urinary urgency, incontinence or hematuria  NEUROLOGICAL: No numbness or weakness  SKIN: No itching, burning, rashes, or lesions   VASCULAR: No bilateral lower extremity edema.   All other review of systems is negative unless indicated above.      PHYSICAL EXAM:  Constitutional: NAD  HEENT: anicteric sclera, oropharynx clear, MMM  Neck: No JVD  Respiratory: decreased BS b/l  Cardiovascular: S1, S2, RRR  Gastrointestinal: BS+, soft, NT/ND  Extremities: No cyanosis or clubbing. No peripheral edema  Neurological: A/O x 3, no focal deficits  Psychiatric: Normal mood, normal affect  : No CVA tenderness. No lowe.   Skin: No rashes    Hospital Medications:   MEDICATIONS  (STANDING):  aspirin enteric coated 81 milliGRAM(s) Oral daily  atorvastatin 20 milliGRAM(s) Oral at bedtime  budesonide 160 MICROgram(s)/formoterol 4.5 MICROgram(s) Inhaler 2 Puff(s) Inhalation two times a day  chlorhexidine 4% Liquid 1 Application(s) Topical <User Schedule>  melatonin 5 milliGRAM(s) Oral at bedtime  nystatin Powder 1 Application(s) Topical every 12 hours  polyethylene glycol 3350 17 Gram(s) Oral at bedtime  senna 2 Tablet(s) Oral at bedtime  sodium chloride 0.9% Bolus 250 milliLiter(s) IV Bolus once  warfarin 1 milliGRAM(s) Oral at bedtime        VITALS:  T(F): 97.9 (07-21-21 @ 12:33), Max: 97.9 (07-21-21 @ 12:33)  HR: 58 (07-21-21 @ 12:33)  BP: 116/60 (07-21-21 @ 12:33)  RR: 18 (07-21-21 @ 12:33)  SpO2: 94% (07-21-21 @ 08:15)  Wt(kg): --    07-19 @ 07:01  -  07-20 @ 07:00  --------------------------------------------------------  IN: 600 mL / OUT: 950 mL / NET: -350 mL    07-20 @ 07:01  -  07-21 @ 07:00  --------------------------------------------------------  IN: 540 mL / OUT: 1865 mL / NET: -1325 mL    07-21 @ 07:01  -  07-21 @ 15:48  --------------------------------------------------------  IN: 220 mL / OUT: 200 mL / NET: 20 mL          LABS:  07-21    138  |  87<L>  |  67<HH>  ----------------------------<  97  3.3<L>   |  40<H>  |  2.1<H>    Ca    8.3<L>      21 Jul 2021 06:24  Mg     2.8     07-21    TPro  6.6  /  Alb  3.3<L>  /  TBili  1.4<H>  /  DBili      /  AST  32  /  ALT  14  /  AlkPhos  112  07-21                          10.7   3.84  )-----------( 176      ( 21 Jul 2021 06:24 )             32.8       Urine Studies:    Sodium, Random Urine: 84.0 mmoL/L (07-19 @ 00:01)  Creatinine, Random Urine: 67 mg/dL (07-19 @ 00:01)      RADIOLOGY & ADDITIONAL STUDIES:

## 2021-07-21 NOTE — PROGRESS NOTE ADULT - ASSESSMENT
79 y/o male with PMH CAD s/p PCI and stent 2007, COPD on 2L home O2, HFpEF TTE in 4/21 EF 55-65, DLD, atrial fibrillation on coumadin presents for shortness of breath and increase in his NADIA along with 1 day history of chest pain.    # HFpEF exacerbation resolved   # Afib SVR # Bradycardia # NSVT  # CAD s/p stent 2007  # Severe Pulmonary HTN  - CXR b/l pleural effusions and vascular congestion on admission   - TTE 4/21 EF 55-65 PSAP 70 mmhg; get new TTE  - BNP 14K on admission   - ECG afib in SVR  - troponin negative   - d/c bumex 20 mg IV 1mg/hr   - d/c Spironolactone 25 mg daily   - d/c Metolazone   - Holding diuretics due to low BP   - ASA, atorvastatin   -  lipid panel WNL except HDL 39 and A1c 5.6  - iron profile WNL  - daily weight, strict I/Os keep I < O  - DASH TLC diet  fluid restriction 1000 ml daily  - continue tele monitoring  - EP on board : Loop recorder placed 7/12    - Avoid AVN blockers  -  INR 3.17- Coumadin 1 mg tonight   - Cardiology Cs doctor Niesha; Heart failure team consult  - TSH 4.34  - keep K > 4 and Mg > 2.1  - NC 2 LPM; HOB > 35 titrate oxygen keep saO2 89-94%      # DORON on CKD II  - creatinine trending down  - Most likely secondary to aggressive diuresis   - Bumex d/c , possible fluid bolus  -once trending down patient ok for discharge    -Bladder scan - not retaining   - d/c all diuretics     # Normocytic anemia- chronic  - at baseline  - -Iron sucrose  x 5 doses   - FU folate b12 and TSH  - retic count WNL  - Stool sample for guaiac  - needs OP colonoscopy  - HIv screening negative  - active type and screen keep Hb > 7    # COPD not in exacerbation  # PSAP 70 mmhg  - on  2 LPM NC at home during night   - c/w symbicort and ventolin as needed  - CXR bilateral opacities basilar; underlying neoplasm not excluded      DVT: on comoundin  Diet : Dash/TLC   Dispo: [facility]

## 2021-07-21 NOTE — PROGRESS NOTE ADULT - ASSESSMENT
79 y/o male with PMH CAD s/p PCI and stent 2007, COPD on 2L home O2, HFpEF TTE in 4/21 EF 55-65, DLD, atrial fibrillation on coumadin presented with progressively worsening sob associated with increasing leg swelling for the last few days. Also C/O intermittent cp for the last one day.     Acute HFpEF  DORON  COPD on home O2  CAD S/P PCI in 2007  Chronic A-Fib with Slow Ventricular Response   NSVT  Anemia  Orthostatic hypotension likely due to overdiuresis              PLAN:    ·	continue to hold diuretic today - pt is not volume overloaded  ·	s/p 250 cc fluid bolus and use support stockings when OOB  ·	repeat orthostatics in AM - if improved/resolved, then d/c planning to SNF  ·	renal f/u appreciated: torsemide on discharge  ·	fall precautions  ·	repleted for hypokalemia  ·	INR now trending down. restart low dose coumadin   ·	ECHO showed EF is 50-55%. Entresto was stopped.   ·	NSVT on tele. Pt was bradycardiac on admission. No need for PPM as per cardiology  ·	has loop recorder and will f/u with EP as outpt  ·	Check i's and o's and daily wts  ·	Low salt diet and water restriction to 1.5 L/D      PROGRESS NOTE HANDOFF    Pending: improvement in orthostatics, labs in AM    pt aware of plan of care    Disposition: STR at SNF

## 2021-07-21 NOTE — PROGRESS NOTE ADULT - ASSESSMENT
DORON   - prerenal from overdiuresis   - b/l normal sized kidneys without hydro on renal sono   - no proteinuria, RBC's on UA   - pt aggressively diuresed with bumx gtt, metolazone, hypertonic saline and aldactone  contraction metabolic acidosis (diuretics and compensation) and resp acidosis (COPD)  acute on chronic HFpEF  Afib with SVR / NSVT / CAD / PCI  COPD on home O2  hf of DVT  resolved gross hematuria from lowe trauma  anemia    plan:    may resume torsemide 20mg po qd upon discharge  give KCl PO  off ACE-I / ARB / entresto  ckd education   outpt bmp f/u  outpt renal f/u

## 2021-07-21 NOTE — PROGRESS NOTE ADULT - SUBJECTIVE AND OBJECTIVE BOX
SANTORODNEY SOTO  80y Male    INTERVAL HPI/OVERNIGHT EVENTS:    Pt feels well - wants to go to rehab  No chest pain or SOB  encouraged pt to eat and drink  Orthostatics done by medical staff including me: /48 to 98/50 to 73/36  HR 36-50  Pt denies dizziness    T(F): 97.9 (07-21-21 @ 12:33), Max: 97.9 (07-21-21 @ 12:33)  HR: 58 (07-21-21 @ 12:33) (53 - 58)  BP: 116/60 (07-21-21 @ 12:33) (98/46 - 116/60)  RR: 18 (07-21-21 @ 12:33) (18 - 18)  SpO2: 94% (07-21-21 @ 08:15) (94% - 94%)    I&O's Summary    20 Jul 2021 07:01  -  21 Jul 2021 07:00  --------------------------------------------------------  IN: 540 mL / OUT: 1865 mL / NET: -1325 mL    21 Jul 2021 07:01  -  21 Jul 2021 15:51  --------------------------------------------------------  IN: 220 mL / OUT: 200 mL / NET: 20 mL      CAPILLARY BLOOD GLUCOSE      POCT Blood Glucose.: 108 mg/dL (21 Jul 2021 12:11)  POCT Blood Glucose.: 97 mg/dL (21 Jul 2021 07:47)        PHYSICAL EXAM:  GENERAL: NAD  HEAD:  Normocephalic  EYES:  conjunctiva and sclera clear  ENMT: Moist mucous membranes  NECK: Supple, No JVD  NERVOUS SYSTEM:  Alert, awake, Good concentration  CHEST/LUNG: decreased BS but CTA  HEART: selvin  ABDOMEN: Soft, Nontender, Nondistended  EXTREMITIES: No edema LE  SKIN: dry!    Consultant(s) Notes Reviewed:  [x ] YES  [ ] NO  Care Discussed with Consultants/Other Providers [ x] YES  [ ] NO    MEDICATIONS  (STANDING):  aspirin enteric coated 81 milliGRAM(s) Oral daily  atorvastatin 20 milliGRAM(s) Oral at bedtime  budesonide 160 MICROgram(s)/formoterol 4.5 MICROgram(s) Inhaler 2 Puff(s) Inhalation two times a day  chlorhexidine 4% Liquid 1 Application(s) Topical <User Schedule>  melatonin 5 milliGRAM(s) Oral at bedtime  nystatin Powder 1 Application(s) Topical every 12 hours  polyethylene glycol 3350 17 Gram(s) Oral at bedtime  senna 2 Tablet(s) Oral at bedtime  sodium chloride 0.9% Bolus 250 milliLiter(s) IV Bolus once  warfarin 1 milliGRAM(s) Oral at bedtime    MEDICATIONS  (PRN):  ALBUTerol  90 MICROgram(s) HFA Inhaler - Peds 2 Puff(s) Inhalation every 4 hours PRN Shortness of Breath and/or Wheezing  lactulose Syrup 10 Gram(s) Oral every 3 hours PRN constipation  oxycodone    5 mG/acetaminophen 325 mG 1 Tablet(s) Oral every 8 hours PRN Severe Pain (7 - 10)      Telemetry reviewed by me    LABS:                        10.7   3.84  )-----------( 176      ( 21 Jul 2021 06:24 )             32.8     07-21    138  |  87<L>  |  67<HH>  ----------------------------<  97  3.3<L>   |  40<H>  |  2.1<H>    Ca    8.3<L>      21 Jul 2021 06:24  Mg     2.8     07-21    TPro  6.6  /  Alb  3.3<L>  /  TBili  1.4<H>  /  DBili  x   /  AST  32  /  ALT  14  /  AlkPhos  112  07-21    PT/INR - ( 21 Jul 2021 06:24 )   PT: 36.50 sec;   INR: 3.17 ratio                     Case discussed with residents and RN on rounds today    Care discussed with pt

## 2021-07-21 NOTE — PROGRESS NOTE ADULT - SUBJECTIVE AND OBJECTIVE BOX
RODNEY SANTO  80y  Male      Patient is a 80y old  Male who presents with a chief complaint of shortness of breath (20 Jul 2021 14:40)      INTERVAL HPI/OVERNIGHT EVENTS:    REVIEW OF SYSTEMS:  CONSTITUTIONAL: No fever, weight loss, or fatigue  EYES: No eye pain, visual disturbances, or discharge  ENMT:  No difficulty hearing, tinnitus, vertigo; No sinus or throat pain  NECK: No pain or stiffness  RESPIRATORY: No cough, wheezing, chills or hemoptysis; No shortness of breath  CARDIOVASCULAR: No chest pain, palpitations, dizziness, or leg swelling  GASTROINTESTINAL: No abdominal or epigastric pain. No diarrhea or constipation. No nausea, vomiting, or hematemesis. No melena or hematochezia.  GENITOURINARY: No dysuria, frequency, hematuria, or incontinence  NEUROLOGICAL: No headaches, memory loss, loss of strength, numbness, or tremors  MUSCULOSKELETAL: No joint pain or swelling; No muscle, back, or extremity pain  SKIN: No itching, burning, rashes, or lesions   LYMPH NODES: No enlarged glands  ENDOCRINE: No heat or cold intolerance; No hair loss  PSYCHIATRIC: No depression, anxiety, mood swings, or difficulty sleeping  HEME/LYMPH: No easy bruising, or bleeding gums  ALLERY AND IMMUNOLOGIC: No hives or eczema    Vital Signs Last 24 Hrs  T(C): 35.6 (21 Jul 2021 05:25), Max: 36.3 (20 Jul 2021 21:00)  T(F): 96.1 (21 Jul 2021 05:25), Max: 97.3 (20 Jul 2021 21:00)  HR: 53 (21 Jul 2021 05:25) (47 - 53)  BP: 98/46 (21 Jul 2021 05:25) (78/49 - 116/54)  BP(mean): --  RR: 18 (20 Jul 2021 21:00) (18 - 18)  SpO2: --    PHYSICAL EXAM:  GENERAL: NAD, well-groomed, well-developed  HEAD:  Atraumatic, Normocephalic  EYES: EOMI, PERRLA, conjunctiva and sclera clear  ENMT: Moist mucous membranes, Good dentition, No lesions  NECK: Supple, No JVD, Normal thyroid  NERVOUS SYSTEM:  Alert & Oriented X3, Good concentration; Motor Strength 5/5 B/L upper and lower extremities; DTRs 2+ intact and symmetric  CHEST/LUNG: Clear to auscultation bilaterally; No rales, rhonchi, wheezing, or rubs  HEART: Regular rate and rhythm; No murmurs, rubs, or gallops  ABDOMEN: Soft, Nontender, Nondistended; Bowel sounds present  EXTREMITIES:  2+ Peripheral Pulses, No clubbing, cyanosis, or edema  LYMPH: No lymphadenopathy noted  SKIN: No rashes or lesions    Consultant(s) Notes Reviewed:  [x ] YES  [ ] NO  Care Discussed with Consultants/Other Providers [ x] YES  [ ] NO    LABS:                        10.0   5.28  )-----------( 195      ( 20 Jul 2021 05:43 )             31.5     07-20    137  |  88<L>  |  68<HH>  ----------------------------<  110<H>  4.0   |  38<H>  |  2.0<H>    Ca    8.4<L>      20 Jul 2021 19:03  Mg     2.7     07-20    TPro  6.6  /  Alb  3.1<L>  /  TBili  1.4<H>  /  DBili  x   /  AST  30  /  ALT  12  /  AlkPhos  109  07-20    PT/INR - ( 20 Jul 2021 05:43 )   PT: >40.00 sec;   INR: 3.65 ratio               CAPILLARY BLOOD GLUCOSE          RADIOLOGY & ADDITIONAL TESTS:    Imaging Personally Reviewed:  [ ] YES  [ ] NO RODNEY SANTO  80y  Male    81 y/o male with PMH CAD s/p PCI and stent 2007, COPD on 2L home O2, HFpEF TTE in 4/21 EF 55-65, DLD, atrial fibrillation on coumadin presents for shortness of breath and increase in his NADIA along with 1 day history of chest pain.        INTERVAL HPI/OVERNIGHT EVENTS:  No acute events overnight. Patient was not able to be d/[place]  yesterday due to a drop in BP of 79/41 when transferred from bed to chair for which he received 250 CC bolus NS.     REVIEW OF SYSTEMS:  CONSTITUTIONAL: No fever, weight loss, or fatigue  EYES: No eye pain, visual disturbances, or discharge  ENMT:  No difficulty hearing  NECK: No pain or stiffness  RESPIRATORY: No cough, wheezing, chills or hemoptysis; No shortness of breath  CARDIOVASCULAR: No chest pain, palpitations, dizziness, or leg swelling  GASTROINTESTINAL: No abdominal or epigastric pain. Constipation   GENITOURINARY: No dysuria, frequency, hematuria, or incontinence  MUSCULOSKELETAL: Lower extremity pain   SKIN: No itching, burning, rashes, or lesions   LYMPH NODES: No enlarged glands  ENDOCRINE: No heat or cold intolerance; No hair loss  PSYCHIATRIC: No depression, anxiety, mood swings, or difficulty sleeping    Vital Signs Last 24 Hrs  T(C): 35.6 (21 Jul 2021 05:25), Max: 36.3 (20 Jul 2021 21:00)  T(F): 96.1 (21 Jul 2021 05:25), Max: 97.3 (20 Jul 2021 21:00)  HR: 53 (21 Jul 2021 05:25) (47 - 53)  BP: 98/46 (21 Jul 2021 05:25) (78/49 - 116/54)  RR: 18 (20 Jul 2021 21:00) (18 - 18)      PHYSICAL EXAM:  GENERAL: NAD  HEAD:  Atraumatic, Normocephalic  EYES: EOMI, PERRLA, conjunctiva and sclera clear  ENMT: Moist mucous membranes  NECK: Supple, No JVD, Normal thyroid  NERVOUS SYSTEM:  Alert & Oriented X3  CHEST/LUNG: Clear to auscultation bilaterally; No rales, rhonchi, wheezing, or rubs  HEART: Bradycardic ; No murmurs, rubs, or gallops  ABDOMEN: Soft, Nontender, Nondistended; Bowel sounds present  EXTREMITIES:  2+ Peripheral Pulses, No clubbing, cyanosis, or edema  LYMPH: No lymphadenopathy noted  SKIN: No rashes or lesions    Consultant(s) Notes Reviewed:  [x ] YES  [ ] NO  Care Discussed with Consultants/Other Providers [ x] YES  [ ] NO    LABS:                        10.0   5.28  )-----------( 195      ( 20 Jul 2021 05:43 )             31.5     07-20    137  |  88<L>  |  68<HH>  ----------------------------<  110<H>  4.0   |  38<H>  |  2.0<H>    Ca    8.4<L>      20 Jul 2021 19:03  Mg     2.7     07-20    TPro  6.6  /  Alb  3.1<L>  /  TBili  1.4<H>  /  DBili  x   /  AST  30  /  ALT  12  /  AlkPhos  109  07-20    PT/INR - ( 20 Jul 2021 05:43 )   PT: >40.00 sec;   INR: 3.65 ratio               CAPILLARY BLOOD GLUCOSE          RADIOLOGY & ADDITIONAL TESTS:    Imaging Personally Reviewed:  [ ] YES  [ ] NO

## 2021-07-22 ENCOUNTER — TRANSCRIPTION ENCOUNTER (OUTPATIENT)
Age: 81
End: 2021-07-22

## 2021-07-22 VITALS
HEART RATE: 66 BPM | TEMPERATURE: 98 F | SYSTOLIC BLOOD PRESSURE: 136 MMHG | RESPIRATION RATE: 16 BRPM | DIASTOLIC BLOOD PRESSURE: 62 MMHG

## 2021-07-22 LAB
ALBUMIN SERPL ELPH-MCNC: 3.3 G/DL — LOW (ref 3.5–5.2)
ALP SERPL-CCNC: 106 U/L — SIGNIFICANT CHANGE UP (ref 30–115)
ALT FLD-CCNC: 14 U/L — SIGNIFICANT CHANGE UP (ref 0–41)
ANION GAP SERPL CALC-SCNC: 9 MMOL/L — SIGNIFICANT CHANGE UP (ref 7–14)
AST SERPL-CCNC: 30 U/L — SIGNIFICANT CHANGE UP (ref 0–41)
BASOPHILS # BLD AUTO: 0.02 K/UL — SIGNIFICANT CHANGE UP (ref 0–0.2)
BASOPHILS NFR BLD AUTO: 0.5 % — SIGNIFICANT CHANGE UP (ref 0–1)
BILIRUB SERPL-MCNC: 1.1 MG/DL — SIGNIFICANT CHANGE UP (ref 0.2–1.2)
BUN SERPL-MCNC: 67 MG/DL — CRITICAL HIGH (ref 10–20)
CALCIUM SERPL-MCNC: 8.4 MG/DL — LOW (ref 8.5–10.1)
CHLORIDE SERPL-SCNC: 90 MMOL/L — LOW (ref 98–110)
CO2 SERPL-SCNC: 39 MMOL/L — HIGH (ref 17–32)
CREAT SERPL-MCNC: 1.7 MG/DL — HIGH (ref 0.7–1.5)
EOSINOPHIL # BLD AUTO: 0.12 K/UL — SIGNIFICANT CHANGE UP (ref 0–0.7)
EOSINOPHIL NFR BLD AUTO: 3.1 % — SIGNIFICANT CHANGE UP (ref 0–8)
GLUCOSE SERPL-MCNC: 84 MG/DL — SIGNIFICANT CHANGE UP (ref 70–99)
HCT VFR BLD CALC: 32.4 % — LOW (ref 42–52)
HGB BLD-MCNC: 10.3 G/DL — LOW (ref 14–18)
IMM GRANULOCYTES NFR BLD AUTO: 0.5 % — HIGH (ref 0.1–0.3)
INR BLD: 3.02 RATIO — HIGH (ref 0.65–1.3)
LYMPHOCYTES # BLD AUTO: 0.78 K/UL — LOW (ref 1.2–3.4)
LYMPHOCYTES # BLD AUTO: 20.1 % — LOW (ref 20.5–51.1)
MAGNESIUM SERPL-MCNC: 3 MG/DL — HIGH (ref 1.8–2.4)
MCHC RBC-ENTMCNC: 28.4 PG — SIGNIFICANT CHANGE UP (ref 27–31)
MCHC RBC-ENTMCNC: 31.8 G/DL — LOW (ref 32–37)
MCV RBC AUTO: 89.3 FL — SIGNIFICANT CHANGE UP (ref 80–94)
MONOCYTES # BLD AUTO: 0.68 K/UL — HIGH (ref 0.1–0.6)
MONOCYTES NFR BLD AUTO: 17.5 % — HIGH (ref 1.7–9.3)
NEUTROPHILS # BLD AUTO: 2.26 K/UL — SIGNIFICANT CHANGE UP (ref 1.4–6.5)
NEUTROPHILS NFR BLD AUTO: 58.3 % — SIGNIFICANT CHANGE UP (ref 42.2–75.2)
NRBC # BLD: 0 /100 WBCS — SIGNIFICANT CHANGE UP (ref 0–0)
PLATELET # BLD AUTO: 197 K/UL — SIGNIFICANT CHANGE UP (ref 130–400)
POTASSIUM SERPL-MCNC: 3.9 MMOL/L — SIGNIFICANT CHANGE UP (ref 3.5–5)
POTASSIUM SERPL-SCNC: 3.9 MMOL/L — SIGNIFICANT CHANGE UP (ref 3.5–5)
PROT SERPL-MCNC: 6.6 G/DL — SIGNIFICANT CHANGE UP (ref 6–8)
PROTHROM AB SERPL-ACNC: 34.7 SEC — HIGH (ref 9.95–12.87)
RBC # BLD: 3.63 M/UL — LOW (ref 4.7–6.1)
RBC # FLD: 20.9 % — HIGH (ref 11.5–14.5)
SARS-COV-2 RNA SPEC QL NAA+PROBE: SIGNIFICANT CHANGE UP
SODIUM SERPL-SCNC: 138 MMOL/L — SIGNIFICANT CHANGE UP (ref 135–146)
WBC # BLD: 3.88 K/UL — LOW (ref 4.8–10.8)
WBC # FLD AUTO: 3.88 K/UL — LOW (ref 4.8–10.8)

## 2021-07-22 PROCEDURE — 99233 SBSQ HOSP IP/OBS HIGH 50: CPT

## 2021-07-22 RX ORDER — SODIUM CHLORIDE 5 G/100ML
150 INJECTION, SOLUTION INTRAVENOUS
Refills: 0 | Status: DISCONTINUED | OUTPATIENT
Start: 2021-07-22 | End: 2021-07-22

## 2021-07-22 RX ADMIN — SENNA PLUS 2 TABLET(S): 8.6 TABLET ORAL at 21:40

## 2021-07-22 RX ADMIN — OXYCODONE AND ACETAMINOPHEN 1 TABLET(S): 5; 325 TABLET ORAL at 21:40

## 2021-07-22 RX ADMIN — BUDESONIDE AND FORMOTEROL FUMARATE DIHYDRATE 2 PUFF(S): 160; 4.5 AEROSOL RESPIRATORY (INHALATION) at 09:00

## 2021-07-22 RX ADMIN — OXYCODONE AND ACETAMINOPHEN 1 TABLET(S): 5; 325 TABLET ORAL at 11:38

## 2021-07-22 RX ADMIN — CHLORHEXIDINE GLUCONATE 1 APPLICATION(S): 213 SOLUTION TOPICAL at 05:00

## 2021-07-22 RX ADMIN — OXYCODONE AND ACETAMINOPHEN 1 TABLET(S): 5; 325 TABLET ORAL at 12:08

## 2021-07-22 RX ADMIN — Medication 5 MILLIGRAM(S): at 21:40

## 2021-07-22 RX ADMIN — NYSTATIN CREAM 1 APPLICATION(S): 100000 CREAM TOPICAL at 05:00

## 2021-07-22 RX ADMIN — BUDESONIDE AND FORMOTEROL FUMARATE DIHYDRATE 2 PUFF(S): 160; 4.5 AEROSOL RESPIRATORY (INHALATION) at 21:40

## 2021-07-22 RX ADMIN — Medication 81 MILLIGRAM(S): at 11:38

## 2021-07-22 RX ADMIN — ATORVASTATIN CALCIUM 20 MILLIGRAM(S): 80 TABLET, FILM COATED ORAL at 21:40

## 2021-07-22 NOTE — PROGRESS NOTE ADULT - ASSESSMENT
DORON   - improving   - prerenal from overdiuresis   - b/l normal sized kidneys without hydro on renal sono   - no proteinuria, RBC's on UA   - pt aggressively diuresed with bumx gtt, metolazone, hypertonic saline and aldactone  acute on chronic HFpEF  Afib with SVR / NSVT / CAD / PCI  COPD on home O2  hf of DVT  anemia  debility    plan:    resume torsemide 20mg po qd    off ACE-I / ARB / entresto  ckd education   outpt bmp f/u  outpt renal f/u  dc planning

## 2021-07-22 NOTE — DIETITIAN INITIAL EVALUATION ADULT. - NAME AND PHONE
Nutrition Intervention:meals and snacks; Nutrition Monitoring:diet order,energy intake,body composition,NFPF, renal/electrolytes profile

## 2021-07-22 NOTE — PROGRESS NOTE ADULT - SUBJECTIVE AND OBJECTIVE BOX
NEPHROLOGY FOLLOW UP NOTE    improving renal function   no sob  bp's fair    PAST MEDICAL & SURGICAL HISTORY:  COPD (chronic obstructive pulmonary disease)    Hypertension    Deep vein thrombosis (DVT) of left lower extremity, unspecified chronicity, unspecified vein    Cellulitis of left lower extremity    Chronic atrial fibrillation    Mitral valve insufficiency, unspecified etiology  Moderate    CHF (congestive heart failure)    S/P coronary artery stent placement    History of total right knee replacement      Allergies:  No Known Allergies    Home Medications Reviewed    SOCIAL HISTORY:  Denies ETOH,Smoking,   FAMILY HISTORY:        REVIEW OF SYSTEMS:  CONSTITUTIONAL: No weakness, fevers or chills  EYES/ENT: No visual changes;  No vertigo or throat pain   NECK: No pain or stiffness  RESPIRATORY: No cough, wheezing, hemoptysis; No shortness of breath  CARDIOVASCULAR: No chest pain or palpitations.  GASTROINTESTINAL: No abdominal or epigastric pain. No nausea, vomiting, or hematemesis; No diarrhea or constipation. No melena or hematochezia.  GENITOURINARY: No dysuria, frequency, foamy urine, urinary urgency, incontinence or hematuria  NEUROLOGICAL: No numbness or weakness  SKIN: No itching, burning, rashes, or lesions   VASCULAR: No bilateral lower extremity edema.   All other review of systems is negative unless indicated above.      PHYSICAL EXAM:  Constitutional: NAD  HEENT: anicteric sclera, oropharynx clear, MMM  Neck: No JVD  Respiratory: decreased BS b/l  Cardiovascular: S1, S2, RRR  Gastrointestinal: BS+, soft, NT/ND  Extremities: No cyanosis or clubbing. No peripheral edema  Neurological: A/O x 3, no focal deficits  Psychiatric: Normal mood, normal affect  : No CVA tenderness. No lowe.   Skin: No rashes    Hospital Medications:   MEDICATIONS  (STANDING):  aspirin enteric coated 81 milliGRAM(s) Oral daily  atorvastatin 20 milliGRAM(s) Oral at bedtime  budesonide 160 MICROgram(s)/formoterol 4.5 MICROgram(s) Inhaler 2 Puff(s) Inhalation two times a day  chlorhexidine 4% Liquid 1 Application(s) Topical <User Schedule>  melatonin 5 milliGRAM(s) Oral at bedtime  nystatin Powder 1 Application(s) Topical every 12 hours  polyethylene glycol 3350 17 Gram(s) Oral at bedtime  senna 2 Tablet(s) Oral at bedtime        VITALS:  T(F): 96.3 (07-22-21 @ 14:30), Max: 97.6 (07-21-21 @ 20:27)  HR: 60 (07-22-21 @ 14:30)  BP: 114/55 (07-22-21 @ 14:30)  RR: 17 (07-22-21 @ 14:30)  SpO2: 91% (07-22-21 @ 08:35)  Wt(kg): --    07-20 @ 07:01  -  07-21 @ 07:00  --------------------------------------------------------  IN: 540 mL / OUT: 1865 mL / NET: -1325 mL    07-21 @ 07:01  -  07-22 @ 07:00  --------------------------------------------------------  IN: 220 mL / OUT: 200 mL / NET: 20 mL    07-22 @ 07:01  -  07-22 @ 15:39  --------------------------------------------------------  IN: 440 mL / OUT: 500 mL / NET: -60 mL          LABS:  07-22    138  |  90<L>  |  67<HH>  ----------------------------<  84  3.9   |  39<H>  |  1.7<H>    Ca    8.4<L>      22 Jul 2021 06:05  Mg     3.0     07-22    TPro  6.6  /  Alb  3.3<L>  /  TBili  1.1  /  DBili      /  AST  30  /  ALT  14  /  AlkPhos  106  07-22                          10.3   3.88  )-----------( 197      ( 22 Jul 2021 06:05 )             32.4       Urine Studies:    Sodium, Random Urine: 84.0 mmoL/L (07-19 @ 00:01)  Creatinine, Random Urine: 67 mg/dL (07-19 @ 00:01)      RADIOLOGY & ADDITIONAL STUDIES:

## 2021-07-22 NOTE — PROGRESS NOTE ADULT - NSICDXPILOT_GEN_ALL_CORE
[place]

## 2021-07-22 NOTE — PROGRESS NOTE ADULT - REASON FOR ADMISSION
shortness of breath

## 2021-07-22 NOTE — DIETITIAN INITIAL EVALUATION ADULT. - PERSON TAUGHT/METHOD
encouraged po intake; not interested in any written education at this time/verbal instruction/patient instructed

## 2021-07-22 NOTE — DIETITIAN INITIAL EVALUATION ADULT. - ORAL INTAKE PTA/DIET HISTORY
reports po/appetite optimum PTA. eats 2-3 meals a day. UBW: unsure as pt always carries a lot of water wt unsure of dry wt. But does not feel concern for unintentional wt loss. NKFA. No MVI. no cul/rel or personal food rest/prefs noted.

## 2021-07-22 NOTE — PROGRESS NOTE ADULT - PROVIDER SPECIALTY LIST ADULT
Heart Failure
Heart Failure
Internal Medicine
Internal Medicine
Electrophysiology
Electrophysiology
Heart Failure
Hospitalist
Internal Medicine
Nephrology
Electrophysiology
Heart Failure
Hospitalist
Internal Medicine
Nephrology
Nephrology
Electrophysiology
Hospitalist
Hospitalist
Internal Medicine
Urology
Heart Failure
Hospitalist
Internal Medicine
Nephrology
Cardiology

## 2021-07-22 NOTE — DIETITIAN INITIAL EVALUATION ADULT. - PERTINENT MEDS FT
MEDICATIONS  (STANDING):  aspirin enteric coated 81 milliGRAM(s) Oral daily  atorvastatin 20 milliGRAM(s) Oral at bedtime  budesonide 160 MICROgram(s)/formoterol 4.5 MICROgram(s) Inhaler 2 Puff(s) Inhalation two times a day  polyethylene glycol 3350 17 Gram(s) Oral at bedtime  senna 2 Tablet(s) Oral at bedtime    MEDICATIONS  (PRN):  ALBUTerol  90 MICROgram(s) HFA Inhaler - Peds 2 Puff(s) Inhalation every 4 hours PRN Shortness of Breath and/or Wheezing  lactulose Syrup 10 Gram(s) Oral every 3 hours PRN constipation

## 2021-07-22 NOTE — DISCHARGE NOTE NURSING/CASE MANAGEMENT/SOCIAL WORK - PATIENT PORTAL LINK FT
You can access the FollowMyHealth Patient Portal offered by [facility] by registering at the following website: [email or web address]. By joining [employer]’s FollowMyHealth portal, you will also be able to view your health information using other applications (apps) compatible with our system.

## 2021-07-22 NOTE — DIETITIAN INITIAL EVALUATION ADULT. - OTHER INFO
pertinent medical information:   79 y/o male with PMH CAD s/p PCI and stent 2007, COPD on 2L home O2, HFpEF TTE in 4/21 EF 55-65, DLD, atrial fibrillation on coumadin presents for shortness of breath and increase in his NADIA along with 1 day history of chest pain.  # HFpEF exacerbation -resolved - daily weight, strict I/Os keep I < O  # DORON on CKD II- Most likely secondary to aggressive diuresis   -once trending down patient ok for discharge    # Normocytic anemia- chronic- at baseline    pertinent subjective information: reports po/appetite optimum at admit. eating 3 meals, >75%. no chewing/swallowing difficulty reported.

## 2021-07-22 NOTE — DIETITIAN INITIAL EVALUATION ADULT. - OTHER CALCULATIONS
using IBW 89kg d/t obese BMI; Energy: 2225-2670kcal (25-30kcal -- age considered); Protein: 89-107g/kg (1-1.2g/kg -- slight elevation in renals labs noted likely d/t diuresis -- will adjust PRN); Fluid: 1mL/kcal or LIP

## 2021-07-22 NOTE — PROGRESS NOTE ADULT - ASSESSMENT
81 y/o male with PMH CAD s/p PCI and stent 2007, COPD on 2L home O2, HFpEF TTE in 4/21 EF 55-65, DLD, atrial fibrillation on coumadin presented with progressively worsening sob associated with increasing leg swelling for the last few days. Also C/O intermittent cp for the last one day.     Acute HFpEF  DORON  COPD on home O2  CAD S/P PCI in 2007  Chronic A-Fib with Slow Ventricular Response   NSVT  Anemia  Orthostatic hypotension likely due to overdiuresis              PLAN:    ·	Can restart Torsemide 20 mg po daily  ·	repeat orthostatics in AM - if improved/resolved, then d/c planning to SNF  ·	renal f/u appreciated: torsemide on discharge  ·	fall precautions  ·	INR now trending down. restart low dose coumadin   ·	ECHO showed EF is 50-55%. Entresto was stopped.   ·	NSVT on tele. Pt was bradycardiac on admission. No need for PPM as per cardiology  ·	Had loop recorder and will f/u with EP as outpt  ·	Check i's and o's and daily wts  ·	Low salt diet and water restriction to 1.5 L/D      PROGRESS NOTE HANDOFF    Pending: improvement in orthostatics.    pt aware of plan of care    Disposition: STR at SNF 79 y/o male with PMH CAD s/p PCI and stent 2007, COPD on 2L home O2, HFpEF TTE in 4/21 EF 55-65, DLD, atrial fibrillation on coumadin presented with progressively worsening sob associated with increasing leg swelling for the last few days. Also C/O intermittent cp for the last one day.     Acute HFpEF  DORON  COPD on home O2  CAD S/P PCI in 2007  Chronic A-Fib with Slow Ventricular Response   NSVT  Anemia  Orthostatic hypotension likely due to overdiuresis                PLAN:    ·	Can restart Torsemide 20 mg po daily  ·	Sven's stockings.   ·	Can D/C him to SNF today  ·	Fall precautions  ·	INR now trending down. Can restart Coumadin today. Give him Coumadin 1 mg po tonigh  ·	ECHO showed EF is 50-55%. Entresto was stopped.   ·	NSVT on tele. Pt was bradycardiac on admission. No need for PPM as per cardiology. S/P ILR  ·	Had loop recorder and will f/u with EP as outpt  ·	Low salt diet and water restriction to 1.5 L/D    * Med rec reviewed. Plan of care D/W the pt. Time spent 36 minutes.       PROGRESS NOTE HANDOFF    Pending: Covid swab. Can D/C to SNF today    pt aware of plan of care    Disposition: STR at SNF

## 2021-07-22 NOTE — ADVANCED PRACTICE NURSE CONSULT - ASSESSMENT
79 y/o male with PMH CAD s/p PCI and stent 2007, COPD on 2L home O2, HFpEF TTE in 4/21 EF 55-65, DLD, atrial fibrillation on coumadin presents (7/7) for shortness of breath and increase in his NADIA along with 1 day history of chest pain.  VS on admission were within normal range. He was saturating well on 3 L NC.  CXR showed bilateral pleural effusions with  vascular congestion.  He received 40 mg of iv lasix.   Currently admitted to medicine, on 3C, being managed for HFpEF exacerbation resolved; Afib SVR # Bradycardia; NSVT; CAD s/p stent 2007; Severe Pulmonary HTN; DORON on CKD II;  Normocytic anemia- chronic; COPD not in exacerbation; PSAP 70 mmhg.    Received patient on 3C,  laying supine in bed, turned to right side (pillow under left side), HOB elevated 30 degrees. Pt asleep, easily arouseable, A&Ox3, made aware of purpose of WOCN visit, agreeable to consult. Compression stockings to BLEs in place, removed for skin assessment. Right heel intact.     Type of wound: Deep tissue pressure injury (DTPI)   Location: left lateral heel   Wound measurements: 1cm x 1cm x 0cm, true depth indeterminable at this time   Tunneling/Undermining: none  Wound bed: intact deep purple tissue   Wound edges: attached, flush  Periwound: blanchable erythema  Wound exudate: none  Wound odor: none  Induration, warmth: none   Wound pain: denies & no s/s pain present on assessment    With assistance from primary RN Kathryn, turned patient to right side for sacral skin assessment.     Sacral skin intact.     Type of wound: Stage 2 pressure injury   Location: coccyx-intergluteal cleft  Wound measurements: 0.3cm x 0.3cm x 0.2cm  Tunneling/Undermining: none  Wound bed: dark pink tissue   Wound edges: attached, flush  Periwound: blanchable erythema; scattered patches of purple hyperpigmentation throughout outer b/l buttock area   Wound exudate: none  Wound odor: none  Induration, warmth: none   Wound pain: pt w/ tenderness to area during wound bed palpation    Patient OOB w/ assistance, limited mobility in bed, patient reports continent of urine and stool. Ordered for sodium & cholesterol restricted  diet, adequate intake as per reported Baljinder score.

## 2021-07-22 NOTE — PROGRESS NOTE ADULT - SUBJECTIVE AND OBJECTIVE BOX
RODNEY SANTO  80y Male    CHIEF COMPLAINT:    Patient is a 80y old  Male who presents with a chief complaint of shortness of breath (22 Jul 2021 06:45)      INTERVAL HPI/OVERNIGHT EVENTS:    Patient seen and examined.    ROS: All other systems are negative.    Vital Signs:    T(F): 96.5 (07-22-21 @ 04:34), Max: 97.9 (07-21-21 @ 12:33)  HR: 50 (07-22-21 @ 04:37) (35 - 58)  BP: 119/50 (07-22-21 @ 04:34) (103/54 - 119/50)  RR: 18 (07-21-21 @ 20:27) (18 - 18)  SpO2: 91% (07-22-21 @ 08:35) (91% - 99%)  I&O's Summary    21 Jul 2021 07:01  -  22 Jul 2021 07:00  --------------------------------------------------------  IN: 220 mL / OUT: 200 mL / NET: 20 mL      Daily     Daily   CAPILLARY BLOOD GLUCOSE      POCT Blood Glucose.: 104 mg/dL (21 Jul 2021 16:27)  POCT Blood Glucose.: 108 mg/dL (21 Jul 2021 12:11)      PHYSICAL EXAM:    GENERAL:  NAD  SKIN: No rashes or lesions  HENT: Atraumatic. Normocephalic. PERRL. Moist membranes.  NECK: Supple, No JVD. No lymphadenopathy.  PULMONARY: CTA B/L. No wheezing. No rales  CVS: Normal S1, S2. Rate and Rhythm are regular. No murmurs.  ABDOMEN/GI: Soft, Nontender, Nondistended; BS present  EXTREMITIES: Peripheral pulses intact. No edema B/L LE.  NEUROLOGIC:  No motor or sensory deficit.  PSYCH: Alert & oriented x 3    Consultant(s) Notes Reviewed:  [x ] YES  [ ] NO  Care Discussed with Consultants/Other Providers [ x] YES  [ ] NO    EKG reviewed  Telemetry reviewed    LABS:                        10.3   3.88  )-----------( 197      ( 22 Jul 2021 06:05 )             32.4     07-22    138  |  90<L>  |  67<HH>  ----------------------------<  84   Creatinine Trend: 1.7<--, 1.9<--, 2.1<--, 2.0<--, 2.1<--, 2.0<--  3.9   |  39<H>  |  1.7<H>    Ca    8.4<L>      22 Jul 2021 06:05  Mg     3.0     07-22    TPro  6.6  /  Alb  3.3<L>  /  TBili  1.1  /  DBili  x   /  AST  30  /  ALT  14  /  AlkPhos  106  07-22    PT/INR - ( 22 Jul 2021 06:05 )   PT: 34.70 sec;   INR: 3.02 ratio                   RADIOLOGY & ADDITIONAL TESTS:      Imaging or report Personally Reviewed:  [ ] YES  [ ] NO    Medications:  Standing  aspirin enteric coated 81 milliGRAM(s) Oral daily  atorvastatin 20 milliGRAM(s) Oral at bedtime  budesonide 160 MICROgram(s)/formoterol 4.5 MICROgram(s) Inhaler 2 Puff(s) Inhalation two times a day  chlorhexidine 4% Liquid 1 Application(s) Topical <User Schedule>  melatonin 5 milliGRAM(s) Oral at bedtime  nystatin Powder 1 Application(s) Topical every 12 hours  polyethylene glycol 3350 17 Gram(s) Oral at bedtime  senna 2 Tablet(s) Oral at bedtime    PRN Meds  ALBUTerol  90 MICROgram(s) HFA Inhaler - Peds 2 Puff(s) Inhalation every 4 hours PRN  lactulose Syrup 10 Gram(s) Oral every 3 hours PRN  oxycodone    5 mG/acetaminophen 325 mG 1 Tablet(s) Oral every 8 hours PRN      Case discussed with resident    Care discussed with pt/family           RODNEY SANTO  80y Male    CHIEF COMPLAINT:    Patient is a 80y old  Male who presents with a chief complaint of shortness of breath (22 Jul 2021 06:45)      INTERVAL HPI/OVERNIGHT EVENTS:    Patient seen and examined. Feels good. No dizziness. No sob. Renal function has been improving    ROS: All other systems are negative.    Vital Signs:    T(F): 96.5 (07-22-21 @ 04:34), Max: 97.9 (07-21-21 @ 12:33)  HR: 50 (07-22-21 @ 04:37) (35 - 58)  BP: 119/50 (07-22-21 @ 04:34) (103/54 - 119/50)  RR: 18 (07-21-21 @ 20:27) (18 - 18)  SpO2: 91% (07-22-21 @ 08:35) (91% - 99%)  I&O's Summary    21 Jul 2021 07:01  -  22 Jul 2021 07:00  --------------------------------------------------------  IN: 220 mL / OUT: 200 mL / NET: 20 mL      Daily     Daily   CAPILLARY BLOOD GLUCOSE      POCT Blood Glucose.: 104 mg/dL (21 Jul 2021 16:27)  POCT Blood Glucose.: 108 mg/dL (21 Jul 2021 12:11)      PHYSICAL EXAM:    GENERAL:  NAD  SKIN: No rashes or lesions  HENT: Atraumatic. Normocephalic. PERRL. Moist membranes.  NECK: Supple, No JVD. No lymphadenopathy.  PULMONARY: CTA B/L. No wheezing. No rales  CVS: Normal S1, S2. Rate and Rhythm are regular. No murmurs.  ABDOMEN/GI: Soft, Nontender, Nondistended; BS present  EXTREMITIES: Peripheral pulses intact. No edema B/L LE.  NEUROLOGIC:  No motor or sensory deficit.  PSYCH: Alert & oriented x 3    Consultant(s) Notes Reviewed:  [x ] YES  [ ] NO  Care Discussed with Consultants/Other Providers [ x] YES  [ ] NO    EKG reviewed  Telemetry reviewed    LABS:                        10.3   3.88  )-----------( 197      ( 22 Jul 2021 06:05 )             32.4     07-22    138  |  90<L>  |  67<HH>  ----------------------------<  84   Creatinine Trend: 1.7<--, 1.9<--, 2.1<--, 2.0<--, 2.1<--, 2.0<--  3.9   |  39<H>  |  1.7<H>    Ca    8.4<L>      22 Jul 2021 06:05  Mg     3.0     07-22    TPro  6.6  /  Alb  3.3<L>  /  TBili  1.1  /  DBili  x   /  AST  30  /  ALT  14  /  AlkPhos  106  07-22    PT/INR - ( 22 Jul 2021 06:05 )   PT: 34.70 sec;   INR: 3.02 ratio                   RADIOLOGY & ADDITIONAL TESTS:      Imaging or report Personally Reviewed:  [ ] YES  [ ] NO    Medications:  Standing  aspirin enteric coated 81 milliGRAM(s) Oral daily  atorvastatin 20 milliGRAM(s) Oral at bedtime  budesonide 160 MICROgram(s)/formoterol 4.5 MICROgram(s) Inhaler 2 Puff(s) Inhalation two times a day  chlorhexidine 4% Liquid 1 Application(s) Topical <User Schedule>  melatonin 5 milliGRAM(s) Oral at bedtime  nystatin Powder 1 Application(s) Topical every 12 hours  polyethylene glycol 3350 17 Gram(s) Oral at bedtime  senna 2 Tablet(s) Oral at bedtime    PRN Meds  ALBUTerol  90 MICROgram(s) HFA Inhaler - Peds 2 Puff(s) Inhalation every 4 hours PRN  lactulose Syrup 10 Gram(s) Oral every 3 hours PRN  oxycodone    5 mG/acetaminophen 325 mG 1 Tablet(s) Oral every 8 hours PRN      Case discussed with resident    Care discussed with pt/family

## 2021-07-22 NOTE — DIETITIAN INITIAL EVALUATION ADULT. - PHYSCIAL ASSESSMENT
AAOX4; 7/11: 1+ L/R leg edema noted; G: no discomfort, LBM 7/21 per pt; Skin: L heel DTI; many other wts noted in EMR to be higher d/t HF -- will use lowest wt of 95.7kg 7/20 for calculations

## 2021-07-22 NOTE — ADVANCED PRACTICE NURSE CONSULT - RECOMMEDATIONS
1. DTPI L heel-Apply silicone foam Allevyn dressing to provide protective layer, cushion area, decrease friction & shearing, and prevent further skin breakdown. Change dressing q3d and prn for strike-through drainage or soiling.  -Offload heels from bed surface with soft pillow under calfs or by applying offloading boots to BLEs.   2. Stage 2 coccyx pressure injury-Cleanse skin w/ normal saline, gently pat dry. Continue applying Coloplast Sugar Protect moisture barrier cream daily and prn after any soiling/each incontinent episode.    -Assess skin/wound qshift, report changes to primary provider.     Additional recs:  -Continue turning/positioning patient from side-to-side q2h while in bed, q1h when/if OOB chair, or in accordance w/ pt's plan of care. Continue utilizing pillows and/or z-fannie fluidized positioner to assist w/ turning/positioning. When/if OOB chair, utilize pillows or chair cushion to offload pressure.   -Continue utilizing one underpad underneath patient to wick away moisture from skin surface & contain any soiling/incontinence episodes; change pad when saturated/soiled. .   -Continue nutrition consult for optimal wound healing & nutritional status.     Plan of Care: Primary RN Kathryn at bedside & made aware of above recs. Spoke w/ covering/primary MD Rice (at [phone]) in regards to above. No further needs/recs from [facility] service at this time. Staff RN to perform routine skin/wound assessment and manage wound care. Questions or concerns or if wound worsens and reconsult needed, please contact [facility], [phone].

## 2021-07-22 NOTE — PROGRESS NOTE ADULT - ASSESSMENT
81 y/o male with PMH CAD s/p PCI and stent 2007, COPD on 2L home O2, HFpEF TTE in 4/21 EF 55-65, DLD, atrial fibrillation on coumadin presents for shortness of breath and increase in his NADIA along with 1 day history of chest pain.    # HFpEF exacerbation resolved   # Afib SVR # Bradycardia # NSVT  # CAD s/p stent 2007  # Severe Pulmonary HTN  - CXR b/l pleural effusions and vascular congestion on admission   - TTE 4/21 EF 55-65 PSAP 70 mmhg; get new TTE  - BNP 14K on admission   - ECG afib in SVR  - troponin negative   - d/c bumex 20 mg IV 1mg/hr   - d/c Spironolactone 25 mg daily   - d/c Metolazone   - Holding diuretics due to low BP   - ASA, atorvastatin   -  lipid panel WNL except HDL 39 and A1c 5.6  - iron profile WNL  - daily weight, strict I/Os keep I < O  - DASH TLC diet  fluid restriction 1000 ml daily  - continue tele monitoring  - EP on board : Loop recorder placed 7/12    - Avoid AVN blockers  - Pending INR will dose coumadin   - Cardiology Cs doctor Niesha; Heart failure team consult  - TSH 4.34  - keep K > 4 and Mg > 2.1  - NC 2 LPM; HOB > 35 titrate oxygen keep saO2 89-94%      # DORON on CKD II  - creatinine trending down  - Most likely secondary to aggressive diuresis   - Bumex d/c , possible fluid bolus  -once trending down patient ok for discharge    -Bladder scan - not retaining   - d/c all diuretics     # Normocytic anemia- chronic  - at baseline  - -Iron sucrose  x 5 doses   - FU folate b12 and TSH  - retic count WNL  - Stool sample for guaiac  - needs OP colonoscopy  - HIv screening negative  - active type and screen keep Hb > 7    # COPD not in exacerbation  # PSAP 70 mmhg  - on  2 LPM NC at home during night   - c/w symbicort and ventolin as needed  - CXR bilateral opacities basilar; underlying neoplasm not excluded      DVT: on Coumadin   Diet : Dash/TLC   Dispo: [facility]

## 2021-07-22 NOTE — PROGRESS NOTE ADULT - SUBJECTIVE AND OBJECTIVE BOX
RODNEY SANTO  80y  Male      Patient is a 80y old  Male who presents with a chief complaint of shortness of breath (21 Jul 2021 15:51)      INTERVAL HPI/OVERNIGHT EVENTS:    REVIEW OF SYSTEMS:  CONSTITUTIONAL: No fever, weight loss, or fatigue  EYES: No eye pain, visual disturbances, or discharge  ENMT:  No difficulty hearing, tinnitus, vertigo; No sinus or throat pain  NECK: No pain or stiffness  RESPIRATORY: No cough, wheezing, chills or hemoptysis; No shortness of breath  CARDIOVASCULAR: No chest pain, palpitations, dizziness, or leg swelling  GASTROINTESTINAL: No abdominal or epigastric pain. No diarrhea or constipation. No nausea, vomiting, or hematemesis. No melena or hematochezia.  GENITOURINARY: No dysuria, frequency, hematuria, or incontinence  NEUROLOGICAL: No headaches, memory loss, loss of strength, numbness, or tremors  MUSCULOSKELETAL: No joint pain or swelling; No muscle, back, or extremity pain  SKIN: No itching, burning, rashes, or lesions   LYMPH NODES: No enlarged glands  ENDOCRINE: No heat or cold intolerance; No hair loss  PSYCHIATRIC: No depression, anxiety, mood swings, or difficulty sleeping  HEME/LYMPH: No easy bruising, or bleeding gums  ALLERY AND IMMUNOLOGIC: No hives or eczema    Vital Signs Last 24 Hrs  T(C): 35.8 (22 Jul 2021 04:34), Max: 36.6 (21 Jul 2021 12:33)  T(F): 96.5 (22 Jul 2021 04:34), Max: 97.9 (21 Jul 2021 12:33)  HR: 50 (22 Jul 2021 04:37) (35 - 58)  BP: 119/50 (22 Jul 2021 04:34) (103/54 - 119/50)  BP(mean): --  RR: 18 (21 Jul 2021 20:27) (18 - 18)  SpO2: 94% (21 Jul 2021 08:15) (94% - 94%)    PHYSICAL EXAM:  GENERAL: NAD, well-groomed, well-developed  HEAD:  Atraumatic, Normocephalic  EYES: EOMI, PERRLA, conjunctiva and sclera clear  ENMT: Moist mucous membranes, Good dentition, No lesions  NECK: Supple, No JVD, Normal thyroid  NERVOUS SYSTEM:  Alert & Oriented X3, Good concentration; Motor Strength 5/5 B/L upper and lower extremities; DTRs 2+ intact and symmetric  CHEST/LUNG: Clear to auscultation bilaterally; No rales, rhonchi, wheezing, or rubs  HEART: Regular rate and rhythm; No murmurs, rubs, or gallops  ABDOMEN: Soft, Nontender, Nondistended; Bowel sounds present  EXTREMITIES:  2+ Peripheral Pulses, No clubbing, cyanosis, or edema  LYMPH: No lymphadenopathy noted  SKIN: No rashes or lesions    Consultant(s) Notes Reviewed:  [x ] YES  [ ] NO  Care Discussed with Consultants/Other Providers [ x] YES  [ ] NO    LABS:                        10.7   3.84  )-----------( 176      ( 21 Jul 2021 06:24 )             32.8     07-21    138  |  91<L>  |  70<HH>  ----------------------------<  99  4.8   |  37<H>  |  1.9<H>    Ca    8.6      21 Jul 2021 16:00  Mg     2.8     07-21    TPro  6.6  /  Alb  3.3<L>  /  TBili  1.4<H>  /  DBili  x   /  AST  32  /  ALT  14  /  AlkPhos  112  07-21    PT/INR - ( 21 Jul 2021 06:24 )   PT: 36.50 sec;   INR: 3.17 ratio               CAPILLARY BLOOD GLUCOSE      POCT Blood Glucose.: 104 mg/dL (21 Jul 2021 16:27)  POCT Blood Glucose.: 108 mg/dL (21 Jul 2021 12:11)  POCT Blood Glucose.: 97 mg/dL (21 Jul 2021 07:47)      RADIOLOGY & ADDITIONAL TESTS:    Imaging Personally Reviewed:  [ ] YES  [ ] NO RODNEY SANTO  80y  Male        79 y/o male with PMH CAD s/p PCI and stent 2007, COPD on 2L home O2, HFpEF TTE in 4/21 EF 55-65, DLD, atrial fibrillation on coumadin presents for shortness of breath and increase in his NADIA along with 1 day history of chest pain.      INTERVAL HPI/OVERNIGHT EVENTS:  No events overnight. Patient is resting in bed comfortably. Denies lightheadedness, dizziness, palpitations, chest pain and SOB.     REVIEW OF SYSTEMS:  CONSTITUTIONAL: No fever, weight loss, or fatigue  EYES: No eye pain, visual disturbances, or discharge  ENMT:  No difficulty hearing, tinnitus, vertigo; No sinus or throat pain  NECK: No pain or stiffness  RESPIRATORY: No cough, wheezing, chills or hemoptysis; No shortness of breath  CARDIOVASCULAR: No chest pain, palpitations, dizziness, or leg swelling  GASTROINTESTINAL: No abdominal or epigastric pain. No diarrhea or constipation. No nausea, vomiting, or hematemesis. No melena or hematochezia.  GENITOURINARY: No dysuria, frequency, hematuria, or incontinence  NEUROLOGICAL: No headaches, memory loss, loss of strength, numbness, or tremors  MUSCULOSKELETAL: Joint pain     Vital Signs Last 24 Hrs  T(C): 35.8 (22 Jul 2021 04:34), Max: 36.6 (21 Jul 2021 12:33)  T(F): 96.5 (22 Jul 2021 04:34), Max: 97.9 (21 Jul 2021 12:33)  HR: 50 (22 Jul 2021 04:37) (35 - 58)  BP: 119/50 (22 Jul 2021 04:34) (103/54 - 119/50)  RR: 18 (21 Jul 2021 20:27) (18 - 18)  SpO2: 94% (21 Jul 2021 08:15) (94% - 94%)    PHYSICAL EXAM:  GENERAL: NAD  HEAD:  Atraumatic, Normocephalic  EYES: EOMI, PERRLA, conjunctiva and sclera clear  ENMT: Moist mucous membranes  NECK: Supple, No JVD, Normal thyroid  NERVOUS SYSTEM:  Alert & Oriented X3  CHEST/LUNG: Clear to auscultation bilaterally; No rales, rhonchi, wheezing, or rubs  HEART: Bradycardic ; No murmurs, rubs, or gallops  ABDOMEN: Soft, Nontender, Nondistended; Bowel sounds present  EXTREMITIES:  2+ Peripheral Pulses, No clubbing, cyanosis, or edema  LYMPH: No lymphadenopathy noted  SKIN: No rashes or lesions    Consultant(s) Notes Reviewed:  [x ] YES  [ ] NO  Care Discussed with Consultants/Other Providers [ x] YES  [ ] NO    LABS:                        10.7   3.84  )-----------( 176      ( 21 Jul 2021 06:24 )             32.8     07-21    138  |  91<L>  |  70<HH>  ----------------------------<  99  4.8   |  37<H>  |  1.9<H>    Ca    8.6      21 Jul 2021 16:00  Mg     2.8     07-21    TPro  6.6  /  Alb  3.3<L>  /  TBili  1.4<H>  /  DBili  x   /  AST  32  /  ALT  14  /  AlkPhos  112  07-21    PT/INR - ( 21 Jul 2021 06:24 )   PT: 36.50 sec;   INR: 3.17 ratio               CAPILLARY BLOOD GLUCOSE      POCT Blood Glucose.: 104 mg/dL (21 Jul 2021 16:27)  POCT Blood Glucose.: 108 mg/dL (21 Jul 2021 12:11)  POCT Blood Glucose.: 97 mg/dL (21 Jul 2021 07:47)      RADIOLOGY & ADDITIONAL TESTS:    Imaging Personally Reviewed:  [ ] YES  [ ] NO

## 2021-07-26 NOTE — H&P ADULT - NSHPLABSRESULTS_GEN_ALL_CORE
9.4    3.69  )-----------( 154      ( 07 Jul 2021 12:40 )             30.0   07-07    139  |  102  |  28<H>  ----------------------------<  92  3.9   |  28  |  1.5    Ca    8.5      07 Jul 2021 12:40  Mg     2.2     07-07    TPro  6.8  /  Alb  3.6  /  TBili  1.6<H>  /  DBili  x   /  AST  21  /  ALT  10  /  AlkPhos  137<H>  07-07
Normal

## 2021-07-28 DIAGNOSIS — R53.81 OTHER MALAISE: ICD-10-CM

## 2021-07-28 DIAGNOSIS — T50.2X5A ADVERSE EFFECT OF CARBONIC-ANHYDRASE INHIBITORS, BENZOTHIADIAZIDES AND OTHER DIURETICS, INITIAL ENCOUNTER: ICD-10-CM

## 2021-07-28 DIAGNOSIS — I82.531 CHRONIC EMBOLISM AND THROMBOSIS OF RIGHT POPLITEAL VEIN: ICD-10-CM

## 2021-07-28 DIAGNOSIS — I25.10 ATHEROSCLEROTIC HEART DISEASE OF NATIVE CORONARY ARTERY WITHOUT ANGINA PECTORIS: ICD-10-CM

## 2021-07-28 DIAGNOSIS — N18.2 CHRONIC KIDNEY DISEASE, STAGE 2 (MILD): ICD-10-CM

## 2021-07-28 DIAGNOSIS — J96.21 ACUTE AND CHRONIC RESPIRATORY FAILURE WITH HYPOXIA: ICD-10-CM

## 2021-07-28 DIAGNOSIS — I82.B12 ACUTE EMBOLISM AND THROMBOSIS OF LEFT SUBCLAVIAN VEIN: ICD-10-CM

## 2021-07-28 DIAGNOSIS — I13.0 HYPERTENSIVE HEART AND CHRONIC KIDNEY DISEASE WITH HEART FAILURE AND STAGE 1 THROUGH STAGE 4 CHRONIC KIDNEY DISEASE, OR UNSPECIFIED CHRONIC KIDNEY DISEASE: ICD-10-CM

## 2021-07-28 DIAGNOSIS — D50.9 IRON DEFICIENCY ANEMIA, UNSPECIFIED: ICD-10-CM

## 2021-07-28 DIAGNOSIS — E87.6 HYPOKALEMIA: ICD-10-CM
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
## 2021-07-28 DIAGNOSIS — Z79.01 LONG TERM (CURRENT) USE OF ANTICOAGULANTS: ICD-10-CM

## 2021-07-28 DIAGNOSIS — I82.A12 ACUTE EMBOLISM AND THROMBOSIS OF LEFT AXILLARY VEIN: ICD-10-CM

## 2021-07-28 DIAGNOSIS — I48.19 OTHER PERSISTENT ATRIAL FIBRILLATION: ICD-10-CM

## 2021-07-28 DIAGNOSIS — Z99.81 DEPENDENCE ON SUPPLEMENTAL OXYGEN: ICD-10-CM

## 2021-07-28 DIAGNOSIS — D63.1 ANEMIA IN CHRONIC KIDNEY DISEASE: ICD-10-CM

## 2021-07-28 DIAGNOSIS — I47.2 VENTRICULAR TACHYCARDIA: ICD-10-CM

## 2021-07-28 DIAGNOSIS — I82.511 CHRONIC EMBOLISM AND THROMBOSIS OF RIGHT FEMORAL VEIN: ICD-10-CM

## 2021-07-28 DIAGNOSIS — Z95.5 PRESENCE OF CORONARY ANGIOPLASTY IMPLANT AND GRAFT: ICD-10-CM

## 2021-07-28 DIAGNOSIS — I27.20 PULMONARY HYPERTENSION, UNSPECIFIED: ICD-10-CM

## 2021-07-28 DIAGNOSIS — E78.5 HYPERLIPIDEMIA, UNSPECIFIED: ICD-10-CM

## 2021-07-28 DIAGNOSIS — R31.0 GROSS HEMATURIA: ICD-10-CM

## 2021-07-28 DIAGNOSIS — I95.2 HYPOTENSION DUE TO DRUGS: ICD-10-CM

## 2021-07-28 DIAGNOSIS — I50.33 ACUTE ON CHRONIC DIASTOLIC (CONGESTIVE) HEART FAILURE: ICD-10-CM

## 2021-07-28 DIAGNOSIS — J44.9 CHRONIC OBSTRUCTIVE PULMONARY DISEASE, UNSPECIFIED: ICD-10-CM

## 2021-07-28 DIAGNOSIS — E87.4 MIXED DISORDER OF ACID-BASE BALANCE: ICD-10-CM

## 2021-07-28 DIAGNOSIS — L89.626 PRESSURE-INDUCED DEEP TISSUE DAMAGE OF LEFT HEEL: ICD-10-CM

## 2021-07-28 DIAGNOSIS — R00.1 BRADYCARDIA, UNSPECIFIED: ICD-10-CM

## 2021-07-28 DIAGNOSIS — N17.9 ACUTE KIDNEY FAILURE, UNSPECIFIED: ICD-10-CM

## 2021-07-28 DIAGNOSIS — I34.0 NONRHEUMATIC MITRAL (VALVE) INSUFFICIENCY: ICD-10-CM

## 2021-07-29 ENCOUNTER — APPOINTMENT (OUTPATIENT)
Dept: CARDIOLOGY | Facility: CLINIC | Age: 81
End: 2021-07-29

## 2021-07-30 ENCOUNTER — NON-APPOINTMENT (OUTPATIENT)
Age: 81
End: 2021-07-30

## 2021-07-30 ENCOUNTER — INPATIENT (INPATIENT)
Facility: HOSPITAL | Age: 81
LOS: 5 days | Discharge: NURSING, REHAB OR LONG-TERM CARE | End: 2021-08-05
Attending: HOSPITALIST | Admitting: HOSPITALIST
Payer: MEDICARE

## 2021-07-30 VITALS
SYSTOLIC BLOOD PRESSURE: 140 MMHG | HEIGHT: 75 IN | TEMPERATURE: 97 F | DIASTOLIC BLOOD PRESSURE: 56 MMHG | RESPIRATION RATE: 18 BRPM | OXYGEN SATURATION: 99 % | HEART RATE: 43 BPM

## 2021-07-30 DIAGNOSIS — Z96.651 PRESENCE OF RIGHT ARTIFICIAL KNEE JOINT: Chronic | ICD-10-CM

## 2021-07-30 DIAGNOSIS — Z95.5 PRESENCE OF CORONARY ANGIOPLASTY IMPLANT AND GRAFT: Chronic | ICD-10-CM

## 2021-07-30 LAB
ALBUMIN SERPL ELPH-MCNC: 3.6 G/DL — SIGNIFICANT CHANGE UP (ref 3.5–5.2)
ALP SERPL-CCNC: 118 U/L — HIGH (ref 30–115)
ALT FLD-CCNC: 17 U/L — SIGNIFICANT CHANGE UP (ref 0–41)
ANION GAP SERPL CALC-SCNC: 11 MMOL/L — SIGNIFICANT CHANGE UP (ref 7–14)
APTT BLD: 48.7 SEC — HIGH (ref 27–39.2)
AST SERPL-CCNC: 30 U/L — SIGNIFICANT CHANGE UP (ref 0–41)
BASOPHILS # BLD AUTO: 0.01 K/UL — SIGNIFICANT CHANGE UP (ref 0–0.2)
BASOPHILS NFR BLD AUTO: 0.2 % — SIGNIFICANT CHANGE UP (ref 0–1)
BILIRUB SERPL-MCNC: 1 MG/DL — SIGNIFICANT CHANGE UP (ref 0.2–1.2)
BUN SERPL-MCNC: 61 MG/DL — CRITICAL HIGH (ref 10–20)
CALCIUM SERPL-MCNC: 9 MG/DL — SIGNIFICANT CHANGE UP (ref 8.5–10.1)
CHLORIDE SERPL-SCNC: 94 MMOL/L — LOW (ref 98–110)
CO2 SERPL-SCNC: 31 MMOL/L — SIGNIFICANT CHANGE UP (ref 17–32)
CREAT SERPL-MCNC: 1.5 MG/DL — SIGNIFICANT CHANGE UP (ref 0.7–1.5)
EOSINOPHIL # BLD AUTO: 0.1 K/UL — SIGNIFICANT CHANGE UP (ref 0–0.7)
EOSINOPHIL NFR BLD AUTO: 2 % — SIGNIFICANT CHANGE UP (ref 0–8)
GLUCOSE SERPL-MCNC: 107 MG/DL — HIGH (ref 70–99)
HCT VFR BLD CALC: 36.2 % — LOW (ref 42–52)
HGB BLD-MCNC: 11.5 G/DL — LOW (ref 14–18)
IMM GRANULOCYTES NFR BLD AUTO: 0.4 % — HIGH (ref 0.1–0.3)
INR BLD: 3.58 RATIO — HIGH (ref 0.65–1.3)
LYMPHOCYTES # BLD AUTO: 0.83 K/UL — LOW (ref 1.2–3.4)
LYMPHOCYTES # BLD AUTO: 16.4 % — LOW (ref 20.5–51.1)
MAGNESIUM SERPL-MCNC: 2.8 MG/DL — HIGH (ref 1.8–2.4)
MCHC RBC-ENTMCNC: 28.8 PG — SIGNIFICANT CHANGE UP (ref 27–31)
MCHC RBC-ENTMCNC: 31.8 G/DL — LOW (ref 32–37)
MCV RBC AUTO: 90.5 FL — SIGNIFICANT CHANGE UP (ref 80–94)
MONOCYTES # BLD AUTO: 0.59 K/UL — SIGNIFICANT CHANGE UP (ref 0.1–0.6)
MONOCYTES NFR BLD AUTO: 11.7 % — HIGH (ref 1.7–9.3)
NEUTROPHILS # BLD AUTO: 3.51 K/UL — SIGNIFICANT CHANGE UP (ref 1.4–6.5)
NEUTROPHILS NFR BLD AUTO: 69.3 % — SIGNIFICANT CHANGE UP (ref 42.2–75.2)
NRBC # BLD: 0 /100 WBCS — SIGNIFICANT CHANGE UP (ref 0–0)
PLATELET # BLD AUTO: 249 K/UL — SIGNIFICANT CHANGE UP (ref 130–400)
POTASSIUM SERPL-MCNC: 4.4 MMOL/L — SIGNIFICANT CHANGE UP (ref 3.5–5)
POTASSIUM SERPL-SCNC: 4.4 MMOL/L — SIGNIFICANT CHANGE UP (ref 3.5–5)
PROT SERPL-MCNC: 7.3 G/DL — SIGNIFICANT CHANGE UP (ref 6–8)
PROTHROM AB SERPL-ACNC: >40 SEC — HIGH (ref 9.95–12.87)
RBC # BLD: 4 M/UL — LOW (ref 4.7–6.1)
RBC # FLD: 20.7 % — HIGH (ref 11.5–14.5)
SARS-COV-2 RNA SPEC QL NAA+PROBE: SIGNIFICANT CHANGE UP
SODIUM SERPL-SCNC: 136 MMOL/L — SIGNIFICANT CHANGE UP (ref 135–146)
TROPONIN T SERPL-MCNC: 0.03 NG/ML — CRITICAL HIGH
WBC # BLD: 5.06 K/UL — SIGNIFICANT CHANGE UP (ref 4.8–10.8)
WBC # FLD AUTO: 5.06 K/UL — SIGNIFICANT CHANGE UP (ref 4.8–10.8)

## 2021-07-30 PROCEDURE — 93010 ELECTROCARDIOGRAM REPORT: CPT | Mod: 76

## 2021-07-30 PROCEDURE — 71045 X-RAY EXAM CHEST 1 VIEW: CPT | Mod: 26

## 2021-07-30 PROCEDURE — 99285 EMERGENCY DEPT VISIT HI MDM: CPT

## 2021-07-30 RX ORDER — OXYCODONE AND ACETAMINOPHEN 5; 325 MG/1; MG/1
1 TABLET ORAL ONCE
Refills: 0 | Status: DISCONTINUED | OUTPATIENT
Start: 2021-07-30 | End: 2021-07-30

## 2021-07-30 NOTE — ED ADULT NURSE NOTE - OBJECTIVE STATEMENT
pt states he is scheduled to have a pacemaker on monday, but at [facility] he was seen to be bradycardic to the 40s which was unusual for him. pt denies cp, sob, n/v

## 2021-07-30 NOTE — ED PROVIDER NOTE - OBJECTIVE STATEMENT
CISCO remy chaperone: no testicular enlargement, minimal L testicle ttp, questionable very minimal scrotal erythema, no inguinal hernias, No crepitus, firmness, induration, fluctuance. Skin is normal temp. No warmth. No obvious skin breaks.
81 y/o male with a PMH of CHF, afib on coumadin followed by cardio Dr. Scherer with loop recorder in place, DVT, HTN, and COPD on 2L presents to the ED from [facility] for evaluation of bradycardia. pt reports he is scheduled for a pacemaker due to bradycardia this monday by Dr. Colon and as per note from [facility] pt loop recorder was reading bradycardia so they thought it would be best if he was monitored in the hospital until pacemaker placed. As per pt he had been bradycardic for a while. pt denies dizziness, chest pain, sob, fever, chills, cough, weakness, visual changes, abdominal pain, n/v/d/c, weakness, numbness, or tingling.

## 2021-07-30 NOTE — ED PROVIDER NOTE - PHYSICAL EXAMINATION
Physical Exam    Vital Signs: I have reviewed the initial vital signs.  Constitutional: well-nourished, appears stated age, no acute distress  Eyes: Conjunctiva pink, Sclera clear,  Cardiovascular: (+) bradycardic, iregular rate, iregular rhythm, well-perfused extremities, radial pulses equal and 2+ b/l.   Respiratory: unlabored respiratory effort, clear to auscultation bilaterally no wheezing, rales and rhonchi. pt is speaking full sentences. no accessory muscle use.   Gastrointestinal: soft, non-tender, nondistended abdomen, no pulsatile mass, normal bowl sounds, no rebound, no guarding, no organomegaly.   Musculoskeletal: supple neck, no lower extremity edema, no calf tenderness, no midline tenderness, no palpable spinal step offs  Integumentary: warm, dry, no rash  Neurologic: awake, alert, cranial nerves II-XII grossly intact, extremities’ motor and sensory functions grossly intact.   Psychiatric: appropriate mood, appropriate affect

## 2021-07-30 NOTE — ED PROVIDER NOTE - PMH
Cellulitis of left lower extremity    CHF (congestive heart failure)    Chronic atrial fibrillation    COPD (chronic obstructive pulmonary disease)    Deep vein thrombosis (DVT) of left lower extremity, unspecified chronicity, unspecified vein    Hypertension    Mitral valve insufficiency, unspecified etiology  Moderate

## 2021-07-30 NOTE — ED PROVIDER NOTE - ATTENDING CONTRIBUTION TO CARE
Patient is transferred from NH for evaluation of bradycardia, patient is scheduled to pacemaker, denies any falls/injuries, denies f/c/n/v/abd pain.   +generalized weakness,   Vitals reviewed.   Lungs: CTA  abd: +BS, NT, ND, soft  A/P: Generalized weakness/bradycardia,   labs, EKG, CXR,   reevaluation.   consulted cardiology fellow on call and followed recommendations.

## 2021-07-31 LAB
ALBUMIN SERPL ELPH-MCNC: 3.6 G/DL — SIGNIFICANT CHANGE UP (ref 3.5–5.2)
ALP SERPL-CCNC: 110 U/L — SIGNIFICANT CHANGE UP (ref 30–115)
ALT FLD-CCNC: 17 U/L — SIGNIFICANT CHANGE UP (ref 0–41)
ANION GAP SERPL CALC-SCNC: 9 MMOL/L — SIGNIFICANT CHANGE UP (ref 7–14)
APTT BLD: 46.4 SEC — HIGH (ref 27–39.2)
AST SERPL-CCNC: 27 U/L — SIGNIFICANT CHANGE UP (ref 0–41)
BILIRUB SERPL-MCNC: 0.9 MG/DL — SIGNIFICANT CHANGE UP (ref 0.2–1.2)
BUN SERPL-MCNC: 54 MG/DL — HIGH (ref 10–20)
CALCIUM SERPL-MCNC: 8.7 MG/DL — SIGNIFICANT CHANGE UP (ref 8.5–10.1)
CHLORIDE SERPL-SCNC: 97 MMOL/L — LOW (ref 98–110)
CO2 SERPL-SCNC: 30 MMOL/L — SIGNIFICANT CHANGE UP (ref 17–32)
CREAT SERPL-MCNC: 1.4 MG/DL — SIGNIFICANT CHANGE UP (ref 0.7–1.5)
GLUCOSE SERPL-MCNC: 111 MG/DL — HIGH (ref 70–99)
HCT VFR BLD CALC: 36.3 % — LOW (ref 42–52)
HGB BLD-MCNC: 11.2 G/DL — LOW (ref 14–18)
INR BLD: 3.36 RATIO — HIGH (ref 0.65–1.3)
MAGNESIUM SERPL-MCNC: 2.6 MG/DL — HIGH (ref 1.8–2.4)
MCHC RBC-ENTMCNC: 27.9 PG — SIGNIFICANT CHANGE UP (ref 27–31)
MCHC RBC-ENTMCNC: 30.9 G/DL — LOW (ref 32–37)
MCV RBC AUTO: 90.5 FL — SIGNIFICANT CHANGE UP (ref 80–94)
NRBC # BLD: 0 /100 WBCS — SIGNIFICANT CHANGE UP (ref 0–0)
PLATELET # BLD AUTO: 244 K/UL — SIGNIFICANT CHANGE UP (ref 130–400)
POTASSIUM SERPL-MCNC: 3.9 MMOL/L — SIGNIFICANT CHANGE UP (ref 3.5–5)
POTASSIUM SERPL-SCNC: 3.9 MMOL/L — SIGNIFICANT CHANGE UP (ref 3.5–5)
PROT SERPL-MCNC: 7 G/DL — SIGNIFICANT CHANGE UP (ref 6–8)
PROTHROM AB SERPL-ACNC: 38.2 SEC — HIGH (ref 9.95–12.87)
RBC # BLD: 4.01 M/UL — LOW (ref 4.7–6.1)
RBC # FLD: 20.6 % — HIGH (ref 11.5–14.5)
SODIUM SERPL-SCNC: 136 MMOL/L — SIGNIFICANT CHANGE UP (ref 135–146)
TROPONIN T SERPL-MCNC: 0.02 NG/ML — HIGH
TROPONIN T SERPL-MCNC: 0.02 NG/ML — HIGH
WBC # BLD: 4.57 K/UL — LOW (ref 4.8–10.8)
WBC # FLD AUTO: 4.57 K/UL — LOW (ref 4.8–10.8)

## 2021-07-31 PROCEDURE — 99223 1ST HOSP IP/OBS HIGH 75: CPT

## 2021-07-31 RX ORDER — FUROSEMIDE 40 MG
40 TABLET ORAL DAILY
Refills: 0 | Status: DISCONTINUED | OUTPATIENT
Start: 2021-07-31 | End: 2021-08-05

## 2021-07-31 RX ORDER — OXYCODONE AND ACETAMINOPHEN 5; 325 MG/1; MG/1
1 TABLET ORAL EVERY 6 HOURS
Refills: 0 | Status: DISCONTINUED | OUTPATIENT
Start: 2021-07-31 | End: 2021-08-05

## 2021-07-31 RX ORDER — ATROPINE SULFATE 0.1 MG/ML
0.5 SYRINGE (ML) INJECTION ONCE
Refills: 0 | Status: COMPLETED | OUTPATIENT
Start: 2021-07-31 | End: 2021-07-31

## 2021-07-31 RX ORDER — ATROPINE SULFATE 0.1 MG/ML
5 SYRINGE (ML) INJECTION ONCE
Refills: 0 | Status: DISCONTINUED | OUTPATIENT
Start: 2021-07-31 | End: 2021-07-31

## 2021-07-31 RX ORDER — ALBUTEROL 90 UG/1
2 AEROSOL, METERED ORAL EVERY 6 HOURS
Refills: 0 | Status: DISCONTINUED | OUTPATIENT
Start: 2021-07-31 | End: 2021-08-05

## 2021-07-31 RX ORDER — HEPARIN SODIUM 5000 [USP'U]/ML
5000 INJECTION INTRAVENOUS; SUBCUTANEOUS EVERY 8 HOURS
Refills: 0 | Status: DISCONTINUED | OUTPATIENT
Start: 2021-07-31 | End: 2021-07-31

## 2021-07-31 RX ORDER — ASPIRIN/CALCIUM CARB/MAGNESIUM 324 MG
81 TABLET ORAL DAILY
Refills: 0 | Status: DISCONTINUED | OUTPATIENT
Start: 2021-07-31 | End: 2021-08-05

## 2021-07-31 RX ORDER — PANTOPRAZOLE SODIUM 20 MG/1
40 TABLET, DELAYED RELEASE ORAL DAILY
Refills: 0 | Status: DISCONTINUED | OUTPATIENT
Start: 2021-07-31 | End: 2021-08-01

## 2021-07-31 RX ORDER — ATORVASTATIN CALCIUM 80 MG/1
20 TABLET, FILM COATED ORAL AT BEDTIME
Refills: 0 | Status: DISCONTINUED | OUTPATIENT
Start: 2021-07-31 | End: 2021-08-05

## 2021-07-31 RX ORDER — BUDESONIDE AND FORMOTEROL FUMARATE DIHYDRATE 160; 4.5 UG/1; UG/1
2 AEROSOL RESPIRATORY (INHALATION)
Refills: 0 | Status: DISCONTINUED | OUTPATIENT
Start: 2021-07-31 | End: 2021-08-05

## 2021-07-31 RX ADMIN — OXYCODONE AND ACETAMINOPHEN 1 TABLET(S): 5; 325 TABLET ORAL at 22:15

## 2021-07-31 RX ADMIN — Medication 0.5 MILLIGRAM(S): at 05:19

## 2021-07-31 RX ADMIN — OXYCODONE AND ACETAMINOPHEN 1 TABLET(S): 5; 325 TABLET ORAL at 21:20

## 2021-07-31 RX ADMIN — Medication 0.5 MILLIGRAM(S): at 01:46

## 2021-07-31 RX ADMIN — Medication 40 MILLIGRAM(S): at 05:19

## 2021-07-31 RX ADMIN — ATORVASTATIN CALCIUM 20 MILLIGRAM(S): 80 TABLET, FILM COATED ORAL at 21:21

## 2021-07-31 RX ADMIN — PANTOPRAZOLE SODIUM 40 MILLIGRAM(S): 20 TABLET, DELAYED RELEASE ORAL at 12:05

## 2021-07-31 RX ADMIN — HEPARIN SODIUM 5000 UNIT(S): 5000 INJECTION INTRAVENOUS; SUBCUTANEOUS at 05:19

## 2021-07-31 RX ADMIN — Medication 81 MILLIGRAM(S): at 12:05

## 2021-07-31 RX ADMIN — OXYCODONE AND ACETAMINOPHEN 1 TABLET(S): 5; 325 TABLET ORAL at 00:07

## 2021-07-31 RX ADMIN — BUDESONIDE AND FORMOTEROL FUMARATE DIHYDRATE 2 PUFF(S): 160; 4.5 AEROSOL RESPIRATORY (INHALATION) at 19:57

## 2021-07-31 RX ADMIN — OXYCODONE AND ACETAMINOPHEN 1 TABLET(S): 5; 325 TABLET ORAL at 01:39

## 2021-07-31 NOTE — CONSULT NOTE ADULT - SUBJECTIVE AND OBJECTIVE BOX
Patient is a 80y old  Male who presents with a chief complaint of       HPI:  79 y/o male with a PMH of CHF, afib on coumadin followed by cardio Dr. Scherer with loop recorder in place, DVT, HTN, and COPD on 2L presents to the ED from [facility] for evaluation of bradycardia. pt reports he is scheduled for a pacemaker due to bradycardia this monday by Dr. Colon and as per note from [facility] pt loop recorder was reading bradycardia so they thought it would be best if he was monitored in the hospital until pacemaker placed. As per pt he had been bradycardic for a while. pt denies dizziness, chest pain, sob, fever, chills, cough, weakness, visual changes, abdominal pain, n/v/d/c, weakness, numbness, or tingling.    On admission: T(F): 97.9, HR: 51, BP: 131/61, RR: 18, SpO2: 97%. Trop 0.02, INR 3.58. EKG w/ afib and nonspecific ST abnormalities      (2021 00:12)      Electrophysiology:  79 y/o male with PMH CAD s/p PCI and stent , COPD on 2L home O2, HFpEF TTE in  EF 55-65, DLD, atrial fibrillation wit slow VR on coumadin, frequent PVC and short NSVT, was seen earlier this month due to slow AF. At that time medications were admisted and HR improved. It was decided not to proceed with PPM at that time. Implantable loop recorder was placed instead to monitor for pauses.  Patient was discharged recently and scheduled to follow up , however, office was notified by remote monitoring about multipel nocturnal pauses >4sec, bradycardia to 30s bpm.  Patient was advised to be admitted to the hospital for PPM placment  Patient is asymptomatic. Patient is bedridden and does not ambulate     REVIEW OF SYSTEMS    [ x] A ten-point review of systems was otherwise negative except as noted.  [ ] Due to altered mental status/intubation, subjective information were not able to be obtained from the patient. History was obtained, to the extent possible, from review of the chart and collateral sources of information.      PAST MEDICAL & SURGICAL HISTORY:  COPD (chronic obstructive pulmonary disease)    Hypertension    Deep vein thrombosis (DVT) of left lower extremity, unspecified chronicity, unspecified vein    Cellulitis of left lower extremity    Chronic atrial fibrillation    Mitral valve insufficiency, unspecified etiology  Moderate    CHF (congestive heart failure)    S/P coronary artery stent placement    History of total right knee replacement        Home Medications:  atorvastatin 20 mg oral tablet: 1 tab(s) orally once a day (2021 14:49)  nystatin 100,000 units/g topical powder: 1 application topically every 12 hours (2021 12:26)  polyethylene glycol 3350 oral powder for reconstitution: 17 gram(s) orally once a day (at bedtime) (2021 12:26)  spironolactone: 12.5 milligram(s) orally once a day (2021 15:20)  Symbicort 160 mcg-4.5 mcg/inh inhalation aerosol: 2 puff(s) inhaled 2 times a day (2021 14:49)  Ventolin HFA 90 mcg/inh inhalation aerosol: 2 puff(s) inhaled every 6 hours as neede for shortness of breath (2021 14:49)  warfarin 2 mg oral tablet: Take half a tablet Sun, Tues, Wed, Thurs, Fri, Sat.  Take a whole tablet on Mon (2021 14:49)      Allergies:  No Known Allergies      FAMILY HISTORY:      SOCIAL HISTORY:    CIGARETTES: Former smoker, quit about 10 years ago. Smoked 1 pack per day  prior to quitting.  ALCOHOL: denies        PREVIOUS DIAGNOSTIC TESTING:      ECHO  FINDINGS:  < from: TTE Echo Complete w/o Contrast w/ Doppler (21 @ 09:57) >  Summary:   1. Left ventricular ejection fraction, by visual estimation, is 50 to 55%.   2. Normal global left ventricular systolic function.   3. Moderate to severe left atrial enlargement.   4. Mildly enlarged right ventricle.   5. Moderately reduced RV systolic function.   6. Moderately enlarged right atrium.   7. Moderate mitral valve regurgitation.   8. Moderate-severe tricuspid regurgitation.   9. Mild pulmonic valve regurgitation.  10. Estimated pulmonary artery systolic pressure is 59.7 mmHg assuming a right atrial pressure of 10 mmHg, which is consistent with moderate pulmonary hypertension.  11. There is mild aortic root calcification.    < end of copied text >      VENOUS DUPLEX SCAN:  FINDINGS:  < from: VA Duplex Lower Ext Vein Scan, Bilat (07.15.21 @ 15:00) >  IMPRESSION:  No evidence of deep venous thrombosis in the left lower extremity    < end of copied text >    CHEST CT :  FINDINGS:  < from: CT Chest No Cont (21 @ 18:10) >  IMPRESSION:  Interlobular septal thickening. Right lung groundglass opacities and bilateral lower lung predominant consolidation/atelectasis. Small right and moderate left pleural effusions. Findings compatible with pulmonary edema.    No definite mass is identified. However partial obscuration of the bilateral lower lobes as described as well as incomplete evaluation of the bilateral ronel on noncontrast CT chest. Follow-up is recommended as clinically warranted.    < end of copied text >        MEDICATIONS  (STANDING):  aspirin  chewable 81 milliGRAM(s) Oral daily  atorvastatin 20 milliGRAM(s) Oral at bedtime  budesonide 160 MICROgram(s)/formoterol 4.5 MICROgram(s) Inhaler 2 Puff(s) Inhalation two times a day  furosemide    Tablet 40 milliGRAM(s) Oral daily  heparin   Injectable 5000 Unit(s) SubCutaneous every 8 hours  pantoprazole  Injectable 40 milliGRAM(s) IV Push daily    MEDICATIONS  (PRN):  ALBUTerol    90 MICROgram(s) HFA Inhaler 2 Puff(s) Inhalation every 6 hours PRN Shortness of Breath and/or Wheezing      Vital Signs Last 24 Hrs  T(C): 35.8 (2021 14:14), Max: 36.6 (2021 23:54)  T(F): 96.4 (2021 14:14), Max: 97.9 (2021 23:54)  HR: 45 (2021 14:14) (43 - 51)  BP: 114/56 (2021 14:14) (114/56 - 140/56)  BP(mean): --  RR: 18 (2021 14:14) (17 - 18)  SpO2: 99% (2021 05:07) (97% - 100%)    PHYSICAL EXAM:    GENERAL: In no apparent distress, well nourished, and hydrated.  HEAD:  Atraumatic, Normocephalic  EYES: EOMI, PERRLA, conjunctiva and sclera clear  NECK: Supple and normal thyroid.  No JVD or carotid bruit.  Carotid pulse is 2+ bilaterally.  HEART: slow, IRRegular rate and rhythm; No murmurs, rubs, or gallops.  PULMONARY: Clear to auscultation and perfusion.  No rales, wheezing, or rhonchi bilaterally.  ABDOMEN: Soft, Nontender, Nondistended; Bowel sounds present  EXTREMITIES:  2+ Peripheral Pulses, No clubbing, cyanosis, or edema  NEUROLOGICAL: Grossly nonfocal    I&O's Detail    2021 07:01  -  2021 07:00  --------------------------------------------------------  IN:    Oral Fluid: 150 mL  Total IN: 150 mL    OUT:    Voided (mL): 200 mL  Total OUT: 200 mL    Total NET: -50 mL      2021 07:01  -  2021 15:38  --------------------------------------------------------  IN:  Total IN: 0 mL    OUT:    Voided (mL): 600 mL  Total OUT: 600 mL    Total NET: -600 mL        Daily Height in cm: 190.5 (2021 19:04)    Daily     INTERPRETATION OF TELEMETRY:    EC Lead ECG:   Ventricular Rate 45 BPM    Atrial Rate 67 BPM    QRS Duration 92 ms    Q-T Interval 486 ms    QTC Calculation(Bazett) 420 ms    R Axis -22 degrees    T Axis -5 degrees    Diagnosis Line Atrial fibrillation with frequent Premature ventricular complexes  Nonspecific ST abnormality  Abnormal ECG    Confirmed by JOSEPH CHUN MD ([id number]) on 2021 11:12:03 PM (21 @ 20:20)  12 Lead ECG:   Ventricular Rate 50 BPM    QRS Duration 96 ms    Q-T Interval 452 ms    QTC Calculation(Bazett) 412 ms    R Axis -17 degrees    T Axis -27 degrees    Diagnosis Line Atrial fibrillation with slow ventricular response with premature ventricular  or aberrantly conducted complexes  Incomplete right bundle branch block  Anterior infarct , age undetermined  Abnormal ECG    Confirmed by JOSEPH CHUN MD [id number]) on 2021 11:11:32 PM (21 @ 19:04)        LABS:                        11.2   4.57  )-----------( 244      ( 2021 09:05 )             36.3         136  |  97<L>  |  54<H>  ----------------------------<  111<H>  3.9   |  30  |  1.4    Ca    8.7      2021 09:05  Mg     2.6         TPro  7.0  /  Alb  3.6  /  TBili  0.9  /  DBili  x   /  AST  27  /  ALT  17  /  AlkPhos  110      CARDIAC MARKERS ( 2021 11:00 )  x     / 0.02 ng/mL / x     / x     / x      CARDIAC MARKERS ( 2021 09:05 )  x     / 0.02 ng/mL / x     / x     / x      CARDIAC MARKERS ( 2021 20:38 )  x     / 0.03 ng/mL / x     / x     / x          PT/INR - ( 2021 09:05 )   PT: 38.20 sec;   INR: 3.36 ratio         PTT - ( 2021 09:05 )  PTT:46.4 sec    BNP    COVID-19 PCR: NotDetec:  (21 @ 20:37)            RADIOLOGY & ADDITIONAL STUDIES:      < from: Xray Chest 1 View AP/PA (21 @ 22:25) >  FINDINGS:    Support devices: Left chest wall loop recorder.    Cardiac/mediastinum/hilum: Stable cardiomegaly.    Lung parenchyma/Pleura: Improved bilateral opacities/effusions. There is no pneumothorax.    Skeleton/soft tissues: Stable.    IMPRESSION:    Improved bilateral opacities/effusions.    < end of copied text >

## 2021-07-31 NOTE — H&P ADULT - NSHPLABSRESULTS_GEN_ALL_CORE
T(C): 36.6 (30 Jul 2021 23:54), Max: 36.6 (30 Jul 2021 23:54)  T(F): 97.9 (30 Jul 2021 23:54), Max: 97.9 (30 Jul 2021 23:54)  HR: 51 (30 Jul 2021 23:54) (43 - 51)  BP: 131/61 (30 Jul 2021 23:54) (131/61 - 140/56)  BP(mean): --  RR: 18 (30 Jul 2021 23:54) (18 - 18)  SpO2: 97% (30 Jul 2021 23:54) (97% - 99%)    LABS:                        11.5   5.06  )-----------( 249      ( 30 Jul 2021 20:38 )             36.2     07-30    136  |  94<L>  |  61<HH>  ----------------------------<  107<H>  4.4   |  31  |  1.5    Ca    9.0      30 Jul 2021 20:38  Mg     2.8     07-30    TPro  7.3  /  Alb  3.6  /  TBili  1.0  /  DBili  x   /  AST  30  /  ALT  17  /  AlkPhos  118<H>  07-30    PT/INR - ( 30 Jul 2021 21:20 )   PT: >40.00 sec;   INR: 3.58 ratio         PTT - ( 30 Jul 2021 21:20 )  PTT:48.7 sec      Troponin T, Serum: 0.03 ng/mL *HH* (07-30-21 @ 20:38)      CARDIAC MARKERS ( 30 Jul 2021 20:38 )  x     / 0.03 ng/mL / x     / x     / x

## 2021-07-31 NOTE — H&P ADULT - ASSESSMENT
79 y/o male with PMH CAD s/p PCI and stent 2007, HFpEF TTE in 7/17/2021 w/ EF 55-65, atrial fibrillation on coumadin, s/p ILR followed by Dr. Scherer, DVT, HTN, DLD and COPD on 2L presents to the ED from [facility] for evaluation of bradycardia.       #bradycardia  - pt asx, HR 51 on admission  - Of noted pt was bradycardiac on previous admission. During that admission he was seen by cardiology, S/P ILR was asked to f/u with EP as outpt. Was scheduled for a pacemaker on Monday by Dr. Colon   - hold AVN blocking agents   - tele monitoring  - keep pads and atropine at bedside   - EP on Monday       #HFpEF (stable)  #CAD s/p PCI and stent 2007  - recent admission on 07/07/2021 due to HF exacerbation   - TTE (7/11/2021) w/ EF 50-55%. Moderate to severe left atrial enlargement. Moderate mitral valve regurgitation. Moderate-severe tricuspid regurgitation.  Estimated pulmonary artery systolic pressure is 59.7 mmHg assuming a right atrial pressure of 10 mmHg, which is consistent with moderate pulmonary hypertension.  - c/w lasix 40 mg po daily   - monitor I/Os     #CAD s/p PCI and stent in 2007  -#DLD  - c/w statin and ASA       #Afib   #supratheraputic INR  - on coumadin   - INR 3.58  - f/u daily INR  - hold coumadin     # COPD not in exacerbation  - c/w 2 L NC (on 2L at home during night)  - c/w Symbicort and ventolin as needed      #CKD 3b  - Cr 1.5 (baseline Cr 1.2-1.6)  - trend cr    #Misc  - DVT Prophylaxis: heparin subq 5000 tid   - Diet: DASH  - GI Prophylaxis: protonix  - Activity: as tolerated   - IV Fluids:  - Code Status: full code

## 2021-07-31 NOTE — PATIENT PROFILE ADULT - MEDICATIONS/VISITS
Patient last seen by Dr. Keen on 03/27/2017.  Patient scheduled to see Dr. Keen on 04/09/2018 for annual follow up.   Per verbal order from Dr. Keen, patient's Atorvastatin 10 MG TAB PO daily was reordered (90 tablets, 3 refills).    Rx refill authorization e-prescribed to patient's pharmacy.      
no

## 2021-07-31 NOTE — CONSULT NOTE ADULT - ASSESSMENT
Cardis: Dr Arnulfo Scherer  EP: Dr Colon    79 y/o male with PMH CAD s/p PCI and stent 2007, COPD on 2L home O2, HFpEF TTE in 4/21 EF 55-65, DLD, atrial fibrillation wit slow VR on coumadin, recent admission for HF exacerbation. Meds adjustment at that time with improvement of VR.  Now with bradycardia and multiple pauses on ILR  COVID neg 7.30      Chronic AF (coumadin) with Slow Ventricular Response and pauses on ILR  CHADS VASc of at least 4 (CHF, Age > 75, CAD) on Coumadin  NSVT on tele  HFpEF  CAD sp PCI  COPD on 2L O2  HLD    con't tele  will plan Micra PPM on Mon-Tue, pending shceduling  con't Coumadin, adjust as needed, goal INR for procedure 2.3-2.6  **cannot be bridged post PPM placement  Maintain electrolytes K>4.0 Mg >2.0  will follow

## 2021-07-31 NOTE — H&P ADULT - ATTENDING COMMENTS
h80 YO M with a PMH of obesity, CAD s/p PCI and stent, COPD (2L home O2), HFpEF, DLD, and chronic Afib atrial (coumadin)    **Incomplete Note 79 YO M with a PMH of obesity, CKD3, CAD s/p PCI and stent, COPD (2L home O2), HFpEF, DLD, and chronic Afib (coumadin) who was asked to present to the hospital by his cardiologist (SLADE George) because his loop recorder showed the pt having multiple episodes of Afib w/ junctional beats at a HR in the 30's. The pt denies any symptoms of CP, SOB, palpitations, cough, fevers/chills, LE swelling, ABD pain, or dizziness. ROS isx negative except as above.     In the ED, troponin elevated to .03 with 81 YO M with a PMH of obesity, CKD3, CAD s/p PCI and stent, COPD (2L home O2), HFpEF, DLD, and chronic Afib (coumadin) who was asked to present to the hospital by his cardiologist (Doris) because his loop recorder showed the pt having multiple episodes of Afib w/ junctional beats at a HR in the 30's. The pt denies any symptoms of CP, SOB, palpitations, cough, fevers/chills, LE swelling, ABD pain, or dizziness. ROS isx negative except as above.     In the ED, troponin elevated to .03 with no ischemic changes. Pt placed on tele in the ED.     FMHx: Reviewed, not relevant    Physical exam shows obese pt in NAD. HR (40's), afebrile, not hypoxic on RA. A&Ox3. Non-focal neuro exam. Muscle strength/sensation intact. CTA B/L with no W/C/R. Bradycardic, no M/G/R. ABD is soft and non-tender, normoactive BSs. LEs without swelling. No rashes. Labs and radiology as above.     Afib with bradycardia, asymptomatic. Cardio asked pt to come into hospital for PPM. Asking for EP eval. Plan is to go to OR on Monday (8/2). Tele. Serial cardiac enzymes and EKGs. Echo. TSH. Hold AVN blocking agents.     Normocytic anemia, above baseline. Pt denies bleeding symptoms. Replace as necessary.     Hx of obesity, CKD3, CAD s/p PCI and stent, COPD (2L home O2), HFpEF, DLD, and chronic Afib (coumadin). Restart home meds, except as stated above. DVT PPX. Inform PCP of pt's admission to hospital. My note supersedes the residents note.

## 2021-07-31 NOTE — H&P ADULT - HISTORY OF PRESENT ILLNESS
81 y/o male with a PMH of CHF, afib on coumadin followed by cardio Dr. Scherer with loop recorder in place, DVT, HTN, and COPD on 2L presents to the ED from [facility] for evaluation of bradycardia. pt reports he is scheduled for a pacemaker due to bradycardia this monday by Dr. Colon and as per note from [facility] pt loop recorder was reading bradycardia so they thought it would be best if he was monitored in the hospital until pacemaker placed. As per pt he had been bradycardic for a while. pt denies dizziness, chest pain, sob, fever, chills, cough, weakness, visual changes, abdominal pain, n/v/d/c, weakness, numbness, or tingling.    On admission: T(F): 97.9, HR: 51, BP: 131/61, RR: 18, SpO2: 97%. Trop 0.02, INR 3.58. EKG w/ afib and nonspecific ST abnormalities

## 2021-08-01 LAB
APPEARANCE UR: CLEAR — SIGNIFICANT CHANGE UP
BILIRUB UR-MCNC: NEGATIVE — SIGNIFICANT CHANGE UP
COLOR SPEC: YELLOW — SIGNIFICANT CHANGE UP
COVID-19 SPIKE DOMAIN AB INTERP: POSITIVE
COVID-19 SPIKE DOMAIN ANTIBODY RESULT: >250 U/ML — HIGH
DIFF PNL FLD: NEGATIVE — SIGNIFICANT CHANGE UP
GLUCOSE UR QL: NEGATIVE — SIGNIFICANT CHANGE UP
INR BLD: 2.69 RATIO — HIGH (ref 0.65–1.3)
KETONES UR-MCNC: NEGATIVE — SIGNIFICANT CHANGE UP
LEUKOCYTE ESTERASE UR-ACNC: NEGATIVE — SIGNIFICANT CHANGE UP
NITRITE UR-MCNC: NEGATIVE — SIGNIFICANT CHANGE UP
PH UR: 6.5 — SIGNIFICANT CHANGE UP (ref 5–8)
PROT UR-MCNC: NEGATIVE — SIGNIFICANT CHANGE UP
PROTHROM AB SERPL-ACNC: 30.6 SEC — HIGH (ref 9.95–12.87)
SARS-COV-2 IGG+IGM SERPL QL IA: >250 U/ML — HIGH
SARS-COV-2 IGG+IGM SERPL QL IA: POSITIVE
SP GR SPEC: 1.01 — SIGNIFICANT CHANGE UP (ref 1.01–1.03)
UROBILINOGEN FLD QL: ABNORMAL

## 2021-08-01 PROCEDURE — 99233 SBSQ HOSP IP/OBS HIGH 50: CPT

## 2021-08-01 RX ORDER — LANOLIN ALCOHOL/MO/W.PET/CERES
5 CREAM (GRAM) TOPICAL AT BEDTIME
Refills: 0 | Status: DISCONTINUED | OUTPATIENT
Start: 2021-08-01 | End: 2021-08-05

## 2021-08-01 RX ORDER — WARFARIN SODIUM 2.5 MG/1
1 TABLET ORAL ONCE
Refills: 0 | Status: COMPLETED | OUTPATIENT
Start: 2021-08-01 | End: 2021-08-01

## 2021-08-01 RX ORDER — PANTOPRAZOLE SODIUM 20 MG/1
40 TABLET, DELAYED RELEASE ORAL
Refills: 0 | Status: DISCONTINUED | OUTPATIENT
Start: 2021-08-01 | End: 2021-08-05

## 2021-08-01 RX ADMIN — OXYCODONE AND ACETAMINOPHEN 1 TABLET(S): 5; 325 TABLET ORAL at 18:49

## 2021-08-01 RX ADMIN — ATORVASTATIN CALCIUM 20 MILLIGRAM(S): 80 TABLET, FILM COATED ORAL at 21:19

## 2021-08-01 RX ADMIN — Medication 5 MILLIGRAM(S): at 21:19

## 2021-08-01 RX ADMIN — BUDESONIDE AND FORMOTEROL FUMARATE DIHYDRATE 2 PUFF(S): 160; 4.5 AEROSOL RESPIRATORY (INHALATION) at 20:04

## 2021-08-01 RX ADMIN — WARFARIN SODIUM 1 MILLIGRAM(S): 2.5 TABLET ORAL at 21:18

## 2021-08-01 RX ADMIN — Medication 81 MILLIGRAM(S): at 11:40

## 2021-08-01 RX ADMIN — Medication 40 MILLIGRAM(S): at 05:38

## 2021-08-01 RX ADMIN — OXYCODONE AND ACETAMINOPHEN 1 TABLET(S): 5; 325 TABLET ORAL at 19:45

## 2021-08-01 NOTE — PROGRESS NOTE ADULT - SUBJECTIVE AND OBJECTIVE BOX
SUBJECTIVE:    Patient is a 80y old Male who presents with a chief complaint of   Currently admitted to medicine with the primary diagnosis of Bradycardia  Today is hospital day 2d. This morning he is resting comfortably in bed and reports no new issues.  Overnight events: several episodes of pause lasting 2-3 sec    PAST MEDICAL & SURGICAL HISTORY  COPD (chronic obstructive pulmonary disease)    Hypertension    Deep vein thrombosis (DVT) of left lower extremity, unspecified chronicity, unspecified vein    Cellulitis of left lower extremity    Chronic atrial fibrillation    Mitral valve insufficiency, unspecified etiology  Moderate    CHF (congestive heart failure)    S/P coronary artery stent placement    History of total right knee replacement    ALLERGIES:  No Known Allergies    MEDICATIONS:  STANDING MEDICATIONS  aspirin  chewable 81 milliGRAM(s) Oral daily  atorvastatin 20 milliGRAM(s) Oral at bedtime  budesonide 160 MICROgram(s)/formoterol 4.5 MICROgram(s) Inhaler 2 Puff(s) Inhalation two times a day  furosemide    Tablet 40 milliGRAM(s) Oral daily  pantoprazole  Injectable 40 milliGRAM(s) IV Push daily    PRN MEDICATIONS  ALBUTerol    90 MICROgram(s) HFA Inhaler 2 Puff(s) Inhalation every 6 hours PRN  oxycodone    5 mG/acetaminophen 325 mG 1 Tablet(s) Oral every 6 hours PRN    VITALS:   T(F): 97.5  HR: 42  BP: 124/56  RR: 18  SpO2: 98%    LABS:                        11.2   4.57  )-----------( 244      ( 31 Jul 2021 09:05 )             36.3     07-31    136  |  97<L>  |  54<H>  ----------------------------<  111<H>  3.9   |  30  |  1.4    Ca    8.7      31 Jul 2021 09:05  Mg     2.6     07-31    TPro  7.0  /  Alb  3.6  /  TBili  0.9  /  DBili  x   /  AST  27  /  ALT  17  /  AlkPhos  110  07-31    PT/INR - ( 01 Aug 2021 07:36 )   PT: 30.60 sec;   INR: 2.69 ratio         PTT - ( 31 Jul 2021 09:05 )  PTT:46.4 sec      Troponin T, Serum: 0.02 ng/mL *H* (07-31-21 @ 11:00)      CARDIAC MARKERS ( 31 Jul 2021 11:00 )  x     / 0.02 ng/mL / x     / x     / x      CARDIAC MARKERS ( 31 Jul 2021 09:05 )  x     / 0.02 ng/mL / x     / x     / x      CARDIAC MARKERS ( 30 Jul 2021 20:38 )  x     / 0.03 ng/mL / x     / x     / x          RADIOLOGY:  < from: Xray Chest 1 View AP/PA (07.30.21 @ 22:25) >  IMPRESSION:    Improved bilateral opacities/effusions.    < end of copied text >    PHYSICAL EXAM:  GEN: No acute distress  LUNGS: Clear to auscultation bilaterally, no wheezes, rales, rhonchi  HEART: bradycardic, +s1 s2  ABD: Soft, non-tender, non-distended.   EXT: trace LE edema/2+PP/SAPP/Skin Intact.   NEURO: AAOX3, no focal deficits       SUBJECTIVE:    Patient is a 80y old Male who presents with a chief complaint of   Currently admitted to medicine with the primary diagnosis of Bradycardia  Today is hospital day 2d. This morning he is resting comfortably in bed and reports no new issues.  Overnight events: several episodes of pause lasting 2-3 sec    Attending Note: Pt seen and examined at bedside. No cp or sob.  Tele events as above.     PAST MEDICAL & SURGICAL HISTORY  COPD (chronic obstructive pulmonary disease)    Hypertension    Deep vein thrombosis (DVT) of left lower extremity, unspecified chronicity, unspecified vein    Cellulitis of left lower extremity    Chronic atrial fibrillation    Mitral valve insufficiency, unspecified etiology  Moderate    CHF (congestive heart failure)    S/P coronary artery stent placement    History of total right knee replacement    ALLERGIES:  No Known Allergies    MEDICATIONS:  STANDING MEDICATIONS  aspirin  chewable 81 milliGRAM(s) Oral daily  atorvastatin 20 milliGRAM(s) Oral at bedtime  budesonide 160 MICROgram(s)/formoterol 4.5 MICROgram(s) Inhaler 2 Puff(s) Inhalation two times a day  furosemide    Tablet 40 milliGRAM(s) Oral daily  pantoprazole  Injectable 40 milliGRAM(s) IV Push daily    PRN MEDICATIONS  ALBUTerol    90 MICROgram(s) HFA Inhaler 2 Puff(s) Inhalation every 6 hours PRN  oxycodone    5 mG/acetaminophen 325 mG 1 Tablet(s) Oral every 6 hours PRN    VITALS:   T(F): 97.5  HR: 42  BP: 124/56  RR: 18  SpO2: 98%    LABS:                        11.2   4.57  )-----------( 244      ( 31 Jul 2021 09:05 )             36.3     07-31    136  |  97<L>  |  54<H>  ----------------------------<  111<H>  3.9   |  30  |  1.4    Ca    8.7      31 Jul 2021 09:05  Mg     2.6     07-31    TPro  7.0  /  Alb  3.6  /  TBili  0.9  /  DBili  x   /  AST  27  /  ALT  17  /  AlkPhos  110  07-31    PT/INR - ( 01 Aug 2021 07:36 )   PT: 30.60 sec;   INR: 2.69 ratio         PTT - ( 31 Jul 2021 09:05 )  PTT:46.4 sec      Troponin T, Serum: 0.02 ng/mL *H* (07-31-21 @ 11:00)      CARDIAC MARKERS ( 31 Jul 2021 11:00 )  x     / 0.02 ng/mL / x     / x     / x      CARDIAC MARKERS ( 31 Jul 2021 09:05 )  x     / 0.02 ng/mL / x     / x     / x      CARDIAC MARKERS ( 30 Jul 2021 20:38 )  x     / 0.03 ng/mL / x     / x     / x          RADIOLOGY:  < from: Xray Chest 1 View AP/PA (07.30.21 @ 22:25) >  IMPRESSION:    Improved bilateral opacities/effusions.    < end of copied text >    PHYSICAL EXAM:  GEN: No acute distress  LUNGS: Clear to auscultation bilaterally, no wheezes, rales, rhonchi  HEART: bradycardic, +s1 s2  ABD: Soft, non-tender, non-distended.   EXT: trace LE edema/2+PP/SAPP/Skin Intact.   NEURO: AAOX3, no focal deficits

## 2021-08-01 NOTE — PROGRESS NOTE ADULT - ASSESSMENT
81 y/o male with PMH CAD s/p PCI and stent 2007, HFpEF TTE in 7/17/2021 w/ EF 55-65, atrial fibrillation on coumadin, s/p ILR followed by Dr. Scherer, DVT, HTN, DLD and COPD on 2L presents to the ED from [facility] for evaluation of bradycardia.     #bradycardia  - pt asx, HR 51 on admission  - Of noted pt was bradycardiac on previous admission. During that admission he was seen by cardiology, S/P ILR was asked to f/u with EP as outpt. Was scheduled for a pacemaker on Monday by Dr. Colon   - hold AVN blocking agents   - tele monitoring  - overnight: several episodes of pause lasting 2-3 sec  - keep pads and atropine at bedside   - EP cs appreciated: pt is planned for Micra PPM on Monday     #HFpEF (stable)  #CAD s/p PCI and stent 2007  - recent admission on 07/07/2021 due to HF exacerbation   - TTE (7/11/2021) w/ EF 50-55%. Moderate to severe left atrial enlargement. Moderate mitral valve regurgitation. Moderate-severe tricuspid regurgitation.  Estimated pulmonary artery systolic pressure is 59.7 mmHg assuming a right atrial pressure of 10 mmHg, which is consistent with moderate pulmonary hypertension.  - c/w lasix 40 mg po daily   - monitor I/Os     #CAD s/p PCI and stent in 2007  #DLD  - c/w statin and ASA     #Afib   #supratheraputic INR on admission  - on coumadin   - INR 3.58 on admission  - INR 2.69 today  - f/u daily INR  - Spoke to EP, ok to receive 1mg coumadin tonight    # COPD not in exacerbation  - c/w 2 L NC (on 2L at home during night)  - c/w Symbicort and ventolin as needed    #CKD 3b  - Cr 1.5>1.4 (baseline Cr 1.2-1.6)  - trend Cr    #Misc  - DVT Prophylaxis: on coumadin  - Diet: DASH, NPO after midnight for PPM  - GI Prophylaxis: protonix  - Activity: as tolerated   - Code Status: full code   81 y/o male with PMH CAD s/p PCI and stent 2007, HFpEF TTE in 7/17/2021 w/ EF 55-65, atrial fibrillation on coumadin, s/p ILR followed by Dr. Scherer, DVT, HTN, DLD and COPD on 2L presents to the ED from [facility] for evaluation of bradycardia.     #bradycardia  - pt asx, HR 51 on admission  - Of noted pt was bradycardiac on previous admission. During that admission he was seen by cardiology, S/P ILR was asked to f/u with EP as outpt. Was scheduled for a pacemaker on Monday by Dr. Colon   - hold AVN blocking agents   - tele monitoring  - overnight: several episodes of pause lasting 2-3 sec  - keep pads and atropine at bedside   - EP cs appreciated: pt is planned for  PPM on Monday, EP follow up today     #HFpEF (stable)  #CAD s/p PCI and stent 2007  - recent admission on 07/07/2021 due to HF exacerbation   - TTE (7/11/2021) w/ EF 50-55%. Moderate to severe left atrial enlargement. Moderate mitral valve regurgitation. Moderate-severe tricuspid regurgitation.  Estimated pulmonary artery systolic pressure is 59.7 mmHg assuming a right atrial pressure of 10 mmHg, which is consistent with moderate pulmonary hypertension.  - c/w lasix 40 mg po daily   - monitor I/Os     #CAD s/p PCI and stent in 2007  #DLD  - c/w statin and ASA     #Afib   #supratheraputic INR on admission  - on coumadin   - INR 3.58 on admission  - INR 2.69 today  - f/u daily INR  - Spoke to EP, ok to receive 1mg coumadin tonight    # COPD not in exacerbation  - c/w 2 L NC (on 2L at home during night)  - c/w Symbicort and ventolin as needed    #CKD 3b  - Cr 1.5>1.4 (baseline Cr 1.2-1.6)  - trend Cr    #Misc  - DVT Prophylaxis: on coumadin  - Diet: DASH, NPO after midnight for PPM  - GI Prophylaxis: protonix  - Activity: as tolerated   - Code Status: full code

## 2021-08-01 NOTE — CHART NOTE - NSCHARTNOTEFT_GEN_A_CORE
Electrophysiology PA Note     for Micra pacemaker placement tomorrow  NPO after MN  INR 2.69 restart Coumadin 1mg (home dose) tonight  Anestesia consult for pre-op eval  UA pending    LABS:  CAPILLARY BLOOD GLUCOSE                              11.2   4.57  )-----------( 244      ( 31 Jul 2021 09:05 )             36.3       07-31    136  |  97<L>  |  54<H>  ----------------------------<  111<H>  3.9   |  30  |  1.4    Ca    8.7      31 Jul 2021 09:05  Mg     2.6     07-31    TPro  7.0  /  Alb  3.6  /  TBili  0.9  /  DBili  x   /  AST  27  /  ALT  17  /  AlkPhos  110  07-31      PT/INR - ( 01 Aug 2021 07:36 )   PT: 30.60 sec;   INR: 2.69 ratio         PTT - ( 31 Jul 2021 09:05 )  PTT:46.4 sec  CARDIAC MARKERS ( 31 Jul 2021 11:00 )  x     / 0.02 ng/mL / x     / x     / x      CARDIAC MARKERS ( 31 Jul 2021 09:05 )  x     / 0.02 ng/mL / x     / x     / x      CARDIAC MARKERS ( 30 Jul 2021 20:38 )  x     / 0.03 ng/mL / x     / x     / x

## 2021-08-02 LAB
ALBUMIN SERPL ELPH-MCNC: 3.4 G/DL — LOW (ref 3.5–5.2)
ALP SERPL-CCNC: 117 U/L — HIGH (ref 30–115)
ALT FLD-CCNC: 20 U/L — SIGNIFICANT CHANGE UP (ref 0–41)
ANION GAP SERPL CALC-SCNC: 8 MMOL/L — SIGNIFICANT CHANGE UP (ref 7–14)
APTT BLD: 36 SEC — SIGNIFICANT CHANGE UP (ref 27–39.2)
AST SERPL-CCNC: 29 U/L — SIGNIFICANT CHANGE UP (ref 0–41)
BASOPHILS # BLD AUTO: 0.01 K/UL — SIGNIFICANT CHANGE UP (ref 0–0.2)
BASOPHILS NFR BLD AUTO: 0.2 % — SIGNIFICANT CHANGE UP (ref 0–1)
BILIRUB SERPL-MCNC: 1.1 MG/DL — SIGNIFICANT CHANGE UP (ref 0.2–1.2)
BUN SERPL-MCNC: 43 MG/DL — HIGH (ref 10–20)
CALCIUM SERPL-MCNC: 8.7 MG/DL — SIGNIFICANT CHANGE UP (ref 8.5–10.1)
CHLORIDE SERPL-SCNC: 97 MMOL/L — LOW (ref 98–110)
CO2 SERPL-SCNC: 29 MMOL/L — SIGNIFICANT CHANGE UP (ref 17–32)
CREAT SERPL-MCNC: 1.4 MG/DL — SIGNIFICANT CHANGE UP (ref 0.7–1.5)
EOSINOPHIL # BLD AUTO: 0.08 K/UL — SIGNIFICANT CHANGE UP (ref 0–0.7)
EOSINOPHIL NFR BLD AUTO: 1.4 % — SIGNIFICANT CHANGE UP (ref 0–8)
GLUCOSE SERPL-MCNC: 87 MG/DL — SIGNIFICANT CHANGE UP (ref 70–99)
HCT VFR BLD CALC: 36.2 % — LOW (ref 42–52)
HGB BLD-MCNC: 11.5 G/DL — LOW (ref 14–18)
IMM GRANULOCYTES NFR BLD AUTO: 0.4 % — HIGH (ref 0.1–0.3)
INR BLD: 1.95 RATIO — HIGH (ref 0.65–1.3)
LYMPHOCYTES # BLD AUTO: 0.74 K/UL — LOW (ref 1.2–3.4)
LYMPHOCYTES # BLD AUTO: 13.2 % — LOW (ref 20.5–51.1)
MAGNESIUM SERPL-MCNC: 2.3 MG/DL — SIGNIFICANT CHANGE UP (ref 1.8–2.4)
MCHC RBC-ENTMCNC: 29 PG — SIGNIFICANT CHANGE UP (ref 27–31)
MCHC RBC-ENTMCNC: 31.8 G/DL — LOW (ref 32–37)
MCV RBC AUTO: 91.4 FL — SIGNIFICANT CHANGE UP (ref 80–94)
MONOCYTES # BLD AUTO: 0.62 K/UL — HIGH (ref 0.1–0.6)
MONOCYTES NFR BLD AUTO: 11 % — HIGH (ref 1.7–9.3)
NEUTROPHILS # BLD AUTO: 4.15 K/UL — SIGNIFICANT CHANGE UP (ref 1.4–6.5)
NEUTROPHILS NFR BLD AUTO: 73.8 % — SIGNIFICANT CHANGE UP (ref 42.2–75.2)
NRBC # BLD: 0 /100 WBCS — SIGNIFICANT CHANGE UP (ref 0–0)
PLATELET # BLD AUTO: 210 K/UL — SIGNIFICANT CHANGE UP (ref 130–400)
POTASSIUM SERPL-MCNC: 4.4 MMOL/L — SIGNIFICANT CHANGE UP (ref 3.5–5)
POTASSIUM SERPL-SCNC: 4.4 MMOL/L — SIGNIFICANT CHANGE UP (ref 3.5–5)
PROT SERPL-MCNC: 6.9 G/DL — SIGNIFICANT CHANGE UP (ref 6–8)
PROTHROM AB SERPL-ACNC: 22.3 SEC — HIGH (ref 9.95–12.87)
RBC # BLD: 3.96 M/UL — LOW (ref 4.7–6.1)
RBC # FLD: 20.4 % — HIGH (ref 11.5–14.5)
SARS-COV-2 RNA SPEC QL NAA+PROBE: SIGNIFICANT CHANGE UP
SODIUM SERPL-SCNC: 134 MMOL/L — LOW (ref 135–146)
WBC # BLD: 5.62 K/UL — SIGNIFICANT CHANGE UP (ref 4.8–10.8)
WBC # FLD AUTO: 5.62 K/UL — SIGNIFICANT CHANGE UP (ref 4.8–10.8)

## 2021-08-02 PROCEDURE — 99233 SBSQ HOSP IP/OBS HIGH 50: CPT

## 2021-08-02 PROCEDURE — 71045 X-RAY EXAM CHEST 1 VIEW: CPT | Mod: 26

## 2021-08-02 PROCEDURE — 93010 ELECTROCARDIOGRAM REPORT: CPT

## 2021-08-02 PROCEDURE — 33286 RMVL SUBQ CAR RHYTHM MNTR: CPT

## 2021-08-02 PROCEDURE — 33274 TCAT INSJ/RPL PERM LDLS PM: CPT | Mod: Q0

## 2021-08-02 RX ORDER — VANCOMYCIN HCL 1 G
1000 VIAL (EA) INTRAVENOUS ONCE
Refills: 0 | Status: COMPLETED | OUTPATIENT
Start: 2021-08-02 | End: 2021-08-02

## 2021-08-02 RX ORDER — VANCOMYCIN HCL 1 G
1000 VIAL (EA) INTRAVENOUS EVERY 12 HOURS
Refills: 0 | Status: COMPLETED | OUTPATIENT
Start: 2021-08-03 | End: 2021-08-03

## 2021-08-02 RX ORDER — WARFARIN SODIUM 2.5 MG/1
1 TABLET ORAL ONCE
Refills: 0 | Status: DISCONTINUED | OUTPATIENT
Start: 2021-08-02 | End: 2021-08-03

## 2021-08-02 RX ADMIN — OXYCODONE AND ACETAMINOPHEN 1 TABLET(S): 5; 325 TABLET ORAL at 10:33

## 2021-08-02 RX ADMIN — PANTOPRAZOLE SODIUM 40 MILLIGRAM(S): 20 TABLET, DELAYED RELEASE ORAL at 05:30

## 2021-08-02 RX ADMIN — BUDESONIDE AND FORMOTEROL FUMARATE DIHYDRATE 2 PUFF(S): 160; 4.5 AEROSOL RESPIRATORY (INHALATION) at 22:10

## 2021-08-02 RX ADMIN — Medication 81 MILLIGRAM(S): at 13:45

## 2021-08-02 RX ADMIN — Medication 40 MILLIGRAM(S): at 05:30

## 2021-08-02 RX ADMIN — ATORVASTATIN CALCIUM 20 MILLIGRAM(S): 80 TABLET, FILM COATED ORAL at 22:10

## 2021-08-02 RX ADMIN — WARFARIN SODIUM 1 MILLIGRAM(S): 2.5 TABLET ORAL at 22:10

## 2021-08-02 RX ADMIN — BUDESONIDE AND FORMOTEROL FUMARATE DIHYDRATE 2 PUFF(S): 160; 4.5 AEROSOL RESPIRATORY (INHALATION) at 09:02

## 2021-08-02 RX ADMIN — Medication 5 MILLIGRAM(S): at 22:10

## 2021-08-02 RX ADMIN — Medication 250 MILLIGRAM(S): at 15:33

## 2021-08-02 RX ADMIN — OXYCODONE AND ACETAMINOPHEN 1 TABLET(S): 5; 325 TABLET ORAL at 11:30

## 2021-08-02 NOTE — DIETITIAN INITIAL EVALUATION ADULT. - OTHER INFO
Patient admitted from Nursing home with bradycardia; scheduled for pacemaker placement today, currently NPO for surgery.  He reports he is eating well since admission and is hungry today; 100% meal completion per RN documentation.  Reports he is maintaining his weight at home and no issues with eating.  Received consult for pressure ulcer - stag2 at right buttock and DTI at left heel.  Fecal incontinence noted on 7/31, patient declines any issues with moving his bowels. Patient admitted from Nursing home with bradycardia; scheduled for pacemaker placement today, currently NPO for surgery.  He reports he is eating well since admission and is hungry today; 100% meal completion per RN documentation. Received consult for pressure ulcer - stag2 at right buttock and DTI at left heel.  Fecal incontinence noted on 7/31, patient declines any issues with moving his bowels. Patient reports weight of "220 or something" prior to admission, unsure when this weight was taken; patient reports no issues with eating at home and maintaining his weight.

## 2021-08-02 NOTE — CHART NOTE - NSCHARTNOTEFT_GEN_A_CORE
PACU ANESTHESIA ADMISSION NOTE      Procedure: Pacemaker implant; loop recorder removal  Post op diagnosis:  bradycardia    ____  Intubated  TV:______       Rate: ______      FiO2: ______    __x__  Patent Airway    _x___  Full return of protective reflexes    _x___  Full recovery from anesthesia / back to baseline     Vitals:   T:  98.4F         R:   15               BP:    107/59              Sat:   99                P: 60      Mental Status:  _x___ Awake   _x____ Alert   _____ Drowsy   _____ Sedated    Nausea/Vomiting:  _x___ NO  ______Yes,   See Post - Op Orders          Pain Scale (0-10):  __0/10___    Treatment: ____ None    __x__ See Post - Op/PCA Orders    Post - Operative Fluids:   ____ Oral   __x__ See Post - Op Orders    Plan: Discharge:   ____Home       __x___Floor     _____Critical Care    _____  Other:_________________    Comments: Pt tolerated procedure well, no anesthesia related complications. Care of pt endorsed to PACU, report given to PACU RN. Discharge to next level of care when criteria are met.

## 2021-08-02 NOTE — DIETITIAN INITIAL EVALUATION ADULT. - ADD RECOMMEND
Monitor po intake and acceptance of oral nutrition supplement; weight maintenance; skin integrity and wound healing

## 2021-08-02 NOTE — DIETITIAN INITIAL EVALUATION ADULT. - PERSON TAUGHT/METHOD
Encouraged patient to consume all of meals; enough nutrition to help with wound healing/patient instructed

## 2021-08-02 NOTE — DIETITIAN INITIAL EVALUATION ADULT. - PHYSCIAL ASSESSMENT
well nourished/overweight Bedridden, resting comfortably; rounded abdomen per RN documentation, no complaints from patient; some missing teeth; stage 2 pressure ulcer at sacrum and DTI at left heel

## 2021-08-02 NOTE — DIETITIAN INITIAL EVALUATION ADULT. - ORAL NUTRITION SUPPLEMENTS
Recommend adding Ruben BID for wound healing, as patient is eating well - will provide 180kcal, 28g protein/day

## 2021-08-02 NOTE — CHART NOTE - NSCHARTNOTEFT_GEN_A_CORE
: Daphney Pierce M.D.    PROCEDURE:  Leadless (Medtronic Micra VR) Pacemaker implantation and loop recorder removal.    INDICATION FOR PROCEDURE:  Persistent AF, Tachy-selvin syndrome     PROCEDURE DETAILS:  Patient was brought into the EP lab in the fasting, non-sedated state.  After informed consent was obtained, patient underwent general anesthesia.  Anesthesia was maintained by the Anesthesia Service.  Continuous blood pressure and heart rates were being monitored throughout the procedure.     After sterile prep and drape, bilateral groins were prepped, and initial access was performed on the right groin.  With modified Seldinger technique, we obtained 1 access in the right groin.  With serial dilatations, we were able to increase the size of the access sheath to a 20-Wallisian sheath.  After that, we exchanged it over to the 23-Wallisian long introducer sheath of the Micra Pacemaker.     Using routine precautions, we advanced delivery tool into the RV.  We extended the Micra Leadless Pacemaker into the RV low septum  after confirming the location using contrast.  The device was anchored to the low  septum.  Sensing and threshold were excellent.  We monitored the pacemaker in this location and rechecked the parameters and confirmed that they were stable.  We also performed a tug test to confirm that at least 2 hooks were anchored into the RV.  After being comfortable with the location, parameters and the stability of the pacemaker, we disconnected the thread holding the pacemaker.  The pacemaker remained stable, and electrical function was also stable. Prior to this attempt, multiple attempt were made on mid and lower septum. However, there was either undersensing or high threshold.    We put a figure-of-eight suture at the access site and held manual pressure for about 20 minutes post procedure.  Patient tolerated the procedure well.    Then, ILR explant was performed with blunt dissection through original incision. Hemostasis was achieved. Dermabond placed.    COMPLICATIONS:  None.    PLAN:      -  Overnight monitoring  -  Bedrest for 4 hours  -  Suture removal in am  - Continue Warfarin  -  CXR and interrogation in am  -   Anticipate discharge in am

## 2021-08-02 NOTE — PROGRESS NOTE ADULT - SUBJECTIVE AND OBJECTIVE BOX
RODNEY SANTO 80y Male  MRN#: [id number]   CODE STATUS:    Hospital Day: 3d    Pt is currently admitted with the primary diagnosis of afib with bradycardia and frequent sinus pauses.       SUBJECTIVE  Hospital Course  81 y/o male with a PMH of CHF, afib on coumadin followed by cardio Dr. Scherer with loop recorder in place, DVT, HTN, and COPD on 2L presents to the ED from [facility] for evaluation of bradycardia. pt reports he is scheduled for a pacemaker due to bradycardia this monday by Dr. Colon and as per note from [facility] pt loop recorder was reading bradycardia so they thought it would be best if he was monitored in the hospital until pacemaker placed. As per pt he had been bradycardic for a while. pt denies dizziness, chest pain, sob, fever, chills, cough, weakness, visual changes, abdominal pain, n/v/d/c, weakness, numbness, or tingling.    On admission: T(F): 97.9, HR: 51, BP: 131/61, RR: 18, SpO2: 97%. Trop 0.02, INR 3.58. EKG w/ afib and nonspecific ST abnormalities      (2021 00:12)      Overnight events/Subjective complaints  Patient was seen at the bedside this morning. He is lying comfortably on the bed. There were frequent sinus pauses overnight on Tele.     He denies any chest pain, shortness of breath, cough, fever, chills, abdominal pain, nausea, vomiting, diarrhea, dizziness or headache.                                              ----------------------------------------------------------  OBJECTIVE  PAST MEDICAL & SURGICAL HISTORY  COPD (chronic obstructive pulmonary disease)    Hypertension    Deep vein thrombosis (DVT) of left lower extremity, unspecified chronicity, unspecified vein    Cellulitis of left lower extremity    Chronic atrial fibrillation    Mitral valve insufficiency, unspecified etiology  Moderate    CHF (congestive heart failure)    S/P coronary artery stent placement    History of total right knee replacement                                              -----------------------------------------------------------  ALLERGIES:  No Known Allergies                                            ------------------------------------------------------------    HOME MEDICATIONS  Home Medications:  atorvastatin 20 mg oral tablet: 1 tab(s) orally once a day (2021 14:49)  nystatin 100,000 units/g topical powder: 1 application topically every 12 hours (2021 12:26)  polyethylene glycol 3350 oral powder for reconstitution: 17 gram(s) orally once a day (at bedtime) (2021 12:26)  spironolactone: 12.5 milligram(s) orally once a day (2021 15:20)  Symbicort 160 mcg-4.5 mcg/inh inhalation aerosol: 2 puff(s) inhaled 2 times a day (2021 14:49)  Ventolin HFA 90 mcg/inh inhalation aerosol: 2 puff(s) inhaled every 6 hours as neede for shortness of breath (2021 14:49)  warfarin 2 mg oral tablet: Take half a tablet Sun, Tues, Wed, Thurs, Fri, Sat.  Take a whole tablet on Mon (2021 14:49)                           MEDICATIONS:  STANDING MEDICATIONS  aspirin  chewable 81 milliGRAM(s) Oral daily  atorvastatin 20 milliGRAM(s) Oral at bedtime  budesonide 160 MICROgram(s)/formoterol 4.5 MICROgram(s) Inhaler 2 Puff(s) Inhalation two times a day  furosemide    Tablet 40 milliGRAM(s) Oral daily  melatonin 5 milliGRAM(s) Oral at bedtime  pantoprazole    Tablet 40 milliGRAM(s) Oral before breakfast    PRN MEDICATIONS  ALBUTerol    90 MICROgram(s) HFA Inhaler 2 Puff(s) Inhalation every 6 hours PRN  oxycodone    5 mG/acetaminophen 325 mG 1 Tablet(s) Oral every 6 hours PRN                                            ------------------------------------------------------------  VITAL SIGNS: Last 24 Hours  T(C): 36.5 (02 Aug 2021 05:00), Max: 36.6 (01 Aug 2021 13:56)  T(F): 97.7 (02 Aug 2021 05:00), Max: 97.8 (01 Aug 2021 13:56)  HR: 62 (02 Aug 2021 05:00) (57 - 62)  BP: 114/68 (02 Aug 2021 05:00) (112/54 - 123/58)  RR: 18 (02 Aug 2021 05:00) (18 - 18)  SpO2: 100% (02 Aug 2021 08:49) (97% - 100%)      21 @ 07:01  -  21 @ 07:00  --------------------------------------------------------  IN: 240 mL / OUT: 1100 mL / NET: -860 mL    21 @ 07:01  -  21 @ 12:48  --------------------------------------------------------  IN: 0 mL / OUT: 650 mL / NET: -650 mL        Daily Height in cm: 190.5 (01 Aug 2021 15:16), Height in cm: 190.5 (2021 19:04), Height in cm: 190.5 (2021 19:04)    Daily Weight in k.6 (02 Aug 2021 05:00), Weight in k.1 (01 Aug 2021 05:08)                                        --------------------------------------------------------------  LABS:                        11.5   5.62  )-----------( 210      ( 02 Aug 2021 04:30 )             36.2                         11.2   4.57  )-----------( 244      ( 2021 09:05 )             36.3                         11.5   5.06  )-----------( 249      ( 2021 20:38 )             36.2     08- @ 04:30    134<L>  |  97<L>  |  43<H>  ----------------------------<  87  4.4   |  29  |  1.4  07 @ 09:05    136  |  97<L>  |  54<H>  ----------------------------<  111<H>  3.9   |  30  |  1.4   @ 20:38    136  |  94<L>  |  61<HH>  ----------------------------<  107<H>  4.4   |  31  |  1.5    Ca    8.7      08-02 @ 04:30  Ca    8.7      0731 @ 09:05  Ca    9.0      0730 @ 20:38  Mg     2.3     08-  Mg     2.6       Mg     2.8     30    TPro  6.9  /  Alb  3.4<L>  /  TBili  1.1  /  DBili  x   /  AST  29  /  ALT  20  /  AlkPhos  117<H>  08-02  TPro  7.0  /  Alb  3.6  /  TBili  0.9  /  DBili  x   /  AST  27  /  ALT  17  /  AlkPhos  110    TPro  7.3  /  Alb  3.6  /  TBili  1.0  /  DBili  x   /  AST  30  /  ALT  17  /  AlkPhos  118<H>  0730    PT/INR - ( 02 Aug 2021 04:30 )   PT: 22.30 sec;   INR: 1.95 ratio         PT/INR - ( 01 Aug 2021 07:36 )   PT: 30.60 sec;   INR: 2.69 ratio         PT/INR - ( 2021 09:05 )   PT: 38.20 sec;   INR: 3.36 ratio         PTT - ( 02 Aug 2021 04:30 )  PTT:36.0 sec, PTT - ( 2021 09:05 )  PTT:46.4 sec, PTT - ( 2021 21:20 )  PTT:48.7 sec  Urinalysis Basic - ( 01 Aug 2021 16:30 )    Color: Yellow / Appearance: Clear / S.013 / pH: x  Gluc: x / Ketone: Negative  / Bili: Negative / Urobili: 3 mg/dL   Blood: x / Protein: Negative / Nitrite: Negative   Leuk Esterase: Negative / RBC: x / WBC x   Sq Epi: x / Non Sq Epi: x / Bacteria: x        Troponin T, Serum: 0.02 *H* ( @ 11:00)          CARDIAC MARKERS ( 21 @ 11:00 )  x     / 0.02 ng/mL / x     / x     / x      CARDIAC MARKERS ( 21 @ 09:05 )  x     / 0.02 ng/mL / x     / x     / x      CARDIAC MARKERS ( 21 @ 20:38 )  x     / 0.03 ng/mL / x     / x     / x                                                  -------------------------------------------------------------  RADIOLOGY:  CXR -   Improved bilateral opacities/effusions.                                            --------------------------------------------------------------    PHYSICAL EXAM:  General: No acute distress; Pallor (-), Icterus (-), Cyanosis (-), Clubbing (-)  HEENT: Normocephalic, atraumatic, PERRLA, EOMI  PULM: Bilaterally equal and clear breath sounds, wheeze (-), rubs (-), crackles (-)  CVS: Normal S1 and S2, murmurs (-), rubs (-), gallops (-)   GI: Soft, nondistended, nontender, BS +  MSK: Edema (-), no muscle, bone or joint tenderness noted  SKIN: Warm and well perfused, no rashes noted  NEURO:  Alert and Oriented x 3; No gross focal neurological deficit noted                                             -------------------------------------------------------------- RODNEY SANTO 80y Male  MRN#: [id number]   CODE STATUS: Full    Hospital Day: 3d    Pt is currently admitted with the primary diagnosis of afib with bradycardia and frequent sinus pauses.       SUBJECTIVE  Hospital Course  81 y/o male with a PMH of CHF, afib on coumadin followed by cardio Dr. Scherer with loop recorder in place, DVT, HTN, and COPD on 2L presents to the ED from [facility] for evaluation of bradycardia. pt reports he is scheduled for a pacemaker due to bradycardia this monday by Dr. Colon and as per note from [facility] pt loop recorder was reading bradycardia so they thought it would be best if he was monitored in the hospital until pacemaker placed. As per pt he had been bradycardic for a while. pt denies dizziness, chest pain, sob, fever, chills, cough, weakness, visual changes, abdominal pain, n/v/d/c, weakness, numbness, or tingling.    On admission: T(F): 97.9, HR: 51, BP: 131/61, RR: 18, SpO2: 97%. Trop 0.02, INR 3.58. EKG w/ afib and nonspecific ST abnormalities      (2021 00:12)      Overnight events/Subjective complaints  Patient was seen at the bedside this morning. He is lying comfortably on the bed. There were frequent sinus pauses overnight on Tele.     He denies any chest pain, shortness of breath, cough, fever, chills, abdominal pain, nausea, vomiting, diarrhea, dizziness or headache.                                              ----------------------------------------------------------  OBJECTIVE  PAST MEDICAL & SURGICAL HISTORY  COPD (chronic obstructive pulmonary disease)    Hypertension    Deep vein thrombosis (DVT) of left lower extremity, unspecified chronicity, unspecified vein    Cellulitis of left lower extremity    Chronic atrial fibrillation    Mitral valve insufficiency, unspecified etiology  Moderate    CHF (congestive heart failure)    S/P coronary artery stent placement    History of total right knee replacement                                              -----------------------------------------------------------  ALLERGIES:  No Known Allergies                                            ------------------------------------------------------------    HOME MEDICATIONS  Home Medications:  atorvastatin 20 mg oral tablet: 1 tab(s) orally once a day (2021 14:49)  nystatin 100,000 units/g topical powder: 1 application topically every 12 hours (2021 12:26)  polyethylene glycol 3350 oral powder for reconstitution: 17 gram(s) orally once a day (at bedtime) (2021 12:26)  spironolactone: 12.5 milligram(s) orally once a day (2021 15:20)  Symbicort 160 mcg-4.5 mcg/inh inhalation aerosol: 2 puff(s) inhaled 2 times a day (2021 14:49)  Ventolin HFA 90 mcg/inh inhalation aerosol: 2 puff(s) inhaled every 6 hours as neede for shortness of breath (2021 14:49)  warfarin 2 mg oral tablet: Take half a tablet Sun, Tues, Wed, Thurs, Fri, Sat.  Take a whole tablet on Mon (2021 14:49)                           MEDICATIONS:  STANDING MEDICATIONS  aspirin  chewable 81 milliGRAM(s) Oral daily  atorvastatin 20 milliGRAM(s) Oral at bedtime  budesonide 160 MICROgram(s)/formoterol 4.5 MICROgram(s) Inhaler 2 Puff(s) Inhalation two times a day  furosemide    Tablet 40 milliGRAM(s) Oral daily  melatonin 5 milliGRAM(s) Oral at bedtime  pantoprazole    Tablet 40 milliGRAM(s) Oral before breakfast    PRN MEDICATIONS  ALBUTerol    90 MICROgram(s) HFA Inhaler 2 Puff(s) Inhalation every 6 hours PRN  oxycodone    5 mG/acetaminophen 325 mG 1 Tablet(s) Oral every 6 hours PRN                                            ------------------------------------------------------------  VITAL SIGNS: Last 24 Hours  T(C): 36.5 (02 Aug 2021 05:00), Max: 36.6 (01 Aug 2021 13:56)  T(F): 97.7 (02 Aug 2021 05:00), Max: 97.8 (01 Aug 2021 13:56)  HR: 62 (02 Aug 2021 05:00) (57 - 62)  BP: 114/68 (02 Aug 2021 05:00) (112/54 - 123/58)  RR: 18 (02 Aug 2021 05:00) (18 - 18)  SpO2: 100% (02 Aug 2021 08:49) (97% - 100%)      21 @ 07:01  -  21 @ 07:00  --------------------------------------------------------  IN: 240 mL / OUT: 1100 mL / NET: -860 mL    21 @ 07:01  -  21 @ 12:48  --------------------------------------------------------  IN: 0 mL / OUT: 650 mL / NET: -650 mL        Daily Height in cm: 190.5 (01 Aug 2021 15:16), Height in cm: 190.5 (2021 19:04), Height in cm: 190.5 (2021 19:04)    Daily Weight in k.6 (02 Aug 2021 05:00), Weight in k.1 (01 Aug 2021 05:08)                                        --------------------------------------------------------------  LABS:                        11.5   5.62  )-----------( 210      ( 02 Aug 2021 04:30 )             36.2                         11.2   4.57  )-----------( 244      ( 2021 09:05 )             36.3                         11.5   5.06  )-----------( 249      ( 2021 20:38 )             36.2     08-02 @ 04:30    134<L>  |  97<L>  |  43<H>  ----------------------------<  87  4.4   |  29  |  1.4  07 @ 09:05    136  |  97<L>  |  54<H>  ----------------------------<  111<H>  3.9   |  30  |  1.4   @ 20:38    136  |  94<L>  |  61<HH>  ----------------------------<  107<H>  4.4   |  31  |  1.5    Ca    8.7      08-02 @ 04:30  Ca    8.7      07-31 @ 09:05  Ca    9.0      0730 @ 20:38  Mg     2.3     08-02  Mg     2.6       Mg     2.8     0730    TPro  6.9  /  Alb  3.4<L>  /  TBili  1.1  /  DBili  x   /  AST  29  /  ALT  20  /  AlkPhos  117<H>  08-02  TPro  7.0  /  Alb  3.6  /  TBili  0.9  /  DBili  x   /  AST  27  /  ALT  17  /  AlkPhos  110    TPro  7.3  /  Alb  3.6  /  TBili  1.0  /  DBili  x   /  AST  30  /  ALT  17  /  AlkPhos  118<H>  0730    PT/INR - ( 02 Aug 2021 04:30 )   PT: 22.30 sec;   INR: 1.95 ratio         PT/INR - ( 01 Aug 2021 07:36 )   PT: 30.60 sec;   INR: 2.69 ratio         PT/INR - ( 2021 09:05 )   PT: 38.20 sec;   INR: 3.36 ratio         PTT - ( 02 Aug 2021 04:30 )  PTT:36.0 sec, PTT - ( 2021 09:05 )  PTT:46.4 sec, PTT - ( 2021 21:20 )  PTT:48.7 sec  Urinalysis Basic - ( 01 Aug 2021 16:30 )    Color: Yellow / Appearance: Clear / S.013 / pH: x  Gluc: x / Ketone: Negative  / Bili: Negative / Urobili: 3 mg/dL   Blood: x / Protein: Negative / Nitrite: Negative   Leuk Esterase: Negative / RBC: x / WBC x   Sq Epi: x / Non Sq Epi: x / Bacteria: x        Troponin T, Serum: 0.02 *H* ( @ 11:00)          CARDIAC MARKERS ( 21 @ 11:00 )  x     / 0.02 ng/mL / x     / x     / x      CARDIAC MARKERS ( 21 @ 09:05 )  x     / 0.02 ng/mL / x     / x     / x      CARDIAC MARKERS ( 21 @ 20:38 )  x     / 0.03 ng/mL / x     / x     / x                                                  -------------------------------------------------------------  RADIOLOGY:  CXR -   Improved bilateral opacities/effusions.                                            --------------------------------------------------------------    PHYSICAL EXAM:  General: No acute distress; Pallor (-), Icterus (-), Cyanosis (-), Clubbing (-)  HEENT: Normocephalic, atraumatic, PERRLA, EOMI  PULM: Bilaterally equal and clear breath sounds, wheeze (-), rubs (-), crackles (-)  CVS: Normal S1 and S2, murmurs (-), rubs (-), gallops (-)   GI: Soft, nondistended, nontender, BS +  MSK: Edema (-), no muscle, bone or joint tenderness noted  SKIN: Warm and well perfused, no rashes noted  NEURO:  Alert and Oriented x 3; No gross focal neurological deficit noted                                             -------------------------------------------------------------- RODNEY SANTO 80y Male  MRN#: [id number]   CODE STATUS: Full    Hospital Day: 3d    Pt is currently admitted with the primary diagnosis of afib with bradycardia and frequent sinus pauses.       SUBJECTIVE  Hospital Course  79 y/o male with a PMH of CHF, afib on coumadin followed by cardio Dr. Scherer with loop recorder in place, DVT, HTN, and COPD on 2L presents to the ED from [facility] for evaluation of bradycardia. pt reports he is scheduled for a pacemaker due to bradycardia this monday by Dr. Colon and as per note from [facility] pt loop recorder was reading bradycardia so they thought it would be best if he was monitored in the hospital until pacemaker placed. As per pt he had been bradycardic for a while. pt denies dizziness, chest pain, sob, fever, chills, cough, weakness, visual changes, abdominal pain, n/v/d/c, weakness, numbness, or tingling.    On admission: T(F): 97.9, HR: 51, BP: 131/61, RR: 18, SpO2: 97%. Trop 0.02, INR 3.58. EKG w/ afib and nonspecific ST abnormalities      (2021 00:12)      Overnight events/Subjective complaints  Patient was seen at the bedside this morning. He is lying comfortably on the bed. There were frequent sinus pauses overnight on Tele.     He denies any chest pain, shortness of breath, cough, fever, chills, abdominal pain, nausea, vomiting, diarrhea, dizziness or headache.     Attending Note: Pt seen and examined at bedside. No cp or sob. Had pauses on cardiac telemonitoring                                              ----------------------------------------------------------  OBJECTIVE  PAST MEDICAL & SURGICAL HISTORY  COPD (chronic obstructive pulmonary disease)    Hypertension    Deep vein thrombosis (DVT) of left lower extremity, unspecified chronicity, unspecified vein    Cellulitis of left lower extremity    Chronic atrial fibrillation    Mitral valve insufficiency, unspecified etiology  Moderate    CHF (congestive heart failure)    S/P coronary artery stent placement    History of total right knee replacement                                              -----------------------------------------------------------  ALLERGIES:  No Known Allergies                                            ------------------------------------------------------------    HOME MEDICATIONS  Home Medications:  atorvastatin 20 mg oral tablet: 1 tab(s) orally once a day (2021 14:49)  nystatin 100,000 units/g topical powder: 1 application topically every 12 hours (2021 12:26)  polyethylene glycol 3350 oral powder for reconstitution: 17 gram(s) orally once a day (at bedtime) (2021 12:26)  spironolactone: 12.5 milligram(s) orally once a day (2021 15:20)  Symbicort 160 mcg-4.5 mcg/inh inhalation aerosol: 2 puff(s) inhaled 2 times a day (2021 14:49)  Ventolin HFA 90 mcg/inh inhalation aerosol: 2 puff(s) inhaled every 6 hours as neede for shortness of breath (2021 14:49)  warfarin 2 mg oral tablet: Take half a tablet Sun, Tues, Wed, Thurs, Fri, Sat.  Take a whole tablet on Mon (2021 14:49)                           MEDICATIONS:  STANDING MEDICATIONS  aspirin  chewable 81 milliGRAM(s) Oral daily  atorvastatin 20 milliGRAM(s) Oral at bedtime  budesonide 160 MICROgram(s)/formoterol 4.5 MICROgram(s) Inhaler 2 Puff(s) Inhalation two times a day  furosemide    Tablet 40 milliGRAM(s) Oral daily  melatonin 5 milliGRAM(s) Oral at bedtime  pantoprazole    Tablet 40 milliGRAM(s) Oral before breakfast    PRN MEDICATIONS  ALBUTerol    90 MICROgram(s) HFA Inhaler 2 Puff(s) Inhalation every 6 hours PRN  oxycodone    5 mG/acetaminophen 325 mG 1 Tablet(s) Oral every 6 hours PRN                                            ------------------------------------------------------------  VITAL SIGNS: Last 24 Hours  T(C): 36.5 (02 Aug 2021 05:00), Max: 36.6 (01 Aug 2021 13:56)  T(F): 97.7 (02 Aug 2021 05:00), Max: 97.8 (01 Aug 2021 13:56)  HR: 62 (02 Aug 2021 05:00) (57 - 62)  BP: 114/68 (02 Aug 2021 05:00) (112/54 - 123/58)  RR: 18 (02 Aug 2021 05:00) (18 - 18)  SpO2: 100% (02 Aug 2021 08:49) (97% - 100%)      21 @ 07:01  -  21 @ 07:00  --------------------------------------------------------  IN: 240 mL / OUT: 1100 mL / NET: -860 mL    21 @ 07:01  -  21 @ 12:48  --------------------------------------------------------  IN: 0 mL / OUT: 650 mL / NET: -650 mL        Daily Height in cm: 190.5 (01 Aug 2021 15:16), Height in cm: 190.5 (2021 19:04), Height in cm: 190.5 (2021 19:04)    Daily Weight in k.6 (02 Aug 2021 05:00), Weight in k.1 (01 Aug 2021 05:08)                                        --------------------------------------------------------------  LABS:                        11.5   5.62  )-----------( 210      ( 02 Aug 2021 04:30 )             36.2                         11.2   4.57  )-----------( 244      ( 2021 09:05 )             36.3                         11.5   5.06  )-----------( 249      ( 2021 20:38 )             36.2     08 @ 04:30    134<L>  |  97<L>  |  43<H>  ----------------------------<  87  4.4   |  29  |  1.4   @ 09:05    136  |  97<L>  |  54<H>  ----------------------------<  111<H>  3.9   |  30  |  1.4   @ 20:38    136  |  94<L>  |  61<HH>  ----------------------------<  107<H>  4.4   |  31  |  1.5    Ca    8.7      08 @ 04:30  Ca    8.7      31 @ 09:05  Ca    9.0       @ 20:38  Mg     2.3     08  Mg     2.6       Mg     2.8     30    TPro  6.9  /  Alb  3.4<L>  /  TBili  1.1  /  DBili  x   /  AST  29  /  ALT  20  /  AlkPhos  117<H>  08  TPro  7.0  /  Alb  3.6  /  TBili  0.9  /  DBili  x   /  AST  27  /  ALT  17  /  AlkPhos  110    TPro  7.3  /  Alb  3.6  /  TBili  1.0  /  DBili  x   /  AST  30  /  ALT  17  /  AlkPhos  118<H>  0730    PT/INR - ( 02 Aug 2021 04:30 )   PT: 22.30 sec;   INR: 1.95 ratio         PT/INR - ( 01 Aug 2021 07:36 )   PT: 30.60 sec;   INR: 2.69 ratio         PT/INR - ( 2021 09:05 )   PT: 38.20 sec;   INR: 3.36 ratio         PTT - ( 02 Aug 2021 04:30 )  PTT:36.0 sec, PTT - ( 2021 09:05 )  PTT:46.4 sec, PTT - ( 2021 21:20 )  PTT:48.7 sec  Urinalysis Basic - ( 01 Aug 2021 16:30 )    Color: Yellow / Appearance: Clear / S.013 / pH: x  Gluc: x / Ketone: Negative  / Bili: Negative / Urobili: 3 mg/dL   Blood: x / Protein: Negative / Nitrite: Negative   Leuk Esterase: Negative / RBC: x / WBC x   Sq Epi: x / Non Sq Epi: x / Bacteria: x        Troponin T, Serum: 0.02 *H* ( @ 11:00)          CARDIAC MARKERS ( 21 @ 11:00 )  x     / 0.02 ng/mL / x     / x     / x      CARDIAC MARKERS ( 21 @ 09:05 )  x     / 0.02 ng/mL / x     / x     / x      CARDIAC MARKERS ( 21 @ 20:38 )  x     / 0.03 ng/mL / x     / x     / x                                                  -------------------------------------------------------------  RADIOLOGY:  CXR -   Improved bilateral opacities/effusions.                                            --------------------------------------------------------------    PHYSICAL EXAM:  General: No acute distress; Pallor (-), Icterus (-), Cyanosis (-), Clubbing (-)  HEENT: Normocephalic, atraumatic, PERRLA, EOMI  PULM: Bilaterally equal and clear breath sounds, wheeze (-), rubs (-), crackles (-)  CVS: Normal S1 and S2, murmurs (-), rubs (-), gallops (-)   GI: Soft, nondistended, nontender, BS +  MSK: Edema (-), no muscle, bone or joint tenderness noted  SKIN: Warm and well perfused, no rashes noted  NEURO:  Alert and Oriented x 3; No gross focal neurological deficit noted                                             --------------------------------------------------------------

## 2021-08-02 NOTE — PROGRESS NOTE ADULT - ASSESSMENT
79 y/o male with PMH CAD s/p PCI and stent 2007, HFpEF TTE in 7/17/2021 w/ EF 55-65, atrial fibrillation on coumadin, s/p ILR followed by Dr. Scherer, DVT, HTN, DLD and COPD on 2L presents to the ED from [facility] for evaluation of bradycardia.     # Bradycardia  - Pt Asymptomatic, HR 51 on admission  - Of noted pt was bradycardiac on previous admission. During that admission he was seen by cardiology, S/P ILR was asked to f/u with EP as outpatient. Was scheduled for a pacemaker on Monday by Dr. Colon   - Hold AVN blocking agents   - Tele monitoring  - Overnight: several episodes of pause lasting 2-3 sec  - Keep pads and atropine at bedside   - EP - planned for PPM today    #HFpEF (stable)  #CAD s/p PCI and stent 2007  - recent admission on 07/07/2021 due to HF exacerbation   - TTE (7/11/2021) w/ EF 50-55%. Moderate to severe left atrial enlargement. Moderate mitral valve regurgitation. Moderate-severe tricuspid regurgitation.  Estimated pulmonary artery systolic pressure is 59.7 mmHg assuming a right atrial pressure of 10 mmHg, which is consistent with moderate pulmonary hypertension.  - c/w lasix 40 mg po daily   - monitor I/Os     # CAD s/p PCI and stent in 2007  # DLD  - c/w statin and ASA     # Afib   # Supra-therapeutic INR on admission  - on coumadin   - INR 3.58 on admission  - INR 2.69 yesterday - Coumadin restarted  - INR today 1.95  - f/u daily INR  - c/w coumadin 1mg HS    # COPD not in exacerbation  - c/w 2 L NC (on 2L at home during night)  - c/w Symbicort and ventolin as needed    # CKD 3b  - Cr 1.5 > 1.4 (baseline Cr 1.2-1.6) > 1.4  - Trend Cr    # Misc  - DVT Prophylaxis: on coumadin  - Diet: DASH, resume diet after PPM placement  - GI Prophylaxis: Protonix  - Activity: as tolerated  - Code Status: full code

## 2021-08-02 NOTE — DIETITIAN INITIAL EVALUATION ADULT. - ORAL INTAKE PTA/DIET HISTORY
Reports he eats well at NH, follows a low salt diet - meals are prepared for him.  In order to get the taste he prefers, he uses No Salt as a salt substitute.  Not taking any protein supplements prior to admission

## 2021-08-02 NOTE — PHYSICAL THERAPY INITIAL EVALUATION ADULT - SPECIFY REASON(S)
Upon chart review, pt is currently on active bedrest orders. PT will f/u upon appropriate activity orders.

## 2021-08-03 ENCOUNTER — TRANSCRIPTION ENCOUNTER (OUTPATIENT)
Age: 81
End: 2021-08-03

## 2021-08-03 LAB
ALBUMIN SERPL ELPH-MCNC: 3.5 G/DL — SIGNIFICANT CHANGE UP (ref 3.5–5.2)
ALP SERPL-CCNC: 121 U/L — HIGH (ref 30–115)
ALT FLD-CCNC: 18 U/L — SIGNIFICANT CHANGE UP (ref 0–41)
ANION GAP SERPL CALC-SCNC: 9 MMOL/L — SIGNIFICANT CHANGE UP (ref 7–14)
AST SERPL-CCNC: 29 U/L — SIGNIFICANT CHANGE UP (ref 0–41)
BASOPHILS # BLD AUTO: 0.03 K/UL — SIGNIFICANT CHANGE UP (ref 0–0.2)
BASOPHILS NFR BLD AUTO: 0.5 % — SIGNIFICANT CHANGE UP (ref 0–1)
BILIRUB SERPL-MCNC: 0.9 MG/DL — SIGNIFICANT CHANGE UP (ref 0.2–1.2)
BUN SERPL-MCNC: 40 MG/DL — HIGH (ref 10–20)
CALCIUM SERPL-MCNC: 9 MG/DL — SIGNIFICANT CHANGE UP (ref 8.5–10.1)
CHLORIDE SERPL-SCNC: 98 MMOL/L — SIGNIFICANT CHANGE UP (ref 98–110)
CO2 SERPL-SCNC: 27 MMOL/L — SIGNIFICANT CHANGE UP (ref 17–32)
CREAT SERPL-MCNC: 1.4 MG/DL — SIGNIFICANT CHANGE UP (ref 0.7–1.5)
EOSINOPHIL # BLD AUTO: 0.16 K/UL — SIGNIFICANT CHANGE UP (ref 0–0.7)
EOSINOPHIL NFR BLD AUTO: 2.4 % — SIGNIFICANT CHANGE UP (ref 0–8)
GLUCOSE BLDC GLUCOMTR-MCNC: 86 MG/DL — SIGNIFICANT CHANGE UP (ref 70–99)
GLUCOSE SERPL-MCNC: 89 MG/DL — SIGNIFICANT CHANGE UP (ref 70–99)
HCT VFR BLD CALC: 35.6 % — LOW (ref 42–52)
HGB BLD-MCNC: 11.2 G/DL — LOW (ref 14–18)
IMM GRANULOCYTES NFR BLD AUTO: 0.5 % — HIGH (ref 0.1–0.3)
INR BLD: 2.03 RATIO — HIGH (ref 0.65–1.3)
LYMPHOCYTES # BLD AUTO: 0.73 K/UL — LOW (ref 1.2–3.4)
LYMPHOCYTES # BLD AUTO: 11 % — LOW (ref 20.5–51.1)
MAGNESIUM SERPL-MCNC: 2.3 MG/DL — SIGNIFICANT CHANGE UP (ref 1.8–2.4)
MCHC RBC-ENTMCNC: 28.6 PG — SIGNIFICANT CHANGE UP (ref 27–31)
MCHC RBC-ENTMCNC: 31.5 G/DL — LOW (ref 32–37)
MCV RBC AUTO: 91 FL — SIGNIFICANT CHANGE UP (ref 80–94)
MONOCYTES # BLD AUTO: 0.68 K/UL — HIGH (ref 0.1–0.6)
MONOCYTES NFR BLD AUTO: 10.2 % — HIGH (ref 1.7–9.3)
NEUTROPHILS # BLD AUTO: 5.03 K/UL — SIGNIFICANT CHANGE UP (ref 1.4–6.5)
NEUTROPHILS NFR BLD AUTO: 75.4 % — HIGH (ref 42.2–75.2)
NRBC # BLD: 0 /100 WBCS — SIGNIFICANT CHANGE UP (ref 0–0)
PLATELET # BLD AUTO: 185 K/UL — SIGNIFICANT CHANGE UP (ref 130–400)
POTASSIUM SERPL-MCNC: 5.2 MMOL/L — HIGH (ref 3.5–5)
POTASSIUM SERPL-SCNC: 5.2 MMOL/L — HIGH (ref 3.5–5)
PROT SERPL-MCNC: 6.8 G/DL — SIGNIFICANT CHANGE UP (ref 6–8)
PROTHROM AB SERPL-ACNC: 23.2 SEC — HIGH (ref 9.95–12.87)
RBC # BLD: 3.91 M/UL — LOW (ref 4.7–6.1)
RBC # FLD: 20.4 % — HIGH (ref 11.5–14.5)
SODIUM SERPL-SCNC: 134 MMOL/L — LOW (ref 135–146)
WBC # BLD: 6.66 K/UL — SIGNIFICANT CHANGE UP (ref 4.8–10.8)
WBC # FLD AUTO: 6.66 K/UL — SIGNIFICANT CHANGE UP (ref 4.8–10.8)

## 2021-08-03 PROCEDURE — 93285 PRGRMG DEV EVAL SCRMS IP: CPT | Mod: 26

## 2021-08-03 PROCEDURE — 93288 INTERROG EVL PM/LDLS PM IP: CPT | Mod: 26,59

## 2021-08-03 PROCEDURE — 71045 X-RAY EXAM CHEST 1 VIEW: CPT | Mod: 26

## 2021-08-03 PROCEDURE — 99024 POSTOP FOLLOW-UP VISIT: CPT

## 2021-08-03 PROCEDURE — 99233 SBSQ HOSP IP/OBS HIGH 50: CPT

## 2021-08-03 RX ORDER — ASPIRIN/CALCIUM CARB/MAGNESIUM 324 MG
1 TABLET ORAL
Qty: 0 | Refills: 0 | DISCHARGE
Start: 2021-08-03

## 2021-08-03 RX ORDER — WARFARIN SODIUM 2.5 MG/1
1 TABLET ORAL AT BEDTIME
Refills: 0 | Status: DISCONTINUED | OUTPATIENT
Start: 2021-08-03 | End: 2021-08-05

## 2021-08-03 RX ADMIN — Medication 250 MILLIGRAM(S): at 05:09

## 2021-08-03 RX ADMIN — OXYCODONE AND ACETAMINOPHEN 1 TABLET(S): 5; 325 TABLET ORAL at 00:04

## 2021-08-03 RX ADMIN — BUDESONIDE AND FORMOTEROL FUMARATE DIHYDRATE 2 PUFF(S): 160; 4.5 AEROSOL RESPIRATORY (INHALATION) at 09:43

## 2021-08-03 RX ADMIN — Medication 40 MILLIGRAM(S): at 05:18

## 2021-08-03 RX ADMIN — Medication 81 MILLIGRAM(S): at 11:29

## 2021-08-03 RX ADMIN — OXYCODONE AND ACETAMINOPHEN 1 TABLET(S): 5; 325 TABLET ORAL at 20:17

## 2021-08-03 RX ADMIN — PANTOPRAZOLE SODIUM 40 MILLIGRAM(S): 20 TABLET, DELAYED RELEASE ORAL at 06:10

## 2021-08-03 RX ADMIN — WARFARIN SODIUM 1 MILLIGRAM(S): 2.5 TABLET ORAL at 22:09

## 2021-08-03 RX ADMIN — Medication 250 MILLIGRAM(S): at 17:09

## 2021-08-03 RX ADMIN — ATORVASTATIN CALCIUM 20 MILLIGRAM(S): 80 TABLET, FILM COATED ORAL at 22:09

## 2021-08-03 RX ADMIN — OXYCODONE AND ACETAMINOPHEN 1 TABLET(S): 5; 325 TABLET ORAL at 22:09

## 2021-08-03 RX ADMIN — Medication 5 MILLIGRAM(S): at 22:09

## 2021-08-03 NOTE — DISCHARGE NOTE PROVIDER - NSDCFUSCHEDAPPT_GEN_ALL_CORE_FT
RODNEY SANTO ; 08/10/2021 ; NP Med Mgmt OP [facility] RODNEY Diallo ; 08/16/2021 ; NPP Cardio [facility] RODNEY SANTO ; 08/10/2021 ; NP Med Mgmt OP [facility] RODNEY Diallo ; 09/02/2021 ; NP Cardio [address]

## 2021-08-03 NOTE — PROGRESS NOTE ADULT - ATTENDING COMMENTS
Seen in am  Suture removed  CXR reviewed  Interrogation showed slightly elevated thresholds. Reduced safety margin and turned off auto-threshold to increased longevity.  Ok DC from EP standpoint  Continue warfarin  INR check  Restart metoprolol home dose.
#Progress Note Handoff  Pending (specify):  follow up EP, EEG, orthostats  Family discussion: house staff updated pt family  Disposition: cardiac telemonitoring
#Progress Note Handoff  Pending (specify):  ppm monday, follow up EP  Family discussion: house staff updated pt family  Disposition: PPM monday
#Progress Note Handoff  Pending (specify):  PPM today, follow up EP  Family discussion: house staff updated pt family  Disposition: f9

## 2021-08-03 NOTE — ADVANCED PRACTICE NURSE CONSULT - ASSESSMENT
79 y/o male with a PMH of CHF, afib on coumadin followed by cardio Dr. Scherer with loop recorder in place, DVT, HTN, and COPD on 2L presents to the ED (7/11) from [facility] for evaluation of bradycardia. pt reports he is scheduled for a pacemaker due to bradycardia this monday by Dr. Colon and as per note from [facility] pt loop recorder was reading bradycardia so they thought it would be best if he was monitored in the hospital until pacemaker placed.  On admission: T(F): 97.9, HR: 51, BP: 131/61, RR: 18, SpO2: 97%. Trop 0.02, INR 3.58. EKG w/ afib and nonspecific ST abnormalities Patient seen by [facility] 7/22/21 during previous admission.     Received patient on 3C, sitting up in bed, eating lunch tray, HOB elevated 30 degrees. Pt awake, A&Ox3, made aware of purpose of [facility]OC[facility] visit, agreeable to consult.      Right heel intact.  Foam Allevyn dressing to left heel in place, dressing removed.     Type of wound: Deep tissue pressure injury (DTPI); resolving  Location: left heel   Wound measurements: 3cm x 1cm x 0cm, true depth indeterminable at this time   Tunneling/Undermining: none  Wound bed: presenting as resolving blood blister w/ dry, intact, tan-maroon colored tissue   Wound edges: attached, flush  Periwound: none  Wound exudate: none  Wound odor: none  Induration, warmth: none   Wound pain: denies & no s/s pain present on assessment    With minimal assistance, patient turned to left  side for sacral skin assessment.     Sacral & coccyx skin intact.     Bilateral buttock slightly denuded, scattered areas of small circular healed light pink tissue throughout lower buttock area.     Patient OOB w/ assistance, able to turn/position in bed w/ assistance, patient reports continent of urine and stool. Ordered for sodium & cholesterol restricted  diet, adequate intake as per reported Baljinder score.

## 2021-08-03 NOTE — ADVANCED PRACTICE NURSE CONSULT - RECOMMEDATIONS
1. DTPI L heel-Continue w/ current treatment w/ Allevyn dressing. Apply silicone foam Allevyn dressing to provide protective layer, cushion area, decrease friction & shearing, and prevent further skin breakdown. Change dressing q3d and prn for strike-through drainage or soiling.  -Offload heels from bed surface with soft pillow under calfs or by applying offloading boots to BLEs.   2. B/L buttock-Continue applying Coloplast Sugar Protect moisture barrier cream daily and prn after any soiling/each incontinent episode.    -Assess skin/wound qshift, report changes to primary provider.     Additional recs:  -Continue to assist patient w/ turning/positioning from side-to-side q2h while in bed, q1h when/if OOB chair, or in accordance w/ pt's plan of care. Continue utilizing pillows and/or z-fannie fluidized positioner to assist w/ turning/positioning. When/if OOB chair, utilize pillows or chair cushion to offload pressure.   -Continue utilizing one underpad underneath patient to wick away moisture from skin surface & contain any soiling/incontinence episodes; change pad when saturated/soiled. .   -Continue nutrition consult for optimal wound healing & nutritional status.     Plan of Care: Primary RN Ebony at bedside & made aware of above recs. Spoke w/ covering/primary MD (at 1440) in regards to above. Signing off on patient, no further needs/recs from [facility] service at this time. Staff RN to perform routine skin/wound assessment and manage wound care. Questions or concerns or if wound worsens and reconsult needed, please contact [facility], [phone].

## 2021-08-03 NOTE — DISCHARGE NOTE PROVIDER - NSDCFUADDAPPT_GEN_ALL_CORE_FT
- continue warfarin  If discharged, will need INR check on Friday 8/6  - No heavy lifting or exertional activities for 5-7days  - Can take a shower in 5days ,  no bathtub for 5 days, do not submerge yourself in water  - remove bandage on the chest in 5 days, do not wet the site  - FU in EP office with Katherine Wilson NP for wound check on 9/2 11:30am  follow up with Daphney Pierce MD office  [address] [place] [address], [address] [facility]  [phone]

## 2021-08-03 NOTE — PROGRESS NOTE ADULT - ASSESSMENT
81 y/o male with PMH CAD s/p PCI and stent 2007, HFpEF TTE in 7/17/2021 w/ EF 55-65, atrial fibrillation on coumadin, s/p ILR followed by Dr. Scherer, DVT, HTN, DLD and COPD on 2L presents to the ED from [facility] for evaluation of bradycardia.     # Persistent Afib, Tachy-selvin syndrome  - s/p Micra PPM by EP on 08/03/21  - No events on tele    # Episode of unresponsiveness  - Brief episode of unresponsiveness on 08/03  - Didn't lose muscle tone  - No focal deficit  - Orthostatic hypotension vs seizure  - Follow up rEEG  - tele monitoring    #HFpEF (stable)  #CAD s/p PCI and stent 2007  - recent admission on 07/07/2021 due to HF exacerbation   - TTE (7/11/2021) w/ EF 50-55%. Moderate to severe left atrial enlargement. Moderate mitral valve regurgitation. Moderate-severe tricuspid regurgitation.  Estimated pulmonary artery systolic pressure is 59.7 mmHg assuming a right atrial pressure of 10 mmHg, which is consistent with moderate pulmonary hypertension.  - c/w lasix 40 mg po daily   - monitor I/Os     # CAD s/p PCI and stent in 2007  # DLD  - c/w statin and ASA     # Afib   # Supra-therapeutic INR on admission  - on coumadin   - INR 3.58 on admission  - INR 2.03 today  - f/u daily INR  - c/w coumadin 1mg HS    # COPD not in exacerbation  - c/w 2 L NC (on 2L at home during night)  - c/w Symbicort and ventolin as needed    # CKD 3b  - Cr stable  - Monitor BMP  - avoid nephrotoxic medications    # Misc  - DVT Prophylaxis: on coumadin  - Diet: DASH  - GI Prophylaxis: Protonix  - Activity: as tolerated  - Code Status: full code

## 2021-08-03 NOTE — DISCHARGE NOTE PROVIDER - NSDCCPCAREPLAN_GEN_ALL_CORE_FT
PRINCIPAL DISCHARGE DIAGNOSIS  Diagnosis: Bradycardia  Assessment and Plan of Treatment: You were admitted to the hospital for pacemaker implanation for bradycardia. You had pacemaker implanted on 08/02/21. Please follow up with Dr. Pierce (electrophysiologist) within 4 weeks.   Seek immediate medical attention if you have chest pain, shortness of breath, palpitations, dizziness, syncope, blurring of vision, blackout, weakness or numbness of face or extremities.      SECONDARY DISCHARGE DIAGNOSES  Diagnosis: Other persistent atrial fibrillation  Assessment and Plan of Treatment: Atrial Fibrillation  Atrial fibrillation is a type of irregular heartbeat (arrhythmia) where the heart quivers continuously in a chaotic pattern that makes the heart unable to pump blood normally. This can increase the risk for stroke, heart failure, and other heart-related conditions. Atrial fibrillation can be caused by a variety of conditions and may be temporary, intermittent, or permanent. Symptoms include feeling that your heart is beating rapidly or irregularly, chest discomfort, shortness of breath, or dizziness/lightheadedness that may be worse with exertion. Treatment is varied but may involve medication or electrical shock (cardioversion).  SEEK IMMEDIATE MEDICAL CARE IF YOU HAVE ANY OF THE FOLLOWING SYMPTOMS: chest pain, shortness of breath, abdominal pain, sweating, vomiting, blood in vomit/bowel movements/urine, dizziness/lightheadedness, weakness or numbness to face/arm/leg, trouble speaking or understanding, facial droop.      Diagnosis: Hypertension  Assessment and Plan of Treatment: Hypertension  Hypertension, commonly called high blood pressure, is when the force of blood pumping through your arteries is too strong. Hypertension forces your heart to work harder to pump blood. Your arteries may become narrow or stiff. Having untreated or uncontrolled hypertension for a long period of time can cause heart attack, stroke, kidney disease, and other problems. If started on a medication, take exactly as prescribed by your health care professional. Maintain a healthy lifestyle and follow up with your primary care physician.  SEEK IMMEDIATE MEDICAL CARE IF YOU HAVE ANY OF THE FOLLOWING SYMPTOMS: severe headache, confusion, chest pain, abdominal pain, vomiting, or shortness of breath.      Diagnosis: COPD without exacerbation  Assessment and Plan of Treatment: Chronic Obstructive Pulmonary Disease  Chronic obstructive pulmonary disease (COPD) is a lung condition in which airflow from the lungs is limited. Causes include smoking, secondhand smoke exposure, genetics, or recurrent infections. Take all medicines (inhaled or pills) as directed by your health care provider. Avoid exposure to irritants such as smoke, chemicals, and fumes that aggravate your breathing.  If you are a smoker, the most important thing that you can do is stop smoking. Continuing to smoke will cause further lung damage and breathing trouble. Ask your health care provider for help with quitting smoking.  SEEK IMMEDIATE MEDICAL CARE IF YOU HAVE ANY OF THE FOLLOWING SYMPTOMS: shortness of breath at rest or when talking, bluish discoloration of lips, skin, fever, worsening cough, unexplained chest pain, or lightheadedness/dizziness.      Diagnosis: Chronic CHF  Assessment and Plan of Treatment: Congestive Heart Failure (CHF)  Congestive heart failure is a chronic condition in which the heart has trouble pumping blood. In some cases of heart failure, fluid may back up into your lungs or you may have swelling (edema) in your lower legs. There are many causes of heart failure including high blood pressure, coronary artery disease, abnormal heart valves, heart muscle disease, lung disease, diabetes, etc. Symptoms include shortness of breath with activity or when lying flat, cough, swelling of the legs, fatigue, or increased urination during the night.   Treatment is aimed at managing the symptoms of heart failure and may include lifestyle changes, medications, or surgical procedures. Take medicines only as directed by your health care provider and do not stop unless instructed to do so. Eat heart-healthy foods with low or no trans/saturated fats, cholesterol and salt. Weigh yourself every day for early recognition of fluid accumulation.  SEEK IMMEDIATE MEDICAL CARE IF YOU HAVE ANY OF THE FOLLOWING SYMPTOMS: shortness of breath, leg swelling, rapid increase in body weight, difficulty lying flat, change in mental status, chest pain, lightheadedness/dizziness/fainting, or worsening of symptoms including not being able to conduct normal physical activity.

## 2021-08-03 NOTE — DISCHARGE NOTE PROVIDER - HOSPITAL COURSE
81 y/o male with a PMH of CHF, afib on coumadin with loop recorder in place followed by cardio Dr. Scherer, DVT, HTN, and COPD on 2L presents to the ED from [facility] for evaluation of bradycardia. Pt was scheduled to get a PPM as loop recorder was reading bradycardia and frequent sinus pauses, patient was sent from Nursing home to the hospital for monitoring until pacemaker placed. On presentation patient was asymptomatic.   On admission: T(F): 97.9, HR: 51, BP: 131/61, RR: 18, SpO2: 97%. Trop 0.02, INR 3.58. EKG w/ afib and nonspecific ST abnormalities. Coumadin was held on admission d/t supratherapeutic INR, which was restarted after INR became < 3.   Patient had a leadless Medtronic Micra VR pacemaker implantation and loop recorder removal done on 08/02/21.  Patient is stable for discharge home with outpatient PCP, cardiology and EP follow up.

## 2021-08-03 NOTE — PHYSICAL THERAPY INITIAL EVALUATION ADULT - LEVEL OF CONSCIOUSNESS, REHAB EVAL
how ever became unresponsive to PT unable to respond or follow directions , patient placed in chair and rapid response called/alert

## 2021-08-03 NOTE — DISCHARGE NOTE PROVIDER - CARE PROVIDER_API CALL
Daphney Pierce)  Cardiac Electrophysiology; Cardiology; Internal Medicine  [address] [place] [address]  Phone: [phone]  Fax: [phone]  Follow Up Time: 2 weeks    Anali Scheerr)  Cardiovascular Disease; Internal Medicine  [address] [place] [address] [address]  [place] [address]  Phone: [phone]  Fax: [phone]  Follow Up Time: 2 weeks    John Page)  [address] [place], [place] [id number]  [address] [place] [address], [place] [address]  Phone: [phone]  Fax: [phone]  Follow Up Time: 2 weeks

## 2021-08-03 NOTE — PHYSICAL THERAPY INITIAL EVALUATION ADULT - PERTINENT HX OF CURRENT PROBLEM, REHAB EVAL
81 YO M with a PMH of obesity, CKD3, CAD s/p PCI and stent, COPD (2L home O2), HFpEF, DLD, and chronic Afib (coumadin) who was asked to present to the hospital by his cardiologist (Doris) because his loop recorder showed the pt having multiple episodes of Afib w/ junctional beats at a HR in the 30's. The pt denies any symptoms of CP, SOB, palpitations, cough, fevers/chills, LE swelling, ABD pain, or dizziness.

## 2021-08-03 NOTE — PROGRESS NOTE ADULT - SUBJECTIVE AND OBJECTIVE BOX
RODNEY SANTO 80y Male  MRN#: [id number]   CODE STATUS:    Hospital Day: 4d  Pt is currently admitted with the primary diagnosis of Afib with Tachy-selvin syndrome.     SUBJECTIVE  Hospital Course  79 y/o male with a PMH of CHF, afib on coumadin followed by cardio Dr. Scherer with loop recorder in place, DVT, HTN, and COPD on 2L presents to the ED from [facility] for evaluation of bradycardia. pt reports he is scheduled for a pacemaker due to bradycardia this monday by Dr. Colon and as per note from [facility] pt loop recorder was reading bradycardia so they thought it would be best if he was monitored in the hospital until pacemaker placed. As per pt he had been bradycardic for a while. pt denies dizziness, chest pain, sob, fever, chills, cough, weakness, visual changes, abdominal pain, n/v/d/c, weakness, numbness, or tingling.    On admission: T(F): 97.9, HR: 51, BP: 131/61, RR: 18, SpO2: 97%. Trop 0.02, INR 3.58. EKG w/ afib and nonspecific ST abnormalities.    (2021 00:12)      Overnight events/Subjective complaints  Patient was seen at the bedside this morning. He is lying comfortably on the bed. There were no acute events overnight.   He denies any chest pain, shortness of breath, cough, fever, chills, abdominal pain, nausea, vomiting, diarrhea, dizziness or headache.                                             ----------------------------------------------------------  OBJECTIVE  PAST MEDICAL & SURGICAL HISTORY  COPD (chronic obstructive pulmonary disease)    Hypertension    Deep vein thrombosis (DVT) of left lower extremity, unspecified chronicity, unspecified vein    Cellulitis of left lower extremity    Chronic atrial fibrillation    Mitral valve insufficiency, unspecified etiology  Moderate    CHF (congestive heart failure)    S/P coronary artery stent placement    History of total right knee replacement                                              -----------------------------------------------------------  ALLERGIES:  No Known Allergies                                            ------------------------------------------------------------    HOME MEDICATIONS  Home Medications:  aspirin 81 mg oral tablet, chewable: 1 tab(s) orally once a day (03 Aug 2021 06:25)  atorvastatin 20 mg oral tablet: 1 tab(s) orally once a day (2021 14:49)  nystatin 100,000 units/g topical powder: 1 application topically every 12 hours (2021 12:26)  polyethylene glycol 3350 oral powder for reconstitution: 17 gram(s) orally once a day (at bedtime) (2021 12:26)  spironolactone: 12.5 milligram(s) orally once a day (2021 15:20)  Symbicort 160 mcg-4.5 mcg/inh inhalation aerosol: 2 puff(s) inhaled 2 times a day (2021 14:49)  Ventolin HFA 90 mcg/inh inhalation aerosol: 2 puff(s) inhaled every 6 hours as neede for shortness of breath (2021 14:49)  warfarin 2 mg oral tablet: Take half a tablet Sun, Tues, Wed, Thurs, Fri, Sat.  Take a whole tablet on Mon (2021 14:49)                           MEDICATIONS:  STANDING MEDICATIONS  aspirin  chewable 81 milliGRAM(s) Oral daily  atorvastatin 20 milliGRAM(s) Oral at bedtime  budesonide 160 MICROgram(s)/formoterol 4.5 MICROgram(s) Inhaler 2 Puff(s) Inhalation two times a day  furosemide    Tablet 40 milliGRAM(s) Oral daily  melatonin 5 milliGRAM(s) Oral at bedtime  pantoprazole    Tablet 40 milliGRAM(s) Oral before breakfast  vancomycin  IVPB 1000 milliGRAM(s) IV Intermittent every 12 hours  warfarin 1 milliGRAM(s) Oral at bedtime    PRN MEDICATIONS  ALBUTerol    90 MICROgram(s) HFA Inhaler 2 Puff(s) Inhalation every 6 hours PRN  oxycodone    5 mG/acetaminophen 325 mG 1 Tablet(s) Oral every 6 hours PRN                                            ------------------------------------------------------------  VITAL SIGNS: Last 24 Hours  T(C): 36.4 (03 Aug 2021 05:19), Max: 36.4 (03 Aug 2021 05:19)  T(F): 97.6 (03 Aug 2021 05:19), Max: 97.6 (03 Aug 2021 05:19)  HR: 42 (03 Aug 2021 05:19) (35 - 62)  BP: 127/57 (03 Aug 2021 05:19) (122/60 - 156/78)  BP(mean): --  RR: 18 (03 Aug 2021 05:19) (18 - 19)  SpO2: 100% (03 Aug 2021 05:19) (97% - 100%)      21 @ 07:01  -  21 @ 07:00  --------------------------------------------------------  IN: 280 mL / OUT: 1150 mL / NET: -870 mL    21 @ 07:01  -  21 @ 13:51  --------------------------------------------------------  IN: 0 mL / OUT: 200 mL / NET: -200 mL    Daily Height in cm: 190.5 (02 Aug 2021 15:42), Height in cm: 190.5 (01 Aug 2021 15:16), Height in cm: 190.5 (2021 19:04)    Daily Weight in k.6 (02 Aug 2021 05:00), Weight in k.1 (01 Aug 2021 05:08)                                        --------------------------------------------------------------  LABS:                        11.2   6.66  )-----------( 185      ( 03 Aug 2021 07:21 )             35.6                         11.5   5.62  )-----------( 210      ( 02 Aug 2021 04:30 )             36.2                         11.2   4.57  )-----------( 244      ( 2021 09:05 )             36.3     08-03 @ 07:21    134<L>  |  98  |  40<H>  ----------------------------<  89  5.2<H>   |  27  |  1.4   @ 04:30    134<L>  |  97<L>  |  43<H>  ----------------------------<  87  4.4   |  29  |  1.4   @ 09:05    136  |  97<L>  |  54<H>  ----------------------------<  111<H>  3.9   |  30  |  1.4    Ca    9.0      08- @ 07:21  Ca    8.7      08 @ 04:30  Ca    8.7       @ 09:05  Mg     2.3     08-03  Mg     2.3     08-02  Mg     2.6     31    TPro  6.8  /  Alb  3.5  /  TBili  0.9  /  DBili  x   /  AST  29  /  ALT  18  /  AlkPhos  121<H>  0803  TPro  6.9  /  Alb  3.4<L>  /  TBili  1.1  /  DBili  x   /  AST  29  /  ALT  20  /  AlkPhos  117<H>  08-02  TPro  7.0  /  Alb  3.6  /  TBili  0.9  /  DBili  x   /  AST  27  /  ALT  17  /  AlkPhos  110  31    PT/INR - ( 03 Aug 2021 07:21 )   PT: 23.20 sec;   INR: 2.03 ratio         PT/INR - ( 02 Aug 2021 04:30 )   PT: 22.30 sec;   INR: 1.95 ratio         PT/INR - ( 01 Aug 2021 07:36 )   PT: 30.60 sec;   INR: 2.69 ratio         PTT - ( 02 Aug 2021 04:30 )  PTT:36.0 sec, PTT - ( 2021 09:05 )  PTT:46.4 sec, PTT - ( 2021 21:20 )  PTT:48.7 sec  Urinalysis Basic - ( 01 Aug 2021 16:30 )    Color: Yellow / Appearance: Clear / S.013 / pH: x  Gluc: x / Ketone: Negative  / Bili: Negative / Urobili: 3 mg/dL   Blood: x / Protein: Negative / Nitrite: Negative   Leuk Esterase: Negative / RBC: x / WBC x   Sq Epi: x / Non Sq Epi: x / Bacteria: x    Troponin T, Serum: 0.02 *H* ( @ 11:00)    CARDIAC MARKERS ( 21 @ 11:00 )  x     / 0.02 ng/mL / x     / x     / x      CARDIAC MARKERS ( 21 @ 09:05 )  x     / 0.02 ng/mL / x     / x     / x      CARDIAC MARKERS ( 21 @ 20:38 )  x     / 0.03 ng/mL / x     / x     / x                                                  -------------------------------------------------------------  RADIOLOGY:  Impression:  Right midlung scarring. Unchanged.                                            --------------------------------------------------------------    PHYSICAL EXAM:  General: No acute distress; Pallor (-), Icterus (-), Cyanosis (-), Clubbing (-)  HEENT: Normocephalic, atraumatic, PERRLA, EOMI  PULM: Bilaterally equal and clear breath sounds, wheeze (-), rubs (-), crackles (-)  CVS: Normal S1 and S2, murmurs (-), rubs (-), gallops (-)   GI: Soft, nondistended, nontender, BS +  MSK: Edema (-), no muscle, bone or joint tenderness noted;  SKIN: Warm and well perfused, Dressing + on left chest - no soakage, erythema or tenderness  NEURO:  Alert and Oriented x 3; No gross focal neurological deficit noted                                             -------------------------------------------------------------- RODNEY SANTO 80y Male  MRN#: [id number]   CODE STATUS:    Hospital Day: 4d  Pt is currently admitted with the primary diagnosis of Afib with Tachy-selvin syndrome.     SUBJECTIVE  Hospital Course  79 y/o male with a PMH of CHF, afib on coumadin followed by cardio Dr. Scherer with loop recorder in place, DVT, HTN, and COPD on 2L presents to the ED from [facility] for evaluation of bradycardia. pt reports he is scheduled for a pacemaker due to bradycardia this monday by Dr. Colon and as per note from [facility] pt loop recorder was reading bradycardia so they thought it would be best if he was monitored in the hospital until pacemaker placed. As per pt he had been bradycardic for a while. pt denies dizziness, chest pain, sob, fever, chills, cough, weakness, visual changes, abdominal pain, n/v/d/c, weakness, numbness, or tingling.    On admission: T(F): 97.9, HR: 51, BP: 131/61, RR: 18, SpO2: 97%. Trop 0.02, INR 3.58. EKG w/ afib and nonspecific ST abnormalities.    (2021 00:12)      Overnight events/Subjective complaints  Patient was seen at the bedside this morning. He is lying comfortably on the bed. There were no acute events overnight.   He denies any chest pain, shortness of breath, cough, fever, chills, abdominal pain, nausea, vomiting, diarrhea, dizziness or headache.       Attending Note: Pt seen and examined at bedside. No cp or sob. Later today pt had episode of unresponsiveness                                             ----------------------------------------------------------  OBJECTIVE  PAST MEDICAL & SURGICAL HISTORY  COPD (chronic obstructive pulmonary disease)    Hypertension    Deep vein thrombosis (DVT) of left lower extremity, unspecified chronicity, unspecified vein    Cellulitis of left lower extremity    Chronic atrial fibrillation    Mitral valve insufficiency, unspecified etiology  Moderate    CHF (congestive heart failure)    S/P coronary artery stent placement    History of total right knee replacement                                              -----------------------------------------------------------  ALLERGIES:  No Known Allergies                                            ------------------------------------------------------------    HOME MEDICATIONS  Home Medications:  aspirin 81 mg oral tablet, chewable: 1 tab(s) orally once a day (03 Aug 2021 06:25)  atorvastatin 20 mg oral tablet: 1 tab(s) orally once a day (2021 14:49)  nystatin 100,000 units/g topical powder: 1 application topically every 12 hours (2021 12:26)  polyethylene glycol 3350 oral powder for reconstitution: 17 gram(s) orally once a day (at bedtime) (2021 12:26)  spironolactone: 12.5 milligram(s) orally once a day (2021 15:20)  Symbicort 160 mcg-4.5 mcg/inh inhalation aerosol: 2 puff(s) inhaled 2 times a day (2021 14:49)  Ventolin HFA 90 mcg/inh inhalation aerosol: 2 puff(s) inhaled every 6 hours as neede for shortness of breath (2021 14:49)  warfarin 2 mg oral tablet: Take half a tablet Sun, Tues, Wed, Thurs, Fri, Sat.  Take a whole tablet on Mon (2021 14:49)                           MEDICATIONS:  STANDING MEDICATIONS  aspirin  chewable 81 milliGRAM(s) Oral daily  atorvastatin 20 milliGRAM(s) Oral at bedtime  budesonide 160 MICROgram(s)/formoterol 4.5 MICROgram(s) Inhaler 2 Puff(s) Inhalation two times a day  furosemide    Tablet 40 milliGRAM(s) Oral daily  melatonin 5 milliGRAM(s) Oral at bedtime  pantoprazole    Tablet 40 milliGRAM(s) Oral before breakfast  vancomycin  IVPB 1000 milliGRAM(s) IV Intermittent every 12 hours  warfarin 1 milliGRAM(s) Oral at bedtime    PRN MEDICATIONS  ALBUTerol    90 MICROgram(s) HFA Inhaler 2 Puff(s) Inhalation every 6 hours PRN  oxycodone    5 mG/acetaminophen 325 mG 1 Tablet(s) Oral every 6 hours PRN                                            ------------------------------------------------------------  VITAL SIGNS: Last 24 Hours  T(C): 36.4 (03 Aug 2021 05:19), Max: 36.4 (03 Aug 2021 05:19)  T(F): 97.6 (03 Aug 2021 05:19), Max: 97.6 (03 Aug 2021 05:19)  HR: 42 (03 Aug 2021 05:19) (35 - 62)  BP: 127/57 (03 Aug 2021 05:19) (122/60 - 156/78)  BP(mean): --  RR: 18 (03 Aug 2021 05:19) (18 - 19)  SpO2: 100% (03 Aug 2021 05:19) (97% - 100%)      21 @ 07:01  -  21 @ 07:00  --------------------------------------------------------  IN: 280 mL / OUT: 1150 mL / NET: -870 mL    21 @ 07:01  -  21 @ 13:51  --------------------------------------------------------  IN: 0 mL / OUT: 200 mL / NET: -200 mL    Daily Height in cm: 190.5 (02 Aug 2021 15:42), Height in cm: 190.5 (01 Aug 2021 15:16), Height in cm: 190.5 (2021 19:04)    Daily Weight in k.6 (02 Aug 2021 05:00), Weight in k.1 (01 Aug 2021 05:08)                                        --------------------------------------------------------------  LABS:                        11.2   6.66  )-----------( 185      ( 03 Aug 2021 07:21 )             35.6                         11.5   5.62  )-----------( 210      ( 02 Aug 2021 04:30 )             36.2                         11.2   4.57  )-----------( 244      ( 2021 09:05 )             36.3     08-03 @ 07:21    134<L>  |  98  |  40<H>  ----------------------------<  89  5.2<H>   |  27  |  1.4   @ 04:30    134<L>  |  97<L>  |  43<H>  ----------------------------<  87  4.4   |  29  |  1.4   @ 09:05    136  |  97<L>  |  54<H>  ----------------------------<  111<H>  3.9   |  30  |  1.4    Ca    9.0      08- @ 07:21  Ca    8.7      08 @ 04:30  Ca    8.7       @ 09:05  Mg     2.3     08  Mg     2.3     08  Mg     2.6     31    TPro  6.8  /  Alb  3.5  /  TBili  0.9  /  DBili  x   /  AST  29  /  ALT  18  /  AlkPhos  121<H>  0803  TPro  6.9  /  Alb  3.4<L>  /  TBili  1.1  /  DBili  x   /  AST  29  /  ALT  20  /  AlkPhos  117<H>  08  TPro  7.0  /  Alb  3.6  /  TBili  0.9  /  DBili  x   /  AST  27  /  ALT  17  /  AlkPhos  110      PT/INR - ( 03 Aug 2021 07:21 )   PT: 23.20 sec;   INR: 2.03 ratio         PT/INR - ( 02 Aug 2021 04:30 )   PT: 22.30 sec;   INR: 1.95 ratio         PT/INR - ( 01 Aug 2021 07:36 )   PT: 30.60 sec;   INR: 2.69 ratio         PTT - ( 02 Aug 2021 04:30 )  PTT:36.0 sec, PTT - ( 2021 09:05 )  PTT:46.4 sec, PTT - ( 2021 21:20 )  PTT:48.7 sec  Urinalysis Basic - ( 01 Aug 2021 16:30 )    Color: Yellow / Appearance: Clear / S.013 / pH: x  Gluc: x / Ketone: Negative  / Bili: Negative / Urobili: 3 mg/dL   Blood: x / Protein: Negative / Nitrite: Negative   Leuk Esterase: Negative / RBC: x / WBC x   Sq Epi: x / Non Sq Epi: x / Bacteria: x    Troponin T, Serum: 0.02 *H* ( @ 11:00)    CARDIAC MARKERS ( 21 @ 11:00 )  x     / 0.02 ng/mL / x     / x     / x      CARDIAC MARKERS ( 21 @ 09:05 )  x     / 0.02 ng/mL / x     / x     / x      CARDIAC MARKERS ( 21 @ 20:38 )  x     / 0.03 ng/mL / x     / x     / x                                                  -------------------------------------------------------------  RADIOLOGY:  Impression:  Right midlung scarring. Unchanged.                                            --------------------------------------------------------------    PHYSICAL EXAM:  General: No acute distress; Pallor (-), Icterus (-), Cyanosis (-), Clubbing (-)  HEENT: Normocephalic, atraumatic, PERRLA, EOMI  PULM: Bilaterally equal and clear breath sounds, wheeze (-), rubs (-), crackles (-)  CVS: Normal S1 and S2, murmurs (-), rubs (-), gallops (-)   GI: Soft, nondistended, nontender, BS +  MSK: Edema (-), no muscle, bone or joint tenderness noted;  SKIN: Warm and well perfused, Dressing + on left chest - no soakage, erythema or tenderness  NEURO:  Alert and Oriented x 3; No gross focal neurological deficit noted                                             --------------------------------------------------------------

## 2021-08-03 NOTE — PROGRESS NOTE ADULT - ASSESSMENT
Cardis: Dr Arnulfo Scherer  EP: Daphney Pierce MD    81 y/o male with PMH CAD s/p PCI and stent 2007, COPD on 2L home O2, HFpEF TTE in 4/21 EF 55-65, DLD, atrial fibrillation wit slow VR on coumadin, recent admission for HF exacerbation. Meds adjustment at that time with improvement of VR.  Now with bradycardia and multiple pauses on ILR  COVID neg 7.30      Chronic AF (coumadin) with Slow Ventricular Response and pauses on ILR  CHADS VASc of at least 4 (CHF, Age > 75, CAD) on Coumadin  NSVT on tele  HFpEF  CAD sp PCI  COPD on 2L O2  HLD        Patient S/P     Micra PPM  Patient is doing well  - continue warfarin  If discharged, will need INR check on Friday 8/6  - No heavy lifting or exertional activities for 5-7days  - Can take a shower in 5days ,  no bathtub for 5days, do not submerge yourself in water  remove bandage on the chest in 5 days, do not wet the site  - FU in EP office with Katherine Wilson NP for wound check on 9/2 11:30am  follow uo with Daphney Pierce MD office  [address] [place] [address], [address]  [phone]

## 2021-08-03 NOTE — DISCHARGE NOTE PROVIDER - PROVIDER TOKENS
PROVIDER:[TOKEN:[[id number]],FOLLOWUP:[2 weeks]],PROVIDER:[TOKEN:[[id number]],FOLLOWUP:[2 weeks]],PROVIDER:[TOKEN:[[id number]],FOLLOWUP:[2 weeks]]

## 2021-08-03 NOTE — DISCHARGE NOTE PROVIDER - CARE PROVIDERS DIRECT ADDRESSES
,[email or web address]

## 2021-08-03 NOTE — CONSULT NOTE ADULT - ASSESSMENT
IMPRESSION: Rehab of gait dysfunction / bradycardia    PRECAUTIONS: [   ] Cardiac  [   ] Respiratory  [   ] Seizures [   ] Contact Isolation  [   ] Droplet Isolation  [   ] Other    Weight Bearing Status:     RECOMMENDATION:    Out of Bed to Chair     DVT/Decubiti Prophylaxis    REHAB PLAN:     [ x   ] Bedside P/T 3-5 times a week   [    ]   Bedside O/T  2-3 times a week             [    ] Speech Therapy               [    ]  No Rehab Therapy Indicated   Conditioning/ROM                                    ADL  Bed Mobility                                               Conditioning/ROM  Transfers                                                     Bed Mobility  Sitting /Standing Balance                         Transfers                                        Gait Training                                               Sitting/Standing Balance  Stair Training [   ]Applicable                    Home equipment Eval                                                                        Splinting  [   ] Only      GOALS:   ADL   [    ]   Independent                    Transfers  [ x   ] Independent                          Ambulation  [  x  ] Independent     [   x  ] With device                            [    ]  CG                                                         [    ]  CG                                                                  [    ] CG                            [    ] Min A                                                   [    ] Min A                                                              [    ] Min  A          DISCHARGE PLAN:   [    ]  Good candidate for Intensive Rehabilitation/Hospital based                                             Will tolerate 3hrs Intensive Rehab Daily                                       [ x    ]  Short Term Rehab in Skilled Nursing Facility                                       [     ]  Home with Outpatient or VN services                                         [     ]  Possible Candidate for Intensive Hospital based Rehab                                        IMPRESSION: Rehab of gait dysfunction / bradycardia, s/p PPM implant     PRECAUTIONS: [   ] Cardiac  [   ] Respiratory  [   ] Seizures [   ] Contact Isolation  [   ] Droplet Isolation  [   ] Other    Weight Bearing Status:     RECOMMENDATION:    Out of Bed to Chair     DVT/Decubiti Prophylaxis    REHAB PLAN:     [ x   ] Bedside P/T 3-5 times a week   [    ]   Bedside O/T  2-3 times a week             [    ] Speech Therapy               [    ]  No Rehab Therapy Indicated   Conditioning/ROM                                    ADL  Bed Mobility                                               Conditioning/ROM  Transfers                                                     Bed Mobility  Sitting /Standing Balance                         Transfers                                        Gait Training                                               Sitting/Standing Balance  Stair Training [   ]Applicable                    Home equipment Eval                                                                        Splinting  [   ] Only      GOALS:   ADL   [    ]   Independent                    Transfers  [ x   ] Independent                          Ambulation  [  x  ] Independent     [   x  ] With device                            [    ]  CG                                                         [    ]  CG                                                                  [    ] CG                            [    ] Min A                                                   [    ] Min A                                                              [    ] Min  A          DISCHARGE PLAN:   [    ]  Good candidate for Intensive Rehabilitation/Hospital based                                             Will tolerate 3hrs Intensive Rehab Daily                                       [ x    ]  Short Term Rehab in Skilled Nursing Facility                                       [     ]  Home with Outpatient or  services                                         [     ]  Possible Candidate for Intensive Hospital based Rehab

## 2021-08-03 NOTE — DISCHARGE NOTE PROVIDER - NSDCMRMEDTOKEN_GEN_ALL_CORE_FT
aspirin 81 mg oral tablet, chewable: 1 tab(s) orally once a day  atorvastatin 20 mg oral tablet: 1 tab(s) orally once a day  nystatin 100,000 units/g topical powder: 1 application topically every 12 hours  polyethylene glycol 3350 oral powder for reconstitution: 17 gram(s) orally once a day (at bedtime)  spironolactone: 12.5 milligram(s) orally once a day  Symbicort 160 mcg-4.5 mcg/inh inhalation aerosol: 2 puff(s) inhaled 2 times a day  torsemide 20 mg oral tablet: 1 tab(s) orally once a day  Ventolin HFA 90 mcg/inh inhalation aerosol: 2 puff(s) inhaled every 6 hours as neede for shortness of breath  warfarin 2 mg oral tablet: Take half a tablet Sun, Tues, Wed, Thurs, Fri, Sat.  Take a whole tablet on Mon   aspirin 81 mg oral tablet, chewable: 1 tab(s) orally once a day  atorvastatin 20 mg oral tablet: 1 tab(s) orally once a day  melatonin 5 mg oral tablet: 1 tab(s) orally once a day (at bedtime)  nystatin 100,000 units/g topical powder: 1 application topically every 12 hours  polyethylene glycol 3350 oral powder for reconstitution: 17 gram(s) orally once a day (at bedtime)  spironolactone: 12.5 milligram(s) orally once a day  Symbicort 160 mcg-4.5 mcg/inh inhalation aerosol: 2 puff(s) inhaled 2 times a day  torsemide 20 mg oral tablet: 1 tab(s) orally once a day  Ventolin HFA 90 mcg/inh inhalation aerosol: 2 puff(s) inhaled every 6 hours as neede for shortness of breath  warfarin 2 mg oral tablet: Take half a tablet Sun, Tues, Wed, Thurs, Fri, Sat.  Take a whole tablet on Mon

## 2021-08-03 NOTE — CONSULT NOTE ADULT - SUBJECTIVE AND OBJECTIVE BOX
HPI:  81 y/o male with a PMH of CHF, afib on coumadin followed by cardio Dr. Scherer with loop recorder in place, DVT, HTN, and COPD on 2L presents to the ED from [facility] for evaluation of bradycardia. pt reports he is scheduled for a pacemaker due to bradycardia this monday by Dr. Colon and as per note from [facility] pt loop recorder was reading bradycardia so they thought it would be best if he was monitored in the hospital until pacemaker placed. As per pt he had been bradycardic for a while. pt denies dizziness, chest pain, sob, fever, chills, cough, weakness, visual changes, abdominal pain, n/v/d/c, weakness, numbness, or tingling.    On admission: T(F): 97.9, HR: 51, BP: 131/61, RR: 18, SpO2: 97%. Trop 0.02, INR 3.58. EKG w/ afib and nonspecific ST abnormalities           PAST MEDICAL & SURGICAL HISTORY:  COPD (chronic obstructive pulmonary disease)    Hypertension    Deep vein thrombosis (DVT) of left lower extremity, unspecified chronicity, unspecified vein    Cellulitis of left lower extremity    Chronic atrial fibrillation    Mitral valve insufficiency, unspecified etiology  Moderate    CHF (congestive heart failure)    S/P coronary artery stent placement    History of total right knee replacement        Hospital Course:    TODAY'S SUBJECTIVE & REVIEW OF SYMPTOMS:     Constitutional WNL   Cardio WNL   Resp WNL   GI WNL  Heme WNL  Endo WNL  Skin WNL  MSK Weakness  Neuro WNL  Cognitive WNL  Psych WNL      MEDICATIONS  (STANDING):  aspirin  chewable 81 milliGRAM(s) Oral daily  atorvastatin 20 milliGRAM(s) Oral at bedtime  budesonide 160 MICROgram(s)/formoterol 4.5 MICROgram(s) Inhaler 2 Puff(s) Inhalation two times a day  furosemide    Tablet 40 milliGRAM(s) Oral daily  melatonin 5 milliGRAM(s) Oral at bedtime  pantoprazole    Tablet 40 milliGRAM(s) Oral before breakfast  vancomycin  IVPB 1000 milliGRAM(s) IV Intermittent every 12 hours  warfarin 1 milliGRAM(s) Oral once    MEDICATIONS  (PRN):  ALBUTerol    90 MICROgram(s) HFA Inhaler 2 Puff(s) Inhalation every 6 hours PRN Shortness of Breath and/or Wheezing  oxycodone    5 mG/acetaminophen 325 mG 1 Tablet(s) Oral every 6 hours PRN Moderate Pain (4 - 6)      FAMILY HISTORY:      Allergies    No Known Allergies    Intolerances        SOCIAL HISTORY:    [  ] Etoh  [  ] Smoking  [  ] Substance abuse     Home Environment:  [   ] Home Alone  [   ] Lives with Family  [   ] Home Health Aid    Dwelling:  [   ] Apartment  [   ] Private House  [   ] Adult Home  [   ] Skilled Nursing Facility      [x   ] Short Term  [   ] Long Term  [   ] Stairs       Elevator [   ]    FUNCTIONAL STATUS PTA: (Check all that apply)  Ambulation: [    ]Independent    [x   ] Dependent     [   ] Non-Ambulatory  Assistive Device: [   ] SA Cane  [   ]  Q Cane  [ x  ] Walker  [   ]  Wheelchair  ADL : [   ] Independent  [x    ]  Dependent       Vital Signs Last 24 Hrs  T(C): 36.4 (03 Aug 2021 05:19), Max: 36.4 (03 Aug 2021 05:19)  T(F): 97.6 (03 Aug 2021 05:19), Max: 97.6 (03 Aug 2021 05:19)  HR: 42 (03 Aug 2021 05:19) (35 - 62)  BP: 127/57 (03 Aug 2021 05:19) (122/60 - 156/78)  BP(mean): --  RR: 18 (03 Aug 2021 05:19) (18 - 19)  SpO2: 100% (03 Aug 2021 05:19) (97% - 100%)      PHYSICAL EXAM: Awake & Alert  GENERAL: NAD  HEAD:  Normocephalic  CHEST/LUNG: Clear   HEART: S1S2+  ABDOMEN: Soft, Nontender  EXTREMITIES:  no calf tenderness    NERVOUS SYSTEM:  Cranial Nerves 2-12 intact [   ] Abnormal  [   ]  ROM: WFL all extremities [ x  ]  Abnormal [   ]  Motor Strength: WFL all extremities  [   ]  Abnormal [ x  ]4/5 all ext  Sensation: intact to light touch [  x ] Abnormal [   ]    FUNCTIONAL STATUS:  Bed Mobility: Independent [   ]  Supervision [   ]  Needs Assistance [ x  ]  N/A [   ]  Transfers: Independent [   ]  Supervision [   ]  Needs Assistance [ x  ]  N/A [   ]   Ambulation: Independent [   ]  Supervision [   ]  Needs Assistance [  x ]  N/A [   ]  ADL: Independent [   ] Requires Assistance [   ] N/A [   ]      LABS:                        11.2   6.66  )-----------( 185      ( 03 Aug 2021 07:21 )             35.6     08-03    134<L>  |  98  |  40<H>  ----------------------------<  89  5.2<H>   |  27  |  1.4    Ca    9.0      03 Aug 2021 07:21  Mg     2.3     -    TPro  6.8  /  Alb  3.5  /  TBili  0.9  /  DBili  x   /  AST  29  /  ALT  18  /  AlkPhos  121<H>  -    PT/INR - ( 03 Aug 2021 07:21 )   PT: 23.20 sec;   INR: 2.03 ratio         PTT - ( 02 Aug 2021 04:30 )  PTT:36.0 sec  Urinalysis Basic - ( 01 Aug 2021 16:30 )    Color: Yellow / Appearance: Clear / S.013 / pH: x  Gluc: x / Ketone: Negative  / Bili: Negative / Urobili: 3 mg/dL   Blood: x / Protein: Negative / Nitrite: Negative   Leuk Esterase: Negative / RBC: x / WBC x   Sq Epi: x / Non Sq Epi: x / Bacteria: x        RADIOLOGY & ADDITIONAL STUDIES:   HPI:  81 y/o male with a PMH of CHF, afib on coumadin followed by cardio Dr. Scherer with loop recorder in place, DVT, HTN, and COPD on 2L presents to the ED from [facility] for evaluation of bradycardia. pt reports he is scheduled for a pacemaker due to bradycardia this monday by Dr. Colon and as per note from [facility] pt loop recorder was reading bradycardia so they thought it would be best if he was monitored in the hospital until pacemaker placed. As per pt he had been bradycardic for a while. pt denies dizziness, chest pain, sob, fever, chills, cough, weakness, visual changes, abdominal pain, n/v/d/c, weakness, numbness, or tingling.    On admission: T(F): 97.9, HR: 51, BP: 131/61, RR: 18, SpO2: 97%. Trop 0.02, INR 3.58. EKG w/ afib and nonspecific ST abnormalities. s/p PPM implant on           PAST MEDICAL & SURGICAL HISTORY:  COPD (chronic obstructive pulmonary disease)    Hypertension    Deep vein thrombosis (DVT) of left lower extremity, unspecified chronicity, unspecified vein    Cellulitis of left lower extremity    Chronic atrial fibrillation    Mitral valve insufficiency, unspecified etiology  Moderate    CHF (congestive heart failure)    S/P coronary artery stent placement    History of total right knee replacement        Hospital Course:    TODAY'S SUBJECTIVE & REVIEW OF SYMPTOMS:     Constitutional WNL   Cardio WNL   Resp WNL   GI WNL  Heme WNL  Endo WNL  Skin WNL  MSK Weakness  Neuro WNL  Cognitive WNL  Psych WNL      MEDICATIONS  (STANDING):  aspirin  chewable 81 milliGRAM(s) Oral daily  atorvastatin 20 milliGRAM(s) Oral at bedtime  budesonide 160 MICROgram(s)/formoterol 4.5 MICROgram(s) Inhaler 2 Puff(s) Inhalation two times a day  furosemide    Tablet 40 milliGRAM(s) Oral daily  melatonin 5 milliGRAM(s) Oral at bedtime  pantoprazole    Tablet 40 milliGRAM(s) Oral before breakfast  vancomycin  IVPB 1000 milliGRAM(s) IV Intermittent every 12 hours  warfarin 1 milliGRAM(s) Oral once    MEDICATIONS  (PRN):  ALBUTerol    90 MICROgram(s) HFA Inhaler 2 Puff(s) Inhalation every 6 hours PRN Shortness of Breath and/or Wheezing  oxycodone    5 mG/acetaminophen 325 mG 1 Tablet(s) Oral every 6 hours PRN Moderate Pain (4 - 6)      FAMILY HISTORY:      Allergies    No Known Allergies    Intolerances        SOCIAL HISTORY:    [  ] Etoh  [  ] Smoking  [  ] Substance abuse     Home Environment:  [   ] Home Alone  [   ] Lives with Family  [   ] Home Health Aid    Dwelling:  [   ] Apartment  [   ] Private House  [   ] Adult Home  [   ] Skilled Nursing Facility      [x   ] Short Term  [   ] Long Term  [   ] Stairs       Elevator [   ]    FUNCTIONAL STATUS PTA: (Check all that apply)  Ambulation: [    ]Independent    [x   ] Dependent     [   ] Non-Ambulatory  Assistive Device: [   ] SA Cane  [   ]  Q Cane  [ x  ] Walker  [   ]  Wheelchair  ADL : [   ] Independent  [x    ]  Dependent       Vital Signs Last 24 Hrs  T(C): 36.4 (03 Aug 2021 05:19), Max: 36.4 (03 Aug 2021 05:19)  T(F): 97.6 (03 Aug 2021 05:19), Max: 97.6 (03 Aug 2021 05:19)  HR: 42 (03 Aug 2021 05:19) (35 - 62)  BP: 127/57 (03 Aug 2021 05:19) (122/60 - 156/78)  BP(mean): --  RR: 18 (03 Aug 2021 05:19) (18 - 19)  SpO2: 100% (03 Aug 2021 05:19) (97% - 100%)      PHYSICAL EXAM: Awake & Alert  GENERAL: NAD  HEAD:  Normocephalic  CHEST/LUNG: Clear   HEART: S1S2+  ABDOMEN: Soft, Nontender  EXTREMITIES:  no calf tenderness    NERVOUS SYSTEM:  Cranial Nerves 2-12 intact [   ] Abnormal  [   ]  ROM: WFL all extremities [ x  ]  Abnormal [   ]  Motor Strength: WFL all extremities  [   ]  Abnormal [ x  ]4/5 all ext  Sensation: intact to light touch [  x ] Abnormal [   ]    FUNCTIONAL STATUS:  Bed Mobility: Independent [   ]  Supervision [   ]  Needs Assistance [ x  ]  N/A [   ]  Transfers: Independent [   ]  Supervision [   ]  Needs Assistance [ x  ]  N/A [   ]   Ambulation: Independent [   ]  Supervision [   ]  Needs Assistance [  x ]  N/A [   ]  ADL: Independent [   ] Requires Assistance [   ] N/A [   ]      LABS:                        11.2   6.66  )-----------( 185      ( 03 Aug 2021 07:21 )             35.6     08-    134<L>  |  98  |  40<H>  ----------------------------<  89  5.2<H>   |  27  |  1.4    Ca    9.0      03 Aug 2021 07:21  Mg     2.3     08-    TPro  6.8  /  Alb  3.5  /  TBili  0.9  /  DBili  x   /  AST  29  /  ALT  18  /  AlkPhos  121<H>  08-03    PT/INR - ( 03 Aug 2021 07:21 )   PT: 23.20 sec;   INR: 2.03 ratio         PTT - ( 02 Aug 2021 04:30 )  PTT:36.0 sec  Urinalysis Basic - ( 01 Aug 2021 16:30 )    Color: Yellow / Appearance: Clear / S.013 / pH: x  Gluc: x / Ketone: Negative  / Bili: Negative / Urobili: 3 mg/dL   Blood: x / Protein: Negative / Nitrite: Negative   Leuk Esterase: Negative / RBC: x / WBC x   Sq Epi: x / Non Sq Epi: x / Bacteria: x        RADIOLOGY & ADDITIONAL STUDIES:

## 2021-08-03 NOTE — DISCHARGE NOTE PROVIDER - NSDCQMAMI_CARD_ALL_CORE
Personalized Preventive Plan for Celso Sawyer - 12/1/2020  Medicare offers a range of preventive health benefits. Some of the tests and screenings are paid in full while other may be subject to a deductible, co-insurance, and/or copay. Some of these benefits include a comprehensive review of your medical history including lifestyle, illnesses that may run in your family, and various assessments and screenings as appropriate. After reviewing your medical record and screening and assessments performed today your provider may have ordered immunizations, labs, imaging, and/or referrals for you. A list of these orders (if applicable) as well as your Preventive Care list are included within your After Visit Summary for your review. Other Preventive Recommendations:    · A preventive eye exam performed by an eye specialist is recommended every 1-2 years to screen for glaucoma; cataracts, macular degeneration, and other eye disorders. · A preventive dental visit is recommended every 6 months. · Try to get at least 150 minutes of exercise per week or 10,000 steps per day on a pedometer . · Order or download the FREE \"Exercise & Physical Activity: Your Everyday Guide\" from [employer]. Call 6-678.234.9125 or search [employer] online. · You need 4174-5032 mg of calcium and 5910-4516 IU of vitamin D per day. It is possible to meet your calcium requirement with diet alone, but a vitamin D supplement is usually necessary to meet this goal.  · When exposed to the sun, use a sunscreen that protects against both UVA and UVB radiation with an SPF of 30 or greater. Reapply every 2 to 3 hours or after sweating, drying off with a towel, or swimming. · Always wear a seat belt when traveling in a car. Always wear a helmet when riding a bicycle or motorcycle.
No

## 2021-08-03 NOTE — PROGRESS NOTE ADULT - SUBJECTIVE AND OBJECTIVE BOX
INTERVAL HPI/OVERNIGHT EVENTS:    Patient s/p    Micra PPM implant .   No events over night  Patient in NSR    MEDICATIONS  (STANDING):  aspirin  chewable 81 milliGRAM(s) Oral daily  atorvastatin 20 milliGRAM(s) Oral at bedtime  budesonide 160 MICROgram(s)/formoterol 4.5 MICROgram(s) Inhaler 2 Puff(s) Inhalation two times a day  furosemide    Tablet 40 milliGRAM(s) Oral daily  melatonin 5 milliGRAM(s) Oral at bedtime  pantoprazole    Tablet 40 milliGRAM(s) Oral before breakfast  vancomycin  IVPB 1000 milliGRAM(s) IV Intermittent every 12 hours  warfarin 1 milliGRAM(s) Oral at bedtime    MEDICATIONS  (PRN):  ALBUTerol    90 MICROgram(s) HFA Inhaler 2 Puff(s) Inhalation every 6 hours PRN Shortness of Breath and/or Wheezing  oxycodone    5 mG/acetaminophen 325 mG 1 Tablet(s) Oral every 6 hours PRN Moderate Pain (4 - 6)      Allergies    No Known Allergies    Intolerances          Vital Signs Last 24 Hrs  T(C): 36.4 (03 Aug 2021 13:53), Max: 36.4 (03 Aug 2021 05:19)  T(F): 97.6 (03 Aug 2021 13:53), Max: 97.6 (03 Aug 2021 05:19)  HR: 50 (03 Aug 2021 13:53) (35 - 62)  BP: 108/53 (03 Aug 2021 13:53) (108/53 - 156/78)  BP(mean): --  RR: 18 (03 Aug 2021 13:53) (18 - 19)  SpO2: 100% (03 Aug 2021 05:19) (97% - 100%)    GENERAL: In no apparent distress, well nourished, and hydrated.  HEART: Regular rate and rhythm; No murmurs, rubs, or gallops.  PULMONARY: Clear to auscultation and perfusion.  No rales, wheezing, or rhonchi bilaterally.  ABDOMEN: Soft, Nontender, Nondistended; Bowel sounds present  EXTREMITIES:  2+ Peripheral Pulses, No clubbing, cyanosis, or edema  groins No hematoma, no bleeding; stiches removed  NEUROLOGICAL: Grossly nonfocal    LABS:                        11.2   6.66  )-----------( 185      ( 03 Aug 2021 07:21 )             35.6     08-    134<L>  |  98  |  40<H>  ----------------------------<  89  5.2<H>   |  27  |  1.4    Ca    9.0      03 Aug 2021 07:21  Mg     2.3     08-    TPro  6.8  /  Alb  3.5  /  TBili  0.9  /  DBili  x   /  AST  29  /  ALT  18  /  AlkPhos  121<H>  08-03    PT/INR - ( 03 Aug 2021 07:21 )   PT: 23.20 sec;   INR: 2.03 ratio         PTT - ( 02 Aug 2021 04:30 )  PTT:36.0 sec  Urinalysis Basic - ( 01 Aug 2021 16:30 )    Color: Yellow / Appearance: Clear / S.013 / pH: x  Gluc: x / Ketone: Negative  / Bili: Negative / Urobili: 3 mg/dL   Blood: x / Protein: Negative / Nitrite: Negative   Leuk Esterase: Negative / RBC: x / WBC x   Sq Epi: x / Non Sq Epi: x / Bacteria: x        EKG:      < from: Xray Chest 1 View- PORTABLE-Urgent (Xray Chest 1 View- PORTABLE-Urgent .) (21 @ 10:19) >  Findings:    Support devices: Telemetry leads are seen    Cardiac/mediastinum/hilum: Without difference    Lung parenchyma/Pleura: Right midlung scarring.    Skeleton/soft tissues: Unremarkable.    Impression:    Right midlung scarring. Unchanged.    < end of copied text >

## 2021-08-03 NOTE — CHART NOTE - NSCHARTNOTEFT_GEN_A_CORE
Rapid response was called because of brief period of blank stare and unresponsiveness while patient was transferred from bed to chair while working with PT. No convulsive activity noted. Vitals: /55, HR - 55, SpO2 - 92% on RA, RR - 16/min, T- 98F. Patient quickly regained consciousness and returned back to baseline, patient denies feeling any dizziness, palpitations, lightheadedness, vertigo or headache. On exam - Patient sitting comfortably on chair, alert and oriented to time, place and person; No focal neurological deficit noted, bilateral clear breath sounds, No murmurs noted. FS - 86.     Assessment and plan:   - Brief period of unresponsiveness/blank stare  - orthostatic hypotension vs seizure  - No events noted on telemetry  - Check orthostatic vitals  - FS - 86  - check rEEG  - EP follow up  - Tele monitoring

## 2021-08-03 NOTE — PHYSICAL THERAPY INITIAL EVALUATION ADULT - GENERAL OBSERVATIONS, REHAB EVAL
[phone] Patient encountered semi egan in bed + IV lock + NCo2@2L . RAPID RESPONSE  called during session  /52  HR 49 bpm , pox86% on 3L o2,  patient became unresponsive and returned to baseline after placed in seated position

## 2021-08-04 LAB
ALBUMIN SERPL ELPH-MCNC: 3.2 G/DL — LOW (ref 3.5–5.2)
ALP SERPL-CCNC: 117 U/L — HIGH (ref 30–115)
ALT FLD-CCNC: 16 U/L — SIGNIFICANT CHANGE UP (ref 0–41)
ANION GAP SERPL CALC-SCNC: 9 MMOL/L — SIGNIFICANT CHANGE UP (ref 7–14)
AST SERPL-CCNC: 23 U/L — SIGNIFICANT CHANGE UP (ref 0–41)
BASOPHILS # BLD AUTO: 0.02 K/UL — SIGNIFICANT CHANGE UP (ref 0–0.2)
BASOPHILS NFR BLD AUTO: 0.4 % — SIGNIFICANT CHANGE UP (ref 0–1)
BILIRUB SERPL-MCNC: 0.8 MG/DL — SIGNIFICANT CHANGE UP (ref 0.2–1.2)
BUN SERPL-MCNC: 34 MG/DL — HIGH (ref 10–20)
CALCIUM SERPL-MCNC: 8.4 MG/DL — LOW (ref 8.5–10.1)
CHLORIDE SERPL-SCNC: 99 MMOL/L — SIGNIFICANT CHANGE UP (ref 98–110)
CO2 SERPL-SCNC: 29 MMOL/L — SIGNIFICANT CHANGE UP (ref 17–32)
CREAT SERPL-MCNC: 1.4 MG/DL — SIGNIFICANT CHANGE UP (ref 0.7–1.5)
EOSINOPHIL # BLD AUTO: 0.21 K/UL — SIGNIFICANT CHANGE UP (ref 0–0.7)
EOSINOPHIL NFR BLD AUTO: 3.8 % — SIGNIFICANT CHANGE UP (ref 0–8)
GLUCOSE SERPL-MCNC: 84 MG/DL — SIGNIFICANT CHANGE UP (ref 70–99)
HCT VFR BLD CALC: 32.1 % — LOW (ref 42–52)
HGB BLD-MCNC: 10 G/DL — LOW (ref 14–18)
IMM GRANULOCYTES NFR BLD AUTO: 0.4 % — HIGH (ref 0.1–0.3)
INR BLD: 2.77 RATIO — HIGH (ref 0.65–1.3)
LYMPHOCYTES # BLD AUTO: 0.64 K/UL — LOW (ref 1.2–3.4)
LYMPHOCYTES # BLD AUTO: 11.7 % — LOW (ref 20.5–51.1)
MAGNESIUM SERPL-MCNC: 2.2 MG/DL — SIGNIFICANT CHANGE UP (ref 1.8–2.4)
MCHC RBC-ENTMCNC: 28.5 PG — SIGNIFICANT CHANGE UP (ref 27–31)
MCHC RBC-ENTMCNC: 31.2 G/DL — LOW (ref 32–37)
MCV RBC AUTO: 91.5 FL — SIGNIFICANT CHANGE UP (ref 80–94)
MONOCYTES # BLD AUTO: 0.66 K/UL — HIGH (ref 0.1–0.6)
MONOCYTES NFR BLD AUTO: 12.1 % — HIGH (ref 1.7–9.3)
NEUTROPHILS # BLD AUTO: 3.92 K/UL — SIGNIFICANT CHANGE UP (ref 1.4–6.5)
NEUTROPHILS NFR BLD AUTO: 71.6 % — SIGNIFICANT CHANGE UP (ref 42.2–75.2)
NRBC # BLD: 0 /100 WBCS — SIGNIFICANT CHANGE UP (ref 0–0)
PLATELET # BLD AUTO: 166 K/UL — SIGNIFICANT CHANGE UP (ref 130–400)
POTASSIUM SERPL-MCNC: 4.3 MMOL/L — SIGNIFICANT CHANGE UP (ref 3.5–5)
POTASSIUM SERPL-SCNC: 4.3 MMOL/L — SIGNIFICANT CHANGE UP (ref 3.5–5)
PROT SERPL-MCNC: 6.4 G/DL — SIGNIFICANT CHANGE UP (ref 6–8)
PROTHROM AB SERPL-ACNC: 31.5 SEC — HIGH (ref 9.95–12.87)
RBC # BLD: 3.51 M/UL — LOW (ref 4.7–6.1)
RBC # FLD: 20.2 % — HIGH (ref 11.5–14.5)
SODIUM SERPL-SCNC: 137 MMOL/L — SIGNIFICANT CHANGE UP (ref 135–146)
WBC # BLD: 5.47 K/UL — SIGNIFICANT CHANGE UP (ref 4.8–10.8)
WBC # FLD AUTO: 5.47 K/UL — SIGNIFICANT CHANGE UP (ref 4.8–10.8)

## 2021-08-04 PROCEDURE — 99233 SBSQ HOSP IP/OBS HIGH 50: CPT

## 2021-08-04 PROCEDURE — 95816 EEG AWAKE AND DROWSY: CPT | Mod: 26

## 2021-08-04 RX ORDER — LANOLIN ALCOHOL/MO/W.PET/CERES
1 CREAM (GRAM) TOPICAL
Qty: 0 | Refills: 0 | DISCHARGE
Start: 2021-08-04

## 2021-08-04 RX ADMIN — OXYCODONE AND ACETAMINOPHEN 1 TABLET(S): 5; 325 TABLET ORAL at 10:00

## 2021-08-04 RX ADMIN — PANTOPRAZOLE SODIUM 40 MILLIGRAM(S): 20 TABLET, DELAYED RELEASE ORAL at 06:15

## 2021-08-04 RX ADMIN — Medication 40 MILLIGRAM(S): at 06:15

## 2021-08-04 RX ADMIN — Medication 81 MILLIGRAM(S): at 11:55

## 2021-08-04 RX ADMIN — OXYCODONE AND ACETAMINOPHEN 1 TABLET(S): 5; 325 TABLET ORAL at 20:47

## 2021-08-04 RX ADMIN — OXYCODONE AND ACETAMINOPHEN 1 TABLET(S): 5; 325 TABLET ORAL at 21:15

## 2021-08-04 RX ADMIN — Medication 5 MILLIGRAM(S): at 21:44

## 2021-08-04 RX ADMIN — OXYCODONE AND ACETAMINOPHEN 1 TABLET(S): 5; 325 TABLET ORAL at 09:21

## 2021-08-04 RX ADMIN — BUDESONIDE AND FORMOTEROL FUMARATE DIHYDRATE 2 PUFF(S): 160; 4.5 AEROSOL RESPIRATORY (INHALATION) at 11:56

## 2021-08-04 RX ADMIN — WARFARIN SODIUM 1 MILLIGRAM(S): 2.5 TABLET ORAL at 21:44

## 2021-08-04 RX ADMIN — ATORVASTATIN CALCIUM 20 MILLIGRAM(S): 80 TABLET, FILM COATED ORAL at 21:44

## 2021-08-04 NOTE — OCCUPATIONAL THERAPY INITIAL EVALUATION ADULT - PHYSICAL ASSIST/NONPHYSICAL ASSIST: SIT/STAND, REHAB EVAL
OT ant to pt. Attempt made to take pt BP in standing but pt unable to tolerate standing after ~5 seconds./verbal cues/1 person assist

## 2021-08-04 NOTE — PROGRESS NOTE ADULT - SUBJECTIVE AND OBJECTIVE BOX
RODNEY SANTO  80y  Male      Patient is a 80y old  Male who presents with a chief complaint of Bradycardia (03 Aug 2021 13:51)      INTERVAL HPI/OVERNIGHT EVENTS:  He feels ok, no new complaints.   Vital Signs Last 24 Hrs  T(C): 35.6 (04 Aug 2021 06:16), Max: 36.2 (03 Aug 2021 21:25)  T(F): 96.1 (04 Aug 2021 06:16), Max: 97.1 (03 Aug 2021 21:25)  HR: 50 (04 Aug 2021 12:58) (49 - 50)  BP: 113/55 (04 Aug 2021 12:58) (103/55 - 126/59)  BP(mean): --  RR: 19 (04 Aug 2021 12:58) (19 - 20)  SpO2: --      08-03-21 @ 07:01  -  08-04-21 @ 07:00  --------------------------------------------------------  IN: 0 mL / OUT: 600 mL / NET: -600 mL    08-04-21 @ 07:01  -  08-04-21 @ 20:28  --------------------------------------------------------  IN: 0 mL / OUT: 600 mL / NET: -600 mL            Consultant(s) Notes Reviewed:  [x ] YES  [ ] NO          MEDICATIONS  (STANDING):  aspirin  chewable 81 milliGRAM(s) Oral daily  atorvastatin 20 milliGRAM(s) Oral at bedtime  budesonide 160 MICROgram(s)/formoterol 4.5 MICROgram(s) Inhaler 2 Puff(s) Inhalation two times a day  furosemide    Tablet 40 milliGRAM(s) Oral daily  melatonin 5 milliGRAM(s) Oral at bedtime  pantoprazole    Tablet 40 milliGRAM(s) Oral before breakfast  warfarin 1 milliGRAM(s) Oral at bedtime    MEDICATIONS  (PRN):  ALBUTerol    90 MICROgram(s) HFA Inhaler 2 Puff(s) Inhalation every 6 hours PRN Shortness of Breath and/or Wheezing  oxycodone    5 mG/acetaminophen 325 mG 1 Tablet(s) Oral every 6 hours PRN Moderate Pain (4 - 6)      LABS                          10.0   5.47  )-----------( 166      ( 04 Aug 2021 05:32 )             32.1     08-04    137  |  99  |  34<H>  ----------------------------<  84  4.3   |  29  |  1.4    Ca    8.4<L>      04 Aug 2021 05:32  Mg     2.2     08-04    TPro  6.4  /  Alb  3.2<L>  /  TBili  0.8  /  DBili  x   /  AST  23  /  ALT  16  /  AlkPhos  117<H>  08-04        PT/INR - ( 04 Aug 2021 05:32 )   PT: 31.50 sec;   INR: 2.77 ratio           Lactate Trend        CAPILLARY BLOOD GLUCOSE      POCT Blood Glucose.: 86 mg/dL (03 Aug 2021 10:32)        RADIOLOGY & ADDITIONAL TESTS:    Imaging Personally Reviewed:  [ ] YES  [ ] NO    HEALTH ISSUES - PROBLEM Dx:          PHYSICAL EXAM:  GENERAL: NAD, well-developed.  HEAD:  Atraumatic, Normocephalic.  EYES: EOMI, PERRLA, conjunctiva and sclera clear.  NECK: Supple, No JVD.  CHEST/LUNG: Clear to auscultation bilaterally; No wheeze.  HEART: Regular rate and rhythm; S1 S2.   ABDOMEN: Soft, Nontender, Nondistended; Bowel sounds present.  EXTREMITIES:  2+ Peripheral Pulses, No clubbing, cyanosis, or edema.  PSYCH: AAOx3.  NEUROLOGY: non-focal.  SKIN: No rashes or lesions.

## 2021-08-04 NOTE — OCCUPATIONAL THERAPY INITIAL EVALUATION ADULT - GENERAL OBSERVATIONS, REHAB EVAL
OT eval: 7:30-8:10 Pt received semi egan in bed in NAD +2.5L O2 via NC. Pt pleasant and agreeable to OT eval. TOOTIE Oates reported no issues.

## 2021-08-04 NOTE — OCCUPATIONAL THERAPY INITIAL EVALUATION ADULT - ADL RETRAINING, OT EVAL
Pt will perform UB dressing with supervision and LB dressing with mod assist by discharge to work towards returning to PLOF

## 2021-08-04 NOTE — OCCUPATIONAL THERAPY INITIAL EVALUATION ADULT - PERTINENT HX OF CURRENT PROBLEM, REHAB EVAL
Pt is a 79 y/o man with a PMH of obesity, CKD3, CAD s/p PCI and stent, COPD (2L home O2), HFpEF, DLD, and chronic Afib (coumadin) who was asked to present to the hospital by his cardiologist (Doris) because his loop recorder showed the pt having multiple episodes of Afib w/ junctional beats at a HR in the 30's. The pt denies any symptoms of CP, SOB, palpitations, cough, fevers/chills, LE swelling, ABD pain, or dizziness.

## 2021-08-04 NOTE — OCCUPATIONAL THERAPY INITIAL EVALUATION ADULT - ADDITIONAL COMMENTS
Pt admitted from [facility] where he was receiving rehab. Pt resides in  with 12 steps to enter (6 platform 6). +walk-in shower.

## 2021-08-04 NOTE — OCCUPATIONAL THERAPY INITIAL EVALUATION ADULT - LEVEL OF INDEPENDENCE: SIT/SUPINE, REHAB EVAL
Pt became weak and borderline unresponsive for 1-2 seconds following sit to stand transfer. TOOTIE Oates informed and vitals taken following /53 lying after sit to stand with HR 33bpm. TOOTIE Oates checked leads and reassessed upon OT leaving./maximum assist (25% patients effort)

## 2021-08-04 NOTE — CHART NOTE - NSCHARTNOTEFT_GEN_A_CORE
<<<RESIDENT DISCHARGE NOTE>>>     RODNEY SANTO  MRN-[id number]  pt seen and examined.   stable for discharge  VITAL SIGNS:  T(F): 96.1 (08-04-21 @ 06:16), Max: 97.1 (08-03-21 @ 21:25)  HR: 50 (08-04-21 @ 12:58)  BP: 113/55 (08-04-21 @ 12:58)  SpO2: --                            10.0   5.47  )-----------( 166      ( 04 Aug 2021 05:32 )             32.1       08-04    137  |  99  |  34<H>  ----------------------------<  84  4.3   |  29  |  1.4    Ca    8.4<L>      04 Aug 2021 05:32  Mg     2.2     08-04    TPro  6.4  /  Alb  3.2<L>  /  TBili  0.8  /  DBili  x   /  AST  23  /  ALT  16  /  AlkPhos  117<H>  08-04      FINAL DISCHARGE INTERVIEW:  Resident(s) Present: (Name:___Roverto_____), RN Present: (Name:  ___________)    DISCHARGE MEDICATION RECONCILIATION  reviewed with Attending (Name:__jovanny_____)    DISPOSITION:   [  ] Home,    [  ] Home with Visiting Nursing Services,   [ x   ]  SNF/ NH,    [   ] Acute Rehab (4A),   [   ] Other (Specify:_________)

## 2021-08-04 NOTE — PROGRESS NOTE ADULT - ASSESSMENT
79 y/o male with PMH of HTN, CAD s/p PCI and stent 2007, HFpEF , atrial fibrillation on coumadin, s/p ILR  DVT and COPD on 2L presents to the ED from [facility] for evaluation of bradycardia.     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 Moderate to severe left atrial enlargement. Moderate mitral valve regurgitation. Moderate-severe tricuspid regurgitation.  Estimated pulmonary artery systolic pressure is 59.7 mmHg assuming a right atrial pressure of 10 mmHg, which is consistent with moderate pulmonary hypertension.  - c/w lasix 40 mg po daily   - monitor I/Os     # CAD s/p PCI and stent in 2007  # DLD  - c/w statin and ASA     # Afib   # Supra-therapeutic INR on admission  - on coumadin   - INR 3.58 on admission  - INR 2.03 today  - f/u daily INR  - c/w coumadin 1mg HS    # COPD not in exacerbation  - c/w 2 L NC (on 2L at home during night)  - c/w Symbicort and ventolin as needed    # CKD 3b  - Cr stable  - Monitor BMP  - avoid nephrotoxic medications    # Misc  - DVT Prophylaxis: on coumadin  - Diet: DASH  - GI Prophylaxis: Protonix  - Activity: as tolerated  - Code Status: full code   79 y/o male with PMH of HTN, CAD s/p PCI and stent 2007, HFpEF , atrial fibrillation on coumadin, s/p ILR  DVT and COPD on 2L presents to the ED from [facility] for evaluation of bradycardia.     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   Resume Metoprolol, continue Warfarin. INR is therapeutic.     Chronic HFpEF:   Echo(7/11/2021) w/ EF 50-55%. Moderate to severe left atrial enlargement. Moderate mitral valve regurgitation. Moderate-severe tricuspid regurgitation.  Estimated pulmonary artery systolic pressure is 59.7 mmHg, moderate pulmonary hypertension.  Continue Lasix 40mg po daily.  Resume Metoprolol.     CAD s/p PCI and stent in 2007  Continue ASA, Metoprolol and Lipitor.       COPD: stable, on nasal canula 2L/min   Symbicort and ventolin as needed    CKD stage 3:   Cr stable    DVT Prophylaxis on warfarin.   Code Status: full code.    #Progress Note Handoff:  Pending (specify):  placement.   Family discussion:  Disposition: SNF

## 2021-08-04 NOTE — OCCUPATIONAL THERAPY INITIAL EVALUATION ADULT - LEVEL OF INDEPENDENCE: STAND/SIT, REHAB EVAL
Pt became weak and unresponsive with blank stare for 1-2 seconds in standing and pt guided to lying position./maximum assist (25% patients effort)

## 2021-08-05 ENCOUNTER — TRANSCRIPTION ENCOUNTER (OUTPATIENT)
Age: 81
End: 2021-08-05

## 2021-08-05 VITALS
TEMPERATURE: 98 F | DIASTOLIC BLOOD PRESSURE: 52 MMHG | HEART RATE: 50 BPM | RESPIRATION RATE: 18 BRPM | SYSTOLIC BLOOD PRESSURE: 111 MMHG

## 2021-08-05 LAB
ALBUMIN SERPL ELPH-MCNC: 3.3 G/DL — LOW (ref 3.5–5.2)
ALP SERPL-CCNC: 111 U/L — SIGNIFICANT CHANGE UP (ref 30–115)
ALT FLD-CCNC: 15 U/L — SIGNIFICANT CHANGE UP (ref 0–41)
ANION GAP SERPL CALC-SCNC: 8 MMOL/L — SIGNIFICANT CHANGE UP (ref 7–14)
APTT BLD: 37.2 SEC — SIGNIFICANT CHANGE UP (ref 27–39.2)
AST SERPL-CCNC: 24 U/L — SIGNIFICANT CHANGE UP (ref 0–41)
BASOPHILS # BLD AUTO: 0.02 K/UL — SIGNIFICANT CHANGE UP (ref 0–0.2)
BASOPHILS NFR BLD AUTO: 0.4 % — SIGNIFICANT CHANGE UP (ref 0–1)
BILIRUB SERPL-MCNC: 0.7 MG/DL — SIGNIFICANT CHANGE UP (ref 0.2–1.2)
BUN SERPL-MCNC: 34 MG/DL — HIGH (ref 10–20)
CALCIUM SERPL-MCNC: 8.7 MG/DL — SIGNIFICANT CHANGE UP (ref 8.5–10.1)
CHLORIDE SERPL-SCNC: 99 MMOL/L — SIGNIFICANT CHANGE UP (ref 98–110)
CO2 SERPL-SCNC: 28 MMOL/L — SIGNIFICANT CHANGE UP (ref 17–32)
CREAT SERPL-MCNC: 1.4 MG/DL — SIGNIFICANT CHANGE UP (ref 0.7–1.5)
EOSINOPHIL # BLD AUTO: 0.13 K/UL — SIGNIFICANT CHANGE UP (ref 0–0.7)
EOSINOPHIL NFR BLD AUTO: 2.7 % — SIGNIFICANT CHANGE UP (ref 0–8)
GLUCOSE SERPL-MCNC: 94 MG/DL — SIGNIFICANT CHANGE UP (ref 70–99)
HCT VFR BLD CALC: 31.4 % — LOW (ref 42–52)
HGB BLD-MCNC: 9.8 G/DL — LOW (ref 14–18)
IMM GRANULOCYTES NFR BLD AUTO: 0.4 % — HIGH (ref 0.1–0.3)
INR BLD: 3.01 RATIO — HIGH (ref 0.65–1.3)
LYMPHOCYTES # BLD AUTO: 0.7 K/UL — LOW (ref 1.2–3.4)
LYMPHOCYTES # BLD AUTO: 14.6 % — LOW (ref 20.5–51.1)
MAGNESIUM SERPL-MCNC: 2.1 MG/DL — SIGNIFICANT CHANGE UP (ref 1.8–2.4)
MCHC RBC-ENTMCNC: 28.5 PG — SIGNIFICANT CHANGE UP (ref 27–31)
MCHC RBC-ENTMCNC: 31.2 G/DL — LOW (ref 32–37)
MCV RBC AUTO: 91.3 FL — SIGNIFICANT CHANGE UP (ref 80–94)
MONOCYTES # BLD AUTO: 0.46 K/UL — SIGNIFICANT CHANGE UP (ref 0.1–0.6)
MONOCYTES NFR BLD AUTO: 9.6 % — HIGH (ref 1.7–9.3)
NEUTROPHILS # BLD AUTO: 3.46 K/UL — SIGNIFICANT CHANGE UP (ref 1.4–6.5)
NEUTROPHILS NFR BLD AUTO: 72.3 % — SIGNIFICANT CHANGE UP (ref 42.2–75.2)
NRBC # BLD: 0 /100 WBCS — SIGNIFICANT CHANGE UP (ref 0–0)
PLATELET # BLD AUTO: 162 K/UL — SIGNIFICANT CHANGE UP (ref 130–400)
POTASSIUM SERPL-MCNC: 4.5 MMOL/L — SIGNIFICANT CHANGE UP (ref 3.5–5)
POTASSIUM SERPL-SCNC: 4.5 MMOL/L — SIGNIFICANT CHANGE UP (ref 3.5–5)
PROT SERPL-MCNC: 6.4 G/DL — SIGNIFICANT CHANGE UP (ref 6–8)
PROTHROM AB SERPL-ACNC: 34.2 SEC — HIGH (ref 9.95–12.87)
RBC # BLD: 3.44 M/UL — LOW (ref 4.7–6.1)
RBC # FLD: 20.3 % — HIGH (ref 11.5–14.5)
SODIUM SERPL-SCNC: 135 MMOL/L — SIGNIFICANT CHANGE UP (ref 135–146)
WBC # BLD: 4.79 K/UL — LOW (ref 4.8–10.8)
WBC # FLD AUTO: 4.79 K/UL — LOW (ref 4.8–10.8)

## 2021-08-05 PROCEDURE — 99239 HOSP IP/OBS DSCHRG MGMT >30: CPT

## 2021-08-05 RX ORDER — METOPROLOL TARTRATE 50 MG
1 TABLET ORAL
Qty: 0 | Refills: 0 | DISCHARGE
Start: 2021-08-05

## 2021-08-05 RX ORDER — METOPROLOL TARTRATE 50 MG
25 TABLET ORAL DAILY
Refills: 0 | Status: DISCONTINUED | OUTPATIENT
Start: 2021-08-05 | End: 2021-08-05

## 2021-08-05 RX ORDER — ONDANSETRON 8 MG/1
4 TABLET, FILM COATED ORAL ONCE
Refills: 0 | Status: DISCONTINUED | OUTPATIENT
Start: 2021-08-05 | End: 2021-08-05

## 2021-08-05 RX ORDER — WARFARIN SODIUM 2.5 MG/1
1 TABLET ORAL ONCE
Refills: 0 | Status: DISCONTINUED | OUTPATIENT
Start: 2021-08-05 | End: 2021-08-05

## 2021-08-05 RX ADMIN — Medication 25 MILLIGRAM(S): at 11:25

## 2021-08-05 RX ADMIN — PANTOPRAZOLE SODIUM 40 MILLIGRAM(S): 20 TABLET, DELAYED RELEASE ORAL at 05:55

## 2021-08-05 RX ADMIN — OXYCODONE AND ACETAMINOPHEN 1 TABLET(S): 5; 325 TABLET ORAL at 10:15

## 2021-08-05 RX ADMIN — BUDESONIDE AND FORMOTEROL FUMARATE DIHYDRATE 2 PUFF(S): 160; 4.5 AEROSOL RESPIRATORY (INHALATION) at 10:11

## 2021-08-05 RX ADMIN — Medication 81 MILLIGRAM(S): at 11:25

## 2021-08-05 RX ADMIN — Medication 40 MILLIGRAM(S): at 05:55

## 2021-08-05 RX ADMIN — OXYCODONE AND ACETAMINOPHEN 1 TABLET(S): 5; 325 TABLET ORAL at 09:54

## 2021-08-05 NOTE — PROGRESS NOTE ADULT - SUBJECTIVE AND OBJECTIVE BOX
RODNEY SANTO  80y  Male      Patient is a 80y old  Male who presents with a chief complaint of Bradycardia (05 Aug 2021 15:34)      INTERVAL HPI/OVERNIGHT EVENTS:  He feels ok, no new complaints.   Vital Signs Last 24 Hrs  T(C): 36.8 (05 Aug 2021 17:00), Max: 36.8 (05 Aug 2021 17:00)  T(F): 98.2 (05 Aug 2021 17:00), Max: 98.2 (05 Aug 2021 17:00)  HR: 50 (05 Aug 2021 17:00) (49 - 56)  BP: 111/52 (05 Aug 2021 17:00) (103/54 - 130/59)  BP(mean): 75 (05 Aug 2021 17:00) (75 - 75)  RR: 18 (05 Aug 2021 17:00) (18 - 18)  SpO2: --      08-04-21 @ 07:01  -  08-05-21 @ 07:00  --------------------------------------------------------  IN: 0 mL / OUT: 900 mL / NET: -900 mL    08-05-21 @ 07:01  -  08-05-21 @ 17:09  --------------------------------------------------------  IN: 920 mL / OUT: 300 mL / NET: 620 mL            Consultant(s) Notes Reviewed:  [x ] YES  [ ] NO          MEDICATIONS  (STANDING):  aspirin  chewable 81 milliGRAM(s) Oral daily  atorvastatin 20 milliGRAM(s) Oral at bedtime  budesonide 160 MICROgram(s)/formoterol 4.5 MICROgram(s) Inhaler 2 Puff(s) Inhalation two times a day  furosemide    Tablet 40 milliGRAM(s) Oral daily  melatonin 5 milliGRAM(s) Oral at bedtime  metoprolol succinate ER 25 milliGRAM(s) Oral daily  pantoprazole    Tablet 40 milliGRAM(s) Oral before breakfast  warfarin 1 milliGRAM(s) Oral once    MEDICATIONS  (PRN):  ALBUTerol    90 MICROgram(s) HFA Inhaler 2 Puff(s) Inhalation every 6 hours PRN Shortness of Breath and/or Wheezing  oxycodone    5 mG/acetaminophen 325 mG 1 Tablet(s) Oral every 6 hours PRN Moderate Pain (4 - 6)      LABS                          9.8    4.79  )-----------( 162      ( 05 Aug 2021 06:51 )             31.4     08-05    135  |  99  |  34<H>  ----------------------------<  94  4.5   |  28  |  1.4    Ca    8.7      05 Aug 2021 06:51  Mg     2.1     08-05    TPro  6.4  /  Alb  3.3<L>  /  TBili  0.7  /  DBili  x   /  AST  24  /  ALT  15  /  AlkPhos  111  08-05        PT/INR - ( 05 Aug 2021 06:51 )   PT: 34.20 sec;   INR: 3.01 ratio         PTT - ( 05 Aug 2021 06:51 )  PTT:37.2 sec  Lactate Trend        CAPILLARY BLOOD GLUCOSE            RADIOLOGY & ADDITIONAL TESTS:    Imaging Personally Reviewed:  [ ] YES  [ ] NO    HEALTH ISSUES - PROBLEM Dx:          PHYSICAL EXAM:  GENERAL: NAD, well-developed.  HEAD:  Atraumatic, Normocephalic.  EYES: EOMI, PERRLA, conjunctiva and sclera clear.  NECK: Supple, No JVD.  CHEST/LUNG: Clear to auscultation bilaterally; No wheeze.  HEART: Regular rate and rhythm; S1 S2.   ABDOMEN: Soft, Nontender, Nondistended; Bowel sounds present.  EXTREMITIES:  2+ Peripheral Pulses, No clubbing, cyanosis, or edema.  PSYCH: AAOx3.  NEUROLOGY: non-focal.  SKIN: No rashes or lesions.

## 2021-08-05 NOTE — PROGRESS NOTE ADULT - PROVIDER SPECIALTY LIST ADULT
Hospitalist
Hospitalist
Internal Medicine
Electrophysiology
Internal Medicine

## 2021-08-05 NOTE — DISCHARGE NOTE NURSING/CASE MANAGEMENT/SOCIAL WORK - PATIENT PORTAL LINK FT
You can access the FollowMyHealth Patient Portal offered by [facility] by registering at the following website: [email or web address]. By joining [facility]’s FollowMyHealth portal, you will also be able to view your health information using other applications (apps) compatible with our system.

## 2021-08-05 NOTE — PROGRESS NOTE ADULT - ASSESSMENT
79 y/o male with PMH CAD s/p PCI and stent 2007, HFpEF TTE in 7/17/2021 w/ EF 55-65, atrial fibrillation on coumadin, s/p ILR followed by Dr. Scherer, DVT, HTN, DLD and COPD on 2L presents to the ED from [facility] for evaluation of bradycardia.     # Persistent Afib, Tachy-selvin syndrome  - s/p Micra PPM by EP on 08/03/21  - No events on tele  - Metoprolol home dose was re-instituted    # Episode of unresponsiveness  - Brief episode of unresponsiveness on 08/03  - Didn't lose muscle tone  - No focal deficit  - Orthostatic hypotension vs seizure  - Follow up rEEG  - tele monitoring    #HFpEF (stable)  #CAD s/p PCI and stent 2007  - recent admission on 07/07/2021 due to HF exacerbation   - TTE (7/11/2021) w/ EF 50-55%. Moderate to severe left atrial enlargement. Moderate mitral valve regurgitation. Moderate-severe tricuspid regurgitation.  Estimated pulmonary artery systolic pressure is 59.7 mmHg assuming a right atrial pressure of 10 mmHg, which is consistent with moderate pulmonary hypertension.  - c/w lasix 40 mg po daily   - monitor I/Os     # CAD s/p PCI and stent in 2007  # DLD  - c/w statin and ASA     # Afib   # Supra-therapeutic INR on admission  - on coumadin   - INR 3.58 on admission  - INR 2.03 today  - f/u daily INR  - c/w coumadin 1mg HS    # COPD not in exacerbation  - c/w 2 L NC (on 2L at home during night)  - c/w Symbicort and ventolin as needed    # CKD 3b  - Cr stable  - Monitor BMP  - avoid nephrotoxic medications

## 2021-08-05 NOTE — PROGRESS NOTE ADULT - ASSESSMENT
79 y/o male with PMH of HTN, CAD s/p PCI and stent 2007, HFpEF , atrial fibrillation on coumadin, s/p ILR  DVT and COPD on 2L presents to the ED from [facility] for evaluation of bradycardia.     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   Resume Metoprolol, continue Warfarin. INR is therapeutic.     Chronic HFpEF:   Echo(7/11/2021) w/ EF 50-55%. Moderate to severe left atrial enlargement. Moderate mitral valve regurgitation. Moderate-severe tricuspid regurgitation.  Estimated pulmonary artery systolic pressure is 59.7 mmHg, moderate pulmonary hypertension.  Continue Lasix 40mg po daily.  Resume Metoprolol.     CAD s/p PCI and stent in 2007  Continue ASA, Metoprolol and Lipitor.     COPD: stable, on nasal canula 2L/min   Symbicort and ventolin as needed    CKD stage 3:   Cr stable    DVT Prophylaxis on warfarin.   Code Status: full code.    #Progress Note Handoff:  Pending (specify):  placement.   Family discussion:  Disposition: SNF

## 2021-08-05 NOTE — PROGRESS NOTE ADULT - SUBJECTIVE AND OBJECTIVE BOX
SUBJECTIVE:    Patient is a 80y old Male who presents with a chief complaint of Bradycardia (03 Aug 2021 13:51)    Overnight Events: No major events overnight. Patient waiting for discharge to nursing home.    PAST MEDICAL & SURGICAL HISTORY  COPD (chronic obstructive pulmonary disease)    Hypertension    Deep vein thrombosis (DVT) of left lower extremity, unspecified chronicity, unspecified vein    Cellulitis of left lower extremity    Chronic atrial fibrillation    Mitral valve insufficiency, unspecified etiology  Moderate    CHF (congestive heart failure)    S/P coronary artery stent placement    History of total right knee replacement      SOCIAL HISTORY:  Negative for smoking/alcohol/drug use.     ALLERGIES:  No Known Allergies    MEDICATIONS:  STANDING MEDICATIONS  aspirin  chewable 81 milliGRAM(s) Oral daily  atorvastatin 20 milliGRAM(s) Oral at bedtime  budesonide 160 MICROgram(s)/formoterol 4.5 MICROgram(s) Inhaler 2 Puff(s) Inhalation two times a day  furosemide    Tablet 40 milliGRAM(s) Oral daily  melatonin 5 milliGRAM(s) Oral at bedtime  metoprolol succinate ER 25 milliGRAM(s) Oral daily  pantoprazole    Tablet 40 milliGRAM(s) Oral before breakfast  warfarin 1 milliGRAM(s) Oral once    PRN MEDICATIONS  ALBUTerol    90 MICROgram(s) HFA Inhaler 2 Puff(s) Inhalation every 6 hours PRN  oxycodone    5 mG/acetaminophen 325 mG 1 Tablet(s) Oral every 6 hours PRN    VITALS:   T(F): 97.7, Max: 97.7 (08-05-21 @ 12:43)  HR: 50 (49 - 56)  BP: 113/57 (103/54 - 130/59)  RR: 18 (18 - 18)  SpO2: --    LABS:                        9.8    4.79  )-----------( 162      ( 05 Aug 2021 06:51 )             31.4     08-05    135  |  99  |  34<H>  ----------------------------<  94  4.5   |  28  |  1.4    Ca    8.7      05 Aug 2021 06:51  Mg     2.1     08-05    TPro  6.4  /  Alb  3.3<L>  /  TBili  0.7  /  DBili  x   /  AST  24  /  ALT  15  /  AlkPhos  111  08-05    PT/INR - ( 05 Aug 2021 06:51 )   PT: 34.20 sec;   INR: 3.01 ratio         PTT - ( 05 Aug 2021 06:51 )  PTT:37.2 sec                  08-04-21 @ 07:01  -  08-05-21 @ 07:00  --------------------------------------------------------  IN: 0 mL / OUT: 900 mL / NET: -900 mL    08-05-21 @ 07:01  -  08-05-21 @ 15:35  --------------------------------------------------------  IN: 920 mL / OUT: 300 mL / NET: 620 mL          IMAGING/EKG: No imaging    PHYSICAL EXAM:  GEN: NAD, comfortable  LUNGS: CTAB, no w/r/r  HEART: irregular rhythm without MGR  ABD: soft, NT/ND, +BS  EXT: no edema, PP b/l  NEURO: AAOX3

## 2021-08-05 NOTE — DISCHARGE NOTE NURSING/CASE MANAGEMENT/SOCIAL WORK - NSDCFUADDAPPT_GEN_ALL_CORE_FT
- continue warfarin  If discharged, will need INR check on Friday 8/6  - No heavy lifting or exertional activities for 5-7days  - Can take a shower in 5days ,  no bathtub for 5 days, do not submerge yourself in water  - remove bandage on the chest in 5 days, do not wet the site  - FU in EP office with Katherine Wilson NP for wound check on 9/2 11:30am  follow up with Daphney Pierce MD office  [address] [place] [address], [address] [facility]  [phone]

## 2021-08-09 DIAGNOSIS — R00.1 BRADYCARDIA, UNSPECIFIED: ICD-10-CM

## 2021-08-09 DIAGNOSIS — I49.5 SICK SINUS SYNDROME: ICD-10-CM

## 2021-08-09 DIAGNOSIS — I25.10 ATHEROSCLEROTIC HEART DISEASE OF NATIVE CORONARY ARTERY WITHOUT ANGINA PECTORIS: ICD-10-CM

## 2021-08-09 DIAGNOSIS — Z99.81 DEPENDENCE ON SUPPLEMENTAL OXYGEN: ICD-10-CM

## 2021-08-09 DIAGNOSIS — I27.20 PULMONARY HYPERTENSION, UNSPECIFIED: ICD-10-CM

## 2021-08-09 DIAGNOSIS — Z87.891 PERSONAL HISTORY OF NICOTINE DEPENDENCE: ICD-10-CM

## 2021-08-09 DIAGNOSIS — I50.32 CHRONIC DIASTOLIC (CONGESTIVE) HEART FAILURE: ICD-10-CM

## 2021-08-09 DIAGNOSIS — I48.19 OTHER PERSISTENT ATRIAL FIBRILLATION: ICD-10-CM

## 2021-08-09 DIAGNOSIS — I13.0 HYPERTENSIVE HEART AND CHRONIC KIDNEY DISEASE WITH HEART FAILURE AND STAGE 1 THROUGH STAGE 4 CHRONIC KIDNEY DISEASE, OR UNSPECIFIED CHRONIC KIDNEY DISEASE: ICD-10-CM

## 2021-08-09 DIAGNOSIS — N18.32 CHRONIC KIDNEY DISEASE, STAGE 3B: ICD-10-CM

## 2021-08-09 DIAGNOSIS — E78.5 HYPERLIPIDEMIA, UNSPECIFIED: ICD-10-CM

## 2021-08-09 DIAGNOSIS — Z95.5 PRESENCE OF CORONARY ANGIOPLASTY IMPLANT AND GRAFT: ICD-10-CM

## 2021-08-10 ENCOUNTER — APPOINTMENT (OUTPATIENT)
Dept: MEDICATION MANAGEMENT | Facility: CLINIC | Age: 81
End: 2021-08-10

## 2021-08-16 ENCOUNTER — APPOINTMENT (OUTPATIENT)
Dept: CARDIOLOGY | Facility: CLINIC | Age: 81
End: 2021-08-16

## 2021-08-19 ENCOUNTER — APPOINTMENT (OUTPATIENT)
Dept: CARDIOLOGY | Facility: CLINIC | Age: 81
End: 2021-08-19

## 2021-08-24 ENCOUNTER — APPOINTMENT (OUTPATIENT)
Dept: CARDIOLOGY | Facility: CLINIC | Age: 81
End: 2021-08-24

## 2021-09-02 ENCOUNTER — APPOINTMENT (OUTPATIENT)
Dept: CARDIOLOGY | Facility: CLINIC | Age: 81
End: 2021-09-02

## 2021-09-12 ENCOUNTER — INPATIENT (INPATIENT)
Facility: HOSPITAL | Age: 81
LOS: 8 days | Discharge: NURSING, REHAB OR LONG-TERM CARE | End: 2021-09-21
Attending: INTERNAL MEDICINE | Admitting: INTERNAL MEDICINE
Payer: MEDICARE

## 2021-09-12 VITALS
HEART RATE: 53 BPM | WEIGHT: 197.09 LBS | RESPIRATION RATE: 20 BRPM | HEIGHT: 75 IN | TEMPERATURE: 99 F | SYSTOLIC BLOOD PRESSURE: 134 MMHG | OXYGEN SATURATION: 100 % | DIASTOLIC BLOOD PRESSURE: 63 MMHG

## 2021-09-12 DIAGNOSIS — Z96.651 PRESENCE OF RIGHT ARTIFICIAL KNEE JOINT: Chronic | ICD-10-CM

## 2021-09-12 DIAGNOSIS — Z95.5 PRESENCE OF CORONARY ANGIOPLASTY IMPLANT AND GRAFT: Chronic | ICD-10-CM

## 2021-09-12 LAB
ALBUMIN SERPL ELPH-MCNC: 3.4 G/DL — LOW (ref 3.5–5.2)
ALP SERPL-CCNC: 130 U/L — HIGH (ref 30–115)
ALT FLD-CCNC: 15 U/L — SIGNIFICANT CHANGE UP (ref 0–41)
ANION GAP SERPL CALC-SCNC: 12 MMOL/L — SIGNIFICANT CHANGE UP (ref 7–14)
APPEARANCE UR: ABNORMAL
APTT BLD: 48 SEC — HIGH (ref 27–39.2)
AST SERPL-CCNC: 20 U/L — SIGNIFICANT CHANGE UP (ref 0–41)
BACTERIA # UR AUTO: ABNORMAL
BASE EXCESS BLDV CALC-SCNC: 3.3 MMOL/L — HIGH (ref -2–3)
BASOPHILS # BLD AUTO: 0.02 K/UL — SIGNIFICANT CHANGE UP (ref 0–0.2)
BASOPHILS NFR BLD AUTO: 0.3 % — SIGNIFICANT CHANGE UP (ref 0–1)
BILIRUB SERPL-MCNC: 1.2 MG/DL — SIGNIFICANT CHANGE UP (ref 0.2–1.2)
BILIRUB UR-MCNC: NEGATIVE — SIGNIFICANT CHANGE UP
BUN SERPL-MCNC: 50 MG/DL — HIGH (ref 10–20)
CA-I SERPL-SCNC: 1.13 MMOL/L — LOW (ref 1.15–1.33)
CALCIUM SERPL-MCNC: 8.4 MG/DL — LOW (ref 8.5–10.1)
CHLORIDE SERPL-SCNC: 95 MMOL/L — LOW (ref 98–110)
CO2 SERPL-SCNC: 26 MMOL/L — SIGNIFICANT CHANGE UP (ref 17–32)
COLOR SPEC: ABNORMAL
CREAT SERPL-MCNC: 1.7 MG/DL — HIGH (ref 0.7–1.5)
DIFF PNL FLD: ABNORMAL
EOSINOPHIL # BLD AUTO: 0.12 K/UL — SIGNIFICANT CHANGE UP (ref 0–0.7)
EOSINOPHIL NFR BLD AUTO: 2 % — SIGNIFICANT CHANGE UP (ref 0–8)
EPI CELLS # UR: 2 /HPF — SIGNIFICANT CHANGE UP (ref 0–5)
GAS PNL BLDV: 132 MMOL/L — LOW (ref 136–145)
GAS PNL BLDV: SIGNIFICANT CHANGE UP
GLUCOSE SERPL-MCNC: 108 MG/DL — HIGH (ref 70–99)
GLUCOSE UR QL: NEGATIVE — SIGNIFICANT CHANGE UP
HCO3 BLDV-SCNC: 31 MMOL/L — HIGH (ref 22–29)
HCT VFR BLD CALC: 37.6 % — LOW (ref 42–52)
HCT VFR BLDA CALC: 39 % — SIGNIFICANT CHANGE UP (ref 39–51)
HGB BLD CALC-MCNC: 12.9 G/DL — SIGNIFICANT CHANGE UP (ref 12.6–17.4)
HGB BLD-MCNC: 12 G/DL — LOW (ref 14–18)
HYALINE CASTS # UR AUTO: 1 /LPF — SIGNIFICANT CHANGE UP (ref 0–7)
IMM GRANULOCYTES NFR BLD AUTO: 0.3 % — SIGNIFICANT CHANGE UP (ref 0.1–0.3)
INR BLD: 3.66 RATIO — HIGH (ref 0.65–1.3)
KETONES UR-MCNC: NEGATIVE — SIGNIFICANT CHANGE UP
LACTATE BLDV-MCNC: 2.3 MMOL/L — HIGH (ref 0.5–2)
LEUKOCYTE ESTERASE UR-ACNC: ABNORMAL
LYMPHOCYTES # BLD AUTO: 0.73 K/UL — LOW (ref 1.2–3.4)
LYMPHOCYTES # BLD AUTO: 12.3 % — LOW (ref 20.5–51.1)
MAGNESIUM SERPL-MCNC: 2.3 MG/DL — SIGNIFICANT CHANGE UP (ref 1.8–2.4)
MCHC RBC-ENTMCNC: 30.6 PG — SIGNIFICANT CHANGE UP (ref 27–31)
MCHC RBC-ENTMCNC: 31.9 G/DL — LOW (ref 32–37)
MCV RBC AUTO: 95.9 FL — HIGH (ref 80–94)
MONOCYTES # BLD AUTO: 0.76 K/UL — HIGH (ref 0.1–0.6)
MONOCYTES NFR BLD AUTO: 12.8 % — HIGH (ref 1.7–9.3)
NEUTROPHILS # BLD AUTO: 4.27 K/UL — SIGNIFICANT CHANGE UP (ref 1.4–6.5)
NEUTROPHILS NFR BLD AUTO: 72.3 % — SIGNIFICANT CHANGE UP (ref 42.2–75.2)
NITRITE UR-MCNC: NEGATIVE — SIGNIFICANT CHANGE UP
NRBC # BLD: 0 /100 WBCS — SIGNIFICANT CHANGE UP (ref 0–0)
NT-PROBNP SERPL-SCNC: 3659 PG/ML — HIGH (ref 0–300)
PCO2 BLDV: 58 MMHG — HIGH (ref 42–55)
PH BLDV: 7.33 — SIGNIFICANT CHANGE UP (ref 7.32–7.43)
PH UR: 6 — SIGNIFICANT CHANGE UP (ref 5–8)
PLATELET # BLD AUTO: 193 K/UL — SIGNIFICANT CHANGE UP (ref 130–400)
PO2 BLDV: 12 MMHG — SIGNIFICANT CHANGE UP
POTASSIUM BLDV-SCNC: 4 MMOL/L — SIGNIFICANT CHANGE UP (ref 3.5–5.1)
POTASSIUM SERPL-MCNC: 3.8 MMOL/L — SIGNIFICANT CHANGE UP (ref 3.5–5)
POTASSIUM SERPL-SCNC: 3.8 MMOL/L — SIGNIFICANT CHANGE UP (ref 3.5–5)
PROT SERPL-MCNC: 6.5 G/DL — SIGNIFICANT CHANGE UP (ref 6–8)
PROT UR-MCNC: ABNORMAL
PROTHROM AB SERPL-ACNC: >40 SEC — HIGH (ref 9.95–12.87)
RBC # BLD: 3.92 M/UL — LOW (ref 4.7–6.1)
RBC # FLD: 20.2 % — HIGH (ref 11.5–14.5)
RBC CASTS # UR COMP ASSIST: 3 /HPF — SIGNIFICANT CHANGE UP (ref 0–4)
SAO2 % BLDV: 14.6 % — SIGNIFICANT CHANGE UP
SARS-COV-2 RNA SPEC QL NAA+PROBE: SIGNIFICANT CHANGE UP
SODIUM SERPL-SCNC: 133 MMOL/L — LOW (ref 135–146)
SP GR SPEC: 1.01 — SIGNIFICANT CHANGE UP (ref 1.01–1.03)
TROPONIN T SERPL-MCNC: 0.02 NG/ML — HIGH
TROPONIN T SERPL-MCNC: 0.03 NG/ML — CRITICAL HIGH
UROBILINOGEN FLD QL: ABNORMAL
WBC # BLD: 5.92 K/UL — SIGNIFICANT CHANGE UP (ref 4.8–10.8)
WBC # FLD AUTO: 5.92 K/UL — SIGNIFICANT CHANGE UP (ref 4.8–10.8)
WBC UR QL: >720 /HPF — HIGH (ref 0–5)

## 2021-09-12 PROCEDURE — 74177 CT ABD & PELVIS W/CONTRAST: CPT | Mod: 26,MA

## 2021-09-12 PROCEDURE — 99223 1ST HOSP IP/OBS HIGH 75: CPT

## 2021-09-12 PROCEDURE — 93010 ELECTROCARDIOGRAM REPORT: CPT | Mod: 76

## 2021-09-12 PROCEDURE — 71045 X-RAY EXAM CHEST 1 VIEW: CPT | Mod: 26

## 2021-09-12 PROCEDURE — 99285 EMERGENCY DEPT VISIT HI MDM: CPT | Mod: CS

## 2021-09-12 PROCEDURE — 71045 X-RAY EXAM CHEST 1 VIEW: CPT | Mod: 26,77

## 2021-09-12 RX ORDER — SPIRONOLACTONE 25 MG/1
12.5 TABLET, FILM COATED ORAL
Qty: 0 | Refills: 0 | DISCHARGE

## 2021-09-12 RX ORDER — SPIRONOLACTONE 25 MG/1
25 TABLET, FILM COATED ORAL DAILY
Refills: 0 | Status: DISCONTINUED | OUTPATIENT
Start: 2021-09-12 | End: 2021-09-16

## 2021-09-12 RX ORDER — CEFTRIAXONE 500 MG/1
1000 INJECTION, POWDER, FOR SOLUTION INTRAMUSCULAR; INTRAVENOUS EVERY 24 HOURS
Refills: 0 | Status: DISCONTINUED | OUTPATIENT
Start: 2021-09-13 | End: 2021-09-13

## 2021-09-12 RX ORDER — CEFTRIAXONE 500 MG/1
1000 INJECTION, POWDER, FOR SOLUTION INTRAMUSCULAR; INTRAVENOUS ONCE
Refills: 0 | Status: COMPLETED | OUTPATIENT
Start: 2021-09-12 | End: 2021-09-12

## 2021-09-12 RX ORDER — ALBUTEROL 90 UG/1
2 AEROSOL, METERED ORAL EVERY 4 HOURS
Refills: 0 | Status: DISCONTINUED | OUTPATIENT
Start: 2021-09-12 | End: 2021-09-21

## 2021-09-12 RX ADMIN — CEFTRIAXONE 100 MILLIGRAM(S): 500 INJECTION, POWDER, FOR SOLUTION INTRAMUSCULAR; INTRAVENOUS at 16:48

## 2021-09-12 NOTE — H&P ADULT - NSHPLABSRESULTS_GEN_ALL_CORE
VITAL SIGNS: Last 24 Hours  T(C): 35 (12 Sep 2021 16:30), Max: 37.1 (12 Sep 2021 10:54)  T(F): 95 (12 Sep 2021 16:30), Max: 98.7 (12 Sep 2021 10:54)  HR: 52 (12 Sep 2021 16:30) (52 - 53)  BP: 133/61 (12 Sep 2021 16:30) (133/61 - 134/63)  BP(mean): --  RR: 18 (12 Sep 2021 16:30) (18 - 20)  SpO2: 98% (12 Sep 2021 16:30) (98% - 100%)    LABS:                        12.0   5.92  )-----------( 193      ( 12 Sep 2021 12:00 )             37.6         133<L>  |  95<L>  |  50<H>  ----------------------------<  108<H>  3.8   |  26  |  1.7<H>    Ca    8.4<L>      12 Sep 2021 12:00  Mg     2.3       TPro  6.5  /  Alb  3.4<L>  /  TBili  1.2  /  DBili  x   /  AST  20  /  ALT  15  /  AlkPhos  130<H>    PT/INR - ( 12 Sep 2021 12:00 )   PT: >40.00 sec;   INR: 3.66 ratio    PTT - ( 12 Sep 2021 12:00 )  PTT:48.0 sec  Urinalysis Basic - ( 12 Sep 2021 12:50 )  Color: Orange / Appearance: Turbid / S.014 / pH: x  Gluc: x / Ketone: Negative  / Bili: Negative / Urobili: 3 mg/dL   Blood: x / Protein: 100 mg/dL / Nitrite: Negative   Leuk Esterase: Large / RBC: 3 /HPF / WBC >720 /HPF   Sq Epi: x / Non Sq Epi: 2 /HPF / Bacteria: Many  Troponin T, Serum: 0.03 ng/mL *HH* (21 @ 12:00)  CARDIAC MARKERS ( 12 Sep 2021 12:00 )  x     / 0.03 ng/mL / x     / x     / x

## 2021-09-12 NOTE — H&P ADULT - HISTORY OF PRESENT ILLNESS
Initiate Treatment: urea 40 % topical cream BID
Detail Level: Zone
Patient is a 80 y/o male with a PMH of HFpEF (50-55%), chronic afib on coumadin followed by cardio Dr. Scherer, recent Micra PPM placement for tachy-selvin syndrome, DVT July 2021, HTN, and COPD on 2L home O2 presents to the ED from home due to atypical chest pain. The patient notes he awoke from sleep on the morning of presentation with difficulty breathing and epigastric pain. The patient notes pain on deep breath in the epigastric/RUQ region. Patient saturating well on home O2, 2L with no increased O2 requirements. Denies substernal chest pain, radiation to L arm/jaw, palpitations, nausea, vomiting, diarrhea, constipation, syncope, falls, cough, chills, or fever. The patient denies prior episodes of similar pain.     In ED, the patient was found to have bradycardia 50s (asymptomatic). Labs revealing INR 3.66 and positive urinalysis. EKG with no ischemic changes. CT Abdomen and Pelvis w/ IV Cont showed urinary bladder wall thickening and enhancement most compatible with cystitis.  Masslike enlargement/low-attenuation of the inferior aspect of the right pectineus muscle. This is nonspecific. Differential includes hematoma or possibly abscess. Malignancy is not excluded. Further evaluation and follow-up is recommended. For complete characterization MRI may be obtained. Bilateral pleural effusions, small on the right and trace to small on the left. The patient will be admitted to telemetry for further management.

## 2021-09-12 NOTE — ED PROVIDER NOTE - PHYSICAL EXAMINATION
VITALS: Reviewed  CONSTITUTIONAL: elderly male, appears stated age, in no acute distress, speaking in full sentences, nontoxic appearing  SKIN: warm, dry, no rash  HEAD: normocephalic, atraumatic  EYES: PERRL, EOMI, no conjunctival erythema, sclera clear  ENT: patent airway, moist mucous membranes  NECK: supple, no masses  CV:  regular rate, regular rhythm, 2+ radial pulses bilaterally  RESP: +crackles at the bases, no wheezes, no rales, no rhonchi, normal work of breathing  ABD: soft, epigastric tenderness, nondistended, no rebound, no guarding  MSK: normal ROM, no cyanosis, no edema, +sacral ulcer, +L foot ulcer  NEURO: alert, oriented x3  PSYCH: cooperative, appropriate

## 2021-09-12 NOTE — H&P ADULT - ASSESSMENT
RODNEY SANTO 81y Male  MRN#: [id number]   Hospital Day:     SUBJECTIVE  Patient is a 81y old Male who presents with a chief complaint of Currently admitted to medicine with the primary diagnosis of Chest pain      INTERVAL HPI AND OVERNIGHT EVENTS:  Patient was examined and seen at bedside. This morning he is resting comfortably in bed and reports no issues or overnight events.    REVIEW OF SYMPTOMS:  CONSTITUTIONAL: No weakness, fevers or chills; No headaches  EYES: No visual changes, eye pain, or discharge  ENT: No vertigo; No ear pain or change in hearing; No sore throat or difficulty swallowing  NECK: No pain or stiffness  RESPIRATORY: No cough, wheezing, or hemoptysis; No shortness of breath  CARDIOVASCULAR: No chest pain or palpitations  GASTROINTESTINAL: No abdominal or epigastric pain; No nausea, vomiting, or hematemesis; No diarrhea or constipation; No melena or hematochezia  GENITOURINARY: No dysuria, frequency or hematuria  MUSCULOSKELETAL: No joint pain, no muscle pain, no weakness  NEUROLOGICAL: No numbness or weakness  SKIN: No itching or rashes    OBJECTIVE  PAST MEDICAL & SURGICAL HISTORY  COPD (chronic obstructive pulmonary disease)    Hypertension    Deep vein thrombosis (DVT) of left lower extremity, unspecified chronicity, unspecified vein    Cellulitis of left lower extremity    Chronic atrial fibrillation    Mitral valve insufficiency, unspecified etiology  Moderate    CHF (congestive heart failure)    S/P coronary artery stent placement    History of total right knee replacement      ALLERGIES:  No Known Allergies    MEDICATIONS:  STANDING MEDICATIONS    PRN MEDICATIONS      VITAL SIGNS: Last 24 Hours  T(C): 35 (12 Sep 2021 16:30), Max: 37.1 (12 Sep 2021 10:54)  T(F): 95 (12 Sep 2021 16:30), Max: 98.7 (12 Sep 2021 10:54)  HR: 52 (12 Sep 2021 16:30) (52 - 53)  BP: 133/61 (12 Sep 2021 16:30) (133/61 - 134/63)  BP(mean): --  RR: 18 (12 Sep 2021 16:30) (18 - 20)  SpO2: 98% (12 Sep 2021 16:30) (98% - 100%)    LABS:                        12.0   5.92  )-----------( 193      ( 12 Sep 2021 12:00 )             37.6     09-12    133<L>  |  95<L>  |  50<H>  ----------------------------<  108<H>  3.8   |  26  |  1.7<H>    Ca    8.4<L>      12 Sep 2021 12:00  Mg     2.3         TPro  6.5  /  Alb  3.4<L>  /  TBili  1.2  /  DBili  x   /  AST  20  /  ALT  15  /  AlkPhos  130<H>      PT/INR - ( 12 Sep 2021 12:00 )   PT: >40.00 sec;   INR: 3.66 ratio         PTT - ( 12 Sep 2021 12:00 )  PTT:48.0 sec  Urinalysis Basic - ( 12 Sep 2021 12:50 )    Color: Orange / Appearance: Turbid / S.014 / pH: x  Gluc: x / Ketone: Negative  / Bili: Negative / Urobili: 3 mg/dL   Blood: x / Protein: 100 mg/dL / Nitrite: Negative   Leuk Esterase: Large / RBC: 3 /HPF / WBC >720 /HPF   Sq Epi: x / Non Sq Epi: 2 /HPF / Bacteria: Many        Troponin T, Serum: 0.03 ng/mL *HH* (21 @ 12:00)      CARDIAC MARKERS ( 12 Sep 2021 12:00 )  x     / 0.03 ng/mL / x     / x     / x          RADIOLOGY:      PHYSICAL EXAM:    ASSESSMENT & PLAN        Atypical Chest pain r/o ACS  - admit to telemetry   - follow up EP consult- bradycardia, recent admission for bradycardia with Micra PPM placed 2021  - EKG: no ischemic changes  - Troponin T, Serum: 0.03 (baseline 0.02) > trend troponin   - hold BB due to bradycardia     Transaminitis w/ RUQ pain   - Alkaline Phosphatase, Serum: 130 U/L  - RUQ pain on palpation  - follow up US abdomen    Cystitis:  -  CT Abdomen and Pelvis w/ IV Cont (21 @ 13:58) : Urinary bladder wall thickening and enhancement most compatible with cystitis. Correlation with urinary urinalysis is recommended  - UA: large LE, moderate blood, Many bacteria WBC >720  - no evidence of sepsis  - s/p rocephin in ED, c/w rocephin IV qd     COPD not in exacerbation  - no evidence of COPD exacerbation on exam   - saturating well on home dose O2 (2L)   - c/w home inhalers: Symbicort duonebs PRN    #Supratherapeutic INR  - INR: 3.66  - repeat INR daily   - hold coumadin for now  - home regimen: 1 mg //, 2mg ///Sun    #HFpEF-echo 50-55%  #CAD s/p PCI and stent   #DLD  - no evidence of fluid overload on exam   - TTE (2021) w/ EF 50-55%. Moderate to severe left atrial enlargement. Moderate mitral valve regurgitation. Moderate-severe tricuspid regurgitation.  Estimated pulmonary artery systolic pressure is 59.7 mmHg assuming a right atrial pressure of 10 mmHg, which is consistent with moderate pulmonary hypertension.  -  monitor I&O, daily weight   - c/w statin    #CKD 3b  - Cr 1.7 (baseline Cr 1.2-1.6)  - montior Cr daily     #Deep vein thrombosis (DVT) of left lower extremity  - [facility] Duplex Lower Ext Vein Scan, Bilat (07.15.21)No evidence of deep venous thrombosis in the left lower extremity. Chronic Thrombus is noted in the right common femoral, femoral, and popliteal vein  - on coumadin (holding due to supratherapeutic INR)     #Macrocytic Anemia  - MCV 95.9, Hemoglobin 12  - follow up Iron studies, B12, folate    #Misc  - DVT Prophylaxis: Coumadin   - GI Prophylaxis: pantoprazole  - Diet: DASH/TLC/CC  - Activity: as tolerated  - IV Fluids: n/a  - Code Status: Full Code  - Dispo: admit to telemetry  Patient is a 80 y/o male with a PMH of HFpEF (50-55%), chronic afib on coumadin followed by cardio Dr. Scherer, recent Micra PPM placement for tachy-selvin syndrome, DVT July 2021, HTN, and COPD on 2L home O2 presents to the ED from home due to atypical chest pain. The patient notes he awoke from sleep on the morning of presentation with difficulty breathing and epigastric pain. On examination, patient denying chest pain but complains of epigastric/RUQ pain on deep inhalation. Epigastric region tender on examination.     #Atypical Chest pain r/o ACS  - reports epigastric pain w/ deep inspiration and tenderness  - admit to telemetry   - follow up EP consult- bradycardia, recent admission for bradycardia with Micra PPM placed August 2021  - follow up cardiology consult- Dr. Scherer, patient states his doctor d/c aspirin?  - EKG: no ischemic changes  - Troponin T, Serum: 0.03 (baseline 0.02) > trend troponin   - serial EKGs  - hold BB due to bradycardia     #Transaminitis w/ RUQ pain  - Alkaline Phosphatase, Serum: 130 U/L  - RUQ pain on palpation  - follow up US abdomen     #Cystitis:  -  CT Abdomen and Pelvis w/ IV Cont (09.12.21 @ 13:58) : Urinary bladder wall thickening and enhancement most compatible with cystitis. Correlation with urinary urinalysis is recommended  - UA: large LE, moderate blood, Many bacteria WBC >720  - no evidence of sepsis  - s/p Rocephin in ED, c/w Rocephin IV qd     #COPD not in exacerbation  - no evidence of COPD exacerbation on exam   - saturating well on home dose O2 (2L)   - c/w home inhalers: Symbicort duonebs PRN    #Supratherapeutic INR  - INR: 3.66  - repeat INR daily   - hold coumadin for now  - home regimen: 1 mg M/W/F, 2mg T/Th/Sa/Sun    #HFpEF-echo 50-55%  #A-Fib on coumadin  #CAD s/p PCI and stent 2007  #DLD  - no evidence of fluid overload on exam   - TTE (7/11/2021) w/ EF 50-55%. Moderate to severe left atrial enlargement. Moderate mitral valve regurgitation. Moderate-severe tricuspid regurgitation.  Estimated pulmonary artery systolic pressure is 59.7 mmHg assuming a right atrial pressure of 10 mmHg, which is consistent with moderate pulmonary hypertension.  -  monitor I&O, daily weight   - c/w statin    #CKD 3b  - Cr 1.7 (baseline Cr 1.2-1.6)  - monitor Cr daily     #Deep vein thrombosis (DVT) of left lower extremity  - [facility] Duplex Lower Ext Vein Scan, Bilat (07.15.21)No evidence of deep venous thrombosis in the left lower extremity. Chronic Thrombus is noted in the right common femoral, femoral, and popliteal vein  - on coumadin (holding due to supratherapeutic INR)     #Macrocytic Anemia  - MCV 95.9, Hemoglobin 12  - follow up Iron studies, B12, folate    #Misc  - DVT Prophylaxis: Coumadin   - GI Prophylaxis: pantoprazole  - Diet: DASH/TLC/CC  - Activity: as tolerated  - IV Fluids: n/a  - Code Status: Full Code  - Dispo: admit to telemetry  Patient is a 82 y/o male with a PMH of HFpEF (50-55%), chronic afib on coumadin followed by cardio Dr. Scherer, recent Micra PPM placement for tachy-selvin syndrome, DVT July 2021, HTN, and COPD on 2L home O2 presents to the ED from home due to atypical chest pain. The patient notes he awoke from sleep on the morning of presentation with difficulty breathing and epigastric pain. On examination, patient denying chest pain but complains of epigastric/RUQ pain on deep inhalation. Epigastric region tender on examination.     #Atypical Chest pain r/o ACS  - reports epigastric pain w/ deep inspiration and tenderness  - admit to telemetry   - follow up EP consult- bradycardia, recent admission for bradycardia with Micra PPM placed August 2021  - follow up cardiology consult- Dr. Scherer, patient states his doctor d/c aspirin?  - EKG: no ischemic changes  - Troponin T, Serum: 0.03 (baseline 0.02) > trend troponin   - serial EKGs  - hold BB due to bradycardia     #Transaminitis w/ RUQ pain- r/o cholecystitis   - Alkaline Phosphatase, Serum: 130 U/L  - RUQ pain on palpation  - follow up US abdomen     #Cystitis:  -  CT Abdomen and Pelvis w/ IV Cont (09.12.21 @ 13:58) : Urinary bladder wall thickening and enhancement most compatible with cystitis. Correlation with urinary urinalysis is recommended  - UA: large LE, moderate blood, Many bacteria WBC >720  - no evidence of sepsis  - s/p Rocephin in ED, c/w Rocephin IV qd   - follow up cultures     #COPD not in exacerbation  - no evidence of COPD exacerbation on exam   - saturating well on home dose O2 (2L)   - c/w home inhalers: Symbicort duonebs PRN    #Supratherapeutic INR  - INR: 3.66  - repeat INR daily   - hold coumadin for now  - home regimen: 1 mg M/W/F, 2mg T/Th/Sa/Sun    #HFpEF-echo 50-55%  #A-Fib on coumadin  #CAD s/p PCI and stent 2007  #DLD  - no evidence of fluid overload on exam   - TTE (7/11/2021) w/ EF 50-55%. Moderate to severe left atrial enlargement. Moderate mitral valve regurgitation. Moderate-severe tricuspid regurgitation.  Estimated pulmonary artery systolic pressure is 59.7 mmHg assuming a right atrial pressure of 10 mmHg, which is consistent with moderate pulmonary hypertension.  -  monitor I&O, daily weight   - c/w statin    #Possible hematoma of right pectineus muscle:   - incidental finding of on CT  - no pain, tenderness, swelling, hematoma, or wound see at the site  - continue to monitor     #CKD 3b  - Cr 1.7 (baseline Cr 1.2-1.6)  - monitor Cr daily   - follow up urine lytes     #Deep vein thrombosis (DVT) of left lower extremity  - [facility] Duplex Lower Ext Vein Scan, Bilat (07.15.21)No evidence of deep venous thrombosis in the left lower extremity. Chronic Thrombus is noted in the right common femoral, femoral, and popliteal vein  - on coumadin (holding due to supratherapeutic INR)     #Macrocytic Anemia  - MCV 95.9, Hemoglobin 12  - follow up Iron studies, B12, folate    #Misc  - DVT Prophylaxis: Coumadin   - GI Prophylaxis: pantoprazole  - Diet: DASH/TLC/CC  - Activity: as tolerated  - IV Fluids: n/a  - Code Status: Full Code  - Dispo: admit to telemetry  Patient is a 80 y/o male with a PMH of HFpEF (50-55%), chronic afib on coumadin followed by cardio Dr. Scherer, recent Micra PPM placement for tachy-selvin syndrome, DVT July 2021, HTN, and COPD on 2L home O2 presents to the ED from home due to atypical chest pain. The patient notes he awoke from sleep on the morning of presentation with difficulty breathing and epigastric pain. On examination, patient denying chest pain but complains of epigastric/RUQ pain on deep inhalation. Epigastric region tender on examination.     #Atypical Chest pain r/o ACS  - reports epigastric pain w/ deep inspiration and tenderness  - admit to telemetry   - follow up EP consult- bradycardia, recent admission for bradycardia with Micra PPM placed August 2021  - follow up cardiology consult- Dr. Scherer, patient states his doctor d/c aspirin?  - EKG: no ischemic changes  - Troponin T, Serum: 0.03 (baseline 0.02) > trend troponin   - serial EKGs  - hold BB due to bradycardia     #Transaminitis w/ RUQ pain- r/o cholecystitis   - Alkaline Phosphatase, Serum: 130 U/L  - RUQ pain on palpation  - follow up US abdomen     #Cystitis:  -  CT Abdomen and Pelvis w/ IV Cont (09.12.21 @ 13:58) : Urinary bladder wall thickening and enhancement most compatible with cystitis. Correlation with urinary urinalysis is recommended  - UA: large LE, moderate blood, Many bacteria WBC >720  - no evidence of sepsis  - s/p Rocephin in ED, c/w Rocephin IV qd   - follow up cultures     #COPD not in exacerbation  - no evidence of COPD exacerbation on exam   - saturating well on home dose O2 (2L)   - c/w home inhalers: Symbicort duonebs PRN    #Supratherapeutic INR  - INR: 3.66  - repeat INR daily   - hold coumadin for now  - home regimen: 1 mg M/W/F, 2mg T/Th/Sa/Sun    #HFpEF-echo 50-55%  #A-Fib on coumadin  #CAD s/p PCI and stent 2007  #DLD  - no evidence of fluid overload on exam   - TTE (7/11/2021) w/ EF 50-55%. Moderate to severe left atrial enlargement. Moderate mitral valve regurgitation. Moderate-severe tricuspid regurgitation.  Estimated pulmonary artery systolic pressure is 59.7 mmHg assuming a right atrial pressure of 10 mmHg, which is consistent with moderate pulmonary hypertension.  -  monitor I&O, daily weight   - c/w statin    #Possible hematoma of right pectineus muscle:   - incidental finding of on CT  - no pain, tenderness, swelling, hematoma, or wound see at the site  - continue to monitor     #CKD 3b  - Cr 1.7 (baseline Cr 1.2-1.6)  - monitor Cr daily   - follow up urine lytes     #Deep vein thrombosis (DVT) of left lower extremity  - [facility] Duplex Lower Ext Vein Scan, Bilat (07.15.21)No evidence of deep venous thrombosis in the left lower extremity. Chronic Thrombus is noted in the right common femoral, femoral, and popliteal vein  - on coumadin (holding due to supratherapeutic INR)     #Macrocytic Anemia  - MCV 95.9, Hemoglobin 12  - follow up Iron studies, B12, folate    #Misc  - DVT Prophylaxis: coumadin  - GI Prophylaxis: pantoprazole  - Diet: DASH/TLC/CC  - Activity: as tolerated  - IV Fluids: n/a  - Code Status: Full Code  - Dispo: admit to telemetry

## 2021-09-12 NOTE — H&P ADULT - NSHPREVIEWOFSYSTEMS_GEN_ALL_CORE
CONSTITUTIONAL: No weakness, fevers or chills; No headaches  EYES: No visual changes, eye pain, or discharge  ENT: No vertigo; No ear pain or change in hearing; No sore throat or difficulty swallowing  NECK: No pain or stiffness  RESPIRATORY: No cough, wheezing, or hemoptysis; No shortness of breath  CARDIOVASCULAR: No chest pain or palpitations  GASTROINTESTINAL: + epigastric pain; No nausea, vomiting, or hematemesis; No diarrhea or constipation; No melena or hematochezia  GENITOURINARY: No dysuria, frequency or hematuria  MUSCULOSKELETAL: No joint pain, no muscle pain, no weakness  NEUROLOGICAL: No numbness or weakness  SKIN: No itching or rashes

## 2021-09-12 NOTE — PATIENT PROFILE ADULT - NUMBER OF YRS
DISCHARGE SUMMARY:  Discharge Time: 0248  Via:  Ambulatory  Accompanied by:  Spouse  Verbal Instructions given: Yes  Verbalized understanding: Yes  Written Instructions given: Yes  Discharge Signature Obtained: Yes  Patient Able to Void: Yes  Discharged Stable Per MD Order: Yes  Reviewed discharged instructions with pt. Reinforced to follow up with OB/GYN. IV removed. Pt ambulated to exit with steady gait and with all their belongings. Pt denied any pain and in no distress. Pt denied any questions or concern.     20

## 2021-09-12 NOTE — PATIENT PROFILE ADULT - ABILITY TO HEAR (WITH HEARING AID OR HEARING APPLIANCE IF NORMALLY USED):
24-Jul-2018 09:18 Mildly to Moderately Impaired: difficulty hearing in some environments or speaker may need to increase volume or speak distinctly

## 2021-09-12 NOTE — ED PROVIDER NOTE - OBJECTIVE STATEMENT
81Y M w/ PMHx of CHF, afib on coumadin with loop recorder in place followed by cardio Dr. Scherer, DVT, HTN, and COPD on 2L 81Y M w/ PMHx of CHF, afib on coumadin with loop recorder in place followed by cardio Dr. Scherer, DVT, HTN, and COPD on 2L presents with CC of chest pain that started after waking up this morning. Patient reports the chest pain to be substernal/epigastric, no exacerbating or relieving factors, no syncope. Patient says the SOB has been at baseline, had checked his pulse O2 at home and was >93 at home on 2L which he is always on. Denies fevers, chills, N/V/D. Patient did not become hypoxic when taken off O2 and placed on RA, however, becomes dyspneic and requires O2.

## 2021-09-12 NOTE — ED PROVIDER NOTE - CLINICAL SUMMARY MEDICAL DECISION MAKING FREE TEXT BOX
80 yo M presented to ED for CP. Due to PMH ACS is concerned. Pt also found to have UTI for which antibiotics were given. Pt admitted for further evaluation and management.

## 2021-09-12 NOTE — H&P ADULT - NSHPPHYSICALEXAM_GEN_ALL_CORE
CONSTITUTIONAL: No acute distress, well-developed, well-groomed, AAOx3  HEAD: Atraumatic, normocephalic  EYES: EOM intact, PERRLA, conjunctiva and sclera clear  ENT: Supple, no masses, no thyromegaly, no bruits, no JVD; moist mucous membranes  PULMONARY: Clear to auscultation bilaterally; no wheezes  CARDIOVASCULAR: irregularly irregular, no murmur  GASTROINTESTINAL: Soft, non-tender, non-distended  MUSCULOSKELETAL: no edema   NEUROLOGY: non-focal  SKIN: wound of the left foot concealed in Kerlix, patient did not allow examination

## 2021-09-12 NOTE — ED PROVIDER NOTE - ATTENDING CONTRIBUTION TO CARE
82 yo M with PMH of CHF, afib (on coumadin), DVT, HTN, COPD on 2 L presents to ED for CP. CP started this morning when he woke up. Son put a pulse ox on him and his oxygen >90%. Substernal non-radiating CP. Describes it as pressure like. No worsening SOB. No abdominal pain, HA, fevers, chills.     Const: Well nourished, well developed, appears stated age  Eyes: PERRL, no conjunctival injection  HENT:  Neck supple without meningismus   CV: RRR, Warm, well-perfused extremities  RESP: CTA B/L, no tachypnea   GI: soft, epigastric tenderness, non-distended  MSK: No gross deformities appreciated  Skin: Warm, dry. No rashes  Neuro: Alert, CNs II-XII grossly intact. Sensation and motor function of extremities grossly intact.  Psych: Appropriate mood and affect.    concern for ACS. pt has abdominal tenderness will do labs and CT

## 2021-09-12 NOTE — ED PROVIDER NOTE - CARE PLAN
1 Principal Discharge DX:	Chest pain  Secondary Diagnosis:	Acute cystitis  Secondary Diagnosis:	Hematoma

## 2021-09-12 NOTE — H&P ADULT - ATTENDING COMMENTS
80 YO M with a PMH of HFpEF w/ moderate pulm HTN, chronic afib (coumadin) s/p PPM placement for tachy-selvin syndrome, HTN, and COPD (2L home O2) who presents to the hospital for eval of epigastric pain that started this AM. Worse with deep breaths. - N/V (non-bloody). Denies any fevers/chills, hematemesis, black/bloody stools, rashes, flank pain, dysuria, LE swelling, rashes, or CP. ROS negative except as stated above.     In the ED, CT-AP w/ IV contrast showed cystitis and incidental finding of possible hematoma on right pectineus muscle. UA positive. Started on IV ABXs (Ceftriaxone).     FMHx: Reviewed, not relevant    Physical exam shows pt laying in bed in NAD. VSS, afebrile, not hypoxic on RA. A&Ox3. Neuro exam intact. CTA B/L with no W/C/R. Irregular, no M/G/R. ABD is soft with TTP in the epigastric/RUQ region, normoactive BSs. LEs with no pitting edema. No rashes. Labs and radiology as above.     Epigastric/RUQ ABD pain, rule out choledocholithiasis vs cholelithiasis; doubt cardiac; no sepsis present on admission. RUQ US. Trend LFTs.     DORON on CKD3. Send UA, urine lytes, urine pr:cr ratio, and trend BMP. Monitor urinary out-put. Hold nephrotoxic agents.     Urinary tract infection; no sepsis present on admission. FU cultures. PRN pain meds. IV ABXs (Ceftriaxone).     Normocytic anemia, above baseline. Replace as necessary.     Hypoalbuminemia, from poor oral intake. Nutrition eval.     Troponin elevation, suspected cause is CKD3. Doubt ACS. No CP or EKG changes. Serial cardiac enzymes and EKGs.     Mildly supratherapeutic INR. Hold Coumadin. Repeat Coags in the AM.     Hx of HFpEF w/ moderate pulm HTN, chronic afib (coumadin) s/p PPM placement for tachy-selvin syndrome, HTN, and COPD (2L home O2). Restart home meds, except as stated above. DVT PPX. Inform PCP of pt's admission to hospital. My note supersedes the residents note.

## 2021-09-12 NOTE — ED PROVIDER NOTE - NS ED ROS FT
Review of Systems:  CONSTITUTIONAL - No fever  SKIN - No rash  HEMATOLOGIC - No abnormal bleeding or bruising  RESPIRATORY - No cough  GI - No abdominal pain, No nausea, No vomiting, No diarrhea  MUSCULOSKELETAL - No joint paint, No swelling, No back pain  NEUROLOGIC - No numbness, No focal weakness, No headache  All other systems negative, unless specified in HPI

## 2021-09-12 NOTE — ED ADULT NURSE NOTE - NSIMPLEMENTINTERV_GEN_ALL_ED
Implemented All Universal Safety Interventions:  [place] to call system. Call bell, personal items and telephone within reach. Instruct patient to call for assistance. Room bathroom lighting operational. Non-slip footwear when patient is off stretcher. Physically safe environment: no spills, clutter or unnecessary equipment. Stretcher in lowest position, wheels locked, appropriate side rails in place.

## 2021-09-12 NOTE — ED ADULT NURSE NOTE - OBJECTIVE STATEMENT
Pt c/o sob and chest pain - pt has 2Ln/c home o2 (COPD) - oxygen sat 100% on 2L - also c/o chest pain since 1 am

## 2021-09-13 LAB
A1C WITH ESTIMATED AVERAGE GLUCOSE RESULT: 5.3 % — SIGNIFICANT CHANGE UP (ref 4–5.6)
ALBUMIN SERPL ELPH-MCNC: 3.7 G/DL — SIGNIFICANT CHANGE UP (ref 3.5–5.2)
ALP SERPL-CCNC: 133 U/L — HIGH (ref 30–115)
ALT FLD-CCNC: 15 U/L — SIGNIFICANT CHANGE UP (ref 0–41)
ANION GAP SERPL CALC-SCNC: 9 MMOL/L — SIGNIFICANT CHANGE UP (ref 7–14)
APTT BLD: 46.4 SEC — HIGH (ref 27–39.2)
AST SERPL-CCNC: 18 U/L — SIGNIFICANT CHANGE UP (ref 0–41)
BASOPHILS # BLD AUTO: 0.01 K/UL — SIGNIFICANT CHANGE UP (ref 0–0.2)
BASOPHILS NFR BLD AUTO: 0.1 % — SIGNIFICANT CHANGE UP (ref 0–1)
BILIRUB SERPL-MCNC: 0.9 MG/DL — SIGNIFICANT CHANGE UP (ref 0.2–1.2)
BUN SERPL-MCNC: 46 MG/DL — HIGH (ref 10–20)
CALCIUM SERPL-MCNC: 8.9 MG/DL — SIGNIFICANT CHANGE UP (ref 8.5–10.1)
CHLORIDE SERPL-SCNC: 98 MMOL/L — SIGNIFICANT CHANGE UP (ref 98–110)
CHOLEST SERPL-MCNC: 111 MG/DL — SIGNIFICANT CHANGE UP
CK SERPL-CCNC: 36 U/L — SIGNIFICANT CHANGE UP (ref 0–225)
CO2 SERPL-SCNC: 29 MMOL/L — SIGNIFICANT CHANGE UP (ref 17–32)
CREAT SERPL-MCNC: 1.4 MG/DL — SIGNIFICANT CHANGE UP (ref 0.7–1.5)
EOSINOPHIL # BLD AUTO: 0.02 K/UL — SIGNIFICANT CHANGE UP (ref 0–0.7)
EOSINOPHIL NFR BLD AUTO: 0.1 % — SIGNIFICANT CHANGE UP (ref 0–8)
ESTIMATED AVERAGE GLUCOSE: 105 MG/DL — SIGNIFICANT CHANGE UP (ref 68–114)
GLUCOSE SERPL-MCNC: 119 MG/DL — HIGH (ref 70–99)
HCT VFR BLD CALC: 39.2 % — LOW (ref 42–52)
HDLC SERPL-MCNC: 53 MG/DL — SIGNIFICANT CHANGE UP
HGB BLD-MCNC: 12.8 G/DL — LOW (ref 14–18)
IMM GRANULOCYTES NFR BLD AUTO: 0.4 % — HIGH (ref 0.1–0.3)
INR BLD: 1.29 RATIO — SIGNIFICANT CHANGE UP (ref 0.65–1.3)
INR BLD: 4.09 RATIO — HIGH (ref 0.65–1.3)
INR BLD: 5 RATIO — HIGH (ref 0.65–1.3)
LACTATE SERPL-SCNC: 1.4 MMOL/L — SIGNIFICANT CHANGE UP (ref 0.7–2)
LIPID PNL WITH DIRECT LDL SERPL: 40 MG/DL — SIGNIFICANT CHANGE UP
LYMPHOCYTES # BLD AUTO: 0.55 K/UL — LOW (ref 1.2–3.4)
LYMPHOCYTES # BLD AUTO: 4.1 % — LOW (ref 20.5–51.1)
MAGNESIUM SERPL-MCNC: 2.5 MG/DL — HIGH (ref 1.8–2.4)
MCHC RBC-ENTMCNC: 31.3 PG — HIGH (ref 27–31)
MCHC RBC-ENTMCNC: 32.7 G/DL — SIGNIFICANT CHANGE UP (ref 32–37)
MCV RBC AUTO: 95.8 FL — HIGH (ref 80–94)
MONOCYTES # BLD AUTO: 1.39 K/UL — HIGH (ref 0.1–0.6)
MONOCYTES NFR BLD AUTO: 10.4 % — HIGH (ref 1.7–9.3)
NEUTROPHILS # BLD AUTO: 11.39 K/UL — HIGH (ref 1.4–6.5)
NEUTROPHILS NFR BLD AUTO: 84.9 % — HIGH (ref 42.2–75.2)
NON HDL CHOLESTEROL: 58 MG/DL — SIGNIFICANT CHANGE UP
NRBC # BLD: 0 /100 WBCS — SIGNIFICANT CHANGE UP (ref 0–0)
NT-PROBNP SERPL-SCNC: 4699 PG/ML — HIGH (ref 0–300)
PHOSPHATE SERPL-MCNC: 4.3 MG/DL — SIGNIFICANT CHANGE UP (ref 2.1–4.9)
PLATELET # BLD AUTO: 198 K/UL — SIGNIFICANT CHANGE UP (ref 130–400)
POTASSIUM SERPL-MCNC: 4.6 MMOL/L — SIGNIFICANT CHANGE UP (ref 3.5–5)
POTASSIUM SERPL-SCNC: 4.6 MMOL/L — SIGNIFICANT CHANGE UP (ref 3.5–5)
PROT SERPL-MCNC: 6.8 G/DL — SIGNIFICANT CHANGE UP (ref 6–8)
PROTHROM AB SERPL-ACNC: 14.8 SEC — HIGH (ref 9.95–12.87)
PROTHROM AB SERPL-ACNC: >40 SEC — HIGH (ref 9.95–12.87)
PROTHROM AB SERPL-ACNC: >40 SEC — HIGH (ref 9.95–12.87)
RBC # BLD: 4.09 M/UL — LOW (ref 4.7–6.1)
RBC # FLD: 20.8 % — HIGH (ref 11.5–14.5)
SODIUM SERPL-SCNC: 136 MMOL/L — SIGNIFICANT CHANGE UP (ref 135–146)
TRIGL SERPL-MCNC: 59 MG/DL — SIGNIFICANT CHANGE UP
TROPONIN T SERPL-MCNC: 0.02 NG/ML — HIGH
TROPONIN T SERPL-MCNC: 0.03 NG/ML — CRITICAL HIGH
TSH SERPL-MCNC: 2.14 UIU/ML — SIGNIFICANT CHANGE UP (ref 0.27–4.2)
WBC # BLD: 13.41 K/UL — HIGH (ref 4.8–10.8)
WBC # FLD AUTO: 13.41 K/UL — HIGH (ref 4.8–10.8)

## 2021-09-13 PROCEDURE — 99222 1ST HOSP IP/OBS MODERATE 55: CPT

## 2021-09-13 PROCEDURE — 99233 SBSQ HOSP IP/OBS HIGH 50: CPT

## 2021-09-13 PROCEDURE — 99223 1ST HOSP IP/OBS HIGH 75: CPT

## 2021-09-13 PROCEDURE — 93279 PRGRMG DEV EVAL PM/LDLS PM: CPT | Mod: 26

## 2021-09-13 PROCEDURE — 76705 ECHO EXAM OF ABDOMEN: CPT | Mod: 26

## 2021-09-13 PROCEDURE — 74177 CT ABD & PELVIS W/CONTRAST: CPT | Mod: 26

## 2021-09-13 RX ORDER — METRONIDAZOLE 500 MG
500 TABLET ORAL ONCE
Refills: 0 | Status: COMPLETED | OUTPATIENT
Start: 2021-09-13 | End: 2021-09-13

## 2021-09-13 RX ORDER — CEFTRIAXONE 500 MG/1
2000 INJECTION, POWDER, FOR SOLUTION INTRAMUSCULAR; INTRAVENOUS EVERY 24 HOURS
Refills: 0 | Status: DISCONTINUED | OUTPATIENT
Start: 2021-09-13 | End: 2021-09-13

## 2021-09-13 RX ORDER — PROTHROMBIN COMPLEX CONCENTRATE (HUMAN) 25.5; 16.5; 24; 22; 22; 26 [IU]/ML; [IU]/ML; [IU]/ML; [IU]/ML; [IU]/ML; [IU]/ML
1500 POWDER, FOR SOLUTION INTRAVENOUS ONCE
Refills: 0 | Status: COMPLETED | OUTPATIENT
Start: 2021-09-13 | End: 2021-09-13

## 2021-09-13 RX ORDER — SODIUM CHLORIDE 9 MG/ML
1000 INJECTION, SOLUTION INTRAVENOUS
Refills: 0 | Status: DISCONTINUED | OUTPATIENT
Start: 2021-09-13 | End: 2021-09-15

## 2021-09-13 RX ORDER — METRONIDAZOLE 500 MG
500 TABLET ORAL EVERY 8 HOURS
Refills: 0 | Status: DISCONTINUED | OUTPATIENT
Start: 2021-09-13 | End: 2021-09-13

## 2021-09-13 RX ORDER — LANOLIN ALCOHOL/MO/W.PET/CERES
3 CREAM (GRAM) TOPICAL ONCE
Refills: 0 | Status: COMPLETED | OUTPATIENT
Start: 2021-09-13 | End: 2021-09-13

## 2021-09-13 RX ORDER — PROTHROMBIN COMPLEX CONCENTRATE (HUMAN) 25.5; 16.5; 24; 22; 22; 26 [IU]/ML; [IU]/ML; [IU]/ML; [IU]/ML; [IU]/ML; [IU]/ML
1500 POWDER, FOR SOLUTION INTRAVENOUS ONCE
Refills: 0 | Status: DISCONTINUED | OUTPATIENT
Start: 2021-09-13 | End: 2021-09-13

## 2021-09-13 RX ORDER — OXYCODONE AND ACETAMINOPHEN 5; 325 MG/1; MG/1
1 TABLET ORAL EVERY 6 HOURS
Refills: 0 | Status: DISCONTINUED | OUTPATIENT
Start: 2021-09-13 | End: 2021-09-20

## 2021-09-13 RX ORDER — METRONIDAZOLE 500 MG
TABLET ORAL
Refills: 0 | Status: DISCONTINUED | OUTPATIENT
Start: 2021-09-13 | End: 2021-09-13

## 2021-09-13 RX ORDER — PIPERACILLIN AND TAZOBACTAM 4; .5 G/20ML; G/20ML
3.38 INJECTION, POWDER, LYOPHILIZED, FOR SOLUTION INTRAVENOUS ONCE
Refills: 0 | Status: COMPLETED | OUTPATIENT
Start: 2021-09-13 | End: 2021-09-13

## 2021-09-13 RX ORDER — LANOLIN ALCOHOL/MO/W.PET/CERES
3 CREAM (GRAM) TOPICAL AT BEDTIME
Refills: 0 | Status: DISCONTINUED | OUTPATIENT
Start: 2021-09-13 | End: 2021-09-21

## 2021-09-13 RX ORDER — ALBUTEROL 90 UG/1
2 AEROSOL, METERED ORAL
Qty: 0 | Refills: 0 | DISCHARGE

## 2021-09-13 RX ORDER — PIPERACILLIN AND TAZOBACTAM 4; .5 G/20ML; G/20ML
3.38 INJECTION, POWDER, LYOPHILIZED, FOR SOLUTION INTRAVENOUS EVERY 8 HOURS
Refills: 0 | Status: DISCONTINUED | OUTPATIENT
Start: 2021-09-13 | End: 2021-09-15

## 2021-09-13 RX ORDER — SODIUM CHLORIDE 9 MG/ML
1000 INJECTION, SOLUTION INTRAVENOUS
Refills: 0 | Status: DISCONTINUED | OUTPATIENT
Start: 2021-09-13 | End: 2021-09-13

## 2021-09-13 RX ADMIN — Medication 100 MILLIGRAM(S): at 09:20

## 2021-09-13 RX ADMIN — OXYCODONE AND ACETAMINOPHEN 1 TABLET(S): 5; 325 TABLET ORAL at 01:46

## 2021-09-13 RX ADMIN — PROTHROMBIN COMPLEX CONCENTRATE (HUMAN) 400 INTERNATIONAL UNIT(S): 25.5; 16.5; 24; 22; 22; 26 POWDER, FOR SOLUTION INTRAVENOUS at 18:16

## 2021-09-13 RX ADMIN — OXYCODONE AND ACETAMINOPHEN 1 TABLET(S): 5; 325 TABLET ORAL at 00:47

## 2021-09-13 RX ADMIN — OXYCODONE AND ACETAMINOPHEN 1 TABLET(S): 5; 325 TABLET ORAL at 19:35

## 2021-09-13 RX ADMIN — OXYCODONE AND ACETAMINOPHEN 1 TABLET(S): 5; 325 TABLET ORAL at 19:05

## 2021-09-13 RX ADMIN — CEFTRIAXONE 100 MILLIGRAM(S): 500 INJECTION, POWDER, FOR SOLUTION INTRAMUSCULAR; INTRAVENOUS at 09:19

## 2021-09-13 RX ADMIN — PIPERACILLIN AND TAZOBACTAM 200 GRAM(S): 4; .5 INJECTION, POWDER, LYOPHILIZED, FOR SOLUTION INTRAVENOUS at 14:59

## 2021-09-13 RX ADMIN — PIPERACILLIN AND TAZOBACTAM 25 GRAM(S): 4; .5 INJECTION, POWDER, LYOPHILIZED, FOR SOLUTION INTRAVENOUS at 14:56

## 2021-09-13 RX ADMIN — Medication 20 MILLIGRAM(S): at 05:33

## 2021-09-13 RX ADMIN — PIPERACILLIN AND TAZOBACTAM 25 GRAM(S): 4; .5 INJECTION, POWDER, LYOPHILIZED, FOR SOLUTION INTRAVENOUS at 21:07

## 2021-09-13 RX ADMIN — OXYCODONE AND ACETAMINOPHEN 1 TABLET(S): 5; 325 TABLET ORAL at 11:30

## 2021-09-13 RX ADMIN — SPIRONOLACTONE 25 MILLIGRAM(S): 25 TABLET, FILM COATED ORAL at 05:33

## 2021-09-13 RX ADMIN — Medication 3 MILLIGRAM(S): at 21:07

## 2021-09-13 RX ADMIN — OXYCODONE AND ACETAMINOPHEN 1 TABLET(S): 5; 325 TABLET ORAL at 09:58

## 2021-09-13 NOTE — CONSULT NOTE ADULT - ATTENDING COMMENTS
Cardiologist:  Dr. Arnulfo Scherer    COPD with mod PHTN and oxygen dependent  AF and recent DVT on Coumadin  High-risk for perioperative MACE    CT:  Small left effusion with bibasilar atelectasis  INR  4.0  Cr  1.4      No further cardiac workup is indicated at this time.  There are no current cardiac contraindications that prohibit proceeding with the scheduled surgery/procedure.  This consult serves only as a perioperative cardiac risk stratification and evaluation to predict 30-day cardiac complications risk and mortality.  The decision to proceed with the surgery/procedure is made by the performing physician and the patient.
patient seen, multip[le medical problems including CHF, a fib, anticoagulated. dicussed the patient with primary attending and cardiologist.  high risk for surgical intervnetion, recommend IR for perc drain.

## 2021-09-13 NOTE — PROGRESS NOTE ADULT - SUBJECTIVE AND OBJECTIVE BOX
Hospital Day:  1d    Subjective: Patient is a 81y old  Male who presents with a chief complaint of Atypical chest pain (13 Sep 2021 11:03)      Pt seen and evaluated at bedside.   Complaints: RUQ abdominal pain, PATIENT DENYING CHEST PAIN  Over the night Events: none    Past Medical Hx:   COPD (chronic obstructive pulmonary disease)    Blood clot in vein    Hypertension    High cholesterol    Deep vein thrombosis (DVT) of left lower extremity, unspecified chronicity, unspecified vein    Cellulitis of left lower extremity    Chronic atrial fibrillation    Mitral valve insufficiency, unspecified etiology    CHF (congestive heart failure)      Past Sx:  No significant past surgical history    S/P coronary artery stent placement    History of total right knee replacement      Allergies:  No Known Allergies    Current Meds:   Standng Meds:  melatonin 3 milliGRAM(s) Oral at bedtime  piperacillin/tazobactam IVPB. 3.375 Gram(s) IV Intermittent once  piperacillin/tazobactam IVPB.. 3.375 Gram(s) IV Intermittent every 8 hours  sodium chloride 0.45%. 1000 milliLiter(s) (50 mL/Hr) IV Continuous <Continuous>  spironolactone 25 milliGRAM(s) Oral daily  torsemide 20 milliGRAM(s) Oral daily    PRN Meds:  ALBUTerol  90 MICROgram(s) HFA Inhaler - Peds 2 Puff(s) Inhalation every 4 hours PRN Bronchospasm  oxycodone    5 mG/acetaminophen 325 mG 1 Tablet(s) Oral every 6 hours PRN Moderate Pain (4 - 6)      Vital Signs:   T(F): 96.5 (21 @ 04:42), Max: 96.5 (21 @ 04:42)  HR: 64 (21 @ 04:42) (52 - 71)  BP: 138/60 (21 @ 04:42) (133/61 - 156/68)  RR: 18 (21 @ 04:42) (18 - 18)  SpO2: 100% (21 @ 23:47) (98% - 100%)    Physical Exam:   GENERAL: NAD, Resting in bed  HEENT: NCAT  CHEST/LUNG: Clear to auscultation bilaterally; No wheezing or rubs.   HEART: Regular rate and rhythm; No murmurs, rubs, or gallops  ABDOMEN: RUQ tenderness to palpation   EXTREMITIES:  No clubbing, cyanosis, or edema  NERVOUS SYSTEM:  Alert & Oriented X3    FLUID BALANCE    21 @ 07:01  -  21 @ 07:00  --------------------------------------------------------  IN: 80 mL / OUT: 325 mL / NET: -245 mL    21 @ 07:01  -  21 @ 13:44  --------------------------------------------------------  IN: 0 mL / OUT: 220 mL / NET: -220 mL        Labs:                         12.8   13.41 )-----------( 198      ( 13 Sep 2021 05:00 )             39.2     Neutophil% 84.9, Lymphocyte% 4.1, Monocyte% 10.4, Bands% 0.4 21 @ 05:00    13 Sep 2021 05:00    136    |  98     |  46     ----------------------------<  119    4.6     |  29     |  1.4      Ca    8.9        13 Sep 2021 05:00  Phos  4.3       13 Sep 2021 05:00  Mg     2.5       13 Sep 2021 05:00    TPro  6.8    /  Alb  3.7    /  TBili  0.9    /  DBili  x      /  AST  18     /  ALT  15     /  AlkPhos  133    13 Sep 2021 05:00       pTT    46.4             ----< 4.09 INR  (21 @ 05:00)    >40.00        PT  Chol 111, LDL --, HDL 53, VLDL --, TRG 59 21 @ 05:00        Serum Pro-Brain Natriuretic Peptide: 4699 pg/mL (21 @ 05:00)  Serum Pro-Brain Natriuretic Peptide: 3659 pg/mL (21 @ 12:00)    Troponin 0.03, CKMB --, CK 36 21 @ 05:00  Troponin 0.02, CKMB --, CK -- 21 @ 00:22  Troponin 0.02, CKMB --, CK -- 21 @ 20:43  Troponin 0.03, CKMB --, CK -- 21 @ 12:00        Urinalysis Basic - ( 12 Sep 2021 12:50 )    Color: Orange / Appearance: Turbid / S.014 / pH: x  Gluc: x / Ketone: Negative  / Bili: Negative / Urobili: 3 mg/dL   Blood: x / Protein: 100 mg/dL / Nitrite: Negative   Leuk Esterase: Large / RBC: 3 /HPF / WBC >720 /HPF   Sq Epi: x / Non Sq Epi: 2 /HPF / Bacteria: Many                      Radiology:     < from: US Abdomen Upper Quadrant Right (21 @ 01:58) >  Findings are compatible with acute cholecystitis.    Indeterminate focal irregularity of the anterior gallbladder wall. Repeat CT examination is recommended to evaluate for gallbladder perforation.    < end of copied text >  < from: CT Abdomen and Pelvis w/ IV Cont (21 @ 13:58) >  Urinary bladder wall thickening and enhancement most compatible with cystitis. Correlation with urinary urinalysis is recommended.    Masslike enlargement/low-attenuation of the inferior aspect of the right pectineus muscle. This is nonspecific. Differential includes hematoma or possibly abscess. Malignancy is not excluded. Further evaluation and follow-up is recommended. For complete characterization MRI may be obtained.    Bilateral pleural effusions, small on the right and trace to small on the left.    < end of copied text >   Hospital Day:  1d    Subjective: Patient is a 81y old  Male who presents with a chief complaint of Atypical chest pain (13 Sep 2021 11:03)      Pt seen and evaluated at bedside.   Complaints: RUQ abdominal pain, PATIENT DENYING CHEST PAIN  Over the night Events: none    Past Medical Hx:   COPD (chronic obstructive pulmonary disease)    Blood clot in vein    Hypertension    High cholesterol    Deep vein thrombosis (DVT) of left lower extremity, unspecified chronicity, unspecified vein    Cellulitis of left lower extremity    Chronic atrial fibrillation    Mitral valve insufficiency, unspecified etiology    CHF (congestive heart failure)      Past Sx:  No significant past surgical history    S/P coronary artery stent placement    History of total right knee replacement      Allergies:  No Known Allergies    Current Meds:   Standng Meds:  melatonin 3 milliGRAM(s) Oral at bedtime  piperacillin/tazobactam IVPB. 3.375 Gram(s) IV Intermittent once  piperacillin/tazobactam IVPB.. 3.375 Gram(s) IV Intermittent every 8 hours  sodium chloride 0.45%. 1000 milliLiter(s) (50 mL/Hr) IV Continuous <Continuous>  spironolactone 25 milliGRAM(s) Oral daily  torsemide 20 milliGRAM(s) Oral daily    PRN Meds:  ALBUTerol  90 MICROgram(s) HFA Inhaler - Peds 2 Puff(s) Inhalation every 4 hours PRN Bronchospasm  oxycodone    5 mG/acetaminophen 325 mG 1 Tablet(s) Oral every 6 hours PRN Moderate Pain (4 - 6)      Vital Signs:   T(F): 96.5 (21 @ 04:42), Max: 96.5 (21 @ 04:42)  HR: 64 (21 @ 04:42) (52 - 71)  BP: 138/60 (21 @ 04:42) (133/61 - 156/68)  RR: 18 (21 @ 04:42) (18 - 18)  SpO2: 100% (21 @ 23:47) (98% - 100%)    Physical Exam:   GENERAL: NAD, Resting in bed  HEENT: NCAT  CHEST/LUNG: Clear to auscultation bilaterally; No wheezing or rubs.   HEART: Regular rate and rhythm; No murmurs, rubs, or gallops  ABDOMEN: RUQ tenderness to palpation   EXTREMITIES:  No clubbing, cyanosis, or edema  NERVOUS SYSTEM:  Alert & Oriented X3    FLUID BALANCE    21 @ 07:01  -  21 @ 07:00  --------------------------------------------------------  IN: 80 mL / OUT: 325 mL / NET: -245 mL    21 @ 07:01  -  21 @ 13:44  --------------------------------------------------------  IN: 0 mL / OUT: 220 mL / NET: -220 mL        Labs:                         12.8   13.41 )-----------( 198      ( 13 Sep 2021 05:00 )             39.2     Neutophil% 84.9, Lymphocyte% 4.1, Monocyte% 10.4, Bands% 0.4 21 @ 05:00    13 Sep 2021 05:00    136    |  98     |  46     ----------------------------<  119    4.6     |  29     |  1.4      Ca    8.9        13 Sep 2021 05:00  Phos  4.3       13 Sep 2021 05:00  Mg     2.5       13 Sep 2021 05:00    TPro  6.8    /  Alb  3.7    /  TBili  0.9    /  DBili  x      /  AST  18     /  ALT  15     /  AlkPhos  133    13 Sep 2021 05:00       pTT    46.4             ----< 4.09 INR  (21 @ 05:00)    >40.00        PT  Chol 111, LDL --, HDL 53, VLDL --, TRG 59 21 @ 05:00        Serum Pro-Brain Natriuretic Peptide: 4699 pg/mL (21 @ 05:00)  Serum Pro-Brain Natriuretic Peptide: 3659 pg/mL (21 @ 12:00)    Troponin 0.03, CKMB --, CK 36 21 @ 05:00  Troponin 0.02, CKMB --, CK -- 21 @ 00:22  Troponin 0.02, CKMB --, CK -- 21 @ 20:43  Troponin 0.03, CKMB --, CK -- 21 @ 12:00    Urinalysis Basic - ( 12 Sep 2021 12:50 )    Color: Orange / Appearance: Turbid / S.014 / pH: x  Gluc: x / Ketone: Negative  / Bili: Negative / Urobili: 3 mg/dL   Blood: x / Protein: 100 mg/dL / Nitrite: Negative   Leuk Esterase: Large / RBC: 3 /HPF / WBC >720 /HPF   Sq Epi: x / Non Sq Epi: 2 /HPF / Bacteria: Many    Radiology:     < from: US Abdomen Upper Quadrant Right (21 @ 01:58) >  Findings are compatible with acute cholecystitis.    Indeterminate focal irregularity of the anterior gallbladder wall. Repeat CT examination is recommended to evaluate for gallbladder perforation.    < end of copied text >  < from: CT Abdomen and Pelvis w/ IV Cont (21 @ 13:58) >  Urinary bladder wall thickening and enhancement most compatible with cystitis. Correlation with urinary urinalysis is recommended.    Masslike enlargement/low-attenuation of the inferior aspect of the right pectineus muscle. This is nonspecific. Differential includes hematoma or possibly abscess. Malignancy is not excluded. Further evaluation and follow-up is recommended. For complete characterization MRI may be obtained.    Bilateral pleural effusions, small on the right and trace to small on the left.    < end of copied text >

## 2021-09-13 NOTE — CHART NOTE - NSCHARTNOTEFT_GEN_A_CORE
[employer] Micra PPM interrogated 9/13/21  No events noted  Device functioning properly    Recall EP as needed [id number]

## 2021-09-13 NOTE — CONSULT NOTE ADULT - SUBJECTIVE AND OBJECTIVE BOX
GENERAL SURGERY CONSULT NOTE    Patient: RODNEY SANTO , 81y (44)Male   MRN: [id number]  Location: [place] [id number]  Visit: 21 Inpatient  Date: 21 @ 08:28    HPI:  Patient is a 82 y/o male with a PMH of HFpEF (50-55%), chronic afib on coumadin followed by cardio Dr. Scherer, recent Micra PPM placement for tachy-selvin syndrome, DVT 2021, HTN, and COPD on 2L home O2 presents to the ED from home due to atypical chest pain. The patient notes he awoke from sleep on the morning of presentation with difficulty breathing and epigastric pain. The patient notes pain on deep breath in the epigastric/RUQ region. Patient saturating well on home O2, 2L with no increased O2 requirements. Denies substernal chest pain, radiation to L arm/jaw, palpitations, nausea, vomiting, diarrhea, constipation, syncope, falls, cough, chills, or fever. The patient denies prior episodes of similar pain.     In ED, the patient was found to have bradycardia 50s (asymptomatic). Labs revealing INR 3.66 and positive urinalysis. EKG with no ischemic changes. CT Abdomen and Pelvis w/ IV Cont showed urinary bladder wall thickening and enhancement most compatible with cystitis.  Masslike enlargement/low-attenuation of the inferior aspect of the right pectineus muscle. This is nonspecific. Differential includes hematoma or possibly abscess. Malignancy is not excluded. Further evaluation and follow-up is recommended. For complete characterization MRI may be obtained. Bilateral pleural effusions, small on the right and trace to small on the left. The patient will be admitted to telemetry for further management.    (12 Sep 2021 20:59)    Today, Hospital day 2, surgery consulted for acute cholecystitis. Patient came in complaining of epigastric chest pain since early yesterday that radiated to bilateral upper quadrants. Patient was admitted to medicine/telemetry, receiving cardiac workup with no acute findings. CT was done which shows cholelithiasis and a very large gallbladder. RUQ US was also done showing minimal GB wall thickening to about 5+ mm, but no pericholecystic fluid. There is also a focal irregularity on anterior gallbladder wall, questionable for perforation, but there is no evidence of perforation on CT. Furthermore, WBC is WNL and patient is afebrile. Of note, patient has very extensive PMHx including CHF with EF of 50-55%, AFib on Coumadin w/ current INR of 3.6, DVT, HTN, COPD on 2L, CKD Cr 1.7. Past intraabdominal surgical hx appears to be sleeve gastrectomy 15-20 years ago  (poor historian)      PAST MEDICAL & SURGICAL HISTORY:  COPD (chronic obstructive pulmonary disease)    Hypertension    Deep vein thrombosis (DVT) of left lower extremity, unspecified chronicity, unspecified vein    Cellulitis of left lower extremity    Chronic atrial fibrillation    Mitral valve insufficiency, unspecified etiology  Moderate    CHF (congestive heart failure)    S/P coronary artery stent placement    History of total right knee replacement        Home Medications:  atorvastatin 20 mg oral tablet: 1 tab(s) orally once a day (13 Sep 2021 04:59)  metoprolol succinate 25 mg oral tablet, extended release: 1 tab(s) orally once a day (13 Sep 2021 04:59)  nystatin 100,000 units/g topical powder: 1 application topically every 12 hours (13 Sep 2021 04:59)  spironolactone: 25 milligram(s) orally once a day (13 Sep 2021 04:59)  Symbicort 160 mcg-4.5 mcg/inh inhalation aerosol: 2 puff(s) inhaled 2 times a day (13 Sep 2021 04:59)  Ventolin HFA 90 mcg/inh inhalation aerosol: 2 puff(s) inhaled every 6 hours as neede for shortness of breath (13 Sep 2021 04:59)  warfarin 2 mg oral tablet: Take half a tablet Sun, Tues, Wed, Thurs, Fri, Sat.  Take a whole tablet on Mon (13 Sep 2021 04:59)        VITALS:  T(F): 96.5 (21 @ 04:42), Max: 98.7 (21 @ 10:54)  HR: 64 (21 @ 04:42) (52 - 71)  BP: 138/60 (21 @ 04:42) (133/61 - 156/68)  RR: 18 (21 @ 04:42) (18 - 20)  SpO2: 100% (21 @ 23:47) (98% - 100%)    PHYSICAL EXAM:  General: NAD, AAOx3, calm and cooperative  HEENT: NCAT, TREY, EOMI  Cardiac: RRR S1, S2, no Murmurs  Respiratory: CTAB, normal respiratory effort, breath sounds equal BL  Abdomen: Soft, non-distended, moderately-tender RUQ, no rebound, no guarding  Vascular: Pulses 2+ throughout, extremities well perfused  Skin: Warm/dry, normal color, no jaundice    MEDICATIONS  (STANDING):  cefTRIAXone   IVPB 2000 milliGRAM(s) IV Intermittent every 24 hours  melatonin 3 milliGRAM(s) Oral at bedtime  metroNIDAZOLE  IVPB      metroNIDAZOLE  IVPB 500 milliGRAM(s) IV Intermittent once  metroNIDAZOLE  IVPB 500 milliGRAM(s) IV Intermittent every 8 hours  spironolactone 25 milliGRAM(s) Oral daily  torsemide 20 milliGRAM(s) Oral daily    MEDICATIONS  (PRN):  ALBUTerol  90 MICROgram(s) HFA Inhaler - Peds 2 Puff(s) Inhalation every 4 hours PRN Bronchospasm  oxycodone    5 mG/acetaminophen 325 mG 1 Tablet(s) Oral every 6 hours PRN Moderate Pain (4 - 6)    LAB/STUDIES:                        12.0   5.92  )-----------( 193      ( 12 Sep 2021 12:00 )             37.6     09-12    133<L>  |  95<L>  |  50<H>  ----------------------------<  108<H>  3.8   |  26  |  1.7<H>    Ca    8.4<L>      12 Sep 2021 12:00  Mg     2.3         TPro  6.5  /  Alb  3.4<L>  /  TBili  1.2  /  DBili  x   /  AST  20  /  ALT  15  /  AlkPhos  130<H>  12    PT/INR - ( 13 Sep 2021 05:00 )   PT: >40.00 sec;   INR: 4.09 ratio         PTT - ( 12 Sep 2021 12:00 )  PTT:48.0 sec  LIVER FUNCTIONS - ( 12 Sep 2021 12:00 )  Alb: 3.4 g/dL / Pro: 6.5 g/dL / ALK PHOS: 130 U/L / ALT: 15 U/L / AST: 20 U/L / GGT: x           Urinalysis Basic - ( 12 Sep 2021 12:50 )    Color: Orange / Appearance: Turbid / S.014 / pH: x  Gluc: x / Ketone: Negative  / Bili: Negative / Urobili: 3 mg/dL   Blood: x / Protein: 100 mg/dL / Nitrite: Negative   Leuk Esterase: Large / RBC: 3 /HPF / WBC >720 /HPF   Sq Epi: x / Non Sq Epi: 2 /HPF / Bacteria: Many      CARDIAC MARKERS ( 13 Sep 2021 00:22 )  x     / 0.02 ng/mL / x     / x     / x      CARDIAC MARKERS ( 12 Sep 2021 20:43 )  x     / 0.02 ng/mL / x     / x     / x      CARDIAC MARKERS ( 12 Sep 2021 12:00 )  x     / 0.03 ng/mL / x     / x     / x          IMAGING:  < from: US Abdomen Upper Quadrant Right (21 @ 01:58) >  IMPRESSION:    Findings are compatible with acute cholecystitis.    Indeterminate focal irregularity of the anterior gallbladder wall. Repeat CT examination is recommended to evaluate for gallbladder perforation.    Dr. Luis Pillai discussed preliminary findings with primary inpatient medical team on 2021 at 3:30 AM.    --- End of Report ---    < end of copied text >      < from: CT Abdomen and Pelvis w/ IV Cont (21 @ 13:58) >  IMPRESSION:  Urinary bladder wall thickening and enhancement most compatible with cystitis. Correlation with urinary urinalysis is recommended.    Masslike enlargement/low-attenuation of the inferior aspect of the right pectineus muscle. This is nonspecific. Differential includes hematoma or possibly abscess. Malignancy is not excluded. Further evaluation and follow-up is recommended. For complete characterization MRI may be obtained.    Bilateral pleural effusions, small on the right and trace to small on the left.    < end of copied text >

## 2021-09-13 NOTE — CONSULT NOTE ADULT - ASSESSMENT
80 y/o male with a PMH of HFpEF (50-55%), chronic afib on coumadin, recent Micra PPM placement for tachy-selvin syndrome, DVT July 2021, HTN, and COPD on 2L home O2 presents to the ED from home due to atypical chest pain. Patient was found to have acute cholecystitis on admission and plan for OR. Cardiology consulted for pre-op eval.    * SUMMARY: Elevated/high risk patient to proceed with moderate risk surgery. No acute signs of ACS or CHF exacerbation. This procedure is urgent in nature. Will proceed to OR. No additional cardiac testing indicated at this time.    * Patient-based characteristics (Functional capacity)  Patient is able to achieve more than 4 MET (walk 4 blocks, climb 2 flights of stairs, etc...)          Y [ ] / N [x]    High-risk patient features:  - Recent (<30 days) or active MI          Y [] / N [X]  - Unstable or severe angina          Y [] / N [X]  - Decompensated heart failure, or worsening or new-onset heart failure          Y [] / N [X]  - Severe valvular disease          Y [] / N [X]  - Significant arrhythmia (Tachy- or Bradyarrhythmia)          Y [] / N [X]    * Surgery/Procedure-based characteristics (Type of surgery)  - Low-risk procedure (outpatient procedure, elective, endoscopy, etc...)          Y [] / N []  - Elevated or Moderate-risk procedure (Inpatient)          Y [x] / N []  - High-risk procedure (urgent/emergent procedure, Intrathoracic, vascular, etc...)          Y [] / N []    * Revised Cardiac Risk Index (RCRI)  1- History of ischemic heart disease          Y [x] / N [X]  2- History of congestive heart failure          Y [x] / N [X]  3- History of stroke/TIA          Y [] / N [X]  4- History of insulin-dependent diabetes          Y [] / N [X]  5- Chronic kidney disease (Cr >2mg/dL)          Y [] / N [X]  6- Undergoing suprainguinal vascular, intraperitoneal, or intrathoracic surgery          Y [x] / N [X]    Class III risk (Two factors) --> 10.1% risk (30-day risk of death, MI, or cardiac arrest)    * IMPRESSION & RECOMMENDATIONS:  Elevated risk patient for a moderate risk surgery/procedure  No further cardiac work-up is needed at the moment. There are no current cardiac contraindications to prevent from proceeding with the scheduled surgery/procedure.    - This consult serves only as a adrian-operative cardiac risk stratification and evaluation to predict 30-days cardiac complications risk and mortality. The decision to proceed with the surgery/procedure is made by the performing physician and the patient - 82 y/o male with a PMH of HFpEF (50-55%), CAD s/p PCI, chronic afib on coumadin, recent Micra PPM placement for tachy-selvin syndrome, DVT July 2021, HTN, and COPD on 2L home O2 presents to the ED from home due to atypical chest pain. Patient was found to have acute cholecystitis on admission and plan for OR. Cardiology consulted for pre-op eval.      * SUMMARY: Elevated/high risk patient to proceed with moderate risk surgery. No acute signs of ACS or CHF exacerbation. This procedure is urgent in nature. Will proceed to OR. No additional cardiac testing indicated at this time.    * Patient-based characteristics (Functional capacity)  Patient is able to achieve more than 4 MET (walk 4 blocks, climb 2 flights of stairs, etc...)          Y [ ] / N [x]    High-risk patient features:  - Recent (<30 days) or active MI          Y [] / N [X]  - Unstable or severe angina          Y [] / N [X]  - Decompensated heart failure, or worsening or new-onset heart failure          Y [] / N [X]  - Severe valvular disease          Y [] / N [X]  - Significant arrhythmia (Tachy- or Bradyarrhythmia)          Y [] / N [X]    * Surgery/Procedure-based characteristics (Type of surgery)  - Elevated or Moderate-risk procedure (Inpatient)          Y [x] / N []    * Revised Cardiac Risk Index (RCRI)  1- History of ischemic heart disease          Y [x] / N []  2- History of congestive heart failure          Y [x] / N []  3- History of stroke/TIA          Y [] / N [X]  4- History of insulin-dependent diabetes          Y [] / N [X]  5- Chronic kidney disease (Cr >2mg/dL)          Y [] / N [X]  6- Undergoing suprainguinal vascular, intraperitoneal, or intrathoracic surgery          Y [] / N [X]    Class III risk (Two factors) --> 10.1% risk (30-day risk of death, MI, or cardiac arrest)      * IMPRESSION & RECOMMENDATIONS:  Elevated risk patient for a moderate risk surgery/procedure

## 2021-09-13 NOTE — CONSULT NOTE ADULT - SUBJECTIVE AND OBJECTIVE BOX
HISTORY OF PRESENT ILLNESS:   82 y/o male with a PMH of HFpEF (50-55%), chronic afib on coumadin, recent Micra PPM placement for tachy-selvin syndrome, DVT July 2021, HTN, and COPD on 2L home O2 presents to the ED from home due to atypical chest pain. The patient notes he awoke from sleep on the morning of presentation with difficulty breathing and epigastric pain. The patient notes pain on deep breath in the epigastric/RUQ region. Patient saturating well on home O2, 2L. Patient was found to have acute cholecystitis on admission and plan for OR. At the time of this eval, patient denied palpitation, n/v, chest pain or dizziness.    PAST MEDICAL & SURGICAL HISTORY  COPD (chronic obstructive pulmonary disease)    Hypertension    Deep vein thrombosis (DVT) of left lower extremity, unspecified chronicity, unspecified vein    Cellulitis of left lower extremity    Chronic atrial fibrillation    Mitral valve insufficiency, unspecified etiology  Moderate    CHF (congestive heart failure)    S/P coronary artery stent placement    History of total right knee replacement        FAMILY HISTORY:  FAMILY HISTORY:      SOCIAL HISTORY:  []smoker  []Alcohol  []Drug    ROS:  Negative except as mentioned in HPI    ALLERGIES:  No Known Allergies      MEDICATIONS:  MEDICATIONS  (STANDING):  cefTRIAXone   IVPB 2000 milliGRAM(s) IV Intermittent every 24 hours  melatonin 3 milliGRAM(s) Oral at bedtime  metroNIDAZOLE  IVPB      metroNIDAZOLE  IVPB 500 milliGRAM(s) IV Intermittent every 8 hours  sodium chloride 0.45%. 1000 milliLiter(s) (50 mL/Hr) IV Continuous <Continuous>  spironolactone 25 milliGRAM(s) Oral daily  torsemide 20 milliGRAM(s) Oral daily    MEDICATIONS  (PRN):  ALBUTerol  90 MICROgram(s) HFA Inhaler - Peds 2 Puff(s) Inhalation every 4 hours PRN Bronchospasm  oxycodone    5 mG/acetaminophen 325 mG 1 Tablet(s) Oral every 6 hours PRN Moderate Pain (4 - 6)      HOME MEDICATIONS:  Home Medications:  atorvastatin 20 mg oral tablet: 1 tab(s) orally once a day (13 Sep 2021 04:59)  metoprolol succinate 25 mg oral tablet, extended release: 1 tab(s) orally once a day (13 Sep 2021 04:59)  nystatin 100,000 units/g topical powder: 1 application topically every 12 hours (13 Sep 2021 04:59)  spironolactone: 25 milligram(s) orally once a day (13 Sep 2021 04:59)  Symbicort 160 mcg-4.5 mcg/inh inhalation aerosol: 2 puff(s) inhaled 2 times a day (13 Sep 2021 04:59)  Ventolin HFA 90 mcg/inh inhalation aerosol: 2 puff(s) inhaled every 6 hours as neede for shortness of breath (13 Sep 2021 04:59)  warfarin 2 mg oral tablet: Take half a tablet Sun, Tues, Wed, Thurs, Fri, Sat.  Take a whole tablet on Mon (13 Sep 2021 04:59)      VITALS:   T(F): 96.5 (09-13 @ 04:42), Max: 98.7 (09-12 @ 10:54)  HR: 64 (09-13 @ 04:42) (52 - 71)  BP: 138/60 (09-13 @ 04:42) (133/61 - 156/68)  BP(mean): --  RR: 18 (09-13 @ 04:42) (18 - 20)  SpO2: 100% (09-12 @ 23:47) (98% - 100%)    I&O's Summary    12 Sep 2021 07:01  -  13 Sep 2021 07:00  --------------------------------------------------------  IN: 80 mL / OUT: 325 mL / NET: -245 mL    13 Sep 2021 07:01  -  13 Sep 2021 11:03  --------------------------------------------------------  IN: 0 mL / OUT: 220 mL / NET: -220 mL        PHYSICAL EXAM:  GEN: Not in acute distress  HEENT: NCAT, PERRL, EOMI  LUNGS: Clear to auscultation bilaterally   CARDIOVASCULAR: RRR, S1/S2 present  ABD: Soft, non-tender, non-distended  EXT: No NADIA  SKIN: Intact  NEURO: AAOx3    LABS:                        12.8   13.41 )-----------( 198      ( 13 Sep 2021 05:00 )             39.2     09-13    136  |  98  |  46<H>  ----------------------------<  119<H>  4.6   |  29  |  1.4    Ca    8.9      13 Sep 2021 05:00  Phos  4.3     09-13  Mg     2.5     09-13    TPro  6.8  /  Alb  3.7  /  TBili  0.9  /  DBili  x   /  AST  18  /  ALT  15  /  AlkPhos  133<H>  09-13    PT/INR - ( 13 Sep 2021 05:00 )   PT: >40.00 sec;   INR: 4.09 ratio         PTT - ( 13 Sep 2021 05:00 )  PTT:46.4 sec  Creatine Kinase, Serum: 36 U/L (09-13-21 @ 05:00)  Troponin T, Serum: 0.03 ng/mL *HH* (09-13-21 @ 05:00)  Lactate, Blood: 1.4 mmol/L (09-13-21 @ 05:00)  Troponin T, Serum: 0.02 ng/mL *H* (09-13-21 @ 00:22)  Troponin T, Serum: 0.02 ng/mL *H* (09-12-21 @ 20:43)  Troponin T, Serum: 0.03 ng/mL *HH* (09-12-21 @ 12:00)    Troponin 0.03, CKMB --, CK 36/ 09-13-21 @ 05:00  Troponin 0.02, CKMB --, CK --/ 09-13-21 @ 00:22  Troponin 0.02, CKMB --, CK --/ 09-12-21 @ 20:43  Troponin 0.03, CKMB --, CK --/ 09-12-21 @ 12:00    Serum Pro-Brain Natriuretic Peptide: 4699 pg/mL (09-13-21 @ 05:00)  Serum Pro-Brain Natriuretic Peptide: 3659 pg/mL (09-12-21 @ 12:00)    09-13 Chol 111 LDL -- HDL 53 Trig 59  Hemoglobin A1C   Thyroid    CXR: V-paced with pvc.  CXR: unchanged small bilateral effusion  TTE: 07/21 normal EF, mod TR and pulm HTN HISTORY OF PRESENT ILLNESS:   82 y/o male with a PMH of HFpEF (50-55%), chronic afib on coumadin, recent Micra PPM placement for tachy-selvin syndrome, DVT July 2021, HTN, and COPD on 2L home O2 presents to the ED from home due to atypical chest pain. The patient notes he awoke from sleep on the morning of presentation with difficulty breathing and epigastric pain. The patient notes pain on deep breath in the epigastric/RUQ region. Patient saturating well on home O2, 2L. Patient was found to have acute cholecystitis on admission and plan for OR. At the time of this eval, patient denied palpitation, n/v, chest pain or dizziness.      PAST MEDICAL & SURGICAL HISTORY  COPD (chronic obstructive pulmonary disease)    Hypertension    Deep vein thrombosis (DVT) of left lower extremity, unspecified chronicity, unspecified vein    Cellulitis of left lower extremity    Chronic atrial fibrillation    Mitral valve insufficiency, unspecified etiology  Moderate    CHF (congestive heart failure)    S/P coronary artery stent placement    History of total right knee replacement        ALLERGIES:  No Known Allergies      MEDICATIONS:  MEDICATIONS  (STANDING):  cefTRIAXone   IVPB 2000 milliGRAM(s) IV Intermittent every 24 hours  melatonin 3 milliGRAM(s) Oral at bedtime  metroNIDAZOLE  IVPB      metroNIDAZOLE  IVPB 500 milliGRAM(s) IV Intermittent every 8 hours  sodium chloride 0.45%. 1000 milliLiter(s) (50 mL/Hr) IV Continuous <Continuous>  spironolactone 25 milliGRAM(s) Oral daily  torsemide 20 milliGRAM(s) Oral daily    MEDICATIONS  (PRN):  ALBUTerol  90 MICROgram(s) HFA Inhaler - Peds 2 Puff(s) Inhalation every 4 hours PRN Bronchospasm  oxycodone    5 mG/acetaminophen 325 mG 1 Tablet(s) Oral every 6 hours PRN Moderate Pain (4 - 6)      Home Medications:  atorvastatin 20 mg oral tablet: 1 tab(s) orally once a day (13 Sep 2021 04:59)  metoprolol succinate 25 mg oral tablet, extended release: 1 tab(s) orally once a day (13 Sep 2021 04:59)  nystatin 100,000 units/g topical powder: 1 application topically every 12 hours (13 Sep 2021 04:59)  spironolactone: 25 milligram(s) orally once a day (13 Sep 2021 04:59)  Symbicort 160 mcg-4.5 mcg/inh inhalation aerosol: 2 puff(s) inhaled 2 times a day (13 Sep 2021 04:59)  Ventolin HFA 90 mcg/inh inhalation aerosol: 2 puff(s) inhaled every 6 hours as neede for shortness of breath (13 Sep 2021 04:59)  warfarin 2 mg oral tablet: Take half a tablet Sun, Tues, Wed, Thurs, Fri, Sat.  Take a whole tablet on Mon (13 Sep 2021 04:59)      REVIEW OF SYSTEMS:  CONSTITUTIONAL: No fever, weight loss, fatigue  NECK: No pain or stiffness  RESPIRATORY: See HPI  CARDIOVASCULAR: See HPI  GASTROINTESTINAL: +abdominal/epigastric pain, no nausea, vomiting, hematemesis, diarrhea, constipation, melena or hematochezia  GENITOURINARY: No dysuria, frequency, hematuria, incontinence  NEUROLOGICAL: No headaches, memory loss, loss of strength, numbness, tremors  SKIN: No itching, burning, rashes, lesions   ENDOCRINE: No heat/cold intolerance or hair loss  MUSCULOSKELETAL: No joint pain or swelling  HEME/LYMPH: No easy bruising or bleeding gums    VITALS:   T(F): 96.5 (09-13 @ 04:42), Max: 98.7 (09-12 @ 10:54)  HR: 64 (09-13 @ 04:42) (52 - 71)  BP: 138/60 (09-13 @ 04:42) (133/61 - 156/68)  BP(mean): --  RR: 18 (09-13 @ 04:42) (18 - 20)  SpO2: 100% (09-12 @ 23:47) (98% - 100%)    I&O's Summary    12 Sep 2021 07:01  -  13 Sep 2021 07:00  --------------------------------------------------------  IN: 80 mL / OUT: 325 mL / NET: -245 mL    13 Sep 2021 07:01  -  13 Sep 2021 11:03  --------------------------------------------------------  IN: 0 mL / OUT: 220 mL / NET: -220 mL        PHYSICAL EXAM:  GEN: Not in acute distress  HEENT: NCAT, PERRL, EOMI  LUNGS: Clear to auscultation bilaterally   CARDIOVASCULAR: RRR, S1/S2 present  ABD: Soft, non-tender, non-distended  EXT: No NADIA  SKIN: Intact  NEURO: AAOx3      LABS:                        12.8   13.41 )-----------( 198      ( 13 Sep 2021 05:00 )             39.2     09-13    136  |  98  |  46<H>  ----------------------------<  119<H>  4.6   |  29  |  1.4    Ca    8.9      13 Sep 2021 05:00  Phos  4.3     09-13  Mg     2.5     09-13    TPro  6.8  /  Alb  3.7  /  TBili  0.9  /  DBili  x   /  AST  18  /  ALT  15  /  AlkPhos  133<H>  09-13    PT/INR - ( 13 Sep 2021 05:00 )   PT: >40.00 sec;   INR: 4.09 ratio    PTT - ( 13 Sep 2021 05:00 )  PTT:46.4 sec      Creatine Kinase, Serum: 36 U/L (09-13-21 @ 05:00)  Troponin T, Serum: 0.03 ng/mL *HH* (09-13-21 @ 05:00)  Lactate, Blood: 1.4 mmol/L (09-13-21 @ 05:00)  Troponin T, Serum: 0.02 ng/mL *H* (09-13-21 @ 00:22)  Troponin T, Serum: 0.02 ng/mL *H* (09-12-21 @ 20:43)  Troponin T, Serum: 0.03 ng/mL *HH* (09-12-21 @ 12:00)    Serum Pro-Brain Natriuretic Peptide: 4699 pg/mL (09-13-21 @ 05:00)  Serum Pro-Brain Natriuretic Peptide: 3659 pg/mL (09-12-21 @ 12:00)    09-13 Chol 111 LDL -- HDL 53 Trig 59      EKG: V-paced with pvc.  CXR: unchanged small bilateral effusions      < from: TTE Echo Complete w/o Contrast w/ Doppler (07.11.21 @ 09:57) >  Summary:   1. Left ventricular ejection fraction, by visual estimation, is 50 to 55%.   2. Normal global left ventricular systolic function.   3. Moderate to severe left atrial enlargement.   4. Mildly enlarged right ventricle.   5. Moderately reduced RV systolic function.   6. Moderately enlarged right atrium.   7. Moderate mitral valve regurgitation.   8. Moderate-severe tricuspid regurgitation.   9. Mild pulmonic valve regurgitation.  10. Estimated pulmonary artery systolic pressure is 59.7 mmHg assuming a right atrial pressure of 10 mmHg, which is consistent with moderate pulmonary hypertension.    < end of copied text >

## 2021-09-13 NOTE — CONSULT NOTE ADULT - ASSESSMENT
ASSESSMENT:  81yM w/ extensive PMHx including CHF with EF of 50-55%, AFib on Coumadin w/ current INR of 3.6, DVT, HTN, COPD on 2L, CKD Cr 1.7. Past intraabdominal surgical hx appears to be sleeve gastrectomy 15-20 years ago  (poor historian. Today, Hospital day 2, surgery consulted for acute cholecystitis. Patient came in complaining of epigastric chest pain since early yesterday that radiated to bilateral upper quadrants. Patient was admitted to medicine/telemetry, receiving cardiac workup with no acute findings. CT was done which shows cholelithiasis and a very large gallbladder. RUQ US was also done showing minimal GB wall thickening to about 5+ mm, but no pericholecystic fluid. There is also a focal irregularity on anterior gallbladder wall, questionable for perforation, but there is no evidence of perforation on CT. Furthermore, WBC is WNL and patient is afebrile. Physical exam findings, imaging, and labs as documented above.     PLAN:  - Attending to see  -  -    Above plan discussed with Attending Surgeon Dr. Farmer, patient, and Primary team  09-13-21 @ 08:28 ASSESSMENT:  81yM w/ extensive PMHx including CHF with EF of 50-55%, AFib on Coumadin w/ current INR of 3.6, DVT, HTN, COPD on 2L, CKD Cr 1.7. Past intraabdominal surgical hx appears to be sleeve gastrectomy 15-20 years ago  (poor historian. Today, Hospital day 2, surgery consulted for acute cholecystitis. Patient came in complaining of epigastric chest pain since early yesterday that radiated to bilateral upper quadrants. Patient was admitted to medicine/telemetry, receiving cardiac workup with no acute findings. CT was done which shows cholelithiasis and a very large gallbladder. RUQ US was also done showing minimal GB wall thickening to about 5+ mm, but no pericholecystic fluid. There is also a focal irregularity on anterior gallbladder wall, questionable for perforation, but there is no evidence of perforation on CT. Furthermore, WBC is WNL and patient is afebrile. Physical exam findings, imaging, and labs as documented above.     Patient has acute cholecystitis and will need cholecystectomy vs percutaneous cholecystostomy. Patient appears to be high risk for surgery. Please obtain Medical and Cardiac clearance for laparoscopic cholecystectomy. If the patient is deemed too high risk, patient should be referred to IR for percutaneous cholecystostomy.     PLAN:  - Medical risk stratification for laparoscopic cholecystectomy  - Cardiac risk stratification for laparoscopic cholecystectomy  - INR must be reversed before any procedure (Recommend PCC as patient has CHF and less volume ideal)  - Antibiotics (Preferably Zosyn)  - Trend LFTs  - Keep NPO except medications  - Will continue to follow    Above plan discussed with Attending Surgeon Dr. Farmer, patient, and Primary team  09-13-21 @ 08:28 ASSESSMENT:  81yM w/ extensive PMHx including CHF with EF of 50-55%, AFib on Coumadin w/ current INR of 3.6, DVT, HTN, COPD on 2L, CKD Cr 1.7. Past intraabdominal surgical hx appears to be sleeve gastrectomy 15-20 years ago  (poor historian. Today, Hospital day 2, surgery consulted for acute cholecystitis. Patient came in complaining of epigastric chest pain since early yesterday that radiated to bilateral upper quadrants. Patient was admitted to medicine/telemetry, receiving cardiac workup with no acute findings. CT was done which shows cholelithiasis and a very large gallbladder. RUQ US was also done showing minimal GB wall thickening to about 5+ mm, but no pericholecystic fluid. There is also a focal irregularity on anterior gallbladder wall, questionable for perforation, but there is no evidence of perforation on CT. Furthermore, WBC is WNL and patient is afebrile. Physical exam findings, imaging, and labs as documented above.     Patient has acute cholecystitis and will need cholecystectomy vs percutaneous cholecystostomy. Patient appears to be high risk for surgery. Please obtain Medical and Cardiac clearance for laparoscopic cholecystectomy. If the patient is deemed too high risk, patient should be referred to IR for percutaneous cholecystostomy.     PLAN:  - Medical risk stratification for laparoscopic cholecystectomy  - Cardiac/Electrophysiology risk stratification for laparoscopic cholecystectomy  - INR must be reversed before any procedure (Recommend PCC as patient has CHF and less volume ideal)  - Antibiotics (Preferably Zosyn)  - Trend LFTs  - Keep NPO except medications  - Will continue to follow    Above plan discussed with Attending Surgeon Dr. Farmer, patient, and Primary team  09-13-21 @ 08:28

## 2021-09-13 NOTE — PROGRESS NOTE ADULT - ATTENDING COMMENTS
Patient seen and examined independently. Agree with resident note/ history / physical exam and plan of care with following exceptions/additions/updates. Case discussed with patient/pt decision maker, house-staff and nursing. My notes supersedes the resident's notes in case of discrepancies.    high risk for cholecystectomy, dw surgery atttending, we called IR for poss cholecystostomy, if procedure to be done INR. I spoke with IR, they will let me know their rec, so we can reverse the INR.     #Progress Note Handoff  Pending (specify):  Consults_IR, reversal of INR   Family discussion: dw pt,   Disposition: Home_ Patient seen and examined independently. Agree with resident note/ history / physical exam and plan of care with following exceptions/additions/updates. Case discussed with patient/pt decision maker, house-staff and nursing. My notes supersedes the resident's notes in case of discrepancies.    high risk for cholecystectomy, dw surgery atttending, we called IR for poss cholecystostomy, if procedure to be done INR. I spoke with IR, they will let me know their rec, so we can reverse the INR. pt has coumadin toxicity.     #Progress Note Handoff  Pending (specify):  Consults_IR, reversal of INR   Family discussion: dw pt,   Disposition: Home_

## 2021-09-13 NOTE — PROGRESS NOTE ADULT - ASSESSMENT
82 y/o male with a PMH of HFpEF (50-55%), chronic afib on coumadin followed by cardio Dr. Scherer, recent Micra PPM placement for tachy-selvin syndrome, DVT July 2021, HTN, and COPD on 2L home O2 presents to the ED from home due to atypical chest pain. The patient notes he awoke from sleep on the morning of presentation with difficulty breathing and epigastric pain. On examination, patient denying chest pain but complains of epigastric/RUQ pain on deep inhalation. Epigastric region tender on examination.     #Acute cholecystitis   - PATIENT DID NOT HAVE CHEST PAIN - > d/c tele      - EKG: no ischemic changes     - Troponin T, Serum: 0.03 > 0.02 > 0.02 > 0.03     - hold BB due to bradycardia   - Alkaline Phosphatase, Serum: 130 U/L; other LFTs wnl   - RUQ pain on palpation  - confirmed on us abdomen   - repeat CT a/p IV con to r/o gallbadder perf (first CT a/p did not see gallbladder abnormalities)   - surgery on board  - cardio: elevated risk for mod risk procedure; No further cardiac workup is indicated at this time.  There are no current cardiac contraindications that prohibit proceeding with the scheduled surgery/procedure.    - EP said ppm is working properly  - patient likely poor surgical candidate  - IR consult not in but made IR aware of this patient for possible perc cheli  - was given ceftriaxone and flagyl in am; switch to zosyn     #Cystitis:  -  CT Abdomen and Pelvis w/ IV Cont (09.12.21 @ 13:58) : Urinary bladder wall thickening and enhancement most compatible with cystitis. Correlation with urinary urinalysis is recommended  - UA: large LE, moderate blood, Many bacteria WBC >720  - no evidence of sepsis  - s/p Rocephin in ED; abx as per cholecystitis   - follow up cultures     #COPD not in exacerbation  - no evidence of COPD exacerbation on exam   - saturating well on home dose O2 (2L)   - c/w home inhalers: Symbicort duonebs PRN    #Supratherapeutic INR  - INR: 3.66 > 4.09  - repeat INR daily   - hold coumadin for now  - home regimen: 1 mg M/W/F, 2mg T/Th/Sa/Sun  - will need reversal if patient goes for surgery     #HFpEF-echo 50-55%  #A-Fib on coumadin  #CAD s/p PCI and stent 2007  #DLD  - no evidence of fluid overload on exam   - TTE (7/11/2021) w/ EF 50-55%. Moderate to severe left atrial enlargement. Moderate mitral valve regurgitation. Moderate-severe tricuspid regurgitation.  Estimated pulmonary artery systolic pressure is 59.7 mmHg assuming a right atrial pressure of 10 mmHg, which is consistent with moderate pulmonary hypertension.  -  monitor I&O, daily weight   - c/w statin    #Possible hematoma of right pectineus muscle:   - incidental finding of on CT  - no pain, tenderness, swelling, hematoma, or wound see at the site  - continue to monitor     #DORON on CKD 3b - improved   - Cr 1.4 today; (baseline Cr 1.2-1.6)  - monitor Cr daily   - follow up urine lytes     #Deep vein thrombosis (DVT) of left lower extremity  - [facility] Duplex Lower Ext Vein Scan, Bilat (07.15.21)No evidence of deep venous thrombosis in the left lower extremity. Chronic Thrombus is noted in the right common femoral, femoral, and popliteal vein  - on coumadin (holding due to supratherapeutic INR)     #Macrocytic Anemia  - MCV 95.9, Hemoglobin 12  - follow up Iron studies, B12, folate    - DVT Prophylaxis: coumadin (on hold)   - GI Prophylaxis: pantoprazole  - Diet: DASH/TLC/CC  - Activity: as tolerated  - Code Status: Full Code  - Dispo: f/u surgery if patient is candidate; may need IR for percutaneous cholecystostomy

## 2021-09-13 NOTE — CONSULT NOTE ADULT - SUBJECTIVE AND OBJECTIVE BOX
INTERVENTIONAL RADIOLOGY CONSULT:     Procedure Requested: percutaneous cholecystostomy    HPI:  Patient is a 80 y/o male with a PMH of HFpEF (50-55%), chronic afib on coumadin followed by cardio Dr. Scherer, recent Micra PPM placement for tachy-selvin syndrome, DVT July 2021, HTN, and COPD on 2L home O2 presents to the ED from home due to atypical chest pain. The patient notes he awoke from sleep on the morning of presentation with difficulty breathing and epigastric pain. The patient notes pain on deep breath in the epigastric/RUQ region. Patient saturating well on home O2, 2L with no increased O2 requirements. Denies substernal chest pain, radiation to L arm/jaw, palpitations, nausea, vomiting, diarrhea, constipation, syncope, falls, cough, chills, or fever. The patient denies prior episodes of similar pain.     In ED, the patient was found to have bradycardia 50s (asymptomatic). Labs revealing INR 3.66 and positive urinalysis. EKG with no ischemic changes. CT Abdomen and Pelvis w/ IV Cont showed urinary bladder wall thickening and enhancement most compatible with cystitis.  Masslike enlargement/low-attenuation of the inferior aspect of the right pectineus muscle. This is nonspecific. Differential includes hematoma or possibly abscess. Malignancy is not excluded. Further evaluation and follow-up is recommended. For complete characterization MRI may be obtained. Bilateral pleural effusions, small on the right and trace to small on the left. The patient will be admitted to telemetry for further management.              (12 Sep 2021 20:59)      PAST MEDICAL & SURGICAL HISTORY:  COPD (chronic obstructive pulmonary disease)    Hypertension    Deep vein thrombosis (DVT) of left lower extremity, unspecified chronicity, unspecified vein    Cellulitis of left lower extremity    Chronic atrial fibrillation    Mitral valve insufficiency, unspecified etiology  Moderate    CHF (congestive heart failure)    S/P coronary artery stent placement    History of total right knee replacement        MEDICATIONS  (STANDING):  dextrose 5% + sodium chloride 0.45%. 1000 milliLiter(s) (50 mL/Hr) IV Continuous <Continuous>  melatonin 3 milliGRAM(s) Oral at bedtime  piperacillin/tazobactam IVPB.. 3.375 Gram(s) IV Intermittent every 8 hours  spironolactone 25 milliGRAM(s) Oral daily  torsemide 20 milliGRAM(s) Oral daily    MEDICATIONS  (PRN):  ALBUTerol  90 MICROgram(s) HFA Inhaler - Peds 2 Puff(s) Inhalation every 4 hours PRN Bronchospasm  oxycodone    5 mG/acetaminophen 325 mG 1 Tablet(s) Oral every 6 hours PRN Moderate Pain (4 - 6)      Allergies    No Known Allergies    Intolerances    Physical Exam:   Vital Signs Last 24 Hrs  T(C): 35.7 (13 Sep 2021 13:52), Max: 35.8 (13 Sep 2021 04:42)  T(F): 96.3 (13 Sep 2021 13:52), Max: 96.5 (13 Sep 2021 04:42)  HR: 54 (13 Sep 2021 13:52) (52 - 71)  BP: 113/54 (13 Sep 2021 13:52) (113/54 - 156/68)  BP(mean): --  RR: 19 (13 Sep 2021 13:52) (18 - 19)  SpO2: 100% (12 Sep 2021 23:47) (98% - 100%)      Labs:                         12.8   13.41 )-----------( 198      ( 13 Sep 2021 05:00 )             39.2     09-13    136  |  98  |  46<H>  ----------------------------<  119<H>  4.6   |  29  |  1.4    Ca    8.9      13 Sep 2021 05:00  Phos  4.3     09-13  Mg     2.5     09-13    TPro  6.8  /  Alb  3.7  /  TBili  0.9  /  DBili  x   /  AST  18  /  ALT  15  /  AlkPhos  133<H>  09-13    PT/INR - ( 13 Sep 2021 05:00 )   PT: >40.00 sec;   INR: 4.09 ratio         PTT - ( 13 Sep 2021 05:00 )  PTT:46.4 sec    Pertinent labs:                      12.8   13.41 )-----------( 198      ( 13 Sep 2021 05:00 )             39.2       09-13    136  |  98  |  46<H>  ----------------------------<  119<H>  4.6   |  29  |  1.4    Ca    8.9      13 Sep 2021 05:00  Phos  4.3     09-13  Mg     2.5     09-13    TPro  6.8  /  Alb  3.7  /  TBili  0.9  /  DBili  x   /  AST  18  /  ALT  15  /  AlkPhos  133<H>  09-13      PT/INR - ( 13 Sep 2021 05:00 )   PT: >40.00 sec;   INR: 4.09 ratio         PTT - ( 13 Sep 2021 05:00 )  PTT:46.4 sec    Radiology & Additional Studies:     Radiology imaging reviewed.       ASSESSMENT/ PLAN:   80 y/o male with a PMH of HFpEF (50-55%), chronic afib on coumadin,  recent Micra PPM placement for tachy-selvin syndrome, DVT July 2021, HTN, and COPD on 2L home O2 presents to the ED from home due to atypical chest pain. T  - imaging findings c/w cholecystitis  - HD stable, afebrile, WBC 13  - afib on coumadin, INR =4  - seen by cardiology, cleared for surgery  - recommend surgical management  - d/w resident  - please call back as needed      Thank you for the courtesy of this consult, please call [phone] with any further questions.    INTERVENTIONAL RADIOLOGY CONSULT:     Procedure Requested: percutaneous cholecystostomy    HPI:  Patient is a 82 y/o male with a PMH of HFpEF (50-55%), chronic afib on coumadin followed by cardio Dr. Scherer, recent Micra PPM placement for tachy-selvin syndrome, DVT July 2021, HTN, and COPD on 2L home O2 presents to the ED from home due to atypical chest pain. The patient notes he awoke from sleep on the morning of presentation with difficulty breathing and epigastric pain. The patient notes pain on deep breath in the epigastric/RUQ region. Patient saturating well on home O2, 2L with no increased O2 requirements. Denies substernal chest pain, radiation to L arm/jaw, palpitations, nausea, vomiting, diarrhea, constipation, syncope, falls, cough, chills, or fever. The patient denies prior episodes of similar pain.     In ED, the patient was found to have bradycardia 50s (asymptomatic). Labs revealing INR 3.66 and positive urinalysis. EKG with no ischemic changes. CT Abdomen and Pelvis w/ IV Cont showed urinary bladder wall thickening and enhancement most compatible with cystitis.  Masslike enlargement/low-attenuation of the inferior aspect of the right pectineus muscle. This is nonspecific. Differential includes hematoma or possibly abscess. Malignancy is not excluded. Further evaluation and follow-up is recommended. For complete characterization MRI may be obtained. Bilateral pleural effusions, small on the right and trace to small on the left. The patient will be admitted to telemetry for further management.              (12 Sep 2021 20:59)      PAST MEDICAL & SURGICAL HISTORY:  COPD (chronic obstructive pulmonary disease)    Hypertension    Deep vein thrombosis (DVT) of left lower extremity, unspecified chronicity, unspecified vein    Cellulitis of left lower extremity    Chronic atrial fibrillation    Mitral valve insufficiency, unspecified etiology  Moderate    CHF (congestive heart failure)    S/P coronary artery stent placement    History of total right knee replacement        MEDICATIONS  (STANDING):  dextrose 5% + sodium chloride 0.45%. 1000 milliLiter(s) (50 mL/Hr) IV Continuous <Continuous>  melatonin 3 milliGRAM(s) Oral at bedtime  piperacillin/tazobactam IVPB.. 3.375 Gram(s) IV Intermittent every 8 hours  spironolactone 25 milliGRAM(s) Oral daily  torsemide 20 milliGRAM(s) Oral daily    MEDICATIONS  (PRN):  ALBUTerol  90 MICROgram(s) HFA Inhaler - Peds 2 Puff(s) Inhalation every 4 hours PRN Bronchospasm  oxycodone    5 mG/acetaminophen 325 mG 1 Tablet(s) Oral every 6 hours PRN Moderate Pain (4 - 6)      Allergies    No Known Allergies    Intolerances    Physical Exam:   Vital Signs Last 24 Hrs  T(C): 35.7 (13 Sep 2021 13:52), Max: 35.8 (13 Sep 2021 04:42)  T(F): 96.3 (13 Sep 2021 13:52), Max: 96.5 (13 Sep 2021 04:42)  HR: 54 (13 Sep 2021 13:52) (52 - 71)  BP: 113/54 (13 Sep 2021 13:52) (113/54 - 156/68)  BP(mean): --  RR: 19 (13 Sep 2021 13:52) (18 - 19)  SpO2: 100% (12 Sep 2021 23:47) (98% - 100%)      Labs:                         12.8   13.41 )-----------( 198      ( 13 Sep 2021 05:00 )             39.2     09-13    136  |  98  |  46<H>  ----------------------------<  119<H>  4.6   |  29  |  1.4    Ca    8.9      13 Sep 2021 05:00  Phos  4.3     09-13  Mg     2.5     09-13    TPro  6.8  /  Alb  3.7  /  TBili  0.9  /  DBili  x   /  AST  18  /  ALT  15  /  AlkPhos  133<H>  09-13    PT/INR - ( 13 Sep 2021 05:00 )   PT: >40.00 sec;   INR: 4.09 ratio         PTT - ( 13 Sep 2021 05:00 )  PTT:46.4 sec    Pertinent labs:                      12.8   13.41 )-----------( 198      ( 13 Sep 2021 05:00 )             39.2       09-13    136  |  98  |  46<H>  ----------------------------<  119<H>  4.6   |  29  |  1.4    Ca    8.9      13 Sep 2021 05:00  Phos  4.3     09-13  Mg     2.5     09-13    TPro  6.8  /  Alb  3.7  /  TBili  0.9  /  DBili  x   /  AST  18  /  ALT  15  /  AlkPhos  133<H>  09-13      PT/INR - ( 13 Sep 2021 05:00 )   PT: >40.00 sec;   INR: 4.09 ratio         PTT - ( 13 Sep 2021 05:00 )  PTT:46.4 sec    Radiology & Additional Studies:     Radiology imaging reviewed.       ASSESSMENT/ PLAN:   82 y/o male with a PMH of HFpEF (50-55%), chronic afib on coumadin,  recent Micra PPM placement for tachy-selvin syndrome, DVT July 2021, HTN, and COPD on 2L home O2 presents to the ED from home due to atypical chest pain.   - imaging findings c/w cholecystitis  - HD stable, afebrile, WBC 13  - afib on coumadin, INR =4  - INR needs to be 1.5 or less for procedure  - please reverse INR  - will plan tentatively for perc cheli 9/14, pending INR  - NPO at midnight  - d/w resident    Thank you for the courtesy of this consult, please call [phone] with any further questions.

## 2021-09-14 LAB
ALBUMIN SERPL ELPH-MCNC: 3.3 G/DL — LOW (ref 3.5–5.2)
ALP SERPL-CCNC: 114 U/L — SIGNIFICANT CHANGE UP (ref 30–115)
ALT FLD-CCNC: 11 U/L — SIGNIFICANT CHANGE UP (ref 0–41)
ANION GAP SERPL CALC-SCNC: 11 MMOL/L — SIGNIFICANT CHANGE UP (ref 7–14)
APTT BLD: 34.4 SEC — SIGNIFICANT CHANGE UP (ref 27–39.2)
APTT BLD: 37.3 SEC — SIGNIFICANT CHANGE UP (ref 27–39.2)
AST SERPL-CCNC: 12 U/L — SIGNIFICANT CHANGE UP (ref 0–41)
BASOPHILS # BLD AUTO: 0.02 K/UL — SIGNIFICANT CHANGE UP (ref 0–0.2)
BASOPHILS NFR BLD AUTO: 0.1 % — SIGNIFICANT CHANGE UP (ref 0–1)
BILIRUB SERPL-MCNC: 0.8 MG/DL — SIGNIFICANT CHANGE UP (ref 0.2–1.2)
BUN SERPL-MCNC: 49 MG/DL — HIGH (ref 10–20)
CALCIUM SERPL-MCNC: 8.5 MG/DL — SIGNIFICANT CHANGE UP (ref 8.5–10.1)
CHLORIDE SERPL-SCNC: 98 MMOL/L — SIGNIFICANT CHANGE UP (ref 98–110)
CO2 SERPL-SCNC: 27 MMOL/L — SIGNIFICANT CHANGE UP (ref 17–32)
COVID-19 SPIKE DOMAIN AB INTERP: POSITIVE
COVID-19 SPIKE DOMAIN ANTIBODY RESULT: >250 U/ML — HIGH
CREAT SERPL-MCNC: 1.6 MG/DL — HIGH (ref 0.7–1.5)
EOSINOPHIL # BLD AUTO: 0.07 K/UL — SIGNIFICANT CHANGE UP (ref 0–0.7)
EOSINOPHIL NFR BLD AUTO: 0.4 % — SIGNIFICANT CHANGE UP (ref 0–8)
FOLATE SERPL-MCNC: 3.2 NG/ML — LOW
GLUCOSE SERPL-MCNC: 107 MG/DL — HIGH (ref 70–99)
HCT VFR BLD CALC: 36.4 % — LOW (ref 42–52)
HGB BLD-MCNC: 11.9 G/DL — LOW (ref 14–18)
IMM GRANULOCYTES NFR BLD AUTO: 0.5 % — HIGH (ref 0.1–0.3)
INR BLD: 1.35 RATIO — HIGH (ref 0.65–1.3)
INR BLD: 1.85 RATIO — HIGH (ref 0.65–1.3)
INR BLD: 2.17 RATIO — HIGH (ref 0.65–1.3)
LYMPHOCYTES # BLD AUTO: 0.51 K/UL — LOW (ref 1.2–3.4)
LYMPHOCYTES # BLD AUTO: 3 % — LOW (ref 20.5–51.1)
MAGNESIUM SERPL-MCNC: 2.4 MG/DL — SIGNIFICANT CHANGE UP (ref 1.8–2.4)
MCHC RBC-ENTMCNC: 31.2 PG — HIGH (ref 27–31)
MCHC RBC-ENTMCNC: 32.7 G/DL — SIGNIFICANT CHANGE UP (ref 32–37)
MCV RBC AUTO: 95.3 FL — HIGH (ref 80–94)
MONOCYTES # BLD AUTO: 1.65 K/UL — HIGH (ref 0.1–0.6)
MONOCYTES NFR BLD AUTO: 9.6 % — HIGH (ref 1.7–9.3)
NEUTROPHILS # BLD AUTO: 14.81 K/UL — HIGH (ref 1.4–6.5)
NEUTROPHILS NFR BLD AUTO: 86.4 % — HIGH (ref 42.2–75.2)
NRBC # BLD: 0 /100 WBCS — SIGNIFICANT CHANGE UP (ref 0–0)
PLATELET # BLD AUTO: 166 K/UL — SIGNIFICANT CHANGE UP (ref 130–400)
POTASSIUM SERPL-MCNC: 4.1 MMOL/L — SIGNIFICANT CHANGE UP (ref 3.5–5)
POTASSIUM SERPL-SCNC: 4.1 MMOL/L — SIGNIFICANT CHANGE UP (ref 3.5–5)
PROCALCITONIN SERPL-MCNC: 0.16 NG/ML — HIGH (ref 0.02–0.1)
PROT SERPL-MCNC: 5.9 G/DL — LOW (ref 6–8)
PROTHROM AB SERPL-ACNC: 15.5 SEC — HIGH (ref 9.95–12.87)
PROTHROM AB SERPL-ACNC: 21.1 SEC — HIGH (ref 9.95–12.87)
PROTHROM AB SERPL-ACNC: 24.8 SEC — HIGH (ref 9.95–12.87)
RBC # BLD: 3.82 M/UL — LOW (ref 4.7–6.1)
RBC # FLD: 20.9 % — HIGH (ref 11.5–14.5)
SARS-COV-2 IGG+IGM SERPL QL IA: >250 U/ML — HIGH
SARS-COV-2 IGG+IGM SERPL QL IA: POSITIVE
SODIUM SERPL-SCNC: 136 MMOL/L — SIGNIFICANT CHANGE UP (ref 135–146)
VIT B12 SERPL-MCNC: 432 PG/ML — SIGNIFICANT CHANGE UP (ref 232–1245)
WBC # BLD: 17.14 K/UL — HIGH (ref 4.8–10.8)
WBC # FLD AUTO: 17.14 K/UL — HIGH (ref 4.8–10.8)

## 2021-09-14 PROCEDURE — 99152 MOD SED SAME PHYS/QHP 5/>YRS: CPT

## 2021-09-14 PROCEDURE — 47490 INCISION OF GALLBLADDER: CPT

## 2021-09-14 PROCEDURE — 99233 SBSQ HOSP IP/OBS HIGH 50: CPT

## 2021-09-14 PROCEDURE — 99232 SBSQ HOSP IP/OBS MODERATE 35: CPT

## 2021-09-14 RX ORDER — PROTHROMBIN COMPLEX CONCENTRATE (HUMAN) 25.5; 16.5; 24; 22; 22; 26 [IU]/ML; [IU]/ML; [IU]/ML; [IU]/ML; [IU]/ML; [IU]/ML
1500 POWDER, FOR SOLUTION INTRAVENOUS ONCE
Refills: 0 | Status: COMPLETED | OUTPATIENT
Start: 2021-09-14 | End: 2021-09-14

## 2021-09-14 RX ORDER — FOLIC ACID 0.8 MG
1 TABLET ORAL DAILY
Refills: 0 | Status: DISCONTINUED | OUTPATIENT
Start: 2021-09-14 | End: 2021-09-21

## 2021-09-14 RX ADMIN — Medication 20 MILLIGRAM(S): at 05:54

## 2021-09-14 RX ADMIN — OXYCODONE AND ACETAMINOPHEN 1 TABLET(S): 5; 325 TABLET ORAL at 19:37

## 2021-09-14 RX ADMIN — PROTHROMBIN COMPLEX CONCENTRATE (HUMAN) 400 INTERNATIONAL UNIT(S): 25.5; 16.5; 24; 22; 22; 26 POWDER, FOR SOLUTION INTRAVENOUS at 10:35

## 2021-09-14 RX ADMIN — PIPERACILLIN AND TAZOBACTAM 25 GRAM(S): 4; .5 INJECTION, POWDER, LYOPHILIZED, FOR SOLUTION INTRAVENOUS at 13:10

## 2021-09-14 RX ADMIN — OXYCODONE AND ACETAMINOPHEN 1 TABLET(S): 5; 325 TABLET ORAL at 11:52

## 2021-09-14 RX ADMIN — PIPERACILLIN AND TAZOBACTAM 25 GRAM(S): 4; .5 INJECTION, POWDER, LYOPHILIZED, FOR SOLUTION INTRAVENOUS at 21:09

## 2021-09-14 RX ADMIN — Medication 1 MILLIGRAM(S): at 11:17

## 2021-09-14 RX ADMIN — SPIRONOLACTONE 25 MILLIGRAM(S): 25 TABLET, FILM COATED ORAL at 05:54

## 2021-09-14 RX ADMIN — OXYCODONE AND ACETAMINOPHEN 1 TABLET(S): 5; 325 TABLET ORAL at 19:07

## 2021-09-14 RX ADMIN — OXYCODONE AND ACETAMINOPHEN 1 TABLET(S): 5; 325 TABLET ORAL at 07:47

## 2021-09-14 RX ADMIN — Medication 3 MILLIGRAM(S): at 21:09

## 2021-09-14 RX ADMIN — PIPERACILLIN AND TAZOBACTAM 25 GRAM(S): 4; .5 INJECTION, POWDER, LYOPHILIZED, FOR SOLUTION INTRAVENOUS at 05:55

## 2021-09-14 NOTE — PROGRESS NOTE ADULT - ATTENDING COMMENTS
Patient seen and examined independently. Agree with resident note/ history / physical exam and plan of care with following exceptions/additions/updates. Case discussed with patient/pt decision maker, house-staff and nursing. My notes supersedes the resident's notes in case of discrepancies.    # acute cholecystitis, for cholecystostomy today   INR: 1.35: Recommended ranges for therapeutic INR:    2.0-3.0 for most medical and surgical thromboembolic states    2.0-3.0 for atrial fibrillation    2.0-3.0 for bileaflet mechanical valve in aortic position    2.5-3.5 for mechanical heart valves    Chest 2004;[id number]  The presence of direct thrombin inhibitors (argatroban, refludan)  may falsely increase results. ratio (09.14.21 @ 11:00)    cont abx,       #Progress Note Handoff  Pending (specify):  cholecystostomy by IR today   Family discussion: enrique pt   Disposition: Home__

## 2021-09-14 NOTE — ADVANCED PRACTICE NURSE CONSULT - ASSESSMENT
80 y/o male with a PMH of HFpEF (50-55%), chronic afib on coumadin followed by cardio Dr. Scherer, recent Micra PPM placement for tachy-selvin syndrome, DVT July 2021, HTN, and COPD on 2L home O2 presents to the ED (9/12) from home due to atypical chest pain. The patient notes he awoke from sleep on the morning of presentation with difficulty breathing and epigastric pain.  In ED, the patient was found to have bradycardia 50s (asymptomatic). Labs revealing INR 3.66 and positive urinalysis. EKG with no ischemic changes. CT Abdomen and Pelvis w/ IV Cont showed urinary bladder wall thickening and enhancement most compatible with cystitis.  Masslike enlargement/low-attenuation of the inferior aspect of the right pectineus muscle. This is nonspecific. Differential includes hematoma or possibly abscess. Malignancy is not excluded. Further evaluation and follow-up is recommended. For complete characterization MRI may be obtained. Bilateral pleural effusions, small on the right and trace to small on the left. The patient will be admitted to telemetry for further management.   Patient seen by [facility]OC[facility] 8/2021 during previous admission.     Received patient on F9, laying supine in bed, HOB elevated 30 degrees, heels elevated from bed surface w/ pillow. Pt awake, A&Ox3, made aware of purpose of WOCN visit, agreeable to consult.      Right heel intact.  Foam Allevyn dressing to left heel in place, dressing removed. Patch of dry maroon colored crusted skin peeled off from skin surface, skin intact, no pressure injuries present.     With minimal assistance, patient turned to left  side for sacral skin assessment.   Sacral & coccyx skin intact.     Bilateral buttock slightly denuded, slight light brown hyperpigmentation & scattered areas of small circular healed light pink tissue throughout lower buttock area.     Patient OOB w/ assistance, able to turn/position in bed w/ assistance, patient reports continent of urine and stool, NPO for procedure today.  82 y/o male with a PMH of HFpEF (50-55%), chronic afib on coumadin followed by cardio Dr. Scherer, recent Micra PPM placement for tachy-selvin syndrome, DVT July 2021, HTN, and COPD on 2L home O2 presents to the ED (9/12) from home due to atypical chest pain. The patient notes he awoke from sleep on the morning of presentation with difficulty breathing and epigastric pain.  In ED, the patient was found to have bradycardia 50s (asymptomatic). Labs revealing INR 3.66 and positive urinalysis. EKG with no ischemic changes. CT Abdomen and Pelvis w/ IV Cont showed urinary bladder wall thickening and enhancement most compatible with cystitis.  Masslike enlargement/low-attenuation of the inferior aspect of the right pectineus muscle. This is nonspecific. Differential includes hematoma or possibly abscess. Malignancy is not excluded. Further evaluation and follow-up is recommended. For complete characterization MRI may be obtained. Bilateral pleural effusions, small on the right and trace to small on the left. The patient will be admitted to telemetry for further management.   Patient seen by WOCN 8/2021 during previous admission.     Received patient on F9, laying supine in bed, HOB elevated 30 degrees, heels elevated from bed surface w/ pillow. Pt awake, A&Ox3, made aware of purpose of WOCN visit, agreeable to consult.      Right heel intact.  Foam Allevyn dressing to left heel in place, dressing removed. Patch of dry maroon colored crusted skin peeled off from left heel, skin intact, no pressure injuries present.     With minimal assistance, patient turned to left  side for sacral skin assessment.   Sacral & coccyx skin intact.     Bilateral buttock slightly denuded, slight light brown hyperpigmentation & scattered areas of small circular healed light pink tissue throughout lower buttock area.     Patient OOB w/ assistance, able to turn/position in bed w/ assistance, patient reports continent of urine and stool, NPO for procedure today.

## 2021-09-14 NOTE — PHYSICAL THERAPY INITIAL EVALUATION ADULT - SPECIFY REASON(S)
840 am Attempted to see pt for PT initial evaluation however , c/o epigatric pain (PS=9/10), despite pain meds. Pt. unable to participate with therapy at this time.

## 2021-09-14 NOTE — PROGRESS NOTE ADULT - SUBJECTIVE AND OBJECTIVE BOX
GENERAL SURGERY PROGRESS NOTE    Patient: RODNEY SANTO , 81y (44)Male   MRN: [id number]  Location: [place] [address] (3CF) 011 A  Visit: 21 Inpatient  Date: 21 @ 07:38    Hospital Day #: 3  Post-Op Day #: None    Procedure/Dx/Injuries: 81yM w/ extensive PMHx including CHF with EF of 50-55%, AFib on Coumadin w/ current INR of 3.6, DVT, HTN, COPD on 2L, CKD Cr 1.7. 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 Cardiology evaluation demonstrates elevated risk patient for a moderate risk surgery/procedure. Patient is HD stable with no nausea, no vomitus, and passing gas with bowel movements.    PAST MEDICAL & SURGICAL HISTORY:  COPD (chronic obstructive pulmonary disease)    Hypertension    Deep vein thrombosis (DVT) of left lower extremity, unspecified chronicity, unspecified vein    Cellulitis of left lower extremity    Chronic atrial fibrillation    Mitral valve insufficiency, unspecified etiology  Moderate    CHF (congestive heart failure)    S/P coronary artery stent placement    History of total right knee replacement        Vitals:   T(F): 96.7 (21 @ 05:45), Max: 97 (21 @ 20:22)  HR: 94 (21 @ 05:45)  BP: 111/53 (21 @ 05:45)  RR: 19 (21 @ 05:45)  SpO2: 100% (21 @ 03:17)      Diet, NPO:   Except Medications      Fluids: dextrose 5% + sodium chloride 0.45%.: Solution, 1000 milliLiter(s) infuse at 50 mL/Hr      I & O's:    21 @ 07:01  -  21 @ 07:00  --------------------------------------------------------  IN:    dextrose 5% + sodium chloride 0.45%: 350 mL    IV PiggyBack: 100 mL    Oral Fluid: 80 mL  Total IN: 530 mL    OUT:    Voided (mL): 650 mL  Total OUT: 650 mL    Total NET: -120 mL    Bowel Movement: : [x] YES [] NO  Flatus: : [x] YES [] NO    PHYSICAL EXAM:  General: NAD, AAOx3, calm and cooperative  HEENT: NCAT, TREY, EOMI  Cardiac: RRR S1, S2, no Murmurs  Respiratory: CTAB, normal respiratory effort, breath sounds equal BL  Abdomen: Soft, non-distended, moderately-tender RUQ, no rebound, no guarding  Vascular: Pulses 2+ throughout, extremities well perfused  Skin: Warm/dry, normal color, no jaundice    MEDICATIONS  (STANDING):  dextrose 5% + sodium chloride 0.45%. 1000 milliLiter(s) (50 mL/Hr) IV Continuous <Continuous>  melatonin 3 milliGRAM(s) Oral at bedtime  piperacillin/tazobactam IVPB.. 3.375 Gram(s) IV Intermittent every 8 hours  spironolactone 25 milliGRAM(s) Oral daily  torsemide 20 milliGRAM(s) Oral daily    MEDICATIONS  (PRN):  ALBUTerol  90 MICROgram(s) HFA Inhaler - Peds 2 Puff(s) Inhalation every 4 hours PRN Bronchospasm  oxycodone    5 mG/acetaminophen 325 mG 1 Tablet(s) Oral every 6 hours PRN Moderate Pain (4 - 6)    DVT PROPHYLAXIS:   GI PROPHYLAXIS:   ANTICOAGULATION:   ANTIBIOTICS:  piperacillin/tazobactam IVPB.. 3.375 Gram(s)    LAB/STUDIES:  Labs:  CAPILLARY BLOOD GLUCOSE                        12.8   13.41 )-----------( 198      ( 13 Sep 2021 05:00 )             39.2             136  |  98  |  46<H>  ----------------------------<  119<H>  4.6   |  29  |  1.4    LFTs:             6.8  | 0.9  | 18       ------------------[133     ( 13 Sep 2021 05:00 )  3.7  | x    | 15          Lipase:x      Amylase:x         Lactate, Blood: 1.4 mmol/L (09-13-21 @ 05:00)  Blood Gas Venous - Lactate: 2.30 mmol/L (21 @ 11:56)      Coags:     21.10  ----< 1.85    ( 14 Sep 2021 00:58 )     x           CARDIAC MARKERS ( 13 Sep 2021 05:00 )  x     / 0.03 ng/mL / 36 U/L / x     / x      CARDIAC MARKERS ( 13 Sep 2021 00:22 )  x     / 0.02 ng/mL / x     / x     / x      CARDIAC MARKERS ( 12 Sep 2021 20:43 )  x     / 0.02 ng/mL / x     / x     / x      CARDIAC MARKERS ( 12 Sep 2021 12:00 )  x     / 0.03 ng/mL / x     / x     / x          Serum Pro-Brain Natriuretic Peptide: 4699 pg/mL (21 @ 05:00)  Serum Pro-Brain Natriuretic Peptide: 3659 pg/mL (21 @ 12:00)      Urinalysis Basic - ( 12 Sep 2021 12:50 )    Color: Orange / Appearance: Turbid / S.014 / pH: x  Gluc: x / Ketone: Negative  / Bili: Negative / Urobili: 3 mg/dL   Blood: x / Protein: 100 mg/dL / Nitrite: Negative   Leuk Esterase: Large / RBC: 3 /HPF / WBC >720 /HPF   Sq Epi: x / Non Sq Epi: 2 /HPF / Bacteria: Many    Culture - Urine (collected 12 Sep 2021 12:50)  Source: Clean Catch Clean Catch (Midstream)  Preliminary Report (14 Sep 2021 00:25):    >100,000 CFU/ml Gram Negative Rods

## 2021-09-14 NOTE — PROGRESS NOTE ADULT - ASSESSMENT
80 y/o male with a PMH of HFpEF (50-55%), chronic afib on coumadin followed by cardio Dr. Scherer, recent Micra PPM placement for tachy-selvin syndrome, DVT July 2021, HTN, and COPD on 2L home O2 presents to the ED from home due to atypical chest pain. The patient notes he awoke from sleep on the morning of presentation with difficulty breathing and epigastric pain. On examination, patient denying chest pain but complains of epigastric/RUQ pain on deep inhalation. Epigastric region tender on examination.     #Acute cholecystitis   - PATIENT DID NOT HAVE CHEST PAIN - > d/c tele      - EKG: no ischemic changes     - Troponin T, Serum: 0.03 > 0.02 > 0.02 > 0.03     - hold BB due to bradycardia   - Alkaline Phosphatase, Serum: 130 U/L; other LFTs wnl   - RUQ pain on palpation  - confirmed on us abdomen   - repeat CT a/p IV con to r/o gallbadder perf (first CT a/p did not see gallbladder abnormalities)   - surgery on board  - cardio: elevated risk for mod risk procedure; No further cardiac workup is indicated at this time.  There are no current cardiac contraindications that prohibit proceeding with the scheduled surgery/procedure.    - EP said ppm is working properly  - Surgery: poor surgical candidate  - IR: percutaenous cholecystectostomy today vs. tmrw   - c/w zosyn IV    #Cystitis:  -  CT Abdomen and Pelvis w/ IV Cont (09.12.21 @ 13:58) : Urinary bladder wall thickening and enhancement most compatible with cystitis. Correlation with urinary urinalysis is recommended  - UA: large LE, moderate blood, Many bacteria WBC >720  - no evidence of sepsis  - s/p Rocephin in ED; abx as per cholecystitis   - follow up cultures     #COPD not in exacerbation  - no evidence of COPD exacerbation on exam   - saturating well on home dose O2 (2L)   - c/w home inhalers: Symbicort duonebs PRN    #Supratherapeutic INR  - INR: 3.66 > 4.09  - repeat INR daily   - hold coumadin for now  - home regimen: 1 mg M/W/F, 2mg T/Th/Sa/Sun  - will need reversal if patient goes for surgery     #HFpEF-echo 50-55%  #A-Fib on coumadin  #CAD s/p PCI and stent 2007  #DLD  - no evidence of fluid overload on exam   - TTE (7/11/2021) w/ EF 50-55%. Moderate to severe left atrial enlargement. Moderate mitral valve regurgitation. Moderate-severe tricuspid regurgitation.  Estimated pulmonary artery systolic pressure is 59.7 mmHg assuming a right atrial pressure of 10 mmHg, which is consistent with moderate pulmonary hypertension.  -  monitor I&O, daily weight   - c/w statin    #Possible hematoma of right pectineus muscle:   - incidental finding of on CT  - no pain, tenderness, swelling, hematoma, or wound see at the site  - continue to monitor     #DORON on CKD 3b   - Cr 1.7, uptrended today; (baseline Cr 1.2-1.6)  - monitor Cr daily   - follow up urine lytes     #Deep vein thrombosis (DVT) of left lower extremity  - [facility] Duplex Lower Ext Vein Scan, Bilat (07.15.21)No evidence of deep venous thrombosis in the left lower extremity. Chronic Thrombus is noted in the right common femoral, femoral, and popliteal vein  - on coumadin (holding due to supratherapeutic INR)     #Macrocytic Anemia  - MCV 95.9, Hemoglobin 12  - follow up Iron studies, B12, folate    - DVT Prophylaxis: coumadin (on hold)   - GI Prophylaxis: pantoprazole  - Diet: DASH/TLC/CC  - Activity: as tolerated  - Code Status: Full Code  - Dispo: from home; reversal of coumadin and perc cheli IR today vs. tmrw    82 y/o male with a PMH of HFpEF (50-55%), chronic afib on coumadin followed by cardio Dr. Scherer, recent Micra PPM placement for tachy-selvin syndrome, DVT July 2021, HTN, and COPD on 2L home O2 presents to the ED from home due to atypical chest pain. The patient notes he awoke from sleep on the morning of presentation with difficulty breathing and epigastric pain. On examination, patient denying chest pain but complains of epigastric/RUQ pain on deep inhalation. Epigastric region tender on examination.     #Acute cholecystitis   - PATIENT DID NOT HAVE CHEST PAIN - > d/c tele      - EKG: no ischemic changes     - Troponin T, Serum: 0.03 > 0.02 > 0.02 > 0.03     - hold BB due to bradycardia   - Alkaline Phosphatase, Serum: 130 U/L; other LFTs wnl   - RUQ pain on palpation  - confirmed on us abdomen   - repeat CT a/p IV con to r/o gallbadder perf (first CT a/p did not see gallbladder abnormalities)   - surgery on board  - cardio: elevated risk for mod risk procedure; No further cardiac workup is indicated at this time.  There are no current cardiac contraindications that prohibit proceeding with the scheduled surgery/procedure.    - EP said ppm is working properly  - Surgery: poor surgical candidate  - IR: percutaenous cholecystectostomy today vs. tmrw   - c/w zosyn IV  - while npo, c/w D5-1/2NS and hold torsemide to avoid dehydration     #Cystitis:  -  CT Abdomen and Pelvis w/ IV Cont (09.12.21 @ 13:58) : Urinary bladder wall thickening and enhancement most compatible with cystitis. Correlation with urinary urinalysis is recommended  - UA: large LE, moderate blood, Many bacteria WBC >720  - no evidence of sepsis  - s/p Rocephin in ED; abx as per cholecystitis   - follow up cultures     #COPD not in exacerbation  - no evidence of COPD exacerbation on exam   - saturating well on home dose O2 (2L)   - c/w home inhalers: Symbicort duonebs PRN    #Supratherapeutic INR  - INR: 3.66 > 4.09  - repeat INR daily   - hold coumadin for now  - home regimen: 1 mg M/W/F, 2mg T/Th/Sa/Sun  - will need reversal if patient goes for surgery     #HFpEF-echo 50-55%  #A-Fib on coumadin  #CAD s/p PCI and stent 2007  #DLD  - no evidence of fluid overload on exam   - TTE (7/11/2021) w/ EF 50-55%. Moderate to severe left atrial enlargement. Moderate mitral valve regurgitation. Moderate-severe tricuspid regurgitation.  Estimated pulmonary artery systolic pressure is 59.7 mmHg assuming a right atrial pressure of 10 mmHg, which is consistent with moderate pulmonary hypertension.  -  monitor I&O, daily weight   - c/w statin  - hold torsemide for now     #Possible hematoma of right pectineus muscle:   - incidental finding of on CT  - no pain, tenderness, swelling, hematoma, or wound see at the site  - continue to monitor     #DORON on CKD 3b   - Cr 1.7, uptrended today; (baseline Cr 1.2-1.6)  - monitor Cr daily   - follow up urine lytes     #Deep vein thrombosis (DVT) of left lower extremity  - [facility] Duplex Lower Ext Vein Scan, Bilat (07.15.21)No evidence of deep venous thrombosis in the left lower extremity. Chronic Thrombus is noted in the right common femoral, femoral, and popliteal vein  - on coumadin (holding due to supratherapeutic INR)     #Macrocytic Anemia  - MCV 95.9, Hemoglobin 12  - follow up Iron studies, B12, folate    - DVT Prophylaxis: coumadin (on hold)   - GI Prophylaxis: pantoprazole  - Diet: npo for now   - Activity: as tolerated  - Code Status: Full Code  - Dispo: from home; reversal of coumadin and perc cheli IR today vs. tmrw

## 2021-09-14 NOTE — PROGRESS NOTE ADULT - SUBJECTIVE AND OBJECTIVE BOX
Hospital Day:  2d    Subjective: Patient is a 81y old  Male who presents with a chief complaint of Atypical chest pain (14 Sep 2021 07:37)      Pt seen and evaluated at bedside.   Complaints: none  Over the night Events: none    Past Medical Hx:   COPD (chronic obstructive pulmonary disease)    Blood clot in vein    Hypertension    High cholesterol    Deep vein thrombosis (DVT) of left lower extremity, unspecified chronicity, unspecified vein    Cellulitis of left lower extremity    Chronic atrial fibrillation    Mitral valve insufficiency, unspecified etiology    CHF (congestive heart failure)      Past Sx:  No significant past surgical history    S/P coronary artery stent placement    History of total right knee replacement      Allergies:  No Known Allergies    Current Meds:   Standng Meds:  dextrose 5% + sodium chloride 0.45%. 1000 milliLiter(s) (50 mL/Hr) IV Continuous <Continuous>  folic acid 1 milliGRAM(s) Oral daily  melatonin 3 milliGRAM(s) Oral at bedtime  piperacillin/tazobactam IVPB.. 3.375 Gram(s) IV Intermittent every 8 hours  spironolactone 25 milliGRAM(s) Oral daily    PRN Meds:  ALBUTerol  90 MICROgram(s) HFA Inhaler - Peds 2 Puff(s) Inhalation every 4 hours PRN Bronchospasm  oxycodone    5 mG/acetaminophen 325 mG 1 Tablet(s) Oral every 6 hours PRN Moderate Pain (4 - 6)      Vital Signs:   T(F): 96.7 (21 @ 05:45), Max: 97 (21 @ 20:22)  HR: 94 (21 @ 05:45) (43 - 94)  BP: 111/53 (21 @ 05:45) (111/53 - 116/55)  RR: 19 (21 @ 05:45) (18 - 19)  SpO2: 100% (21 @ 03:17) (100% - 100%)    Physical Exam:   GENERAL: NAD, Resting in bed  HEENT: NCAT  CHEST/LUNG: Clear to auscultation bilaterally; No wheezing or rubs.   HEART: Regular rate and rhythm; No murmurs, rubs, or gallops  ABDOMEN: Tenderness to palpation in RUQ  EXTREMITIES:  No clubbing, cyanosis, or edema  NERVOUS SYSTEM:  Alert & Oriented X3    FLUID BALANCE    21 @ 07:01  -  21 @ 07:00  --------------------------------------------------------  IN: 80 mL / OUT: 325 mL / NET: -245 mL    21 @ 07:01  -  21 @ 07:00  --------------------------------------------------------  IN: 530 mL / OUT: 650 mL / NET: -120 mL    21 @ 07:01  -  21 @ 11:23  --------------------------------------------------------  IN: 0 mL / OUT: 125 mL / NET: -125 mL        Labs:                         11.9   17.14 )-----------( 166      ( 14 Sep 2021 07:31 )             36.4     Neutophil% 86.4, Lymphocyte% 3.0, Monocyte% 9.6, Bands% 0.5 21 @ 07:31    14 Sep 2021 07:31    136    |  98     |  49     ----------------------------<  107    4.1     |  27     |  1.6      Ca    8.5        14 Sep 2021 07:31  Phos  4.3       13 Sep 2021 05:00  Mg     2.4       14 Sep 2021 07:31    TPro  5.9    /  Alb  3.3    /  TBili  0.8    /  DBili  x      /  AST  12     /  ALT  11     /  AlkPhos  114    14 Sep 2021 07:31       pTT    37.3             ----< 2.17 INR  (21 @ 07:31)    24.80        PT,    pTT    --             ----< 1.85 INR  (09-14-21 @ 00:58)    21.10        PT,    pTT    --             ----< 1.29 INR  (21 @ 18:40)    14.80        PT,    pTT    --             ----< 5.00 INR  (21 @ 17:11)    >40.00        PT        Serum Pro-Brain Natriuretic Peptide: 4699 pg/mL (21 @ 05:00)  Serum Pro-Brain Natriuretic Peptide: 3659 pg/mL (21 @ 12:00)    Troponin 0.03, CKMB --, CK 36 21 @ 05:00  Troponin 0.02, CKMB --, CK -- 21 @ 00:22  Troponin 0.02, CKMB --, CK -- 21 @ 20:43  Troponin 0.03, CKMB --, CK -- 21 @ 12:00        Urinalysis Basic - ( 12 Sep 2021 12:50 )    Color: Orange / Appearance: Turbid / S.014 / pH: x  Gluc: x / Ketone: Negative  / Bili: Negative / Urobili: 3 mg/dL   Blood: x / Protein: 100 mg/dL / Nitrite: Negative   Leuk Esterase: Large / RBC: 3 /HPF / WBC >720 /HPF   Sq Epi: x / Non Sq Epi: 2 /HPF / Bacteria: Many            Procalcitonin, Serum: 0.16 ng/mL (21 @ 05:00)            Radiology:  Hospital Day:  2d    Subjective: Patient is a 81y old  Male who presents with a chief complaint of Atypical chest pain (14 Sep 2021 07:37)      Pt seen and evaluated at bedside.   Complaints: none  Over the night Events: none    ROS : no n/v, no sob, no cp     Past Medical Hx:   COPD (chronic obstructive pulmonary disease)    Blood clot in vein    Hypertension    High cholesterol    Deep vein thrombosis (DVT) of left lower extremity, unspecified chronicity, unspecified vein    Cellulitis of left lower extremity    Chronic atrial fibrillation    Mitral valve insufficiency, unspecified etiology    CHF (congestive heart failure)      Past Sx:  No significant past surgical history    S/P coronary artery stent placement    History of total right knee replacement      Allergies:  No Known Allergies    Current Meds:   Standng Meds:  dextrose 5% + sodium chloride 0.45%. 1000 milliLiter(s) (50 mL/Hr) IV Continuous <Continuous>  folic acid 1 milliGRAM(s) Oral daily  melatonin 3 milliGRAM(s) Oral at bedtime  piperacillin/tazobactam IVPB.. 3.375 Gram(s) IV Intermittent every 8 hours  spironolactone 25 milliGRAM(s) Oral daily    PRN Meds:  ALBUTerol  90 MICROgram(s) HFA Inhaler - Peds 2 Puff(s) Inhalation every 4 hours PRN Bronchospasm  oxycodone    5 mG/acetaminophen 325 mG 1 Tablet(s) Oral every 6 hours PRN Moderate Pain (4 - 6)      Vital Signs:   T(F): 96.7 (21 @ 05:45), Max: 97 (21 @ 20:22)  HR: 94 (21 @ 05:45) (43 - 94)  BP: 111/53 (21 @ 05:45) (111/53 - 116/55)  RR: 19 (21 @ 05:45) (18 - 19)  SpO2: 100% (21 @ 03:17) (100% - 100%)    Physical Exam:   GENERAL: NAD, Resting in bed  HEENT: NCAT  CHEST/LUNG: Clear to auscultation bilaterally; No wheezing or rubs.   HEART: Regular rate and rhythm; No murmurs, rubs, or gallops  ABDOMEN: Tenderness to palpation in RUQ  EXTREMITIES:  No clubbing, cyanosis, or edema  NERVOUS SYSTEM:  Alert & Oriented X3    FLUID BALANCE    21 @ 07:01  -  21 @ 07:00  --------------------------------------------------------  IN: 80 mL / OUT: 325 mL / NET: -245 mL    21 @ 07:01  -  21 @ 07:00  --------------------------------------------------------  IN: 530 mL / OUT: 650 mL / NET: -120 mL    21 @ 07:01  -  21 @ 11:23  --------------------------------------------------------  IN: 0 mL / OUT: 125 mL / NET: -125 mL        Labs:                         11.9   17.14 )-----------( 166      ( 14 Sep 2021 07:31 )             36.4     Neutophil% 86.4, Lymphocyte% 3.0, Monocyte% 9.6, Bands% 0.5 21 @ 07:31    14 Sep 2021 07:31    136    |  98     |  49     ----------------------------<  107    4.1     |  27     |  1.6      Ca    8.5        14 Sep 2021 07:31  Phos  4.3       13 Sep 2021 05:00  Mg     2.4       14 Sep 2021 07:31    TPro  5.9    /  Alb  3.3    /  TBili  0.8    /  DBili  x      /  AST  12     /  ALT  11     /  AlkPhos  114    14 Sep 2021 07:31       pTT    37.3             ----< 2.17 INR  (21 @ 07:31)    24.80        PT,    pTT    --             ----< 1.85 INR  (21 @ 00:58)    21.10        PT,    pTT    --             ----< 1.29 INR  (21 @ 18:40)    14.80        PT,    pTT    --             ----< 5.00 INR  (21 @ 17:11)    >40.00        PT        Serum Pro-Brain Natriuretic Peptide: 4699 pg/mL (21 @ 05:00)  Serum Pro-Brain Natriuretic Peptide: 3659 pg/mL (21 @ 12:00)    Troponin 0.03, CKMB --, CK 36 21 @ 05:00  Troponin 0.02, CKMB --, CK -- 21 @ 00:22  Troponin 0.02, CKMB --, CK -- 21 @ 20:43  Troponin 0.03, CKMB --, CK -- 21 @ 12:00        Urinalysis Basic - ( 12 Sep 2021 12:50 )    Color: Orange / Appearance: Turbid / S.014 / pH: x  Gluc: x / Ketone: Negative  / Bili: Negative / Urobili: 3 mg/dL   Blood: x / Protein: 100 mg/dL / Nitrite: Negative   Leuk Esterase: Large / RBC: 3 /HPF / WBC >720 /HPF   Sq Epi: x / Non Sq Epi: 2 /HPF / Bacteria: Many            Procalcitonin, Serum: 0.16 ng/mL (21 @ 05:00)            Radiology:

## 2021-09-14 NOTE — PROGRESS NOTE ADULT - ASSESSMENT
ASSESSMENT:  81yM w/ extensive PMHx including CHF with EF of 50-55%, AFib on Coumadin w/ current INR of 3.6, DVT, HTN, COPD on 2L, CKD Cr 1.7. Past intraabdominal surgical hx appears to be sleeve gastrectomy 15-20 years ago presenting with acute cholecystitis     PLAN:  - Cardiology notes appreciated  - IR notes appreciated  - Pain Management  - Strict Ins and Out  - IV ABX  - Trend LFTs  - Will continue to follow    TRAUMA SPECTRA: [phone]

## 2021-09-14 NOTE — ADVANCED PRACTICE NURSE CONSULT - RECOMMEDATIONS
PI Prevention:  -Continue to assist patient w/ turning/positioning from side-to-side q2h while in bed, q1h when/if OOB chair, or in accordance w/ pt's plan of care. Continue utilizing pillows and/or z-fannie fluidized positioner to assist w/ turning/positioning. When/if OOB chair, utilize pillows or chair cushion to offload pressure.  -Continue to offload heels from bed surface with soft pillow under calfs or by applying offloading boots to BLEs.   -May continue prophylactically applying silicone foam Allevyn dressings to b/l heels (and other bony prominences/high pressure areas)  to provide protective layer, cushion area, decrease friction & shearing, and prevent further skin breakdown. Change dressing q3d and prn for strike-through drainage or soiling.  -Continue utilizing one underpad underneath patient to wick away moisture from skin surface & contain any soiling/incontinence episodes; change pad when saturated/soiled.   -Continue applying Coloplast Sugar Protect moisture barrier cream to buttock & perineal area daily and prn after any soiling/each incontinent episode.    -Assess skin/wound qshift, report changes to primary provider.     Plan of Care: Primary RN Anu  at bedside & made aware of above recs. Spoke w/ covering/primary MD [place] (at [phone]) in regards to above. Signing off on patient, no further needs/recs from [facility] service at this time. Staff RN to perform routine skin/wound assessment and manage wound care. Questions or concerns or if wound worsens and reconsult needed, please contact [facility], Spectra #[phone].

## 2021-09-14 NOTE — PROGRESS NOTE ADULT - SUBJECTIVE AND OBJECTIVE BOX
INTERVENTIONAL RADIOLOGY BRIEF-OPERATIVE NOTE    Procedure:  Percutaneous, CT-directed Cholecystostomy    Pre-Op Diagnosis:  Cholecystitis    Post-Op Diagnosis:  Same    Attending:   Dr. Antunez  Resident:   None    Anesthesia (type):  [ ] General Anesthesia  [X] Sedation-  Versed, 0.5 mg and Fentanyl, 12.5 mcg iv  [ ] Spinal Anesthesia  [X] Local/Regional-  1% Lidocaine, SQ, RUQ, 9 cc    Total Face-to-Face Sedation Time:  20 minutes    Contrast:   None    Blood Loss:  5 cc    Condition:   [ ] Critical  [ ] Serious  [ ] Fair   [X] Good    Findings/Follow up Plan of Care:  Access to gallbladder achieved with CT-guidance via a right trans-hepatic approach.  8.5-Fr pigtail catheter placed, with aspiration of 130 cc of turbid brown fluid.  Specimens sent for cultures.  Catheter connected to DON drain.  Patient tolerated well without incident.    Specimens Removed:  130 cc gallbladder fluid --> cultures    Implants:  None    Complications:  None    Disposition:  Please monitor output q shift; f/u cultures      Please call Interventional Radiology [phone] with any questions, concerns, or issues.

## 2021-09-15 LAB
-  AMIKACIN: SIGNIFICANT CHANGE UP
-  AMOXICILLIN/CLAVULANIC ACID: SIGNIFICANT CHANGE UP
-  AMPICILLIN/SULBACTAM: SIGNIFICANT CHANGE UP
-  AMPICILLIN: SIGNIFICANT CHANGE UP
-  AZTREONAM: SIGNIFICANT CHANGE UP
-  CEFAZOLIN: SIGNIFICANT CHANGE UP
-  CEFEPIME: SIGNIFICANT CHANGE UP
-  CEFOXITIN: SIGNIFICANT CHANGE UP
-  CEFTRIAXONE: SIGNIFICANT CHANGE UP
-  CIPROFLOXACIN: SIGNIFICANT CHANGE UP
-  ERTAPENEM: SIGNIFICANT CHANGE UP
-  GENTAMICIN: SIGNIFICANT CHANGE UP
-  IMIPENEM: SIGNIFICANT CHANGE UP
-  LEVOFLOXACIN: SIGNIFICANT CHANGE UP
-  MEROPENEM: SIGNIFICANT CHANGE UP
-  NITROFURANTOIN: SIGNIFICANT CHANGE UP
-  PIPERACILLIN/TAZOBACTAM: SIGNIFICANT CHANGE UP
-  TIGECYCLINE: SIGNIFICANT CHANGE UP
-  TOBRAMYCIN: SIGNIFICANT CHANGE UP
-  TRIMETHOPRIM/SULFAMETHOXAZOLE: SIGNIFICANT CHANGE UP
ALBUMIN SERPL ELPH-MCNC: 3 G/DL — LOW (ref 3.5–5.2)
ALP SERPL-CCNC: 95 U/L — SIGNIFICANT CHANGE UP (ref 30–115)
ALT FLD-CCNC: 9 U/L — SIGNIFICANT CHANGE UP (ref 0–41)
ANION GAP SERPL CALC-SCNC: 13 MMOL/L — SIGNIFICANT CHANGE UP (ref 7–14)
AST SERPL-CCNC: 9 U/L — SIGNIFICANT CHANGE UP (ref 0–41)
BILIRUB SERPL-MCNC: 0.7 MG/DL — SIGNIFICANT CHANGE UP (ref 0.2–1.2)
BUN SERPL-MCNC: 62 MG/DL — CRITICAL HIGH (ref 10–20)
CALCIUM SERPL-MCNC: 8.2 MG/DL — LOW (ref 8.5–10.1)
CALCIUM UR-MCNC: <1 MG/DL — SIGNIFICANT CHANGE UP
CHLORIDE SERPL-SCNC: 96 MMOL/L — LOW (ref 98–110)
CHLORIDE UR-SCNC: 20 — SIGNIFICANT CHANGE UP
CO2 SERPL-SCNC: 26 MMOL/L — SIGNIFICANT CHANGE UP (ref 17–32)
CREAT ?TM UR-MCNC: 119 MG/DL — SIGNIFICANT CHANGE UP
CREAT SERPL-MCNC: 2.5 MG/DL — HIGH (ref 0.7–1.5)
CULTURE RESULTS: SIGNIFICANT CHANGE UP
GLUCOSE SERPL-MCNC: 97 MG/DL — SIGNIFICANT CHANGE UP (ref 70–99)
GRAM STN FLD: SIGNIFICANT CHANGE UP
HCT VFR BLD CALC: 36.2 % — LOW (ref 42–52)
HGB BLD-MCNC: 11.5 G/DL — LOW (ref 14–18)
INR BLD: 2.47 RATIO — HIGH (ref 0.65–1.3)
MAGNESIUM SERPL-MCNC: 2.4 MG/DL — SIGNIFICANT CHANGE UP (ref 1.8–2.4)
MCHC RBC-ENTMCNC: 30.4 PG — SIGNIFICANT CHANGE UP (ref 27–31)
MCHC RBC-ENTMCNC: 31.8 G/DL — LOW (ref 32–37)
MCV RBC AUTO: 95.8 FL — HIGH (ref 80–94)
METHOD TYPE: SIGNIFICANT CHANGE UP
NRBC # BLD: 0 /100 WBCS — SIGNIFICANT CHANGE UP (ref 0–0)
ORGANISM # SPEC MICROSCOPIC CNT: SIGNIFICANT CHANGE UP
ORGANISM # SPEC MICROSCOPIC CNT: SIGNIFICANT CHANGE UP
OSMOLALITY UR: 314 MOS/KG — SIGNIFICANT CHANGE UP (ref 50–1200)
PHOSPHATE 24H UR-MCNC: 18 MG/DL — SIGNIFICANT CHANGE UP
PLATELET # BLD AUTO: 154 K/UL — SIGNIFICANT CHANGE UP (ref 130–400)
POTASSIUM SERPL-MCNC: 4.4 MMOL/L — SIGNIFICANT CHANGE UP (ref 3.5–5)
POTASSIUM SERPL-SCNC: 4.4 MMOL/L — SIGNIFICANT CHANGE UP (ref 3.5–5)
POTASSIUM UR-SCNC: 51 MMOL/L — SIGNIFICANT CHANGE UP
PROT ?TM UR-MCNC: 53 MG/DLG/24H — SIGNIFICANT CHANGE UP
PROT SERPL-MCNC: 5.8 G/DL — LOW (ref 6–8)
PROT/CREAT UR-RTO: 0.4 RATIO — HIGH (ref 0–0.2)
PROTHROM AB SERPL-ACNC: 28.2 SEC — HIGH (ref 9.95–12.87)
RBC # BLD: 3.78 M/UL — LOW (ref 4.7–6.1)
RBC # FLD: 20.6 % — HIGH (ref 11.5–14.5)
SODIUM SERPL-SCNC: 135 MMOL/L — SIGNIFICANT CHANGE UP (ref 135–146)
SODIUM UR-SCNC: <20 MMOL/L — SIGNIFICANT CHANGE UP
SPECIMEN SOURCE: SIGNIFICANT CHANGE UP
SPECIMEN SOURCE: SIGNIFICANT CHANGE UP
WBC # BLD: 11.62 K/UL — HIGH (ref 4.8–10.8)
WBC # FLD AUTO: 11.62 K/UL — HIGH (ref 4.8–10.8)

## 2021-09-15 PROCEDURE — 99232 SBSQ HOSP IP/OBS MODERATE 35: CPT

## 2021-09-15 PROCEDURE — 71045 X-RAY EXAM CHEST 1 VIEW: CPT | Mod: 26

## 2021-09-15 PROCEDURE — 99233 SBSQ HOSP IP/OBS HIGH 50: CPT

## 2021-09-15 PROCEDURE — 76775 US EXAM ABDO BACK WALL LIM: CPT | Mod: 26

## 2021-09-15 RX ORDER — METRONIDAZOLE 500 MG
500 TABLET ORAL EVERY 8 HOURS
Refills: 0 | Status: DISCONTINUED | OUTPATIENT
Start: 2021-09-15 | End: 2021-09-19

## 2021-09-15 RX ORDER — CEFTRIAXONE 500 MG/1
1000 INJECTION, POWDER, FOR SOLUTION INTRAMUSCULAR; INTRAVENOUS EVERY 24 HOURS
Refills: 0 | Status: DISCONTINUED | OUTPATIENT
Start: 2021-09-15 | End: 2021-09-19

## 2021-09-15 RX ADMIN — Medication 100 MILLIGRAM(S): at 21:15

## 2021-09-15 RX ADMIN — Medication 1 MILLIGRAM(S): at 11:46

## 2021-09-15 RX ADMIN — Medication 100 MILLIGRAM(S): at 15:44

## 2021-09-15 RX ADMIN — OXYCODONE AND ACETAMINOPHEN 1 TABLET(S): 5; 325 TABLET ORAL at 19:04

## 2021-09-15 RX ADMIN — OXYCODONE AND ACETAMINOPHEN 1 TABLET(S): 5; 325 TABLET ORAL at 19:50

## 2021-09-15 RX ADMIN — PIPERACILLIN AND TAZOBACTAM 25 GRAM(S): 4; .5 INJECTION, POWDER, LYOPHILIZED, FOR SOLUTION INTRAVENOUS at 05:44

## 2021-09-15 RX ADMIN — SPIRONOLACTONE 25 MILLIGRAM(S): 25 TABLET, FILM COATED ORAL at 05:44

## 2021-09-15 RX ADMIN — OXYCODONE AND ACETAMINOPHEN 1 TABLET(S): 5; 325 TABLET ORAL at 11:45

## 2021-09-15 RX ADMIN — OXYCODONE AND ACETAMINOPHEN 1 TABLET(S): 5; 325 TABLET ORAL at 10:53

## 2021-09-15 RX ADMIN — CEFTRIAXONE 100 MILLIGRAM(S): 500 INJECTION, POWDER, FOR SOLUTION INTRAMUSCULAR; INTRAVENOUS at 18:03

## 2021-09-15 RX ADMIN — Medication 3 MILLIGRAM(S): at 21:17

## 2021-09-15 NOTE — DIETITIAN INITIAL EVALUATION ADULT. - PERTINENT LABORATORY DATA
9/14 H/H 11.9/36.4 L, BUN 49 H, Cr 1.6 H, glu 107 H, 9/14 Mg 2.5 H, folate 3.2 L, A1c 5.3%, lipid panel wnl,

## 2021-09-15 NOTE — DIETITIAN INITIAL EVALUATION ADULT. - PERTINENT MEDS FT
MEDICATIONS  (STANDING):  dextrose 5% + sodium chloride 0.45%. 1000 milliLiter(s) (50 mL/Hr) IV Continuous <Continuous>  folic acid 1 milliGRAM(s) Oral daily  melatonin 3 milliGRAM(s) Oral at bedtime  piperacillin/tazobactam IVPB.. 3.375 Gram(s) IV Intermittent every 8 hours  spironolactone 25 milliGRAM(s) Oral daily  oxycodone    5 mG/acetaminophen 325 mG 1 Tablet(s) Oral every 6 hours PRN Moderate Pain (4 - 6)

## 2021-09-15 NOTE — DIETITIAN INITIAL EVALUATION ADULT. - PHYSCIAL ASSESSMENT
Appearance: alert/oriented x 4  No edema Appearance: alert/oriented x 4  Skin: no pressure injuries per wound note  GI: denies N/V/D/C  Oral: missing teeth  No edema Thin, not emaciated, some age associated wasting noted/well nourished

## 2021-09-15 NOTE — DIETITIAN INITIAL EVALUATION ADULT. - OTHER INFO
Admit r/t cholecystitis- percutaneous cholecystectomy today vs tomorrow; cystitis; COPD; macrocytic anemia.    Wound care c/s 9/14 noted: no pressure injuries present to B/L heels, "Bilateral buttock slightly denuded, slight light brown hyperpigmentation & scattered areas of small circular healed light pink tissue throughout lower buttock area." No pressure injuries.     Currently NPO for procedure. Admit r/t cholecystitis- percutaneous cholecystostomy; cystitis; COPD; macrocytic anemia.    Wound care c/s 9/14 noted: no pressure injuries present to B/L heels, "Bilateral buttock slightly denuded, slight light brown hyperpigmentation & scattered areas of small circular healed light pink tissue throughout lower buttock area." No pressure injuries.     Diet upgraded to DASH/TLC for lunch today.

## 2021-09-15 NOTE — PROGRESS NOTE ADULT - ASSESSMENT
82 y/o male with a PMH of HFpEF (50-55%), chronic afib on coumadin followed by cardio Dr. Scherer, recent Micra PPM placement for tachy-selvin syndrome, DVT July 2021, HTN, and COPD on 2L home O2 presents to the ED from home due to atypical chest pain. The patient notes he awoke from sleep on the morning of presentation with difficulty breathing and epigastric pain. On examination, patient denying chest pain but complains of epigastric/RUQ pain on deep inhalation. Epigastric region tender on examination.     #Acute cholecystitis   - PATIENT DID NOT HAVE CHEST PAIN - > d/c tele      - EKG: no ischemic changes     - Troponin T, Serum: 0.03 > 0.02 > 0.02 > 0.03     - hold BB due to bradycardia   - Alkaline Phosphatase, Serum: 130 U/L; other LFTs wnl   - RUQ pain on palpation  - confirmed on us abdomen   - repeat CT a/p IV con to r/o gallbadder perf (first CT a/p did not see gallbladder abnormalities)   - surgery on board  - cardio: elevated risk for mod risk procedure; No further cardiac workup is indicated at this time.  There are no current cardiac contraindications that prohibit proceeding with the scheduled surgery/procedure.    - EP said ppm is working properly  - Surgery: poor surgical candidate  - IR: percutaenous cholecystectostomy today vs. tmrw   - c/w zosyn IV  - d/c IV fluids     #Cystitis:  -  CT Abdomen and Pelvis w/ IV Cont (09.12.21 @ 13:58) : Urinary bladder wall thickening and enhancement most compatible with cystitis. Correlation with urinary urinalysis is recommended  - UA: large LE, moderate blood, Many bacteria WBC >720  - no evidence of sepsis  - s/p Rocephin in ED; abx as per cholecystitis   - follow up cultures     #COPD not in exacerbation  - no evidence of COPD exacerbation on exam   - saturating well on home dose O2 (2L)   - c/w home inhalers: Symbicort duonebs PRN    #Supratherapeutic INR - improved s/p kcentra doses for perc cheli   - INR: 3.66 > 4.09  - repeat INR daily and dose coumadin   - home regimen: 1 mg M/W/F, 2mg T/Th/Sa/Sun    #HFpEF-echo 50-55%  #A-Fib on coumadin  #CAD s/p PCI and stent 2007  #DLD  - no evidence of fluid overload on exam   - TTE (7/11/2021) w/ EF 50-55%. Moderate to severe left atrial enlargement. Moderate mitral valve regurgitation. Moderate-severe tricuspid regurgitation.  Estimated pulmonary artery systolic pressure is 59.7 mmHg assuming a right atrial pressure of 10 mmHg, which is consistent with moderate pulmonary hypertension.  -  monitor I&O, daily weight   - c/w statin  - hold torsemide for now     #Possible hematoma of right pectineus muscle:   - incidental finding of on CT  - no pain, tenderness, swelling, hematoma, or wound see at the site  - continue to monitor     #DORON on CKD 3b   - Cr 1.7, uptrended today; (baseline Cr 1.2-1.6)  - monitor Cr daily   - follow up urine lytes     #Deep vein thrombosis (DVT) of left lower extremity  - [facility] Duplex Lower Ext Vein Scan, Bilat (07.15.21)No evidence of deep venous thrombosis in the left lower extremity. Chronic Thrombus is noted in the right common femoral, femoral, and popliteal vein  - f/u INR daily and dose coumadin     #Macrocytic Anemia  #folic acid deficiency   - MCV 95.9, Hemoglobin 12  - folate low, started folic acid daily     - DVT Prophylaxis: coumadin (on hold)   - GI Prophylaxis: pantoprazole  - Diet: regular low fat   - Activity: as tolerated  - Code Status: Full Code  - Dispo: from home; pending improvement after per cheli, f/u INR and dose coumadin

## 2021-09-15 NOTE — DIETITIAN INITIAL EVALUATION ADULT. - ORAL INTAKE PTA/DIET HISTORY
Met pt at bedside, pt hungry for lunch, diet has been upgraded. Pt has help from son and daughter for meal procurement and preparation. Per diet recall, pt has high fat diet at home (meatballs, corned beef hash, gravy, rotisserie chicken, ice cream). NKFA, no intolerance, no rel/ethnic/cul food preferences. Pt does not know UBW[facility] 6'1". No MVI/min supps. Pt missing most of teeth on top.

## 2021-09-15 NOTE — PROGRESS NOTE ADULT - SUBJECTIVE AND OBJECTIVE BOX
Hospital Day:  3d    Subjective: Patient is a 81y old  Male who presents with a chief complaint of Atypical chest pain (15 Sep 2021 10:44)      Pt seen and evaluated at bedside.   Complaints: tolerated per cheli well yesterday; still w/ RUQ though improved, however did not want to work with PT today   Over the night Events: DON draining > 200cc     Past Medical Hx:   COPD (chronic obstructive pulmonary disease)    Blood clot in vein    Hypertension    High cholesterol    Deep vein thrombosis (DVT) of left lower extremity, unspecified chronicity, unspecified vein    Cellulitis of left lower extremity    Chronic atrial fibrillation    Mitral valve insufficiency, unspecified etiology    CHF (congestive heart failure)      Past Sx:  No significant past surgical history    S/P coronary artery stent placement    History of total right knee replacement      Allergies:  No Known Allergies    Current Meds:   Standng Meds:  folic acid 1 milliGRAM(s) Oral daily  melatonin 3 milliGRAM(s) Oral at bedtime  piperacillin/tazobactam IVPB.. 3.375 Gram(s) IV Intermittent every 8 hours  spironolactone 25 milliGRAM(s) Oral daily    PRN Meds:  ALBUTerol  90 MICROgram(s) HFA Inhaler - Peds 2 Puff(s) Inhalation every 4 hours PRN Bronchospasm  oxycodone    5 mG/acetaminophen 325 mG 1 Tablet(s) Oral every 6 hours PRN Moderate Pain (4 - 6)      Vital Signs:   T(F): 96.1 (09-15-21 @ 05:28), Max: 96.8 (21 @ 14:08)  HR: 43 (09-15-21 @ 05:28) (43 - 66)  BP: 95/50 (09-15-21 @ 05:28) (95/50 - 121/55)  RR: 18 (09-15-21 @ 05:28) (18 - 18)  SpO2: 100% (21 @ 19:41) (100% - 100%)    Physical Exam:   GENERAL: NAD, Resting in bed  HEENT: NCAT  CHEST/LUNG: Clear to auscultation bilaterally; No wheezing or rubs.   HEART: Regular rate and rhythm; No murmurs, rubs, or gallops  ABDOMEN: Bowel sounds present; Soft, Nontender, Nondistended.   EXTREMITIES:  No clubbing, cyanosis, or edema  NERVOUS SYSTEM:  Alert & Oriented X3    FLUID BALANCE    21 @ 07:01  -  21 @ 07:00  --------------------------------------------------------  IN: 530 mL / OUT: 650 mL / NET: -120 mL    21 @ 07:01  -  09-15-21 @ 07:00  --------------------------------------------------------  IN: 300 mL / OUT: 555 mL / NET: -255 mL    09-15-21 @ 07:01  -  09-15-21 @ 12:20  --------------------------------------------------------  IN: 0 mL / OUT: 175 mL / NET: -175 mL        Labs:                         11.9   17.14 )-----------( 166      ( 14 Sep 2021 07:31 )             36.4       14 Sep 2021 07:31    136    |  98     |  49     ----------------------------<  107    4.1     |  27     |  1.6      Ca    8.5        14 Sep 2021 07:31  Mg     2.4       14 Sep 2021 07:31    TPro  5.9    /  Alb  3.3    /  TBili  0.8    /  DBili  x      /  AST  12     /  ALT  11     /  AlkPhos  114    14 Sep 2021 07:31            Serum Pro-Brain Natriuretic Peptide: 4699 pg/mL (21 @ 05:00)  Serum Pro-Brain Natriuretic Peptide: 3659 pg/mL (21 @ 12:00)    Troponin 0.03, CKMB --, CK 36 21 @ 05:00  Troponin 0.02, CKMB --, CK -- 21 @ 00:22  Troponin 0.02, CKMB --, CK -- 21 @ 20:43  Troponin 0.03, CKMB --, CK -- 21 @ 12:00        Urinalysis Basic - ( 12 Sep 2021 12:50 )    Color: Orange / Appearance: Turbid / S.014 / pH: x  Gluc: x / Ketone: Negative  / Bili: Negative / Urobili: 3 mg/dL   Blood: x / Protein: 100 mg/dL / Nitrite: Negative   Leuk Esterase: Large / RBC: 3 /HPF / WBC >720 /HPF   Sq Epi: x / Non Sq Epi: 2 /HPF / Bacteria: Many          Culture - Blood (collected 21 @ 11:00)  Source: .Blood Blood  Preliminary Report (09-15-21 @ 02:01):    No growth to date.        Procalcitonin, Serum: 0.16 ng/mL (21 @ 05:00)

## 2021-09-15 NOTE — PROGRESS NOTE ADULT - ASSESSMENT
81yM w/ extensive PMHx including CHF with EF of 50-55%, AFib on Coumadin w/ current INR of 3.6, DVT, HTN, COPD on 2L, CKD Cr 1.7. 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 Pt evaluated at bedside with DON drain in place, turbid output. Physical exam non tender, non distended abdomen.     PLAN:  - care by primary team  - s/p percutaneous cholecystostomy by IR  - no surgical intervention  - surgery will sign off now  - please notify is further evaluation is needed    TRAUMA SPECTRA: [id number]

## 2021-09-15 NOTE — PROGRESS NOTE ADULT - ATTENDING COMMENTS
#Acute cheli  us with acute cheli, possible perforation  s/p perc cheli  f/u cx  bcx ntd  change abx to ceftriaxone, flagyl; to end 5 days post perc cheli  appreciate ir, sx  #UTI, kleb  ucx kleb pansensitive  ceftriaxone as above  bcx ntd  #R pectinus muscle hypoattenuation  incidentally noted on ct, ?suspected hematoma  will need outpt f/u, hold on mri given gfr  repeat ct abd 9/13 unchanged  #AFib, chronic  coumadin  chadsvasc 4, hold on bridge  trend inr    #Progress Note Handoff:  Pending (specify):  Consults_________, Tests________, Test Results_______, Other___f/u perc cheli cx, alfa drain outpt, f/u ir______  Family discussion: d/w pt at bedside re: treatment plan, primary dx  Disposition: Home___/SNF___/Other________/Unknown at this time___x_____

## 2021-09-15 NOTE — DIETITIAN INITIAL EVALUATION ADULT. - RD TO REMAIN AVAILABLE
Interventions: meals and snacks, nutrition education-content, coordination of care Monitoring/evaluation: oral intake, diet order, weight/yes

## 2021-09-15 NOTE — PROGRESS NOTE ADULT - SUBJECTIVE AND OBJECTIVE BOX
GENERAL SURGERY PROGRESS NOTE    Patient: RODNEY SANTO , 81y ([birth year])Male   MRN: [id number]  Location: [place] [id number] ([id number]) 011 A  Visit: 09-12-21 Inpatient  Date: 09-15-21 @ 10:44    Hospital Day #:2  Post-Op Day #:1    Procedure/Dx/Injuries:    Events of past 24 hours:  acute cholecystitis , s/p 8.5-Fr pigtail catheter placed, with aspiration of 130 cc of turbid brown fluid.    PAST MEDICAL & SURGICAL HISTORY:  COPD (chronic obstructive pulmonary disease)    Hypertension    Deep vein thrombosis (DVT) of left lower extremity, unspecified chronicity, unspecified vein    Cellulitis of left lower extremity    Chronic atrial fibrillation    Mitral valve insufficiency, unspecified etiology  Moderate    CHF (congestive heart failure)    S/P coronary artery stent placement    History of total right knee replacement        Vitals:   T(F): 96.1 (09-15-21 @ 05:28), Max: 96.8 (09-14-21 @ 14:08)  HR: 43 (09-15-21 @ 05:28)  BP: 95/50 (09-15-21 @ 05:28)  RR: 18 (09-15-21 @ 05:28)  SpO2: 100% (09-14-21 @ 19:41)      Diet, DASH/TLC:   Sodium & Cholesterol Restricted  Low Fat (LOWFAT)      Fluids:     I & O's:    09-14-21 @ 07:01  -  09-15-21 @ 07:00  --------------------------------------------------------  IN:    dextrose 5% + sodium chloride 0.45%: 150 mL    IV PiggyBack: 100 mL    Oral Fluid: 50 mL  Total IN: 300 mL    OUT:    Bulb (mL): 230 mL    Voided (mL): 325 mL  Total OUT: 555 mL    Total NET: -255 mL        Bowel Movement: : [] YES [] NO  Flatus: : [] YES [] NO    PHYSICAL EXAM:  General: NAD, AAOx3, calm and cooperative  Cardiac: RRR S1, S2, no Murmurs, rubs or gallops  Respiratory: CTAB, normal respiratory effort  Abdomen: Soft, non-distended, non-tender, no rebound, no guarding. +BS. DON drain with turbid output  Musculoskeletal: Strength 5/5 BL UE/LE, ROM intact, compartments soft  Incision/wound: healing well, dressings in place, clean, dry and intact    MEDICATIONS  (STANDING):  folic acid 1 milliGRAM(s) Oral daily  melatonin 3 milliGRAM(s) Oral at bedtime  piperacillin/tazobactam IVPB.. 3.375 Gram(s) IV Intermittent every 8 hours  spironolactone 25 milliGRAM(s) Oral daily    MEDICATIONS  (PRN):  ALBUTerol  90 MICROgram(s) HFA Inhaler - Peds 2 Puff(s) Inhalation every 4 hours PRN Bronchospasm  oxycodone    5 mG/acetaminophen 325 mG 1 Tablet(s) Oral every 6 hours PRN Moderate Pain (4 - 6)      DVT PROPHYLAXIS:   GI PROPHYLAXIS:   ANTICOAGULATION:   ANTIBIOTICS:  piperacillin/tazobactam IVPB.. 3.375 Gram(s)            LAB/STUDIES:  Labs:  CAPILLARY BLOOD GLUCOSE                              11.9   17.14 )-----------( 166      ( 14 Sep 2021 07:31 )             36.4         09-14    136  |  98  |  49<H>  ----------------------------<  107<H>  4.1   |  27  |  1.6<H>          LFTs:             5.9  | 0.8  | 12       ------------------[114     ( 14 Sep 2021 07:31 )  3.3  | x    | 11          Lipase:x      Amylase:x         Lactate, Blood: 1.4 mmol/L (09-13-21 @ 05:00)  Blood Gas Venous - Lactate: 2.30 mmol/L (09-12-21 @ 11:56)      Coags:     15.50  ----< 1.35    ( 14 Sep 2021 11:00 )     34.4            Serum Pro-Brain Natriuretic Peptide: 4699 pg/mL (09-13-21 @ 05:00)  Serum Pro-Brain Natriuretic Peptide: 3659 pg/mL (09-12-21 @ 12:00)          Culture - Blood (collected 13 Sep 2021 11:00)  Source: .Blood Blood  Preliminary Report (15 Sep 2021 02:01):    No growth to date.    Culture - Urine (collected 12 Sep 2021 12:50)  Source: Clean Catch Clean Catch (Midstream)  Final Report (15 Sep 2021 05:25):    >100,000 CFU/ml Klebsiella pneumoniae  Organism: Klebsiella pneumoniae (15 Sep 2021 05:25)  Organism: Klebsiella pneumoniae (15 Sep 2021 05:25)              IMAGING:      ACCESS/ DEVICES:  [x ] Peripheral IV  [ ] Central Venous Line	[ ] R	[ ] L	[ ] IJ	[ ] Fem	[ ] SC	Placed:   [ ] Arterial Line		[ ] R	[ ] L	[ ] Fem	[ ] Rad	[ ] Ax	Placed:   [ ] PICC:					[ ] Mediport  [ ] Urinary Catheter,  Date Placed:   [ ] Chest tube: [ ] Right, [ ] Left  [x ] DON/Manuel Drains

## 2021-09-15 NOTE — PHYSICAL THERAPY INITIAL EVALUATION ADULT - SPECIFY REASON(S)
928 am Attempted to see pt for PT initial evaluation , s/p Cholecystostomy (9/14) with DON drain; pt verbalized he feels better at bedside however, remains to decline out of bed activity.

## 2021-09-16 LAB
ALBUMIN SERPL ELPH-MCNC: 2.8 G/DL — LOW (ref 3.5–5.2)
ALP SERPL-CCNC: 97 U/L — SIGNIFICANT CHANGE UP (ref 30–115)
ALT FLD-CCNC: 8 U/L — SIGNIFICANT CHANGE UP (ref 0–41)
ANION GAP SERPL CALC-SCNC: 12 MMOL/L — SIGNIFICANT CHANGE UP (ref 7–14)
APTT BLD: 42.4 SEC — HIGH (ref 27–39.2)
AST SERPL-CCNC: 9 U/L — SIGNIFICANT CHANGE UP (ref 0–41)
BASOPHILS # BLD AUTO: 0.01 K/UL — SIGNIFICANT CHANGE UP (ref 0–0.2)
BASOPHILS NFR BLD AUTO: 0.1 % — SIGNIFICANT CHANGE UP (ref 0–1)
BILIRUB SERPL-MCNC: 0.6 MG/DL — SIGNIFICANT CHANGE UP (ref 0.2–1.2)
BUN SERPL-MCNC: 70 MG/DL — CRITICAL HIGH (ref 10–20)
CALCIUM SERPL-MCNC: 8.3 MG/DL — LOW (ref 8.5–10.1)
CHLORIDE SERPL-SCNC: 96 MMOL/L — LOW (ref 98–110)
CO2 SERPL-SCNC: 25 MMOL/L — SIGNIFICANT CHANGE UP (ref 17–32)
CREAT SERPL-MCNC: 2.9 MG/DL — HIGH (ref 0.7–1.5)
EOSINOPHIL # BLD AUTO: 0.11 K/UL — SIGNIFICANT CHANGE UP (ref 0–0.7)
EOSINOPHIL NFR BLD AUTO: 1.3 % — SIGNIFICANT CHANGE UP (ref 0–8)
GLUCOSE BLDC GLUCOMTR-MCNC: 109 MG/DL — HIGH (ref 70–99)
GLUCOSE SERPL-MCNC: 91 MG/DL — SIGNIFICANT CHANGE UP (ref 70–99)
HCT VFR BLD CALC: 36.4 % — LOW (ref 42–52)
HGB BLD-MCNC: 11.6 G/DL — LOW (ref 14–18)
IMM GRANULOCYTES NFR BLD AUTO: 0.2 % — SIGNIFICANT CHANGE UP (ref 0.1–0.3)
INR BLD: 3 RATIO — HIGH (ref 0.65–1.3)
LYMPHOCYTES # BLD AUTO: 0.56 K/UL — LOW (ref 1.2–3.4)
LYMPHOCYTES # BLD AUTO: 6.5 % — LOW (ref 20.5–51.1)
MAGNESIUM SERPL-MCNC: 2.5 MG/DL — HIGH (ref 1.8–2.4)
MCHC RBC-ENTMCNC: 30.5 PG — SIGNIFICANT CHANGE UP (ref 27–31)
MCHC RBC-ENTMCNC: 31.9 G/DL — LOW (ref 32–37)
MCV RBC AUTO: 95.8 FL — HIGH (ref 80–94)
MONOCYTES # BLD AUTO: 0.53 K/UL — SIGNIFICANT CHANGE UP (ref 0.1–0.6)
MONOCYTES NFR BLD AUTO: 6.2 % — SIGNIFICANT CHANGE UP (ref 1.7–9.3)
NEUTROPHILS # BLD AUTO: 7.33 K/UL — HIGH (ref 1.4–6.5)
NEUTROPHILS NFR BLD AUTO: 85.7 % — HIGH (ref 42.2–75.2)
NRBC # BLD: 0 /100 WBCS — SIGNIFICANT CHANGE UP (ref 0–0)
PLATELET # BLD AUTO: 152 K/UL — SIGNIFICANT CHANGE UP (ref 130–400)
POTASSIUM SERPL-MCNC: 3.9 MMOL/L — SIGNIFICANT CHANGE UP (ref 3.5–5)
POTASSIUM SERPL-SCNC: 3.9 MMOL/L — SIGNIFICANT CHANGE UP (ref 3.5–5)
PROT SERPL-MCNC: 5.9 G/DL — LOW (ref 6–8)
PROTHROM AB SERPL-ACNC: 34.1 SEC — HIGH (ref 9.95–12.87)
RBC # BLD: 3.8 M/UL — LOW (ref 4.7–6.1)
RBC # FLD: 20.5 % — HIGH (ref 11.5–14.5)
SODIUM SERPL-SCNC: 133 MMOL/L — LOW (ref 135–146)
WBC # BLD: 8.56 K/UL — SIGNIFICANT CHANGE UP (ref 4.8–10.8)
WBC # FLD AUTO: 8.56 K/UL — SIGNIFICANT CHANGE UP (ref 4.8–10.8)

## 2021-09-16 PROCEDURE — 99233 SBSQ HOSP IP/OBS HIGH 50: CPT

## 2021-09-16 RX ADMIN — Medication 20 MILLIGRAM(S): at 05:12

## 2021-09-16 RX ADMIN — Medication 100 MILLIGRAM(S): at 05:12

## 2021-09-16 RX ADMIN — CEFTRIAXONE 100 MILLIGRAM(S): 500 INJECTION, POWDER, FOR SOLUTION INTRAMUSCULAR; INTRAVENOUS at 17:00

## 2021-09-16 RX ADMIN — OXYCODONE AND ACETAMINOPHEN 1 TABLET(S): 5; 325 TABLET ORAL at 14:34

## 2021-09-16 RX ADMIN — Medication 100 MILLIGRAM(S): at 13:03

## 2021-09-16 RX ADMIN — OXYCODONE AND ACETAMINOPHEN 1 TABLET(S): 5; 325 TABLET ORAL at 04:44

## 2021-09-16 RX ADMIN — OXYCODONE AND ACETAMINOPHEN 1 TABLET(S): 5; 325 TABLET ORAL at 06:43

## 2021-09-16 RX ADMIN — OXYCODONE AND ACETAMINOPHEN 1 TABLET(S): 5; 325 TABLET ORAL at 13:23

## 2021-09-16 RX ADMIN — Medication 3 MILLIGRAM(S): at 21:42

## 2021-09-16 RX ADMIN — Medication 100 MILLIGRAM(S): at 21:42

## 2021-09-16 RX ADMIN — OXYCODONE AND ACETAMINOPHEN 1 TABLET(S): 5; 325 TABLET ORAL at 22:20

## 2021-09-16 RX ADMIN — OXYCODONE AND ACETAMINOPHEN 1 TABLET(S): 5; 325 TABLET ORAL at 21:42

## 2021-09-16 RX ADMIN — Medication 1 MILLIGRAM(S): at 11:19

## 2021-09-16 RX ADMIN — SPIRONOLACTONE 25 MILLIGRAM(S): 25 TABLET, FILM COATED ORAL at 05:12

## 2021-09-16 NOTE — CONSULT NOTE ADULT - SUBJECTIVE AND OBJECTIVE BOX
NEPHROLOGY CONSULTATION NOTE    Patient is a 80 y/o male with a PMH of HFpEF (50-55%), chronic afib on coumadin followed by cardio Dr. Scherer, recent Micra PPM placement for tachy-selvin syndrome, DVT 2021, HTN, and COPD on 2L home O2 presents to the ED from home due to atypical chest pain. The patient notes he awoke from sleep on the morning of presentation with difficulty breathing and epigastric pain. The patient notes pain on deep breath in the epigastric/RUQ region. Patient saturating well on home O2, 2L with no increased O2 requirements. Denies substernal chest pain, radiation to L arm/jaw, palpitations, nausea, vomiting, diarrhea, constipation, syncope, falls, cough, chills, or fever. The patient denies prior episodes of similar pain.     In ED, the patient was found to have bradycardia 50s (asymptomatic). Labs revealing INR 3.66 and positive urinalysis. EKG with no ischemic changes. CT Abdomen and Pelvis w/ IV Cont showed urinary bladder wall thickening and enhancement most compatible with cystitis.  Masslike enlargement/low-attenuation of the inferior aspect of the right pectineus muscle. This is nonspecific. Differential includes hematoma or possibly abscess. Malignancy is not excluded. Further evaluation and follow-up is recommended. For complete characterization MRI may be obtained. Bilateral pleural effusions, small on the right and trace to small on the left. The patient will be admitted to telemetry for further management.     renal:    consulted for nosocomial DORNO after CT with iv contrast.  No voiding complaints.  Per cheli draining.  BP's low this admission    PAST MEDICAL & SURGICAL HISTORY:  COPD (chronic obstructive pulmonary disease)    Hypertension    Deep vein thrombosis (DVT) of left lower extremity, unspecified chronicity, unspecified vein    Cellulitis of left lower extremity    Chronic atrial fibrillation    Mitral valve insufficiency, unspecified etiology  Moderate    CHF (congestive heart failure)    S/P coronary artery stent placement    History of total right knee replacement      Allergies:  No Known Allergies    Home Medications Reviewed    SOCIAL HISTORY:  Denies ETOH,Smoking,   FAMILY HISTORY:        REVIEW OF SYSTEMS:  CONSTITUTIONAL: No weakness, fevers or chills  EYES/ENT: No visual changes;  No vertigo or throat pain   NECK: No pain or stiffness  RESPIRATORY: No cough, wheezing, hemoptysis; No shortness of breath  CARDIOVASCULAR: No chest pain or palpitations.  GASTROINTESTINAL: No abdominal or epigastric pain. No nausea, vomiting, or hematemesis; No diarrhea or constipation. No melena or hematochezia.  GENITOURINARY: No dysuria, frequency, foamy urine, urinary urgency, incontinence or hematuria  NEUROLOGICAL: No numbness or weakness  SKIN: No itching, burning, rashes, or lesions   VASCULAR: No bilateral lower extremity edema.   All other review of systems is negative unless indicated above.    PHYSICAL EXAM:  Constitutional: NAD  HEENT: anicteric sclera, oropharynx clear, MMM  Neck: No JVD  Respiratory: decreased BS b/l  Cardiovascular: S1, S2, RRR  Gastrointestinal: BS+, soft, NT/ND + perc cheli  Extremities: No cyanosis or clubbing. No peripheral edema  Neurological: A/O x 3, no focal deficits  Psychiatric: Normal mood, normal affect  : No CVA tenderness. No lowe.   Skin: No rashes    Hospital Medications:   MEDICATIONS  (STANDING):  cefTRIAXone   IVPB 1000 milliGRAM(s) IV Intermittent every 24 hours  folic acid 1 milliGRAM(s) Oral daily  melatonin 3 milliGRAM(s) Oral at bedtime  metroNIDAZOLE  IVPB 500 milliGRAM(s) IV Intermittent every 8 hours        VITALS:  T(F): 97.2 (21 @ 12:52), Max: 97.2 (21 @ 12:52)  HR: 61 (21 @ 12:52)  BP: 120/55 (21 @ 12:52)  RR: 18 (21 @ 12:52)  SpO2: 98% (21 @ 07:48)  Wt(kg): --     @ 07:  -  09-15 @ 07:00  --------------------------------------------------------  IN: 300 mL / OUT: 555 mL / NET: -255 mL    09-15 @ 07:  -   @ 07:00  --------------------------------------------------------  IN: 560 mL / OUT: 960 mL / NET: -400 mL     @ 07:01  -   @ 17:54  --------------------------------------------------------  IN: 120 mL / OUT: 150 mL / NET: -30 mL          LABS:      133<L>  |  96<L>  |  70<HH>  ----------------------------<  91  3.9   |  25  |  2.9<H>    Ca    8.3<L>      16 Sep 2021 07:54  Mg     2.5         TPro  5.9<L>  /  Alb  2.8<L>  /  TBili  0.6  /  DBili      /  AST  9   /  ALT  8   /  AlkPhos  97                            11.6   8.56  )-----------( 152      ( 16 Sep 2021 07:54 )             36.4       Urine Studies:  Urinalysis Basic - ( 12 Sep 2021 12:50 )    Color: Orange / Appearance: Turbid / S.014 / pH:   Gluc:  / Ketone: Negative  / Bili: Negative / Urobili: 3 mg/dL   Blood:  / Protein: 100 mg/dL / Nitrite: Negative   Leuk Esterase: Large / RBC: 3 /HPF / WBC >720 /HPF   Sq Epi:  / Non Sq Epi: 2 /HPF / Bacteria: Many      Chloride, Random Urine: 20 (09-15 @ 05:30)  Calcium, Random Urine: <1 mg/dL (09-15 @ 05:30)  Potassium, Random Urine: 51 mmol/L (09-15 @ 05:30)  Osmolality, Random Urine: 314 mos/kg (09-15 @ 05:30)  Sodium, Random Urine: <20.0 mmoL/L (09-15 @ 05:30)  Protein/Creatinine Ratio Calculation: 0.4 Ratio (09-15 @ 05:30)  Creatinine, Random Urine: 119 mg/dL (09-15 @ 05:30)      RADIOLOGY & ADDITIONAL STUDIES:

## 2021-09-16 NOTE — PHYSICAL THERAPY INITIAL EVALUATION ADULT - GENERAL OBSERVATIONS, REHAB EVAL
240-305 pm Chart reviewed. Pt. seen semirecline in bed , in No apparent distress + telemetry , IV lock , DON drain, epigastric area,  Pt. agreed to activity/therapy.

## 2021-09-16 NOTE — CONSULT NOTE ADULT - ASSESSMENT
DORON   - progressive, prerenal, nosocomial DORON in setting of overdiuresis   - slowly worsening renal function over last week   - b/l normal sized kidneys without hydro on renal sono   - no proteinuria, RBC's on UA   - pt aggressively diuresed with bumx gtt, metolazone, hypertonic saline and aldactone    chronic HFpEF  Afib with SVR / NSVT / CAD / PCI  COPD on home O2  hf of DVT  anemia    plan:    diuretics held  500cc LR given today  not on ACE-I / ARB / entresto  renal function should stabilize with above measures  no need for diamox  f/u labs   check bladder scan for PVR  will follow, thanks     DORON   - worsening   - likely contrast nephropathy in setting of heart disease, diuretics, hypotension and infection  acute cheli  klebsiella UTI  chronic HFpEF  Afib with SVR / NSVT / CAD / PCI  COPD on home O2  hf of DVT  anemia    plan:    hold diuretics  encourage po hydration  trend renal function  monitor uop via urinal, avoid lowe for now  will follow  cont

## 2021-09-16 NOTE — PROGRESS NOTE ADULT - SUBJECTIVE AND OBJECTIVE BOX
Hospital Day:  4d    Subjective: Patient is a 81y old  Male who presents with a chief complaint of Atypical chest pain (15 Sep 2021 12:19)      Pt seen and evaluated at bedside.   Complaints: creatinine uptrending, still making a little urine, feels better and wants to work with PT though   Over the night Events: none    Past Medical Hx:   COPD (chronic obstructive pulmonary disease)    Blood clot in vein    Hypertension    High cholesterol    Deep vein thrombosis (DVT) of left lower extremity, unspecified chronicity, unspecified vein    Cellulitis of left lower extremity    Chronic atrial fibrillation    Mitral valve insufficiency, unspecified etiology    CHF (congestive heart failure)      Past Sx:  No significant past surgical history    S/P coronary artery stent placement    History of total right knee replacement      Allergies:  No Known Allergies    Current Meds:   Standng Meds:  cefTRIAXone   IVPB 1000 milliGRAM(s) IV Intermittent every 24 hours  folic acid 1 milliGRAM(s) Oral daily  melatonin 3 milliGRAM(s) Oral at bedtime  metroNIDAZOLE  IVPB 500 milliGRAM(s) IV Intermittent every 8 hours    PRN Meds:  ALBUTerol  90 MICROgram(s) HFA Inhaler - Peds 2 Puff(s) Inhalation every 4 hours PRN Bronchospasm  oxycodone    5 mG/acetaminophen 325 mG 1 Tablet(s) Oral every 6 hours PRN Moderate Pain (4 - 6)      Vital Signs:   T(F): 96.3 (21 @ 05:33), Max: 96.9 (09-15-21 @ 13:34)  HR: 66 (21 @ 05:33) (50 - 66)  BP: 106/53 (21 @ 05:33) (89/52 - 109/54)  RR: 17 (21 @ 07:48) (16 - 18)  SpO2: 98% (21 @ 07:48) (98% - 98%)    Physical Exam:   GENERAL: NAD, Resting in bed  HEENT: NCAT  CHEST/LUNG: Clear to auscultation bilaterally; No wheezing or rubs.   HEART: Regular rate and rhythm; No murmurs, rubs, or gallops  ABDOMEN: Bowel sounds present; Soft, Nontender, Nondistended.   EXTREMITIES:  No clubbing, cyanosis, or edema  NERVOUS SYSTEM:  Alert & Oriented X3    FLUID BALANCE    21 @ 07:01  -  09-15-21 @ 07:00  --------------------------------------------------------  IN: 300 mL / OUT: 555 mL / NET: -255 mL    09-15-21 @ 07:01  -  21 @ 07:00  --------------------------------------------------------  IN: 560 mL / OUT: 960 mL / NET: -400 mL        Labs:                         11.6   8.56  )-----------( 152      ( 16 Sep 2021 07:54 )             36.4     Neutophil% 85.7, Lymphocyte% 6.5, Monocyte% 6.2, Bands% 0.2 21 @ 07:54    16 Sep 2021 07:54    133    |  96     |  70     ----------------------------<  91     3.9     |  25     |  2.9      Ca    8.3        16 Sep 2021 07:54  Mg     2.5       16 Sep 2021 07:54    TPro  5.9    /  Alb  2.8    /  TBili  0.6    /  DBili  x      /  AST  9      /  ALT  8      /  AlkPhos  97     16 Sep 2021 07:54       pTT    42.4             ----< 3.00 INR  (21 @ 07:54)    34.10        PT        Serum Pro-Brain Natriuretic Peptide: 4699 pg/mL (21 @ 05:00)  Serum Pro-Brain Natriuretic Peptide: 3659 pg/mL (21 @ 12:00)    Troponin 0.03, CKMB --, CK 36 21 @ 05:00  Troponin 0.02, CKMB --, CK -- 21 @ 00:22  Troponin 0.02, CKMB --, CK -- 21 @ 20:43  Troponin 0.03, CKMB --, CK -- 21 @ 12:00        Urinalysis Basic - ( 12 Sep 2021 12:50 )    Color: Orange / Appearance: Turbid / S.014 / pH: x  Gluc: x / Ketone: Negative  / Bili: Negative / Urobili: 3 mg/dL   Blood: x / Protein: 100 mg/dL / Nitrite: Negative   Leuk Esterase: Large / RBC: 3 /HPF / WBC >720 /HPF   Sq Epi: x / Non Sq Epi: 2 /HPF / Bacteria: Many          Culture - Blood (collected 21 @ 11:00)  Source: .Blood Blood  Preliminary Report (09-15-21 @ 02:01):    No growth to date.        Procalcitonin, Serum: 0.16 ng/mL (21 @ 05:00)            Radiology:

## 2021-09-16 NOTE — PHYSICAL THERAPY INITIAL EVALUATION ADULT - PERTINENT HX OF CURRENT PROBLEM, REHAB EVAL
80 y/o male with a PMH of HFpEF (50-55%), chronic afib on coumadin followed by cardio Dr. Scherer, recent Micra PPM placement for tachy-selvin syndrome, DVT July 2021, HTN, and COPD on 2L home O2 presents to the ED from home due to atypical chest pain; dx: cholecystitis s/p percutaneous cholecystostomy, referred to PT for eval and tx.

## 2021-09-16 NOTE — PROGRESS NOTE ADULT - ASSESSMENT
82 y/o male with a PMH of HFpEF (50-55%), chronic afib on coumadin followed by cardio Dr. Scherer, recent Micra PPM placement for tachy-selvin syndrome, DVT July 2021, HTN, and COPD on 2L home O2 presents to the ED from home due to atypical chest pain. The patient notes he awoke from sleep on the morning of presentation with difficulty breathing and epigastric pain. On examination, patient denying chest pain but complains of epigastric/RUQ pain on deep inhalation. Epigastric region tender on examination.     #Acute cholecystitis   - PATIENT DID NOT HAVE CHEST PAIN - > d/c tele      - EKG: no ischemic changes     - Troponin T, Serum: 0.03 > 0.02 > 0.02 > 0.03     - hold BB due to bradycardia   - Alkaline Phosphatase, Serum: 130 U/L; other LFTs wnl   - confirmed on us abdomen   - repeat CT a/p IV con to r/o gallbadder perf (first CT a/p did not see gallbladder abnormalities)   - Surgery: poor surgical candidate  - s/p perc cheli 9/14  - switched from zosyn to ceftriaxone and flagyl   - d/c IV fluids     #DORON on CKD 3b   - Cr 2.9; probably pre-renal (baseline Cr 1.2-1.6)  - monitor Cr daily   - us renal wnl   - nephrology consult     #Cystitis:  -  CT Abdomen and Pelvis w/ IV Cont (09.12.21 @ 13:58) : Urinary bladder wall thickening and enhancement most compatible with cystitis. Correlation with urinary urinalysis is recommended  - UA: large LE, moderate blood, Many bacteria WBC >720  - no evidence of sepsis  - s/p Rocephin in ED; abx as per cholecystitis   - follow up cultures     #COPD not in exacerbation  - no evidence of COPD exacerbation on exam   - saturating well on home dose O2 (2L)   - c/w home inhalers: Symbicort duonebs PRN    #Supratherapeutic INR - improved s/p kcentra doses for perc cheli   - repeat INR daily and dose coumadin   - home regimen: 1 mg M/W/F, 2mg T/Th/Sa/Sun    #HFpEF-echo 50-55%  #A-Fib on coumadin  #CAD s/p PCI and stent 2007  #DLD  - no evidence of fluid overload on exam   - TTE (7/11/2021) w/ EF 50-55%. Moderate to severe left atrial enlargement. Moderate mitral valve regurgitation. Moderate-severe tricuspid regurgitation.  Estimated pulmonary artery systolic pressure is 59.7 mmHg assuming a right atrial pressure of 10 mmHg, which is consistent with moderate pulmonary hypertension.  -  monitor I&O, daily weight   - c/w statin  - hold torsemide for now     #Possible hematoma of right pectineus muscle:   - incidental finding of on CT  - no pain, tenderness, swelling, hematoma, or wound see at the site  - continue to monitor     #Deep vein thrombosis (DVT) of left lower extremity  - [facility] Duplex Lower Ext Vein Scan, Bilat (07.15.21)No evidence of deep venous thrombosis in the left lower extremity. Chronic Thrombus is noted in the right common femoral, femoral, and popliteal vein  - f/u INR daily and dose coumadin     #Macrocytic Anemia  #folic acid deficiency   - MCV 95.9, Hemoglobin 12  - folate low, started folic acid daily     - DVT Prophylaxis: coumadin (on hold)   - GI Prophylaxis: pantoprazole  - Diet: regular low fat   - Activity: as tolerated  - Code Status: Full Code  - Dispo: from home; PT eval, nephro consult

## 2021-09-16 NOTE — PHYSICAL THERAPY INITIAL EVALUATION ADULT - DID THE PATIENT HAVE SURGERY?
Pt education provided on benefits of therapeutic exercises and out of bed activity , will continue to follow up.
Pt encouraged to assume chair mode position if possible; PT to follow up as appropriate.
percutaneous cholecystostomy/yes

## 2021-09-16 NOTE — PROGRESS NOTE ADULT - ATTENDING COMMENTS
#DORON, suspect overdiuresis  hold aldactone, torsemide  cxr 9/15 noted  renal us neg  trend scr  f/u renal  #Acute cheli  repeat ct abd without perforation  s/p perc cheli  cx with gram variable rods, f/u speciaiton  monitor alfa drain outpt, f/u sx/ ir for removal  bcx ntd  ceftriaxone, flagyl; to end 5 days post perc cheli  appreciate ir, sx  #UTI, kleb  ucx kleb pansensitive  ceftriaxone as above  bcx ntd  #R pectinus muscle hypoattenuation  incidentally noted on ct, ?suspected hematoma  will need outpt f/u, hold on mri given gfr  repeat ct abd 9/13 unchanged    #Progress Note Handoff:  Pending (specify):  Consults_________, Tests________, Test Results_______, Other__ doron, f/u renal______  Family discussion: d/w pt at bedside re: treatment plan, primary dx  Disposition: Home___/SNF___/Other________/Unknown at this time___x_____

## 2021-09-17 LAB
-  AMIKACIN: SIGNIFICANT CHANGE UP
-  AMOXICILLIN/CLAVULANIC ACID: SIGNIFICANT CHANGE UP
-  AMPICILLIN/SULBACTAM: SIGNIFICANT CHANGE UP
-  AMPICILLIN: SIGNIFICANT CHANGE UP
-  AZTREONAM: SIGNIFICANT CHANGE UP
-  CEFAZOLIN: SIGNIFICANT CHANGE UP
-  CEFEPIME: SIGNIFICANT CHANGE UP
-  CEFOXITIN: SIGNIFICANT CHANGE UP
-  CEFTRIAXONE: SIGNIFICANT CHANGE UP
-  CIPROFLOXACIN: SIGNIFICANT CHANGE UP
-  ERTAPENEM: SIGNIFICANT CHANGE UP
-  GENTAMICIN: SIGNIFICANT CHANGE UP
-  IMIPENEM: SIGNIFICANT CHANGE UP
-  LEVOFLOXACIN: SIGNIFICANT CHANGE UP
-  MEROPENEM: SIGNIFICANT CHANGE UP
-  PIPERACILLIN/TAZOBACTAM: SIGNIFICANT CHANGE UP
-  TOBRAMYCIN: SIGNIFICANT CHANGE UP
-  TRIMETHOPRIM/SULFAMETHOXAZOLE: SIGNIFICANT CHANGE UP
ALBUMIN SERPL ELPH-MCNC: 2.8 G/DL — LOW (ref 3.5–5.2)
ALP SERPL-CCNC: 82 U/L — SIGNIFICANT CHANGE UP (ref 30–115)
ALT FLD-CCNC: 6 U/L — SIGNIFICANT CHANGE UP (ref 0–41)
ANION GAP SERPL CALC-SCNC: 12 MMOL/L — SIGNIFICANT CHANGE UP (ref 7–14)
APTT BLD: 44.5 SEC — HIGH (ref 27–39.2)
AST SERPL-CCNC: 8 U/L — SIGNIFICANT CHANGE UP (ref 0–41)
BASOPHILS # BLD AUTO: 0.01 K/UL — SIGNIFICANT CHANGE UP (ref 0–0.2)
BASOPHILS NFR BLD AUTO: 0.2 % — SIGNIFICANT CHANGE UP (ref 0–1)
BILIRUB SERPL-MCNC: 0.4 MG/DL — SIGNIFICANT CHANGE UP (ref 0.2–1.2)
BUN SERPL-MCNC: 73 MG/DL — CRITICAL HIGH (ref 10–20)
CALCIUM SERPL-MCNC: 8.3 MG/DL — LOW (ref 8.5–10.1)
CHLORIDE SERPL-SCNC: 98 MMOL/L — SIGNIFICANT CHANGE UP (ref 98–110)
CO2 SERPL-SCNC: 25 MMOL/L — SIGNIFICANT CHANGE UP (ref 17–32)
CREAT SERPL-MCNC: 2.7 MG/DL — HIGH (ref 0.7–1.5)
EOSINOPHIL # BLD AUTO: 0.11 K/UL — SIGNIFICANT CHANGE UP (ref 0–0.7)
EOSINOPHIL NFR BLD AUTO: 1.7 % — SIGNIFICANT CHANGE UP (ref 0–8)
GLUCOSE SERPL-MCNC: 97 MG/DL — SIGNIFICANT CHANGE UP (ref 70–99)
HCT VFR BLD CALC: 33.3 % — LOW (ref 42–52)
HGB BLD-MCNC: 10.8 G/DL — LOW (ref 14–18)
IMM GRANULOCYTES NFR BLD AUTO: 0.5 % — HIGH (ref 0.1–0.3)
INR BLD: 3.31 RATIO — HIGH (ref 0.65–1.3)
LYMPHOCYTES # BLD AUTO: 0.62 K/UL — LOW (ref 1.2–3.4)
LYMPHOCYTES # BLD AUTO: 9.7 % — LOW (ref 20.5–51.1)
MAGNESIUM SERPL-MCNC: 2.5 MG/DL — HIGH (ref 1.8–2.4)
MCHC RBC-ENTMCNC: 30.9 PG — SIGNIFICANT CHANGE UP (ref 27–31)
MCHC RBC-ENTMCNC: 32.4 G/DL — SIGNIFICANT CHANGE UP (ref 32–37)
MCV RBC AUTO: 95.1 FL — HIGH (ref 80–94)
METHOD TYPE: SIGNIFICANT CHANGE UP
MONOCYTES # BLD AUTO: 0.58 K/UL — SIGNIFICANT CHANGE UP (ref 0.1–0.6)
MONOCYTES NFR BLD AUTO: 9.1 % — SIGNIFICANT CHANGE UP (ref 1.7–9.3)
NEUTROPHILS # BLD AUTO: 5.05 K/UL — SIGNIFICANT CHANGE UP (ref 1.4–6.5)
NEUTROPHILS NFR BLD AUTO: 78.8 % — HIGH (ref 42.2–75.2)
NRBC # BLD: 0 /100 WBCS — SIGNIFICANT CHANGE UP (ref 0–0)
PLATELET # BLD AUTO: 149 K/UL — SIGNIFICANT CHANGE UP (ref 130–400)
POTASSIUM SERPL-MCNC: 4.3 MMOL/L — SIGNIFICANT CHANGE UP (ref 3.5–5)
POTASSIUM SERPL-SCNC: 4.3 MMOL/L — SIGNIFICANT CHANGE UP (ref 3.5–5)
PROT SERPL-MCNC: 5.6 G/DL — LOW (ref 6–8)
PROTHROM AB SERPL-ACNC: 37.6 SEC — HIGH (ref 9.95–12.87)
RBC # BLD: 3.5 M/UL — LOW (ref 4.7–6.1)
RBC # FLD: 20.1 % — HIGH (ref 11.5–14.5)
SODIUM SERPL-SCNC: 135 MMOL/L — SIGNIFICANT CHANGE UP (ref 135–146)
WBC # BLD: 6.4 K/UL — SIGNIFICANT CHANGE UP (ref 4.8–10.8)
WBC # FLD AUTO: 6.4 K/UL — SIGNIFICANT CHANGE UP (ref 4.8–10.8)

## 2021-09-17 PROCEDURE — 93010 ELECTROCARDIOGRAM REPORT: CPT

## 2021-09-17 PROCEDURE — 99233 SBSQ HOSP IP/OBS HIGH 50: CPT

## 2021-09-17 RX ORDER — SODIUM CHLORIDE 0.65 %
1 AEROSOL, SPRAY (ML) NASAL
Refills: 0 | Status: DISCONTINUED | OUTPATIENT
Start: 2021-09-17 | End: 2021-09-21

## 2021-09-17 RX ORDER — POLYETHYLENE GLYCOL 3350 17 G/17G
17 POWDER, FOR SOLUTION ORAL DAILY
Refills: 0 | Status: DISCONTINUED | OUTPATIENT
Start: 2021-09-17 | End: 2021-09-21

## 2021-09-17 RX ADMIN — Medication 100 MILLIGRAM(S): at 13:28

## 2021-09-17 RX ADMIN — OXYCODONE AND ACETAMINOPHEN 1 TABLET(S): 5; 325 TABLET ORAL at 21:06

## 2021-09-17 RX ADMIN — Medication 100 MILLIGRAM(S): at 22:32

## 2021-09-17 RX ADMIN — Medication 1 MILLIGRAM(S): at 11:20

## 2021-09-17 RX ADMIN — Medication 100 MILLIGRAM(S): at 05:17

## 2021-09-17 RX ADMIN — Medication 3 MILLIGRAM(S): at 21:06

## 2021-09-17 RX ADMIN — OXYCODONE AND ACETAMINOPHEN 1 TABLET(S): 5; 325 TABLET ORAL at 13:28

## 2021-09-17 RX ADMIN — POLYETHYLENE GLYCOL 3350 17 GRAM(S): 17 POWDER, FOR SOLUTION ORAL at 11:20

## 2021-09-17 RX ADMIN — OXYCODONE AND ACETAMINOPHEN 1 TABLET(S): 5; 325 TABLET ORAL at 14:25

## 2021-09-17 RX ADMIN — CEFTRIAXONE 100 MILLIGRAM(S): 500 INJECTION, POWDER, FOR SOLUTION INTRAMUSCULAR; INTRAVENOUS at 17:07

## 2021-09-17 RX ADMIN — Medication 1 SPRAY(S): at 06:50

## 2021-09-17 RX ADMIN — OXYCODONE AND ACETAMINOPHEN 1 TABLET(S): 5; 325 TABLET ORAL at 22:06

## 2021-09-17 NOTE — PROGRESS NOTE ADULT - SUBJECTIVE AND OBJECTIVE BOX
Hospital Day:  5d    Subjective: Patient is a 81y old  Male who presents with a chief complaint of Atypical chest pain (16 Sep 2021 16:53)      Pt seen and evaluated at bedside.   Complaints: none  Over the night Events: none    Past Medical Hx:   COPD (chronic obstructive pulmonary disease)    Blood clot in vein    Hypertension    High cholesterol    Deep vein thrombosis (DVT) of left lower extremity, unspecified chronicity, unspecified vein    Cellulitis of left lower extremity    Chronic atrial fibrillation    Mitral valve insufficiency, unspecified etiology    CHF (congestive heart failure)      Past Sx:  No significant past surgical history    S/P coronary artery stent placement    History of total right knee replacement      Allergies:  No Known Allergies    Current Meds:   Standng Meds:  cefTRIAXone   IVPB 1000 milliGRAM(s) IV Intermittent every 24 hours  folic acid 1 milliGRAM(s) Oral daily  melatonin 3 milliGRAM(s) Oral at bedtime  metroNIDAZOLE  IVPB 500 milliGRAM(s) IV Intermittent every 8 hours  polyethylene glycol 3350 17 Gram(s) Oral daily    PRN Meds:  ALBUTerol  90 MICROgram(s) HFA Inhaler - Peds 2 Puff(s) Inhalation every 4 hours PRN Bronchospasm  oxycodone    5 mG/acetaminophen 325 mG 1 Tablet(s) Oral every 6 hours PRN Moderate Pain (4 - 6)  sodium chloride 0.65% Nasal 1 Spray(s) Both Nostrils two times a day PRN Nasal Congestion      Vital Signs:   T(F): 96.2 (09-17-21 @ 04:30), Max: 96.9 (09-16-21 @ 20:03)  HR: 72 (09-17-21 @ 07:25) (42 - 72)  BP: 136/56 (09-17-21 @ 04:30) (97/44 - 136/56)  RR: 18 (09-17-21 @ 04:30) (18 - 18)  SpO2: --    Physical Exam:   GENERAL: NAD, Resting in bed  HEENT: NCAT  CHEST/LUNG: Clear to auscultation bilaterally; No wheezing or rubs.   HEART: Regular rate and rhythm; No murmurs, rubs, or gallops  ABDOMEN: Bowel sounds present; Soft, Nontender, Nondistended.   EXTREMITIES:  No clubbing, cyanosis, or edema  NERVOUS SYSTEM:  Alert & Oriented X3    FLUID BALANCE    09-15-21 @ 07:01  -  09-16-21 @ 07:00  --------------------------------------------------------  IN: 560 mL / OUT: 960 mL / NET: -400 mL    09-16-21 @ 07:01  -  09-17-21 @ 07:00  --------------------------------------------------------  IN: 360 mL / OUT: 900 mL / NET: -540 mL    09-17-21 @ 07:01  -  09-17-21 @ 13:03  --------------------------------------------------------  IN: 240 mL / OUT: 300 mL / NET: -60 mL        Labs:                         10.8   6.40  )-----------( 149      ( 17 Sep 2021 07:15 )             33.3     Neutophil% 78.8, Lymphocyte% 9.7, Monocyte% 9.1, Bands% 0.5 09-17-21 @ 07:15    17 Sep 2021 07:15    135    |  98     |  73     ----------------------------<  97     4.3     |  25     |  2.7      Ca    8.3        17 Sep 2021 07:15  Mg     2.5       17 Sep 2021 07:15    TPro  5.6    /  Alb  2.8    /  TBili  0.4    /  DBili  x      /  AST  8      /  ALT  6      /  AlkPhos  82     17 Sep 2021 07:15       pTT    44.5             ----< 3.31 INR  (09-17-21 @ 07:15)    37.60        PT        Serum Pro-Brain Natriuretic Peptide: 4699 pg/mL (09-13-21 @ 05:00)  Serum Pro-Brain Natriuretic Peptide: 3659 pg/mL (09-12-21 @ 12:00)                Culture - Blood (collected 09-13-21 @ 11:00)  Source: .Blood Blood  Preliminary Report (09-15-21 @ 02:01):    No growth to date.        Procalcitonin, Serum: 0.16 ng/mL (09-13-21 @ 05:00)

## 2021-09-17 NOTE — PROGRESS NOTE ADULT - ASSESSMENT
DORON   - worsening   - likely contrast nephropathy in setting of heart disease, diuretics, hypotension and infection  acute cheli s/p perc cheli  klebsiella UTI  chronic HFpEF  Afib with SVR / NSVT / CAD / PCI  COPD on home O2  hf of DVT  anemia    plan:    cont to hold diuretics  encourage po hydration  trend renal function  complete abx course  perc cheli drain  trend lytes and renal function  full code

## 2021-09-17 NOTE — PROGRESS NOTE ADULT - SUBJECTIVE AND OBJECTIVE BOX
NEPHROLOGY FOLLOW UP NOTE    pt seen and examined  cr improving  no new complaints  toelrating po  off diuretics  bp's better    PAST MEDICAL & SURGICAL HISTORY:  COPD (chronic obstructive pulmonary disease)    Hypertension    Deep vein thrombosis (DVT) of left lower extremity, unspecified chronicity, unspecified vein    Cellulitis of left lower extremity    Chronic atrial fibrillation    Mitral valve insufficiency, unspecified etiology  Moderate    CHF (congestive heart failure)    S/P coronary artery stent placement    History of total right knee replacement      Allergies:  No Known Allergies    Home Medications Reviewed    SOCIAL HISTORY:  Denies ETOH,Smoking,   FAMILY HISTORY:        REVIEW OF SYSTEMS:  CONSTITUTIONAL: No weakness, fevers or chills  EYES/ENT: No visual changes;  No vertigo or throat pain   NECK: No pain or stiffness  RESPIRATORY: No cough, wheezing, hemoptysis; No shortness of breath  CARDIOVASCULAR: No chest pain or palpitations.  GASTROINTESTINAL: No abdominal or epigastric pain. No nausea, vomiting, or hematemesis; No diarrhea or constipation. No melena or hematochezia.  GENITOURINARY: No dysuria, frequency, foamy urine, urinary urgency, incontinence or hematuria  NEUROLOGICAL: No numbness or weakness  SKIN: No itching, burning, rashes, or lesions   VASCULAR: No bilateral lower extremity edema.   All other review of systems is negative unless indicated above.    advance care planning and directives reviewed     PHYSICAL EXAM:  Constitutional: NAD  HEENT: anicteric sclera, oropharynx clear, MMM  Neck: No JVD  Respiratory: decreased BS b/l  Cardiovascular: S1, S2, RRR  Gastrointestinal: BS+, soft, NT/ND + perc cheli  Extremities: No cyanosis or clubbing. No peripheral edema  Neurological: A/O x 3, no focal deficits  Psychiatric: Normal mood, normal affect  : No CVA tenderness. No lowe.   Skin: No rashes    Hospital Medications:   MEDICATIONS  (STANDING):  cefTRIAXone   IVPB 1000 milliGRAM(s) IV Intermittent every 24 hours  folic acid 1 milliGRAM(s) Oral daily  melatonin 3 milliGRAM(s) Oral at bedtime  metroNIDAZOLE  IVPB 500 milliGRAM(s) IV Intermittent every 8 hours  polyethylene glycol 3350 17 Gram(s) Oral daily        VITALS:  T(F): 96.2 (21 @ 04:30), Max: 96.9 (21 @ 20:03)  HR: 57 (21 @ 14:21)  BP: 103/53 (21 @ 14:21)  RR: 18 (21 @ 04:30)  SpO2: 98% (21 @ 14:55)  Wt(kg): --    09-15 @ 07:01  -   @ 07:00  --------------------------------------------------------  IN: 560 mL / OUT: 960 mL / NET: -400 mL     @ 07:01  -   @ 07:00  --------------------------------------------------------  IN: 360 mL / OUT: 900 mL / NET: -540 mL     @ 07:01  -   @ 15:05  --------------------------------------------------------  IN: 420 mL / OUT: 430 mL / NET: -10 mL          LABS:      135  |  98  |  73<HH>  ----------------------------<  97  4.3   |  25  |  2.7<H>    Ca    8.3<L>      17 Sep 2021 07:15  Mg     2.5         TPro  5.6<L>  /  Alb  2.8<L>  /  TBili  0.4  /  DBili      /  AST  8   /  ALT  6   /  AlkPhos  82                            10.8   6.40  )-----------( 149      ( 17 Sep 2021 07:15 )             33.3       Urine Studies:  Urinalysis Basic - ( 12 Sep 2021 12:50 )    Color: Orange / Appearance: Turbid / S.014 / pH:   Gluc:  / Ketone: Negative  / Bili: Negative / Urobili: 3 mg/dL   Blood:  / Protein: 100 mg/dL / Nitrite: Negative   Leuk Esterase: Large / RBC: 3 /HPF / WBC >720 /HPF   Sq Epi:  / Non Sq Epi: 2 /HPF / Bacteria: Many      Chloride, Random Urine: 20 (09-15 @ 05:30)  Calcium, Random Urine: <1 mg/dL (09-15 @ 05:30)  Potassium, Random Urine: 51 mmol/L (09-15 @ 05:30)  Osmolality, Random Urine: 314 mos/kg (09-15 @ 05:30)  Sodium, Random Urine: <20.0 mmoL/L (09-15 @ 05:30)  Protein/Creatinine Ratio Calculation: 0.4 Ratio (09-15 @ 05:30)  Creatinine, Random Urine: 119 mg/dL (09-15 @ 05:30)      RADIOLOGY & ADDITIONAL STUDIES:

## 2021-09-17 NOTE — PROGRESS NOTE ADULT - ASSESSMENT
80 y/o male with a PMH of HFpEF (50-55%), chronic afib on coumadin followed by cardio Dr. Scherer, recent Micra PPM placement for tachy-selvin syndrome, DVT July 2021, HTN, and COPD on 2L home O2 presents to the ED from home due to atypical chest pain. The patient notes he awoke from sleep on the morning of presentation with difficulty breathing and epigastric pain. On examination, patient denying chest pain but complains of epigastric/RUQ pain on deep inhalation. Epigastric region tender on examination.     #Acute cholecystitis   - PATIENT DID NOT HAVE CHEST PAIN - > d/c tele      - EKG: no ischemic changes     - Troponin T, Serum: 0.03 > 0.02 > 0.02 > 0.03     - hold BB due to bradycardia   - Alkaline Phosphatase, Serum: 130 U/L; other LFTs wnl   - confirmed on us abdomen   - repeat CT a/p IV con to r/o gallbadder perf (first CT a/p did not see gallbladder abnormalities)   - gallbladder cultures: klebsiella pan sensitive   - Surgery: poor surgical candidate  - s/p perc cheli 9/14  - switched from zosyn to ceftriaxone and flagyl   - d/c IV fluids     #DORON on CKD 3b likely secondary to KD (CT a/p IV con)   - Cr 2.7; (baseline Cr 1.2-1.6)  - monitor Cr daily   - us renal wnl   - nephrology following, Dr. Quick  - hold diuretics and encourage po hydration     #Cystitis:  -  CT Abdomen and Pelvis w/ IV Cont (09.12.21 @ 13:58) : Urinary bladder wall thickening and enhancement most compatible with cystitis. Correlation with urinary urinalysis is recommended  - UA: large LE, moderate blood, Many bacteria WBC >720  - no evidence of sepsis  - s/p Rocephin in ED; abx as per cholecystitis   - follow up cultures     #COPD not in exacerbation  - no evidence of COPD exacerbation on exam   - saturating well on home dose O2 (2L)   - c/w home inhalers: Symbicort duonebs PRN    #Supratherapeutic INR - improved s/p kcentra doses for perc cheli   - repeat INR daily and dose coumadin   - home regimen: 1 mg M/W/F, 2mg T/Th/Sa/Sun    #HFpEF-echo 50-55%  #A-Fib on coumadin  #CAD s/p PCI and stent 2007  #DLD  - no evidence of fluid overload on exam   - TTE (7/11/2021) w/ EF 50-55%. Moderate to severe left atrial enlargement. Moderate mitral valve regurgitation. Moderate-severe tricuspid regurgitation.  Estimated pulmonary artery systolic pressure is 59.7 mmHg assuming a right atrial pressure of 10 mmHg, which is consistent with moderate pulmonary hypertension.  -  monitor I&O, daily weight   - c/w statin  - hold torsemide for now     #Possible hematoma of right pectineus muscle:   - incidental finding of on CT  - no pain, tenderness, swelling, hematoma, or wound see at the site  - continue to monitor     #Deep vein thrombosis (DVT) of left lower extremity  - [facility] Duplex Lower Ext Vein Scan, Bilat (07.15.21)No evidence of deep venous thrombosis in the left lower extremity. Chronic Thrombus is noted in the right common femoral, femoral, and popliteal vein  - f/u INR daily and dose coumadin     #Macrocytic Anemia  #folic acid deficiency   - MCV 95.9, Hemoglobin 12  - folate low, started folic acid daily     - DVT Prophylaxis: coumadin (on hold)   - GI Prophylaxis: pantoprazole  - Diet: regular low fat   - Activity: as tolerated  - Code Status: Full Code  - Dispo: from home; pending completion of abx on sunday 9/19 and improvement of creatinine

## 2021-09-17 NOTE — PROGRESS NOTE ADULT - ATTENDING COMMENTS
#DORON, suspect contrast induced  hold aldactone, torsemide  cxr 9/15 noted  renal us neg  trend scr, improving  appreciate renal  #Acute cheli  repeat ct abd without perforation  s/p perc cheli  cx with gram variable rods, ecoli, pan sensitive  monitor alfa drain outpt, f/u sx/ ir for removal  bcx ntd  ceftriaxone, flagyl; to end 9/19  appreciate ir, sx  #UTI, kleb  ucx kleb pansensitive  ceftriaxone as above  bcx ntd  #R pectinus muscle hypoattenuation  incidentally noted on ct, ?suspected hematoma  will need outpt f/u, hold on mri given gfr  repeat ct abd 9/13 unchanged    #Progress Note Handoff:  Pending (specify):  Consults_________, Tests________, Test Results_______, Other__ doron, f/u renal______  Family discussion: d/w pt at bedside re: treatment plan, primary dx  Disposition: Home___/SNF___/Other________/Unknown at this time___x_____

## 2021-09-18 LAB
ALBUMIN SERPL ELPH-MCNC: 2.9 G/DL — LOW (ref 3.5–5.2)
ALP SERPL-CCNC: 80 U/L — SIGNIFICANT CHANGE UP (ref 30–115)
ALT FLD-CCNC: 6 U/L — SIGNIFICANT CHANGE UP (ref 0–41)
ANION GAP SERPL CALC-SCNC: 13 MMOL/L — SIGNIFICANT CHANGE UP (ref 7–14)
APTT BLD: 49.6 SEC — HIGH (ref 27–39.2)
AST SERPL-CCNC: 9 U/L — SIGNIFICANT CHANGE UP (ref 0–41)
BASOPHILS # BLD AUTO: 0.02 K/UL — SIGNIFICANT CHANGE UP (ref 0–0.2)
BASOPHILS NFR BLD AUTO: 0.3 % — SIGNIFICANT CHANGE UP (ref 0–1)
BILIRUB SERPL-MCNC: 0.3 MG/DL — SIGNIFICANT CHANGE UP (ref 0.2–1.2)
BUN SERPL-MCNC: 63 MG/DL — CRITICAL HIGH (ref 10–20)
CALCIUM SERPL-MCNC: 8.3 MG/DL — LOW (ref 8.5–10.1)
CHLORIDE SERPL-SCNC: 100 MMOL/L — SIGNIFICANT CHANGE UP (ref 98–110)
CO2 SERPL-SCNC: 24 MMOL/L — SIGNIFICANT CHANGE UP (ref 17–32)
CREAT SERPL-MCNC: 2.1 MG/DL — HIGH (ref 0.7–1.5)
EOSINOPHIL # BLD AUTO: 0.12 K/UL — SIGNIFICANT CHANGE UP (ref 0–0.7)
EOSINOPHIL NFR BLD AUTO: 2 % — SIGNIFICANT CHANGE UP (ref 0–8)
GLUCOSE SERPL-MCNC: 74 MG/DL — SIGNIFICANT CHANGE UP (ref 70–99)
HCT VFR BLD CALC: 34.3 % — LOW (ref 42–52)
HGB BLD-MCNC: 11.1 G/DL — LOW (ref 14–18)
IMM GRANULOCYTES NFR BLD AUTO: 0.3 % — SIGNIFICANT CHANGE UP (ref 0.1–0.3)
INR BLD: 2.79 RATIO — HIGH (ref 0.65–1.3)
LYMPHOCYTES # BLD AUTO: 0.57 K/UL — LOW (ref 1.2–3.4)
LYMPHOCYTES # BLD AUTO: 9.5 % — LOW (ref 20.5–51.1)
MAGNESIUM SERPL-MCNC: 2.5 MG/DL — HIGH (ref 1.8–2.4)
MCHC RBC-ENTMCNC: 31 PG — SIGNIFICANT CHANGE UP (ref 27–31)
MCHC RBC-ENTMCNC: 32.4 G/DL — SIGNIFICANT CHANGE UP (ref 32–37)
MCV RBC AUTO: 95.8 FL — HIGH (ref 80–94)
MONOCYTES # BLD AUTO: 0.62 K/UL — HIGH (ref 0.1–0.6)
MONOCYTES NFR BLD AUTO: 10.3 % — HIGH (ref 1.7–9.3)
NEUTROPHILS # BLD AUTO: 4.67 K/UL — SIGNIFICANT CHANGE UP (ref 1.4–6.5)
NEUTROPHILS NFR BLD AUTO: 77.6 % — HIGH (ref 42.2–75.2)
NRBC # BLD: 0 /100 WBCS — SIGNIFICANT CHANGE UP (ref 0–0)
PLATELET # BLD AUTO: 161 K/UL — SIGNIFICANT CHANGE UP (ref 130–400)
POTASSIUM SERPL-MCNC: 4.7 MMOL/L — SIGNIFICANT CHANGE UP (ref 3.5–5)
POTASSIUM SERPL-SCNC: 4.7 MMOL/L — SIGNIFICANT CHANGE UP (ref 3.5–5)
PROT SERPL-MCNC: 5.6 G/DL — LOW (ref 6–8)
PROTHROM AB SERPL-ACNC: 31.8 SEC — HIGH (ref 9.95–12.87)
RBC # BLD: 3.58 M/UL — LOW (ref 4.7–6.1)
RBC # FLD: 20.4 % — HIGH (ref 11.5–14.5)
SODIUM SERPL-SCNC: 137 MMOL/L — SIGNIFICANT CHANGE UP (ref 135–146)
WBC # BLD: 6.02 K/UL — SIGNIFICANT CHANGE UP (ref 4.8–10.8)
WBC # FLD AUTO: 6.02 K/UL — SIGNIFICANT CHANGE UP (ref 4.8–10.8)

## 2021-09-18 PROCEDURE — 99231 SBSQ HOSP IP/OBS SF/LOW 25: CPT

## 2021-09-18 PROCEDURE — 71045 X-RAY EXAM CHEST 1 VIEW: CPT | Mod: 26

## 2021-09-18 RX ORDER — SODIUM CHLORIDE 9 MG/ML
1000 INJECTION INTRAMUSCULAR; INTRAVENOUS; SUBCUTANEOUS
Refills: 0 | Status: DISCONTINUED | OUTPATIENT
Start: 2021-09-18 | End: 2021-09-18

## 2021-09-18 RX ORDER — WARFARIN SODIUM 2.5 MG/1
1 TABLET ORAL ONCE
Refills: 0 | Status: COMPLETED | OUTPATIENT
Start: 2021-09-18 | End: 2021-09-18

## 2021-09-18 RX ORDER — BUDESONIDE AND FORMOTEROL FUMARATE DIHYDRATE 160; 4.5 UG/1; UG/1
2 AEROSOL RESPIRATORY (INHALATION)
Refills: 0 | Status: DISCONTINUED | OUTPATIENT
Start: 2021-09-18 | End: 2021-09-21

## 2021-09-18 RX ADMIN — CEFTRIAXONE 100 MILLIGRAM(S): 500 INJECTION, POWDER, FOR SOLUTION INTRAMUSCULAR; INTRAVENOUS at 17:00

## 2021-09-18 RX ADMIN — Medication 100 MILLIGRAM(S): at 21:14

## 2021-09-18 RX ADMIN — OXYCODONE AND ACETAMINOPHEN 1 TABLET(S): 5; 325 TABLET ORAL at 21:12

## 2021-09-18 RX ADMIN — BUDESONIDE AND FORMOTEROL FUMARATE DIHYDRATE 2 PUFF(S): 160; 4.5 AEROSOL RESPIRATORY (INHALATION) at 22:00

## 2021-09-18 RX ADMIN — WARFARIN SODIUM 1 MILLIGRAM(S): 2.5 TABLET ORAL at 21:14

## 2021-09-18 RX ADMIN — POLYETHYLENE GLYCOL 3350 17 GRAM(S): 17 POWDER, FOR SOLUTION ORAL at 11:35

## 2021-09-18 RX ADMIN — Medication 100 MILLIGRAM(S): at 13:44

## 2021-09-18 RX ADMIN — Medication 3 MILLIGRAM(S): at 21:16

## 2021-09-18 RX ADMIN — Medication 1 MILLIGRAM(S): at 11:35

## 2021-09-18 RX ADMIN — OXYCODONE AND ACETAMINOPHEN 1 TABLET(S): 5; 325 TABLET ORAL at 20:35

## 2021-09-18 NOTE — PROGRESS NOTE ADULT - ASSESSMENT
81M PMHx HFpEF of 55%, AFib on coumadin, SSS s/p PPM, DVT 7/2021, HTN, COPD on home o2 here with acute cheli s/p perc cholecystostomy.     #DORON, suspect contrast induced  hold aldactone, torsemide  cxr 9/15 noted  renal us neg  trend scr, improving  appreciate renal  #Acute cheli  repeat ct abd without perforation  s/p perc cheli  cx with gram variable rods, ecoli, pan sensitive  monitor alfa drain outpt, f/u sx/ ir for removal  bcx ntd  ceftriaxone, flagyl; to end 9/19  appreciate ir, sx  #UTI, kleb  ucx kleb pansensitive  ceftriaxone as above  bcx ntd  #R pectinus muscle hypoattenuation  incidentally noted on ct, ?suspected hematoma  will need outpt f/u, hold on mri given gfr  repeat ct abd 9/13 unchanged  #HFpEF  #AFib  coumadin  #DVT  coumadin  #HTN  controlled off medications  #SSS s/p PPM  stable, outpt f/u  #COPD  albuterol prn  start symbicort  #DVT ppx  coumadin      #Progress Note Handoff:  Pending (specify):  Consults_________, Tests________, Test Results_______, Other__ doron, trend inr______  Family discussion: d/w pt at bedside re: treatment plan, primary dx  Disposition: Home___/SNF___/Other________/Unknown at this time___x_____ .   81M PMHx HFpEF of 55%, AFib on coumadin, SSS s/p PPM, DVT 7/2021, HTN, COPD on home o2 here with acute cheli s/p perc cholecystostomy.     #DORON, suspect contrast induced  hold aldactone, torsemide  cxr 9/15 noted  renal us neg  trend scr, improving  appreciate renal  #Acute cheli  repeat ct abd without perforation  s/p perc cheli  cx with gram variable rods, ecoli, pan sensitive  monitor alfa drain outpt, f/u sx/ ir for removal  bcx ntd  ceftriaxone, flagyl; to end 9/19  appreciate ir, sx  #UTI, kleb  ucx kleb pansensitive  ceftriaxone as above  bcx ntd  #R pectinus muscle hypoattenuation  incidentally noted on ct, ?suspected hematoma  will need outpt f/u, hold on mri given gfr  repeat ct abd 9/13 unchanged  #HFpEF  torsemide, aldactone on hold  repeat cxr  #AFib  coumadin  rate controlled  #DVT  coumadin  #HTN  aldactone on hold  #SSS s/p PPM  stable, outpt f/u  #COPD  albuterol prn  start symbicort  #DVT ppx  coumadin      #Progress Note Handoff:  Pending (specify):  Consults_________, Tests________, Test Results_______, Other__ doron, trend inr______  Family discussion: d/w pt at bedside re: treatment plan, primary dx  Disposition: Home___/SNF___/Other________/Unknown at this time___x_____ .

## 2021-09-18 NOTE — PROGRESS NOTE ADULT - SUBJECTIVE AND OBJECTIVE BOX
INTERVAL HPI/OVERNIGHT EVENTS:    SUBJECTIVE: Patient seen and examined at bedside.     no cp, sob, abd pain, fever  no abd pain, nausea, vomiting, diarrhea    OBJECTIVE:    VITAL SIGNS:  Vital Signs Last 24 Hrs  T(C): 36.2 (18 Sep 2021 05:12), Max: 36.2 (18 Sep 2021 05:12)  T(F): 97.2 (18 Sep 2021 05:12), Max: 97.2 (18 Sep 2021 05:12)  HR: 62 (18 Sep 2021 05:12) (42 - 62)  BP: 113/56 (18 Sep 2021 05:12) (103/53 - 123/65)  BP(mean): --  RR: 18 (18 Sep 2021 05:12) (18 - 18)  SpO2: 97% (17 Sep 2021 19:13) (97% - 98%)      PHYSICAL EXAM:    General: NAD  HEENT: NC/AT; PERRL, clear conjunctiva  Neck: supple  Respiratory: CTA b/l  Cardiovascular: +S1/S2; RRR  Abdomen: soft, NT/ND; +BS x4  Extremities: WWP, 2+ peripheral pulses b/l; no LE edema  Skin: normal color and turgor; no rash  Neurological:    MEDICATIONS:  MEDICATIONS  (STANDING):  cefTRIAXone   IVPB 1000 milliGRAM(s) IV Intermittent every 24 hours  folic acid 1 milliGRAM(s) Oral daily  melatonin 3 milliGRAM(s) Oral at bedtime  metroNIDAZOLE  IVPB 500 milliGRAM(s) IV Intermittent every 8 hours  polyethylene glycol 3350 17 Gram(s) Oral daily  sodium chloride 0.9%. 1000 milliLiter(s) (75 mL/Hr) IV Continuous <Continuous>  warfarin 1 milliGRAM(s) Oral once    MEDICATIONS  (PRN):  ALBUTerol  90 MICROgram(s) HFA Inhaler - Peds 2 Puff(s) Inhalation every 4 hours PRN Bronchospasm  oxycodone    5 mG/acetaminophen 325 mG 1 Tablet(s) Oral every 6 hours PRN Moderate Pain (4 - 6)  sodium chloride 0.65% Nasal 1 Spray(s) Both Nostrils two times a day PRN Nasal Congestion      ALLERGIES:  Allergies    No Known Allergies    Intolerances        LABS:                        11.1   6.02  )-----------( 161      ( 18 Sep 2021 06:14 )             34.3     Hemoglobin: 11.1 g/dL (09-18 @ 06:14)  Hemoglobin: 10.8 g/dL (09-17 @ 07:15)  Hemoglobin: 11.6 g/dL (09-16 @ 07:54)  Hemoglobin: 11.5 g/dL (09-15 @ 11:00)  Hemoglobin: 11.9 g/dL (09-14 @ 07:31)    CBC Full  -  ( 18 Sep 2021 06:14 )  WBC Count : 6.02 K/uL  RBC Count : 3.58 M/uL  Hemoglobin : 11.1 g/dL  Hematocrit : 34.3 %  Platelet Count - Automated : 161 K/uL  Mean Cell Volume : 95.8 fL  Mean Cell Hemoglobin : 31.0 pg  Mean Cell Hemoglobin Concentration : 32.4 g/dL  Auto Neutrophil # : 4.67 K/uL  Auto Lymphocyte # : 0.57 K/uL  Auto Monocyte # : 0.62 K/uL  Auto Eosinophil # : 0.12 K/uL  Auto Basophil # : 0.02 K/uL  Auto Neutrophil % : 77.6 %  Auto Lymphocyte % : 9.5 %  Auto Monocyte % : 10.3 %  Auto Eosinophil % : 2.0 %  Auto Basophil % : 0.3 %    09-18    137  |  100  |  63<HH>  ----------------------------<  74  4.7   |  24  |  2.1<H>    Ca    8.3<L>      18 Sep 2021 06:14  Mg     2.5     09-18    TPro  5.6<L>  /  Alb  2.9<L>  /  TBili  0.3  /  DBili  x   /  AST  9   /  ALT  6   /  AlkPhos  80  09-18    Creatinine Trend: 2.1<--, 2.7<--, 2.9<--, 2.5<--, 1.6<--, 1.4<--  LIVER FUNCTIONS - ( 18 Sep 2021 06:14 )  Alb: 2.9 g/dL / Pro: 5.6 g/dL / ALK PHOS: 80 U/L / ALT: 6 U/L / AST: 9 U/L / GGT: x           PT/INR - ( 18 Sep 2021 06:14 )   PT: 31.80 sec;   INR: 2.79 ratio         PTT - ( 18 Sep 2021 06:14 )  PTT:49.6 sec    hs Troponin:              CSF:                      EKG:   MICROBIOLOGY:    IMAGING:      Labs, imaging, EKG personally reviewed    RADIOLOGY & ADDITIONAL TESTS: Reviewed.

## 2021-09-18 NOTE — PROGRESS NOTE ADULT - ASSESSMENT
Impression:  DORON in setting of infection and hemodynamic instability - improving    Suggest:  Continue IVF for now  labs in AM

## 2021-09-18 NOTE — PROGRESS NOTE ADULT - SUBJECTIVE AND OBJECTIVE BOX
[facility] FOLLOW UP NOTE  --------------------------------------------------------------------------------  24 hour events/subjective:  pt without complaints  reports increased urinary volume      PAST HISTORY  --------------------------------------------------------------------------------  No significant changes to PMH, PSH, FHx, SHx, unless otherwise noted    ALLERGIES & MEDICATIONS  --------------------------------------------------------------------------------  Allergies    No Known Allergies    Intolerances      Standing Inpatient Medications  cefTRIAXone   IVPB 1000 milliGRAM(s) IV Intermittent every 24 hours  folic acid 1 milliGRAM(s) Oral daily  melatonin 3 milliGRAM(s) Oral at bedtime  metroNIDAZOLE  IVPB 500 milliGRAM(s) IV Intermittent every 8 hours  polyethylene glycol 3350 17 Gram(s) Oral daily  sodium chloride 0.9%. 1000 milliLiter(s) IV Continuous <Continuous>    PRN Inpatient Medications  ALBUTerol  90 MICROgram(s) HFA Inhaler - Peds 2 Puff(s) Inhalation every 4 hours PRN  oxycodone    5 mG/acetaminophen 325 mG 1 Tablet(s) Oral every 6 hours PRN  sodium chloride 0.65% Nasal 1 Spray(s) Both Nostrils two times a day PRN      REVIEW OF SYSTEMS  --------------------------------------------------------------------------------  All other systems were reviewed and are negative, except as noted.    VITALS/PHYSICAL EXAM  --------------------------------------------------------------------------------  T(C): 36.2 (09-18-21 @ 05:12), Max: 36.2 (09-18-21 @ 05:12)  HR: 62 (09-18-21 @ 05:12) (42 - 62)  BP: 113/56 (09-18-21 @ 05:12) (103/53 - 123/65)  RR: 18 (09-18-21 @ 05:12) (18 - 18)  SpO2: 97% (09-17-21 @ 19:13) (97% - 98%)  Wt(kg): --        09-17-21 @ 07:01  -  09-18-21 @ 07:00  --------------------------------------------------------  IN: 640 mL / OUT: 810 mL / NET: -170 mL      Physical Exam:  	Gen: NAD  	HEENT: No JVD  	Pulm: CTA B/L  	CV: RRR,   	Abd: +BS, soft, nontender/nondistended              No edema  		    LABS/STUDIES  --------------------------------------------------------------------------------              11.1   6.02  >-----------<  161      [09-18-21 @ 06:14]              34.3     137  |  100  |  63  ----------------------------<  74      [09-18-21 @ 06:14]  4.7   |  24  |  2.1        Ca     8.3     [09-18-21 @ 06:14]      Mg     2.5     [09-18-21 @ 06:14]    TPro  5.6  /  Alb  2.9  /  TBili  0.3  /  DBili  x   /  AST  9   /  ALT  6   /  AlkPhos  80  [09-18-21 @ 06:14]    PT/INR: PT 31.80, INR 2.79       [09-18-21 @ 06:14]  PTT: 49.6       [09-18-21 @ 06:14]    Creatinine Trend:  SCr 2.1 [09-18 @ 06:14]  SCr 2.7 [09-17 @ 07:15]  SCr 2.9 [09-16 @ 07:54]  SCr 2.5 [09-15 @ 11:00]  SCr 1.6 [09-14 @ 07:31]    Urinalysis - [09-12-21 @ 12:50]      Color Orange / Appearance Turbid / SG 1.014 / pH 6.0      Gluc Negative / Ketone Negative  / Bili Negative / Urobili 3 mg/dL       Blood Moderate / Protein 100 mg/dL / Leuk Est Large / Nitrite Negative      RBC 3 / WBC >720 / Hyaline 1 / Gran  / Sq Epi  / Non Sq Epi 2 / Bacteria Many    Urine Creatinine 119      [09-15-21 @ 05:30]  Urine Protein 53      [09-15-21 @ 05:30]  Urine Sodium <20.0      [09-15-21 @ 05:30]  Urine Potassium 51      [09-15-21 @ 05:30]  Urine Chloride 20      [09-15-21 @ 05:30]  Urine Phosphorus 18      [09-15-21 @ 05:30]  Urine Osmolality 314      [09-15-21 @ 05:30]    Iron 55, TIBC 279, %sat 20      [07-08-21 @ 10:47]  Ferritin 44      [07-09-21 @ 10:47]  TSH 2.14      [09-13-21 @ 05:00]  Lipid: chol 111, TG 59, HDL 53, LDL --      [09-13-21 @ 05:00]

## 2021-09-19 LAB
ALBUMIN SERPL ELPH-MCNC: 2.9 G/DL — LOW (ref 3.5–5.2)
ALP SERPL-CCNC: 82 U/L — SIGNIFICANT CHANGE UP (ref 30–115)
ALT FLD-CCNC: 6 U/L — SIGNIFICANT CHANGE UP (ref 0–41)
ANION GAP SERPL CALC-SCNC: 9 MMOL/L — SIGNIFICANT CHANGE UP (ref 7–14)
APTT BLD: 47.4 SEC — HIGH (ref 27–39.2)
AST SERPL-CCNC: 9 U/L — SIGNIFICANT CHANGE UP (ref 0–41)
BASOPHILS # BLD AUTO: 0.01 K/UL — SIGNIFICANT CHANGE UP (ref 0–0.2)
BASOPHILS NFR BLD AUTO: 0.2 % — SIGNIFICANT CHANGE UP (ref 0–1)
BILIRUB SERPL-MCNC: 0.4 MG/DL — SIGNIFICANT CHANGE UP (ref 0.2–1.2)
BUN SERPL-MCNC: 50 MG/DL — HIGH (ref 10–20)
CALCIUM SERPL-MCNC: 8.3 MG/DL — LOW (ref 8.5–10.1)
CHLORIDE SERPL-SCNC: 104 MMOL/L — SIGNIFICANT CHANGE UP (ref 98–110)
CO2 SERPL-SCNC: 27 MMOL/L — SIGNIFICANT CHANGE UP (ref 17–32)
CREAT SERPL-MCNC: 1.5 MG/DL — SIGNIFICANT CHANGE UP (ref 0.7–1.5)
CULTURE RESULTS: SIGNIFICANT CHANGE UP
EOSINOPHIL # BLD AUTO: 0.12 K/UL — SIGNIFICANT CHANGE UP (ref 0–0.7)
EOSINOPHIL NFR BLD AUTO: 2 % — SIGNIFICANT CHANGE UP (ref 0–8)
GLUCOSE SERPL-MCNC: 80 MG/DL — SIGNIFICANT CHANGE UP (ref 70–99)
HCT VFR BLD CALC: 35.4 % — LOW (ref 42–52)
HGB BLD-MCNC: 11.3 G/DL — LOW (ref 14–18)
IMM GRANULOCYTES NFR BLD AUTO: 0.5 % — HIGH (ref 0.1–0.3)
INR BLD: 3.05 RATIO — HIGH (ref 0.65–1.3)
LYMPHOCYTES # BLD AUTO: 0.52 K/UL — LOW (ref 1.2–3.4)
LYMPHOCYTES # BLD AUTO: 8.5 % — LOW (ref 20.5–51.1)
MAGNESIUM SERPL-MCNC: 2.5 MG/DL — HIGH (ref 1.8–2.4)
MCHC RBC-ENTMCNC: 30.7 PG — SIGNIFICANT CHANGE UP (ref 27–31)
MCHC RBC-ENTMCNC: 31.9 G/DL — LOW (ref 32–37)
MCV RBC AUTO: 96.2 FL — HIGH (ref 80–94)
MONOCYTES # BLD AUTO: 0.8 K/UL — HIGH (ref 0.1–0.6)
MONOCYTES NFR BLD AUTO: 13 % — HIGH (ref 1.7–9.3)
NEUTROPHILS # BLD AUTO: 4.67 K/UL — SIGNIFICANT CHANGE UP (ref 1.4–6.5)
NEUTROPHILS NFR BLD AUTO: 75.8 % — HIGH (ref 42.2–75.2)
NRBC # BLD: 0 /100 WBCS — SIGNIFICANT CHANGE UP (ref 0–0)
PLATELET # BLD AUTO: 149 K/UL — SIGNIFICANT CHANGE UP (ref 130–400)
POTASSIUM SERPL-MCNC: 4.5 MMOL/L — SIGNIFICANT CHANGE UP (ref 3.5–5)
POTASSIUM SERPL-SCNC: 4.5 MMOL/L — SIGNIFICANT CHANGE UP (ref 3.5–5)
PROT SERPL-MCNC: 5.7 G/DL — LOW (ref 6–8)
PROTHROM AB SERPL-ACNC: 34.7 SEC — HIGH (ref 9.95–12.87)
RBC # BLD: 3.68 M/UL — LOW (ref 4.7–6.1)
RBC # FLD: 20.3 % — HIGH (ref 11.5–14.5)
SODIUM SERPL-SCNC: 140 MMOL/L — SIGNIFICANT CHANGE UP (ref 135–146)
SPECIMEN SOURCE: SIGNIFICANT CHANGE UP
WBC # BLD: 6.15 K/UL — SIGNIFICANT CHANGE UP (ref 4.8–10.8)
WBC # FLD AUTO: 6.15 K/UL — SIGNIFICANT CHANGE UP (ref 4.8–10.8)

## 2021-09-19 PROCEDURE — 99233 SBSQ HOSP IP/OBS HIGH 50: CPT

## 2021-09-19 RX ADMIN — Medication 3 MILLIGRAM(S): at 21:56

## 2021-09-19 RX ADMIN — OXYCODONE AND ACETAMINOPHEN 1 TABLET(S): 5; 325 TABLET ORAL at 20:38

## 2021-09-19 RX ADMIN — BUDESONIDE AND FORMOTEROL FUMARATE DIHYDRATE 2 PUFF(S): 160; 4.5 AEROSOL RESPIRATORY (INHALATION) at 21:56

## 2021-09-19 RX ADMIN — POLYETHYLENE GLYCOL 3350 17 GRAM(S): 17 POWDER, FOR SOLUTION ORAL at 11:09

## 2021-09-19 RX ADMIN — OXYCODONE AND ACETAMINOPHEN 1 TABLET(S): 5; 325 TABLET ORAL at 11:58

## 2021-09-19 RX ADMIN — Medication 100 MILLIGRAM(S): at 05:14

## 2021-09-19 RX ADMIN — BUDESONIDE AND FORMOTEROL FUMARATE DIHYDRATE 2 PUFF(S): 160; 4.5 AEROSOL RESPIRATORY (INHALATION) at 08:55

## 2021-09-19 RX ADMIN — OXYCODONE AND ACETAMINOPHEN 1 TABLET(S): 5; 325 TABLET ORAL at 11:12

## 2021-09-19 RX ADMIN — Medication 1 MILLIGRAM(S): at 11:08

## 2021-09-19 NOTE — PROGRESS NOTE ADULT - SUBJECTIVE AND OBJECTIVE BOX
RODNEY SANTO 81y Male  MRN#: [id number]   CODE STATUS:________    Hospital Day: 7d    Patient is a 81y old  Male who presents with a chief complaint of Atypical chest pain (16 Sep 2021 16:53)      Pt seen and evaluated at bedside.   Complaints: none  Over the night Events: none    Present Today:   - Russo:  No [X  ], Yes [   ] : Indication:     - Type of IV Access:       .. CVC/Piccline:  No [ X ], Yes [   ] : Indication:       .. Midline: No [X  ], Yes [   ] : Indication:                                             ----------------------------------------------------------  OBJECTIVE  PAST MEDICAL & SURGICAL HISTORY  COPD (chronic obstructive pulmonary disease)    Hypertension    Deep vein thrombosis (DVT) of left lower extremity, unspecified chronicity, unspecified vein    Cellulitis of left lower extremity    Chronic atrial fibrillation    Mitral valve insufficiency, unspecified etiology  Moderate    CHF (congestive heart failure)    S/P coronary artery stent placement    History of total right knee replacement                                              -----------------------------------------------------------  ALLERGIES:  No Known Allergies                                            ------------------------------------------------------------    HOME MEDICATIONS  Home Medications:  atorvastatin 20 mg oral tablet: 1 tab(s) orally once a day (13 Sep 2021 04:59)  metoprolol succinate 25 mg oral tablet, extended release: 1 tab(s) orally once a day (13 Sep 2021 04:59)  nystatin 100,000 units/g topical powder: 1 application topically every 12 hours (13 Sep 2021 04:59)  spironolactone: 25 milligram(s) orally once a day (13 Sep 2021 04:59)  Symbicort 160 mcg-4.5 mcg/inh inhalation aerosol: 2 puff(s) inhaled 2 times a day (13 Sep 2021 04:59)  Ventolin HFA 90 mcg/inh inhalation aerosol: 2 puff(s) inhaled every 6 hours as neede for shortness of breath (13 Sep 2021 04:59)  warfarin 2 mg oral tablet: Take half a tablet Sun, Tues, Wed, Thurs, Fri, Sat.  Take a whole tablet on Mon (13 Sep 2021 04:59)                           MEDICATIONS:  STANDING MEDICATIONS  budesonide 160 MICROgram(s)/formoterol 4.5 MICROgram(s) Inhaler 2 Puff(s) Inhalation two times a day  cefTRIAXone   IVPB 1000 milliGRAM(s) IV Intermittent every 24 hours  folic acid 1 milliGRAM(s) Oral daily  melatonin 3 milliGRAM(s) Oral at bedtime  metroNIDAZOLE  IVPB 500 milliGRAM(s) IV Intermittent every 8 hours  polyethylene glycol 3350 17 Gram(s) Oral daily    PRN MEDICATIONS  ALBUTerol  90 MICROgram(s) HFA Inhaler - Peds 2 Puff(s) Inhalation every 4 hours PRN  oxycodone    5 mG/acetaminophen 325 mG 1 Tablet(s) Oral every 6 hours PRN  sodium chloride 0.65% Nasal 1 Spray(s) Both Nostrils two times a day PRN                                            ------------------------------------------------------------  VITAL SIGNS: Last 24 Hours  T(C): 35.4 (19 Sep 2021 05:35), Max: 35.7 (18 Sep 2021 21:30)  T(F): 95.7 (19 Sep 2021 05:35), Max: 96.2 (18 Sep 2021 21:30)  HR: 33 (19 Sep 2021 05:35) (33 - 50)  BP: 121/57 (19 Sep 2021 05:35) (112/56 - 129/51)  BP(mean): --  RR: 18 (19 Sep 2021 05:35) (18 - 18)  SpO2: 99% (18 Sep 2021 20:38) (99% - 99%)      09-17-21 @ 07:01 - 09-18-21 @ 07:00  --------------------------------------------------------  IN: 640 mL / OUT: 810 mL / NET: -170 mL    09-18-21 @ 07:01  -  09-19-21 @ 05:36  --------------------------------------------------------  IN: 0 mL / OUT: 1060 mL / NET: -1060 mL                                             --------------------------------------------------------------  LABS:                        11.1   6.02  )-----------( 161      ( 18 Sep 2021 06:14 )             34.3     09-18    137  |  100  |  63<HH>  ----------------------------<  74  4.7   |  24  |  2.1<H>    Ca    8.3<L>      18 Sep 2021 06:14  Mg     2.5     09-18    TPro  5.6<L>  /  Alb  2.9<L>  /  TBili  0.3  /  DBili  x   /  AST  9   /  ALT  6   /  AlkPhos  80  09-18    PT/INR - ( 18 Sep 2021 06:14 )   PT: 31.80 sec;   INR: 2.79 ratio         PTT - ( 18 Sep 2021 06:14 )  PTT:49.6 sec                                            --------------------------------------------------------------    PHYSICAL EXAM:  GENERAL: NAD, Resting in bed  HEENT: NCAT  CHEST/LUNG: Clear to auscultation bilaterally; No wheezing or rubs.   HEART: Regular rate and rhythm; No murmurs, rubs, or gallops  ABDOMEN: Bowel sounds present; Soft, Nontender, Nondistended.   EXTREMITIES:  No clubbing, cyanosis, or edema  NERVOUS SYSTEM:  Alert & Oriented X3                                             --------------------------------------------------------------

## 2021-09-19 NOTE — PROGRESS NOTE ADULT - ATTENDING COMMENTS
#DORON, suspect contrast induced  hold aldactone, torsemide  resolved  renal us neg  trend scr  appreciate renal  #Acute cheli  repeat ct abd without perforation  s/p perc cheli  cx with gram variable rods, ecoli, pan sensitive  monitor alfa drain outpt, f/u sx/ ir for removal  bcx ntd  ceftriaxone, flagyl; d/c today  plan for d/c in am; need to d/w sx re: alfa drain management  #UTI, kleb  ucx kleb pansensitive  ceftriaxone as above  bcx ntd  #R pectinus muscle hypoattenuation  incidentally noted on ct, ?suspected hematoma  will need outpt f/u, hold on mri given gfr  repeat ct abd 9/13 unchanged  #HFpEF  torsemide, aldactone on hold  repeat cxr stable    #Progress Note Handoff:  Pending (specify):  Consults_________, Tests________, Test Results_______, Other__ anticipate d/c in am______  Family discussion: d/w pt at bedside re: treatment plan, primary dx  Disposition: Home___/SNF___/Other________/Unknown at this time___x_____ .

## 2021-09-19 NOTE — PROGRESS NOTE ADULT - ASSESSMENT
ASSESSMENT & PLAN  80 y/o male with a PMH of HFpEF (50-55%), chronic afib on coumadin followed by cardio Dr. Scherer, recent Micra PPM placement for tachy-selvin syndrome, DVT July 2021, HTN, and COPD on 2L home O2 presents to the ED from home due to atypical chest pain. The patient notes he awoke from sleep on the morning of presentation with difficulty breathing and epigastric pain. On examination, patient denying chest pain but complains of epigastric/RUQ pain on deep inhalation. Epigastric region tender on examination.     #Acute cholecystitis   - EKG: no ischemic changes, Troponin T, Serum: 0.03 > 0.02 > 0.02 > 0.03  - hold BB due to bradycardia   - Alkaline Phosphatase, Serum: 130 U/L; other LFTs wnl   - gallbladder cultures: klebsiella pan sensitive   - s/p perc cheli 9/14  - switched from zosyn to ceftriaxone and flagyl     #DORON on CKD 3b likely secondary to KD (CT a/p IV con)   - Cr 2.7; (baseline Cr 1.2-1.6)  - nephrology following, Dr. Quick  - hold diuretics, c/w PO fluids    #Cystitis:  -  CT Abdomen and Pelvis w/ IV Cont (09.12.21 @ 13:58) : Urinary bladder wall thickening and enhancement most compatible with cystitis. Correlation with urinary urinalysis is recommended  - UA: large LE, moderate blood, Many bacteria WBC >720  - no evidence of sepsis  - s/p Rocephin in ED; abx as per cholecystitis   - follow up cultures     #COPD not in exacerbation  - no evidence of COPD exacerbation on exam   - saturating well on home dose O2 (2L)   - c/w home inhalers: Symbicort duonebs PRN    #Supratherapeutic INR   - improved s/p kcentra doses for perc cheli   - repeat INR daily and dose coumadin   - home regimen: 1 mg M/W/F, 2mg T/Th/Sa/Sun    #HFpEF-echo 50-55%  #A-Fib on coumadin  #CAD s/p PCI and stent 2007  #DLD  - no evidence of fluid overload on exam   - TTE (7/11/2021) w/ EF 50-55%. Moderate to severe left atrial enlargement. Moderate mitral valve regurgitation. Moderate-severe tricuspid regurgitation.  Estimated pulmonary artery systolic pressure is 59.7 mmHg assuming a right atrial pressure of 10 mmHg, which is consistent with moderate pulmonary hypertension.  -  monitor I&O, daily weight   - c/w statin  - hold torsemide for now     #Possible hematoma of right pectineus muscle:   - incidental finding of on CT  - no pain, tenderness, swelling, hematoma, or wound see at the site  - continue to monitor     #Deep vein thrombosis (DVT) of left lower extremity  - [facility] Duplex Lower Ext Vein Scan, Bilat (07.15.21)No evidence of deep venous thrombosis in the left lower extremity. Chronic Thrombus is noted in the right common femoral, femoral, and popliteal vein  - f/u INR daily and dose coumadin     #Macrocytic Anemia  #folic acid deficiency   - MCV 95.9, Hemoglobin 12  - folate low, started folic acid daily     - DVT Prophylaxis: coumadin  - GI Prophylaxis: pantoprazole  - Diet: regular low fat   - Activity: as tolerated  - Code Status: Full Code  - Dispo: from home; pending completion of abx on sunday 9/19 and improvement of creatinine

## 2021-09-19 NOTE — PROGRESS NOTE ADULT - ASSESSMENT
Impression:  DORON - improving    Suggest:  Observe off IVF and liberal diet  Lab monitoring as outpatient

## 2021-09-19 NOTE — PROGRESS NOTE ADULT - SUBJECTIVE AND OBJECTIVE BOX
[facility] FOLLOW UP NOTE  --------------------------------------------------------------------------------  24 hour events/subjective:  no new events  off IVF      PAST HISTORY  --------------------------------------------------------------------------------  No significant changes to PMH, PSH, FHx, SHx, unless otherwise noted    ALLERGIES & MEDICATIONS  --------------------------------------------------------------------------------  Allergies    No Known Allergies    Intolerances      Standing Inpatient Medications  budesonide 160 MICROgram(s)/formoterol 4.5 MICROgram(s) Inhaler 2 Puff(s) Inhalation two times a day  folic acid 1 milliGRAM(s) Oral daily  melatonin 3 milliGRAM(s) Oral at bedtime  polyethylene glycol 3350 17 Gram(s) Oral daily    PRN Inpatient Medications  ALBUTerol  90 MICROgram(s) HFA Inhaler - Peds 2 Puff(s) Inhalation every 4 hours PRN  oxycodone    5 mG/acetaminophen 325 mG 1 Tablet(s) Oral every 6 hours PRN  sodium chloride 0.65% Nasal 1 Spray(s) Both Nostrils two times a day PRN      REVIEW OF SYSTEMS  --------------------------------------------------------------------------------  All other systems were reviewed and are negative, except as noted.    VITALS/PHYSICAL EXAM  --------------------------------------------------------------------------------  T(C): 35.8 (09-19-21 @ 13:17), Max: 36.1 (09-19-21 @ 10:14)  HR: 55 (09-19-21 @ 13:17) (33 - 68)  BP: 121/63 (09-19-21 @ 13:17) (112/56 - 123/65)  RR: 18 (09-19-21 @ 13:17) (18 - 18)  SpO2: 99% (09-19-21 @ 10:14) (99% - 99%)  Wt(kg): --        09-18-21 @ 07:01  -  09-19-21 @ 07:00  --------------------------------------------------------  IN: 0 mL / OUT: 1060 mL / NET: -1060 mL    09-19-21 @ 07:01  -  09-19-21 @ 19:27  --------------------------------------------------------  IN: 0 mL / OUT: 150 mL / NET: -150 mL      Physical Exam:  	Gen: NAD  	HEENT: No JVD lying in bed  	Pulm: CTA B/L  	CV: RRR  	Abd: +BS, soft, nontender/nondistended  	No edema    LABS/STUDIES  --------------------------------------------------------------------------------              11.3   6.15  >-----------<  149      [09-19-21 @ 06:34]              35.4     140  |  104  |  50  ----------------------------<  80      [09-19-21 @ 06:34]  4.5   |  27  |  1.5        Ca     8.3     [09-19-21 @ 06:34]      Mg     2.5     [09-19-21 @ 06:34]    TPro  5.7  /  Alb  2.9  /  TBili  0.4  /  DBili  x   /  AST  9   /  ALT  6   /  AlkPhos  82  [09-19-21 @ 06:34]    PT/INR: PT 34.70, INR 3.05       [09-19-21 @ 06:34]  PTT: 47.4       [09-19-21 @ 06:34]      Creatinine Trend:  SCr 1.5 [09-19 @ 06:34]  SCr 2.1 [09-18 @ 06:14]  SCr 2.7 [09-17 @ 07:15]  SCr 2.9 [09-16 @ 07:54]  SCr 2.5 [09-15 @ 11:00]    Urinalysis - [09-12-21 @ 12:50]      Color Orange / Appearance Turbid / SG 1.014 / pH 6.0      Gluc Negative / Ketone Negative  / Bili Negative / Urobili 3 mg/dL       Blood Moderate / Protein 100 mg/dL / Leuk Est Large / Nitrite Negative      RBC 3 / WBC >720 / Hyaline 1 / Gran  / Sq Epi  / Non Sq Epi 2 / Bacteria Many    Urine Creatinine 119      [09-15-21 @ 05:30]  Urine Protein 53      [09-15-21 @ 05:30]  Urine Sodium <20.0      [09-15-21 @ 05:30]  Urine Potassium 51      [09-15-21 @ 05:30]  Urine Chloride 20      [09-15-21 @ 05:30]  Urine Phosphorus 18      [09-15-21 @ 05:30]  Urine Osmolality 314      [09-15-21 @ 05:30]    Iron 55, TIBC 279, %sat 20      [07-08-21 @ 10:47]  Ferritin 44      [07-09-21 @ 10:47]  TSH 2.14      [09-13-21 @ 05:00]  Lipid: chol 111, TG 59, HDL 53, LDL --      [09-13-21 @ 05:00]

## 2021-09-20 LAB
ALBUMIN SERPL ELPH-MCNC: 3 G/DL — LOW (ref 3.5–5.2)
ALP SERPL-CCNC: 86 U/L — SIGNIFICANT CHANGE UP (ref 30–115)
ALT FLD-CCNC: <5 U/L — SIGNIFICANT CHANGE UP (ref 0–41)
ANION GAP SERPL CALC-SCNC: 6 MMOL/L — LOW (ref 7–14)
APTT BLD: 47.2 SEC — HIGH (ref 27–39.2)
AST SERPL-CCNC: 10 U/L — SIGNIFICANT CHANGE UP (ref 0–41)
BASOPHILS # BLD AUTO: 0.01 K/UL — SIGNIFICANT CHANGE UP (ref 0–0.2)
BASOPHILS NFR BLD AUTO: 0.1 % — SIGNIFICANT CHANGE UP (ref 0–1)
BILIRUB SERPL-MCNC: 0.4 MG/DL — SIGNIFICANT CHANGE UP (ref 0.2–1.2)
BUN SERPL-MCNC: 36 MG/DL — HIGH (ref 10–20)
CALCIUM SERPL-MCNC: 8.4 MG/DL — LOW (ref 8.5–10.1)
CHLORIDE SERPL-SCNC: 105 MMOL/L — SIGNIFICANT CHANGE UP (ref 98–110)
CO2 SERPL-SCNC: 30 MMOL/L — SIGNIFICANT CHANGE UP (ref 17–32)
CREAT SERPL-MCNC: 1 MG/DL — SIGNIFICANT CHANGE UP (ref 0.7–1.5)
CULTURE RESULTS: SIGNIFICANT CHANGE UP
EOSINOPHIL # BLD AUTO: 0.11 K/UL — SIGNIFICANT CHANGE UP (ref 0–0.7)
EOSINOPHIL NFR BLD AUTO: 1.5 % — SIGNIFICANT CHANGE UP (ref 0–8)
GLUCOSE SERPL-MCNC: 151 MG/DL — HIGH (ref 70–99)
HCT VFR BLD CALC: 34.9 % — LOW (ref 42–52)
HGB BLD-MCNC: 11.2 G/DL — LOW (ref 14–18)
IMM GRANULOCYTES NFR BLD AUTO: 0.4 % — HIGH (ref 0.1–0.3)
INR BLD: 3.87 RATIO — HIGH (ref 0.65–1.3)
LYMPHOCYTES # BLD AUTO: 0.52 K/UL — LOW (ref 1.2–3.4)
LYMPHOCYTES # BLD AUTO: 7.3 % — LOW (ref 20.5–51.1)
MAGNESIUM SERPL-MCNC: 2.5 MG/DL — HIGH (ref 1.8–2.4)
MCHC RBC-ENTMCNC: 31.5 PG — HIGH (ref 27–31)
MCHC RBC-ENTMCNC: 32.1 G/DL — SIGNIFICANT CHANGE UP (ref 32–37)
MCV RBC AUTO: 98.3 FL — HIGH (ref 80–94)
MONOCYTES # BLD AUTO: 0.73 K/UL — HIGH (ref 0.1–0.6)
MONOCYTES NFR BLD AUTO: 10.3 % — HIGH (ref 1.7–9.3)
NEUTROPHILS # BLD AUTO: 5.71 K/UL — SIGNIFICANT CHANGE UP (ref 1.4–6.5)
NEUTROPHILS NFR BLD AUTO: 80.4 % — HIGH (ref 42.2–75.2)
NRBC # BLD: 0 /100 WBCS — SIGNIFICANT CHANGE UP (ref 0–0)
ORGANISM # SPEC MICROSCOPIC CNT: SIGNIFICANT CHANGE UP
ORGANISM # SPEC MICROSCOPIC CNT: SIGNIFICANT CHANGE UP
PLATELET # BLD AUTO: 143 K/UL — SIGNIFICANT CHANGE UP (ref 130–400)
POTASSIUM SERPL-MCNC: 4.7 MMOL/L — SIGNIFICANT CHANGE UP (ref 3.5–5)
POTASSIUM SERPL-SCNC: 4.7 MMOL/L — SIGNIFICANT CHANGE UP (ref 3.5–5)
PROT SERPL-MCNC: 5.7 G/DL — LOW (ref 6–8)
PROTHROM AB SERPL-ACNC: >40 SEC — HIGH (ref 9.95–12.87)
RBC # BLD: 3.55 M/UL — LOW (ref 4.7–6.1)
RBC # FLD: 20.3 % — HIGH (ref 11.5–14.5)
SODIUM SERPL-SCNC: 141 MMOL/L — SIGNIFICANT CHANGE UP (ref 135–146)
SPECIMEN SOURCE: SIGNIFICANT CHANGE UP
WBC # BLD: 7.11 K/UL — SIGNIFICANT CHANGE UP (ref 4.8–10.8)
WBC # FLD AUTO: 7.11 K/UL — SIGNIFICANT CHANGE UP (ref 4.8–10.8)

## 2021-09-20 PROCEDURE — 99232 SBSQ HOSP IP/OBS MODERATE 35: CPT

## 2021-09-20 RX ORDER — PHYTONADIONE (VIT K1) 5 MG
2.5 TABLET ORAL ONCE
Refills: 0 | Status: COMPLETED | OUTPATIENT
Start: 2021-09-20 | End: 2021-09-20

## 2021-09-20 RX ORDER — SPIRONOLACTONE 25 MG/1
12.5 TABLET, FILM COATED ORAL DAILY
Refills: 0 | Status: DISCONTINUED | OUTPATIENT
Start: 2021-09-20 | End: 2021-09-21

## 2021-09-20 RX ADMIN — Medication 3 MILLIGRAM(S): at 21:00

## 2021-09-20 RX ADMIN — OXYCODONE AND ACETAMINOPHEN 1 TABLET(S): 5; 325 TABLET ORAL at 21:00

## 2021-09-20 RX ADMIN — Medication 20 MILLIGRAM(S): at 18:32

## 2021-09-20 RX ADMIN — SPIRONOLACTONE 12.5 MILLIGRAM(S): 25 TABLET, FILM COATED ORAL at 18:31

## 2021-09-20 RX ADMIN — OXYCODONE AND ACETAMINOPHEN 1 TABLET(S): 5; 325 TABLET ORAL at 20:25

## 2021-09-20 RX ADMIN — BUDESONIDE AND FORMOTEROL FUMARATE DIHYDRATE 2 PUFF(S): 160; 4.5 AEROSOL RESPIRATORY (INHALATION) at 19:45

## 2021-09-20 RX ADMIN — Medication 2.5 MILLIGRAM(S): at 15:23

## 2021-09-20 RX ADMIN — OXYCODONE AND ACETAMINOPHEN 1 TABLET(S): 5; 325 TABLET ORAL at 10:53

## 2021-09-20 RX ADMIN — OXYCODONE AND ACETAMINOPHEN 1 TABLET(S): 5; 325 TABLET ORAL at 08:01

## 2021-09-20 RX ADMIN — Medication 1 MILLIGRAM(S): at 11:23

## 2021-09-20 RX ADMIN — BUDESONIDE AND FORMOTEROL FUMARATE DIHYDRATE 2 PUFF(S): 160; 4.5 AEROSOL RESPIRATORY (INHALATION) at 08:02

## 2021-09-20 NOTE — PROGRESS NOTE ADULT - ASSESSMENT
80 y/o male with a PMH of HFpEF (50-55%), chronic afib on coumadin followed by cardio Dr. Scherer, recent Micra PPM placement for tachy-selvin syndrome, DVT July 2021, HTN, and COPD on 2L home O2 p/w epigastric/RUQ pain 2/2 acute cholecystitis.    #Acute cholecystitis   - EKG: no ischemic changes, Troponin T, Serum: 0.03 > 0.02 > 0.02 > 0.03  - hold BB due to bradycardia   - Alkaline Phosphatase, Serum: 130 U/L; other LFTs wnl   - gallbladder cultures: klebsiella pan sensitive   - s/p perc cholecystectomy tube placed 9/14--needs follow up appointment in 6 weeks with IR regarding drain-appointment to be made before discharge   - completed 7 days of IV antibiotics      #DORON on CKD 3b likely secondary to KD (CT a/p IV con)   - Cr 2.7; (baseline Cr 1.2-1.6)  currently at 1  - nephrology following, Dr. Quick  - restarting diurectics today     #Cystitis:  -  CT Abdomen and Pelvis w/ IV Cont (09.12.21 @ 13:58) : Urinary bladder wall thickening and enhancement most compatible with cystitis. Correlation with urinary urinalysis is recommended  - UA: large LE, moderate blood, Many bacteria WBC >720  - no evidence of sepsis  - Had Klebseilla - completed antibiotic treatment     #COPD not in exacerbation  - no evidence of COPD exacerbation on exam   - saturating well on home dose O2 (2L)   - c/w home inhalers: Symbicort duonebs PRN    #Supratherapeutic INR   - INR trending up, vit k 2.5mg given 9/20  - repeat INR daily and dose coumadin   - home regimen: coumadin 1 mg M/W/F, 2mg T/Th/Sa/Sun    #HFpEF-echo 50-55%  #A-Fib on coumadin  #CAD s/p PCI and stent 2007  #DLD  - no evidence of fluid overload on exam   - TTE (7/11/2021) w/ EF 50-55%. Moderate to severe left atrial enlargement. Moderate mitral valve regurgitation. Moderate-severe tricuspid regurgitation.  Estimated pulmonary artery systolic pressure is 59.7 mmHg assuming a right atrial pressure of 10 mmHg, which is consistent with moderate pulmonary hypertension.  -  monitor I&O, daily weight   - c/w statin  - restated aldactone and torsemide 9/20     #Possible hematoma of right pectineus muscle:   - incidental finding of on CT  - no pain, tenderness, swelling, hematoma, or wound see at the site  - continue to monitor     #Deep vein thrombosis (DVT) of left lower extremity  - [facility] Duplex Lower Ext Vein Scan, Bilat (07.15.21)No evidence of deep venous thrombosis in the left lower extremity. Chronic Thrombus is noted in the right common femoral, femoral, and popliteal vein  - f/u INR daily and dose coumadin     #Macrocytic Anemia  #folic acid deficiency   - MCV 95.9, Hemoglobin 12  - folate low, started folic acid daily     - DVT Prophylaxis: coumadin  - GI Prophylaxis: pantoprazole  - Diet: regular low fat   - Activity: as tolerated  - Code Status: Full Code  - Dispo: from home; pending INR status

## 2021-09-20 NOTE — PROGRESS NOTE ADULT - SUBJECTIVE AND OBJECTIVE BOX
NEPHROLOGY FOLLOW UP NOTE    renal function improving  no new complaints  toelrating po  off diuretics  bp's fair  no fever  no abd pain    PAST MEDICAL & SURGICAL HISTORY:  COPD (chronic obstructive pulmonary disease)    Hypertension    Deep vein thrombosis (DVT) of left lower extremity, unspecified chronicity, unspecified vein    Cellulitis of left lower extremity    Chronic atrial fibrillation    Mitral valve insufficiency, unspecified etiology  Moderate    CHF (congestive heart failure)    S/P coronary artery stent placement    History of total right knee replacement      Allergies:  No Known Allergies    Home Medications Reviewed    SOCIAL HISTORY:  Denies ETOH,Smoking,   FAMILY HISTORY:        REVIEW OF SYSTEMS:  CONSTITUTIONAL: No weakness, fevers or chills  EYES/ENT: No visual changes;  No vertigo or throat pain   NECK: No pain or stiffness  RESPIRATORY: No cough, wheezing, hemoptysis; No shortness of breath  CARDIOVASCULAR: No chest pain or palpitations.  GASTROINTESTINAL: No abdominal or epigastric pain. No nausea, vomiting, or hematemesis; No diarrhea or constipation. No melena or hematochezia.  GENITOURINARY: No dysuria, frequency, foamy urine, urinary urgency, incontinence or hematuria  NEUROLOGICAL: No numbness or weakness  SKIN: No itching, burning, rashes, or lesions   VASCULAR: No bilateral lower extremity edema.   All other review of systems is negative unless indicated above.      PHYSICAL EXAM:  Constitutional: NAD  HEENT: anicteric sclera, oropharynx clear, MMM  Neck: No JVD  Respiratory: decreased BS b/l  Cardiovascular: S1, S2, RRR  Gastrointestinal: BS+, soft, NT/ND + perc cheli  Extremities: No cyanosis or clubbing. No peripheral edema  Neurological: A/O x 3, no focal deficits  Psychiatric: Normal mood, normal affect  : No CVA tenderness. No lowe.   Skin: No rashes    Hospital Medications:   MEDICATIONS  (STANDING):  budesonide 160 MICROgram(s)/formoterol 4.5 MICROgram(s) Inhaler 2 Puff(s) Inhalation two times a day  folic acid 1 milliGRAM(s) Oral daily  melatonin 3 milliGRAM(s) Oral at bedtime  phytonadione   Solution 2.5 milliGRAM(s) Oral once  polyethylene glycol 3350 17 Gram(s) Oral daily        VITALS:  T(F): 96.1 (09-20-21 @ 05:29), Max: 96.1 (09-20-21 @ 05:29)  HR: 56 (09-20-21 @ 08:31)  BP: 103/54 (09-20-21 @ 05:29)  RR: 20 (09-20-21 @ 05:29)  SpO2: 94% (09-20-21 @ 08:31)  Wt(kg): --    09-18 @ 07:01  -  09-19 @ 07:00  --------------------------------------------------------  IN: 0 mL / OUT: 1060 mL / NET: -1060 mL    09-19 @ 07:01  -  09-20 @ 07:00  --------------------------------------------------------  IN: 0 mL / OUT: 150 mL / NET: -150 mL    09-20 @ 07:01  -  09-20 @ 13:40  --------------------------------------------------------  IN: 0 mL / OUT: 220 mL / NET: -220 mL          LABS:  09-20    141  |  105  |  36<H>  ----------------------------<  151<H>  4.7   |  30  |  1.0    Ca    8.4<L>      20 Sep 2021 09:49  Mg     2.5     09-20    TPro  5.7<L>  /  Alb  3.0<L>  /  TBili  0.4  /  DBili      /  AST  10  /  ALT  <5  /  AlkPhos  86  09-20                          11.2   7.11  )-----------( 143      ( 20 Sep 2021 09:49 )             34.9       Urine Studies:    Chloride, Random Urine: 20 (09-15 @ 05:30)  Calcium, Random Urine: <1 mg/dL (09-15 @ 05:30)  Potassium, Random Urine: 51 mmol/L (09-15 @ 05:30)  Osmolality, Random Urine: 314 mos/kg (09-15 @ 05:30)  Sodium, Random Urine: <20.0 mmoL/L (09-15 @ 05:30)  Protein/Creatinine Ratio Calculation: 0.4 Ratio (09-15 @ 05:30)  Creatinine, Random Urine: 119 mg/dL (09-15 @ 05:30)      RADIOLOGY & ADDITIONAL STUDIES:

## 2021-09-20 NOTE — PROGRESS NOTE ADULT - SUBJECTIVE AND OBJECTIVE BOX
81 year old male with acute cholecystitis. S/p percutaneous cholecystostomy tube placement 9/14 by Dr. Antunez. Afebrile, WBC downtrending.    -Follow-up with IR clinic in 6 weeks as outpatient, appointment to be made prior to discharge.  -Re-consult as necessary.    Discussed with Resident.

## 2021-09-20 NOTE — PROGRESS NOTE ADULT - ASSESSMENT
DORON   -resolved  acute cheli s/p perc cheli  klebsiella UTI  chronic HFpEF  Afib with SVR / NSVT / CAD / PCI  COPD on home O2  hf of DVT  anemia    plan:    can resume torsemide 20mg po qd and aldactone 12.5mg po qd    completed abx course  perc cheli drain care  trend lytes and renal function  dc planning  outpt renal f/u

## 2021-09-20 NOTE — PROGRESS NOTE ADULT - SUBJECTIVE AND OBJECTIVE BOX
LENGTH OF HOSPITAL STAY: 09-12-21 (8d)  CHIEF COMPLAINT:   Patient is a 81y old  Male who presents with a chief complaint of Atypical chest pain (20 Sep 2021 13:39)      Progress:     Interval hx: No events overnight  Subjective complaints: No complaints today. Drain in place 10-25 cc green fluid over 24 hours as per RN.    HISTORY OF PRESENTING ILLNESS:    HPI:  Patient is a 80 y/o male with a PMH of HFpEF (50-55%), chronic afib on coumadin followed by cardio Dr. Scherer, recent Micra PPM placement for tachy-selvin syndrome, DVT July 2021, HTN, and COPD on 2L home O2 presents to the ED from home due to atypical chest pain. The patient notes he awoke from sleep on the morning of presentation with difficulty breathing and epigastric pain. The patient notes pain on deep breath in the epigastric/RUQ region. Patient saturating well on home O2, 2L with no increased O2 requirements. Denies substernal chest pain, radiation to L arm/jaw, palpitations, nausea, vomiting, diarrhea, constipation, syncope, falls, cough, chills, or fever. The patient denies prior episodes of similar pain.     In ED, the patient was found to have bradycardia 50s (asymptomatic). Labs revealing INR 3.66 and positive urinalysis. EKG with no ischemic changes. CT Abdomen and Pelvis w/ IV Cont showed urinary bladder wall thickening and enhancement most compatible with cystitis.  Masslike enlargement/low-attenuation of the inferior aspect of the right pectineus muscle. This is nonspecific. Differential includes hematoma or possibly abscess. Malignancy is not excluded. Further evaluation and follow-up is recommended. For complete characterization MRI may be obtained. Bilateral pleural effusions, small on the right and trace to small on the left. The patient will be admitted to telemetry for further management.              (12 Sep 2021 20:59)      PHYSICAL EXAM:   GENERAL: No acute distress  NEURO: Alert & Oriented X3. Speech clear and fluent.   HEAD: Atraumatic, normocephalic.   EYES: EOMI. Sclera non-icteric.  ENT: Moist mucous membranes.    NECK: No JVD.  No cervical lymphadenopathy.   LUNGS: Clear to auscultation bilaterally. No wheezes or rhonchi. No accessory muscle use.  HEART: RRR. S1/S2 present.   ABDOMEN: Bowel sounds present. Soft. Nontender. Nondistended. No guarding or rigidity. +DON drain in place R flank draining greenish fluid  EXTREMITIES: No edema, cyanosis, clubbing. 2+ peripheral pulses bilaterally.  SKIN: Warm and dry.    OBJECTIVE DATA:    T(C): 36.2 (09-20-21 @ 14:40), Max: 36.2 (09-20-21 @ 14:40)  T(F): 97.2 (09-20-21 @ 14:40), Max: 97.2 (09-20-21 @ 14:40)  HR: 54 (09-20-21 @ 14:40) (49 - 56)  BP: 119/65 (09-20-21 @ 14:40) (103/54 - 119/65)  ABP: --  ABP(mean): --  RR: 19 (09-20-21 @ 14:40) (19 - 20)  SpO2: 94% (09-20-21 @ 08:31) (94% - 99%)      I&O's Summary    19 Sep 2021 07:01  -  20 Sep 2021 07:00  --------------------------------------------------------  IN: 0 mL / OUT: 150 mL / NET: -150 mL    20 Sep 2021 07:01  -  20 Sep 2021 16:09  --------------------------------------------------------  IN: 0 mL / OUT: 395 mL / NET: -395 mL                              11.2   7.11  )-----------( 143      ( 20 Sep 2021 09:49 )             34.9       09-20    141  |  105  |  36<H>  ----------------------------<  151<H>  4.7   |  30  |  1.0    Ca    8.4<L>      20 Sep 2021 09:49  Mg     2.5     09-20    TPro  5.7<L>  /  Alb  3.0<L>  /  TBili  0.4  /  DBili  x   /  AST  10  /  ALT  <5  /  AlkPhos  86  09-20                  PT/INR - ( 20 Sep 2021 09:49 )   PT: >40.00 sec;   INR: 3.87 ratio         PTT - ( 20 Sep 2021 09:49 )  PTT:47.2 sec          CAPILLARY BLOOD GLUCOSE

## 2021-09-20 NOTE — PROGRESS NOTE ADULT - ASSESSMENT
ASSESSMENT/PLAN:  82 y/o male with a PMH of HFpEF (50-55%), chronic afib on coumadin followed by cardio Dr. Scherer, recent Micra PPM placement for tachy-selvin syndrome, DVT July 2021, HTN, and COPD on 2L home O2 p/w epigastric/RUQ pain 2/2 acute cholecystitis.    #Epigastric/RUQ pain 2/2 acute cholecystitis  -RUQ and CT abdomen confirms diagnosis  -c/o chest pain on admission now resolved > - EKG: no ischemic changes, Troponin T, Serum: 0.03 > 0.02 > 0.02 > 0.03  -s/p percutaneous nephrostomy tube placed 9/14 -> f/u IR in 6 weeks for removal  -GB culture positive for gram variable rods and E. Coli, pansensitive  -finished course of IV antibiotics (rocephin and flagyl) also for UTI    #Acute Complicated Cystitis in male  -  CT Abdomen and Pelvis w/ IV Cont (09.12.21 @ 13:58) : Urinary bladder wall thickening and enhancement most compatible with cystitis. Correlation with urinary urinalysis is recommended  - UA: large LE, moderate blood, Many bacteria WBC >720  - no evidence of sepsis, pt no complaints now  - finished course of ceftriaxone/flagyl    #DORON on CKD 3b likely secondary to KD (CT a/p IV con), resolved  - Cr 1.0 today, baseline Cr 1.2-1.6  - nephrology following, Dr. Quick  - holding diuretics, c/w PO fluids    #Supratherapeutic INR   - improved s/p kcentra doses for perc cheli   - repeat INR daily and dose coumadin   - home regimen: 1 mg M/W/F, 2mg T/Th/Sa/Sun    #HFpEF (50-55%) - Right sided heart failure - not in exacerbation  #HO CAD s/p stent 2017  -ECHO 7/2021 shows moderate R-sided systolic dysfunction  -holding diuretics (torsemide and aldactone) 2/2 low BP, likely start tomorrow AM if BP better as DORON now resolved    #HO Chronic A-Fib  #HO Tachy-selvin syndrome s/p micra PPM   -Rate controlled  -On coumadin, check daily INR    #HO DVT   - VA Duplex Lower Ext Vein Scan, Bilat (07.15.21)No evidence of deep venous thrombosis in the left lower extremity. Chronic Thrombus is noted in the right common femoral, femoral, and popliteal vein  -on coumadin    #COPD - not in exacerbation  - no evidence of COPD exacerbation on exam   - saturating well on home dose O2 (2L)   - c/w home inhalers: Symbicort duonebs PRN    #Possible hematoma of right pectineus muscle:   - incidental finding of on CT  - no pain, tenderness, swelling, hematoma, or wound see at the site  - continue to monitor     #Macrocytic Anemia  #folic acid deficiency   - MCV 95.9, Hemoglobin 12  - folate low, started folic acid daily       #Misc:   DVT ppx: on warfarin  GI ppx: none indicated  Diet: DASH/TLC  Activity: AAT  GOC/Code: Full  Disposition: anticipate DC tomorrow

## 2021-09-20 NOTE — PROGRESS NOTE ADULT - SUBJECTIVE AND OBJECTIVE BOX
Patient is a 81y old  Male who presents with a chief complaint of Atypical chest pain (20 Sep 2021 16:08)      Patient seen and examined at bedside.  Patient denies any chest pain, shortness of breath, or abd pain.    ALLERGIES:  No Known Allergies    MEDICATIONS:  ALBUTerol  90 MICROgram(s) HFA Inhaler - Peds 2 Puff(s) Inhalation every 4 hours PRN  budesonide 160 MICROgram(s)/formoterol 4.5 MICROgram(s) Inhaler 2 Puff(s) Inhalation two times a day  folic acid 1 milliGRAM(s) Oral daily  melatonin 3 milliGRAM(s) Oral at bedtime  oxycodone    5 mG/acetaminophen 325 mG 1 Tablet(s) Oral every 6 hours PRN  polyethylene glycol 3350 17 Gram(s) Oral daily  sodium chloride 0.65% Nasal 1 Spray(s) Both Nostrils two times a day PRN    Vital Signs Last 24 Hrs  T(F): 97.2 (20 Sep 2021 14:40), Max: 97.2 (20 Sep 2021 14:40)  HR: 54 (20 Sep 2021 14:40) (49 - 56)  BP: 119/65 (20 Sep 2021 14:40) (103/54 - 119/65)  RR: 19 (20 Sep 2021 14:40) (19 - 20)  SpO2: 94% (20 Sep 2021 08:31) (94% - 99%)  I&O's Summary    19 Sep 2021 07:01  -  20 Sep 2021 07:00  --------------------------------------------------------  IN: 0 mL / OUT: 150 mL / NET: -150 mL    20 Sep 2021 07:01  -  20 Sep 2021 17:09  --------------------------------------------------------  IN: 0 mL / OUT: 395 mL / NET: -395 mL        PHYSICAL EXAM:  General: NAD, A/O x 3  ENT: MMM  Neck: Supple, No JVD  Lungs: diminished air entry bilateraly, no crackles   Cardio: RRR, S1/S2, 2/6 systolic murmur   Abdomen: Soft, Nontender, Nondistended; Bowel sounds present  Extremities: No cyanosis, No edema    LABS:                        11.2   7.11  )-----------( 143      ( 20 Sep 2021 09:49 )             34.9     09-20    141  |  105  |  36  ----------------------------<  151  4.7   |  30  |  1.0    Ca    8.4      20 Sep 2021 09:49  Mg     2.5     09-20    TPro  5.7  /  Alb  3.0  /  TBili  0.4  /  DBili  x   /  AST  10  /  ALT  <5  /  AlkPhos  86  09-20    eGFR if Non African American: 71 mL/min/1.73M2 (09-20-21 @ 09:49)  eGFR if : 83 mL/min/1.73M2 (09-20-21 @ 09:49)    PT/INR - ( 20 Sep 2021 09:49 )   PT: >40.00 sec;   INR: 3.87 ratio         PTT - ( 20 Sep 2021 09:49 )  PTT:47.2 sec        09-13 Chol 111 mg/dL LDL -- HDL 53 mg/dL Trig 59 mg/dL                      Culture - Body Fluid with Gram Stain (collected 14 Sep 2021 18:00)  Source: .Body Fluid gallbladder drainage  Gram Stain (15 Sep 2021 15:29):    No polymorphonuclear leukocytes seen per low power field    Rare Gram Variable Rods seen per oil power field  Final Report (20 Sep 2021 07:52):    Rare Escherichia coli  Organism: Escherichia coli (20 Sep 2021 07:52)  Organism: Escherichia coli (20 Sep 2021 07:52)      -  Amikacin: S <=16      -  Amoxicillin/Clavulanic Acid: S <=8/4      -  Ampicillin: S <=8 These ampicillin results predict results for amoxicillin      -  Ampicillin/Sulbactam: S <=4/2 Enterobacter, Citrobacter, and Serratia may develop resistance during prolonged therapy (3-4 days)      -  Aztreonam: S <=4      -  Cefazolin: S <=2 Enterobacter, Citrobacter, and Serratia may develop resistance during prolonged therapy (3-4 days)      -  Cefepime: S <=2      -  Cefoxitin: S <=8      -  Ceftriaxone: S <=1 Enterobacter, Citrobacter, and Serratia may develop resistance during prolonged therapy      -  Ciprofloxacin: S <=0.25      -  Ertapenem: S <=0.5      -  Gentamicin: S <=2      -  Imipenem: S <=1      -  Levofloxacin: S <=0.5      -  Meropenem: S <=1      -  Piperacillin/Tazobactam: S <=8      -  Tobramycin: S <=2      -  Trimethoprim/Sulfamethoxazole: S <=0.5/9.5      Method Type: SHEFALI      COVID-19 PCR: NotDetec (09-12-21 @ 12:00)      RADIOLOGY & ADDITIONAL TESTS:  < from: US Renal (09.15.21 @ 18:30) >    IMPRESSION:    No hydronephrosis.    < end of copied text >  < from: Xray Chest 1 View- PORTABLE-Routine (Xray Chest 1 View- PORTABLE-Routine .) (09.18.21 @ 15:30) >  FINDINGS/  IMPRESSION:    Unchanged right basal pleural-parenchymal opacity. No pneumothorax.    Stable cardiomediastinal silhouette. Unchanged osseous structures.    Left cardiac loop monitoring    < end of copied text >    Care Discussed with Consultants/Other Providers:

## 2021-09-21 ENCOUNTER — TRANSCRIPTION ENCOUNTER (OUTPATIENT)
Age: 81
End: 2021-09-21

## 2021-09-21 VITALS
RESPIRATION RATE: 19 BRPM | SYSTOLIC BLOOD PRESSURE: 114 MMHG | HEART RATE: 55 BPM | DIASTOLIC BLOOD PRESSURE: 74 MMHG | TEMPERATURE: 98 F

## 2021-09-21 LAB
ALBUMIN SERPL ELPH-MCNC: 3.1 G/DL — LOW (ref 3.5–5.2)
ALP SERPL-CCNC: 87 U/L — SIGNIFICANT CHANGE UP (ref 30–115)
ALT FLD-CCNC: 5 U/L — SIGNIFICANT CHANGE UP (ref 0–41)
ANION GAP SERPL CALC-SCNC: 7 MMOL/L — SIGNIFICANT CHANGE UP (ref 7–14)
APTT BLD: 36.1 SEC — SIGNIFICANT CHANGE UP (ref 27–39.2)
AST SERPL-CCNC: 10 U/L — SIGNIFICANT CHANGE UP (ref 0–41)
BASOPHILS # BLD AUTO: 0.02 K/UL — SIGNIFICANT CHANGE UP (ref 0–0.2)
BASOPHILS NFR BLD AUTO: 0.3 % — SIGNIFICANT CHANGE UP (ref 0–1)
BILIRUB SERPL-MCNC: 0.5 MG/DL — SIGNIFICANT CHANGE UP (ref 0.2–1.2)
BUN SERPL-MCNC: 29 MG/DL — HIGH (ref 10–20)
CALCIUM SERPL-MCNC: 8.6 MG/DL — SIGNIFICANT CHANGE UP (ref 8.5–10.1)
CHLORIDE SERPL-SCNC: 105 MMOL/L — SIGNIFICANT CHANGE UP (ref 98–110)
CO2 SERPL-SCNC: 30 MMOL/L — SIGNIFICANT CHANGE UP (ref 17–32)
CREAT SERPL-MCNC: 1.1 MG/DL — SIGNIFICANT CHANGE UP (ref 0.7–1.5)
EOSINOPHIL # BLD AUTO: 0.19 K/UL — SIGNIFICANT CHANGE UP (ref 0–0.7)
EOSINOPHIL NFR BLD AUTO: 2.8 % — SIGNIFICANT CHANGE UP (ref 0–8)
GLUCOSE SERPL-MCNC: 89 MG/DL — SIGNIFICANT CHANGE UP (ref 70–99)
HCT VFR BLD CALC: 36.1 % — LOW (ref 42–52)
HGB BLD-MCNC: 11.4 G/DL — LOW (ref 14–18)
IMM GRANULOCYTES NFR BLD AUTO: 0.4 % — HIGH (ref 0.1–0.3)
INR BLD: 1.54 RATIO — HIGH (ref 0.65–1.3)
LYMPHOCYTES # BLD AUTO: 0.61 K/UL — LOW (ref 1.2–3.4)
LYMPHOCYTES # BLD AUTO: 9.1 % — LOW (ref 20.5–51.1)
MAGNESIUM SERPL-MCNC: 2.3 MG/DL — SIGNIFICANT CHANGE UP (ref 1.8–2.4)
MCHC RBC-ENTMCNC: 31.4 PG — HIGH (ref 27–31)
MCHC RBC-ENTMCNC: 31.6 G/DL — LOW (ref 32–37)
MCV RBC AUTO: 99.4 FL — HIGH (ref 80–94)
MONOCYTES # BLD AUTO: 0.67 K/UL — HIGH (ref 0.1–0.6)
MONOCYTES NFR BLD AUTO: 10 % — HIGH (ref 1.7–9.3)
NEUTROPHILS # BLD AUTO: 5.17 K/UL — SIGNIFICANT CHANGE UP (ref 1.4–6.5)
NEUTROPHILS NFR BLD AUTO: 77.4 % — HIGH (ref 42.2–75.2)
NRBC # BLD: 0 /100 WBCS — SIGNIFICANT CHANGE UP (ref 0–0)
PLATELET # BLD AUTO: 163 K/UL — SIGNIFICANT CHANGE UP (ref 130–400)
POTASSIUM SERPL-MCNC: 4.7 MMOL/L — SIGNIFICANT CHANGE UP (ref 3.5–5)
POTASSIUM SERPL-SCNC: 4.7 MMOL/L — SIGNIFICANT CHANGE UP (ref 3.5–5)
PROT SERPL-MCNC: 6 G/DL — SIGNIFICANT CHANGE UP (ref 6–8)
PROTHROM AB SERPL-ACNC: 17.6 SEC — HIGH (ref 9.95–12.87)
RBC # BLD: 3.63 M/UL — LOW (ref 4.7–6.1)
RBC # FLD: 20 % — HIGH (ref 11.5–14.5)
SARS-COV-2 RNA SPEC QL NAA+PROBE: SIGNIFICANT CHANGE UP
SODIUM SERPL-SCNC: 142 MMOL/L — SIGNIFICANT CHANGE UP (ref 135–146)
WBC # BLD: 6.69 K/UL — SIGNIFICANT CHANGE UP (ref 4.8–10.8)
WBC # FLD AUTO: 6.69 K/UL — SIGNIFICANT CHANGE UP (ref 4.8–10.8)

## 2021-09-21 PROCEDURE — 99239 HOSP IP/OBS DSCHRG MGMT >30: CPT

## 2021-09-21 RX ORDER — SPIRONOLACTONE 25 MG/1
0.5 TABLET, FILM COATED ORAL
Qty: 0 | Refills: 0 | DISCHARGE
Start: 2021-09-21

## 2021-09-21 RX ORDER — FOLIC ACID 0.8 MG
1 TABLET ORAL
Qty: 0 | Refills: 0 | DISCHARGE
Start: 2021-09-21

## 2021-09-21 RX ORDER — LANOLIN ALCOHOL/MO/W.PET/CERES
1 CREAM (GRAM) TOPICAL
Qty: 30 | Refills: 0
Start: 2021-09-21 | End: 2021-10-20

## 2021-09-21 RX ORDER — OXYCODONE AND ACETAMINOPHEN 5; 325 MG/1; MG/1
1 TABLET ORAL EVERY 6 HOURS
Refills: 0 | Status: DISCONTINUED | OUTPATIENT
Start: 2021-09-21 | End: 2021-09-21

## 2021-09-21 RX ORDER — POLYETHYLENE GLYCOL 3350 17 G/17G
17 POWDER, FOR SOLUTION ORAL
Qty: 510 | Refills: 0
Start: 2021-09-21 | End: 2021-10-20

## 2021-09-21 RX ORDER — SPIRONOLACTONE 25 MG/1
25 TABLET, FILM COATED ORAL
Qty: 0 | Refills: 0 | DISCHARGE

## 2021-09-21 RX ADMIN — Medication 1 MILLIGRAM(S): at 11:13

## 2021-09-21 RX ADMIN — BUDESONIDE AND FORMOTEROL FUMARATE DIHYDRATE 2 PUFF(S): 160; 4.5 AEROSOL RESPIRATORY (INHALATION) at 09:35

## 2021-09-21 RX ADMIN — Medication 20 MILLIGRAM(S): at 05:08

## 2021-09-21 RX ADMIN — OXYCODONE AND ACETAMINOPHEN 1 TABLET(S): 5; 325 TABLET ORAL at 11:43

## 2021-09-21 RX ADMIN — OXYCODONE AND ACETAMINOPHEN 1 TABLET(S): 5; 325 TABLET ORAL at 12:24

## 2021-09-21 RX ADMIN — SPIRONOLACTONE 12.5 MILLIGRAM(S): 25 TABLET, FILM COATED ORAL at 05:08

## 2021-09-21 NOTE — DISCHARGE NOTE PROVIDER - PROVIDER TOKENS
PROVIDER:[TOKEN:[[id number]],FOLLOWUP:[1-3 days],ESTABLISHEDPATIENT:[T]],PROVIDER:[TOKEN:[[id number]],FOLLOWUP:[Routine]],PROVIDER:[TOKEN:[[id number]],FOLLOWUP:[Routine]]

## 2021-09-21 NOTE — DISCHARGE NOTE NURSING/CASE MANAGEMENT/SOCIAL WORK - PATIENT PORTAL LINK FT
You can access the FollowMyHealth Patient Portal offered by [facility] by registering at the following website: [email or web address]. By joining [facility]’s FollowMyHealth portal, you will also be able to view your health information using other applications (apps) compatible with our system.

## 2021-09-21 NOTE — DISCHARGE NOTE PROVIDER - NSDCMRMEDTOKEN_GEN_ALL_CORE_FT
atorvastatin 20 mg oral tablet: 1 tab(s) orally once a day  metoprolol succinate 25 mg oral tablet, extended release: 1 tab(s) orally once a day  nystatin 100,000 units/g topical powder: 1 application topically every 12 hours  spironolactone: 25 milligram(s) orally once a day  Symbicort 160 mcg-4.5 mcg/inh inhalation aerosol: 2 puff(s) inhaled 2 times a day  torsemide 20 mg oral tablet: 1 tab(s) orally once a day  Ventolin HFA 90 mcg/inh inhalation aerosol: 2 puff(s) inhaled every 6 hours as neede for shortness of breath  warfarin 2 mg oral tablet: Take half a tablet Sun, Tues, Wed, Thurs, Fri, Sat.  Take a whole tablet on Mon   atorvastatin 20 mg oral tablet: 1 tab(s) orally once a day  folic acid 1 mg oral tablet: 1 tab(s) orally once a day  nystatin 100,000 units/g topical powder: 1 application topically every 12 hours  oxycodone-acetaminophen 5 mg-325 mg oral tablet: 1 tab(s) orally every 6 hours, As needed, Moderate Pain (4 - 6)  spironolactone 25 mg oral tablet: 0.5 tab(s) orally once a day  Symbicort 160 mcg-4.5 mcg/inh inhalation aerosol: 2 puff(s) inhaled 2 times a day  torsemide 20 mg oral tablet: 1 tab(s) orally once a day  Ventolin HFA 90 mcg/inh inhalation aerosol: 2 puff(s) inhaled every 6 hours as neede for shortness of breath  warfarin 2 mg oral tablet: Take half a tablet Sun, Tues, Wed, Thurs, Fri, Sat.  Take a whole tablet on Mon

## 2021-09-21 NOTE — DISCHARGE NOTE PROVIDER - NSDCCAREPROVSEEN_GEN_ALL_CORE_FT
[facility] medicine, [facility] interventional radiology, [facility] surgery

## 2021-09-21 NOTE — PROGRESS NOTE ADULT - SUBJECTIVE AND OBJECTIVE BOX
Patient is a 81y old  Male who presents with a chief complaint of Atypical chest pain (21 Sep 2021 11:19)      Patient seen and examined at bedside.  Patient denies any chest pain or shortness of breath.   ALLERGIES:  No Known Allergies    MEDICATIONS:  ALBUTerol  90 MICROgram(s) HFA Inhaler - Peds 2 Puff(s) Inhalation every 4 hours PRN  budesonide 160 MICROgram(s)/formoterol 4.5 MICROgram(s) Inhaler 2 Puff(s) Inhalation two times a day  folic acid 1 milliGRAM(s) Oral daily  melatonin 3 milliGRAM(s) Oral at bedtime  oxycodone    5 mG/acetaminophen 325 mG 1 Tablet(s) Oral every 6 hours PRN  polyethylene glycol 3350 17 Gram(s) Oral daily  sodium chloride 0.65% Nasal 1 Spray(s) Both Nostrils two times a day PRN  spironolactone 12.5 milliGRAM(s) Oral daily  torsemide 20 milliGRAM(s) Oral daily    Vital Signs Last 24 Hrs  T(F): 96.8 (21 Sep 2021 05:27), Max: 97.2 (20 Sep 2021 14:40)  HR: 58 (21 Sep 2021 07:33) (54 - 59)  BP: 106/59 (21 Sep 2021 05:27) (106/59 - 119/83)  RR: 17 (21 Sep 2021 05:27) (17 - 19)  SpO2: 95% (21 Sep 2021 07:33) (95% - 95%)  I&O's Summary    20 Sep 2021 07:01  -  21 Sep 2021 07:00  --------------------------------------------------------  IN: 0 mL / OUT: 1195 mL / NET: -1195 mL    21 Sep 2021 07:01  -  21 Sep 2021 12:58  --------------------------------------------------------  IN: 0 mL / OUT: 405 mL / NET: -405 mL        PHYSICAL EXAM:  General: NAD, A/O x 3  ENT: MMM  Neck: Supple, No JVD  Lungs: Clear to auscultation bilaterally  Cardio: RRR, S1/S2, No murmurs  Abdomen: Soft, Nontender, Nondistended; cholecystomy tube in place    Extremities: No cyanosis, +1 LE edema    LABS:                        11.4   6.69  )-----------( 163      ( 21 Sep 2021 04:30 )             36.1     09-21    142  |  105  |  29  ----------------------------<  89  4.7   |  30  |  1.1    Ca    8.6      21 Sep 2021 04:30  Mg     2.3     09-21    TPro  6.0  /  Alb  3.1  /  TBili  0.5  /  DBili  x   /  AST  10  /  ALT  5   /  AlkPhos  87  09-21    eGFR if Non African American: 64 mL/min/1.73M2 (09-21-21 @ 04:30)  eGFR if African American: 74 mL/min/1.73M2 (09-21-21 @ 04:30)    PT/INR - ( 21 Sep 2021 04:30 )   PT: 17.60 sec;   INR: 1.54 ratio         PTT - ( 21 Sep 2021 04:30 )  PTT:36.1 sec        09-13 Chol 111 mg/dL LDL -- HDL 53 mg/dL Trig 59 mg/dL                      Culture - Body Fluid with Gram Stain (collected 14 Sep 2021 18:00)  Source: .Body Fluid gallbladder drainage  Gram Stain (15 Sep 2021 15:29):    No polymorphonuclear leukocytes seen per low power field    Rare Gram Variable Rods seen per oil power field  Final Report (20 Sep 2021 07:52):    Rare Escherichia coli  Organism: Escherichia coli (20 Sep 2021 07:52)  Organism: Escherichia coli (20 Sep 2021 07:52)      -  Amikacin: S <=16      -  Amoxicillin/Clavulanic Acid: S <=8/4      -  Ampicillin: S <=8 These ampicillin results predict results for amoxicillin      -  Ampicillin/Sulbactam: S <=4/2 Enterobacter, Citrobacter, and Serratia may develop resistance during prolonged therapy (3-4 days)      -  Aztreonam: S <=4      -  Cefazolin: S <=2 Enterobacter, Citrobacter, and Serratia may develop resistance during prolonged therapy (3-4 days)      -  Cefepime: S <=2      -  Cefoxitin: S <=8      -  Ceftriaxone: S <=1 Enterobacter, Citrobacter, and Serratia may develop resistance during prolonged therapy      -  Ciprofloxacin: S <=0.25      -  Ertapenem: S <=0.5      -  Gentamicin: S <=2      -  Imipenem: S <=1      -  Levofloxacin: S <=0.5      -  Meropenem: S <=1      -  Piperacillin/Tazobactam: S <=8      -  Tobramycin: S <=2      -  Trimethoprim/Sulfamethoxazole: S <=0.5/9.5      Method Type: SHEFALI      COVID-19 PCR: NotDetec (09-20-21 @ 19:15)  COVID-19 PCR: NotDetec (09-12-21 @ 12:00)      RADIOLOGY & ADDITIONAL TESTS:    Care Discussed with Consultants/Other Providers:

## 2021-09-21 NOTE — PROGRESS NOTE ADULT - REASON FOR ADMISSION
Atypical chest pain

## 2021-09-21 NOTE — PROGRESS NOTE ADULT - SUBJECTIVE AND OBJECTIVE BOX
NEPHROLOGY FOLLOW UP NOTE    pt seen and examined  tolerating PO  no dyspnea  on O2 NC  back on diuretics  c/o large volume void    PAST MEDICAL & SURGICAL HISTORY:  COPD (chronic obstructive pulmonary disease)    Hypertension    Deep vein thrombosis (DVT) of left lower extremity, unspecified chronicity, unspecified vein    Cellulitis of left lower extremity    Chronic atrial fibrillation    Mitral valve insufficiency, unspecified etiology  Moderate    CHF (congestive heart failure)    S/P coronary artery stent placement    History of total right knee replacement      Allergies:  No Known Allergies    Home Medications Reviewed    SOCIAL HISTORY:  Hospital Medications:   MEDICATIONS  (STANDING):  budesonide 160 MICROgram(s)/formoterol 4.5 MICROgram(s) Inhaler 2 Puff(s) Inhalation two times a day  folic acid 1 milliGRAM(s) Oral daily  melatonin 3 milliGRAM(s) Oral at bedtime  polyethylene glycol 3350 17 Gram(s) Oral daily  spironolactone 12.5 milliGRAM(s) Oral daily  torsemide 20 milliGRAM(s) Oral daily        VITALS:  T(F): 96.8 (09-21-21 @ 05:27), Max: 97.2 (09-20-21 @ 14:40)  HR: 58 (09-21-21 @ 07:33)  BP: 106/59 (09-21-21 @ 05:27)  RR: 17 (09-21-21 @ 05:27)  SpO2: 95% (09-21-21 @ 07:33)  Wt(kg): --    09-19 @ 07:01  -  09-20 @ 07:00  --------------------------------------------------------  IN: 0 mL / OUT: 150 mL / NET: -150 mL    09-20 @ 07:01  -  09-21 @ 07:00  --------------------------------------------------------  IN: 0 mL / OUT: 1195 mL / NET: -1195 mL          LABS:  09-21    142  |  105  |  29<H>  ----------------------------<  89  4.7   |  30  |  1.1    Ca    8.6      21 Sep 2021 04:30  Mg     2.3     09-21    TPro  6.0  /  Alb  3.1<L>  /  TBili  0.5  /  DBili      /  AST  10  /  ALT  5   /  AlkPhos  87  09-21                          11.4   6.69  )-----------( 163      ( 21 Sep 2021 04:30 )             36.1       Urine Studies:    Chloride, Random Urine: 20 (09-15 @ 05:30)  Calcium, Random Urine: <1 mg/dL (09-15 @ 05:30)  Potassium, Random Urine: 51 mmol/L (09-15 @ 05:30)  Osmolality, Random Urine: 314 mos/kg (09-15 @ 05:30)  Sodium, Random Urine: <20.0 mmoL/L (09-15 @ 05:30)  Protein/Creatinine Ratio Calculation: 0.4 Ratio (09-15 @ 05:30)  Creatinine, Random Urine: 119 mg/dL (09-15 @ 05:30)      RADIOLOGY & ADDITIONAL STUDIES:  Denies ETOH,Smoking,   FAMILY HISTORY:        REVIEW OF SYSTEMS:  CONSTITUTIONAL: No weakness, fevers or chills  EYES/ENT: No visual changes;  No vertigo or throat pain   NECK: No pain or stiffness  RESPIRATORY: No cough, wheezing, hemoptysis; No shortness of breath  CARDIOVASCULAR: No chest pain or palpitations.  GASTROINTESTINAL: No abdominal or epigastric pain. No nausea, vomiting, or hematemesis; No diarrhea or constipation. No melena or hematochezia.  GENITOURINARY: No dysuria, frequency, foamy urine, urinary urgency, incontinence or hematuria  NEUROLOGICAL: No numbness or weakness  SKIN: No itching, burning, rashes, or lesions   VASCULAR: No bilateral lower extremity edema.   All other review of systems is negative unless indicated above.      PHYSICAL EXAM:  Constitutional: NAD  HEENT: anicteric sclera, oropharynx clear, MMM  Neck: No JVD  Respiratory: decreased BS b/l  Cardiovascular: S1, S2, RRR  Gastrointestinal: BS+, soft, NT/ND + perc cheli  Extremities: No cyanosis or clubbing. No peripheral edema  Neurological: A/O x 3, no focal deficits  Psychiatric: Normal mood, normal affect  : No CVA tenderness. No lowe.   Skin: No rashes

## 2021-09-21 NOTE — DISCHARGE NOTE PROVIDER - HOSPITAL COURSE
82 y/o male with a PMH of HFpEF (50-55%), chronic afib on coumadin followed by cardio Dr. Scherer, recent Micra PPM placement for tachy-selvin syndrome, DVT July 2021, HTN, and COPD on 2L home O2 p/w epigastric/RUQ pain. Patient was found to have acute cholecystitis on RUQ ultrasound. He underwent percutaneous drainage and cholecystostomy tube placed on 9/14 to follow up in 6 weeks with interventional radiology to have drain removed. Spoke with interventional radiology, they will call patient to make appointment. Also found to have acute Klebsiella cystitis on urine. Patent completed 7 days of IV antibiotics. His coumadin was held due to supratherapeutic INR levels, now subtherapeutic INR 1.54 to restart coumadin tonight 2 mg and then resume 1 mg after and follow up with primary care provider within 1 week to check INR levels. He was also found to have an incidental finding on CT of possible hematoma of right pectineus muscle. No pain, tenderness, swelling hematoma or wound seen at site, to follow up with primary care provider outpatient.   Patient feels well and has no complaints. Vital signs over last 24 hours stable. Patient stable for discharge. 82 y/o male with a PMH of HFpEF (50-55%), chronic afib on coumadin followed by cardio Dr. Scherer, recent Micra PPM placement for tachy-selvin syndrome, DVT July 2021, HTN, and COPD on 2L home O2 p/w epigastric/RUQ pain. Patient was found to have acute cholecystitis on RUQ ultrasound. He underwent percutaneous drainage and cholecystostomy tube placed on 9/14 to follow up in 6 weeks with interventional radiology to have drain removed. Spoke with interventional radiology, they will call patient to make appointment. Also found to have acute Klebsiella cystitis on urine. Patent completed 7 days of IV antibiotics. His coumadin was held due to supratherapeutic INR levels, now subtherapeutic INR 1.54 to restart coumadin tonight 2 mg and then resume 1 mg after and follow up with primary care provider within 1 week to check INR levels. He was also found to have an incidental finding on CT of possible hematoma of right pectineus muscle. During his hospitalization he suffered from acute kidney injury.  He was seen by nephrology - his spirolactone and torsemide were held for a period.  As his kidneys recovered he was restarted on home dose of toresemide and half his dose of spirolactone.  His metoprolol succinate was stopped during his hospitalization due to hypotension.  If his blood pressure can recover, he will benefit from the metoprolol being restarted. No pain, tenderness, swelling hematoma or wound seen at site, to follow up with primary care provider outpatient.   Patient feels well and has no complaints. Vital signs over last 24 hours stable. Patient stable for discharge to subacute rehab.    I have personally seen and examined patient on the above date.  I discussed the case with Dr. Curry and I agree with findings and plan as detailed per note above, which I have amended where appropriate.

## 2021-09-21 NOTE — PROGRESS NOTE ADULT - PROVIDER SPECIALTY LIST ADULT
Internal Medicine
Intervent Radiology
Nephrology
Nephrology
Hospitalist
Internal Medicine
Internal Medicine
Intervent Radiology
Nephrology
Surgery
Surgery
Hospitalist
Internal Medicine

## 2021-09-21 NOTE — DISCHARGE NOTE PROVIDER - CARE PROVIDER_API CALL
John Page)  [id number]  [address] [place] [address], [address]  [place] [address]  Phone: [phone]  Fax: [phone]  Established Patient  Follow Up Time: 1-3 days    Sea Antunez)  Radiology Physicians  [address] [place] [address]  Phone: [phone]  Fax: [phone]  Follow Up Time: Routine    David Farmer)  Surgery; Surgical Critical Care  [address] [place] [address]  Phone: [phone]  Fax: [phone]  Follow Up Time: Routine

## 2021-09-21 NOTE — DISCHARGE NOTE PROVIDER - NSDCCPCAREPLAN_GEN_ALL_CORE_FT
PRINCIPAL DISCHARGE DIAGNOSIS  Diagnosis: Acute cholecystitis  Assessment and Plan of Treatment: You were diagnosed with and treated for acute cholecystitis which is an inflammation of your gallbladder.  You had percuatenous cholecystostomy drain placed, interventional radiology will call to make appointment with you to have drain removed in 6 weeks.  Follow up with your primary care provider in 1 week.  Also follow up with general surgery for possible evaluation for lap cholecsystectomy procedure. Refer to follow up section for contact information.         SECONDARY DISCHARGE DIAGNOSES  Diagnosis: Acute cystitis  Assessment and Plan of Treatment:   You were noted either on arrival or during this hospitalization to have a Urinary Tract Infection. You may have already been treated and completed the antibiotics, please refer to the list of medications present on your discharge paperwork. If you notice that there are antibiotics listed, these may be to treat your infection, be sure to complete taking the full course, whether you have symptoms or not, as prescribed.  While taking antibiotics, you may benefit from taking a probiotic such as florastore to help to try and prevent an infectious type of diarrhea known as C Diff. If you notice that you begin having severe watery diarrhea, more than 4-5 episodes a day, please see your Primary Care Doctor or come to the ER to have your stool tested for this infection.   It is not necessary to repeat a urine test to see if the infection is gone, it is assumed that after treatment it should have resolved. However, if you continue to have symptoms, please see your Primary Care doctor or return to the ER.      Diagnosis: Hematoma  Assessment and Plan of Treatment: You were found to have incidental finding of masslike enlargement/low-attenuation of the inferior aspect of the right pectineus muscle. This is nonspecific. Further evaluation and follow-up is recommended. Please follow up with your primary care provider.       Diagnosis: Supratherapeutic INR  Assessment and Plan of Treatment: You were noted to have a higher than normal INR. Coumadin was held. Your most recent INR was 1.54. Take Coumadin 2 mg today, then 1 mg as prescribed until you follow up wtih your primary care provider within 1 week to check your repeat INR levels.

## 2021-09-21 NOTE — PROGRESS NOTE ADULT - ASSESSMENT
82 y/o male with a PMH of HFpEF (50-55%), chronic afib on coumadin followed by cardio Dr. Scherer, recent Micra PPM placement for tachy-selvin syndrome, DVT July 2021, HTN, and COPD on 2L home O2 p/w epigastric/RUQ pain 2/2 acute cholecystitis.    #Acute cholecystitis   - EKG: no ischemic changes, Troponin T, Serum: 0.03 > 0.02 > 0.02 > 0.03  - hold BB due to bradycardia   - Alkaline Phosphatase, Serum: 130 U/L; other LFTs wnl   - gallbladder cultures: klebsiella pan sensitive   - s/p perc cholecystectomy tube placed 9/14--needs follow up appointment in 6 weeks with IR regarding drain-IR to call patient with appointment   - completed 7 days of IV antibiotics      #DORON on CKD 3b likely secondary to KD (CT a/p IV con)   - Cr 2.7; (baseline Cr 1.2-1.6)  currently at 1  - nephrology following, Dr. Quick  - restarted diuretics 9/20    #Cystitis:  -  CT Abdomen and Pelvis w/ IV Cont (09.12.21 @ 13:58) : Urinary bladder wall thickening and enhancement most compatible with cystitis. Correlation with urinary urinalysis is recommended  - UA: large LE, moderate blood, Many bacteria WBC >720  - no evidence of sepsis  - Had Klebseilla - completed antibiotic treatment     #COPD not in exacerbation  - no evidence of COPD exacerbation on exam   - saturating well on home dose O2 (2L)   - c/w home inhalers: Symbicort duonebs PRN    #Supratherapeutic INR   - INR trending up, vit k 2.5mg given 9/20  -INR now 1.54  - home regimen: coumadin 1 mg M/W/F, 2mg T/Th/Sa/Sun    #HFpEF-echo 50-55%  #A-Fib on coumadin  #CAD s/p PCI and stent 2007  #DLD  - no evidence of fluid overload on exam   - TTE (7/11/2021) w/ EF 50-55%. Moderate to severe left atrial enlargement. Moderate mitral valve regurgitation. Moderate-severe tricuspid regurgitation.  Estimated pulmonary artery systolic pressure is 59.7 mmHg assuming a right atrial pressure of 10 mmHg, which is consistent with moderate pulmonary hypertension.  -  monitor I&O, daily weight   - c/w statin  - restated aldactone and torsemide 9/20     #Possible hematoma of right pectineus muscle:   - incidental finding of on CT  - no pain, tenderness, swelling, hematoma, or wound see at the site  - continue to monitor     #Deep vein thrombosis (DVT) of left lower extremity  - [facility] Duplex Lower Ext Vein Scan, Bilat (07.15.21)No evidence of deep venous thrombosis in the left lower extremity. Chronic Thrombus is noted in the right common femoral, femoral, and popliteal vein  -restarted coumadin 9/21    #Macrocytic Anemia  #folic acid deficiency   - MCV 95.9, Hemoglobin 12  - folate low, started folic acid daily     - DVT Prophylaxis: coumadin  - GI Prophylaxis: pantoprazole  - Diet: regular low fat   - Activity: as tolerated  - Code Status: Full Code  - Dispo: discharge to [facility] today    >30min spent on discharge and counseling of daughter on medication changes since hospitalization

## 2021-09-21 NOTE — PROGRESS NOTE ADULT - ASSESSMENT
DORON   -resolved  acute cheli s/p perc cheli  klebsiella UTI  chronic HFpEF  Afib with SVR / NSVT / CAD / PCI  COPD on home O2  hf of DVT  anemia    plan:    cont torsemide 20mg po qd   cont aldactone 12.5mg po qd    completed abx course  perc cheli drain care  physical therapy  outpt renal f/u  f/u bmp in 1-2 weeks

## 2021-09-23 ENCOUNTER — APPOINTMENT (OUTPATIENT)
Dept: CARDIOLOGY | Facility: CLINIC | Age: 81
End: 2021-09-23

## 2021-09-29 NOTE — CDI QUERY NOTE - NSCDIOTHERTXTBX_GEN_ALL_CORE_HH
Dr. Dawson,    80 y/o male admitted with Acute Cholecystitis.    Creatinine 9/14/21   1.6  Creatinine 9/15/21   2.5  Creatinine 9/16/21   2.9  Creatinine 9/17/21   2.7  Creatinine 9/18/21   2.1    Nephrology consult 9/16/21   DORON   - worsening   - likely contrast nephropathy in setting of heart disease, diuretics, hypotension and infection    -Hold Diuretics, encourage po hydration    Patient's creatinine remained leveled for >72 hours    Please clarify if the status of the patient's DORON.  -   ATN ruled in after study  -   ATN ruled out after study  -Clinically unable to determine  -Other (please specify)    Thank you  Melissa Torres

## 2021-10-06 DIAGNOSIS — I49.5 SICK SINUS SYNDROME: ICD-10-CM

## 2021-10-06 DIAGNOSIS — D53.9 NUTRITIONAL ANEMIA, UNSPECIFIED: ICD-10-CM

## 2021-10-06 DIAGNOSIS — J44.9 CHRONIC OBSTRUCTIVE PULMONARY DISEASE, UNSPECIFIED: ICD-10-CM

## 2021-10-06 DIAGNOSIS — E53.8 DEFICIENCY OF OTHER SPECIFIED B GROUP VITAMINS: ICD-10-CM

## 2021-10-06 DIAGNOSIS — I25.10 ATHEROSCLEROTIC HEART DISEASE OF NATIVE CORONARY ARTERY WITHOUT ANGINA PECTORIS: ICD-10-CM

## 2021-10-06 DIAGNOSIS — Z45.018 ENCOUNTER FOR ADJUSTMENT AND MANAGEMENT OF OTHER PART OF CARDIAC PACEMAKER: ICD-10-CM

## 2021-10-06 DIAGNOSIS — R18.8 OTHER ASCITES: ICD-10-CM

## 2021-10-06 DIAGNOSIS — N18.32 CHRONIC KIDNEY DISEASE, STAGE 3B: ICD-10-CM

## 2021-10-06 DIAGNOSIS — I27.29 OTHER SECONDARY PULMONARY HYPERTENSION: ICD-10-CM

## 2021-10-06 DIAGNOSIS — I82.511 CHRONIC EMBOLISM AND THROMBOSIS OF RIGHT FEMORAL VEIN: ICD-10-CM

## 2021-10-06 DIAGNOSIS — Z99.81 DEPENDENCE ON SUPPLEMENTAL OXYGEN: ICD-10-CM

## 2021-10-06 DIAGNOSIS — Z79.01 LONG TERM (CURRENT) USE OF ANTICOAGULANTS: ICD-10-CM

## 2021-10-06 DIAGNOSIS — K81.0 ACUTE CHOLECYSTITIS: ICD-10-CM

## 2021-10-06 DIAGNOSIS — I82.531 CHRONIC EMBOLISM AND THROMBOSIS OF RIGHT POPLITEAL VEIN: ICD-10-CM

## 2021-10-06 DIAGNOSIS — I13.0 HYPERTENSIVE HEART AND CHRONIC KIDNEY DISEASE WITH HEART FAILURE AND STAGE 1 THROUGH STAGE 4 CHRONIC KIDNEY DISEASE, OR UNSPECIFIED CHRONIC KIDNEY DISEASE: ICD-10-CM

## 2021-10-06 DIAGNOSIS — R10.13 EPIGASTRIC PAIN: ICD-10-CM

## 2021-10-06 DIAGNOSIS — I50.32 CHRONIC DIASTOLIC (CONGESTIVE) HEART FAILURE: ICD-10-CM

## 2021-10-06 DIAGNOSIS — B96.1 KLEBSIELLA PNEUMONIAE [K. PNEUMONIAE] AS THE CAUSE OF DISEASES CLASSIFIED ELSEWHERE: ICD-10-CM

## 2021-10-06 DIAGNOSIS — N30.90 CYSTITIS, UNSPECIFIED WITHOUT HEMATURIA: ICD-10-CM

## 2021-10-06 DIAGNOSIS — I48.20 CHRONIC ATRIAL FIBRILLATION, UNSPECIFIED: ICD-10-CM

## 2021-10-06 DIAGNOSIS — R79.1 ABNORMAL COAGULATION PROFILE: ICD-10-CM

## 2021-10-06 DIAGNOSIS — N17.0 ACUTE KIDNEY FAILURE WITH TUBULAR NECROSIS: ICD-10-CM

## 2021-10-12 ENCOUNTER — APPOINTMENT (OUTPATIENT)
Dept: CARDIOLOGY | Facility: CLINIC | Age: 81
End: 2021-10-12

## 2021-10-16 ENCOUNTER — EMERGENCY (EMERGENCY)
Facility: HOSPITAL | Age: 81
LOS: 0 days | Discharge: HOME | End: 2021-10-16
Attending: EMERGENCY MEDICINE | Admitting: EMERGENCY MEDICINE
Payer: MEDICARE

## 2021-10-16 VITALS
SYSTOLIC BLOOD PRESSURE: 120 MMHG | DIASTOLIC BLOOD PRESSURE: 58 MMHG | HEIGHT: 75 IN | OXYGEN SATURATION: 100 % | RESPIRATION RATE: 17 BRPM | TEMPERATURE: 98 F | WEIGHT: 179.9 LBS | HEART RATE: 58 BPM

## 2021-10-16 DIAGNOSIS — R11.2 NAUSEA WITH VOMITING, UNSPECIFIED: ICD-10-CM

## 2021-10-16 DIAGNOSIS — Z95.5 PRESENCE OF CORONARY ANGIOPLASTY IMPLANT AND GRAFT: ICD-10-CM

## 2021-10-16 DIAGNOSIS — Z79.01 LONG TERM (CURRENT) USE OF ANTICOAGULANTS: ICD-10-CM

## 2021-10-16 DIAGNOSIS — J44.9 CHRONIC OBSTRUCTIVE PULMONARY DISEASE, UNSPECIFIED: ICD-10-CM

## 2021-10-16 DIAGNOSIS — R79.1 ABNORMAL COAGULATION PROFILE: ICD-10-CM

## 2021-10-16 DIAGNOSIS — N39.0 URINARY TRACT INFECTION, SITE NOT SPECIFIED: ICD-10-CM

## 2021-10-16 DIAGNOSIS — I11.0 HYPERTENSIVE HEART DISEASE WITH HEART FAILURE: ICD-10-CM

## 2021-10-16 DIAGNOSIS — R10.32 LEFT LOWER QUADRANT PAIN: ICD-10-CM

## 2021-10-16 DIAGNOSIS — I48.91 UNSPECIFIED ATRIAL FIBRILLATION: ICD-10-CM

## 2021-10-16 DIAGNOSIS — Z96.651 PRESENCE OF RIGHT ARTIFICIAL KNEE JOINT: Chronic | ICD-10-CM

## 2021-10-16 DIAGNOSIS — Z95.5 PRESENCE OF CORONARY ANGIOPLASTY IMPLANT AND GRAFT: Chronic | ICD-10-CM

## 2021-10-16 DIAGNOSIS — I50.30 UNSPECIFIED DIASTOLIC (CONGESTIVE) HEART FAILURE: ICD-10-CM

## 2021-10-16 LAB
ALBUMIN SERPL ELPH-MCNC: 3.4 G/DL — LOW (ref 3.5–5.2)
ALP SERPL-CCNC: 107 U/L — SIGNIFICANT CHANGE UP (ref 30–115)
ALT FLD-CCNC: 7 U/L — SIGNIFICANT CHANGE UP (ref 0–41)
ANION GAP SERPL CALC-SCNC: 16 MMOL/L — HIGH (ref 7–14)
APPEARANCE UR: CLEAR — SIGNIFICANT CHANGE UP
APTT BLD: 56.8 SEC — HIGH (ref 27–39.2)
AST SERPL-CCNC: 13 U/L — SIGNIFICANT CHANGE UP (ref 0–41)
BACTERIA # UR AUTO: ABNORMAL
BASOPHILS # BLD AUTO: 0.02 K/UL — SIGNIFICANT CHANGE UP (ref 0–0.2)
BASOPHILS NFR BLD AUTO: 0.3 % — SIGNIFICANT CHANGE UP (ref 0–1)
BILIRUB SERPL-MCNC: 0.5 MG/DL — SIGNIFICANT CHANGE UP (ref 0.2–1.2)
BILIRUB UR-MCNC: NEGATIVE — SIGNIFICANT CHANGE UP
BUN SERPL-MCNC: 32 MG/DL — HIGH (ref 10–20)
CALCIUM SERPL-MCNC: 8.4 MG/DL — LOW (ref 8.5–10.1)
CHLORIDE SERPL-SCNC: 101 MMOL/L — SIGNIFICANT CHANGE UP (ref 98–110)
CO2 SERPL-SCNC: 21 MMOL/L — SIGNIFICANT CHANGE UP (ref 17–32)
COLOR SPEC: YELLOW — SIGNIFICANT CHANGE UP
CREAT SERPL-MCNC: 1 MG/DL — SIGNIFICANT CHANGE UP (ref 0.7–1.5)
DIFF PNL FLD: ABNORMAL
EOSINOPHIL # BLD AUTO: 0.06 K/UL — SIGNIFICANT CHANGE UP (ref 0–0.7)
EOSINOPHIL NFR BLD AUTO: 0.8 % — SIGNIFICANT CHANGE UP (ref 0–8)
EPI CELLS # UR: 0 /HPF — SIGNIFICANT CHANGE UP (ref 0–5)
GLUCOSE SERPL-MCNC: 53 MG/DL — CRITICAL LOW (ref 70–99)
GLUCOSE UR QL: NEGATIVE — SIGNIFICANT CHANGE UP
GRAN CASTS # UR COMP ASSIST: 0 /LPF — SIGNIFICANT CHANGE UP
HCT VFR BLD CALC: 36.7 % — LOW (ref 42–52)
HGB BLD-MCNC: 11.7 G/DL — LOW (ref 14–18)
HYALINE CASTS # UR AUTO: 12 /LPF — HIGH (ref 0–7)
IMM GRANULOCYTES NFR BLD AUTO: 0.5 % — HIGH (ref 0.1–0.3)
INR BLD: 4.81 RATIO — HIGH (ref 0.65–1.3)
KETONES UR-MCNC: NEGATIVE — SIGNIFICANT CHANGE UP
LACTATE SERPL-SCNC: 1 MMOL/L — SIGNIFICANT CHANGE UP (ref 0.7–2)
LEUKOCYTE ESTERASE UR-ACNC: ABNORMAL
LIDOCAIN IGE QN: 10 U/L — SIGNIFICANT CHANGE UP (ref 7–60)
LYMPHOCYTES # BLD AUTO: 0.57 K/UL — LOW (ref 1.2–3.4)
LYMPHOCYTES # BLD AUTO: 7.6 % — LOW (ref 20.5–51.1)
MCHC RBC-ENTMCNC: 31.6 PG — HIGH (ref 27–31)
MCHC RBC-ENTMCNC: 31.9 G/DL — LOW (ref 32–37)
MCV RBC AUTO: 99.2 FL — HIGH (ref 80–94)
MONOCYTES # BLD AUTO: 0.64 K/UL — HIGH (ref 0.1–0.6)
MONOCYTES NFR BLD AUTO: 8.5 % — SIGNIFICANT CHANGE UP (ref 1.7–9.3)
NEUTROPHILS # BLD AUTO: 6.17 K/UL — SIGNIFICANT CHANGE UP (ref 1.4–6.5)
NEUTROPHILS NFR BLD AUTO: 82.3 % — HIGH (ref 42.2–75.2)
NITRITE UR-MCNC: POSITIVE
NRBC # BLD: 0 /100 WBCS — SIGNIFICANT CHANGE UP (ref 0–0)
PH UR: 6 — SIGNIFICANT CHANGE UP (ref 5–8)
PLATELET # BLD AUTO: 214 K/UL — SIGNIFICANT CHANGE UP (ref 130–400)
POTASSIUM SERPL-MCNC: 4.4 MMOL/L — SIGNIFICANT CHANGE UP (ref 3.5–5)
POTASSIUM SERPL-SCNC: 4.4 MMOL/L — SIGNIFICANT CHANGE UP (ref 3.5–5)
PROT SERPL-MCNC: 6.9 G/DL — SIGNIFICANT CHANGE UP (ref 6–8)
PROT UR-MCNC: NEGATIVE — SIGNIFICANT CHANGE UP
PROTHROM AB SERPL-ACNC: >40 SEC — HIGH (ref 9.95–12.87)
RBC # BLD: 3.7 M/UL — LOW (ref 4.7–6.1)
RBC # FLD: 16.6 % — HIGH (ref 11.5–14.5)
RBC CASTS # UR COMP ASSIST: 3 /HPF — SIGNIFICANT CHANGE UP (ref 0–4)
SODIUM SERPL-SCNC: 138 MMOL/L — SIGNIFICANT CHANGE UP (ref 135–146)
SP GR SPEC: 1.01 — SIGNIFICANT CHANGE UP (ref 1.01–1.03)
UROBILINOGEN FLD QL: SIGNIFICANT CHANGE UP
WBC # BLD: 7.5 K/UL — SIGNIFICANT CHANGE UP (ref 4.8–10.8)
WBC # FLD AUTO: 7.5 K/UL — SIGNIFICANT CHANGE UP (ref 4.8–10.8)
WBC UR QL: 2 /HPF — SIGNIFICANT CHANGE UP (ref 0–5)

## 2021-10-16 PROCEDURE — 71045 X-RAY EXAM CHEST 1 VIEW: CPT | Mod: 26

## 2021-10-16 PROCEDURE — 99284 EMERGENCY DEPT VISIT MOD MDM: CPT | Mod: GC

## 2021-10-16 PROCEDURE — 74177 CT ABD & PELVIS W/CONTRAST: CPT | Mod: 26,MA

## 2021-10-16 RX ORDER — SODIUM CHLORIDE 9 MG/ML
1000 INJECTION INTRAMUSCULAR; INTRAVENOUS; SUBCUTANEOUS ONCE
Refills: 0 | Status: COMPLETED | OUTPATIENT
Start: 2021-10-16 | End: 2021-10-16

## 2021-10-16 RX ORDER — CEFPODOXIME PROXETIL 100 MG
1 TABLET ORAL
Qty: 20 | Refills: 0
Start: 2021-10-16 | End: 2021-10-25

## 2021-10-16 RX ORDER — DEXTROSE 50 % IN WATER 50 %
50 SYRINGE (ML) INTRAVENOUS ONCE
Refills: 0 | Status: COMPLETED | OUTPATIENT
Start: 2021-10-16 | End: 2021-10-16

## 2021-10-16 RX ORDER — ONDANSETRON 8 MG/1
1 TABLET, FILM COATED ORAL
Qty: 9 | Refills: 0
Start: 2021-10-16 | End: 2021-10-18

## 2021-10-16 RX ORDER — CEFTRIAXONE 500 MG/1
1000 INJECTION, POWDER, FOR SOLUTION INTRAMUSCULAR; INTRAVENOUS ONCE
Refills: 0 | Status: COMPLETED | OUTPATIENT
Start: 2021-10-16 | End: 2021-10-16

## 2021-10-16 RX ORDER — KETOROLAC TROMETHAMINE 30 MG/ML
15 SYRINGE (ML) INJECTION ONCE
Refills: 0 | Status: DISCONTINUED | OUTPATIENT
Start: 2021-10-16 | End: 2021-10-16

## 2021-10-16 RX ORDER — MORPHINE SULFATE 50 MG/1
4 CAPSULE, EXTENDED RELEASE ORAL ONCE
Refills: 0 | Status: DISCONTINUED | OUTPATIENT
Start: 2021-10-16 | End: 2021-10-16

## 2021-10-16 RX ORDER — ONDANSETRON 8 MG/1
4 TABLET, FILM COATED ORAL ONCE
Refills: 0 | Status: COMPLETED | OUTPATIENT
Start: 2021-10-16 | End: 2021-10-16

## 2021-10-16 RX ADMIN — CEFTRIAXONE 1000 MILLIGRAM(S): 500 INJECTION, POWDER, FOR SOLUTION INTRAMUSCULAR; INTRAVENOUS at 17:10

## 2021-10-16 RX ADMIN — CEFTRIAXONE 100 MILLIGRAM(S): 500 INJECTION, POWDER, FOR SOLUTION INTRAMUSCULAR; INTRAVENOUS at 17:10

## 2021-10-16 RX ADMIN — Medication 15 MILLIGRAM(S): at 19:54

## 2021-10-16 RX ADMIN — MORPHINE SULFATE 4 MILLIGRAM(S): 50 CAPSULE, EXTENDED RELEASE ORAL at 17:10

## 2021-10-16 RX ADMIN — SODIUM CHLORIDE 1000 MILLILITER(S): 9 INJECTION INTRAMUSCULAR; INTRAVENOUS; SUBCUTANEOUS at 11:53

## 2021-10-16 RX ADMIN — Medication 50 MILLILITER(S): at 14:45

## 2021-10-16 RX ADMIN — ONDANSETRON 4 MILLIGRAM(S): 8 TABLET, FILM COATED ORAL at 11:53

## 2021-10-16 RX ADMIN — MORPHINE SULFATE 4 MILLIGRAM(S): 50 CAPSULE, EXTENDED RELEASE ORAL at 13:39

## 2021-10-16 NOTE — ED PROVIDER NOTE - OBJECTIVE STATEMENT
80 y/o male with a PMH of HFpEF (50-55%), chronic afib on coumadin followed by cardio Dr. Scherer, recent Micra PPM placement for tachy-selvin syndrome, DVT July 2021, HTN, and COPD on 2L home O2 s/p percutaneous drainage and cholecystostomy tube placed on 9/14 for acute cheli with drain in place presenting for LLQ abd pain x2-3 days associated with n/v. No diarrhea. No hematuria, urinary symptoms. Normal BM. No f/c. No recent travel/sick contacts. Pt presenting from nursing home.

## 2021-10-16 NOTE — ED ADULT TRIAGE NOTE - CHIEF COMPLAINT QUOTE
patient c/o abdominal pain with vomiting recently has gall bladder removed with alfa drain on right abdomen

## 2021-10-16 NOTE — ED ADULT NURSE REASSESSMENT NOTE - NSIMPLEMENTINTERV_GEN_ALL_ED
Implemented All Fall with Harm Risk Interventions:  [place] to call system. Call bell, personal items and telephone within reach. Instruct patient to call for assistance. Room bathroom lighting operational. Non-slip footwear when patient is off stretcher. Physically safe environment: no spills, clutter or unnecessary equipment. Stretcher in lowest position, wheels locked, appropriate side rails in place. Provide visual cue, wrist band, yellow gown, etc. Monitor gait and stability. Monitor for mental status changes and reorient to person, place, and time. Review medications for side effects contributing to fall risk. Reinforce activity limits and safety measures with patient and family. Provide visual clues: red socks.

## 2021-10-16 NOTE — ED PROVIDER NOTE - NSICDXPASTSURGICALHX_GEN_ALL_CORE_FT
PAST SURGICAL HISTORY:  History of total right knee replacement     S/P coronary artery stent placement     
not applicable

## 2021-10-16 NOTE — ED PROVIDER NOTE - CLINICAL SUMMARY MEDICAL DECISION MAKING FREE TEXT BOX
Patient presented with LLQ abdominal pain s/p cholecystostomy placement. (+) tender on exam but otherwise afebrile, HD stable, well appearing. Obtained labs which were grossly unremarkable including no significant leukocytosis, anemia, signs of dehydration/DORON, transaminitis or significant electrolyte abnormalities. UA showed (+) mild infection. CT abd/pelvis negative for emergent processes. Patient felt much better after tx in ED, and serial abdominal exams benign. Able to tolerate PO. Given the above, will discharge home with PO abx for borderline UTI, outpatient follow up. Patient agreeable with plan. Agrees to return to ED for any new or worsening symptoms.

## 2021-10-16 NOTE — ED PROVIDER NOTE - PATIENT PORTAL LINK FT
You can access the FollowMyHealth Patient Portal offered by [facility] by registering at the following website: [email or web address]. By joining [employer]’s FollowMyHealth portal, you will also be able to view your health information using other applications (apps) compatible with our system.

## 2021-10-16 NOTE — ED PROVIDER NOTE - CARE PROVIDER_API CALL
John Page)  [address] [place] [address], [place] [address]  Phone: [phone]  Fax: [phone]  Follow Up Time:

## 2021-10-16 NOTE — ED PROVIDER NOTE - PHYSICAL EXAMINATION
CONSTITUTIONAL: Well-developed; well-nourished; in no acute distress.   SKIN: warm, dry  HEAD: Normocephalic; atraumatic.  EYES: no conjunctival injection. PERRL.   ENT: No nasal discharge; airway clear.  NECK: Supple; non tender.  CARD: S1, S2 normal; no murmurs, gallops, or rubs. Regular rate and rhythm.   RESP: No wheezes, rales or rhonchi.  ABD: soft, nondistended, LLQ ttp   EXT: Normal ROM.  No clubbing, cyanosis or edema.   LYMPH: No acute cervical adenopathy.  NEURO: Alert, oriented, grossly unremarkable  PSYCH: Cooperative, appropriate.

## 2021-10-16 NOTE — ED PROVIDER NOTE - NSICDXPASTMEDICALHX_GEN_ALL_CORE_FT
PAST MEDICAL HISTORY:  Cellulitis of left lower extremity     CHF (congestive heart failure)     Chronic atrial fibrillation     COPD (chronic obstructive pulmonary disease)     Deep vein thrombosis (DVT) of left lower extremity, unspecified chronicity, unspecified vein     Hypertension     Mitral valve insufficiency, unspecified etiology Moderate

## 2021-10-16 NOTE — ED ADULT NURSE REASSESSMENT NOTE - NS ED NURSE REASSESS COMMENT FT1
BA placed. Pt on Tele/O2 monitor. No s/s of emergent distress noted. Fall precautions maintained. Will f/u.

## 2021-10-16 NOTE — ED PROVIDER NOTE - ATTENDING CONTRIBUTION TO CARE
81 year old male, pmhx as documented presenting with LLQ abdominal pain x 2-3 days s/p cholecystostomy tube placement. Pain is LLQ, non-radiating, dull, no palliative or provocative factors, moderate severity associated with nausea and several episodes of vomiting. Otherwise fevers, diarrhea, blood in stool, urinary symptoms, or any other complaints.    Vital Signs: I have reviewed the initial vital signs.  Constitutional: NAD, well-nourished, appears stated age, no acute distress.  HEENT: Airway patent, moist MM, no erythema/swelling/deformity of oral structures. EOMI, PERRLA.  CV: regular rate, regular rhythm, well-perfused extremities, 2+ b/l DP and radial pulses equal.  Lungs: BCTA, no increased WOB.  ABD: (+) LLQ tenderness, no guarding or rebound, no pulsatile mass, no hernias.   MSK: Neck supple, nontender, nl ROM, no stepoff. Chest nontender. Back nontender in TLS spine or to b/l bony structures or flanks. Ext nontender, nl rom, no deformity.   INTEG: Skin warm, dry, no rash.  NEURO: A&Ox3, normal strength, nl sensation throughout, normal speech.   PSYCH: Calm, cooperative, normal affect and interaction.    Will obtain labs, UA, CT abd/pelvis, give IVF, symptomatic control PRN, re-eval.

## 2021-10-16 NOTE — ED ADULT NURSE NOTE - NSIMPLEMENTINTERV_GEN_ALL_ED
Implemented All Universal Safety Interventions:  [place] to call system. Call bell, personal items and telephone within reach. Instruct patient to call for assistance. Room bathroom lighting operational. Non-slip footwear when patient is off stretcher. Physically safe environment: no spills, clutter or unnecessary equipment. Stretcher in lowest position, wheels locked, appropriate side rails in place.

## 2021-10-16 NOTE — ED PROVIDER NOTE - PROGRESS NOTE DETAILS
No episodes of n/v in the ED. No episodes of pain in the ED. Pt tolerating PO. Requesting to go home. To be d/peyton with cefpodoxime for UTI. Pt told that his INR is supratherapeutic. Instructed to hold his coumadin dose for 1 day -traore

## 2021-10-16 NOTE — ED ADULT NURSE NOTE - NSFALLRSKASSESASSIST_ED_ALL_ED
Obstructive Sleep Apnea (YUSEF) Screening     Patient:  Yelitza Brumfield    YOB: 1953      Medical Record #:  [id number]                     Date:  10/21/2020     1. Are you a loud and/or regular snorer? []  Yes       [x] No    2. Have you been observed to gasp or stop breathing during sleep? []  Yes       [x] No    3. Do you feel tired or groggy upon awakening or do you awaken with a headache?           []  Yes       [] No    4. Are you often tired or fatigued during the wake time hours? []  Yes       [] No    5. Do you fall asleep sitting, reading, watching TV or driving? []  Yes       [] No    6. Do you often have problems with memory or concentration? []  Yes       [] No    **If patient's score is ? 3 they are considered high risk for YUSEF. An Anesthesia provider will evaluate the patient and develop a plan of care the day of surgery. Note:  If the patient's BMI is more than 35 kg m¯² , has neck circumference > 40 cm, and/or high blood pressure the risk is greater (© American Sleep Apnea Association, 2006). no

## 2021-10-16 NOTE — ED PROVIDER NOTE - NSFOLLOWUPINSTRUCTIONS_ED_ALL_ED_FT
PLEASE HOLD YOU COUMADIN DOSE FOR 1 DAY (TODAY) AS YOUR INR IS 4.8    Urinary Tract Infection    A urinary tract infection (UTI) is an infection of any part of the urinary tract, which includes the kidneys, ureters, bladder, and urethra. Risk factors include ignoring your need to urinate, wiping back to front if female, being an uncircumcised male, and having diabetes or a weak immune system. Symptoms include frequent urination, pain or burning with urination, foul smelling urine, cloudy urine, pain in the lower abdomen, blood in the urine, and fever. If you were prescribed an antibiotic medicine, take it as told by your health care provider. Do not stop taking the antibiotic even if you start to feel better.    SEEK IMMEDIATE MEDICAL CARE IF YOU HAVE ANY OF THE FOLLOWING SYMPTOMS: severe back or abdominal pain, fever, inability to keep fluids or medicine down, dizziness/lightheadedness, or a change in mental status.

## 2021-10-19 LAB
CULTURE RESULTS: SIGNIFICANT CHANGE UP
SPECIMEN SOURCE: SIGNIFICANT CHANGE UP

## 2021-10-29 ENCOUNTER — RX RENEWAL (OUTPATIENT)
Age: 81
End: 2021-10-29

## 2021-11-05 ENCOUNTER — RESULT REVIEW (OUTPATIENT)
Age: 81
End: 2021-11-05

## 2021-11-05 ENCOUNTER — OUTPATIENT (OUTPATIENT)
Dept: OUTPATIENT SERVICES | Facility: HOSPITAL | Age: 81
LOS: 1 days | Discharge: HOME | End: 2021-11-05
Payer: MEDICARE

## 2021-11-05 DIAGNOSIS — Z95.5 PRESENCE OF CORONARY ANGIOPLASTY IMPLANT AND GRAFT: Chronic | ICD-10-CM

## 2021-11-05 DIAGNOSIS — Z96.651 PRESENCE OF RIGHT ARTIFICIAL KNEE JOINT: Chronic | ICD-10-CM

## 2021-11-05 PROCEDURE — 47531 INJECTION FOR CHOLANGIOGRAM: CPT

## 2021-11-05 NOTE — PROGRESS NOTE ADULT - SUBJECTIVE AND OBJECTIVE BOX
Interventional Radiology Outpatient Documentation    PREOPERATIVE DAY OF PROCEDURE EVALUATION:     I have personally seen and examined this patient. I agree with the history and physical which I have reviewed.    Plan is for cholangiogram.  (Signed electronically) 11-10-21 @ 05:54     Procedure/ risks/ benefits/ goals/ alternatives were explained. All questions answered. Informed content obtained from patient. Consent placed in chart.     POSTOPERATIVE PROCEDURAL EVALUATION:     Procedure:  Cholangiogram    Pre-Op Diagnosis: Cholecystitis    Post-Op Diagnosis:  Same    Attending: Jimy  Resident: Blane    Anesthesia (type): None    Contrast: 5 cc    Estimated Blood Loss: None    Condition:   [ ] Critical  [ ] Serious  [ ] Fair   [x] Good    Findings/Follow up Plan of Care:  Cholelithiasis with obstruction of cystic duct.  Tube left in place.  Patient not a surgical candidate.  Will consult for cholangioscopy and stone removal.      See full report in pacs    Specimens Removed: None    Implants: None    Complications: None    Disposition: Recovery

## 2021-11-15 ENCOUNTER — APPOINTMENT (OUTPATIENT)
Dept: INTERVENTIONAL RADIOLOGY/VASCULAR | Facility: CLINIC | Age: 81
End: 2021-11-15
Payer: MEDICARE

## 2021-11-15 PROCEDURE — 99212 OFFICE O/P EST SF 10 MIN: CPT

## 2021-11-18 DIAGNOSIS — K80.11 CALCULUS OF GALLBLADDER WITH CHRONIC CHOLECYSTITIS WITH OBSTRUCTION: ICD-10-CM

## 2021-11-18 DIAGNOSIS — Z46.59 ENCOUNTER FOR FITTING AND ADJUSTMENT OF OTHER GASTROINTESTINAL APPLIANCE AND DEVICE: ICD-10-CM

## 2021-11-20 ENCOUNTER — EMERGENCY (EMERGENCY)
Facility: HOSPITAL | Age: 81
LOS: 0 days | Discharge: HOME | End: 2021-11-20
Attending: EMERGENCY MEDICINE | Admitting: EMERGENCY MEDICINE
Payer: MEDICARE

## 2021-11-20 VITALS
HEIGHT: 75 IN | HEART RATE: 77 BPM | SYSTOLIC BLOOD PRESSURE: 98 MMHG | TEMPERATURE: 98 F | DIASTOLIC BLOOD PRESSURE: 52 MMHG | RESPIRATION RATE: 17 BRPM | OXYGEN SATURATION: 99 %

## 2021-11-20 VITALS
OXYGEN SATURATION: 97 % | SYSTOLIC BLOOD PRESSURE: 102 MMHG | HEART RATE: 57 BPM | RESPIRATION RATE: 17 BRPM | DIASTOLIC BLOOD PRESSURE: 55 MMHG
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
## 2021-11-20 DIAGNOSIS — Z79.01 LONG TERM (CURRENT) USE OF ANTICOAGULANTS: ICD-10-CM

## 2021-11-20 DIAGNOSIS — Z20.822 CONTACT WITH AND (SUSPECTED) EXPOSURE TO COVID-19: ICD-10-CM
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
## 2021-11-20 DIAGNOSIS — Z96.651 PRESENCE OF RIGHT ARTIFICIAL KNEE JOINT: Chronic | ICD-10-CM

## 2021-11-20 DIAGNOSIS — J44.9 CHRONIC OBSTRUCTIVE PULMONARY DISEASE, UNSPECIFIED: ICD-10-CM

## 2021-11-20 DIAGNOSIS — T85.518A BREAKDOWN (MECHANICAL) OF OTHER GASTROINTESTINAL PROSTHETIC DEVICES, IMPLANTS AND GRAFTS, INITIAL ENCOUNTER: ICD-10-CM

## 2021-11-20 DIAGNOSIS — Z95.5 PRESENCE OF CORONARY ANGIOPLASTY IMPLANT AND GRAFT: Chronic | ICD-10-CM

## 2021-11-20 DIAGNOSIS — I50.9 HEART FAILURE, UNSPECIFIED: ICD-10-CM

## 2021-11-20 DIAGNOSIS — I48.20 CHRONIC ATRIAL FIBRILLATION, UNSPECIFIED: ICD-10-CM

## 2021-11-20 DIAGNOSIS — Z95.0 PRESENCE OF CARDIAC PACEMAKER: ICD-10-CM

## 2021-11-20 DIAGNOSIS — Z87.891 PERSONAL HISTORY OF NICOTINE DEPENDENCE: ICD-10-CM

## 2021-11-20 DIAGNOSIS — Z99.81 DEPENDENCE ON SUPPLEMENTAL OXYGEN: ICD-10-CM

## 2021-11-20 DIAGNOSIS — I11.0 HYPERTENSIVE HEART DISEASE WITH HEART FAILURE: ICD-10-CM

## 2021-11-20 DIAGNOSIS — Z86.718 PERSONAL HISTORY OF OTHER VENOUS THROMBOSIS AND EMBOLISM: ICD-10-CM

## 2021-11-20 LAB
ALBUMIN SERPL ELPH-MCNC: 2.8 G/DL — LOW (ref 3.5–5.2)
ALP SERPL-CCNC: 150 U/L — HIGH (ref 30–115)
ALT FLD-CCNC: 29 U/L — SIGNIFICANT CHANGE UP (ref 0–41)
ANION GAP SERPL CALC-SCNC: 12 MMOL/L — SIGNIFICANT CHANGE UP (ref 7–14)
AST SERPL-CCNC: 24 U/L — SIGNIFICANT CHANGE UP (ref 0–41)
BASOPHILS # BLD AUTO: 0.02 K/UL — SIGNIFICANT CHANGE UP (ref 0–0.2)
BASOPHILS NFR BLD AUTO: 0.3 % — SIGNIFICANT CHANGE UP (ref 0–1)
BILIRUB SERPL-MCNC: 0.6 MG/DL — SIGNIFICANT CHANGE UP (ref 0.2–1.2)
BUN SERPL-MCNC: 31 MG/DL — HIGH (ref 10–20)
CALCIUM SERPL-MCNC: 8.4 MG/DL — LOW (ref 8.5–10.1)
CHLORIDE SERPL-SCNC: 105 MMOL/L — SIGNIFICANT CHANGE UP (ref 98–110)
CO2 SERPL-SCNC: 18 MMOL/L — SIGNIFICANT CHANGE UP (ref 17–32)
CREAT SERPL-MCNC: 1 MG/DL — SIGNIFICANT CHANGE UP (ref 0.7–1.5)
EOSINOPHIL # BLD AUTO: 0.06 K/UL — SIGNIFICANT CHANGE UP (ref 0–0.7)
EOSINOPHIL NFR BLD AUTO: 0.8 % — SIGNIFICANT CHANGE UP (ref 0–8)
GLUCOSE SERPL-MCNC: 104 MG/DL — HIGH (ref 70–99)
HCT VFR BLD CALC: 36.9 % — LOW (ref 42–52)
HGB BLD-MCNC: 11.9 G/DL — LOW (ref 14–18)
IMM GRANULOCYTES NFR BLD AUTO: 0.4 % — HIGH (ref 0.1–0.3)
LACTATE SERPL-SCNC: 1.7 MMOL/L — SIGNIFICANT CHANGE UP (ref 0.7–2)
LIDOCAIN IGE QN: 7 U/L — SIGNIFICANT CHANGE UP (ref 7–60)
LYMPHOCYTES # BLD AUTO: 0.65 K/UL — LOW (ref 1.2–3.4)
LYMPHOCYTES # BLD AUTO: 8.7 % — LOW (ref 20.5–51.1)
MCHC RBC-ENTMCNC: 32.2 G/DL — SIGNIFICANT CHANGE UP (ref 32–37)
MCHC RBC-ENTMCNC: 32.9 PG — HIGH (ref 27–31)
MCV RBC AUTO: 101.9 FL — HIGH (ref 80–94)
MONOCYTES # BLD AUTO: 0.93 K/UL — HIGH (ref 0.1–0.6)
MONOCYTES NFR BLD AUTO: 12.4 % — HIGH (ref 1.7–9.3)
NEUTROPHILS # BLD AUTO: 5.8 K/UL — SIGNIFICANT CHANGE UP (ref 1.4–6.5)
NEUTROPHILS NFR BLD AUTO: 77.4 % — HIGH (ref 42.2–75.2)
NRBC # BLD: 0 /100 WBCS — SIGNIFICANT CHANGE UP (ref 0–0)
PLATELET # BLD AUTO: 228 K/UL — SIGNIFICANT CHANGE UP (ref 130–400)
POTASSIUM SERPL-MCNC: 4.8 MMOL/L — SIGNIFICANT CHANGE UP (ref 3.5–5)
POTASSIUM SERPL-SCNC: 4.8 MMOL/L — SIGNIFICANT CHANGE UP (ref 3.5–5)
PROT SERPL-MCNC: 5.9 G/DL — LOW (ref 6–8)
RBC # BLD: 3.62 M/UL — LOW (ref 4.7–6.1)
RBC # FLD: 16 % — HIGH (ref 11.5–14.5)
SARS-COV-2 RNA SPEC QL NAA+PROBE: SIGNIFICANT CHANGE UP
SODIUM SERPL-SCNC: 135 MMOL/L — SIGNIFICANT CHANGE UP (ref 135–146)
WBC # BLD: 7.49 K/UL — SIGNIFICANT CHANGE UP (ref 4.8–10.8)
WBC # FLD AUTO: 7.49 K/UL — SIGNIFICANT CHANGE UP (ref 4.8–10.8)

## 2021-11-20 PROCEDURE — 76705 ECHO EXAM OF ABDOMEN: CPT | Mod: 26

## 2021-11-20 PROCEDURE — 99284 EMERGENCY DEPT VISIT MOD MDM: CPT | Mod: GC

## 2021-11-20 RX ORDER — SODIUM CHLORIDE 9 MG/ML
1000 INJECTION INTRAMUSCULAR; INTRAVENOUS; SUBCUTANEOUS ONCE
Refills: 0 | Status: COMPLETED | OUTPATIENT
Start: 2021-11-20 | End: 2021-11-20

## 2021-11-20 RX ORDER — MORPHINE SULFATE 50 MG/1
2 CAPSULE, EXTENDED RELEASE ORAL ONCE
Refills: 0 | Status: DISCONTINUED | OUTPATIENT
Start: 2021-11-20 | End: 2021-11-20

## 2021-11-20 RX ORDER — MORPHINE SULFATE 50 MG/1
4 CAPSULE, EXTENDED RELEASE ORAL ONCE
Refills: 0 | Status: DISCONTINUED | OUTPATIENT
Start: 2021-11-20 | End: 2021-11-20

## 2021-11-20 RX ADMIN — SODIUM CHLORIDE 1000 MILLILITER(S): 9 INJECTION INTRAMUSCULAR; INTRAVENOUS; SUBCUTANEOUS at 14:18

## 2021-11-20 RX ADMIN — MORPHINE SULFATE 4 MILLIGRAM(S): 50 CAPSULE, EXTENDED RELEASE ORAL at 14:17

## 2021-11-20 RX ADMIN — MORPHINE SULFATE 2 MILLIGRAM(S): 50 CAPSULE, EXTENDED RELEASE ORAL at 17:56

## 2021-11-20 NOTE — ED PROVIDER NOTE - PHYSICAL EXAMINATION
CONSTITUTIONAL: Old male with NC, laying on the bed  SKIN: warm, dry  HEAD: Normocephalic; atraumatic.  EYES: no conjunctival injection. PERRL.   ENT: No nasal discharge; airway clear.  NECK: Supple; non tender.  CARD: S1, S2 normal; no murmurs, gallops, or rubs. Regular rate and rhythm.   RESP: No wheezes, rales or rhonchi.  ABD: RUQ of abdomen with cholecystostomy site, no redness or pus, soft ntnd, no rebound tenderness or guarding, no CVAT b/l  EXT: Normal ROM.  No clubbing, cyanosis or edema.   LYMPH: No acute cervical adenopathy.  NEURO: Alert, oriented, grossly unremarkable  PSYCH: Cooperative, appropriate.

## 2021-11-20 NOTE — ED PROVIDER NOTE - CLINICAL SUMMARY MEDICAL DECISION MAKING FREE TEXT BOX
Patient presents after a pulled out cholecystostomy tube. States that abdominal pain has resolved. no fever, no n/v. Discussed with Dr. Antunez from IR who placed drain. Reocmmending labs and sono. No acute findings. Results discussed with Dr. Antunez who states images have improved, if patient is not in pain can be sent home. Patient denies any pain, non tender abdomen. Discharged with IR and surgery follow up. Return precautions discussed.

## 2021-11-20 NOTE — ED PROVIDER NOTE - OBJECTIVE STATEMENT
Patient is a 82 yo male with PMHx of CHF, afib on coumadin, tachy/selvin s/p pacemaker, DVT, HTN, COPD on home 2 L NC c/o cholecystostomy tube taken out this morning. This morning, cholecystostomy tube was caught, and was accidentally pulled out, went to ED for possible reinsertion if needed. Denies fever, chills, abdominal pain, n/v/d. Cholecystostomy tube was placed on 9/14 by IR Dr. Antunez, for cholecystitis.

## 2021-11-20 NOTE — ED ADULT NURSE NOTE - NSIMPLEMENTINTERV_GEN_ALL_ED
Implemented All Fall Risk Interventions:  [place] to call system. Call bell, personal items and telephone within reach. Instruct patient to call for assistance. Room bathroom lighting operational. Non-slip footwear when patient is off stretcher. Physically safe environment: no spills, clutter or unnecessary equipment. Stretcher in lowest position, wheels locked, appropriate side rails in place. Provide visual cue, wrist band, yellow gown, etc. Monitor gait and stability. Monitor for mental status changes and reorient to person, place, and time. Review medications for side effects contributing to fall risk. Reinforce activity limits and safety measures with patient and family.

## 2021-11-20 NOTE — ED PROVIDER NOTE - NSFOLLOWUPCLINICS_GEN_ALL_ED_FT
[facility]  Surgery  [address] [place] [address]  Phone: [phone]  Fax:   Follow Up Time: 4-6 Days

## 2021-11-20 NOTE — ED PROVIDER NOTE - PATIENT PORTAL LINK FT
You can access the FollowMyHealth Patient Portal offered by [facility] by registering at the following website: [email or web address]. By joining [employer]’s FollowMyHealth portal, you will also be able to view your health information using other applications (apps) compatible with our system.

## 2021-11-20 NOTE — ED PROVIDER NOTE - ATTENDING CONTRIBUTION TO CARE
82 yo M PMH of chf, chronic afib on coumadin, DVT July 2021, HTN, and COPD on 2L home O2 s/p percutaneous drainage and cholecystostomy tube placed on 9/14 for acute cholecystitis presents with dislodged cholecystotomy tube. States that he was transferring from one bed to the other and the tube got pulled out. no pain. no n/v/d. States that the previous pain to the abdomen has resolve.d no fevers.     CONSTITUTIONAL: Well-developed; well-nourished; in no acute distress.   SKIN: warm, dry  HEAD: Normocephalic; atraumatic.  EYES: PERRL, EOMI, no conjunctival erythema  ENT: No nasal discharge; airway clear.  NECK: Supple; non tender.  CARD: S1, S2 normal;  Regular rate and rhythm.   RESP: No wheezes, rales or rhonchi.  ABD: soft non tender, non distended, no rebound or guarding  EXT: Normal ROM.  5/5 strength in all 4 extremities   LYMPH: No acute cervical adenopathy.  NEURO: Alert, oriented, grossly unremarkable. neurovascularly intact  PSYCH: Cooperative, appropriate.
none

## 2021-11-20 NOTE — ED PROVIDER NOTE - CARE PROVIDER_API CALL
Sea Antunez)  [facility]  [address] [place] [address]  Phone: [phone]  Fax: [phone]  Follow Up Time: 4-6 Days

## 2021-11-20 NOTE — ED PROVIDER NOTE - NSFOLLOWUPINSTRUCTIONS_ED_ALL_ED_FT
Cholecystostomy, Care After      This sheet gives you information about how to care for yourself after your procedure. Your health care provider may also give you more specific instructions. If you have problems or questions, contact your health care provider.      What can I expect after the procedure?    After your procedure, it is common to have soreness near the incision site of your drainage tube (catheter).      Follow these instructions at home:      Incision care    •Follow instructions from your health care provider about how to take care of your incision site where the catheter was inserted. Make sure you:  •Wash your hands with soap and water before and after you change your bandage (dressing). If soap and water are not available, use hand .      •Change your dressing as told by your health care provider.      •Check the incision site every day for signs of infection. Check for:  •Redness, swelling, or pain.      •Fluid or blood.      •Warmth.      •Pus or a bad smell.        • Do not take baths, swim, or use a hot tub until your health care provider approves. Ask your health care provider if you may take showers. You may only be allowed to take sponge baths.      General instructions     •Follow instructions from your health care provider about how to care for your catheter and collection bag at home.    •Your health care provider will show you:  •How to record the amount of drainage from the catheter.      •How to flush the catheter.      •How to care for the catheter incision site.        •Follow instructions from your health care provider about eating or drinking restrictions.      •Take over-the-counter and prescription medicines only as told by your health care provider.      •Keep all follow-up visits as told by your health care provider. This is important.        Contact a health care provider if:    •You have redness, swelling, or pain around the catheter incision site.      •You have nausea or vomiting.        Get help right away if:    •Your abdominal pain gets worse.      •You feel dizzy or you faint while standing.      •You have fluid or blood coming from the catheter incision site.      •The area around the catheter incision site feels warm to the touch.      •You have pus or a bad smell coming from the catheter incision site.      •You have a fever.      •You have shortness of breath.      •You have a rapid heartbeat.      •Your nausea or vomiting does not go away.      •Your catheter becomes blocked.      •Your catheter comes out of your abdomen.        Summary    •After your procedure, it is common to have soreness near the incision site of your drainage tube (catheter).      •Wash your hands with soap and water before and after you change your bandage (dressing). Change your dressing as told by your health care provider.      •Check the catheter incision site every day for signs of infection. Check for redness, swelling, pain, fluid, blood, warmth, pus, or a bad smell.      •Contact your health care provider if you have nausea or vomiting, or if you have redness, swelling, or pain around your catheter incision site.      •Get help right away if your abdominal pain gets worse, you feel dizzy, you have blood or fluid coming from the catheter incision site, you have a fever, or you have shortness of breath.      This information is not intended to replace advice given to you by your health care provider. Make sure you discuss any questions you have with your health care provider.    Please follow up with surgery and IR in 1 week.

## 2021-11-20 NOTE — ED ADULT NURSE NOTE - OBJECTIVE STATEMENT
sent for possible reinsertion of tube for cholecystitis. pt. alert and oriented times four. vss. c'o pain pain meds given as ordered. awaiting US and IR. comfort measures provided. will monitor.

## 2021-11-20 NOTE — ED PROVIDER NOTE - PROGRESS NOTE DETAILS
MQ: IR consulted, pending callback MQ: Talked to IR Dr. Antunez, wants to get RUQ u/s to evaluate gallbladder and need for cholecystostomy tube, will also obtain labs, will discuss with Dr. Antunez after results come back MQ: Dr. Antunez read the RUQ u/s, says that U/S is improved from previous RUQ U/S, but still with thickened gallbladder with calculi, because patient is not in pain or tender, patient is not indicated for reinsertion of cholecystostomy tube, can be d/c with f/u to IR and surgery, given return precautions MQ: Morphine given multiple times for patient for pain in back and not for abdominal pain

## 2021-11-23 NOTE — HISTORY OF PRESENT ILLNESS
[FreeTextEntry1] : Visit conducted over the phone with the patient's son.  81 year-old male with history of cholecystitis s/p percutaneous cholecystostomy and cholangiogram demonstrating persistent stones and cystic duct obstruction, here today for evaluation for cholangioscopy and stone removal.  He wishes to not live with a tube forever and has been evaluated by surgery and deemed to not be a surgical candidate.

## 2021-11-23 NOTE — ASSESSMENT
[FreeTextEntry1] : 81 year-old male with percutaneous cholecystostomy tube, cholelithiasis, and cystic duct obstruction.  Not a surgical candidate.\par \par \par \par Plan:\par \par Cholangioscopy and stone removal with anesthesia\par Risks, benefits, and alternatives of the procedure discussed with the son at length and they would like to proceed.

## 2021-11-29 ENCOUNTER — APPOINTMENT (OUTPATIENT)
Dept: INTERVENTIONAL RADIOLOGY/VASCULAR | Facility: CLINIC | Age: 81
End: 2021-11-29
Payer: MEDICARE

## 2021-11-29 DIAGNOSIS — K80.20 CALCULUS OF GALLBLADDER W/OUT CHOLECYSTITIS W/OUT OBSTRUCTION: ICD-10-CM

## 2021-11-29 PROCEDURE — 99212 OFFICE O/P EST SF 10 MIN: CPT

## 2021-11-30 ENCOUNTER — INPATIENT (INPATIENT)
Facility: HOSPITAL | Age: 81
LOS: 22 days | Discharge: HOSPICE | End: 2021-12-23
Attending: STUDENT IN AN ORGANIZED HEALTH CARE EDUCATION/TRAINING PROGRAM | Admitting: STUDENT IN AN ORGANIZED HEALTH CARE EDUCATION/TRAINING PROGRAM
Payer: MEDICARE

## 2021-11-30 VITALS
RESPIRATION RATE: 18 BRPM | HEIGHT: 75 IN | DIASTOLIC BLOOD PRESSURE: 58 MMHG | SYSTOLIC BLOOD PRESSURE: 110 MMHG | HEART RATE: 85 BPM | TEMPERATURE: 96 F | OXYGEN SATURATION: 98 %

## 2021-11-30 DIAGNOSIS — Z96.651 PRESENCE OF RIGHT ARTIFICIAL KNEE JOINT: Chronic | ICD-10-CM

## 2021-11-30 DIAGNOSIS — Z95.5 PRESENCE OF CORONARY ANGIOPLASTY IMPLANT AND GRAFT: Chronic | ICD-10-CM

## 2021-11-30 LAB
ALBUMIN SERPL ELPH-MCNC: 2.3 G/DL — LOW (ref 3.5–5.2)
ALP SERPL-CCNC: 168 U/L — HIGH (ref 30–115)
ALT FLD-CCNC: 24 U/L — SIGNIFICANT CHANGE UP (ref 0–41)
ANION GAP SERPL CALC-SCNC: 9 MMOL/L — SIGNIFICANT CHANGE UP (ref 7–14)
APTT BLD: 64.4 SEC — HIGH (ref 27–39.2)
AST SERPL-CCNC: 22 U/L — SIGNIFICANT CHANGE UP (ref 0–41)
BASOPHILS # BLD AUTO: 0.01 K/UL — SIGNIFICANT CHANGE UP (ref 0–0.2)
BASOPHILS NFR BLD AUTO: 0.1 % — SIGNIFICANT CHANGE UP (ref 0–1)
BILIRUB SERPL-MCNC: 0.9 MG/DL — SIGNIFICANT CHANGE UP (ref 0.2–1.2)
BUN SERPL-MCNC: 21 MG/DL — HIGH (ref 10–20)
CALCIUM SERPL-MCNC: 7.7 MG/DL — LOW (ref 8.5–10.1)
CHLORIDE SERPL-SCNC: 107 MMOL/L — SIGNIFICANT CHANGE UP (ref 98–110)
CO2 SERPL-SCNC: 19 MMOL/L — SIGNIFICANT CHANGE UP (ref 17–32)
CREAT SERPL-MCNC: 0.9 MG/DL — SIGNIFICANT CHANGE UP (ref 0.7–1.5)
EOSINOPHIL # BLD AUTO: 0.06 K/UL — SIGNIFICANT CHANGE UP (ref 0–0.7)
EOSINOPHIL NFR BLD AUTO: 0.8 % — SIGNIFICANT CHANGE UP (ref 0–8)
GLUCOSE SERPL-MCNC: 103 MG/DL — HIGH (ref 70–99)
HCT VFR BLD CALC: 28.4 % — LOW (ref 42–52)
HGB BLD-MCNC: 9.1 G/DL — LOW (ref 14–18)
IMM GRANULOCYTES NFR BLD AUTO: 0.3 % — SIGNIFICANT CHANGE UP (ref 0.1–0.3)
INR BLD: 7.87 RATIO — CRITICAL HIGH (ref 0.65–1.3)
LYMPHOCYTES # BLD AUTO: 0.64 K/UL — LOW (ref 1.2–3.4)
LYMPHOCYTES # BLD AUTO: 8.5 % — LOW (ref 20.5–51.1)
MAGNESIUM SERPL-MCNC: 2.1 MG/DL — SIGNIFICANT CHANGE UP (ref 1.8–2.4)
MCHC RBC-ENTMCNC: 32 G/DL — SIGNIFICANT CHANGE UP (ref 32–37)
MCHC RBC-ENTMCNC: 32.2 PG — HIGH (ref 27–31)
MCV RBC AUTO: 100.4 FL — HIGH (ref 80–94)
MONOCYTES # BLD AUTO: 1.15 K/UL — HIGH (ref 0.1–0.6)
MONOCYTES NFR BLD AUTO: 15.2 % — HIGH (ref 1.7–9.3)
NEUTROPHILS # BLD AUTO: 5.67 K/UL — SIGNIFICANT CHANGE UP (ref 1.4–6.5)
NEUTROPHILS NFR BLD AUTO: 75.1 % — SIGNIFICANT CHANGE UP (ref 42.2–75.2)
NRBC # BLD: 0 /100 WBCS — SIGNIFICANT CHANGE UP (ref 0–0)
NT-PROBNP SERPL-SCNC: 3327 PG/ML — HIGH (ref 0–300)
PLATELET # BLD AUTO: 204 K/UL — SIGNIFICANT CHANGE UP (ref 130–400)
POTASSIUM SERPL-MCNC: 4.2 MMOL/L — SIGNIFICANT CHANGE UP (ref 3.5–5)
POTASSIUM SERPL-SCNC: 4.2 MMOL/L — SIGNIFICANT CHANGE UP (ref 3.5–5)
PROT SERPL-MCNC: 5 G/DL — LOW (ref 6–8)
PROTHROM AB SERPL-ACNC: >40 SEC — HIGH (ref 9.95–12.87)
RBC # BLD: 2.83 M/UL — LOW (ref 4.7–6.1)
RBC # FLD: 16.1 % — HIGH (ref 11.5–14.5)
SARS-COV-2 RNA SPEC QL NAA+PROBE: SIGNIFICANT CHANGE UP
SODIUM SERPL-SCNC: 135 MMOL/L — SIGNIFICANT CHANGE UP (ref 135–146)
TROPONIN T SERPL-MCNC: 0.03 NG/ML — CRITICAL HIGH
TROPONIN T SERPL-MCNC: 0.03 NG/ML — CRITICAL HIGH
WBC # BLD: 7.55 K/UL — SIGNIFICANT CHANGE UP (ref 4.8–10.8)
WBC # FLD AUTO: 7.55 K/UL — SIGNIFICANT CHANGE UP (ref 4.8–10.8)

## 2021-11-30 PROCEDURE — 93010 ELECTROCARDIOGRAM REPORT: CPT

## 2021-11-30 PROCEDURE — 99285 EMERGENCY DEPT VISIT HI MDM: CPT | Mod: CS

## 2021-11-30 PROCEDURE — 71045 X-RAY EXAM CHEST 1 VIEW: CPT | Mod: 26

## 2021-11-30 PROCEDURE — 99223 1ST HOSP IP/OBS HIGH 75: CPT | Mod: AI

## 2021-11-30 RX ORDER — SPIRONOLACTONE 25 MG/1
12.5 TABLET, FILM COATED ORAL DAILY
Refills: 0 | Status: DISCONTINUED | OUTPATIENT
Start: 2021-11-30 | End: 2021-12-03

## 2021-11-30 RX ORDER — CHLORHEXIDINE GLUCONATE 213 G/1000ML
1 SOLUTION TOPICAL
Refills: 0 | Status: DISCONTINUED | OUTPATIENT
Start: 2021-11-30 | End: 2021-12-23

## 2021-11-30 RX ORDER — MORPHINE SULFATE 50 MG/1
2 CAPSULE, EXTENDED RELEASE ORAL EVERY 8 HOURS
Refills: 0 | Status: DISCONTINUED | OUTPATIENT
Start: 2021-11-30 | End: 2021-12-07

## 2021-11-30 RX ORDER — MORPHINE SULFATE 50 MG/1
2 CAPSULE, EXTENDED RELEASE ORAL ONCE
Refills: 0 | Status: DISCONTINUED | OUTPATIENT
Start: 2021-11-30 | End: 2021-11-30

## 2021-11-30 RX ORDER — OXYCODONE AND ACETAMINOPHEN 5; 325 MG/1; MG/1
1 TABLET ORAL EVERY 6 HOURS
Refills: 0 | Status: DISCONTINUED | OUTPATIENT
Start: 2021-11-30 | End: 2021-12-07

## 2021-11-30 RX ORDER — BUDESONIDE AND FORMOTEROL FUMARATE DIHYDRATE 160; 4.5 UG/1; UG/1
2 AEROSOL RESPIRATORY (INHALATION)
Refills: 0 | Status: DISCONTINUED | OUTPATIENT
Start: 2021-11-30 | End: 2021-12-23

## 2021-11-30 RX ORDER — ALBUTEROL 90 UG/1
2 AEROSOL, METERED ORAL EVERY 6 HOURS
Refills: 0 | Status: DISCONTINUED | OUTPATIENT
Start: 2021-11-30 | End: 2021-12-23

## 2021-11-30 RX ORDER — ATORVASTATIN CALCIUM 80 MG/1
20 TABLET, FILM COATED ORAL AT BEDTIME
Refills: 0 | Status: DISCONTINUED | OUTPATIENT
Start: 2021-11-30 | End: 2021-12-11

## 2021-11-30 RX ORDER — FOLIC ACID 0.8 MG
1 TABLET ORAL DAILY
Refills: 0 | Status: DISCONTINUED | OUTPATIENT
Start: 2021-11-30 | End: 2021-12-23

## 2021-11-30 RX ORDER — METHOCARBAMOL 500 MG/1
500 TABLET, FILM COATED ORAL THREE TIMES A DAY
Refills: 0 | Status: DISCONTINUED | OUTPATIENT
Start: 2021-11-30 | End: 2021-12-23

## 2021-11-30 RX ADMIN — ATORVASTATIN CALCIUM 20 MILLIGRAM(S): 80 TABLET, FILM COATED ORAL at 23:27

## 2021-11-30 RX ADMIN — METHOCARBAMOL 500 MILLIGRAM(S): 500 TABLET, FILM COATED ORAL at 16:38

## 2021-11-30 RX ADMIN — METHOCARBAMOL 500 MILLIGRAM(S): 500 TABLET, FILM COATED ORAL at 23:27

## 2021-11-30 RX ADMIN — MORPHINE SULFATE 2 MILLIGRAM(S): 50 CAPSULE, EXTENDED RELEASE ORAL at 23:27

## 2021-11-30 RX ADMIN — BUDESONIDE AND FORMOTEROL FUMARATE DIHYDRATE 2 PUFF(S): 160; 4.5 AEROSOL RESPIRATORY (INHALATION) at 20:49

## 2021-11-30 RX ADMIN — MORPHINE SULFATE 2 MILLIGRAM(S): 50 CAPSULE, EXTENDED RELEASE ORAL at 06:17

## 2021-11-30 RX ADMIN — MORPHINE SULFATE 2 MILLIGRAM(S): 50 CAPSULE, EXTENDED RELEASE ORAL at 14:10

## 2021-11-30 NOTE — ED ADULT NURSE NOTE - NSIMPLEMENTINTERV_GEN_ALL_ED
Implemented All Fall with Harm Risk Interventions:  [place] to call system. Call bell, personal items and telephone within reach. Instruct patient to call for assistance. Room bathroom lighting operational. Non-slip footwear when patient is off stretcher. Physically safe environment: no spills, clutter or unnecessary equipment. Stretcher in lowest position, wheels locked, appropriate side rails in place. Provide visual cue, wrist band, yellow gown, etc. Monitor gait and stability. Monitor for mental status changes and reorient to person, place, and time. Review medications for side effects contributing to fall risk. Reinforce activity limits and safety measures with patient and family. Provide visual clues: red socks.

## 2021-11-30 NOTE — H&P ADULT - NSHPPHYSICALEXAM_GEN_ALL_CORE
VITALS:   T(C): 35.8 (11-30-21 @ 07:32), Max: 35.8 (11-30-21 @ 07:32)  HR: 76 (11-30-21 @ 07:32) (76 - 85)  BP: 95/50 (11-30-21 @ 07:32) (95/50 - 110/58)  RR: 18 (11-30-21 @ 07:32) (18 - 18)  SpO2: 100% (11-30-21 @ 07:32) (98% - 100%)    GENERAL: Mild distress, lying in stretcher  HEAD:  Atraumatic, normocephalic  EYES: EOMI, PERRLA, conjunctiva and sclera clear  ENT: Moist mucous membranes  NECK: Supple, no JVD  HEART: Irregularly irregular, slightly tender L sided anterior chest  LUNGS: Slightly labored respirations, slightly dec breath sounds in R lung base  ABDOMEN: Soft, slightly tender RUQ, nondistended, no flank or lower back tenderness  EXTREMITIES: No clubbing, cyanosis, or edema  NERVOUS SYSTEM:  A&Ox4 VITALS:   T(C): 35.8 (11-30-21 @ 07:32), Max: 35.8 (11-30-21 @ 07:32)  HR: 76 (11-30-21 @ 07:32) (76 - 85)  BP: 95/50 (11-30-21 @ 07:32) (95/50 - 110/58)  RR: 18 (11-30-21 @ 07:32) (18 - 18)  SpO2: 100% (11-30-21 @ 07:32) (98% - 100%)    GENERAL: Mild distress, appears uncomfortable, lying in stretcher  HEAD:  Atraumatic, normocephalic  EYES: EOMI, PERRLA, conjunctiva and sclera clear  ENT: Moist mucous membranes  NECK: Supple, no JVD  HEART: Irregularly irregular, slightly tender L sided anterior chest  LUNGS: Slightly labored respirations, slightly dec breath sounds in R lung base, on O2 NC 3L  ABDOMEN: Soft, slightly tender RUQ, nondistended, no flank or lower back tenderness  EXTREMITIES: No clubbing, cyanosis, or edema  NERVOUS SYSTEM:  A&Ox4

## 2021-11-30 NOTE — ED PROVIDER NOTE - NS ED ROS FT
Constitutional: no fever, chills, no recent weight loss, change in appetite or malaise  Cardiac: see HPI  Respiratory: No cough or respiratory distress  GI: No nausea, vomiting, diarrhea or abdominal pain.  : No dysuria, frequency, urgency or hematuria  MS: no bedbound, no new pain, limited ROM at baseline  Neuro: No headache or weakness. No LOC.  Skin: No skin rash.  Except as documented in the HPI, all other systems are negative.

## 2021-11-30 NOTE — ED ADULT NURSE NOTE - OBJECTIVE STATEMENT
BIBA from home for complaints of left sided chest pain radiating down left arm and shortness of breath starting 1 hour ago. Pt with a hx of COPD and CHF. Bed bound at baseline.

## 2021-11-30 NOTE — H&P ADULT - ASSESSMENT
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   - Reports sudden onset L sided chest pain, reproducible on exam, no aggravating factors, moderate relief with Morphine  - Troponin 0.03, was 0.02-0.03 throughout prior admission, repeat at 4PM and in AM  - EKG performed x2 - no acute ischemic changes, repeat ordered for AM  - BNP ~ 3K, is near baseline   - CXR - small R sided pleural effusion, pt at baseline oxygen requirements  - Reports no changes in respiratory symptoms  - C/w home Spironolactone, Torsemide   - Admit for telemetry monitoring and trend troponin  - PRN pain control, c/w home Percocet, Morphine for severe pain if no relief    #Chronic AFib on Coumadin  #Supratherapeutic INR  #Chronic Macrocytic Anemia  - Baseline Hb ~10-11, currently 9.1  - INR > 7  - Denies any recent acute bleeding events, is AAOx4 on exam  - Recent workup in September, revealed low folate, c/w supplementation, B12 within normal limits  - Iron studies within normal limits in July, will repeat in AM  - Cont to hold Coumadin for now, daily coags, monitor for any evidence of hematoma or acute change in mental status     #COPD on Home O2 2.5L  - Does not appear to be in exacerbation, at baseline oxygen requirements  - C/w Symbicort, Ventolin PRN    #Recent Cholecystitis s/p perc cholecystostomy placement and removal 10 days ago by IR  #Hx of DVT in July 2021    DVT ppx: Coumadin on hold  Diet: DASH  Activity: AAT, Physiatry/PT c/s  Full Code  Dispo: Admit for monitoring, wishes to ultimately be discharged to [facility]   82 yo M with PMHx of HFpEF (50-55%), Chronic AFib on Coumadin, 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   - Reports sudden onset L sided chest pain, reproducible on exam, no aggravating factors, moderate relief with Morphine  - Troponin 0.03, was 0.02-0.03 throughout prior admission, repeat at 4PM and in AM  - EKG performed x2 - no acute ischemic changes, repeat ordered for AM  - BNP ~ 3K, is near baseline   - CXR - small R sided pleural effusion, pt at baseline oxygen requirements  - Reports no changes in respiratory symptoms  - C/w home Spironolactone, Torsemide   - Admit for telemetry monitoring and trend troponin  - PRN pain control, c/w home Percocet, Morphine for severe pain if no relief    #Chronic AFib on Coumadin  #Supratherapeutic INR  #Chronic Macrocytic Anemia  - Baseline Hb ~10-11, currently 9.1  - INR > 7  - Denies any recent acute bleeding events, is AAOx4 on exam  - Recent workup in September, revealed low folate, c/w supplementation, B12 within normal limits  - Iron studies within normal limits in July, will repeat in AM  - Cont to hold Coumadin for now, daily coags, monitor for any evidence of hematoma or acute change in mental status     #COPD on Home O2 2.5L  - Does not appear to be in exacerbation, at baseline oxygen requirements  - C/w Symbicort, Ventolin PRN  - C/w O2 NC to maintain SpO2 88-92%    #Recent Cholecystitis s/p perc cholecystostomy placement and removal 10 days ago by IR  #Hx of DVT in July 2021    DVT ppx: Coumadin on hold  Diet: DASH  Activity: AAT, Physiatry/PT c/s  Full Code  Dispo: Admit for monitoring, wishes to ultimately be discharged to [facility]

## 2021-11-30 NOTE — CONSULT NOTE ADULT - ASSESSMENT
IMPRESSION: Rehab of gait dysfunction / r/o ACS     PRECAUTIONS: [   ] Cardiac  [   ] Respiratory  [   ] Seizures [   ] Contact Isolation  [   ] Droplet Isolation  [   ] Other    Weight Bearing Status:     RECOMMENDATION:    Out of Bed to Chair     DVT/Decubiti Prophylaxis    REHAB PLAN:     [ x   ] Bedside P/T 3-5 times a week   [    ]   Bedside O/T  2-3 times a week             [    ] Speech Therapy               [    ]  No Rehab Therapy Indicated   Conditioning/ROM                                    ADL  Bed Mobility                                               Conditioning/ROM  Transfers                                                     Bed Mobility  Sitting /Standing Balance                         Transfers                                        Gait Training                                               Sitting/Standing Balance  Stair Training [   ]Applicable                    Home equipment Eval                                                                        Splinting  [   ] Only      GOALS:   ADL   [    ]   Independent                    Transfers  [    ] Independent                          Ambulation  [    ] Independent     [  x   ] With device                            [    ]  CG                                                         [  x  ]  CG                                                                  [   x ] CG                            [    ] Min A                                                   [    ] Min A                                                              [    ] Min  A          DISCHARGE PLAN:   [    ]  Good candidate for Intensive Rehabilitation/Hospital based                                             Will tolerate 3hrs Intensive Rehab Daily                                       [  x   ]  Short Term Rehab in Skilled Nursing Facility                                       [     ]  Home with Outpatient or  services                                         [     ]  Possible Candidate for Intensive Hospital based Rehab

## 2021-11-30 NOTE — PATIENT PROFILE ADULT - FALL HARM RISK - HARM RISK INTERVENTIONS
Assistance with ambulation/Assistance OOB with selected safe patient handling equipment/Communicate Risk of Fall with Harm to all staff/Discuss with provider need for PT consult/Monitor for mental status changes/Monitor gait and stability/Provide patient with walking aids - walker, cane, crutches/Reinforce activity limits and safety measures with patient and family/Review medications for side effects contributing to fall risk/Tailored Fall Risk Interventions/Use of alarms - bed, chair and/or voice tab/Visual Cue: Yellow wristband and red socks/Bed in lowest position, wheels locked, appropriate side rails in place/Call bell, personal items and telephone in reach/Instruct patient to call for assistance before getting out of bed or chair/Non-slip footwear when patient is out of bed/[place] to call system/Physically safe environment - no spills, clutter or unnecessary equipment/Purposeful Proactive Rounding/Room/bathroom lighting operational, light cord in reach

## 2021-11-30 NOTE — H&P ADULT - ATTENDING COMMENTS
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    # Musculoskeletal Chest Pain  # Chronic Elevated Trop  - r/o ACS  - pain reproducible   - trop 0.03 (at baseline) --> check am trop   - EKG: Afib with no acute ST or T wave changes    # Chronic AFib  # Supra-therapeutic INR  - rate controlled   - not on rika blocker   - hold Coumadin    # Hx of HFpEF  # Hx of tachy-selvin syndrome s/p Micra PPM Placement in August   - euvolemic on exam   - c/w diuretics     # DVT Ppx    # DASH diet     # Anticipated for discharge     # Full code

## 2021-11-30 NOTE — CONSULT NOTE ADULT - SUBJECTIVE AND OBJECTIVE BOX
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 Pt reports that this morning, he was awakened by acute, sudden onset L sided chest pain at 4AM. Describes the pain as "pounding", states that he felt some pain radiate to LUE as well. Denies identifiable aggravating or alleviating factors and states that this has never happened before. Denies headache/dizziness, endorses chronic shortness of breath that is unchanged, has been requiring his baseline oxygen. Denies abdominal pain. Endorses compliance with all medications. States that at baseline, he is bedbound, cannot stand up or ambulate due to weakness. Was recently admitted to [facility] for short term rehab, where he wishes to go back because feels that he cannot care for himself at home. Lives with son but needs more help and PT.   In the ED, T 96, /58, HR 85, RR 18, SpO2 98% on O2 NC 4L, then 100% on 3L. Lab work revealed Hb 9.1, INR 7.87, troponin 0.03 and BNP 3327 (both near baseline). EKG revealed AFib, HR 88. CXR revealed slightly inc small R sided pleural effusion.       PAST MEDICAL & SURGICAL HISTORY:  COPD (chronic obstructive pulmonary disease)    Hypertension    Deep vein thrombosis (DVT) of left lower extremity, unspecified chronicity, unspecified vein    Cellulitis of left lower extremity    Chronic atrial fibrillation    Mitral valve insufficiency, unspecified etiology  Moderate    CHF (congestive heart failure)    S/P coronary artery stent placement    History of total right knee replacement        Hospital Course:    TODAY'S SUBJECTIVE & REVIEW OF SYMPTOMS:     Constitutional WNL   Cardio WNL   Resp WNL   GI WNL  Heme WNL  Endo WNL  Skin WNL  MSK Weakness  Neuro WNL  Cognitive WNL  Psych WNL      MEDICATIONS  (STANDING):  atorvastatin 20 milliGRAM(s) Oral at bedtime  budesonide 160 MICROgram(s)/formoterol 4.5 MICROgram(s) Inhaler 2 Puff(s) Inhalation two times a day  chlorhexidine 4% Liquid 1 Application(s) Topical <User Schedule>  folic acid 1 milliGRAM(s) Oral daily  methocarbamol 500 milliGRAM(s) Oral three times a day  spironolactone 12.5 milliGRAM(s) Oral daily  torsemide 20 milliGRAM(s) Oral daily    MEDICATIONS  (PRN):  ALBUTerol    90 MICROgram(s) HFA Inhaler 2 Puff(s) Inhalation every 6 hours PRN Shortness of Breath and/or Wheezing  morphine  - Injectable 2 milliGRAM(s) IV Push every 8 hours PRN Severe Pain (7 - 10)  oxycodone    5 mG/acetaminophen 325 mG 1 Tablet(s) Oral every 6 hours PRN Moderate Pain (4 - 6)      FAMILY HISTORY:      Allergies    No Known Allergies    Intolerances        SOCIAL HISTORY:    [  ] Etoh  [  ] Smoking  [  ] Substance abuse     Home Environment:  [   ] Home Alone  [ x  ] Lives with Family  [   ] Home Health Aid    Dwelling:  [   ] Apartment  [  x ] Private House  [   ] Adult Home  [   ] Skilled Nursing Facility      [   ] Short Term  [   ] Long Term  [ x  ] Stairs       Elevator [   ]    FUNCTIONAL STATUS PTA: (Check all that apply)  Ambulation: [    ]Independent    [  x ] Dependent     [   ] Non-Ambulatory  Assistive Device: [   ] SA Cane  [   ]  Q Cane  [   ] Walker  [   ]  Wheelchair  ADL : [   ] Independent  [ x   ]  Dependent       Vital Signs Last 24 Hrs  T(C): 36.3 (30 Nov 2021 16:13), Max: 36.3 (30 Nov 2021 16:13)  T(F): 97.4 (30 Nov 2021 16:13), Max: 97.4 (30 Nov 2021 16:13)  HR: 77 (30 Nov 2021 16:13) (76 - 85)  BP: 101/56 (30 Nov 2021 16:13) (94/50 - 110/58)  BP(mean): --  RR: 20 (30 Nov 2021 16:13) (18 - 20)  SpO2: 98% (30 Nov 2021 16:13) (98% - 100%)      PHYSICAL EXAM: Awake & Alert  GENERAL: NAD  HEAD:  Normocephalic  CHEST/LUNG: decreased BS lung bases  HEART: S1S2+  ABDOMEN: Soft, Nontender  EXTREMITIES:  no calf tenderness    NERVOUS SYSTEM:  Cranial Nerves 2-12 intact [   ] Abnormal  [   ]  ROM: WFL all extremities [   ]  Abnormal [  x ]able to move all ext - limited participation  Motor Strength: WFL all extremities  [   ]  Abnormal [   ]  Sensation: intact to light touch [ x  ] Abnormal [   ]    FUNCTIONAL STATUS:  Bed Mobility: Independent [   ]  Supervision [   ]  Needs Assistance [ x  ]  N/A [   ]  Transfers: Independent [   ]  Supervision [   ]  Needs Assistance [   ]  N/A [   ]   Ambulation: Independent [   ]  Supervision [   ]  Needs Assistance [   ]  N/A [   ]  ADL: Independent [   ] Requires Assistance [   ] N/A [   ]      LABS:                        9.1    7.55  )-----------( 204      ( 30 Nov 2021 06:07 )             28.4     11-30    135  |  107  |  21<H>  ----------------------------<  103<H>  4.2   |  19  |  0.9    Ca    7.7<L>      30 Nov 2021 06:07  Mg     2.1     11-30    TPro  5.0<L>  /  Alb  2.3<L>  /  TBili  0.9  /  DBili  x   /  AST  22  /  ALT  24  /  AlkPhos  168<H>  11-30    PT/INR - ( 30 Nov 2021 06:07 )   PT: >40.00 sec;   INR: 7.87 ratio         PTT - ( 30 Nov 2021 06:07 )  PTT:64.4 sec      RADIOLOGY & ADDITIONAL STUDIES:

## 2021-11-30 NOTE — H&P ADULT - HISTORY OF PRESENT ILLNESS
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 Pt reports that this morning, he was awakened by acute, sudden onset L sided chest pain at 4AM. Describes the pain as "pounding", states that he felt some pain radiate to LUE as well. Denies identifiable aggravating or alleviating factors and states that this has never happened before. Denies headache/dizziness, endorses chronic shortness of breath that is unchanged, has been requiring his baseline oxygen. Denies abdominal pain. Endorses compliance with all medications. States that at baseline, he is bedbound, cannot stand up or ambulate due to weakness. Was recently admitted to [facility] for short term rehab, where he wishes to go back because feels that he cannot care for himself at home. Lives with son but needs more help and PT.   In the ED, T 96, /58, HR 85, RR 18, SpO2 98% on O2 NC 4L. Lab work revealed Hb 9.1, INR 7.87, troponin 0.03 and BNP 3327 (both near baseline). EKG revealed AFib, HR 88. CXR unremarkable. 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 Pt reports that this morning, he was awakened by acute, sudden onset L sided chest pain at 4AM. Describes the pain as "pounding", states that he felt some pain radiate to LUE as well. Denies identifiable aggravating or alleviating factors and states that this has never happened before. Denies headache/dizziness, endorses chronic shortness of breath that is unchanged, has been requiring his baseline oxygen. Denies abdominal pain. Endorses compliance with all medications. States that at baseline, he is bedbound, cannot stand up or ambulate due to weakness. Was recently admitted to [facility] for short term rehab, where he wishes to go back because feels that he cannot care for himself at home. Lives with son but needs more help and PT.   In the ED, T 96, /58, HR 85, RR 18, SpO2 98% on O2 NC 4L. Lab work revealed Hb 9.1, INR 7.87, troponin 0.03 and BNP 3327 (both near baseline). EKG revealed AFib, HR 88. CXR revealed slightly inc small R sided pleural effusion. 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 Pt reports that this morning, he was awakened by acute, sudden onset L sided chest pain at 4AM. Describes the pain as "pounding", states that he felt some pain radiate to LUE as well. Denies identifiable aggravating or alleviating factors and states that this has never happened before. Denies headache/dizziness, endorses chronic shortness of breath that is unchanged, has been requiring his baseline oxygen. Denies abdominal pain. Endorses compliance with all medications. States that at baseline, he is bedbound, cannot stand up or ambulate due to weakness. Was recently admitted to [facility] for short term rehab, where he wishes to go back because feels that he cannot care for himself at home. Lives with son but needs more help and PT.   In the ED, T 96, /58, HR 85, RR 18, SpO2 98% on O2 NC 4L, then 100% on 3L. Lab work revealed Hb 9.1, INR 7.87, troponin 0.03 and BNP 3327 (both near baseline). EKG revealed AFib, HR 88. CXR revealed slightly inc small R sided pleural effusion.

## 2021-11-30 NOTE — ED PROVIDER NOTE - OBJECTIVE STATEMENT
80 yo M, hx of COPD on home O2, HLD, HTN, CAD sp stent, afib on coumadin, tachy/selvin syndrome sp pacemaker, bedbound here for assessment of chest pain. Pain is L sided, severe, woke patient from sleep, radiating down L arm. Not associated with nausea, vomiting, diaphoresis, dizziness. Currently still having pain but it is less severe.     Cardiologist, Dr. Scherer, unknown last time he saw cardiology.

## 2021-11-30 NOTE — ED ADULT TRIAGE NOTE - CHIEF COMPLAINT QUOTE
BIBA from home for complaints of left sided chest pain radiating down left arm and shortness of breath starting 1 hour ago. BIBA from home for complaints of left sided chest pain radiating down left arm and shortness of breath starting 1 hour ago. Pt with a hx of COPD and CHF. BIBA from home for complaints of left sided chest pain radiating down left arm and shortness of breath starting 1 hour ago. Pt with a hx of COPD and CHF. Bed bound at baseline.

## 2021-11-30 NOTE — H&P ADULT - NSHPLABSRESULTS_GEN_ALL_CORE
LABS:                        9.1    7.55  )-----------( 204      ( 30 Nov 2021 06:07 )             28.4     11-30    135  |  107  |  21<H>  ----------------------------<  103<H>  4.2   |  19  |  0.9    Ca    7.7<L>      30 Nov 2021 06:07  Mg     2.1     11-30    TPro  5.0<L>  /  Alb  2.3<L>  /  TBili  0.9  /  DBili  x   /  AST  22  /  ALT  24  /  AlkPhos  168<H>  11-30    PT/INR - ( 30 Nov 2021 06:07 )   PT: >40.00 sec;   INR: 7.87 ratio    PTT - ( 30 Nov 2021 06:07 )  PTT:64.4 sec    Troponin T, Serum: 0.03: TYPE:(C=Critical, N=Notification, A=Abnormal) C   Serum Pro-Brain Natriuretic Peptide: 3327 pg/mL (11.30.21 @ 06:07)     Xray Chest 1 View- PORTABLE-Urgent (11.30.21 @ 07:27)     Impression:    Increasing small right pleural effusion.

## 2021-11-30 NOTE — ED PROVIDER NOTE - PHYSICAL EXAMINATION
VITAL SIGNS: I have reviewed nursing notes and confirm.  CONSTITUTIONAL: Well-developed; well-nourished; in no acute distress.  SKIN: Skin exam is warm and dry, no acute rash.  HEAD: Normocephalic; atraumatic.  EYES: PERRL, EOM intact; conjunctiva and sclera clear.  ENT: No nasal discharge; airway clear.   NECK: Supple; non tender.  CARD: irregularly irregular  RESP: trace wheeze, no rales or rhonchi.  ABD: Normal bowel sounds; soft; non-distended; non-tender  EXT: limited ROM of LE chronically, trace pitting edema  NEURO: Alert, oriented. Grossly unremarkable. No focal deficits.  PSYCH: Cooperative, appropriate.

## 2021-11-30 NOTE — ED PROVIDER NOTE - CLINICAL SUMMARY MEDICAL DECISION MAKING FREE TEXT BOX
Pain improving after morphine -- labs notable for trop 0.03, INR 7 -- initial plan was obs placement however given abnormal labs will need admission to tele.     No indication to reverse coumadin toxicity at this time, no active bleeding.

## 2021-11-30 NOTE — ED ADULT NURSE NOTE - NSFALLRSKOUTCOME_ED_ALL_ED
Class I (easy) - visualization of the soft palate, fauces, uvula, and both anterior and posterior pillars
Fall with Harm Risk

## 2021-12-01 LAB
ALBUMIN SERPL ELPH-MCNC: 2.4 G/DL — LOW (ref 3.5–5.2)
ALP SERPL-CCNC: 176 U/L — HIGH (ref 30–115)
ALT FLD-CCNC: 24 U/L — SIGNIFICANT CHANGE UP (ref 0–41)
ANION GAP SERPL CALC-SCNC: 13 MMOL/L — SIGNIFICANT CHANGE UP (ref 7–14)
APTT BLD: 70.3 SEC — CRITICAL HIGH (ref 27–39.2)
AST SERPL-CCNC: 23 U/L — SIGNIFICANT CHANGE UP (ref 0–41)
BASOPHILS # BLD AUTO: 0.01 K/UL — SIGNIFICANT CHANGE UP (ref 0–0.2)
BASOPHILS NFR BLD AUTO: 0.2 % — SIGNIFICANT CHANGE UP (ref 0–1)
BILIRUB SERPL-MCNC: 0.8 MG/DL — SIGNIFICANT CHANGE UP (ref 0.2–1.2)
BUN SERPL-MCNC: 25 MG/DL — HIGH (ref 10–20)
CALCIUM SERPL-MCNC: 7.8 MG/DL — LOW (ref 8.5–10.1)
CHLORIDE SERPL-SCNC: 107 MMOL/L — SIGNIFICANT CHANGE UP (ref 98–110)
CO2 SERPL-SCNC: 17 MMOL/L — SIGNIFICANT CHANGE UP (ref 17–32)
CREAT SERPL-MCNC: 0.9 MG/DL — SIGNIFICANT CHANGE UP (ref 0.7–1.5)
EOSINOPHIL # BLD AUTO: 0.08 K/UL — SIGNIFICANT CHANGE UP (ref 0–0.7)
EOSINOPHIL NFR BLD AUTO: 1.2 % — SIGNIFICANT CHANGE UP (ref 0–8)
GLUCOSE SERPL-MCNC: 74 MG/DL — SIGNIFICANT CHANGE UP (ref 70–99)
HCT VFR BLD CALC: 28.3 % — LOW (ref 42–52)
HGB BLD-MCNC: 9.2 G/DL — LOW (ref 14–18)
IMM GRANULOCYTES NFR BLD AUTO: 0.5 % — HIGH (ref 0.1–0.3)
INR BLD: 6.68 RATIO — CRITICAL HIGH (ref 0.65–1.3)
LYMPHOCYTES # BLD AUTO: 0.69 K/UL — LOW (ref 1.2–3.4)
LYMPHOCYTES # BLD AUTO: 10.4 % — LOW (ref 20.5–51.1)
MAGNESIUM SERPL-MCNC: 2.2 MG/DL — SIGNIFICANT CHANGE UP (ref 1.8–2.4)
MCHC RBC-ENTMCNC: 32.5 G/DL — SIGNIFICANT CHANGE UP (ref 32–37)
MCHC RBC-ENTMCNC: 33.2 PG — HIGH (ref 27–31)
MCV RBC AUTO: 102.2 FL — HIGH (ref 80–94)
MONOCYTES # BLD AUTO: 0.88 K/UL — HIGH (ref 0.1–0.6)
MONOCYTES NFR BLD AUTO: 13.3 % — HIGH (ref 1.7–9.3)
NEUTROPHILS # BLD AUTO: 4.95 K/UL — SIGNIFICANT CHANGE UP (ref 1.4–6.5)
NEUTROPHILS NFR BLD AUTO: 74.4 % — SIGNIFICANT CHANGE UP (ref 42.2–75.2)
NRBC # BLD: 0 /100 WBCS — SIGNIFICANT CHANGE UP (ref 0–0)
PLATELET # BLD AUTO: 209 K/UL — SIGNIFICANT CHANGE UP (ref 130–400)
POTASSIUM SERPL-MCNC: 4.8 MMOL/L — SIGNIFICANT CHANGE UP (ref 3.5–5)
POTASSIUM SERPL-SCNC: 4.8 MMOL/L — SIGNIFICANT CHANGE UP (ref 3.5–5)
PROT SERPL-MCNC: 5.1 G/DL — LOW (ref 6–8)
PROTHROM AB SERPL-ACNC: >40 SEC — HIGH (ref 9.95–12.87)
RBC # BLD: 2.77 M/UL — LOW (ref 4.7–6.1)
RBC # FLD: 16.2 % — HIGH (ref 11.5–14.5)
SODIUM SERPL-SCNC: 137 MMOL/L — SIGNIFICANT CHANGE UP (ref 135–146)
TROPONIN T SERPL-MCNC: 0.03 NG/ML — CRITICAL HIGH
WBC # BLD: 6.64 K/UL — SIGNIFICANT CHANGE UP (ref 4.8–10.8)
WBC # FLD AUTO: 6.64 K/UL — SIGNIFICANT CHANGE UP (ref 4.8–10.8)

## 2021-12-01 PROCEDURE — 93010 ELECTROCARDIOGRAM REPORT: CPT

## 2021-12-01 PROCEDURE — 99222 1ST HOSP IP/OBS MODERATE 55: CPT

## 2021-12-01 PROCEDURE — 99233 SBSQ HOSP IP/OBS HIGH 50: CPT

## 2021-12-01 PROCEDURE — 93970 EXTREMITY STUDY: CPT | Mod: 26

## 2021-12-01 RX ORDER — INFLUENZA VIRUS VACCINE 15; 15; 15; 15 UG/.5ML; UG/.5ML; UG/.5ML; UG/.5ML
0.7 SUSPENSION INTRAMUSCULAR ONCE
Refills: 0 | Status: DISCONTINUED | OUTPATIENT
Start: 2021-12-01 | End: 2021-12-23

## 2021-12-01 RX ADMIN — Medication 20 MILLIGRAM(S): at 05:24

## 2021-12-01 RX ADMIN — Medication 1 MILLIGRAM(S): at 11:04

## 2021-12-01 RX ADMIN — MORPHINE SULFATE 2 MILLIGRAM(S): 50 CAPSULE, EXTENDED RELEASE ORAL at 01:13

## 2021-12-01 RX ADMIN — METHOCARBAMOL 500 MILLIGRAM(S): 500 TABLET, FILM COATED ORAL at 13:11

## 2021-12-01 RX ADMIN — CHLORHEXIDINE GLUCONATE 1 APPLICATION(S): 213 SOLUTION TOPICAL at 06:13

## 2021-12-01 RX ADMIN — BUDESONIDE AND FORMOTEROL FUMARATE DIHYDRATE 2 PUFF(S): 160; 4.5 AEROSOL RESPIRATORY (INHALATION) at 21:29

## 2021-12-01 RX ADMIN — ATORVASTATIN CALCIUM 20 MILLIGRAM(S): 80 TABLET, FILM COATED ORAL at 21:38

## 2021-12-01 RX ADMIN — METHOCARBAMOL 500 MILLIGRAM(S): 500 TABLET, FILM COATED ORAL at 05:24

## 2021-12-01 RX ADMIN — SPIRONOLACTONE 12.5 MILLIGRAM(S): 25 TABLET, FILM COATED ORAL at 05:24

## 2021-12-01 RX ADMIN — MORPHINE SULFATE 2 MILLIGRAM(S): 50 CAPSULE, EXTENDED RELEASE ORAL at 22:45

## 2021-12-01 RX ADMIN — METHOCARBAMOL 500 MILLIGRAM(S): 500 TABLET, FILM COATED ORAL at 21:38

## 2021-12-01 RX ADMIN — MORPHINE SULFATE 2 MILLIGRAM(S): 50 CAPSULE, EXTENDED RELEASE ORAL at 23:00

## 2021-12-01 NOTE — PROGRESS NOTE ADULT - ASSESSMENT
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       CP  Chronic A-Fib on Coumadin  Chronic HFpEF  Chronic DVT  COPD on home O2  Tachy/selvin S/P Micra  CAD           PLAN:    ·	CE x 2 are negative.  ·	CP likely is musculoskeletal.   ·	ECHO done in July this year showed EF is 50-55%  ·	Cardiology eval  ·	Will do nuclear stress test  ·	INR is 7.87. Will hold Coumadin today. Check INR in AM  ·	Cont his other home meds.     Progress Note Handoff    Pending (specify):  Consults___Cardiology______, Tests__Nuclear stress test______, Test Results_______, Other_________  Family discussion:  Disposition: Home___/SNF___/Other________/Unknown at this time________    Kyle Pritchett MD  Spectra: [id number]

## 2021-12-01 NOTE — MEDICAL STUDENT PROGRESS NOTE(EDUCATION) - NS MD HP STUD ASPLAN ASSES FT
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 Trop at baseline 0.03, EKG showed afib, CXR small R sided pleural effusion.

## 2021-12-01 NOTE — CONSULT NOTE ADULT - SUBJECTIVE AND OBJECTIVE BOX
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 Pt reports that this morning, he was awakened by acute, sudden onset L sided chest pain at 4AM. Describes the pain as "pounding", states that he felt some pain radiate to LUE as well. Denies identifiable aggravating or alleviating factors and states that this has never happened before. Denies headache/dizziness, endorses chronic shortness of breath that is unchanged, has been requiring his baseline oxygen. Denies abdominal pain. Endorses compliance with all medications. States that at baseline, he is bedbound, cannot stand up or ambulate due to weakness. Was recently admitted to [facility] for short term rehab, where he wishes to go back because feels that he cannot care for himself at home. Lives with son but needs more help and PT.   In the ED, T 96, /58, HR 85, RR 18, SpO2 98% on O2 NC 4L, then 100% on 3L. Lab work revealed Hb 9.1, INR 7.87, troponin 0.03 and BNP 3327 (both near baseline). EKG revealed AFib, HR 88. CXR revealed slightly inc small R sided pleural effusion. (30 Nov 2021 11:30)      PAST MEDICAL & SURGICAL HISTORY  COPD (chronic obstructive pulmonary disease)    Hypertension    Deep vein thrombosis (DVT) of left lower extremity, unspecified chronicity, unspecified vein    Cellulitis of left lower extremity    Chronic atrial fibrillation    Mitral valve insufficiency, unspecified etiology  Moderate    CHF (congestive heart failure)    S/P coronary artery stent placement    History of total right knee replacement        FAMILY HISTORY:  FAMILY HISTORY:      SOCIAL HISTORY:  []smoker  []Alcohol  []Drug    ALLERGIES:  No Known Allergies      MEDICATIONS:  MEDICATIONS  (STANDING):  atorvastatin 20 milliGRAM(s) Oral at bedtime  budesonide 160 MICROgram(s)/formoterol 4.5 MICROgram(s) Inhaler 2 Puff(s) Inhalation two times a day  chlorhexidine 4% Liquid 1 Application(s) Topical <User Schedule>  folic acid 1 milliGRAM(s) Oral daily  influenza  Vaccine (HIGH DOSE) 0.7 milliLiter(s) IntraMuscular once  methocarbamol 500 milliGRAM(s) Oral three times a day  spironolactone 12.5 milliGRAM(s) Oral daily  torsemide 20 milliGRAM(s) Oral daily    MEDICATIONS  (PRN):  ALBUTerol    90 MICROgram(s) HFA Inhaler 2 Puff(s) Inhalation every 6 hours PRN Shortness of Breath and/or Wheezing  morphine  - Injectable 2 milliGRAM(s) IV Push every 8 hours PRN Severe Pain (7 - 10)  oxycodone    5 mG/acetaminophen 325 mG 1 Tablet(s) Oral every 6 hours PRN Moderate Pain (4 - 6)      HOME MEDICATIONS:  Home Medications:  atorvastatin 20 mg oral tablet: 1 tab(s) orally once a day (13 Sep 2021 04:59)  folic acid 1 mg oral tablet: 1 tab(s) orally once a day (21 Sep 2021 11:34)  oxycodone-acetaminophen 5 mg-325 mg oral tablet: 1 tab(s) orally every 6 hours, As needed, Moderate Pain (4 - 6) (21 Sep 2021 11:32)  spironolactone 25 mg oral tablet: 0.5 tab(s) orally once a day (21 Sep 2021 11:34)  Symbicort 160 mcg-4.5 mcg/inh inhalation aerosol: 2 puff(s) inhaled 2 times a day (13 Sep 2021 04:59)  Ventolin HFA 90 mcg/inh inhalation aerosol: 2 puff(s) inhaled every 6 hours as neede for shortness of breath (13 Sep 2021 04:59)  warfarin 2 mg oral tablet: Take half a tablet Sun, Tues, Wed, Thurs, Fri, Sat.  Take a whole tablet on Mon (13 Sep 2021 04:59)      VITALS:   T(F): 96.1 (12-01 @ 13:01), Max: 97.9 (11-30 @ 21:45)  HR: 76 (12-01 @ 13:01) (73 - 85)  BP: 87/50 (12-01 @ 13:01) (87/50 - 112/60)  BP(mean): --  RR: 18 (12-01 @ 13:01) (18 - 20)  SpO2: 100% (12-01 @ 07:23) (95% - 100%)    I&O's Summary    30 Nov 2021 07:01  -  01 Dec 2021 07:00  --------------------------------------------------------  IN: 238 mL / OUT: 100 mL / NET: 138 mL    01 Dec 2021 07:01  -  01 Dec 2021 13:07  --------------------------------------------------------  IN: 0 mL / OUT: 400 mL / NET: -400 mL        REVIEW OF SYSTEMS:  CONSTITUTIONAL: No weakness, fevers or chills  EYES: No visual changes  ENT: No vertigo or throat pain   NECK: No pain or stiffness  RESPIRATORY: No cough, wheezing, hemoptysis; No shortness of breath  CARDIOVASCULAR: (+) chest pain no palpitations  GASTROINTESTINAL: No abdominal or epigastric pain. No nausea, vomiting, or hematemesis; No diarrhea or constipation. No melena or hematochezia.  GENITOURINARY: No dysuria, frequency or hematuria  NEUROLOGICAL: No numbness or weakness    PHYSICAL EXAM:  NEURO: patient is awake , alert and oriented  GEN: Not in acute distress  NECK: no thyroid enlargement, no JVD  LUNGS: mild ronchi  CARDIOVASCULAR: S1/S2 present, RRR , no murmurs or rubs, no carotid bruits,  + PP bilaterally  ABD: Soft, non-tender, non-distended, +BS  EXT: (+) NADIA    LABS:                        9.2    6.64  )-----------( 209      ( 01 Dec 2021 05:00 )             28.3     12-01    137  |  107  |  25<H>  ----------------------------<  74  4.8   |  17  |  0.9    Ca    7.8<L>      01 Dec 2021 05:00  Mg     2.2     12-01    TPro  5.1<L>  /  Alb  2.4<L>  /  TBili  0.8  /  DBili  x   /  AST  23  /  ALT  24  /  AlkPhos  176<H>  12-01    PT/INR - ( 01 Dec 2021 05:00 )   PT: >40.00 sec;   INR: 6.68 ratio         PTT - ( 01 Dec 2021 05:00 )  PTT:70.3 sec  Troponin T, Serum: 0.03 ng/mL *HH* (12-01-21 @ 05:00)  Troponin T, Serum: 0.03 ng/mL *HH* (11-30-21 @ 16:42)    CARDIAC MARKERS ( 01 Dec 2021 05:00 )  x     / 0.03 ng/mL / x     / x     / x      CARDIAC MARKERS ( 30 Nov 2021 16:42 )  x     / 0.03 ng/mL / x     / x     / x      CARDIAC MARKERS ( 30 Nov 2021 06:07 )  x     / 0.03 ng/mL / x     / x     / x            Troponin trend:    Serum Pro-Brain Natriuretic Peptide: 3327 pg/mL (11-30-21 @ 06:07)          RADIOLOGY:  -CXR: small right pleural effusion    -TTE: < from: TTE Echo Complete w/o Contrast w/ Doppler (07.11.21 @ 09:57) >  Summary:   1. Left ventricular ejection fraction, by visual estimation, is 50 to 55%.   2. Normal global left ventricular systolic function.   3. Moderate to severe left atrial enlargement.   4. Mildly enlarged right ventricle.   5. Moderately reduced RV systolic function.   6. Moderately enlarged right atrium.   7. Moderate mitral valve regurgitation.   8. Moderate-severe tricuspid regurgitation.   9. Mild pulmonic valve regurgitation.  10. Estimated pulmonary artery systolic pressure is 59.7 mmHg assuming a right atrial pressure of 10 mmHg, which is consistent with moderate pulmonary hypertension.  11. There is mild aortic root calcification.    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 Pt reports that this morning, he was awakened by acute, sudden onset L sided chest pain at 4AM. Describes the pain as "pounding", states that he felt some pain radiate to LUE as well. Denies identifiable aggravating or alleviating factors and states that this has never happened before. Denies headache/dizziness, endorses chronic shortness of breath that is unchanged, has been requiring his baseline oxygen. Denies abdominal pain. Endorses compliance with all medications. States that at baseline, he is bedbound, cannot stand up or ambulate due to weakness. Was recently admitted to [facility] for short term rehab, where he wishes to go back because feels that he cannot care for himself at home. Lives with son but needs more help and PT.   In the ED, T 96, /58, HR 85, RR 18, SpO2 98% on O2 NC 4L, then 100% on 3L. Lab work revealed Hb 9.1, INR 7.87, troponin 0.03 and BNP 3327 (both near baseline). EKG revealed AFib, HR 88. CXR revealed slightly inc small R sided pleural effusion. (30 Nov 2021 11:30)      PAST MEDICAL & SURGICAL HISTORY  COPD (chronic obstructive pulmonary disease)    Hypertension    Deep vein thrombosis (DVT) of left lower extremity, unspecified chronicity, unspecified vein    Cellulitis of left lower extremity    Chronic atrial fibrillation    Mitral valve insufficiency, unspecified etiology  Moderate    CHF (congestive heart failure)    S/P coronary artery stent placement    History of total right knee replacement        FAMILY HISTORY:  FAMILY HISTORY:      SOCIAL HISTORY:  []smoker  []Alcohol  []Drug    ALLERGIES:  No Known Allergies      MEDICATIONS:  MEDICATIONS  (STANDING):  atorvastatin 20 milliGRAM(s) Oral at bedtime  budesonide 160 MICROgram(s)/formoterol 4.5 MICROgram(s) Inhaler 2 Puff(s) Inhalation two times a day  chlorhexidine 4% Liquid 1 Application(s) Topical <User Schedule>  folic acid 1 milliGRAM(s) Oral daily  influenza  Vaccine (HIGH DOSE) 0.7 milliLiter(s) IntraMuscular once  methocarbamol 500 milliGRAM(s) Oral three times a day  spironolactone 12.5 milliGRAM(s) Oral daily  torsemide 20 milliGRAM(s) Oral daily    MEDICATIONS  (PRN):  ALBUTerol    90 MICROgram(s) HFA Inhaler 2 Puff(s) Inhalation every 6 hours PRN Shortness of Breath and/or Wheezing  morphine  - Injectable 2 milliGRAM(s) IV Push every 8 hours PRN Severe Pain (7 - 10)  oxycodone    5 mG/acetaminophen 325 mG 1 Tablet(s) Oral every 6 hours PRN Moderate Pain (4 - 6)      HOME MEDICATIONS:  Home Medications:  atorvastatin 20 mg oral tablet: 1 tab(s) orally once a day (13 Sep 2021 04:59)  folic acid 1 mg oral tablet: 1 tab(s) orally once a day (21 Sep 2021 11:34)  oxycodone-acetaminophen 5 mg-325 mg oral tablet: 1 tab(s) orally every 6 hours, As needed, Moderate Pain (4 - 6) (21 Sep 2021 11:32)  spironolactone 25 mg oral tablet: 0.5 tab(s) orally once a day (21 Sep 2021 11:34)  Symbicort 160 mcg-4.5 mcg/inh inhalation aerosol: 2 puff(s) inhaled 2 times a day (13 Sep 2021 04:59)  Ventolin HFA 90 mcg/inh inhalation aerosol: 2 puff(s) inhaled every 6 hours as neede for shortness of breath (13 Sep 2021 04:59)  warfarin 2 mg oral tablet: Take half a tablet Sun, Tues, Wed, Thurs, Fri, Sat.  Take a whole tablet on Mon (13 Sep 2021 04:59)      VITALS:   T(F): 96.1 (12-01 @ 13:01), Max: 97.9 (11-30 @ 21:45)  HR: 76 (12-01 @ 13:01) (73 - 85)  BP: 87/50 (12-01 @ 13:01) (87/50 - 112/60)  BP(mean): --  RR: 18 (12-01 @ 13:01) (18 - 20)  SpO2: 100% (12-01 @ 07:23) (95% - 100%)    I&O's Summary    30 Nov 2021 07:01  -  01 Dec 2021 07:00  --------------------------------------------------------  IN: 238 mL / OUT: 100 mL / NET: 138 mL    01 Dec 2021 07:01  -  01 Dec 2021 13:07  --------------------------------------------------------  IN: 0 mL / OUT: 400 mL / NET: -400 mL        REVIEW OF SYSTEMS:  CONSTITUTIONAL: No weakness, fevers or chills  EYES: No visual changes  ENT: No vertigo or throat pain   NECK: No pain or stiffness  RESPIRATORY: No cough, wheezing, hemoptysis; No shortness of breath  CARDIOVASCULAR: (+) chest pain no palpitations  GASTROINTESTINAL: No abdominal or epigastric pain. No nausea, vomiting, or hematemesis; No diarrhea or constipation. No melena or hematochezia.  GENITOURINARY: No dysuria, frequency or hematuria  NEUROLOGICAL: No numbness or weakness    PHYSICAL EXAM:  NEURO: patient is awake , alert and oriented  GEN: Not in acute distress  NECK: no thyroid enlargement, no JVD  LUNGS: mild ronchi  CARDIOVASCULAR: S1/S2 present, RRR , no murmurs or rubs, no carotid bruits,  + PP bilaterally  ABD: Soft, non-tender, non-distended, +BS  EXT: (+) NADIA    LABS:                        9.2    6.64  )-----------( 209      ( 01 Dec 2021 05:00 )             28.3     12-01    137  |  107  |  25<H>  ----------------------------<  74  4.8   |  17  |  0.9    Ca    7.8<L>      01 Dec 2021 05:00  Mg     2.2     12-01    TPro  5.1<L>  /  Alb  2.4<L>  /  TBili  0.8  /  DBili  x   /  AST  23  /  ALT  24  /  AlkPhos  176<H>  12-01    PT/INR - ( 01 Dec 2021 05:00 )   PT: >40.00 sec;   INR: 6.68 ratio         PTT - ( 01 Dec 2021 05:00 )  PTT:70.3 sec  Troponin T, Serum: 0.03 ng/mL *HH* (12-01-21 @ 05:00)  Troponin T, Serum: 0.03 ng/mL *HH* (11-30-21 @ 16:42)    CARDIAC MARKERS ( 01 Dec 2021 05:00 )  x     / 0.03 ng/mL / x     / x     / x      CARDIAC MARKERS ( 30 Nov 2021 16:42 )  x     / 0.03 ng/mL / x     / x     / x      CARDIAC MARKERS ( 30 Nov 2021 06:07 )  x     / 0.03 ng/mL / x     / x     / x            Troponin trend:    Serum Pro-Brain Natriuretic Peptide: 3327 pg/mL (11-30-21 @ 06:07)          RADIOLOGY:  -CXR: small right pleural effusion    -TTE: < from: TTE Echo Complete w/o Contrast w/ Doppler (07.11.21 @ 09:57) >  Summary:   1. Left ventricular ejection fraction, by visual estimation, is 50 to 55%.   2. Normal global left ventricular systolic function.   3. Moderate to severe left atrial enlargement.   4. Mildly enlarged right ventricle.   5. Moderately reduced RV systolic function.   6. Moderately enlarged right atrium.   7. Moderate mitral valve regurgitation.   8. Moderate-severe tricuspid regurgitation.   9. Mild pulmonic valve regurgitation.  10. Estimated pulmonary artery systolic pressure is 59.7 mmHg assuming a right atrial pressure of 10 mmHg, which is consistent with moderate pulmonary hypertension.  11. There is mild aortic root calcification.    < end of copied text >      -CATHETERIZATION: 2017: Mild LAD and RCA disease, LCx with 60% stenosis stable since 2007 (no intervention performed)    ECG:  AFib at 80bpm, microvoltage    TELEMETRY EVENTS:

## 2021-12-01 NOTE — MEDICAL STUDENT PROGRESS NOTE(EDUCATION) - NS MD HP STUD ASPLAN PLAN FT
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   - Reports sudden onset L sided chest pain, reproducible on exam, no aggravating factors, moderate relief with Morphine  - Troponin 0.03 x3, was 0.02-0.03 throughout prior admission  - EKG performed x2 - no acute ischemic changes, repeat ordered for this AM  - BNP ~ 3K, is near baseline   - CXR - small R sided pleural effusion, pt at baseline oxygen requirements  - Reports no changes in respiratory symptoms  - C/w home Spironolactone, Torsemide   - Admit for telemetry monitoring and trend troponin  - PRN pain control, c/w home Percocet, Morphine for severe pain if no relief    #Chronic AFib on Coumadin  #Supratherapeutic INR  #Chronic Macrocytic Anemia  - Baseline Hb ~10-11, currently 9.1  - INR 6.68, > 7 on admission  - Denies any recent acute bleeding events, is AAOx4 on exam  - Recent workup in September, revealed low folate, c/w supplementation, B12 within normal limits  - Iron studies within normal limits in July, will repeat in AM  - Cont to hold Coumadin for now, daily coags, monitor for any evidence of hematoma or acute change in mental status     #COPD on Home O2 2.5L  - Does not appear to be in exacerbation, at baseline oxygen requirements  - C/w Symbicort, Ventolin PRN  - C/w O2 NC to maintain SpO2 88-92%    #Recent Cholecystitis s/p perc cholecystostomy placement and removal 10 days ago by IR  #Hx of DVT in July 2021    DVT ppx: Coumadin on hold  Diet: DASH  Activity: AAT, d/c to SNF per physiatry  Full Code  Dispo: Admit for monitoring, wishes to ultimately be discharged to [facility] 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   - Reports sudden onset L sided chest pain, reproducible on exam, no aggravating factors, moderate relief with Morphine  - Troponin 0.03 x3, was 0.02-0.03 throughout prior admission  - EKG performed x2 - no acute ischemic changes, repeat ordered for this AM  - BNP ~ 3K, is near baseline   - CXR - small R sided pleural effusion, pt at baseline oxygen requirements  - Reports no changes in respiratory symptoms  - C/w home Spironolactone, Torsemide   - Admit for telemetry monitoring and trend troponin  - PRN pain control, c/w home Percocet, Morphine for severe pain if no relief  - f/u Cardio consult Dr. Arnulfo George  - Duplex U/S LE    #Chronic AFib on Coumadin  #Supratherapeutic INR  #Chronic Macrocytic Anemia  - Baseline Hb ~10-11, currently 9.1  - INR 6.68, > 7 on admission  - Denies any recent acute bleeding events, is AAOx4 on exam  - Recent workup in September, revealed low folate, c/w supplementation, B12 within normal limits  - Iron studies within normal limits in July, will repeat in AM  - Cont to hold Coumadin for now, daily coags, monitor for any evidence of hematoma or acute change in mental status     #COPD on Home O2 2.5L  - Does not appear to be in exacerbation, at baseline oxygen requirements  - C/w Symbicort, Ventolin PRN  - C/w O2 NC to maintain SpO2 88-92%    #Recent Cholecystitis s/p perc cholecystostomy placement and removal 10 days ago by IR  #Hx of DVT in July 2021    DVT ppx: Coumadin on hold  Diet: DASH  Activity: AAT, d/c to SNF per physiatry  Full Code  Dispo: Admit for monitoring, wishes to ultimately be discharged to [facility] 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   - Reports sudden onset L sided chest pain, reproducible on exam, no aggravating factors, moderate relief with Morphine  - Troponin 0.03 x3, was 0.02-0.03 throughout prior admission  - EKG performed x2 - no acute ischemic changes, repeat ordered for this AM  - BNP ~ 3K, is near baseline   - CXR - small R sided pleural effusion, pt at baseline oxygen requirements  - Reports no changes in respiratory symptoms  - C/w home Spironolactone, Torsemide   - Admit for telemetry monitoring and trend troponin  - PRN pain control, c/w home Percocet, Morphine for severe pain if no relief  - Follows with Cardiology Dr. Scherer  - Cardio consult: - Continue same home meds, Repeat TTE (since microvoltage on ECG r/o effusion), Painkillers as needed  - PT rehab  - hold coumadin for INR 2-3  - No cardiac intervention  - Duplex U/S LE    #Chronic AFib on Coumadin  #Supratherapeutic INR  #Chronic Macrocytic Anemia  - Baseline Hb ~10-11, currently 9.1  - INR 6.68, > 7 on admission  - Denies any recent acute bleeding events, is AAOx4 on exam  - Recent workup in September, revealed low folate, c/w supplementation, B12 within normal limits  - Iron studies within normal limits in July, will repeat in AM  - Cont to hold Coumadin for now, daily coags, monitor for any evidence of hematoma or acute change in mental status     #COPD on Home O2 2.5L  - Does not appear to be in exacerbation, at baseline oxygen requirements  - C/w Symbicort, Ventolin PRN  - C/w O2 NC to maintain SpO2 88-92%    #Recent Cholecystitis s/p perc cholecystostomy placement and removal 10 days ago by IR  #Hx of DVT in July 2021    DVT ppx: Coumadin on hold  Diet: DASH  Activity: AAT, d/c to SNF per physiatry  Full Code  Dispo: Admit for monitoring, wishes to ultimately be discharged to [facility]

## 2021-12-01 NOTE — CONSULT NOTE ADULT - ASSESSMENT
IMPRESSION:  - Atypical chest pain - continuous since yesterday, reproducible to palpation - trops flat and no ECG changes  - CATHETERIZATION in 2017: Mild LAD and RCA disease, LCx with 60% stenosis stable since 2007 (no intervention performed)  - AFib on coumadin  - HFpEF - compensated  - Tachy-selvin s/p micra PPM  - DVT 7/2021  - COPD on O2    PLAN:  - Continue same home meds  - Repeat TTE (since microvoltage on ECG r/o effusion)  - Painkillers as needed  - PT rehab  - continue coumadin for INR 2-3  - No cardiac intervention IMPRESSION:  - Atypical chest pain - continuous since yesterday, reproducible to palpation - trops flat and no ECG changes - likely musculoskeletal  - CATHETERIZATION in 2017: Mild LAD and RCA disease, LCx with 60% stenosis stable since 2007 (no intervention performed)  - AFib on coumadin  - HFpEF - compensated  - Tachy-selvin s/p micra PPM  - DVT 7/2021  - COPD on O2    PLAN:  - Continue same home meds  - Repeat TTE (since microvoltage on ECG r/o effusion)  - Painkillers as needed  - PT rehab  - continue coumadin for INR 2-3  - No cardiac intervention  - Can be discharged from cardiology standpoint to follow-up with Dr. Scherer as outpatient IMPRESSION:    - Atypical chest pain - continuous since yesterday, reproducible to palpation - trops flat and no ECG changes - likely musculoskeletal  - CATHETERIZATION in 2017: Mild LAD and RCA disease, LCx with 60% stenosis stable since 2007 (no intervention performed)  - AFib on coumadin  - HFpEF - compensated  - Tachy-selvin s/p micra PPM  - DVT 7/2021  - COPD on O2    PLAN:  - Continue same home meds  - Repeat TTE (since microvoltage on ECG r/o effusion)  - Painkillers as needed  - PT rehab  - continue coumadin for INR 2-3  - No cardiac intervention  - Can be discharged from cardiology standpoint to follow-up with Dr. Scherer as outpatient

## 2021-12-01 NOTE — MEDICAL STUDENT PROGRESS NOTE(EDUCATION) - SUBJECTIVE AND OBJECTIVE BOX
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 Pt reports that this morning, he was awakened by acute, sudden onset L sided chest pain at 4AM. Describes the pain as "pounding", states that he felt some pain radiate to LUE as well. Denies identifiable aggravating or alleviating factors and states that this has never happened before. Denies headache/dizziness, endorses chronic shortness of breath that is unchanged, has been requiring his baseline oxygen. Denies abdominal pain. Endorses compliance with all medications. States that at baseline, he is bedbound, cannot stand up or ambulate due to weakness. Was recently admitted to [facility] for short term rehab, where he wishes to go back because feels that he cannot care for himself at home. Lives with son but needs more help and PT.     In the ED, T 96, /58, HR 85, RR 18, SpO2 98% on O2 NC 4L, then 100% on 3L. Lab work revealed Hb 9.1, INR 7.87, troponin 0.03 and BNP 3327 (both near baseline). EKG revealed AFib, HR 88. CXR revealed slightly inc small R sided pleural effusion.       Interval History: Patient seen and examined at bedside this AM. Patient endorses constant L sided chest pain, described as 7/10. Pain increases with inspiration. Patient also endorses SOB, reports chronic SOB due to COPD. Telemetry reviewed: small run of VT (3 beats), selvin to 49.    PAST MEDICAL & SURGICAL HISTORY:  COPD (chronic obstructive pulmonary disease)    Hypertension    Deep vein thrombosis (DVT) of left lower extremity, unspecified chronicity, unspecified vein    Cellulitis of left lower extremity    Chronic atrial fibrillation    Mitral valve insufficiency, unspecified etiology  Moderate    CHF (congestive heart failure)    S/P coronary artery stent placement    History of total right knee replacement        MEDICATIONS  (STANDING):  atorvastatin 20 milliGRAM(s) Oral at bedtime  budesonide 160 MICROgram(s)/formoterol 4.5 MICROgram(s) Inhaler 2 Puff(s) Inhalation two times a day  chlorhexidine 4% Liquid 1 Application(s) Topical <User Schedule>  folic acid 1 milliGRAM(s) Oral daily  influenza  Vaccine (HIGH DOSE) 0.7 milliLiter(s) IntraMuscular once  methocarbamol 500 milliGRAM(s) Oral three times a day  spironolactone 12.5 milliGRAM(s) Oral daily  torsemide 20 milliGRAM(s) Oral daily    MEDICATIONS  (PRN):  ALBUTerol    90 MICROgram(s) HFA Inhaler 2 Puff(s) Inhalation every 6 hours PRN Shortness of Breath and/or Wheezing  morphine  - Injectable 2 milliGRAM(s) IV Push every 8 hours PRN Severe Pain (7 - 10)  oxycodone    5 mG/acetaminophen 325 mG 1 Tablet(s) Oral every 6 hours PRN Moderate Pain (4 - 6)      No Known Allergies      Vital Signs Last 24 Hrs  T(C): 35.7 (01 Dec 2021 05:32), Max: 36.6 (30 Nov 2021 21:45)  T(F): 96.3 (01 Dec 2021 05:32), Max: 97.9 (30 Nov 2021 21:45)  HR: 82 (01 Dec 2021 05:32) (73 - 82)  BP: 97/52 (01 Dec 2021 05:32) (94/50 - 112/60)  BP(mean): --  RR: 18 (01 Dec 2021 05:32) (18 - 20)  SpO2: 100% (01 Dec 2021 07:23) (95% - 100%)    Physical Exam:  General: NAD, speaking in full sentences  HEENT: NCAT, EOMI, PERRLA, conjunctiva and sclera clear  Lung: CTAB, no rales, rhonchi, wheezing, or rubs  Heart: RRR, S1, S2, pain on inspiration and palpation, No murmurs, rubs, or gallops  Abdomen: Soft, nontender, nondistended. BS +  Extremities: +2 peripheral pulses, no clubbing, cyanosis, or edema  Skin: no rashes or lesions  Neurologic: AAO x4 , no focal deficits appreciated      LABS:                          9.2    6.64  )-----------( 209      ( 01 Dec 2021 05:00 )             28.3     12-01    137  |  107  |  25<H>  ----------------------------<  74  4.8   |  17  |  0.9    Ca    7.8<L>      01 Dec 2021 05:00  Mg     2.2     12-01    TPro  5.1<L>  /  Alb  2.4<L>  /  TBili  0.8  /  DBili  x   /  AST  23  /  ALT  24  /  AlkPhos  176<H>  12-01    PT/INR - ( 01 Dec 2021 05:00 )   PT: >40.00 sec;   INR: 6.68 ratio         PTT - ( 01 Dec 2021 05:00 )  PTT:70.3 sec        CARDIAC MARKERS ( 01 Dec 2021 05:00 )  x     / 0.03 ng/mL / x     / x     / x      CARDIAC MARKERS ( 30 Nov 2021 16:42 )  x     / 0.03 ng/mL / x     / x     / x      CARDIAC MARKERS ( 30 Nov 2021 06:07 )  x     / 0.03 ng/mL / x     / x     / x        < from: 12 Lead ECG (11.30.21 @ 11:50) >  Diagnosis Line Atrial fibrillation  Low voltage QRS  Nonspecific ST and T wave abnormality  Abnormal ECG    < from: TTE Echo Complete w/o Contrast w/ Doppler (07.11.21 @ 09:57) >  Left ventricular ejection fraction, by visual estimation, is 50 to 55%.    < from: Xray Chest 1 View- PORTABLE-Urgent (11.30.21 @ 07:27) >  Impression:  Increasing small right pleural effusion.    < end of copied text >

## 2021-12-01 NOTE — PROGRESS NOTE ADULT - SUBJECTIVE AND OBJECTIVE BOX
RODNEY SANTO  81y Male    CHIEF COMPLAINT:    Patient is a 81y old  Male who presents with a chief complaint of chest pain (2021 16:16)      INTERVAL HPI/OVERNIGHT EVENTS:    Patient seen and examined.    ROS: All other systems are negative.    Vital Signs:    T(F): 96.3 (21 @ 05:32), Max: 97.9 (21 @ 21:45)  HR: 82 (21 @ 05:32) (73 - 82)  BP: 97/52 (21 @ 05:32) (94/50 - 112/60)  RR: 18 (21 @ 05:32) (18 - 20)  SpO2: 95% (21 @ 21:45) (95% - 100%)  I&O's Summary    2021 07:01  -  01 Dec 2021 07:00  --------------------------------------------------------  IN: 238 mL / OUT: 100 mL / NET: 138 mL      Daily     Daily Weight in k.2 (01 Dec 2021 05:32)  CAPILLARY BLOOD GLUCOSE          PHYSICAL EXAM:    GENERAL:  NAD  SKIN: No rashes or lesions  HENT: Atraumatic. Normocephalic. PERRL. Moist membranes.  NECK: Supple, No JVD. No lymphadenopathy.  PULMONARY: CTA B/L. No wheezing. No rales  CVS: Normal S1, S2. Rate and Rhythm are regular. No murmurs.  ABDOMEN/GI: Soft, Nontender, Nondistended; BS present  EXTREMITIES: Peripheral pulses intact. No edema B/L LE.  NEUROLOGIC:  No motor or sensory deficit.  PSYCH: Alert & oriented x 3    Consultant(s) Notes Reviewed:  [x ] YES  [ ] NO  Care Discussed with Consultants/Other Providers [ x] YES  [ ] NO    EKG reviewed  Telemetry reviewed    LABS:                        9.1    7.55  )-----------( 204      ( 2021 06:07 )             28.4     11-30    135  |  107  |  21<H>  ----------------------------<  103<H>  4.2   |  19  |  0.9    Ca    7.7<L>      2021 06:07  Mg     2.1         TPro  5.0<L>  /  Alb  2.3<L>  /  TBili  0.9  /  DBili  x   /  AST  22  /  ALT  24  /  AlkPhos  168<H>      PT/INR - ( 2021 06:07 )   PT: >40.00 sec;   INR: 7.87 ratio         PTT - ( 2021 06:07 )  PTT:64.4 sec  Serum Pro-Brain Natriuretic Peptide: 3327 pg/mL (21 @ 06:07)    Trop 0.03, CKMB --, CK --, 21 @ 16:42  Trop 0.03, CKMB --, CK --, 21 @ 06:07        RADIOLOGY & ADDITIONAL TESTS:    < from: Xray Chest 1 View- PORTABLE-Urgent (21 @ 07:27) >    Impression:    Increasing small right pleural effusion.      < end of copied text >  < from: TTE Echo Complete w/o Contrast w/ Doppler (21 @ 09:57) >      Summary:   1. Left ventricular ejection fraction, by visual estimation, is 50 to 55%.   2. Normal global left ventricular systolic function.   3. Moderate to severe left atrial enlargement.   4. Mildly enlarged right ventricle.   5. Moderately reduced RV systolic function.   6. Moderately enlarged right atrium.   7. Moderate mitral valve regurgitation.   8. Moderate-severe tricuspid regurgitation.   9. Mild pulmonic valve regurgitation.  10. Estimated pulmonary artery systolic pressure is 59.7 mmHg assuming a right atrial pressure of 10 mmHg, which is consistent with moderate pulmonary hypertension.  11. There is mild aortic root calcification.    < end of copied text >    Imaging or report Personally Reviewed:  [ ] YES  [ ] NO    Medications:  Standing  atorvastatin 20 milliGRAM(s) Oral at bedtime  budesonide 160 MICROgram(s)/formoterol 4.5 MICROgram(s) Inhaler 2 Puff(s) Inhalation two times a day  chlorhexidine 4% Liquid 1 Application(s) Topical <User Schedule>  folic acid 1 milliGRAM(s) Oral daily  influenza  Vaccine (HIGH DOSE) 0.7 milliLiter(s) IntraMuscular once  methocarbamol 500 milliGRAM(s) Oral three times a day  spironolactone 12.5 milliGRAM(s) Oral daily  torsemide 20 milliGRAM(s) Oral daily    PRN Meds  ALBUTerol    90 MICROgram(s) HFA Inhaler 2 Puff(s) Inhalation every 6 hours PRN  morphine  - Injectable 2 milliGRAM(s) IV Push every 8 hours PRN  oxycodone    5 mG/acetaminophen 325 mG 1 Tablet(s) Oral every 6 hours PRN      Case discussed with resident    Care discussed with pt/family           RODNEY SANTO  81y Male    CHIEF COMPLAINT:    Patient is a 81y old  Male who presents with a chief complaint of chest pain (2021 16:16)      INTERVAL HPI/OVERNIGHT EVENTS:    Patient seen and examined. C/O intermittent L sided cp and L arm pain. No sob. No palpitations.     ROS: All other systems are negative.    Vital Signs:    T(F): 96.3 (21 @ 05:32), Max: 97.9 (21 @ 21:45)  HR: 82 (21 @ 05:32) (73 - 82)  BP: 97/52 (21 @ 05:32) (94/50 - 112/60)  RR: 18 (21 @ 05:32) (18 - 20)  SpO2: 95% (21 @ 21:45) (95% - 100%)  I&O's Summary    2021 07:01  -  01 Dec 2021 07:00  --------------------------------------------------------  IN: 238 mL / OUT: 100 mL / NET: 138 mL      Daily     Daily Weight in k.2 (01 Dec 2021 05:32)  CAPILLARY BLOOD GLUCOSE          PHYSICAL EXAM:    GENERAL:  NAD  SKIN: No rashes or lesions  HENT: Atraumatic. Normocephalic. PERRL. Moist membranes.  NECK: Supple, No JVD. No lymphadenopathy.  PULMONARY: CTA B/L. No wheezing. No rales  CVS: Normal S1, S2. Rate and Rhythm are regular. No murmurs.  ABDOMEN/GI: Soft, Nontender, Nondistended; BS present  EXTREMITIES: Peripheral pulses intact. No edema B/L LE.  NEUROLOGIC:  No motor or sensory deficit.  PSYCH: Alert & oriented x 3    Consultant(s) Notes Reviewed:  [x ] YES  [ ] NO  Care Discussed with Consultants/Other Providers [ x] YES  [ ] NO    EKG reviewed  Telemetry reviewed    LABS:                        9.1    7.55  )-----------( 204      ( 2021 06:07 )             28.4     11-    135  |  107  |  21<H>  ----------------------------<  103<H>  4.2   |  19  |  0.9    Ca    7.7<L>      2021 06:07  Mg     2.1         TPro  5.0<L>  /  Alb  2.3<L>  /  TBili  0.9  /  DBili  x   /  AST  22  /  ALT  24  /  AlkPhos  168<H>      PT/INR - ( 2021 06:07 )   PT: >40.00 sec;   INR: 7.87 ratio         PTT - ( 2021 06:07 )  PTT:64.4 sec  Serum Pro-Brain Natriuretic Peptide: 3327 pg/mL (21 @ 06:07)    Trop 0.03, CKMB --, CK --, 21 @ 16:42  Trop 0.03, CKMB --, CK --, 21 @ 06:07        RADIOLOGY & ADDITIONAL TESTS:    < from: Xray Chest 1 View- PORTABLE-Urgent (21 @ 07:27) >    Impression:    Increasing small right pleural effusion.      < end of copied text >  < from: TTE Echo Complete w/o Contrast w/ Doppler (21 @ 09:57) >      Summary:   1. Left ventricular ejection fraction, by visual estimation, is 50 to 55%.   2. Normal global left ventricular systolic function.   3. Moderate to severe left atrial enlargement.   4. Mildly enlarged right ventricle.   5. Moderately reduced RV systolic function.   6. Moderately enlarged right atrium.   7. Moderate mitral valve regurgitation.   8. Moderate-severe tricuspid regurgitation.   9. Mild pulmonic valve regurgitation.  10. Estimated pulmonary artery systolic pressure is 59.7 mmHg assuming a right atrial pressure of 10 mmHg, which is consistent with moderate pulmonary hypertension.  11. There is mild aortic root calcification.    < end of copied text >    Imaging or report Personally Reviewed:  [ ] YES  [ ] NO    Medications:  Standing  atorvastatin 20 milliGRAM(s) Oral at bedtime  budesonide 160 MICROgram(s)/formoterol 4.5 MICROgram(s) Inhaler 2 Puff(s) Inhalation two times a day  chlorhexidine 4% Liquid 1 Application(s) Topical <User Schedule>  folic acid 1 milliGRAM(s) Oral daily  influenza  Vaccine (HIGH DOSE) 0.7 milliLiter(s) IntraMuscular once  methocarbamol 500 milliGRAM(s) Oral three times a day  spironolactone 12.5 milliGRAM(s) Oral daily  torsemide 20 milliGRAM(s) Oral daily    PRN Meds  ALBUTerol    90 MICROgram(s) HFA Inhaler 2 Puff(s) Inhalation every 6 hours PRN  morphine  - Injectable 2 milliGRAM(s) IV Push every 8 hours PRN  oxycodone    5 mG/acetaminophen 325 mG 1 Tablet(s) Oral every 6 hours PRN      Case discussed with resident    Care discussed with pt/family

## 2021-12-02 PROBLEM — K80.20 CHOLELITHIASIS: Status: ACTIVE | Noted: 2021-11-23

## 2021-12-02 LAB
ALBUMIN SERPL ELPH-MCNC: 2.4 G/DL — LOW (ref 3.5–5.2)
ALP SERPL-CCNC: 174 U/L — HIGH (ref 30–115)
ALT FLD-CCNC: 24 U/L — SIGNIFICANT CHANGE UP (ref 0–41)
ANION GAP SERPL CALC-SCNC: 11 MMOL/L — SIGNIFICANT CHANGE UP (ref 7–14)
APTT BLD: 63.2 SEC — HIGH (ref 27–39.2)
AST SERPL-CCNC: 23 U/L — SIGNIFICANT CHANGE UP (ref 0–41)
BASOPHILS # BLD AUTO: 0.01 K/UL — SIGNIFICANT CHANGE UP (ref 0–0.2)
BASOPHILS NFR BLD AUTO: 0.2 % — SIGNIFICANT CHANGE UP (ref 0–1)
BILIRUB SERPL-MCNC: 0.8 MG/DL — SIGNIFICANT CHANGE UP (ref 0.2–1.2)
BUN SERPL-MCNC: 28 MG/DL — HIGH (ref 10–20)
CALCIUM SERPL-MCNC: 7.6 MG/DL — LOW (ref 8.5–10.1)
CHLORIDE SERPL-SCNC: 106 MMOL/L — SIGNIFICANT CHANGE UP (ref 98–110)
CO2 SERPL-SCNC: 20 MMOL/L — SIGNIFICANT CHANGE UP (ref 17–32)
CREAT SERPL-MCNC: 1.2 MG/DL — SIGNIFICANT CHANGE UP (ref 0.7–1.5)
EOSINOPHIL # BLD AUTO: 0.05 K/UL — SIGNIFICANT CHANGE UP (ref 0–0.7)
EOSINOPHIL NFR BLD AUTO: 0.8 % — SIGNIFICANT CHANGE UP (ref 0–8)
GLUCOSE SERPL-MCNC: 87 MG/DL — SIGNIFICANT CHANGE UP (ref 70–99)
HCT VFR BLD CALC: 26.3 % — LOW (ref 42–52)
HGB BLD-MCNC: 8.6 G/DL — LOW (ref 14–18)
IMM GRANULOCYTES NFR BLD AUTO: 0.3 % — SIGNIFICANT CHANGE UP (ref 0.1–0.3)
INR BLD: 7.04 RATIO — CRITICAL HIGH (ref 0.65–1.3)
LYMPHOCYTES # BLD AUTO: 0.57 K/UL — LOW (ref 1.2–3.4)
LYMPHOCYTES # BLD AUTO: 8.6 % — LOW (ref 20.5–51.1)
MAGNESIUM SERPL-MCNC: 1.9 MG/DL — SIGNIFICANT CHANGE UP (ref 1.8–2.4)
MCHC RBC-ENTMCNC: 32.7 G/DL — SIGNIFICANT CHANGE UP (ref 32–37)
MCHC RBC-ENTMCNC: 33.3 PG — HIGH (ref 27–31)
MCV RBC AUTO: 101.9 FL — HIGH (ref 80–94)
MONOCYTES # BLD AUTO: 0.82 K/UL — HIGH (ref 0.1–0.6)
MONOCYTES NFR BLD AUTO: 12.4 % — HIGH (ref 1.7–9.3)
NEUTROPHILS # BLD AUTO: 5.15 K/UL — SIGNIFICANT CHANGE UP (ref 1.4–6.5)
NEUTROPHILS NFR BLD AUTO: 77.7 % — HIGH (ref 42.2–75.2)
NRBC # BLD: 0 /100 WBCS — SIGNIFICANT CHANGE UP (ref 0–0)
PLATELET # BLD AUTO: 208 K/UL — SIGNIFICANT CHANGE UP (ref 130–400)
POTASSIUM SERPL-MCNC: 3.9 MMOL/L — SIGNIFICANT CHANGE UP (ref 3.5–5)
POTASSIUM SERPL-SCNC: 3.9 MMOL/L — SIGNIFICANT CHANGE UP (ref 3.5–5)
PROT SERPL-MCNC: 5 G/DL — LOW (ref 6–8)
PROTHROM AB SERPL-ACNC: >40 SEC — HIGH (ref 9.95–12.87)
RBC # BLD: 2.58 M/UL — LOW (ref 4.7–6.1)
RBC # FLD: 16.2 % — HIGH (ref 11.5–14.5)
SODIUM SERPL-SCNC: 137 MMOL/L — SIGNIFICANT CHANGE UP (ref 135–146)
WBC # BLD: 6.62 K/UL — SIGNIFICANT CHANGE UP (ref 4.8–10.8)
WBC # FLD AUTO: 6.62 K/UL — SIGNIFICANT CHANGE UP (ref 4.8–10.8)

## 2021-12-02 PROCEDURE — 99232 SBSQ HOSP IP/OBS MODERATE 35: CPT

## 2021-12-02 PROCEDURE — 93306 TTE W/DOPPLER COMPLETE: CPT | Mod: 26

## 2021-12-02 PROCEDURE — 99233 SBSQ HOSP IP/OBS HIGH 50: CPT

## 2021-12-02 RX ORDER — METOPROLOL TARTRATE 50 MG
12.5 TABLET ORAL
Refills: 0 | Status: DISCONTINUED | OUTPATIENT
Start: 2021-12-02 | End: 2021-12-08

## 2021-12-02 RX ORDER — PHYTONADIONE (VIT K1) 5 MG
2.5 TABLET ORAL ONCE
Refills: 0 | Status: COMPLETED | OUTPATIENT
Start: 2021-12-02 | End: 2021-12-02

## 2021-12-02 RX ADMIN — BUDESONIDE AND FORMOTEROL FUMARATE DIHYDRATE 2 PUFF(S): 160; 4.5 AEROSOL RESPIRATORY (INHALATION) at 21:18

## 2021-12-02 RX ADMIN — ATORVASTATIN CALCIUM 20 MILLIGRAM(S): 80 TABLET, FILM COATED ORAL at 21:19

## 2021-12-02 RX ADMIN — BUDESONIDE AND FORMOTEROL FUMARATE DIHYDRATE 2 PUFF(S): 160; 4.5 AEROSOL RESPIRATORY (INHALATION) at 08:11

## 2021-12-02 RX ADMIN — MORPHINE SULFATE 2 MILLIGRAM(S): 50 CAPSULE, EXTENDED RELEASE ORAL at 23:42

## 2021-12-02 RX ADMIN — METHOCARBAMOL 500 MILLIGRAM(S): 500 TABLET, FILM COATED ORAL at 21:18

## 2021-12-02 RX ADMIN — METHOCARBAMOL 500 MILLIGRAM(S): 500 TABLET, FILM COATED ORAL at 05:53

## 2021-12-02 RX ADMIN — MORPHINE SULFATE 2 MILLIGRAM(S): 50 CAPSULE, EXTENDED RELEASE ORAL at 23:27

## 2021-12-02 RX ADMIN — Medication 20 MILLIGRAM(S): at 05:56

## 2021-12-02 RX ADMIN — Medication 1 MILLIGRAM(S): at 11:26

## 2021-12-02 RX ADMIN — METHOCARBAMOL 500 MILLIGRAM(S): 500 TABLET, FILM COATED ORAL at 13:13

## 2021-12-02 RX ADMIN — Medication 2.5 MILLIGRAM(S): at 17:21

## 2021-12-02 RX ADMIN — Medication 12.5 MILLIGRAM(S): at 14:30

## 2021-12-02 RX ADMIN — SPIRONOLACTONE 12.5 MILLIGRAM(S): 25 TABLET, FILM COATED ORAL at 05:54

## 2021-12-02 NOTE — PROGRESS NOTE ADULT - SUBJECTIVE AND OBJECTIVE BOX
RODNEY SANTO 81y Male  MRN#: [id number]   CODE STATUS: FULL     Hospital Day: 2d    Pt is currently admitted with the primary diagnosis of chest pain     SUBJECTIVE    Overnight events: Monomorphic NSVT 8-10 beats x2 episodes on tele    Subjective complaints: He still endorses left chest pain under his breast that radiates to the arm. Denies palpitations, SOB, abdominal pain. Would like to speak to . Wants to return to [facility].    Present Today:   - Russo:  No [  ], Yes [   ] : Indication:     - Type of IV Access:       .. CVC/Piccline:  No [  ], Yes [   ] : Indication:       .. Midline: No [  ], Yes [   ] : Indication:                                             ----------------------------------------------------------  OBJECTIVE  PAST MEDICAL & SURGICAL HISTORY  COPD (chronic obstructive pulmonary disease)    Hypertension    Deep vein thrombosis (DVT) of left lower extremity, unspecified chronicity, unspecified vein    Cellulitis of left lower extremity    Chronic atrial fibrillation    Mitral valve insufficiency, unspecified etiology  Moderate    CHF (congestive heart failure)    S/P coronary artery stent placement    History of total right knee replacement                                              -----------------------------------------------------------  ALLERGIES:  No Known Allergies                                            ------------------------------------------------------------    HOME MEDICATIONS  Home Medications:  atorvastatin 20 mg oral tablet: 1 tab(s) orally once a day (13 Sep 2021 04:59)  folic acid 1 mg oral tablet: 1 tab(s) orally once a day (21 Sep 2021 11:34)  oxycodone-acetaminophen 5 mg-325 mg oral tablet: 1 tab(s) orally every 6 hours, As needed, Moderate Pain (4 - 6) (21 Sep 2021 11:32)  spironolactone 25 mg oral tablet: 0.5 tab(s) orally once a day (21 Sep 2021 11:34)  Symbicort 160 mcg-4.5 mcg/inh inhalation aerosol: 2 puff(s) inhaled 2 times a day (13 Sep 2021 04:59)  Ventolin HFA 90 mcg/inh inhalation aerosol: 2 puff(s) inhaled every 6 hours as neede for shortness of breath (13 Sep 2021 04:59)  warfarin 2 mg oral tablet: Take half a tablet Sun, Tues, Wed, Thurs, Fri, Sat.  Take a whole tablet on Mon (13 Sep 2021 04:59)                           MEDICATIONS:  STANDING MEDICATIONS  atorvastatin 20 milliGRAM(s) Oral at bedtime  budesonide 160 MICROgram(s)/formoterol 4.5 MICROgram(s) Inhaler 2 Puff(s) Inhalation two times a day  chlorhexidine 4% Liquid 1 Application(s) Topical <User Schedule>  folic acid 1 milliGRAM(s) Oral daily  influenza  Vaccine (HIGH DOSE) 0.7 milliLiter(s) IntraMuscular once  methocarbamol 500 milliGRAM(s) Oral three times a day  spironolactone 12.5 milliGRAM(s) Oral daily  torsemide 20 milliGRAM(s) Oral daily    PRN MEDICATIONS  ALBUTerol    90 MICROgram(s) HFA Inhaler 2 Puff(s) Inhalation every 6 hours PRN  morphine  - Injectable 2 milliGRAM(s) IV Push every 8 hours PRN  oxycodone    5 mG/acetaminophen 325 mG 1 Tablet(s) Oral every 6 hours PRN                                            ------------------------------------------------------------  VITAL SIGNS: Last 24 Hours  T(C): 35.8 (02 Dec 2021 12:35), Max: 36.4 (01 Dec 2021 20:51)  T(F): 96.5 (02 Dec 2021 12:35), Max: 97.5 (01 Dec 2021 20:51)  HR: 72 (02 Dec 2021 12:35) (72 - 86)  BP: 103/53 (02 Dec 2021 12:35) (91/58 - 113/51)  BP(mean): --  RR: 17 (02 Dec 2021 12:35) (17 - 18)  SpO2: 99% (02 Dec 2021 08:39) (99% - 99%)      12-01-21 @ 07:01  -  12-02-21 @ 07:00  --------------------------------------------------------  IN: 800 mL / OUT: 1150 mL / NET: -350 mL    12-02-21 @ 07:01  -  12-02-21 @ 13:30  --------------------------------------------------------  IN: 120 mL / OUT: 630 mL / NET: -510 mL                                             --------------------------------------------------------------  LABS:                        8.6    6.62  )-----------( 208      ( 02 Dec 2021 06:56 )             26.3     12-02    137  |  106  |  28<H>  ----------------------------<  87  3.9   |  20  |  1.2    Ca    7.6<L>      02 Dec 2021 06:56  Mg     1.9     12-02    TPro  5.0<L>  /  Alb  2.4<L>  /  TBili  0.8  /  DBili  x   /  AST  23  /  ALT  24  /  AlkPhos  174<H>  12-02    PT/INR - ( 02 Dec 2021 06:56 )   PT: >40.00 sec;   INR: 7.04 ratio         PTT - ( 02 Dec 2021 06:56 )  PTT:63.2 sec              CARDIAC MARKERS ( 01 Dec 2021 05:00 )  x     / 0.03 ng/mL / x     / x     / x      CARDIAC MARKERS ( 30 Nov 2021 16:42 )  x     / 0.03 ng/mL / x     / x     / x                                                  -------------------------------------------------------------  RADIOLOGY:                                            --------------------------------------------------------------    PHYSICAL EXAM:  General: NAD, speaking in full sentences  HEENT: NCAT, EOMI, PERRLA, conjunctiva and sclera clear  Lung: CTAB, no rales, rhonchi, wheezing, or rubs  Heart: RRR, S1, S2, pain on inspiration and palpation, No murmurs, rubs, or gallops  Abdomen: Soft, nontender, nondistended. 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 Trop at baseline 0.03, EKG showed afib, CXR small R sided pleural effusion.      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   #NSVT on tele   #Hx of Chronic DVT Right Common Fem, Fem, Popliteal   - Reports sudden onset L sided chest pain, reproducible on exam, no aggravating factors, moderate relief with Morphine  - Troponin 0.03 x3, was 0.02-0.03 throughout prior admission  - EKG performed x2 - no acute ischemic changes,   - Dimer 342  - BNP ~ 3K, is near baseline   - CXR - small R sided pleural effusion, pt at baseline oxygen requirements  - Echo: completed, read pending   - Venous duplex: chronic right common fem, femoral and popliteal DVT   - C/w home Spironolactone, Torsemide   - PRN pain control, c/w home Percocet, Morphine for severe pain if no relief  - Follows with Cardiology Dr. Scherer  - Cardio consult Dr. Herrera: -- Start metoprolol 12.5mg bid and titrate to 25mg bid tomorrow if BP tolerates, Would not benefit from repeat ischemic work-up, Outpatient f/u with Dr. Scherer and EP  - PT rehab  - hold coumadin for INR 2-3, INR still supratherapeutic       #Chronic AFib on Coumadin  #Supratherapeutic INR  #Chronic Macrocytic Anemia  #Hx of Chronic DVT Right Common Fem, Fem, Popliteal   - Baseline Hb ~10-11, currently 9.1  - INR 6.68, > 7 on admission  - Denies any recent acute bleeding events, is AAOx4 on exam  - Recent workup in September, revealed low folate, c/w supplementation, B12 within normal limits  - Iron studies within normal limits in July, will repeat in AM  - Cont to hold Coumadin for now, daily coags, monitor for any evidence of hematoma or acute change in mental status     #COPD on Home O2 2.5L  - Does not appear to be in exacerbation, at baseline oxygen requirements  - C/w Symbicort, Ventolin PRN  - C/w O2 NC to maintain SpO2 88-92%    #Recent Cholecystitis s/p perc cholecystostomy placement and removal by IR  - denies abdominal pain      DVT ppx: Coumadin on hold  Diet: DASH  Activity: AAT, d/c to SNF per physiatry  Full Code  Dispo: Tele, pending echo read, INR to be therapeutic, PT, SNF placement    RODNEY SANTO 81y Male  MRN#: [id number]   CODE STATUS: FULL     Hospital Day: 2d    Pt is currently admitted with the primary diagnosis of chest pain     SUBJECTIVE    Overnight events: Monomorphic NSVT 8-10 beats x2 episodes on tele    Subjective complaints: He still endorses left chest pain under his breast that radiates to the arm. Denies palpitations, SOB, abdominal pain. Would like to speak to . Wants to return to [facility].    Present Today:   - Russo:  No [  ], Yes [   ] : Indication:     - Type of IV Access:       .. CVC/Piccline:  No [  ], Yes [   ] : Indication:       .. Midline: No [  ], Yes [   ] : Indication:                                             ----------------------------------------------------------  OBJECTIVE  PAST MEDICAL & SURGICAL HISTORY  COPD (chronic obstructive pulmonary disease)    Hypertension    Deep vein thrombosis (DVT) of left lower extremity, unspecified chronicity, unspecified vein    Cellulitis of left lower extremity    Chronic atrial fibrillation    Mitral valve insufficiency, unspecified etiology  Moderate    CHF (congestive heart failure)    S/P coronary artery stent placement    History of total right knee replacement                                              -----------------------------------------------------------  ALLERGIES:  No Known Allergies                                            ------------------------------------------------------------    HOME MEDICATIONS  Home Medications:  atorvastatin 20 mg oral tablet: 1 tab(s) orally once a day (13 Sep 2021 04:59)  folic acid 1 mg oral tablet: 1 tab(s) orally once a day (21 Sep 2021 11:34)  oxycodone-acetaminophen 5 mg-325 mg oral tablet: 1 tab(s) orally every 6 hours, As needed, Moderate Pain (4 - 6) (21 Sep 2021 11:32)  spironolactone 25 mg oral tablet: 0.5 tab(s) orally once a day (21 Sep 2021 11:34)  Symbicort 160 mcg-4.5 mcg/inh inhalation aerosol: 2 puff(s) inhaled 2 times a day (13 Sep 2021 04:59)  Ventolin HFA 90 mcg/inh inhalation aerosol: 2 puff(s) inhaled every 6 hours as neede for shortness of breath (13 Sep 2021 04:59)  warfarin 2 mg oral tablet: Take half a tablet Sun, Tues, Wed, Thurs, Fri, Sat.  Take a whole tablet on Mon (13 Sep 2021 04:59)                           MEDICATIONS:  STANDING MEDICATIONS  atorvastatin 20 milliGRAM(s) Oral at bedtime  budesonide 160 MICROgram(s)/formoterol 4.5 MICROgram(s) Inhaler 2 Puff(s) Inhalation two times a day  chlorhexidine 4% Liquid 1 Application(s) Topical <User Schedule>  folic acid 1 milliGRAM(s) Oral daily  influenza  Vaccine (HIGH DOSE) 0.7 milliLiter(s) IntraMuscular once  methocarbamol 500 milliGRAM(s) Oral three times a day  spironolactone 12.5 milliGRAM(s) Oral daily  torsemide 20 milliGRAM(s) Oral daily    PRN MEDICATIONS  ALBUTerol    90 MICROgram(s) HFA Inhaler 2 Puff(s) Inhalation every 6 hours PRN  morphine  - Injectable 2 milliGRAM(s) IV Push every 8 hours PRN  oxycodone    5 mG/acetaminophen 325 mG 1 Tablet(s) Oral every 6 hours PRN                                            ------------------------------------------------------------  VITAL SIGNS: Last 24 Hours  T(C): 35.8 (02 Dec 2021 12:35), Max: 36.4 (01 Dec 2021 20:51)  T(F): 96.5 (02 Dec 2021 12:35), Max: 97.5 (01 Dec 2021 20:51)  HR: 72 (02 Dec 2021 12:35) (72 - 86)  BP: 103/53 (02 Dec 2021 12:35) (91/58 - 113/51)  BP(mean): --  RR: 17 (02 Dec 2021 12:35) (17 - 18)  SpO2: 99% (02 Dec 2021 08:39) (99% - 99%)      12-01-21 @ 07:01  -  12-02-21 @ 07:00  --------------------------------------------------------  IN: 800 mL / OUT: 1150 mL / NET: -350 mL    12-02-21 @ 07:01  -  12-02-21 @ 13:30  --------------------------------------------------------  IN: 120 mL / OUT: 630 mL / NET: -510 mL                                             --------------------------------------------------------------  LABS:                        8.6    6.62  )-----------( 208      ( 02 Dec 2021 06:56 )             26.3     12-02    137  |  106  |  28<H>  ----------------------------<  87  3.9   |  20  |  1.2    Ca    7.6<L>      02 Dec 2021 06:56  Mg     1.9     12-02    TPro  5.0<L>  /  Alb  2.4<L>  /  TBili  0.8  /  DBili  x   /  AST  23  /  ALT  24  /  AlkPhos  174<H>  12-02    PT/INR - ( 02 Dec 2021 06:56 )   PT: >40.00 sec;   INR: 7.04 ratio         PTT - ( 02 Dec 2021 06:56 )  PTT:63.2 sec              CARDIAC MARKERS ( 01 Dec 2021 05:00 )  x     / 0.03 ng/mL / x     / x     / x      CARDIAC MARKERS ( 30 Nov 2021 16:42 )  x     / 0.03 ng/mL / x     / x     / x                                                  -------------------------------------------------------------  RADIOLOGY:                                            --------------------------------------------------------------    PHYSICAL EXAM:  General: NAD, speaking in full sentences  HEENT: NCAT, EOMI, PERRLA, conjunctiva and sclera clear  Lung: CTAB, no rales, rhonchi, wheezing, or rubs  Heart: RRR, S1, S2, pain on inspiration and palpation, No murmurs, rubs, or gallops  Abdomen: Soft, nontender, nondistended. 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 Trop at baseline 0.03, EKG showed afib, CXR small R sided pleural effusion.      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   #NSVT on tele   #Hx of Chronic DVT Right Common Fem, Fem, Popliteal   - Reports sudden onset L sided chest pain, reproducible on exam, no aggravating factors, moderate relief with Morphine  - Troponin 0.03 x3, was 0.02-0.03 throughout prior admission  - EKG performed x2 - no acute ischemic changes,   - Dimer 342  - BNP ~ 3K, is near baseline   - CXR - small R sided pleural effusion, pt at baseline oxygen requirements  - Echo: completed, read pending   - Venous duplex: chronic right common fem, femoral and popliteal DVT   - C/w home Spironolactone, Torsemide   - PRN pain control, c/w home Percocet, Morphine for severe pain if no relief  - Follows with Cardiology Dr. Scherer  - Cardio consult Dr. Herrera: -- Start metoprolol 12.5mg bid and titrate to 25mg bid tomorrow if BP tolerates, Would not benefit from repeat ischemic work-up, Outpatient f/u with Dr. Scherer and EP  - PT rehab  - hold coumadin for INR 2-3, INR still supratherapeutic. Uptrending now at 7.04. Given 2.5mg Vit K today.       #Chronic AFib on Coumadin  #Supratherapeutic INR  #Chronic Macrocytic Anemia  #Hx of Chronic DVT Right Common Fem, Fem, Popliteal   - Baseline Hb ~10-11, currently 9.1  - INR 7 on admission  - Denies any recent acute bleeding events, is AAOx4 on exam  - Recent workup in September, revealed low folate, c/w supplementation, B12 within normal limits  - Iron studies within normal limits in July, will repeat in AM  - Cont to hold Coumadin for now, daily coags, monitor for any evidence of hematoma or acute change in mental status   - hold coumadin for INR 2-3, INR still supratherapeutic. Uptrending now at 7.04. Given 2.5mg Vit K today.     #COPD on Home O2 2.5L  - Does not appear to be in exacerbation, at baseline oxygen requirements  - C/w Symbicort, Ventolin PRN  - C/w O2 NC to maintain SpO2 88-92%    #Recent Cholecystitis s/p perc cholecystostomy placement and removal by IR  - denies abdominal pain      DVT ppx: Coumadin on hold  Diet: DASH  Activity: AAT, d/c to SNF per physiatry  Full Code  Dispo: Tele, pending echo read, INR to be therapeutic, PT, SNF placement

## 2021-12-02 NOTE — PROGRESS NOTE ADULT - SUBJECTIVE AND OBJECTIVE BOX
RODNEY SANTO  81y Male    CHIEF COMPLAINT:    Patient is a 81y old  Male who presents with a chief complaint of chest pain (02 Dec 2021 10:12)      INTERVAL HPI/OVERNIGHT EVENTS:    Patient seen and examined.    ROS: All other systems are negative.    Vital Signs:    T(F): 97.2 (21 @ 05:02), Max: 97.5 (21 @ 20:51)  HR: 85 (21 @ 05:02) (76 - 86)  BP: 91/58 (21 @ 05:02) (87/50 - 113/51)  RR: 18 (21 @ 05:02) (18 - 18)  SpO2: 99% (21 @ 08:39) (99% - 99%)  I&O's Summary    01 Dec 2021 07:01  -  02 Dec 2021 07:00  --------------------------------------------------------  IN: 800 mL / OUT: 1150 mL / NET: -350 mL    02 Dec 2021 07:01  -  02 Dec 2021 11:26  --------------------------------------------------------  IN: 120 mL / OUT: 630 mL / NET: -510 mL      Daily     Daily Weight in k.6 (02 Dec 2021 05:02)  CAPILLARY BLOOD GLUCOSE          PHYSICAL EXAM:    GENERAL:  NAD  SKIN: No rashes or lesions  HENT: Atraumatic. Normocephalic. PERRL. Moist membranes.  NECK: Supple, No JVD. No lymphadenopathy.  PULMONARY: CTA B/L. No wheezing. No rales  CVS: Normal S1, S2. Rate and Rhythm are regular. No murmurs.  ABDOMEN/GI: Soft, Nontender, Nondistended; BS present  EXTREMITIES: Peripheral pulses intact. No edema B/L LE.  NEUROLOGIC:  No motor or sensory deficit.  PSYCH: Alert & oriented x 3    Consultant(s) Notes Reviewed:  [x ] YES  [ ] NO  Care Discussed with Consultants/Other Providers [ x] YES  [ ] NO    EKG reviewed  Telemetry reviewed    LABS:                        8.6    6.62  )-----------( 208      ( 02 Dec 2021 06:56 )             26.3         137  |  106  |  28<H>  ----------------------------<  87  3.9   |  20  |  1.2    Ca    7.6<L>      02 Dec 2021 06:56  Mg     1.9         TPro  5.0<L>  /  Alb  2.4<L>  /  TBili  0.8  /  DBili  x   /  AST  23  /  ALT  24  /  AlkPhos  174<H>      PT/INR - ( 02 Dec 2021 06:56 )   PT: >40.00 sec;   INR: 7.04 ratio         PTT - ( 02 Dec 2021 06:56 )  PTT:63.2 sec  Serum Pro-Brain Natriuretic Peptide: 3327 pg/mL (21 @ 06:07)    Trop 0.03, CKMB --, CK --, 21 @ 05:00  Trop 0.03, CKMB --, CK --, 21 @ 16:42  Trop 0.03, CKMB --, CK --, 21 @ 06:07        RADIOLOGY & ADDITIONAL TESTS:    < from: [facility] Duplex Lower Ext Vein Scan, Bilat (21 @ 15:24) >  The peroneal veins were patent.    Impression:    Chronic deep vein thrombosis of the right common femoral, femoral and popliteal veins.    < end of copied text >    Imaging or report Personally Reviewed:  [ ] YES  [ ] NO    Medications:  Standing  atorvastatin 20 milliGRAM(s) Oral at bedtime  budesonide 160 MICROgram(s)/formoterol 4.5 MICROgram(s) Inhaler 2 Puff(s) Inhalation two times a day  chlorhexidine 4% Liquid 1 Application(s) Topical <User Schedule>  folic acid 1 milliGRAM(s) Oral daily  influenza  Vaccine (HIGH DOSE) 0.7 milliLiter(s) IntraMuscular once  methocarbamol 500 milliGRAM(s) Oral three times a day  spironolactone 12.5 milliGRAM(s) Oral daily  torsemide 20 milliGRAM(s) Oral daily    PRN Meds  ALBUTerol    90 MICROgram(s) HFA Inhaler 2 Puff(s) Inhalation every 6 hours PRN  morphine  - Injectable 2 milliGRAM(s) IV Push every 8 hours PRN  oxycodone    5 mG/acetaminophen 325 mG 1 Tablet(s) Oral every 6 hours PRN      Case discussed with resident    Care discussed with pt/family           RODNEY SANTO  81y Male    CHIEF COMPLAINT:    Patient is a 81y old  Male who presents with a chief complaint of chest pain (02 Dec 2021 10:12)      INTERVAL HPI/OVERNIGHT EVENTS:    Patient seen and examined. C/O intermittent cp which is relived by pain killer. No sob. No palpitations.     ROS: All other systems are negative.    Vital Signs:    T(F): 97.2 (21 @ 05:02), Max: 97.5 (21 @ 20:51)  HR: 85 (21 @ 05:02) (76 - 86)  BP: 91/58 (21 @ 05:02) (87/50 - 113/51)  RR: 18 (21 @ 05:02) (18 - 18)  SpO2: 99% (21 @ 08:39) (99% - 99%)  I&O's Summary    01 Dec 2021 07:  -  02 Dec 2021 07:00  --------------------------------------------------------  IN: 800 mL / OUT: 1150 mL / NET: -350 mL    02 Dec 2021 07:  -  02 Dec 2021 11:26  --------------------------------------------------------  IN: 120 mL / OUT: 630 mL / NET: -510 mL      Daily     Daily Weight in k.6 (02 Dec 2021 05:02)  CAPILLARY BLOOD GLUCOSE          PHYSICAL EXAM:    GENERAL:  NAD  SKIN: No rashes or lesions  HENT: Atraumatic. Normocephalic. PERRL. Moist membranes.  NECK: Supple, No JVD. No lymphadenopathy.  PULMONARY: CTA B/L. No wheezing. No rales  CVS: Normal S1, S2. Rate and Rhythm are regular. No murmurs.  ABDOMEN/GI: Soft, Nontender, Nondistended; BS present  EXTREMITIES: Peripheral pulses intact. No edema B/L LE.  NEUROLOGIC:  No motor or sensory deficit.  PSYCH: Alert & oriented x 3    Consultant(s) Notes Reviewed:  [x ] YES  [ ] NO  Care Discussed with Consultants/Other Providers [ x] YES  [ ] NO    EKG reviewed  Telemetry reviewed    LABS:                        8.6    6.62  )-----------( 208      ( 02 Dec 2021 06:56 )             26.3   Hemoglobin: 8.6 g/dL ( @ 06:56)  Hemoglobin: 9.2 g/dL ( @ 05:00)  Hemoglobin: 9.1 g/dL ( @ 06:07)        137  |  106  |  28<H>  ----------------------------<  87  3.9   |  20  |  1.2    Ca    7.6<L>      02 Dec 2021 06:56  Mg     1.9         TPro  5.0<L>  /  Alb  2.4<L>  /  TBili  0.8  /  DBili  x   /  AST  23  /  ALT  24  /  AlkPhos  174<H>      PT/INR - ( 02 Dec 2021 06:56 )   PT: >40.00 sec;   INR: 7.04 ratio         PTT - ( 02 Dec 2021 06:56 )  PTT:63.2 sec  Serum Pro-Brain Natriuretic Peptide: 3327 pg/mL (21 @ 06:07)    Trop 0.03, CKMB --, CK --, 21 @ 05:00  Trop 0.03, CKMB --, CK --, 21 @ 16:42  Trop 0.03, CKMB --, CK --, 21 @ 06:07        RADIOLOGY & ADDITIONAL TESTS:    < from: VA Duplex Lower Ext Vein Scan, Bilat (21 @ 15:24) >  The peroneal veins were patent.    Impression:    Chronic deep vein thrombosis of the right common femoral, femoral and popliteal veins.    < end of copied text >    Imaging or report Personally Reviewed:  [ ] YES  [ ] NO    Medications:  Standing  atorvastatin 20 milliGRAM(s) Oral at bedtime  budesonide 160 MICROgram(s)/formoterol 4.5 MICROgram(s) Inhaler 2 Puff(s) Inhalation two times a day  chlorhexidine 4% Liquid 1 Application(s) Topical <User Schedule>  folic acid 1 milliGRAM(s) Oral daily  influenza  Vaccine (HIGH DOSE) 0.7 milliLiter(s) IntraMuscular once  methocarbamol 500 milliGRAM(s) Oral three times a day  spironolactone 12.5 milliGRAM(s) Oral daily  torsemide 20 milliGRAM(s) Oral daily    PRN Meds  ALBUTerol    90 MICROgram(s) HFA Inhaler 2 Puff(s) Inhalation every 6 hours PRN  morphine  - Injectable 2 milliGRAM(s) IV Push every 8 hours PRN  oxycodone    5 mG/acetaminophen 325 mG 1 Tablet(s) Oral every 6 hours PRN      Case discussed with resident    Care discussed with pt/family

## 2021-12-02 NOTE — ASSESSMENT
[FreeTextEntry1] : Visit conducted over the phone with the patient's son.  81 year-old male with history of cholecystitis s/p percutaneous cholecystostomy and cholangiogram demonstrating persistent stones and cystic duct obstruction.  Not a surgical candidate and was planned for cholangioscopy and possible stone removal but the percutaneous cholecystostomy tube fell out.  The son says that his father is having no pain or fevers but has become debilitated and may need to go back to the nursing home.    \par \par Plan:\par Patient eating well and doing well without percutaneous cholecystostomy tube\par Not a surgical candidate\par Patient to come to ER immediately if fever or right upper quadrant pain

## 2021-12-02 NOTE — PROGRESS NOTE ADULT - SUBJECTIVE AND OBJECTIVE BOX
SUBJECTIVE:    Patient is a 81y old Male who presents with a chief complaint of chest pain (01 Dec 2021 13:07)    Currently admitted to medicine with the primary diagnosis of Chest pain       Today is hospital day 2d. This morning he is resting comfortably in bed. Still has pain  Episodes of NSVT over night    PAST MEDICAL & SURGICAL HISTORY  COPD (chronic obstructive pulmonary disease)    Hypertension    Deep vein thrombosis (DVT) of left lower extremity, unspecified chronicity, unspecified vein    Cellulitis of left lower extremity    Chronic atrial fibrillation    Mitral valve insufficiency, unspecified etiology  Moderate    CHF (congestive heart failure)    S/P coronary artery stent placement    History of total right knee replacement      SOCIAL HISTORY:  Negative for smoking/alcohol/drug use.     ALLERGIES:  No Known Allergies    MEDICATIONS:  STANDING MEDICATIONS  atorvastatin 20 milliGRAM(s) Oral at bedtime  budesonide 160 MICROgram(s)/formoterol 4.5 MICROgram(s) Inhaler 2 Puff(s) Inhalation two times a day  chlorhexidine 4% Liquid 1 Application(s) Topical <User Schedule>  folic acid 1 milliGRAM(s) Oral daily  influenza  Vaccine (HIGH DOSE) 0.7 milliLiter(s) IntraMuscular once  methocarbamol 500 milliGRAM(s) Oral three times a day  spironolactone 12.5 milliGRAM(s) Oral daily  torsemide 20 milliGRAM(s) Oral daily    PRN MEDICATIONS  ALBUTerol    90 MICROgram(s) HFA Inhaler 2 Puff(s) Inhalation every 6 hours PRN  morphine  - Injectable 2 milliGRAM(s) IV Push every 8 hours PRN  oxycodone    5 mG/acetaminophen 325 mG 1 Tablet(s) Oral every 6 hours PRN    VITALS:   T(F): 97.2  HR: 85  BP: 91/58  RR: 18  SpO2: 99%    LABS:                        8.6    6.62  )-----------( 208      ( 02 Dec 2021 06:56 )             26.3     12-02    137  |  106  |  28<H>  ----------------------------<  87  3.9   |  20  |  1.2    Ca    7.6<L>      02 Dec 2021 06:56  Mg     1.9     12-02    TPro  5.0<L>  /  Alb  2.4<L>  /  TBili  0.8  /  DBili  x   /  AST  23  /  ALT  24  /  AlkPhos  174<H>  12-02    PT/INR - ( 02 Dec 2021 06:56 )   PT: >40.00 sec;   INR: 7.04 ratio         PTT - ( 02 Dec 2021 06:56 )  PTT:63.2 sec          CARDIAC MARKERS ( 01 Dec 2021 05:00 )  x     / 0.03 ng/mL / x     / x     / x      CARDIAC MARKERS ( 30 Nov 2021 16:42 )  x     / 0.03 ng/mL / x     / x     / x          RADIOLOGY:    PHYSICAL EXAM:  GEN: No acute distress  LUNGS: Clear to auscultation bilaterally   HEART: S1/S2 present. irregualr.   ABD: Soft, non-tender, non-distended. Bowel sounds present  EXT: NC/NC/NE/2+PP/SAPP  NEURO: AAOX3     SUBJECTIVE:      Patient is a 81y old Male who presents with a chief complaint of chest pain (01 Dec 2021 13:07)    Currently admitted to medicine with the primary diagnosis of Chest pain       Today is hospital day 2d. This morning he is resting comfortably in bed. Still has pain  Episodes of NSVT over night    PAST MEDICAL & SURGICAL HISTORY  COPD (chronic obstructive pulmonary disease)    Hypertension    Deep vein thrombosis (DVT) of left lower extremity, unspecified chronicity, unspecified vein    Cellulitis of left lower extremity    Chronic atrial fibrillation    Mitral valve insufficiency, unspecified etiology  Moderate    CHF (congestive heart failure)    S/P coronary artery stent placement    History of total right knee replacement      SOCIAL HISTORY:  Negative for smoking/alcohol/drug use.     ALLERGIES:  No Known Allergies    MEDICATIONS:  STANDING MEDICATIONS  atorvastatin 20 milliGRAM(s) Oral at bedtime  budesonide 160 MICROgram(s)/formoterol 4.5 MICROgram(s) Inhaler 2 Puff(s) Inhalation two times a day  chlorhexidine 4% Liquid 1 Application(s) Topical <User Schedule>  folic acid 1 milliGRAM(s) Oral daily  influenza  Vaccine (HIGH DOSE) 0.7 milliLiter(s) IntraMuscular once  methocarbamol 500 milliGRAM(s) Oral three times a day  spironolactone 12.5 milliGRAM(s) Oral daily  torsemide 20 milliGRAM(s) Oral daily    PRN MEDICATIONS  ALBUTerol    90 MICROgram(s) HFA Inhaler 2 Puff(s) Inhalation every 6 hours PRN  morphine  - Injectable 2 milliGRAM(s) IV Push every 8 hours PRN  oxycodone    5 mG/acetaminophen 325 mG 1 Tablet(s) Oral every 6 hours PRN    VITALS:   T(F): 97.2  HR: 85  BP: 91/58  RR: 18  SpO2: 99%    LABS:                        8.6    6.62  )-----------( 208      ( 02 Dec 2021 06:56 )             26.3     12-02    137  |  106  |  28<H>  ----------------------------<  87  3.9   |  20  |  1.2    Ca    7.6<L>      02 Dec 2021 06:56  Mg     1.9     12-02    TPro  5.0<L>  /  Alb  2.4<L>  /  TBili  0.8  /  DBili  x   /  AST  23  /  ALT  24  /  AlkPhos  174<H>  12-02    PT/INR - ( 02 Dec 2021 06:56 )   PT: >40.00 sec;   INR: 7.04 ratio         PTT - ( 02 Dec 2021 06:56 )  PTT:63.2 sec          CARDIAC MARKERS ( 01 Dec 2021 05:00 )  x     / 0.03 ng/mL / x     / x     / x      CARDIAC MARKERS ( 30 Nov 2021 16:42 )  x     / 0.03 ng/mL / x     / x     / x          RADIOLOGY:    PHYSICAL EXAM:  GEN: No acute distress  LUNGS: Clear to auscultation bilaterally   HEART: S1/S2 present. irregualr.   ABD: Soft, non-tender, non-distended. Bowel sounds present  EXT: NC/NC/NE/2+PP/SAPP  NEURO: AAOX3

## 2021-12-02 NOTE — HISTORY OF PRESENT ILLNESS
[FreeTextEntry1] : Visit conducted over the phone with the patient's son.  81 year-old male with history of cholecystitis s/p percutaneous cholecystostomy and cholangiogram demonstrating persistent stones and cystic duct obstruction.  Not a surgical candidate and was planned for cholangioscopy and possible stone removal but the percutaneous cholecystostomy tube fell out.  The son says that his father is having no pain or fevers but has become debilitated and may need to go back to the nursing home.

## 2021-12-02 NOTE — PROGRESS NOTE ADULT - ASSESSMENT
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       CP  Chronic A-Fib on Coumadin  Chronic HFpEF  Chronic DVT  COPD on home O2  Tachy/selvin S/P Micra  CAD  NSVT           PLAN:    ·	CE x 3 are negative.   ·	Tele reviewed. Episodes of NSVT  ·	D/W the cardiologist. Not a candidate for any intervention. Recommended to start him on Metoprolol 12.5 mg po q 12h. If BP tolerates, increase to 25 mg po q 12h tomorrow.   ·	CP likely is musculoskeletal.   ·	ECHO done in July this year showed EF is 50-55%  ·	INR is 7.04. Will hold Coumadin today. Check INR in AM  ·	Cont his other home meds.   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       CP likely musculoskeletan  Chronic A-Fib on Coumadin  Chronic HFpEF  Chronic DVT  COPD on home O2  Tachy/selvin syndrome S/P Micra  CAD  NSVT           PLAN:    ·	CE x 3 are negative.   ·	Tele reviewed. Episodes of NSVT  ·	D/W the cardiologist. Not a candidate for any intervention. Recommended to start him on Metoprolol 12.5 mg po q 12h. If BP tolerates, increase to 25 mg po q 12h tomorrow.   ·	CP likely is musculoskeletal.   ·	ECHO done in July this year showed EF is 50-55%. Check repeat ECHO  ·	INR is 7.04. Will hold Coumadin today. Check INR in AM  ·	Cont his other home meds.   ·	Venous doppler of the LE showed chronic DVT of the R common femoral and popliteal veins  ·	PT eval    Progress Note Handoff    Pending (specify):  Consults___PT____, Tests________, Test Results_______, Other__Social services for D/C planning. _______  Family discussion:  Disposition: Home___/SNF___/Other________/Unknown at this time________    Kyle Pritchett MD  Spectra: [phone]

## 2021-12-02 NOTE — REASON FOR VISIT
[Follow-Up: _____] : a [unfilled] follow-up visit [FreeTextEntry1] : Follow up after patient presented to the emergency room after his perc cheli tube fell out

## 2021-12-02 NOTE — PROGRESS NOTE ADULT - REASON FOR ADMISSION
Breastfeeding Services Note    NICU Infant and Family seen as follow up visit for lactation consultation.    Assessment and Teaching with Mom/family of the following:    -using breast pump to help maintain supply  -Appropriate expectations for gestational age  -Pump flange size appropriate    Observed mom pumping to ensure that flange fit is correct.   Have arranged for a pump for home use and taught mom how to use it.    Lactation will follow   Maureen Vela RN, IBCLC            chest pain

## 2021-12-02 NOTE — PROGRESS NOTE ADULT - ASSESSMENT
IMPRESSION:  - Atypical chest pain - continuous since yesterday, reproducible to palpation - trops flat and no ECG changes - likely musculoskeletal  - CATHETERIZATION in 2017: Mild LAD and RCA disease, LCx with 60% stenosis stable since 2007 (no intervention performed)  - AFib on coumadin  - HFpEF - compensated  - Tachy-selvin s/p micra PPM  - DVT 7/2021  - COPD on O2  - Episodes of NSVT at night only r/o KYLIE    PLAN:  - Continue same home meds  - Repeat TTE (since microvoltage on ECG r/o effusion)  - Painkillers as needed  - PT rehab  - continue coumadin for INR 2-3  - Replete Magnesium and potassium (keep K>4 and Mg>2)  - Start metoprolol 12.5mg bid and titrate to 25mg bid tomorrow if BP tolerates  - Would not benefit from ischemic work-up IMPRESSION:  - Atypical chest pain - continuous since yesterday, reproducible to palpation - trops flat and no ECG changes - likely musculoskeletal  - CATHETERIZATION in 2017: Mild LAD and RCA disease, LCx with 60% stenosis stable since 2007 (no intervention performed)  - AFib on coumadin  - HFpEF - compensated  - Tachy-selvin s/p micra PPM  - DVT 7/2021  - COPD on O2  - Episodes of NSVT at night only r/o KYLIE    PLAN:  - Continue same home meds  - Repeat TTE   - Painkillers as needed  - PT rehab  - continue coumadin for INR 2-3  - Replete Magnesium and potassium (keep K>4 and Mg>2)  - Start metoprolol 12.5mg bid and titrate to 25mg bid tomorrow if BP tolerates  - Would not benefit from repeat ischemic work-up  - Outpatient f/u with Dr. Scherer and EP

## 2021-12-03 ENCOUNTER — TRANSCRIPTION ENCOUNTER (OUTPATIENT)
Age: 81
End: 2021-12-03

## 2021-12-03 LAB
ALBUMIN SERPL ELPH-MCNC: 2.2 G/DL — LOW (ref 3.5–5.2)
ALP SERPL-CCNC: 175 U/L — HIGH (ref 30–115)
ALT FLD-CCNC: 24 U/L — SIGNIFICANT CHANGE UP (ref 0–41)
ANION GAP SERPL CALC-SCNC: 12 MMOL/L — SIGNIFICANT CHANGE UP (ref 7–14)
AST SERPL-CCNC: 24 U/L — SIGNIFICANT CHANGE UP (ref 0–41)
BASOPHILS # BLD AUTO: 0.01 K/UL — SIGNIFICANT CHANGE UP (ref 0–0.2)
BASOPHILS NFR BLD AUTO: 0.2 % — SIGNIFICANT CHANGE UP (ref 0–1)
BILIRUB SERPL-MCNC: 0.9 MG/DL — SIGNIFICANT CHANGE UP (ref 0.2–1.2)
BUN SERPL-MCNC: 28 MG/DL — HIGH (ref 10–20)
CALCIUM SERPL-MCNC: 7.5 MG/DL — LOW (ref 8.5–10.1)
CHLORIDE SERPL-SCNC: 102 MMOL/L — SIGNIFICANT CHANGE UP (ref 98–110)
CO2 SERPL-SCNC: 20 MMOL/L — SIGNIFICANT CHANGE UP (ref 17–32)
CREAT SERPL-MCNC: 1.2 MG/DL — SIGNIFICANT CHANGE UP (ref 0.7–1.5)
EOSINOPHIL # BLD AUTO: 0.06 K/UL — SIGNIFICANT CHANGE UP (ref 0–0.7)
EOSINOPHIL NFR BLD AUTO: 0.9 % — SIGNIFICANT CHANGE UP (ref 0–8)
GLUCOSE SERPL-MCNC: 87 MG/DL — SIGNIFICANT CHANGE UP (ref 70–99)
HCT VFR BLD CALC: 25.7 % — LOW (ref 42–52)
HGB BLD-MCNC: 8.5 G/DL — LOW (ref 14–18)
IMM GRANULOCYTES NFR BLD AUTO: 0.3 % — SIGNIFICANT CHANGE UP (ref 0.1–0.3)
INR BLD: 3.69 RATIO — HIGH (ref 0.65–1.3)
IRON SATN MFR SERPL: 81 % — HIGH (ref 15–50)
IRON SATN MFR SERPL: 88 UG/DL — SIGNIFICANT CHANGE UP (ref 35–150)
LYMPHOCYTES # BLD AUTO: 0.63 K/UL — LOW (ref 1.2–3.4)
LYMPHOCYTES # BLD AUTO: 9.7 % — LOW (ref 20.5–51.1)
MAGNESIUM SERPL-MCNC: 1.7 MG/DL — LOW (ref 1.8–2.4)
MCHC RBC-ENTMCNC: 33.1 G/DL — SIGNIFICANT CHANGE UP (ref 32–37)
MCHC RBC-ENTMCNC: 33.3 PG — HIGH (ref 27–31)
MCV RBC AUTO: 100.8 FL — HIGH (ref 80–94)
MONOCYTES # BLD AUTO: 0.85 K/UL — HIGH (ref 0.1–0.6)
MONOCYTES NFR BLD AUTO: 13 % — HIGH (ref 1.7–9.3)
NEUTROPHILS # BLD AUTO: 4.95 K/UL — SIGNIFICANT CHANGE UP (ref 1.4–6.5)
NEUTROPHILS NFR BLD AUTO: 75.9 % — HIGH (ref 42.2–75.2)
NRBC # BLD: 0 /100 WBCS — SIGNIFICANT CHANGE UP (ref 0–0)
PLATELET # BLD AUTO: 204 K/UL — SIGNIFICANT CHANGE UP (ref 130–400)
POTASSIUM SERPL-MCNC: 3.7 MMOL/L — SIGNIFICANT CHANGE UP (ref 3.5–5)
POTASSIUM SERPL-SCNC: 3.7 MMOL/L — SIGNIFICANT CHANGE UP (ref 3.5–5)
PROT SERPL-MCNC: 4.9 G/DL — LOW (ref 6–8)
PROTHROM AB SERPL-ACNC: >40 SEC — HIGH (ref 9.95–12.87)
RBC # BLD: 2.55 M/UL — LOW (ref 4.7–6.1)
RBC # FLD: 16.5 % — HIGH (ref 11.5–14.5)
SODIUM SERPL-SCNC: 134 MMOL/L — LOW (ref 135–146)
TIBC SERPL-MCNC: 108 UG/DL — LOW (ref 220–430)
UIBC SERPL-MCNC: 20 UG/DL — LOW (ref 110–370)
WBC # BLD: 6.52 K/UL — SIGNIFICANT CHANGE UP (ref 4.8–10.8)
WBC # FLD AUTO: 6.52 K/UL — SIGNIFICANT CHANGE UP (ref 4.8–10.8)

## 2021-12-03 PROCEDURE — 99232 SBSQ HOSP IP/OBS MODERATE 35: CPT

## 2021-12-03 PROCEDURE — 93010 ELECTROCARDIOGRAM REPORT: CPT

## 2021-12-03 RX ORDER — LANOLIN ALCOHOL/MO/W.PET/CERES
5 CREAM (GRAM) TOPICAL AT BEDTIME
Refills: 0 | Status: DISCONTINUED | OUTPATIENT
Start: 2021-12-03 | End: 2021-12-23

## 2021-12-03 RX ORDER — MAGNESIUM SULFATE 500 MG/ML
2 VIAL (ML) INJECTION ONCE
Refills: 0 | Status: COMPLETED | OUTPATIENT
Start: 2021-12-03 | End: 2021-12-03

## 2021-12-03 RX ORDER — LANOLIN ALCOHOL/MO/W.PET/CERES
5 CREAM (GRAM) TOPICAL ONCE
Refills: 0 | Status: COMPLETED | OUTPATIENT
Start: 2021-12-03 | End: 2021-12-03

## 2021-12-03 RX ORDER — POTASSIUM CHLORIDE 20 MEQ
20 PACKET (EA) ORAL ONCE
Refills: 0 | Status: COMPLETED | OUTPATIENT
Start: 2021-12-03 | End: 2021-12-03

## 2021-12-03 RX ORDER — SPIRONOLACTONE 25 MG/1
12.5 TABLET, FILM COATED ORAL DAILY
Refills: 0 | Status: DISCONTINUED | OUTPATIENT
Start: 2021-12-03 | End: 2021-12-08

## 2021-12-03 RX ORDER — MIDODRINE HYDROCHLORIDE 2.5 MG/1
5 TABLET ORAL EVERY 8 HOURS
Refills: 0 | Status: DISCONTINUED | OUTPATIENT
Start: 2021-12-03 | End: 2021-12-04

## 2021-12-03 RX ADMIN — MORPHINE SULFATE 2 MILLIGRAM(S): 50 CAPSULE, EXTENDED RELEASE ORAL at 11:21

## 2021-12-03 RX ADMIN — Medication 20 MILLIGRAM(S): at 05:59

## 2021-12-03 RX ADMIN — ATORVASTATIN CALCIUM 20 MILLIGRAM(S): 80 TABLET, FILM COATED ORAL at 22:01

## 2021-12-03 RX ADMIN — Medication 5 MILLIGRAM(S): at 00:22

## 2021-12-03 RX ADMIN — Medication 20 MILLIEQUIVALENT(S): at 14:16

## 2021-12-03 RX ADMIN — BUDESONIDE AND FORMOTEROL FUMARATE DIHYDRATE 2 PUFF(S): 160; 4.5 AEROSOL RESPIRATORY (INHALATION) at 22:02

## 2021-12-03 RX ADMIN — METHOCARBAMOL 500 MILLIGRAM(S): 500 TABLET, FILM COATED ORAL at 14:26

## 2021-12-03 RX ADMIN — Medication 50 GRAM(S): at 17:00

## 2021-12-03 RX ADMIN — MORPHINE SULFATE 2 MILLIGRAM(S): 50 CAPSULE, EXTENDED RELEASE ORAL at 10:06

## 2021-12-03 RX ADMIN — BUDESONIDE AND FORMOTEROL FUMARATE DIHYDRATE 2 PUFF(S): 160; 4.5 AEROSOL RESPIRATORY (INHALATION) at 07:41

## 2021-12-03 RX ADMIN — Medication 12.5 MILLIGRAM(S): at 05:59

## 2021-12-03 RX ADMIN — METHOCARBAMOL 500 MILLIGRAM(S): 500 TABLET, FILM COATED ORAL at 05:59

## 2021-12-03 RX ADMIN — Medication 25 GRAM(S): at 14:18

## 2021-12-03 RX ADMIN — Medication 5 MILLIGRAM(S): at 23:38

## 2021-12-03 RX ADMIN — SPIRONOLACTONE 12.5 MILLIGRAM(S): 25 TABLET, FILM COATED ORAL at 05:59

## 2021-12-03 RX ADMIN — MIDODRINE HYDROCHLORIDE 5 MILLIGRAM(S): 2.5 TABLET ORAL at 22:01

## 2021-12-03 RX ADMIN — Medication 1 MILLIGRAM(S): at 11:28

## 2021-12-03 RX ADMIN — CHLORHEXIDINE GLUCONATE 1 APPLICATION(S): 213 SOLUTION TOPICAL at 05:59

## 2021-12-03 RX ADMIN — MORPHINE SULFATE 2 MILLIGRAM(S): 50 CAPSULE, EXTENDED RELEASE ORAL at 23:38

## 2021-12-03 RX ADMIN — METHOCARBAMOL 500 MILLIGRAM(S): 500 TABLET, FILM COATED ORAL at 22:01

## 2021-12-03 NOTE — DISCHARGE NOTE PROVIDER - CARE PROVIDER_API CALL
Arnulfo Scherer)  Cardiovascular Disease; Internal Medicine; Interventional Cardiology  [address] [place] [address]. [address]  [place] [address]  Phone: [phone]  Fax: [phone]  Follow Up Time:     John Page)  [address] [place], [place] [id number]  [address] [facility] [address]  [place] [address]  Phone: [phone]  Fax: [phone]  Follow Up Time:    Arnulfo Scherer)  Cardiovascular Disease; Internal Medicine; Interventional Cardiology  [address] [facility] [address]  [place] [address]  Phone: [phone]  Fax: [phone]  Follow Up Time:     John Page)  [id number]  [address] [facility], [address]  [place] [address]  Phone: [phone]  Fax: [phone]  Follow Up Time:     Madhu Cisse)  Plastic Surgery  [address] [place] [address]  Phone: [phone]  Fax: [phone]  Follow Up Time: 1 week   John Page)  [address] [place], [place] [id number]  [address] [place] [address], [place] [address]  Phone: [phone]  Fax: [phone]  Follow Up Time: Routine

## 2021-12-03 NOTE — DIETITIAN INITIAL EVALUATION ADULT. - PERTINENT LABORATORY DATA
12/3: RBC-2.55, H/H-8.5/25.7, Na-134, BUN-28, Mg-1.7; per previous admit records: 9/13/2021: [id number]-0.3%, lipid panel WDL

## 2021-12-03 NOTE — DISCHARGE NOTE PROVIDER - HOSPITAL COURSE
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 Trop at baseline 0.03, EKG showed afib, CXR small R sided pleural effusion. Admitted for ACS rule out.     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   #NSVT on tele   #Hx of Chronic DVT Right Common Fem, Fem, Popliteal   - Reports sudden onset L sided chest pain, reproducible on exam, likely MSK, no aggravating factors, moderate relief with Morphine  - Troponin 0.03 x3, was 0.02-0.03 throughout prior admission  - EKG performed x2 - no acute ischemic changes,   - Dimer 342  - BNP ~ 3K, is near baseline   - CXR - small R sided pleural effusion, pt at baseline oxygen requirements  - Echo: EF 50-55%, Mod LA dilation,   - Venous duplex: chronic right common fem, femoral and popliteal DVT   - C/w home Spironolactone, Torsemide   - PRN pain control, c/w home Percocet, Morphine for severe pain if no relief  - Follows with Cardiology Dr. Scherer  - Cardio consult Dr. Herrera: -Start metoprolol 12.5mg bid and titrate to 25mg bid tomorrow if BP tolerates, Would not benefit from repeat ischemic work-up, Outpatient f/u with Dr. Scherer and EP  - PT rehab  - INR reversal with vit k 12/2.   - hold coumadin for INR 2-3, INR still supratherapeutic.     #Chronic AFib on Coumadin  #Supratherapeutic INR s/p Vit K reversal on 12/2.   #Chronic Macrocytic Anemia  #Hx of Chronic DVT Right Common Fem, Fem, Popliteal   - Baseline Hb ~10-11, currently 9.1  - INR 7 on admission >> 3.69  - Denies any recent acute bleeding events, is AAOx4 on exam  - Recent workup in September, revealed low folate, c/w supplementation, B12 within normal limits  - Iron studies within normal limits in July, will repeat in AM  - Cont to hold Coumadin for now, daily coags, monitor for any evidence of hematoma or acute change in mental status   - hold coumadin for INR 2-3, INR still supratherapeutic. Uptrending now at 7.04. Given 2.5mg Vit K today.     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 Troponin at baseline 0.03, EKG showed atrial fibrillation, CXR small R sided pleural effusion, no increased O2 needs.    Admitted to Tele. EKG x2 without acute ischemic changes, VA Duplex showing known chronic DVTs, INR supratherapeutic so Coumadin initially held. Cardio consulted, no ischemic workup further recommended. Coumadin restarted with INR therapeutic. Stable for d/c. 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 Troponin at baseline 0.03, EKG showed atrial fibrillation, CXR small R sided pleural effusion, no increased O2 needs.    Admitted to Tele. EKG x2 without acute ischemic changes, VA Duplex showing known chronic DVTs, INR supratherapeutic so Coumadin initially held. Cardio consulted, no ischemic workup further recommended, chest pain deemed atypical and likely MSK in nature. Coumadin restarted with INR therapeutic. Stable for d/c. 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 Pt reports that this morning, he was awakened by acute, sudden onset L sided chest pain at 4AM. Describes the pain as "pounding", states that he felt some pain radiate to LUE as well. Denies identifiable aggravating or alleviating factors and states that this has never happened before. Denies headache/dizziness, endorses chronic shortness of breath that is unchanged, has been requiring his baseline oxygen. Denies abdominal pain. Endorses compliance with all medications. States that at baseline, he is bedbound, cannot stand up or ambulate due to weakness. Was recently admitted to [facility] for short term rehab, where he wishes to go back because feels that he cannot care for himself at home. Lives with son but needs more help and PT.   In the ED, T 96, /58, HR 85, RR 18, SpO2 98% on O2 NC 4L, then 100% on 3L. Lab work revealed Hb 9.1, INR 7.87, troponin 0.03 and BNP 3327 (both near baseline). EKG revealed AFib, HR 88. CXR revealed slightly inc small R sided pleural effusion.    #Proteus Bacteremia - +BCx x2 on 12/7, f/u BCx on 12/11 NGTD  #Acute Cholecystitis - +findings on RUQ U/S (12/10) + HIDA Scan (12/11)  #PMHx of Recent Cholecystitis - s/p perQ-cholecystostomy placement and removal by IR - 10d prior to admission  -ID consulted:  - Narrowed cefepime to Ceftriaxone 2g q24h IV   - Continue PO flagyl 500mg TID  - Post surgery, cipro 500mg BID and flagyl 500mg TID x 3 days  - Monitor sacral area, low threshold to consult Burn, appreciate Wound care consult  - Gen Surg consulted:  Cardiology risk stratified the patient as high risk for cholecystectomy  Medicine risk stratified the patient as high risk  Patient is not a surgical candidate  Please consult IR for re-placement of percutaneous cholecystostomy tube  Please recall surgery as needed  12/15: Pt. does not want PerQ Cholecysostomy w/IR, declined debridement at bedside by burn team; GOC to be revisited with pt's daughter.    #Atypical Chest Pain - likely MSK, no further ischemic workup as per Cardiology (Dr. Herrera)  #PMHx of HFpEF - follows with Dr. 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  #Chronic DVT Right Common Fem/Fem/Popliteal - on LE Venous Duplex 12/1  - Troponin 0.03 x3, was 0.02-0.03 throughout prior admission, EKG performed x2 with no acute ischemic changes  - D-dimer 342, BNP ~3K near baseline, 11/30 CXR showing small R sided pleural effusion, has remained at baseline oxygen requirements  - TTE 12/2: EF 50-55%, otherwise unremarkable   - d/c Spironolactone + Torsemide due to persistent hypotension, not overloaded on exam  - pain control with Percocet 1 tab q6h PRN for moderate pain + Morphine PRN for severe pain  - INR was supratherapeutic on admission, was reversed with Vitamin K on 12/2, restarted Coumadin, INR remains supratherapeutic, have been holding the Coumadin, will need decreased dosing moving forward  - BP on lower side, increased midodrine to 10mg PO TID  - patient not eating and drinking adequately, calorie count started  - patient refusing PT/Rehab  - CTA-chest 12/9 negative for PE    #Chronic Macrocytic Anemia - on folate supplementation  #Anemia of Chronic Disease  - baseline Hb ~10-11, supratherapeutic INR on admission reversed, B12 WNL in September  - 12/1 iron studies: low TIBC, likely anemia of chronic disease    #DTI Left Heel + Coccyx   - Wound Care consulted, recommendations given to RN  - f/u Burn recs:   Bedside debridement was attempted 12/14 but pt refused stating that he is in pain and "wants to die"    #COPD   - on home O2 2.5L, has remained at baseline, continuing symbicort 160ug 2 puffs BID, Albuterol PRN                                                                          Poor surgical candidate; Pt. does not want PerQ Cholecysostomy w/IR, declined debridement at bedside by burn team; Pt ready for d/c to hospice care.   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 Pt reports that this morning, he was awakened by acute, sudden onset L sided chest pain at 4AM. Describes the pain as "pounding", states that he felt some pain radiate to LUE as well. Denies identifiable aggravating or alleviating factors and states that this has never happened before. Denies headache/dizziness, endorses chronic shortness of breath that is unchanged, has been requiring his baseline oxygen. Denies abdominal pain. Endorses compliance with all medications. States that at baseline, he is bedbound, cannot stand up or ambulate due to weakness. Was recently admitted to [facility] for short term rehab, where he wishes to go back because feels that he cannot care for himself at home. Lives with son but needs more help and PT.   In the ED, T 96, /58, HR 85, RR 18, SpO2 98% on O2 NC 4L, then 100% on 3L. Lab work revealed Hb 9.1, INR 7.87, troponin 0.03 and BNP 3327 (both near baseline). EKG revealed AFib, HR 88. CXR revealed slightly inc small R sided pleural effusion.    #Proteus Bacteremia - +BCx x2 on 12/7, f/u BCx on 12/11 NGTD  #Acute Cholecystitis - +findings on RUQ U/S (12/10) + HIDA Scan (12/11)  #PMHx of Recent Cholecystitis - s/p perQ-cholecystostomy placement and removal by IR - 10d prior to admission  -ID consulted:  - Narrowed cefepime to Ceftriaxone 2g q24h IV   - Continue PO flagyl 500mg TID  - Post surgery, cipro 500mg BID and flagyl 500mg TID x 3 days  - Monitor sacral area, low threshold to consult Burn, appreciate Wound care consult  - Gen Surg consulted:  Cardiology risk stratified the patient as high risk for cholecystectomy  Medicine risk stratified the patient as high risk  Patient is not a surgical candidate  Please consult IR for re-placement of percutaneous cholecystostomy tube  Please recall surgery as needed  12/15: Pt. does not want PerQ Cholecysostomy w/IR, declined debridement at bedside by burn team; GOC to be revisited with pt's daughter.    #Atypical Chest Pain - likely MSK, no further ischemic workup as per Cardiology (Dr. Herrera)  #PMHx of HFpEF - follows with Dr. 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  #Chronic DVT Right Common Fem/Fem/Popliteal - on LE Venous Duplex 12/1  - Troponin 0.03 x3, was 0.02-0.03 throughout prior admission, EKG performed x2 with no acute ischemic changes  - D-dimer 342, BNP ~3K near baseline, 11/30 CXR showing small R sided pleural effusion, has remained at baseline oxygen requirements  - TTE 12/2: EF 50-55%, otherwise unremarkable   - d/c Spironolactone + Torsemide due to persistent hypotension, not overloaded on exam  - pain control with Percocet 1 tab q6h PRN for moderate pain + Morphine PRN for severe pain  - INR was supratherapeutic on admission, was reversed with Vitamin K on 12/2, restarted Coumadin, INR remains supratherapeutic, have been holding the Coumadin, will need decreased dosing moving forward  - BP on lower side, increased midodrine to 10mg PO TID  - patient not eating and drinking adequately, calorie count started  - patient refusing PT/Rehab  - CTA-chest 12/9 negative for PE    #Chronic Macrocytic Anemia - on folate supplementation  #Anemia of Chronic Disease  - baseline Hb ~10-11, supratherapeutic INR on admission reversed, B12 WNL in September  - 12/1 iron studies: low TIBC, likely anemia of chronic disease    #DTI Left Heel + Coccyx   - Wound Care consulted, recommendations given to RN  - f/u Burn recs:   Bedside debridement was attempted 12/14 but pt refused     #COPD   - on home O2 2.5L, has remained at baseline, continuing symbicort 160ug 2 puffs BID, Albuterol PRN                                                                          Poor surgical candidate; Pt. s/p want PerQ Cholecysostomy w/IR, declined debridement at bedside by burn team; declining any further treatment or interventions. Pt ready for d/c to hospice care.

## 2021-12-03 NOTE — DISCHARGE NOTE PROVIDER - PROVIDER TOKENS
PROVIDER:[TOKEN:[[id number]]],PROVIDER:[TOKEN:[[id number]]] PROVIDER:[TOKEN:[[id number]]],PROVIDER:[TOKEN:[[id number]]],PROVIDER:[TOKEN:[[id number]],FOLLOWUP:[1 week]] PROVIDER:[TOKEN:[[id number]],FOLLOWUP:[Routine]]

## 2021-12-03 NOTE — DIETITIAN INITIAL EVALUATION ADULT. - OTHER INFO
Pertinent Medical Information: Admitted with chest pain. Hx of HFpEF. Chronic AFib on Coumadin. NSVT on tele. Supratherapeutic INR s/p Vit K reversal on 12/2. COPD on Home O2 2.5L. Recent Cholecystitis s/p perc cholecystostomy placement and removal by IR - denies abd pain.    Ht: 190.5 cm. Wt: 74 kg (12/3). BMI: 20.4. IBW: 89.1 kg.    1 & 2+ edema to B/L ankle. Alert. Last BM 12/1. No N/V/D/C reported. Missing upper teeth, however reports no chewing/swallowing difficulty reported. Stage II pressure ulcer to sacrum. Suspected deep tissue injury to R buttock.    Current diet order DASH/TLC. Reports fair appetite; consuming 50-75% of meals at this time.

## 2021-12-03 NOTE — DISCHARGE NOTE PROVIDER - NSDCMRMEDTOKEN_GEN_ALL_CORE_FT
atorvastatin 20 mg oral tablet: 1 tab(s) orally once a day  folic acid 1 mg oral tablet: 1 tab(s) orally once a day  oxycodone-acetaminophen 5 mg-325 mg oral tablet: 1 tab(s) orally every 6 hours, As needed, Moderate Pain (4 - 6)  spironolactone 25 mg oral tablet: 0.5 tab(s) orally once a day  Symbicort 160 mcg-4.5 mcg/inh inhalation aerosol: 2 puff(s) inhaled 2 times a day  torsemide 20 mg oral tablet: 1 tab(s) orally once a day  Ventolin HFA 90 mcg/inh inhalation aerosol: 2 puff(s) inhaled every 6 hours as neede for shortness of breath  warfarin 2 mg oral tablet: Take half a tablet Sun, Tues, Wed, Thurs, Fri, Sat.  Take a whole tablet on Mon   fentaNYL 25 mcg/hr transdermal film, extended release: 1 patch transdermal every 72 hours  morphine 20 mg/mL oral concentrate: 0.75 milliliter(s) orally every 2 hours, As needed, pain or dyspnea  polyethylene glycol 3350 oral powder for reconstitution: 17 gram(s) orally once a day  silver sulfADIAZINE 1% topical cream: 1 application topically 2 times a day  torsemide 20 mg oral tablet: 1 tab(s) orally once a day  Ventolin HFA 90 mcg/inh inhalation aerosol: 2 puff(s) inhaled every 6 hours as neede for shortness of breath

## 2021-12-03 NOTE — DISCHARGE NOTE PROVIDER - NSDCCAREPROVSEEN_GEN_ALL_CORE_FT
Kyle Pritchett Arjun Daniels Dr. Daniels, hospitalist  Dr. Marshall, hospitalist  [facility] resident team

## 2021-12-03 NOTE — DISCHARGE NOTE PROVIDER - NSDCCPCAREPLAN_GEN_ALL_CORE_FT
PRINCIPAL DISCHARGE DIAGNOSIS  Diagnosis: Chest pain  Assessment and Plan of Treatment:       SECONDARY DISCHARGE DIAGNOSES  Diagnosis: Supratherapeutic INR  Assessment and Plan of Treatment:      PRINCIPAL DISCHARGE DIAGNOSIS  Diagnosis: Chest pain  Assessment and Plan of Treatment: Please follow-up with your primary care provider and cardiologist.  Chest pain can be caused by many different conditions which may or may not be dangerous. Causes include heartburn, lung infections, heart attack, blood clot in lungs, skin infections, strain or damage to muscle, cartilage, or bones, etc. In addition to a history and physical examination, an electrocardiogram (ECG) or other lab tests may have been performed to determine the cause of your chest pain. Follow up with your primary care provider or with a cardiologist as instructed.   SEEK IMMEDIATE MEDICAL CARE IF YOU HAVE ANY OF THE FOLLOWING SYMPTOMS: worsening chest pain, coughing up blood, unexplained back/neck/jaw pain, severe abdominal pain, dizziness or lightheadedness, fainting, shortness of breath, sweaty or clammy skin, vomiting, or racing heart beat. These symptoms may represent a serious problem that is an emergency. Do not wait to see if the symptoms will go away. Get medical help right away. Call 911 and do not drive yourself to the hospital.      SECONDARY DISCHARGE DIAGNOSES  Diagnosis: Supratherapeutic INR  Assessment and Plan of Treatment: Please follow-up at the Coumadin clinic.     PRINCIPAL DISCHARGE DIAGNOSIS  Diagnosis: Acute cholecystitis  Assessment and Plan of Treatment: You have a recent history of recent Cholecystitis and you had a subcutaneous cholecystostomy placed by interventional rdaiology which was removed 10 days before you presented to the hospital. On this admission, you were diagnosed with Acute Cholecystitis as well. You were seen by General Surgery who deemed you as high risk to undergo surgery and recommended Percutaneous Cholecystostomy which you declined.   DISCHARGE INSTRUCTIONS:  Medicines:  Pain medicine: You may need medicine to take away or decrease pain.  Do not wait until the pain is severe before you take your medicine. Tell caregivers if your pain does not decrease.  Pain medicine can make you dizzy or sleepy. Prevent falls by calling someone when you get out of bed or if you need help.  Antibiotics: This medicine is given to fight or prevent an infection caused by bacteria. Always take your antibiotics exactly as ordered by your healthcare provider. Do not stop taking your medicine unless directed by your healthcare provider. Never save antibiotics or take leftover antibiotics that were given to you for another illness.  NSAIDs , such as ibuprofen, help decrease swelling, pain, and fever. This medicine is available with or without a doctor's order. NSAIDs can cause stomach bleeding or kidney problems in certain people. If you take blood thinner medicine, always ask your healthcare provider if NSAIDs are safe for you. Always read the medicine label and follow directions.  Take your medicine as directed. Call your healthcare provider if you think your medicine is not helping or if you have side effects.  Contact your healthcare provider if:  You have a fever or chills.  You have nausea or vomiting.  You have decreased appetite.  You have pain when you urinate.  Your skin or eyes turn yellow.  You have questions or concerns about your condition or care.  Seek care immediately or call 911 if:  You urinate less than usual.  Please follow up with your primary care doctor with in this week.      SECONDARY DISCHARGE DIAGNOSES  Diagnosis: Costochondritis  Assessment and Plan of Treatment: Chest pain can be caused by many different conditions which may or may not be dangerous. Causes include heartburn, lung infections, heart attack, blood clot in lungs, skin infections, strain or damage to muscle, cartilage, or bones, etc. In addition to a history and physical examination, an electrocardiogram (ECG) or other lab tests may have been performed to determine the cause of your chest pain. Follow up with your primary care provider or with a cardiologist as instructed.   SEEK IMMEDIATE MEDICAL CARE IF YOU HAVE ANY OF THE FOLLOWING SYMPTOMS: worsening chest pain, coughing up blood, unexplained back/neck/jaw pain, severe abdominal pain, dizziness or lightheadedness, fainting, shortness of breath, sweaty or clammy skin, vomiting, or racing heart beat. These symptoms may represent a serious problem that is an emergency. Do not wait to see if the symptoms will go away. Get medical help right away. Call 911 and do not drive yourself to the hospital.  You were seen by cardiology who deemed that your chest pain was Musculoskeletal in nature and they did not recommend any further ischemic work up.  Please follow up with your primary care doctor in 1-2 weeks.    Diagnosis: Decubitus ulcer of coccyx, stage IV  Assessment and Plan of Treatment: On presentation to hospital, you were found to have a 10 x 10 cm ulcer on your coccygeal region with necrotic tissue. Burn/Plastic Surgery was consulted and recommended debridement of the wound. However, you declined debridement. Please follow up with Plastic Surgeon Dr. Cisse in 1 - 2 weeks after being discharged.    Diagnosis: Bacteremia  Assessment and Plan of Treatment: Bacteremia is when there is bacteria in the blood. Bacteremia happens when germs from infections in your body travel to your blood.  Call 911 for any of the following:  You have a seizure or lose consciousness.  You have trouble breathing.  You feel extremely weak and have a hard time moving.  Seek care immediately if:  Your symptoms, such as fever, get worse, even if you are taking medicine to treat the infection.  You stop urinating or urinate very little.  Contact your healthcare provider if:  You have questions or concerns about your condition or care.  Antibiotics may be given to treat an infection. You may be given antibiotics through an IV for several weeks. You may instead be given oral antibiotics. Do not stop taking your antibiotics when you feel better. Take all of your medicine until it is finished. This may prevent the infection from returning or getting worse.Take your medicine as directed.   Prevent bacteremia:  Care for catheters and drains as directed. Wash your hands before and after you touch your catheter or drain. Follow directions for dressing changes and bathing. Watch for signs and symptoms of infection such as pus, fever, swelling, pain or drainage. Report symptoms immediately to your healthcare provider.  Get vaccinated. Get all recommended vaccinations. The pneumonia and influenza vaccines may prevent lung infections that could cause bacteremia.  You were found to have bacteremia with Proteus Mirabilus. You were started on the appropriate antibiotics as per infectious disease. Please follow up with your primary care doctor.    Diagnosis: Elevated INR  Assessment and Plan of Treatment: You were found to have an elevated INR and your Medication Coumadin was held. Please follow up with your primary care doctor and the Coumadin Clinic.

## 2021-12-03 NOTE — DISCHARGE NOTE PROVIDER - NSDCFUADDAPPT_GEN_ALL_CORE_FT
Please follow up with your primary care doctor so that they can manage your end of life conditions and care.

## 2021-12-03 NOTE — PROGRESS NOTE ADULT - SUBJECTIVE AND OBJECTIVE BOX
RODNEY SANTO  81y Male    CHIEF COMPLAINT:    Patient is a 81y old  Male who presents with a chief complaint of chest pain (03 Dec 2021 13:57)      INTERVAL HPI/OVERNIGHT EVENTS:    Patient seen and examined. C/O on & off cp. No sob. No palpitations.     ROS: All other systems are negative.    Vital Signs:    T(F): 96.2 (21 @ 12:44), Max: 96.3 (21 @ 05:50)  HR: 76 (21 @ 15:19) (71 - 80)  BP: 97/55 (21 @ 15:19) (82/40 - 108/47)  RR: 18 (21 @ 12:44) (17 - 18)  SpO2: 96% (21 @ 08:00) (96% - 98%)  I&O's Summary    02 Dec 2021 07:01  -  03 Dec 2021 07:00  --------------------------------------------------------  IN: 900 mL / OUT: 1660 mL / NET: -760 mL    03 Dec 2021 07:01  -  03 Dec 2021 16:48  --------------------------------------------------------  IN: 354 mL / OUT: 200 mL / NET: 154 mL      Daily     Daily Weight in k (03 Dec 2021 05:50)  CAPILLARY BLOOD GLUCOSE          PHYSICAL EXAM:    GENERAL:  NAD  SKIN: No rashes or lesions  HENT: Atraumatic. Normocephalic. PERRL. Moist membranes.  NECK: Supple, No JVD. No lymphadenopathy.  PULMONARY: CTA B/L. No wheezing. No rales  CVS: Normal S1, S2. Rate and Rhythm are regular. No murmurs.  ABDOMEN/GI: Soft, Nontender, Nondistended; BS present  EXTREMITIES: Peripheral pulses intact. No edema B/L LE.  NEUROLOGIC:  No motor or sensory deficit.  PSYCH: Alert & oriented x 3    Consultant(s) Notes Reviewed:  [x ] YES  [ ] NO  Care Discussed with Consultants/Other Providers [ x] YES  [ ] NO    EKG reviewed  Telemetry reviewed    LABS:                        8.5    6.52  )-----------( 204      ( 03 Dec 2021 07:40 )             25.7     12    134<L>  |  102  |  28<H>  ----------------------------<  87  3.7   |  20  |  1.2    Ca    7.5<L>      03 Dec 2021 07:40  Mg     1.7         TPro  4.9<L>  /  Alb  2.2<L>  /  TBili  0.9  /  DBili  x   /  AST  24  /  ALT  24  /  AlkPhos  175<H>      PT/INR - ( 03 Dec 2021 07:40 )   PT: >40.00 sec;   INR: 3.69 ratio         PTT - ( 02 Dec 2021 06:56 )  PTT:63.2 sec  Serum Pro-Brain Natriuretic Peptide: 3327 pg/mL (21 @ 06:07)    Trop 0.03, CKMB --, CK --, 21 @ 05:00        RADIOLOGY & ADDITIONAL TESTS:      Imaging or report Personally Reviewed:  [ ] YES  [ ] NO    Medications:  Standing  atorvastatin 20 milliGRAM(s) Oral at bedtime  budesonide 160 MICROgram(s)/formoterol 4.5 MICROgram(s) Inhaler 2 Puff(s) Inhalation two times a day  chlorhexidine 4% Liquid 1 Application(s) Topical <User Schedule>  folic acid 1 milliGRAM(s) Oral daily  influenza  Vaccine (HIGH DOSE) 0.7 milliLiter(s) IntraMuscular once  methocarbamol 500 milliGRAM(s) Oral three times a day  metoprolol tartrate 12.5 milliGRAM(s) Oral two times a day  midodrine 5 milliGRAM(s) Oral every 8 hours  spironolactone 12.5 milliGRAM(s) Oral daily  torsemide 20 milliGRAM(s) Oral daily    PRN Meds  ALBUTerol    90 MICROgram(s) HFA Inhaler 2 Puff(s) Inhalation every 6 hours PRN  morphine  - Injectable 2 milliGRAM(s) IV Push every 8 hours PRN  oxycodone    5 mG/acetaminophen 325 mG 1 Tablet(s) Oral every 6 hours PRN      Case discussed with resident    Care discussed with pt/family

## 2021-12-03 NOTE — DIETITIAN INITIAL EVALUATION ADULT. - ADD RECOMMEND
Recommendation: Order Prosource Gelatein Plus q12hrs. Order Ensure Compact q24hrs. Order MVI q24hrs. Order 250 mg Vit C q12hrs. Order 220 mg Zinc Sulfate q24hrs.

## 2021-12-03 NOTE — DIETITIAN INITIAL EVALUATION ADULT. - ORAL INTAKE PTA/DIET HISTORY
Fair appetite & po intake PTP per report. Reportedly used to consume meals at home, however has recently been receiving meals from short term rehab facility. Reportedly consumes a regular diet. NKFA. No food preferences. No supplements per report. UBW reported to be 72.7 kg. No known h/o unintentional wt loss. No signs of muscle wasting/subcutaneous fat loss.

## 2021-12-03 NOTE — DIETITIAN INITIAL EVALUATION ADULT. - PERSON TAUGHT/METHOD
Discussed diet order & RD recommendations. Discussed importance of meeting estimated nutrient needs to improve wound healing. Discussed heart healthy nutrition therapy concepts. Discussed coumadin/vit K interaction. D/c instruction - heart healthy written nutrition education materials.

## 2021-12-03 NOTE — DISCHARGE NOTE PROVIDER - NSDCACTIVITY_GEN_ALL_CORE
Follow Instructions Provided by your Surgical Team Yes No heavy lifting/straining/Follow Instructions Provided by your Surgical Team

## 2021-12-03 NOTE — DIETITIAN INITIAL EVALUATION ADULT. - OTHER CALCULATIONS
Energy: 7133-1945 kcal/day (30-35 kcal/kg ABW). Protein:  g/day (1.25-1.5 g/kg ABW). Fluids: 0964-1417 mL/day (20-25 mL/kg ABW) - or per LIP.

## 2021-12-03 NOTE — PROGRESS NOTE ADULT - ASSESSMENT
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       CP likely musculoskeletal  Chronic A-Fib on Coumadin  Chronic HFpEF  Chronic DVT  COPD on home O2  Tachy/selvin syndrome S/P Micra  CAD           PLAN:    ·	D/W the cardiologist again. CP is atypical. No intervention. Cont Metoprolol.   ·	CE x 3 are negative.   ·	Can D/C tele  ·	On Metoprolol 12.5 mg po q 12h. If BP tolerates, increase to 25 mg po q 12h tomorrow.   ·	CP likely is musculoskeletal.   ·	ECHO done in July this year showed EF is 50-55%. Check repeat ECHO  ·	INR is 3.69. Will hold Coumadin today. Check INR in AM  ·	Cont his other home meds.   ·	Venous doppler of the LE showed chronic DVT of the R common femoral and popliteal veins  ·	PT eval  ·	D/C planning     Progress Note Handoff    Pending (specify):  Consults___PT____, Tests________, Test Results_______, Other__Social services for D/C planning. _______  Family discussion:  Disposition: Home__x_/SNF___/Other________/Unknown at this time________    Kyle Pritchett MD  Spectra: [phone]

## 2021-12-03 NOTE — DISCHARGE NOTE PROVIDER - CARE PROVIDERS DIRECT ADDRESSES
,[email or web address], [email or web address]

## 2021-12-04 LAB
ALBUMIN SERPL ELPH-MCNC: 2.3 G/DL — LOW (ref 3.5–5.2)
ALP SERPL-CCNC: 180 U/L — HIGH (ref 30–115)
ALT FLD-CCNC: 24 U/L — SIGNIFICANT CHANGE UP (ref 0–41)
ANION GAP SERPL CALC-SCNC: 14 MMOL/L — SIGNIFICANT CHANGE UP (ref 7–14)
AST SERPL-CCNC: 22 U/L — SIGNIFICANT CHANGE UP (ref 0–41)
BASOPHILS # BLD AUTO: 0.01 K/UL — SIGNIFICANT CHANGE UP (ref 0–0.2)
BASOPHILS NFR BLD AUTO: 0.1 % — SIGNIFICANT CHANGE UP (ref 0–1)
BILIRUB SERPL-MCNC: 0.8 MG/DL — SIGNIFICANT CHANGE UP (ref 0.2–1.2)
BLD GP AB SCN SERPL QL: SIGNIFICANT CHANGE UP
BUN SERPL-MCNC: 32 MG/DL — HIGH (ref 10–20)
CALCIUM SERPL-MCNC: 7.5 MG/DL — LOW (ref 8.5–10.1)
CHLORIDE SERPL-SCNC: 102 MMOL/L — SIGNIFICANT CHANGE UP (ref 98–110)
CO2 SERPL-SCNC: 20 MMOL/L — SIGNIFICANT CHANGE UP (ref 17–32)
CREAT SERPL-MCNC: 1.2 MG/DL — SIGNIFICANT CHANGE UP (ref 0.7–1.5)
EOSINOPHIL # BLD AUTO: 0.09 K/UL — SIGNIFICANT CHANGE UP (ref 0–0.7)
EOSINOPHIL NFR BLD AUTO: 1.1 % — SIGNIFICANT CHANGE UP (ref 0–8)
FERRITIN SERPL-MCNC: 595 NG/ML — HIGH (ref 30–400)
GLUCOSE SERPL-MCNC: 91 MG/DL — SIGNIFICANT CHANGE UP (ref 70–99)
HCT VFR BLD CALC: 26.4 % — LOW (ref 42–52)
HGB BLD-MCNC: 8.6 G/DL — LOW (ref 14–18)
IMM GRANULOCYTES NFR BLD AUTO: 0.4 % — HIGH (ref 0.1–0.3)
INR BLD: 2.17 RATIO — HIGH (ref 0.65–1.3)
LYMPHOCYTES # BLD AUTO: 0.7 K/UL — LOW (ref 1.2–3.4)
LYMPHOCYTES # BLD AUTO: 8.8 % — LOW (ref 20.5–51.1)
MAGNESIUM SERPL-MCNC: 2.5 MG/DL — HIGH (ref 1.8–2.4)
MCHC RBC-ENTMCNC: 32.5 PG — HIGH (ref 27–31)
MCHC RBC-ENTMCNC: 32.6 G/DL — SIGNIFICANT CHANGE UP (ref 32–37)
MCV RBC AUTO: 99.6 FL — HIGH (ref 80–94)
MONOCYTES # BLD AUTO: 1.06 K/UL — HIGH (ref 0.1–0.6)
MONOCYTES NFR BLD AUTO: 13.3 % — HIGH (ref 1.7–9.3)
NEUTROPHILS # BLD AUTO: 6.11 K/UL — SIGNIFICANT CHANGE UP (ref 1.4–6.5)
NEUTROPHILS NFR BLD AUTO: 76.3 % — HIGH (ref 42.2–75.2)
NRBC # BLD: 0 /100 WBCS — SIGNIFICANT CHANGE UP (ref 0–0)
PLATELET # BLD AUTO: 210 K/UL — SIGNIFICANT CHANGE UP (ref 130–400)
POTASSIUM SERPL-MCNC: 3.9 MMOL/L — SIGNIFICANT CHANGE UP (ref 3.5–5)
POTASSIUM SERPL-SCNC: 3.9 MMOL/L — SIGNIFICANT CHANGE UP (ref 3.5–5)
PROT SERPL-MCNC: 5 G/DL — LOW (ref 6–8)
PROTHROM AB SERPL-ACNC: 24.8 SEC — HIGH (ref 9.95–12.87)
RBC # BLD: 2.65 M/UL — LOW (ref 4.7–6.1)
RBC # FLD: 16.4 % — HIGH (ref 11.5–14.5)
SODIUM SERPL-SCNC: 136 MMOL/L — SIGNIFICANT CHANGE UP (ref 135–146)
WBC # BLD: 8 K/UL — SIGNIFICANT CHANGE UP (ref 4.8–10.8)
WBC # FLD AUTO: 8 K/UL — SIGNIFICANT CHANGE UP (ref 4.8–10.8)

## 2021-12-04 PROCEDURE — 99232 SBSQ HOSP IP/OBS MODERATE 35: CPT

## 2021-12-04 RX ORDER — SODIUM CHLORIDE 9 MG/ML
500 INJECTION INTRAMUSCULAR; INTRAVENOUS; SUBCUTANEOUS ONCE
Refills: 0 | Status: COMPLETED | OUTPATIENT
Start: 2021-12-04 | End: 2021-12-04

## 2021-12-04 RX ORDER — MIDODRINE HYDROCHLORIDE 2.5 MG/1
5 TABLET ORAL ONCE
Refills: 0 | Status: COMPLETED | OUTPATIENT
Start: 2021-12-04 | End: 2021-12-04

## 2021-12-04 RX ORDER — SODIUM CHLORIDE 9 MG/ML
500 INJECTION, SOLUTION INTRAVENOUS ONCE
Refills: 0 | Status: COMPLETED | OUTPATIENT
Start: 2021-12-04 | End: 2021-12-04

## 2021-12-04 RX ORDER — WARFARIN SODIUM 2.5 MG/1
1 TABLET ORAL ONCE
Refills: 0 | Status: COMPLETED | OUTPATIENT
Start: 2021-12-04 | End: 2021-12-04

## 2021-12-04 RX ORDER — MIDODRINE HYDROCHLORIDE 2.5 MG/1
10 TABLET ORAL ONCE
Refills: 0 | Status: DISCONTINUED | OUTPATIENT
Start: 2021-12-04 | End: 2021-12-04

## 2021-12-04 RX ORDER — MIDODRINE HYDROCHLORIDE 2.5 MG/1
10 TABLET ORAL
Refills: 0 | Status: DISCONTINUED | OUTPATIENT
Start: 2021-12-04 | End: 2021-12-08

## 2021-12-04 RX ADMIN — OXYCODONE AND ACETAMINOPHEN 1 TABLET(S): 5; 325 TABLET ORAL at 14:08

## 2021-12-04 RX ADMIN — ATORVASTATIN CALCIUM 20 MILLIGRAM(S): 80 TABLET, FILM COATED ORAL at 22:16

## 2021-12-04 RX ADMIN — Medication 20 MILLIGRAM(S): at 05:28

## 2021-12-04 RX ADMIN — BUDESONIDE AND FORMOTEROL FUMARATE DIHYDRATE 2 PUFF(S): 160; 4.5 AEROSOL RESPIRATORY (INHALATION) at 07:20

## 2021-12-04 RX ADMIN — METHOCARBAMOL 500 MILLIGRAM(S): 500 TABLET, FILM COATED ORAL at 05:27

## 2021-12-04 RX ADMIN — Medication 5 MILLIGRAM(S): at 22:16

## 2021-12-04 RX ADMIN — MORPHINE SULFATE 2 MILLIGRAM(S): 50 CAPSULE, EXTENDED RELEASE ORAL at 00:10

## 2021-12-04 RX ADMIN — Medication 12.5 MILLIGRAM(S): at 05:26

## 2021-12-04 RX ADMIN — SODIUM CHLORIDE 500 MILLILITER(S): 9 INJECTION INTRAMUSCULAR; INTRAVENOUS; SUBCUTANEOUS at 21:55

## 2021-12-04 RX ADMIN — WARFARIN SODIUM 1 MILLIGRAM(S): 2.5 TABLET ORAL at 22:16

## 2021-12-04 RX ADMIN — OXYCODONE AND ACETAMINOPHEN 1 TABLET(S): 5; 325 TABLET ORAL at 22:15

## 2021-12-04 RX ADMIN — MIDODRINE HYDROCHLORIDE 10 MILLIGRAM(S): 2.5 TABLET ORAL at 17:05

## 2021-12-04 RX ADMIN — SPIRONOLACTONE 12.5 MILLIGRAM(S): 25 TABLET, FILM COATED ORAL at 13:25

## 2021-12-04 RX ADMIN — CHLORHEXIDINE GLUCONATE 1 APPLICATION(S): 213 SOLUTION TOPICAL at 05:30

## 2021-12-04 RX ADMIN — MIDODRINE HYDROCHLORIDE 5 MILLIGRAM(S): 2.5 TABLET ORAL at 11:37

## 2021-12-04 RX ADMIN — Medication 1 MILLIGRAM(S): at 11:36

## 2021-12-04 RX ADMIN — MIDODRINE HYDROCHLORIDE 5 MILLIGRAM(S): 2.5 TABLET ORAL at 22:15

## 2021-12-04 RX ADMIN — Medication 12.5 MILLIGRAM(S): at 17:05

## 2021-12-04 RX ADMIN — SODIUM CHLORIDE 71.43 MILLILITER(S): 9 INJECTION, SOLUTION INTRAVENOUS at 12:08

## 2021-12-04 RX ADMIN — OXYCODONE AND ACETAMINOPHEN 1 TABLET(S): 5; 325 TABLET ORAL at 12:39

## 2021-12-04 RX ADMIN — MIDODRINE HYDROCHLORIDE 5 MILLIGRAM(S): 2.5 TABLET ORAL at 05:26

## 2021-12-04 RX ADMIN — METHOCARBAMOL 500 MILLIGRAM(S): 500 TABLET, FILM COATED ORAL at 22:16

## 2021-12-04 NOTE — PROGRESS NOTE ADULT - SUBJECTIVE AND OBJECTIVE BOX
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 Pt reports that this morning, he was awakened by acute, sudden onset L sided chest pain at 4AM. Describes the pain as "pounding", states that he felt some pain radiate to LUE as well. Denies identifiable aggravating or alleviating factors and states that this has never happened before. Denies headache/dizziness, endorses chronic shortness of breath that is unchanged, has been requiring his baseline oxygen. Denies abdominal pain. Endorses compliance with all medications. States that at baseline, he is bedbound, cannot stand up or ambulate due to weakness. Was recently admitted to [facility] for short term rehab, where he wishes to go back because feels that he cannot care for himself at home. Lives with son but needs more help and PT.   In the ED, T 96, /58, HR 85, RR 18, SpO2 98% on O2 NC 4L, then 100% on 3L. Lab work revealed Hb 9.1, INR 7.87, troponin 0.03 and BNP 3327 (both near baseline). EKG revealed AFib, HR 88. CXR revealed slightly inc small R sided pleural effusion. (30 Nov 2021 11:30)      SUBJ:  Patient seen and examined. Chest wall tenderness. Started on BB. Tolerating      MEDICATIONS  (STANDING):  atorvastatin 20 milliGRAM(s) Oral at bedtime  budesonide 160 MICROgram(s)/formoterol 4.5 MICROgram(s) Inhaler 2 Puff(s) Inhalation two times a day  chlorhexidine 4% Liquid 1 Application(s) Topical <User Schedule>  folic acid 1 milliGRAM(s) Oral daily  influenza  Vaccine (HIGH DOSE) 0.7 milliLiter(s) IntraMuscular once  methocarbamol 500 milliGRAM(s) Oral three times a day  metoprolol tartrate 12.5 milliGRAM(s) Oral two times a day  midodrine. 10 milliGRAM(s) Oral <User Schedule>  spironolactone 12.5 milliGRAM(s) Oral daily  torsemide 20 milliGRAM(s) Oral daily    MEDICATIONS  (PRN):  ALBUTerol    90 MICROgram(s) HFA Inhaler 2 Puff(s) Inhalation every 6 hours PRN Shortness of Breath and/or Wheezing  melatonin 5 milliGRAM(s) Oral at bedtime PRN Insomnia  morphine  - Injectable 2 milliGRAM(s) IV Push every 8 hours PRN Severe Pain (7 - 10)  oxycodone    5 mG/acetaminophen 325 mG 1 Tablet(s) Oral every 6 hours PRN Moderate Pain (4 - 6)            Vital Signs Last 24 Hrs  T(C): 35.6 (04 Dec 2021 05:02), Max: 36 (03 Dec 2021 20:38)  T(F): 96.1 (04 Dec 2021 05:02), Max: 96.8 (03 Dec 2021 20:38)  HR: 81 (04 Dec 2021 05:02) (68 - 81)  BP: 75/32 (04 Dec 2021 11:32) (75/32 - 98/50)  BP(mean): --  RR: 18 (04 Dec 2021 07:42) (18 - 98)  SpO2: 100% (04 Dec 2021 07:42) (100% - 100%)      PHYSICAL EXAM:    GEN: AAO x 3, NAD  HEENT: NC/AT, PERRL  Neck: No JVD, no bruits  CV: Reg, S1-S2, no murmur  Lungs: CTAB  Abd: Soft, non-tender  Ext: No edema        12-03-21 @ 07:01  -  12-04-21 @ 07:00  --------------------------------------------------------  IN: 354 mL / OUT: 380 mL / NET: -26 mL    12-04-21 @ 07:01  -  12-04-21 @ 12:09  --------------------------------------------------------  IN: 330 mL / OUT: 150 mL / NET: 180 mL        I&O's Summary    03 Dec 2021 07:01  -  04 Dec 2021 07:00  --------------------------------------------------------  IN: 354 mL / OUT: 380 mL / NET: -26 mL    04 Dec 2021 07:01  -  04 Dec 2021 12:09  --------------------------------------------------------  IN: 330 mL / OUT: 150 mL / NET: 180 mL    	        ECG:  < from: 12 Lead ECG (12.03.21 @ 10:24) >  trial fibrillation with slow ventricular response with occasional   ventricular-paced complexes  Low voltage QRS  Possible Inferior infarct , age undetermined  Cannot rule out Anterior infarct , age undetermined  Abnormal ECG    < end of copied text >    TTE:    < from: TTE Echo Complete w/o Contrast w/ Doppler (12.02.21 @ 09:38) >    Summary:   1. Left ventricular ejection fraction, by visual estimation, is 50 to 55%.   2. Normal global left ventricular systolic function.   3. Mildly enlarged right ventricle.   4. Mildly enlarged right atrium.   5. Moderately enlarged left atrium.   6. Mild mitral valve regurgitation.   7. Thickening of the anterior and posterior mitral valve leaflets.   8. Mild-moderate tricuspid regurgitation.   9. There is mild aortic root calcification.    < end of copied text >          LABS:                        8.6    8.00  )-----------( 210      ( 04 Dec 2021 06:11 )             26.4     12-04    136  |  102  |  32<H>  ----------------------------<  91  3.9   |  20  |  1.2    Ca    7.5<L>      04 Dec 2021 06:11  Mg     2.5     12-04    TPro  5.0<L>  /  Alb  2.3<L>  /  TBili  0.8  /  DBili  x   /  AST  22  /  ALT  24  /  AlkPhos  180<H>  12-04        PT/INR - ( 04 Dec 2021 06:11 )   PT: 24.80 sec;   INR: 2.17 ratio               BNP  RADIOLOGY & ADDITIONAL STUDIES:      IMPRESSION AND PLAN:

## 2021-12-04 NOTE — PHYSICAL THERAPY INITIAL EVALUATION ADULT - RANGE OF MOTION EXAMINATION, REHAB EVAL
B UE/LE grossly WFL
BUE grossly functional, BLE assessment limited due to pain of sacrum/.buttock with movement of BLE.

## 2021-12-04 NOTE — PHYSICAL THERAPY INITIAL EVALUATION ADULT - ADDITIONAL COMMENTS
Pt. does not provide PLOF at this time, is primarily focused on c/o sacral/buttock pain and needing relief. Will follow for social history and PLOF at later date.
no NEHEMIAH, 2 flights inside with chair lifts  pt was at [facility] for rehab PTA

## 2021-12-04 NOTE — PROGRESS NOTE ADULT - SUBJECTIVE AND OBJECTIVE BOX
RODNEY SANTO 81y Male  MRN#: [id number]   CODE STATUS: FULL     Hospital Day: 4d    Pt is currently admitted with the primary diagnosis of chest pain    SUBJECTIVE    Overnight events: No events overnight     Subjective complaints: Denies fevers, chills, sob, chest pain, abdominal pain. Has pain from Stage 2 DTI.     Present Today:   - Rafaela:  No [  ], Yes [   ] : Indication:     - Type of IV Access:       .. CVC/Piccline:  No [  ], Yes [   ] : Indication:       .. Midline: No [  ], Yes [   ] : Indication:                                             ----------------------------------------------------------  OBJECTIVE  PAST MEDICAL & SURGICAL HISTORY  COPD (chronic obstructive pulmonary disease)    Hypertension    Deep vein thrombosis (DVT) of left lower extremity, unspecified chronicity, unspecified vein    Cellulitis of left lower extremity    Chronic atrial fibrillation    Mitral valve insufficiency, unspecified etiology  Moderate    CHF (congestive heart failure)    S/P coronary artery stent placement    History of total right knee replacement                                              -----------------------------------------------------------  ALLERGIES:  No Known Allergies                                            ------------------------------------------------------------    HOME MEDICATIONS  Home Medications:  atorvastatin 20 mg oral tablet: 1 tab(s) orally once a day (13 Sep 2021 04:59)  folic acid 1 mg oral tablet: 1 tab(s) orally once a day (21 Sep 2021 11:34)  oxycodone-acetaminophen 5 mg-325 mg oral tablet: 1 tab(s) orally every 6 hours, As needed, Moderate Pain (4 - 6) (21 Sep 2021 11:32)  spironolactone 25 mg oral tablet: 0.5 tab(s) orally once a day (21 Sep 2021 11:34)  Symbicort 160 mcg-4.5 mcg/inh inhalation aerosol: 2 puff(s) inhaled 2 times a day (13 Sep 2021 04:59)  Ventolin HFA 90 mcg/inh inhalation aerosol: 2 puff(s) inhaled every 6 hours as neede for shortness of breath (13 Sep 2021 04:59)  warfarin 2 mg oral tablet: Take half a tablet Sun, Tues, Wed, Thurs, Fri, Sat.  Take a whole tablet on Mon (13 Sep 2021 04:59)                           MEDICATIONS:  STANDING MEDICATIONS  atorvastatin 20 milliGRAM(s) Oral at bedtime  budesonide 160 MICROgram(s)/formoterol 4.5 MICROgram(s) Inhaler 2 Puff(s) Inhalation two times a day  chlorhexidine 4% Liquid 1 Application(s) Topical <User Schedule>  folic acid 1 milliGRAM(s) Oral daily  influenza  Vaccine (HIGH DOSE) 0.7 milliLiter(s) IntraMuscular once  methocarbamol 500 milliGRAM(s) Oral three times a day  metoprolol tartrate 12.5 milliGRAM(s) Oral two times a day  midodrine. 10 milliGRAM(s) Oral <User Schedule>  spironolactone 12.5 milliGRAM(s) Oral daily  torsemide 20 milliGRAM(s) Oral daily    PRN MEDICATIONS  ALBUTerol    90 MICROgram(s) HFA Inhaler 2 Puff(s) Inhalation every 6 hours PRN  melatonin 5 milliGRAM(s) Oral at bedtime PRN  morphine  - Injectable 2 milliGRAM(s) IV Push every 8 hours PRN  oxycodone    5 mG/acetaminophen 325 mG 1 Tablet(s) Oral every 6 hours PRN                                            ------------------------------------------------------------  VITAL SIGNS: Last 24 Hours  T(C): 35.6 (04 Dec 2021 05:02), Max: 36 (03 Dec 2021 20:38)  T(F): 96.1 (04 Dec 2021 05:02), Max: 96.8 (03 Dec 2021 20:38)  HR: 63 (04 Dec 2021 13:28) (59 - 81)  BP: 91/50 (04 Dec 2021 13:28) (75/32 - 98/50)  BP(mean): --  RR: 18 (04 Dec 2021 07:42) (18 - 98)  SpO2: 100% (04 Dec 2021 07:42) (100% - 100%)      12-03-21 @ 07:01  -  12-04-21 @ 07:00  --------------------------------------------------------  IN: 354 mL / OUT: 380 mL / NET: -26 mL    12-04-21 @ 07:01  -  12-04-21 @ 14:59  --------------------------------------------------------  IN: 330 mL / OUT: 350 mL / NET: -20 mL                                             --------------------------------------------------------------  LABS:                        8.6    8.00  )-----------( 210      ( 04 Dec 2021 06:11 )             26.4     12-04    136  |  102  |  32<H>  ----------------------------<  91  3.9   |  20  |  1.2    Ca    7.5<L>      04 Dec 2021 06:11  Mg     2.5     12-04    TPro  5.0<L>  /  Alb  2.3<L>  /  TBili  0.8  /  DBili  x   /  AST  22  /  ALT  24  /  AlkPhos  180<H>  12-04    PT/INR - ( 04 Dec 2021 06:11 )   PT: 24.80 sec;   INR: 2.17 ratio                                                                   -------------------------------------------------------------  RADIOLOGY:                                            --------------------------------------------------------------    PHYSICAL EXAM:  General: NAD, speaking in full sentences  HEENT: NCAT, EOMI, PERRLA, conjunctiva and sclera clear  Lung: CTAB, no rales, rhonchi, wheezing, or rubs  Heart: RRR, S1, S2, pain on inspiration and palpation, No murmurs, rubs, or gallops  Abdomen: Soft, nontender, nondistended. 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 Trop at baseline 0.03, EKG showed afib, CXR small R sided pleural effusion.    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   #NSVT on tele   #Hx of Chronic DVT Right Common Fem, Fem, Popliteal   - Reports sudden onset L sided chest pain, reproducible on exam, no aggravating factors, moderate relief with Morphine  - Troponin 0.03 x3, was 0.02-0.03 throughout prior admission  - EKG performed x2 - no acute ischemic changes,   - Dimer 342  - BNP ~ 3K, is near baseline   - CXR - small R sided pleural effusion, pt at baseline oxygen requirements  - Echo: completed, read pending   - Venous duplex: chronic right common fem, femoral and popliteal DVT   - C/w home Spironolactone, Torsemide   - PRN pain control, c/w home Percocet, Morphine for severe pain if no relief  - Follows with Cardiology Dr. Scherer  - Cardio consult Dr. Herrera: -- Start metoprolol 12.5mg bid and titrate to 25mg bid tomorrow if BP tolerates, Would not benefit from repeat ischemic work-up, Outpatient f/u with Dr. Scherer and EP  - INR reversal with vit k 12/2.   - Restart Coumadin 1mg 12/4.   - BP on lower side Systolic . Add 10mg midodrine BID. Patient not eating and drinking adequately. Start calorie count  - PT rehab > patient refusing. Awaiting long term plan.     #Chronic Macrocytic Anemia  - Baseline Hb ~10-11, currently 9.1  - INR 7 on admission >> 3.69  - Denies any recent acute bleeding events, is AAOx4 on exam  - Recent workup in September, revealed low folate, c/w supplementation, B12 within normal limits  - Iron studies within normal limits in July, will repeat.     #COPD on Home O2 2.5L  - Does not appear to be in exacerbation, at baseline oxygen requirements  - C/w Symbicort, Ventolin PRN  - C/w O2 NC to maintain SpO2 88-92%    #Recent Cholecystitis s/p perc cholecystostomy placement and removal by IR  - denies abdominal pain    DVT ppx: Coumadin on hold  Diet: DASH  Activity: AAT,   Full Code  Dispo: D/CTele, INR to be therapeutic, patient refusing to work with PT, needs long term placement.     #Handoff  - BP control  - Calorie count, nutrition consult  - Daily INR for Coumadin  - Placement

## 2021-12-04 NOTE — PROGRESS NOTE ADULT - ASSESSMENT
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       CP likely musculoskeletal  Chronic A-Fib on Coumadin  Chronic HFpEF  Chronic DVT  COPD on home O2  Tachy/selvin syndrome S/P Micra  CAD           PLAN:    ·	Hypotensive. Start him on Midodrine 10 mg po q 12h  ·	Gentle hydration  ·	D/W the cardiologist. CP is atypical. No intervention. Cont Metoprolol. D/C tele  ·	CE x 3 are negative.   ·	On Metoprolol 12.5 mg po q 12h. If BP tolerates, increase to 25 mg po q 12h tomorrow.   ·	CP likely is musculoskeletal.   ·	ECHO done in July this year showed EF is 50-55%. Check repeat ECHO  ·	INR is 2.17. Give Coumadin 1 mg po tonight. Check INR in AM  ·	Cont his other home meds.   ·	Venous doppler of the LE showed chronic DVT of the R common femoral and popliteal veins  ·	D/C planning     Progress Note Handoff    Pending (specify):  Consults___PT____, Tests________, Test Results_______, Other__Social services for D/C planning. _______  Family discussion:  Disposition: Home__x_/SNF___/Other________/Unknown at this time________    Kyle Pritchett MD  Spectra: [phone]

## 2021-12-04 NOTE — PROGRESS NOTE ADULT - SUBJECTIVE AND OBJECTIVE BOX
RODNEY SANTO  81y Male    CHIEF COMPLAINT:    Patient is a 81y old  Male who presents with a chief complaint of chest pain (04 Dec 2021 12:08)      INTERVAL HPI/OVERNIGHT EVENTS:    Patient seen and examined. C/O backache. No cp or sob. Hypotensive. No palpitations.     ROS: All other systems are negative.    Vital Signs:    T(F): 96.1 (21 @ 05:02), Max: 96.8 (21 @ 20:38)  HR: 59 (21 @ 12:33) (59 - 81)  BP: 94/49 (21 @ 12:33) (75/32 - 98/50)  RR: 18 (21 @ 07:42) (18 - 98)  SpO2: 100% (21 @ 07:42) (100% - 100%)  I&O's Summary    03 Dec 2021 07:01  -  04 Dec 2021 07:00  --------------------------------------------------------  IN: 354 mL / OUT: 380 mL / NET: -26 mL    04 Dec 2021 07:01  -  04 Dec 2021 13:18  --------------------------------------------------------  IN: 330 mL / OUT: 150 mL / NET: 180 mL      Daily     Daily Weight in k.5 (04 Dec 2021 05:02)  CAPILLARY BLOOD GLUCOSE          PHYSICAL EXAM:    GENERAL:  NAD  SKIN: No rashes or lesions  HENT: Atraumatic. Normocephalic. PERRL. Moist membranes.  NECK: Supple, No JVD. No lymphadenopathy.  PULMONARY: CTA B/L. No wheezing. No rales  CVS: Normal S1, S2. Rate and Rhythm are regular. No murmurs.  ABDOMEN/GI: Soft, Nontender, Nondistended; BS present  EXTREMITIES: Peripheral pulses intact. No edema B/L LE.  NEUROLOGIC:  No motor or sensory deficit.  PSYCH: Alert & oriented x 3    Consultant(s) Notes Reviewed:  [x ] YES  [ ] NO  Care Discussed with Consultants/Other Providers [ x] YES  [ ] NO    EKG reviewed  Telemetry reviewed    LABS:                        8.6    8.00  )-----------( 210      ( 04 Dec 2021 06:11 )             26.4   Hemoglobin: 8.6 g/dL ( @ 06:11)  Hemoglobin: 8.5 g/dL ( @ 07:40)  Hemoglobin: 8.6 g/dL ( @ 06:56)  Hemoglobin: 9.2 g/dL ( @ 05:00)  Hemoglobin: 9.1 g/dL ( @ 06:07)        136  |  102  |  32<H>  ----------------------------<  91  3.9   |  20  |  1.2    Ca    7.5<L>      04 Dec 2021 06:11  Mg     2.5         TPro  5.0<L>  /  Alb  2.3<L>  /  TBili  0.8  /  DBili  x   /  AST  22  /  ALT  24  /  AlkPhos  180<H>  -    PT/INR - ( 04 Dec 2021 06:11 )   PT: 24.80 sec;   INR: 2.17 ratio           Serum Pro-Brain Natriuretic Peptide: 3327 pg/mL (21 @ 06:07)          RADIOLOGY & ADDITIONAL TESTS:      Imaging or report Personally Reviewed:  [ ] YES  [ ] NO    Medications:  Standing  atorvastatin 20 milliGRAM(s) Oral at bedtime  budesonide 160 MICROgram(s)/formoterol 4.5 MICROgram(s) Inhaler 2 Puff(s) Inhalation two times a day  chlorhexidine 4% Liquid 1 Application(s) Topical <User Schedule>  folic acid 1 milliGRAM(s) Oral daily  influenza  Vaccine (HIGH DOSE) 0.7 milliLiter(s) IntraMuscular once  methocarbamol 500 milliGRAM(s) Oral three times a day  metoprolol tartrate 12.5 milliGRAM(s) Oral two times a day  midodrine. 10 milliGRAM(s) Oral <User Schedule>  spironolactone 12.5 milliGRAM(s) Oral daily  torsemide 20 milliGRAM(s) Oral daily    PRN Meds  ALBUTerol    90 MICROgram(s) HFA Inhaler 2 Puff(s) Inhalation every 6 hours PRN  melatonin 5 milliGRAM(s) Oral at bedtime PRN  morphine  - Injectable 2 milliGRAM(s) IV Push every 8 hours PRN  oxycodone    5 mG/acetaminophen 325 mG 1 Tablet(s) Oral every 6 hours PRN      Case discussed with resident    Care discussed with pt/family

## 2021-12-04 NOTE — PHYSICAL THERAPY INITIAL EVALUATION ADULT - LEVEL OF INDEPENDENCE: SCOOT/BRIDGE, REHAB EVAL
Due to pain. Education provided to perform supine hip extension with flexed knees to lift buttock off bed for pressure relief; additionally educated and encouraged to perform frequent repositioning via rolling for further pressure relief, as pt. declines to attempt sitting at EOB, standing, or transferring due to anticipated pain with movement in bed./dependent (less than 25% patients effort)

## 2021-12-04 NOTE — PHYSICAL THERAPY INITIAL EVALUATION ADULT - MANUAL MUSCLE TESTING RESULTS, REHAB EVAL
BUE grossly functional, BLE assessment limited due to pain of sacrum/.buttock with movement of BLE/grossly assessed due to

## 2021-12-04 NOTE — PHYSICAL THERAPY INITIAL EVALUATION ADULT - GENERAL OBSERVATIONS, REHAB EVAL
Chart reviewed. Held PT eval secondary to elevated INR. To f/u when medically appropriate.
Held off PT eval secondary to elevated INR. To f/u when medically stable.
Chart reviewed, attempted to see pt for PT IE, pt rec in bed in NAD, pt declined PT stating he cannot get up 2* pain from bedsore 9/10, pt did not want pain meds and had pillows under him for pressure relief, social hx obtained, ROM assessed, pt educated on UE/LE therex and rolling for pressure relief, pt verbalized understanding, pt left as found in NAD, will f/u for IE
Chart reviewed. Pt. in bed upon PT arrival, awakens to voice. Declines all out of bed mobility assessment due to c/o severe sacral/buttock pain with movement. Agreeable to bed mobility and ther ex assessment to promote repositioning and weight shifting from wound.

## 2021-12-04 NOTE — PROGRESS NOTE ADULT - ASSESSMENT
C/w medical therapy  C/w low dose BB  Would not increase further given low BP  Gentle hydration  Chest pain is non-cardiac - no plan for cath  S/p PPM for bradycardia.  D/c planning a per primary team.  May d/c telemetry - patient has PPM in place.

## 2021-12-05 LAB
INR BLD: 2.03 RATIO — HIGH (ref 0.65–1.3)
PROTHROM AB SERPL-ACNC: 23.2 SEC — HIGH (ref 9.95–12.87)

## 2021-12-05 PROCEDURE — 99232 SBSQ HOSP IP/OBS MODERATE 35: CPT

## 2021-12-05 RX ORDER — MIDODRINE HYDROCHLORIDE 2.5 MG/1
5 TABLET ORAL THREE TIMES A DAY
Refills: 0 | Status: DISCONTINUED | OUTPATIENT
Start: 2021-12-05 | End: 2021-12-05

## 2021-12-05 RX ORDER — WARFARIN SODIUM 2.5 MG/1
0.5 TABLET ORAL ONCE
Refills: 0 | Status: COMPLETED | OUTPATIENT
Start: 2021-12-05 | End: 2021-12-05

## 2021-12-05 RX ORDER — SODIUM CHLORIDE 9 MG/ML
500 INJECTION INTRAMUSCULAR; INTRAVENOUS; SUBCUTANEOUS ONCE
Refills: 0 | Status: COMPLETED | OUTPATIENT
Start: 2021-12-05 | End: 2021-12-05

## 2021-12-05 RX ADMIN — Medication 5 MILLIGRAM(S): at 22:46

## 2021-12-05 RX ADMIN — SPIRONOLACTONE 12.5 MILLIGRAM(S): 25 TABLET, FILM COATED ORAL at 06:37

## 2021-12-05 RX ADMIN — SODIUM CHLORIDE 1000 MILLILITER(S): 9 INJECTION INTRAMUSCULAR; INTRAVENOUS; SUBCUTANEOUS at 10:36

## 2021-12-05 RX ADMIN — Medication 12.5 MILLIGRAM(S): at 17:57

## 2021-12-05 RX ADMIN — ATORVASTATIN CALCIUM 20 MILLIGRAM(S): 80 TABLET, FILM COATED ORAL at 21:23

## 2021-12-05 RX ADMIN — MIDODRINE HYDROCHLORIDE 10 MILLIGRAM(S): 2.5 TABLET ORAL at 17:54

## 2021-12-05 RX ADMIN — Medication 12.5 MILLIGRAM(S): at 06:37

## 2021-12-05 RX ADMIN — BUDESONIDE AND FORMOTEROL FUMARATE DIHYDRATE 2 PUFF(S): 160; 4.5 AEROSOL RESPIRATORY (INHALATION) at 08:44

## 2021-12-05 RX ADMIN — METHOCARBAMOL 500 MILLIGRAM(S): 500 TABLET, FILM COATED ORAL at 13:46

## 2021-12-05 RX ADMIN — WARFARIN SODIUM 0.5 MILLIGRAM(S): 2.5 TABLET ORAL at 21:28

## 2021-12-05 RX ADMIN — Medication 1 MILLIGRAM(S): at 13:46

## 2021-12-05 RX ADMIN — METHOCARBAMOL 500 MILLIGRAM(S): 500 TABLET, FILM COATED ORAL at 21:24

## 2021-12-05 RX ADMIN — MIDODRINE HYDROCHLORIDE 10 MILLIGRAM(S): 2.5 TABLET ORAL at 06:06

## 2021-12-05 RX ADMIN — METHOCARBAMOL 500 MILLIGRAM(S): 500 TABLET, FILM COATED ORAL at 06:05

## 2021-12-05 RX ADMIN — Medication 20 MILLIGRAM(S): at 06:07

## 2021-12-05 RX ADMIN — CHLORHEXIDINE GLUCONATE 1 APPLICATION(S): 213 SOLUTION TOPICAL at 06:01

## 2021-12-05 NOTE — PHARMACOTHERAPY INTERVENTION NOTE - COMMENTS
pt already on 10mg bid; md ordered 5mg tid, clarified the order with MD. MD would like to keep the original 10mg bid

## 2021-12-05 NOTE — PROGRESS NOTE ADULT - ASSESSMENT
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       CP likely musculoskeletal  Chronic A-Fib on Coumadin  Chronic HFpEF  Chronic DVT  COPD on home O2  Tachy/selvin syndrome S/P Micra  CAD           PLAN:    ·	Hypotensive. Start him on Midodrine 10 mg po q 12h  ·	Gentle hydration  ·	D/W the cardiologist. CP is atypical. No intervention. Cont Metoprolol. D/C tele  ·	CE x 3 are negative.   ·	On Metoprolol 12.5 mg po q 12h. If BP tolerates, increase to 25 mg po q 12h tomorrow.   ·	CP likely is musculoskeletal.   ·	ECHO done in July this year showed EF is 50-55%. Check repeat ECHO  ·	INR is 2.17. Give Coumadin 1 mg po tonight. Check INR in AM  ·	Cont his other home meds.   ·	Venous doppler of the LE showed chronic DVT of the R common femoral and popliteal veins  ·	D/C planning     Progress Note Handoff    Pending (specify):  Consults___PT____, Tests________, Test Results_______, Other__Social services for D/C planning. 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       CP likely musculoskeletal  Chronic A-Fib on Coumadin  Chronic HFpEF  Chronic DVT  COPD on home O2  Tachy/selvin syndrome S/P Micra  CAD           PLAN:    ·	Change Midodrine to 5 mg po q 8h.   ·	D/W the cardiologist. CP is atypical. No intervention. Cont Metoprolol. D/C tele  ·	CE x 3 are negative.   ·	On Metoprolol 12.5 mg po q 12h. If BP tolerates, increase to 25 mg po q 12h tomorrow.   ·	CP likely is musculoskeletal.   ·	ECHO done in July this year showed EF is 50-55%. Check repeat ECHO  ·	Check INR. Give Coumadin accordingly. Goal INR is 2-3. Check INR in AM  ·	Cont his other home meds.   ·	Venous doppler of the LE showed chronic DVT of the R common femoral and popliteal veins  ·	D/C planning     Progress Note Handoff    Pending (specify):  Consults___PT____, Tests________, Test Results_______, Other__Social services for D/C planning. 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       CP likely musculoskeletal  Chronic A-Fib on Coumadin  Chronic HFpEF  Chronic DVT  COPD on home O2  Tachy/selvin syndrome S/P Micra  CAD           PLAN:    ·	Cont Midodrine 10 mg po q 12h.   ·	D/W the cardiologist. CP is atypical. No intervention. Cont Metoprolol. D/C tele  ·	CE x 3 are negative.   ·	On Metoprolol 12.5 mg po q 12h. If BP tolerates, increase to 25 mg po q 12h tomorrow.   ·	CP likely is musculoskeletal.   ·	ECHO done in July this year showed EF is 50-55%. Check repeat ECHO  ·	Check INR. Give Coumadin accordingly. Goal INR is 2-3. Check INR in AM  ·	Cont his other home meds.   ·	Venous doppler of the LE showed chronic DVT of the R common femoral and popliteal veins  ·	D/C planning     Progress Note Handoff    Pending (specify):  Consults___PT____, Tests________, Test Results_______, Other__Social services for D/C planning. _______  Family discussion:  Disposition: Home__x_/SNF___/Other________/Unknown at this time________    Kyle Pritchett MD  Spectra: [phone]

## 2021-12-05 NOTE — PROGRESS NOTE ADULT - SUBJECTIVE AND OBJECTIVE BOX
RODNEY SANTO  81y Male    CHIEF COMPLAINT:    Patient is a 81y old  Male who presents with a chief complaint of chest pain (04 Dec 2021 14:59)      INTERVAL HPI/OVERNIGHT EVENTS:    Patient seen and examined.    ROS: All other systems are negative.    Vital Signs:    T(F): 97 (12-05-21 @ 07:42), Max: 97 (12-05-21 @ 07:42)  HR: 57 (12-05-21 @ 07:42) (49 - 67)  BP: 89/44 (12-05-21 @ 07:42) (80/49 - 108/48)  RR: 18 (12-05-21 @ 07:42) (18 - 20)  SpO2: --  I&O's Summary    04 Dec 2021 07:01  -  05 Dec 2021 07:00  --------------------------------------------------------  IN: 330 mL / OUT: 750 mL / NET: -420 mL      Daily     Daily   CAPILLARY BLOOD GLUCOSE          PHYSICAL EXAM:    GENERAL:  NAD  SKIN: No rashes or lesions  HENT: Atraumatic. Normocephalic. PERRL. Moist membranes.  NECK: Supple, No JVD. No lymphadenopathy.  PULMONARY: CTA B/L. No wheezing. No rales  CVS: Normal S1, S2. Rate and Rhythm are regular. No murmurs.  ABDOMEN/GI: Soft, Nontender, Nondistended; BS present  EXTREMITIES: Peripheral pulses intact. No edema B/L LE.  NEUROLOGIC:  No motor or sensory deficit.  PSYCH: Alert & oriented x 3    Consultant(s) Notes Reviewed:  [x ] YES  [ ] NO  Care Discussed with Consultants/Other Providers [ x] YES  [ ] NO    EKG reviewed  Telemetry reviewed    LABS:                        8.6    8.00  )-----------( 210      ( 04 Dec 2021 06:11 )             26.4     12-04    136  |  102  |  32<H>  ----------------------------<  91  3.9   |  20  |  1.2    Ca    7.5<L>      04 Dec 2021 06:11  Mg     2.5     12-04    TPro  5.0<L>  /  Alb  2.3<L>  /  TBili  0.8  /  DBili  x   /  AST  22  /  ALT  24  /  AlkPhos  180<H>  12-04    PT/INR - ( 04 Dec 2021 06:11 )   PT: 24.80 sec;   INR: 2.17 ratio           Serum Pro-Brain Natriuretic Peptide: 3327 pg/mL (11-30-21 @ 06:07)          RADIOLOGY & ADDITIONAL TESTS:      Imaging or report Personally Reviewed:  [ ] YES  [ ] NO    Medications:  Standing  atorvastatin 20 milliGRAM(s) Oral at bedtime  budesonide 160 MICROgram(s)/formoterol 4.5 MICROgram(s) Inhaler 2 Puff(s) Inhalation two times a day  chlorhexidine 4% Liquid 1 Application(s) Topical <User Schedule>  folic acid 1 milliGRAM(s) Oral daily  influenza  Vaccine (HIGH DOSE) 0.7 milliLiter(s) IntraMuscular once  methocarbamol 500 milliGRAM(s) Oral three times a day  metoprolol tartrate 12.5 milliGRAM(s) Oral two times a day  midodrine. 10 milliGRAM(s) Oral <User Schedule>  spironolactone 12.5 milliGRAM(s) Oral daily  torsemide 20 milliGRAM(s) Oral daily    PRN Meds  ALBUTerol    90 MICROgram(s) HFA Inhaler 2 Puff(s) Inhalation every 6 hours PRN  melatonin 5 milliGRAM(s) Oral at bedtime PRN  morphine  - Injectable 2 milliGRAM(s) IV Push every 8 hours PRN  oxycodone    5 mG/acetaminophen 325 mG 1 Tablet(s) Oral every 6 hours PRN      Case discussed with resident    Care discussed with pt/family           RODNEY SANTO  81y Male    CHIEF COMPLAINT:    Patient is a 81y old  Male who presents with a chief complaint of chest pain (04 Dec 2021 14:59)      INTERVAL HPI/OVERNIGHT EVENTS:    Patient seen and examined. No new complaint. Waiting for placement    ROS: All other systems are negative.    Vital Signs:    T(F): 97 (12-05-21 @ 07:42), Max: 97 (12-05-21 @ 07:42)  HR: 57 (12-05-21 @ 07:42) (49 - 67)  BP: 89/44 (12-05-21 @ 07:42) (80/49 - 108/48)  RR: 18 (12-05-21 @ 07:42) (18 - 20)  SpO2: --  I&O's Summary    04 Dec 2021 07:01  -  05 Dec 2021 07:00  --------------------------------------------------------  IN: 330 mL / OUT: 750 mL / NET: -420 mL      Daily     Daily   CAPILLARY BLOOD GLUCOSE          PHYSICAL EXAM:    GENERAL:  NAD  SKIN: No rashes or lesions  HENT: Atraumatic. Normocephalic. PERRL. Moist membranes.  NECK: Supple, No JVD. No lymphadenopathy.  PULMONARY: CTA B/L. No wheezing. No rales  CVS: Normal S1, S2. Rate and Rhythm are regular. No murmurs.  ABDOMEN/GI: Soft, Nontender, Nondistended; BS present  EXTREMITIES: Peripheral pulses intact. Trace edema B/L LE.  NEUROLOGIC:  No motor or sensory deficit.  PSYCH: Alert & oriented x 3    Consultant(s) Notes Reviewed:  [x ] YES  [ ] NO  Care Discussed with Consultants/Other Providers [ x] YES  [ ] NO    EKG reviewed  Telemetry reviewed    LABS:                        8.6    8.00  )-----------( 210      ( 04 Dec 2021 06:11 )             26.4     12-04    136  |  102  |  32<H>  ----------------------------<  91  3.9   |  20  |  1.2    Ca    7.5<L>      04 Dec 2021 06:11  Mg     2.5     12-04    TPro  5.0<L>  /  Alb  2.3<L>  /  TBili  0.8  /  DBili  x   /  AST  22  /  ALT  24  /  AlkPhos  180<H>  12-04    PT/INR - ( 04 Dec 2021 06:11 )   PT: 24.80 sec;   INR: 2.17 ratio           Serum Pro-Brain Natriuretic Peptide: 3327 pg/mL (11-30-21 @ 06:07)          RADIOLOGY & ADDITIONAL TESTS:      Imaging or report Personally Reviewed:  [ ] YES  [ ] NO    Medications:  Standing  atorvastatin 20 milliGRAM(s) Oral at bedtime  budesonide 160 MICROgram(s)/formoterol 4.5 MICROgram(s) Inhaler 2 Puff(s) Inhalation two times a day  chlorhexidine 4% Liquid 1 Application(s) Topical <User Schedule>  folic acid 1 milliGRAM(s) Oral daily  influenza  Vaccine (HIGH DOSE) 0.7 milliLiter(s) IntraMuscular once  methocarbamol 500 milliGRAM(s) Oral three times a day  metoprolol tartrate 12.5 milliGRAM(s) Oral two times a day  midodrine. 10 milliGRAM(s) Oral <User Schedule>  spironolactone 12.5 milliGRAM(s) Oral daily  torsemide 20 milliGRAM(s) Oral daily    PRN Meds  ALBUTerol    90 MICROgram(s) HFA Inhaler 2 Puff(s) Inhalation every 6 hours PRN  melatonin 5 milliGRAM(s) Oral at bedtime PRN  morphine  - Injectable 2 milliGRAM(s) IV Push every 8 hours PRN  oxycodone    5 mG/acetaminophen 325 mG 1 Tablet(s) Oral every 6 hours PRN      Case discussed with resident    Care discussed with pt/family

## 2021-12-06 LAB
ALBUMIN SERPL ELPH-MCNC: 2.4 G/DL — LOW (ref 3.5–5.2)
ALP SERPL-CCNC: 243 U/L — HIGH (ref 30–115)
ALT FLD-CCNC: 35 U/L — SIGNIFICANT CHANGE UP (ref 0–41)
ANION GAP SERPL CALC-SCNC: 15 MMOL/L — HIGH (ref 7–14)
AST SERPL-CCNC: 37 U/L — SIGNIFICANT CHANGE UP (ref 0–41)
BASOPHILS # BLD AUTO: 0.01 K/UL — SIGNIFICANT CHANGE UP (ref 0–0.2)
BASOPHILS NFR BLD AUTO: 0.1 % — SIGNIFICANT CHANGE UP (ref 0–1)
BILIRUB SERPL-MCNC: 1.2 MG/DL — SIGNIFICANT CHANGE UP (ref 0.2–1.2)
BUN SERPL-MCNC: 37 MG/DL — HIGH (ref 10–20)
CALCIUM SERPL-MCNC: 7.6 MG/DL — LOW (ref 8.5–10.1)
CHLORIDE SERPL-SCNC: 103 MMOL/L — SIGNIFICANT CHANGE UP (ref 98–110)
CO2 SERPL-SCNC: 18 MMOL/L — SIGNIFICANT CHANGE UP (ref 17–32)
CREAT SERPL-MCNC: 1.1 MG/DL — SIGNIFICANT CHANGE UP (ref 0.7–1.5)
EOSINOPHIL # BLD AUTO: 0.03 K/UL — SIGNIFICANT CHANGE UP (ref 0–0.7)
EOSINOPHIL NFR BLD AUTO: 0.2 % — SIGNIFICANT CHANGE UP (ref 0–8)
GLUCOSE SERPL-MCNC: 82 MG/DL — SIGNIFICANT CHANGE UP (ref 70–99)
HCT VFR BLD CALC: 26.7 % — LOW (ref 42–52)
HGB BLD-MCNC: 8.8 G/DL — LOW (ref 14–18)
IMM GRANULOCYTES NFR BLD AUTO: 0.5 % — HIGH (ref 0.1–0.3)
INR BLD: 2.23 RATIO — HIGH (ref 0.65–1.3)
LYMPHOCYTES # BLD AUTO: 0.49 K/UL — LOW (ref 1.2–3.4)
LYMPHOCYTES # BLD AUTO: 3.4 % — LOW (ref 20.5–51.1)
MAGNESIUM SERPL-MCNC: 2 MG/DL — SIGNIFICANT CHANGE UP (ref 1.8–2.4)
MCHC RBC-ENTMCNC: 32.8 PG — HIGH (ref 27–31)
MCHC RBC-ENTMCNC: 33 G/DL — SIGNIFICANT CHANGE UP (ref 32–37)
MCV RBC AUTO: 99.6 FL — HIGH (ref 80–94)
MONOCYTES # BLD AUTO: 1.54 K/UL — HIGH (ref 0.1–0.6)
MONOCYTES NFR BLD AUTO: 10.6 % — HIGH (ref 1.7–9.3)
NEUTROPHILS # BLD AUTO: 12.37 K/UL — HIGH (ref 1.4–6.5)
NEUTROPHILS NFR BLD AUTO: 85.2 % — HIGH (ref 42.2–75.2)
NRBC # BLD: 0 /100 WBCS — SIGNIFICANT CHANGE UP (ref 0–0)
PLATELET # BLD AUTO: 199 K/UL — SIGNIFICANT CHANGE UP (ref 130–400)
POTASSIUM SERPL-MCNC: 4.1 MMOL/L — SIGNIFICANT CHANGE UP (ref 3.5–5)
POTASSIUM SERPL-SCNC: 4.1 MMOL/L — SIGNIFICANT CHANGE UP (ref 3.5–5)
PROT SERPL-MCNC: 5.1 G/DL — LOW (ref 6–8)
PROTHROM AB SERPL-ACNC: 25.4 SEC — HIGH (ref 9.95–12.87)
RBC # BLD: 2.68 M/UL — LOW (ref 4.7–6.1)
RBC # FLD: 16.7 % — HIGH (ref 11.5–14.5)
SODIUM SERPL-SCNC: 136 MMOL/L — SIGNIFICANT CHANGE UP (ref 135–146)
WBC # BLD: 14.51 K/UL — HIGH (ref 4.8–10.8)
WBC # FLD AUTO: 14.51 K/UL — HIGH (ref 4.8–10.8)

## 2021-12-06 PROCEDURE — 99233 SBSQ HOSP IP/OBS HIGH 50: CPT

## 2021-12-06 RX ORDER — WARFARIN SODIUM 2.5 MG/1
2 TABLET ORAL ONCE
Refills: 0 | Status: COMPLETED | OUTPATIENT
Start: 2021-12-06 | End: 2021-12-06

## 2021-12-06 RX ORDER — ACETAMINOPHEN 500 MG
650 TABLET ORAL EVERY 6 HOURS
Refills: 0 | Status: DISCONTINUED | OUTPATIENT
Start: 2021-12-06 | End: 2021-12-11

## 2021-12-06 RX ADMIN — Medication 650 MILLIGRAM(S): at 12:48

## 2021-12-06 RX ADMIN — WARFARIN SODIUM 2 MILLIGRAM(S): 2.5 TABLET ORAL at 21:18

## 2021-12-06 RX ADMIN — MIDODRINE HYDROCHLORIDE 10 MILLIGRAM(S): 2.5 TABLET ORAL at 05:31

## 2021-12-06 RX ADMIN — Medication 12.5 MILLIGRAM(S): at 18:19

## 2021-12-06 RX ADMIN — BUDESONIDE AND FORMOTEROL FUMARATE DIHYDRATE 2 PUFF(S): 160; 4.5 AEROSOL RESPIRATORY (INHALATION) at 09:29

## 2021-12-06 RX ADMIN — Medication 5 MILLIGRAM(S): at 22:36

## 2021-12-06 RX ADMIN — Medication 1 MILLIGRAM(S): at 12:49

## 2021-12-06 RX ADMIN — METHOCARBAMOL 500 MILLIGRAM(S): 500 TABLET, FILM COATED ORAL at 21:21

## 2021-12-06 RX ADMIN — OXYCODONE AND ACETAMINOPHEN 1 TABLET(S): 5; 325 TABLET ORAL at 22:35

## 2021-12-06 RX ADMIN — Medication 12.5 MILLIGRAM(S): at 05:31

## 2021-12-06 RX ADMIN — MIDODRINE HYDROCHLORIDE 10 MILLIGRAM(S): 2.5 TABLET ORAL at 18:19

## 2021-12-06 RX ADMIN — METHOCARBAMOL 500 MILLIGRAM(S): 500 TABLET, FILM COATED ORAL at 05:30

## 2021-12-06 RX ADMIN — ATORVASTATIN CALCIUM 20 MILLIGRAM(S): 80 TABLET, FILM COATED ORAL at 21:19

## 2021-12-06 RX ADMIN — Medication 650 MILLIGRAM(S): at 00:50

## 2021-12-06 RX ADMIN — Medication 20 MILLIGRAM(S): at 05:30

## 2021-12-06 RX ADMIN — SPIRONOLACTONE 12.5 MILLIGRAM(S): 25 TABLET, FILM COATED ORAL at 05:30

## 2021-12-06 RX ADMIN — CHLORHEXIDINE GLUCONATE 1 APPLICATION(S): 213 SOLUTION TOPICAL at 05:32

## 2021-12-06 RX ADMIN — Medication 650 MILLIGRAM(S): at 13:55

## 2021-12-06 RX ADMIN — METHOCARBAMOL 500 MILLIGRAM(S): 500 TABLET, FILM COATED ORAL at 13:44

## 2021-12-06 NOTE — PROGRESS NOTE ADULT - SUBJECTIVE AND OBJECTIVE BOX
RODNEY SANTO 81y Male  MRN#: [id number]   CODE STATUS: Full  Hospital Day: 6d  Pt is currently admitted with the primary diagnosis of: Atypical Chest Pain    SUBJECTIVE  Overnight events: None reported. Patient continues to complain of L-sided chest pain radiating to L-arm, 6/10, dull, worse with deep breathing.  Subjective complaints: endorse chest pain, L-arm pain, denies neck pain, jaw pain, sweating, fevers/chills, endorses mild dyspnea.                                          ----------------------------------------------------------  OBJECTIVE  PAST MEDICAL & SURGICAL HISTORY  COPD (chronic obstructive pulmonary disease)    Hypertension    Deep vein thrombosis (DVT) of left lower extremity, unspecified chronicity, unspecified vein    Cellulitis of left lower extremity    Chronic atrial fibrillation    Mitral valve insufficiency, unspecified etiology  Moderate    CHF (congestive heart failure)    S/P coronary artery stent placement    History of total right knee replacement                                          -----------------------------------------------------------  ALLERGIES:  No Known Allergies                                          ------------------------------------------------------------  HOME MEDICATIONS  Home Medications:  atorvastatin 20 mg oral tablet: 1 tab(s) orally once a day (13 Sep 2021 04:59)  folic acid 1 mg oral tablet: 1 tab(s) orally once a day (21 Sep 2021 11:34)  oxycodone-acetaminophen 5 mg-325 mg oral tablet: 1 tab(s) orally every 6 hours, As needed, Moderate Pain (4 - 6) (21 Sep 2021 11:32)  spironolactone 25 mg oral tablet: 0.5 tab(s) orally once a day (21 Sep 2021 11:34)  Symbicort 160 mcg-4.5 mcg/inh inhalation aerosol: 2 puff(s) inhaled 2 times a day (13 Sep 2021 04:59)  Ventolin HFA 90 mcg/inh inhalation aerosol: 2 puff(s) inhaled every 6 hours as neede for shortness of breath (13 Sep 2021 04:59)  warfarin 2 mg oral tablet: Take half a tablet Sun, Tues, Wed, Thurs, Fri, Sat.  Take a whole tablet on Mon (13 Sep 2021 04:59)                         MEDICATIONS:  STANDING MEDICATIONS  atorvastatin 20 milliGRAM(s) Oral at bedtime  budesonide 160 MICROgram(s)/formoterol 4.5 MICROgram(s) Inhaler 2 Puff(s) Inhalation two times a day  chlorhexidine 4% Liquid 1 Application(s) Topical <User Schedule>  folic acid 1 milliGRAM(s) Oral daily  influenza  Vaccine (HIGH DOSE) 0.7 milliLiter(s) IntraMuscular once  methocarbamol 500 milliGRAM(s) Oral three times a day  metoprolol tartrate 12.5 milliGRAM(s) Oral two times a day  midodrine. 10 milliGRAM(s) Oral <User Schedule>  spironolactone 12.5 milliGRAM(s) Oral daily  torsemide 20 milliGRAM(s) Oral daily    PRN MEDICATIONS  acetaminophen     Tablet .. 650 milliGRAM(s) Oral every 6 hours PRN  ALBUTerol    90 MICROgram(s) HFA Inhaler 2 Puff(s) Inhalation every 6 hours PRN  melatonin 5 milliGRAM(s) Oral at bedtime PRN  morphine  - Injectable 2 milliGRAM(s) IV Push every 8 hours PRN  oxycodone    5 mG/acetaminophen 325 mG 1 Tablet(s) Oral every 6 hours PRN                                          ------------------------------------------------------------  VITAL SIGNS: Last 24 Hours  T(C): 35.6 (06 Dec 2021 07:30), Max: 35.6 (06 Dec 2021 07:30)  T(F): 96.1 (06 Dec 2021 07:30), Max: 96.1 (06 Dec 2021 07:30)  HR: 60 (06 Dec 2021 07:30) (58 - 75)  BP: 87/48 (06 Dec 2021 07:30) (85/56 - 101/57)  BP(mean): --  RR: 19 (05 Dec 2021 15:59) (19 - 19)  SpO2: --    12-05-21 @ 07:01  -  12-06-21 @ 07:00  --------------------------------------------------------  IN: 360 mL / OUT: 0 mL / NET: 360 mL                                         --------------------------------------------------------------  LABS:             8.8    14.51 )-----------( 199      ( 06 Dec 2021 06:38 )             26.7     12-06    136  |  103  |  37<H>  ----------------------------<  82  4.1   |  18  |  1.1    Ca    7.6<L>      06 Dec 2021 06:38  Mg     2.0     12-06    TPro  5.1<L>  /  Alb  2.4<L>  /  TBili  1.2  /  DBili  x   /  AST  37  /  ALT  35  /  AlkPhos  243<H>  12-06    PT/INR - ( 06 Dec 2021 06:38 )   PT: 25.40 sec;   INR: 2.23 ratio                                            -------------------------------------------------------------  RADIOLOGY:                                          --------------------------------------------------------------  PHYSICAL EXAM:  General: alert, awake, no acute distress  HEENT: atraumatic  LUNGS: clear to auscultation bilaterally, no wheezes noted  HEART: regular rate/rhythm, no murmurs/gallops  ABDOMEN: soft, nontender, nondistended, normoactive bowel sounds  EXT: 2+ radial pulses, no edema noted  NEURO: aaox3, no focal deficits noted  SKIN: intact, no new lesions noted                                         --------------------------------------------------------------  ASSESSMENT & PLAN  Past medical history and hospital course:  80 yo M with PMHx of HFpEF (50-55%), Chronic AFib on Coumadin, 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 Trop at baseline 0.03, EKG showed afib, CXR small R sided pleural effusion.    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   #NSVT on tele   #Hx of Chronic DVT Right Common Fem, Fem, Popliteal   - Reports sudden onset L sided chest pain, reproducible on exam, no aggravating factors, moderate relief with Morphine  - Troponin 0.03 x3, was 0.02-0.03 throughout prior admission  - EKG performed x2 - no acute ischemic changes,   - Dimer 342  - BNP ~ 3K, is near baseline   - CXR - small R sided pleural effusion, pt at baseline oxygen requirements  - Echo: completed, read pending   - Venous duplex: chronic right common fem, femoral and popliteal DVT   - C/w home Spironolactone, Torsemide   - PRN pain control, c/w home Percocet, Morphine for severe pain if no relief  - Follows with Cardiology Dr. Scherer  - Cardio consult Dr. Herrera: -- Start metoprolol 12.5mg bid and titrate to 25mg bid tomorrow if BP tolerates, Would not benefit from repeat ischemic work-up, Outpatient f/u with Dr. Scherer and EP  - INR reversal with vit k 12/2.   - Restart Coumadin 1mg 12/4.   - BP on lower side Systolic . Add 10mg midodrine BID. Patient not eating and drinking adequately. Start calorie count  - PT rehab > patient refusing. Awaiting long term plan.     #Chronic Macrocytic Anemia  - Baseline Hb ~10-11, currently 9.1  - INR 7 on admission >> 3.69  - Denies any recent acute bleeding events, is AAOx4 on exam  - Recent workup in September, revealed low folate, c/w supplementation, B12 within normal limits  - Iron studies within normal limits in July, will repeat.     #COPD on Home O2 2.5L  - Does not appear to be in exacerbation, at baseline oxygen requirements  - C/w Symbicort, Ventolin PRN  - C/w O2 NC to maintain SpO2 88-92%    #Recent Cholecystitis s/p perc cholecystostomy placement and removal by IR  - denies abdominal pain    DVT ppx: Coumadin on hold  Diet: DASH  Activity: AAT,   Full Code  Dispo: D/CTele, INR to be therapeutic, patient refusing to work with PT, needs long term placement.     #Handoff  - BP control  - Calorie count, nutrition consult  - Daily INR for Coumadin  - Placement                                                                             ----------------------------------------------------  # DVT prophylaxis: Daily Coumadin  # GI prophylaxis: N/A  # Diet: DASH/TLC  # Activity: Ambulate as Tolerated  # Code status: Full  # Disposition:                                                                           --------------------------------------------------------  # Handoff: RODNEY SANTO 81y Male  MRN#: [id number]   CODE STATUS: Full  Hospital Day: 6d  Pt is currently admitted with the primary diagnosis of: Atypical Chest Pain    SUBJECTIVE  Overnight events: None reported. Patient continues to complain of L-sided chest pain radiating to L-arm, 6/10, dull, worse with deep breathing.  Subjective complaints: endorse chest pain, L-arm pain, denies neck pain, jaw pain, sweating, fevers/chills, endorses mild dyspnea.                                          ----------------------------------------------------------  OBJECTIVE  PAST MEDICAL & SURGICAL HISTORY  COPD (chronic obstructive pulmonary disease)    Hypertension    Deep vein thrombosis (DVT) of left lower extremity, unspecified chronicity, unspecified vein    Cellulitis of left lower extremity    Chronic atrial fibrillation    Mitral valve insufficiency, unspecified etiology  Moderate    CHF (congestive heart failure)    S/P coronary artery stent placement    History of total right knee replacement                                          -----------------------------------------------------------  ALLERGIES:  No Known Allergies                                          ------------------------------------------------------------  HOME MEDICATIONS  Home Medications:  atorvastatin 20 mg oral tablet: 1 tab(s) orally once a day (13 Sep 2021 04:59)  folic acid 1 mg oral tablet: 1 tab(s) orally once a day (21 Sep 2021 11:34)  oxycodone-acetaminophen 5 mg-325 mg oral tablet: 1 tab(s) orally every 6 hours, As needed, Moderate Pain (4 - 6) (21 Sep 2021 11:32)  spironolactone 25 mg oral tablet: 0.5 tab(s) orally once a day (21 Sep 2021 11:34)  Symbicort 160 mcg-4.5 mcg/inh inhalation aerosol: 2 puff(s) inhaled 2 times a day (13 Sep 2021 04:59)  Ventolin HFA 90 mcg/inh inhalation aerosol: 2 puff(s) inhaled every 6 hours as neede for shortness of breath (13 Sep 2021 04:59)  warfarin 2 mg oral tablet: Take half a tablet Sun, Tues, Wed, Thurs, Fri, Sat.  Take a whole tablet on Mon (13 Sep 2021 04:59)                         MEDICATIONS:  STANDING MEDICATIONS  atorvastatin 20 milliGRAM(s) Oral at bedtime  budesonide 160 MICROgram(s)/formoterol 4.5 MICROgram(s) Inhaler 2 Puff(s) Inhalation two times a day  chlorhexidine 4% Liquid 1 Application(s) Topical <User Schedule>  folic acid 1 milliGRAM(s) Oral daily  influenza  Vaccine (HIGH DOSE) 0.7 milliLiter(s) IntraMuscular once  methocarbamol 500 milliGRAM(s) Oral three times a day  metoprolol tartrate 12.5 milliGRAM(s) Oral two times a day  midodrine. 10 milliGRAM(s) Oral <User Schedule>  spironolactone 12.5 milliGRAM(s) Oral daily  torsemide 20 milliGRAM(s) Oral daily    PRN MEDICATIONS  acetaminophen     Tablet .. 650 milliGRAM(s) Oral every 6 hours PRN  ALBUTerol    90 MICROgram(s) HFA Inhaler 2 Puff(s) Inhalation every 6 hours PRN  melatonin 5 milliGRAM(s) Oral at bedtime PRN  morphine  - Injectable 2 milliGRAM(s) IV Push every 8 hours PRN  oxycodone    5 mG/acetaminophen 325 mG 1 Tablet(s) Oral every 6 hours PRN                                          ------------------------------------------------------------  VITAL SIGNS: Last 24 Hours  T(C): 35.6 (06 Dec 2021 07:30), Max: 35.6 (06 Dec 2021 07:30)  T(F): 96.1 (06 Dec 2021 07:30), Max: 96.1 (06 Dec 2021 07:30)  HR: 60 (06 Dec 2021 07:30) (58 - 75)  BP: 87/48 (06 Dec 2021 07:30) (85/56 - 101/57)  BP(mean): --  RR: 19 (05 Dec 2021 15:59) (19 - 19)  SpO2: --    12-05-21 @ 07:01  -  12-06-21 @ 07:00  --------------------------------------------------------  IN: 360 mL / OUT: 0 mL / NET: 360 mL                                         --------------------------------------------------------------  LABS:             8.8    14.51 )-----------( 199      ( 06 Dec 2021 06:38 )             26.7     12-06    136  |  103  |  37<H>  ----------------------------<  82  4.1   |  18  |  1.1    Ca    7.6<L>      06 Dec 2021 06:38  Mg     2.0     12-06    TPro  5.1<L>  /  Alb  2.4<L>  /  TBili  1.2  /  DBili  x   /  AST  37  /  ALT  35  /  AlkPhos  243<H>  12-06    PT/INR - ( 06 Dec 2021 06:38 )   PT: 25.40 sec;   INR: 2.23 ratio                                            -------------------------------------------------------------  RADIOLOGY:                                          --------------------------------------------------------------  PHYSICAL EXAM:  General: alert, awake, no acute distress  HEENT: atraumatic  LUNGS: clear to auscultation bilaterally, no wheezes noted  HEART: regular rate/rhythm, no murmurs/gallops  ABDOMEN: soft, nontender, nondistended, normoactive bowel sounds  EXT: 2+ radial pulses, no edema noted  NEURO: aaox3, no focal deficits noted  SKIN: intact, no new lesions noted                                         --------------------------------------------------------------  ASSESSMENT & PLAN  Past medical history and hospital course:  82 yo M with PMHx of HFpEF (50-55%), Chronic AFib on Coumadin, 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 Trop at baseline 0.03, EKG showed afib, CXR small R sided pleural effusion.    #Atypical Chest Pain  #PMHx of HFpEF - follows with Dr. 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  #PMHx of DVT Right Common Fem/Fem/Popliteal - on LE Venous Duplex 12/1  - still complaining of 6/10 dull chest pain radiating to L arm, worse with deep breathing, but most likely MSK in nature as per Cardio  - Troponin 0.03 x3, was 0.02-0.03 throughout prior admission, EKG performed x2 with no acute ischemic changes  - D-dimer 342, BNP ~3K near baseline   - CXR showing small R sided pleural effusion, at baseline oxygen requirements  - TTE 12/2: EF 50-55%, otherwise unremarkable   - continuining home spironolactone + torsemide   - PRN pain control with home Percocet + Morphine PRN for severe pain  - Cardio consulted (Dr. Herrera), on metoprolol 12.5mg PO BID, outpatient f/u, no further ischemic workup  - INR supratherapeutic on admission, was reversed with Vitamin K on 12/2, restarted Coumadin, INR therapeutic  - BP on lower side, on midodrine 10mg PO BID   - Patient not eating and drinking adequately, calorie count started  - Patient refusing PT/Rehab    #Chronic Macrocytic Anemia - on folate supplementation   - Baseline Hb ~10-11, currently 9.1  - INR 7 on admission >> 3.69  - Recent workup in September, revealed low folate, c/w supplementation, B12 within normal limits  - Iron studies within normal limits in July, will repeat.     #COPD - on home O2 2.5L, at baseline  - continuing symbicort 160ug 2 puffs BID, Albuterol PRN    #Recent Cholecystitis s/p perc cholecystostomy placement and removal by IR - no abdominal pain                                                                            ----------------------------------------------------  # DVT prophylaxis: Daily Coumadin  # GI prophylaxis: N/A  # Diet: DASH/TLC  # Activity: Ambulate as Tolerated  # Code status: Full  # Disposition: possible d/c today                                                                           --------------------------------------------------------  # Handoff: possible d/c today RODNEY SANTO 81y Male  MRN#: [id number]   CODE STATUS: Full  Hospital Day: 6d  Pt is currently admitted with the primary diagnosis of: Atypical Chest Pain    SUBJECTIVE  Overnight events: None reported. Patient continues to complain of L-sided chest pain radiating to L-arm, 6/10, dull, worse with deep breathing.  Subjective complaints: endorse chest pain, L-arm pain, denies neck pain, jaw pain, sweating, fevers/chills, endorses mild dyspnea.                                          ----------------------------------------------------------  OBJECTIVE  PAST MEDICAL & SURGICAL HISTORY  COPD (chronic obstructive pulmonary disease)    Hypertension    Deep vein thrombosis (DVT) of left lower extremity, unspecified chronicity, unspecified vein    Cellulitis of left lower extremity    Chronic atrial fibrillation    Mitral valve insufficiency, unspecified etiology  Moderate    CHF (congestive heart failure)    S/P coronary artery stent placement    History of total right knee replacement                                          -----------------------------------------------------------  ALLERGIES:  No Known Allergies                                          ------------------------------------------------------------  HOME MEDICATIONS  Home Medications:  atorvastatin 20 mg oral tablet: 1 tab(s) orally once a day (13 Sep 2021 04:59)  folic acid 1 mg oral tablet: 1 tab(s) orally once a day (21 Sep 2021 11:34)  oxycodone-acetaminophen 5 mg-325 mg oral tablet: 1 tab(s) orally every 6 hours, As needed, Moderate Pain (4 - 6) (21 Sep 2021 11:32)  spironolactone 25 mg oral tablet: 0.5 tab(s) orally once a day (21 Sep 2021 11:34)  Symbicort 160 mcg-4.5 mcg/inh inhalation aerosol: 2 puff(s) inhaled 2 times a day (13 Sep 2021 04:59)  Ventolin HFA 90 mcg/inh inhalation aerosol: 2 puff(s) inhaled every 6 hours as neede for shortness of breath (13 Sep 2021 04:59)  warfarin 2 mg oral tablet: Take half a tablet Sun, Tues, Wed, Thurs, Fri, Sat.  Take a whole tablet on Mon (13 Sep 2021 04:59)                         MEDICATIONS:  STANDING MEDICATIONS  atorvastatin 20 milliGRAM(s) Oral at bedtime  budesonide 160 MICROgram(s)/formoterol 4.5 MICROgram(s) Inhaler 2 Puff(s) Inhalation two times a day  chlorhexidine 4% Liquid 1 Application(s) Topical <User Schedule>  folic acid 1 milliGRAM(s) Oral daily  influenza  Vaccine (HIGH DOSE) 0.7 milliLiter(s) IntraMuscular once  methocarbamol 500 milliGRAM(s) Oral three times a day  metoprolol tartrate 12.5 milliGRAM(s) Oral two times a day  midodrine. 10 milliGRAM(s) Oral <User Schedule>  spironolactone 12.5 milliGRAM(s) Oral daily  torsemide 20 milliGRAM(s) Oral daily    PRN MEDICATIONS  acetaminophen     Tablet .. 650 milliGRAM(s) Oral every 6 hours PRN  ALBUTerol    90 MICROgram(s) HFA Inhaler 2 Puff(s) Inhalation every 6 hours PRN  melatonin 5 milliGRAM(s) Oral at bedtime PRN  morphine  - Injectable 2 milliGRAM(s) IV Push every 8 hours PRN  oxycodone    5 mG/acetaminophen 325 mG 1 Tablet(s) Oral every 6 hours PRN                                          ------------------------------------------------------------  VITAL SIGNS: Last 24 Hours  T(C): 35.6 (06 Dec 2021 07:30), Max: 35.6 (06 Dec 2021 07:30)  T(F): 96.1 (06 Dec 2021 07:30), Max: 96.1 (06 Dec 2021 07:30)  HR: 60 (06 Dec 2021 07:30) (58 - 75)  BP: 87/48 (06 Dec 2021 07:30) (85/56 - 101/57)  BP(mean): --  RR: 19 (05 Dec 2021 15:59) (19 - 19)  SpO2: --    12-05-21 @ 07:01  -  12-06-21 @ 07:00  --------------------------------------------------------  IN: 360 mL / OUT: 0 mL / NET: 360 mL                                         --------------------------------------------------------------  LABS:             8.8    14.51 )-----------( 199      ( 06 Dec 2021 06:38 )             26.7     12-06    136  |  103  |  37<H>  ----------------------------<  82  4.1   |  18  |  1.1    Ca    7.6<L>      06 Dec 2021 06:38  Mg     2.0     12-06    TPro  5.1<L>  /  Alb  2.4<L>  /  TBili  1.2  /  DBili  x   /  AST  37  /  ALT  35  /  AlkPhos  243<H>  12-06    PT/INR - ( 06 Dec 2021 06:38 )   PT: 25.40 sec;   INR: 2.23 ratio                                            -------------------------------------------------------------  RADIOLOGY:                                          --------------------------------------------------------------  PHYSICAL EXAM:  General: alert, awake, no acute distress  HEENT: atraumatic  LUNGS: clear to auscultation bilaterally, no wheezes noted  HEART: regular rate/rhythm, no murmurs/gallops  ABDOMEN: soft, nontender, nondistended, normoactive bowel sounds  EXT: 2+ radial pulses, no edema noted  NEURO: aaox3, no focal deficits noted  SKIN: intact, no new lesions noted                                         --------------------------------------------------------------  ASSESSMENT & PLAN  Past medical history and hospital course:  82 yo M with PMHx of HFpEF (50-55%), Chronic AFib on Coumadin, 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 Trop at baseline 0.03, EKG showed afib, CXR small R sided pleural effusion.    #Atypical Chest Pain - likely MSK, no further ischemic workup as per Cardiology (Dr. Herrera)  #PMHx of HFpEF - follows with Dr. 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  #PMHx of DVT Right Common Fem/Fem/Popliteal - on LE Venous Duplex 12/1  - still complaining of 6/10 dull chest pain radiating to L arm, worse with deep breathing, but most likely MSK in nature as per Cardio  - Troponin 0.03 x3, was 0.02-0.03 throughout prior admission, EKG performed x2 with no acute ischemic changes  - D-dimer 342, BNP ~3K near baseline, 11/30 CXR showing small R sided pleural effusion, has remained at baseline oxygen requirements  - TTE 12/2: EF 50-55%, otherwise unremarkable   - continuing Spironolactone 12.5mg PO daily + torsemide 20mg PO daily  - pain control with Percocet 1 tab q6h PRN for moderate pain + Morphine PRN for severe pain  - Cardio consulted (Dr. Herrera), on metoprolol 12.5mg PO BID, outpatient f/u, no further ischemic workup  - INR was supratherapeutic on admission, was reversed with Vitamin K on 12/2, restarted Coumadin, INR therapeutic  - BP on lower side, on midodrine 10mg PO BID   - patient not eating and drinking adequately, calorie count started  - patient refusing PT/Rehab    #Chronic Macrocytic Anemia - on folate supplementation  #Anemia of Chronic Disease  - baseline Hb ~10-11, supratherapeutic INR on admission reversed, B12 WNL in September  - 12/1 iron studies: low TIBC, likely anemia of chronic disease    #COPD - on home O2 2.5L, has remained at baseline, continuing symbicort 160ug 2 puffs BID, Albuterol PRN  #Recent Cholecystitis - s/p perQ-cholecystostomy placement and removal by IR - no abdominal pain on exam                                                                            ----------------------------------------------------  # DVT prophylaxis: daily Coumadin  # GI prophylaxis: N/A  # Diet: DASH/TLC  # Activity: Ambulate as Tolerated  # Code status: Full  # Disposition: possible d/c today                                                                           --------------------------------------------------------  # Handoff: possible d/c today RODNEY SANTO 81y Male  MRN#: [id number]   CODE STATUS: Full  Hospital Day: 6d  Pt is currently admitted with the primary diagnosis of: Atypical Chest Pain    SUBJECTIVE  Overnight events: None reported. Patient continues to complain of L-sided chest pain radiating to L-arm, 6/10, dull, worse with deep breathing.  Subjective complaints: endorse chest pain, L-arm pain, denies neck pain, jaw pain, sweating, fevers/chills, endorses mild dyspnea.                                          ----------------------------------------------------------  OBJECTIVE  PAST MEDICAL & SURGICAL HISTORY  COPD (chronic obstructive pulmonary disease)    Hypertension    Deep vein thrombosis (DVT) of left lower extremity, unspecified chronicity, unspecified vein    Cellulitis of left lower extremity    Chronic atrial fibrillation    Mitral valve insufficiency, unspecified etiology  Moderate    CHF (congestive heart failure)    S/P coronary artery stent placement    History of total right knee replacement                                          -----------------------------------------------------------  ALLERGIES:  No Known Allergies                                          ------------------------------------------------------------  HOME MEDICATIONS  Home Medications:  atorvastatin 20 mg oral tablet: 1 tab(s) orally once a day (13 Sep 2021 04:59)  folic acid 1 mg oral tablet: 1 tab(s) orally once a day (21 Sep 2021 11:34)  oxycodone-acetaminophen 5 mg-325 mg oral tablet: 1 tab(s) orally every 6 hours, As needed, Moderate Pain (4 - 6) (21 Sep 2021 11:32)  spironolactone 25 mg oral tablet: 0.5 tab(s) orally once a day (21 Sep 2021 11:34)  Symbicort 160 mcg-4.5 mcg/inh inhalation aerosol: 2 puff(s) inhaled 2 times a day (13 Sep 2021 04:59)  Ventolin HFA 90 mcg/inh inhalation aerosol: 2 puff(s) inhaled every 6 hours as neede for shortness of breath (13 Sep 2021 04:59)  warfarin 2 mg oral tablet: Take half a tablet Sun, Tues, Wed, Thurs, Fri, Sat.  Take a whole tablet on Mon (13 Sep 2021 04:59)                         MEDICATIONS:  STANDING MEDICATIONS  atorvastatin 20 milliGRAM(s) Oral at bedtime  budesonide 160 MICROgram(s)/formoterol 4.5 MICROgram(s) Inhaler 2 Puff(s) Inhalation two times a day  chlorhexidine 4% Liquid 1 Application(s) Topical <User Schedule>  folic acid 1 milliGRAM(s) Oral daily  influenza  Vaccine (HIGH DOSE) 0.7 milliLiter(s) IntraMuscular once  methocarbamol 500 milliGRAM(s) Oral three times a day  metoprolol tartrate 12.5 milliGRAM(s) Oral two times a day  midodrine. 10 milliGRAM(s) Oral <User Schedule>  spironolactone 12.5 milliGRAM(s) Oral daily  torsemide 20 milliGRAM(s) Oral daily    PRN MEDICATIONS  acetaminophen     Tablet .. 650 milliGRAM(s) Oral every 6 hours PRN  ALBUTerol    90 MICROgram(s) HFA Inhaler 2 Puff(s) Inhalation every 6 hours PRN  melatonin 5 milliGRAM(s) Oral at bedtime PRN  morphine  - Injectable 2 milliGRAM(s) IV Push every 8 hours PRN  oxycodone    5 mG/acetaminophen 325 mG 1 Tablet(s) Oral every 6 hours PRN                                          ------------------------------------------------------------  VITAL SIGNS: Last 24 Hours  T(C): 35.6 (06 Dec 2021 07:30), Max: 35.6 (06 Dec 2021 07:30)  T(F): 96.1 (06 Dec 2021 07:30), Max: 96.1 (06 Dec 2021 07:30)  HR: 60 (06 Dec 2021 07:30) (58 - 75)  BP: 87/48 (06 Dec 2021 07:30) (85/56 - 101/57)  BP(mean): --  RR: 19 (05 Dec 2021 15:59) (19 - 19)  SpO2: --    12-05-21 @ 07:01  -  12-06-21 @ 07:00  --------------------------------------------------------  IN: 360 mL / OUT: 0 mL / NET: 360 mL                                         --------------------------------------------------------------  LABS:             8.8    14.51 )-----------( 199      ( 06 Dec 2021 06:38 )             26.7     12-06    136  |  103  |  37<H>  ----------------------------<  82  4.1   |  18  |  1.1    Ca    7.6<L>      06 Dec 2021 06:38  Mg     2.0     12-06    TPro  5.1<L>  /  Alb  2.4<L>  /  TBili  1.2  /  DBili  x   /  AST  37  /  ALT  35  /  AlkPhos  243<H>  12-06    PT/INR - ( 06 Dec 2021 06:38 )   PT: 25.40 sec;   INR: 2.23 ratio                                            -------------------------------------------------------------  RADIOLOGY:                                          --------------------------------------------------------------  PHYSICAL EXAM:  General: alert, awake, no acute distress  HEENT: atraumatic  LUNGS: clear to auscultation bilaterally, no wheezes noted  HEART: regular rate/rhythm, no murmurs/gallops  ABDOMEN: soft, nontender, nondistended, normoactive bowel sounds  EXT: 2+ radial pulses, no edema noted  NEURO: aaox3, no focal deficits noted  SKIN: intact, no new lesions noted                                         --------------------------------------------------------------  ASSESSMENT & PLAN  Past medical history and hospital course:  82 yo M with PMHx of HFpEF (50-55%), Chronic AFib on Coumadin, 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 Trop at baseline 0.03, EKG showed afib, CXR small R sided pleural effusion.    #Atypical Chest Pain - likely MSK, no further ischemic workup as per Cardiology (Dr. Herrera)  #PMHx of HFpEF - follows with Dr. 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  #PMHx of DVT Right Common Fem/Fem/Popliteal - on LE Venous Duplex 12/1  - still complaining of 6/10 dull chest pain radiating to L arm, worse with deep breathing, but most likely MSK in nature as per Cardio  - Troponin 0.03 x3, was 0.02-0.03 throughout prior admission, EKG performed x2 with no acute ischemic changes  - D-dimer 342, BNP ~3K near baseline, 11/30 CXR showing small R sided pleural effusion, has remained at baseline oxygen requirements  - TTE 12/2: EF 50-55%, otherwise unremarkable   - continuing Spironolactone 12.5mg PO daily + torsemide 20mg PO daily  - pain control with Percocet 1 tab q6h PRN for moderate pain + Morphine PRN for severe pain  - Cardio consulted (Dr. Herrera), on metoprolol 12.5mg PO BID, outpatient f/u, no further ischemic workup  - INR was supratherapeutic on admission, was reversed with Vitamin K on 12/2, restarted Coumadin, INR therapeutic  - BP on lower side, on midodrine 10mg PO BID   - patient not eating and drinking adequately, calorie count started  - patient refusing PT/Rehab    #Chronic Macrocytic Anemia - on folate supplementation  #Anemia of Chronic Disease  - baseline Hb ~10-11, supratherapeutic INR on admission reversed, B12 WNL in September  - 12/1 iron studies: low TIBC, likely anemia of chronic disease    #DTI Left Heel + Coccyx - Wound Care consulted, recommendations given to RN  #COPD - on home O2 2.5L, has remained at baseline, continuing symbicort 160ug 2 puffs BID, Albuterol PRN  #Recent Cholecystitis - s/p perQ-cholecystostomy placement and removal by IR - no abdominal pain on exam                                                                            ----------------------------------------------------  # DVT prophylaxis: daily Coumadin  # GI prophylaxis: N/A  # Diet: DASH/TLC  # Activity: Ambulate as Tolerated  # Code status: Full  # Disposition: possible d/c today                                                                           --------------------------------------------------------  # Handoff: possible d/c today

## 2021-12-06 NOTE — ADVANCED PRACTICE NURSE CONSULT - RECOMMEDATIONS
PI Prevention:  -Continue to assist patient w/ turning/positioning from side-to-side q2h while in bed, q1h when/if OOB chair, or in accordance w/ pt's plan of care. Continue utilizing pillows and/or z-fannie fluidized positioner to assist w/ turning/positioning. When/if OOB chair, utilize pillows or chair cushion to offload pressure.  -Continue to offload heels from bed surface with soft pillow under calfs or by applying offloading boots to BLEs.   -May continue prophylactically applying silicone foam Allevyn dressings to b/l heels (and other bony prominences/high pressure areas)  to provide protective layer, cushion area, decrease friction & shearing, and prevent further skin breakdown. Change dressing q3d and prn for strike-through drainage or soiling.  -Continue utilizing one underpad underneath patient to wick away moisture from skin surface & contain any soiling/incontinence episodes; change pad when saturated/soiled.   -Continue applying Coloplast Sugar Protect moisture barrier cream to buttock & perineal area daily and prn after any soiling/each incontinent episode.    -Assess skin/wound qshift, report changes to primary provider.     Plan of Care: Primary RN Anu  at bedside & made aware of above recs. Spoke w/ covering/primary MD [place] (at [phone]) in regards to above. Signing off on patient, no further needs/recs from [facility] service at this time. Staff RN to perform routine skin/wound assessment and manage wound care. Questions or concerns or if wound worsens and reconsult needed, please contact [facility], [phone].   1. DTPI L heel - Apply silicone foam Allevyn dressing to cushion area, decrease friction and shear, and prevent further skin breakdown. Change dressing q3d and prn for soiling.  2. DTPI coccyx-left buttock- Cleanse wound bed w/ normal saline, gently pat dry.  Apply Cavilon no-sting barrier film to wound edges and periwound to prevent maceration.  Apply Collagenase Santyl nickel-thick to entire wound bed to enzymatically debride devitalized tissue. Apply saline moistened gauze on top of wound bed for moisture to activate debriding enzymes. Cover w/ foam Allevyn dressing. Apply Collagenase and change dressing daily and prn for strike-through drainage or soiling.  3. IAD b/l buttock-Continue applying Coloplast Sugar Protective barrier cream daily and prn after any soiling/each incontinent episode.    -Assess skin/wound qshift, report changes to primary provider.     Additional recs:  -Continue to assist patient w/ turning/positioning from side-to-side q2h while in bed, q1h when/if OOB chair, or in accordance w/ pt's plan of care. Continue utilizing pillows and/or z-fannie fluidized positioner to assist w/ turning/positioning. When/if OOB chair, utilize pillows or chair cushion to offload pressure.  -Continue to offload heels from bed surface by applying offloading boots to BLEs.   -Continue utilizing one underpad underneath patient to contain any soiling/incontinence episodes; change pad when saturated/soiled.   -If patient w/ consistent urinary incontinence, consider utilizing condom catheter (if patient candidate) to contain any urinary incontinence.   -Continue nutrition consult for optimal wound healing & nutritional statis.      Plan of Care: Primary RN Ro at bedside & made aware of above recs. Spoke w/ covering/primary MD Ricky (at [phone]) in regards to above. No further needs/recs from [facility] service at this time. Staff RN to perform routine skin/wound assessment and manage wound care. Questions or concerns or if wound worsens and reconsult needed, please contact [facility], [phone].

## 2021-12-06 NOTE — ADVANCED PRACTICE NURSE CONSULT - ASSESSMENT
Patient seen by WOCN 9/2021 during previous admission.     Received patient on 4B, laying supine in bed, turned to right side (pillow under left side & underneath BLEs elevating heels)m HOB elevated 30 degrees. Pt awake, A&Ox3, primary RN Ro at bedside, both made aware of purpose of WOCN visit, agreeable to consult. With assistance from RN, patient turned completely to right side for skin assessment.     Right heel with blanchable erythema. intact.  Foam Allevyn dressing to left heel in place, dressing removed. Patch of dry maroon colored crusted skin peeled off from left heel, skin intact, no pressure injuries present.     With minimal assistance, patient turned to left  side for sacral skin assessment.   Sacral & coccyx skin intact.     Bilateral buttock slightly denuded, slight light brown hyperpigmentation & scattered areas of small circular healed light pink tissue throughout lower buttock area.     Patient OOB w/ assistance, able to turn/position in bed w/ assistance, patient reports continent of urine and stool, NPO for procedure today.  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 Pt reports that this morning, he was awakened by acute, sudden onset L sided chest pain at 4AM. Describes the pain as "pounding", states that he felt some pain radiate to LUE as well. Denies identifiable aggravating or alleviating factors and states that this has never happened before. Denies headache/dizziness, endorses chronic shortness of breath that is unchanged, has been requiring his baseline oxygen. Denies abdominal pain. Endorses compliance with all medications. States that at baseline, he is bedbound, cannot stand up or ambulate due to weakness. Was recently admitted to [facility] for short term rehab, where he wishes to go back because feels that he cannot care for himself at home. Lives with son but needs more help and PT.   In the ED, T 96, /58, HR 85, RR 18, SpO2 98% on O2 NC 4L, then 100% on 3L. Lab work revealed Hb 9.1, INR 7.87, troponin 0.03 and BNP 3327 (both near baseline). EKG revealed AFib, HR 88. CXR revealed slightly inc small R sided pleural effusion.  Currently admitted to medicine with the primary diagnosis of: Atypical Chest Pain, today is hospital day-6d; on 4B, being managed for Atypical Chest Pain; PMHx of HFpEF; Chronic A-Fib  s/p PPM in August secondary to Tachy-Selvin Syndrome; PMHx of DVT Right Common Fem/Fem/Popliteal - on LE Venous Duplex 12/1; Chronic Macrocytic Anemia; COPD; Recent Cholecystitis s/p perc cholecystostomy placement and removal by IR.  Patient seen by WOCN 9/2021 during previous admission.     Received patient on 4B, laying supine in bed, turned to right side (pillow under left side & underneath BLEs elevating heels)m HOB elevated 30 degrees. Pt awake, A&Ox3, primary RN Ro at bedside, both made aware of purpose of WOCN visit, agreeable to consult. With assistance from RN, patient turned completely to right side for skin assessment.     Right heel with blanchable erythema, skin intact.     Type of wound: Deep tissue pressure injury/DTPI  Location: left heel   Wound measurements: 0.5cm x 1cm x 0cm; true depth indeterminable at this time   Tunneling/Undermining: none  Wound bed: intact deep purple tissue   Wound edges: attached, flush, irregular   Periwound: intact  Wound exudate: none  Wound odor: none  Induration, erythema, warmth: none  Wound pain: denies & no s/s pain present on assessment     Sacral skin intact.   Type of wound: Deep tissue pressure injury/DTPI; evolving; POA; incorrectly documented as Stage 2 pressure injury on admission  Location: coccyx-extending to left lower buttock   Wound measurements: 2 wounds in close proximity to each other & measured together at 8cm x 5cm x 0.2cm; true depth indeterminable at this time   Tunneling/Undermining: none  Wound bed: opening deep purple tissue, dark purple tissue at wound base, left buttock wound w/ yellow subcutaneous tissue w/ pink epidermal budding    Wound edges: attached, flush, irregular   Periwound: blanchable erythema; incontinence associated dermatitis (IAD) to lower b/l buttock & perineal area, skin denuded, erythematous, small scattered areas of partial thickness skin loss present   Wound exudate: moderate amount of serosanguinous drainage present on bedpad underneath patient   Wound odor: none  Induration, erythema, warmth: none  Wound pain: pt w/ c/o tenderness during wound bed assessment     Patient OOB w/ assistance, able to turn/position in bed w/ minimal assistance, patient reports continent of urine, episodes of fecal incontinence. Ordered for sodium & cholesterol restricted diet, probably inadequate intake per reported Baljinder score.

## 2021-12-06 NOTE — PROGRESS NOTE ADULT - SUBJECTIVE AND OBJECTIVE BOX
Progress Note:  Provider Speciality                            Hospitalist      RODNEY SANTO MRN-[id number] 81y Male     CHIEF PRESENTING COMPLAINT:  Patient is a 81y old  Male who presents with a chief complaint of Chest Pain (06 Dec 2021 08:32)        SUBJECTIVE:  Patient was seen and examined at bedside. Reports  lethargy  . No significant overnight events reported.     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 Pt reports that this morning, he was awakened by acute, sudden onset L sided chest pain at 4AM. Describes the pain as "pounding", states that he felt some pain radiate to LUE as well. Denies identifiable aggravating or alleviating factors and states that this has never happened before. Denies headache/dizziness, endorses chronic shortness of breath that is unchanged, has been requiring his baseline oxygen. Denies abdominal pain. Endorses compliance with all medications. States that at baseline, he is bedbound, cannot stand up or ambulate due to weakness. Was recently admitted to [facility] for short term rehab, where he wishes to go back because feels that he cannot care for himself at home. Lives with son but needs more help and PT.   In the ED, T 96, /58, HR 85, RR 18, SpO2 98% on O2 NC 4L, then 100% on 3L. Lab work revealed Hb 9.1, INR 7.87, troponin 0.03 and BNP 3327 (both near baseline). EKG revealed AFib, HR 88. CXR revealed slightly inc small R sided pleural effusion. (30 Nov 2021 11:30)        REVIEW OF SYSTEMS:  Patient denies any headache, any vision complaints, runny nose, fever, chills, sore throat. Denies chest pain, shortness of breath, palpitation. Denies nausea, vomiting, abdominal pain, diarrhoea, Denies urinary burning, urgency, frequency, dysuria. Admits  weakness which is generalized  At least 10 systems were reviewed in ROS. All systems reviewed  are within normal limits except for the complaints as described in Subjective.    PAST MEDICAL & SURGICAL HISTORY:  PAST MEDICAL & SURGICAL HISTORY:  COPD (chronic obstructive pulmonary disease)    Hypertension    Deep vein thrombosis (DVT) of left lower extremity, unspecified chronicity, unspecified vein    Cellulitis of left lower extremity    Chronic atrial fibrillation    Mitral valve insufficiency, unspecified etiology  Moderate    CHF (congestive heart failure)    S/P coronary artery stent placement    History of total right knee replacement            VITAL SIGNS:  Vital Signs Last 24 Hrs  T(C): 35.6 (06 Dec 2021 07:30), Max: 35.6 (06 Dec 2021 07:30)  T(F): 96.1 (06 Dec 2021 07:30), Max: 96.1 (06 Dec 2021 07:30)  HR: 60 (06 Dec 2021 07:30) (58 - 75)  BP: 90/49 (06 Dec 2021 10:54) (85/56 - 101/57)  BP(mean): 67 (06 Dec 2021 10:54) (67 - 67)  RR: 19 (05 Dec 2021 15:59) (19 - 19)  SpO2: --          PHYSICAL EXAMINATION:  Not in acute distress , lethargic  General: No pallor, no icterus  HEENT:   EOMI, no JVD.  Heart: S1+S2 audible  Lungs: bilateral  fair air entry, no wheezing, no crepitations.  Abdomen: Soft, non-tender, non-distended , no  rigidity or guarding.  CNS: Awake alert, CN  grossly intact.  Extremities:  No edema            CONSULTS:  Consultant(s) Notes Reviewed by me.   Care Discussed with Consultants/Other Providers where required.        MEDICATIONS:  MEDICATIONS  (STANDING):  atorvastatin 20 milliGRAM(s) Oral at bedtime  budesonide 160 MICROgram(s)/formoterol 4.5 MICROgram(s) Inhaler 2 Puff(s) Inhalation two times a day  chlorhexidine 4% Liquid 1 Application(s) Topical <User Schedule>  folic acid 1 milliGRAM(s) Oral daily  influenza  Vaccine (HIGH DOSE) 0.7 milliLiter(s) IntraMuscular once  methocarbamol 500 milliGRAM(s) Oral three times a day  metoprolol tartrate 12.5 milliGRAM(s) Oral two times a day  midodrine. 10 milliGRAM(s) Oral <User Schedule>  spironolactone 12.5 milliGRAM(s) Oral daily  torsemide 20 milliGRAM(s) Oral daily  warfarin 2 milliGRAM(s) Oral once    MEDICATIONS  (PRN):  acetaminophen     Tablet .. 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     Suspected atypical chest pain possibly skeletomuscular  Chronic A-Fib on Coumadin  Chronic HFpEF  Chronic DVT  COPD on home O2  Tachy/selvin syndrome S/P pacemaker              Suspected atypical chest pain possibly skeletomuscular- CE negative x 3  Chronic A-Fib on Coumadin. Cont Metoprolol. INR in range  Chronic HFpEF- no exacerbation  orthostatic hypotension-Cont Midodrine 10 mg po q 12h. BP still low  On Metoprolol 12.5 mg po q 12h. If BP tolerates, increase to 25 mg po q 12h tomorrow.   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 Reports  lethargy  . No significant overnight events reported.     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 Pt reports that this morning, he was awakened by acute, sudden onset L sided chest pain at 4AM. Describes the pain as "pounding", states that he felt some pain radiate to LUE as well. Denies identifiable aggravating or alleviating factors and states that this has never happened before. Denies headache/dizziness, endorses chronic shortness of breath that is unchanged, has been requiring his baseline oxygen. Denies abdominal pain. Endorses compliance with all medications. States that at baseline, he is bedbound, cannot stand up or ambulate due to weakness. Was recently admitted to [facility] for short term rehab, where he wishes to go back because feels that he cannot care for himself at home. Lives with son but needs more help and PT.   In the ED, T 96, /58, HR 85, RR 18, SpO2 98% on O2 NC 4L, then 100% on 3L. Lab work revealed Hb 9.1, INR 7.87, troponin 0.03 and BNP 3327 (both near baseline). EKG revealed AFib, HR 88. CXR revealed slightly inc small R sided pleural effusion. (30 Nov 2021 11:30)        REVIEW OF SYSTEMS:  Patient denies any headache, any vision complaints, runny nose, fever, chills, sore throat. Denies chest pain, shortness of breath, palpitation. Denies nausea, vomiting, abdominal pain, diarrhoea, Denies urinary burning, urgency, frequency, dysuria. Admits  weakness which is generalized  At least 10 systems were reviewed in ROS. All systems reviewed  are within normal limits except for the complaints as described in Subjective.    PAST MEDICAL & SURGICAL HISTORY:  PAST MEDICAL & SURGICAL HISTORY:  COPD (chronic obstructive pulmonary disease)    Hypertension    Deep vein thrombosis (DVT) of left lower extremity, unspecified chronicity, unspecified vein    Cellulitis of left lower extremity    Chronic atrial fibrillation    Mitral valve insufficiency, unspecified etiology  Moderate    CHF (congestive heart failure)    S/P coronary artery stent placement    History of total right knee replacement            VITAL SIGNS:  Vital Signs Last 24 Hrs  T(C): 35.6 (06 Dec 2021 07:30), Max: 35.6 (06 Dec 2021 07:30)  T(F): 96.1 (06 Dec 2021 07:30), Max: 96.1 (06 Dec 2021 07:30)  HR: 60 (06 Dec 2021 07:30) (58 - 75)  BP: 90/49 (06 Dec 2021 10:54) (85/56 - 101/57)  BP(mean): 67 (06 Dec 2021 10:54) (67 - 67)  RR: 19 (05 Dec 2021 15:59) (19 - 19)  SpO2: --          PHYSICAL EXAMINATION:  Not in acute distress , lethargic  General: No pallor, no icterus  HEENT:   EOMI, no JVD.  Heart: S1+S2 audible  Lungs: bilateral  fair air entry, no wheezing, no crepitations.  Abdomen: Soft, non-tender, non-distended , no  rigidity or guarding.  CNS: Awake alert, CN  grossly intact.  Extremities:  No edema            CONSULTS:  Consultant(s) Notes Reviewed by me.   Care Discussed with Consultants/Other Providers where required.        MEDICATIONS:  MEDICATIONS  (STANDING):  atorvastatin 20 milliGRAM(s) Oral at bedtime  budesonide 160 MICROgram(s)/formoterol 4.5 MICROgram(s) Inhaler 2 Puff(s) Inhalation two times a day  chlorhexidine 4% Liquid 1 Application(s) Topical <User Schedule>  folic acid 1 milliGRAM(s) Oral daily  influenza  Vaccine (HIGH DOSE) 0.7 milliLiter(s) IntraMuscular once  methocarbamol 500 milliGRAM(s) Oral three times a day  metoprolol tartrate 12.5 milliGRAM(s) Oral two times a day  midodrine. 10 milliGRAM(s) Oral <User Schedule>  spironolactone 12.5 milliGRAM(s) Oral daily  torsemide 20 milliGRAM(s) Oral daily  warfarin 2 milliGRAM(s) Oral once    MEDICATIONS  (PRN):  acetaminophen     Tablet .. 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     Suspected atypical chest pain possibly skeletomuscular  Chronic A-Fib on Coumadin  Chronic HFpEF  Chronic DVT  COPD on home O2  Tachy/selvin syndrome S/P pacemaker              Suspected atypical chest pain possibly skeletomuscular- CE negative x 3  Chronic A-Fib on Coumadin. Cont Metoprolol. INR in range  Chronic HFpEF- no exacerbation  orthostatic hypotension-Cont Midodrine 10 mg po q 12h. 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

## 2021-12-07 LAB
ALBUMIN SERPL ELPH-MCNC: 2.3 G/DL — LOW (ref 3.5–5.2)
ALP SERPL-CCNC: 250 U/L — HIGH (ref 30–115)
ALT FLD-CCNC: 33 U/L — SIGNIFICANT CHANGE UP (ref 0–41)
ANION GAP SERPL CALC-SCNC: 13 MMOL/L — SIGNIFICANT CHANGE UP (ref 7–14)
AST SERPL-CCNC: 30 U/L — SIGNIFICANT CHANGE UP (ref 0–41)
BASOPHILS # BLD AUTO: 0.01 K/UL — SIGNIFICANT CHANGE UP (ref 0–0.2)
BASOPHILS NFR BLD AUTO: 0.1 % — SIGNIFICANT CHANGE UP (ref 0–1)
BILIRUB SERPL-MCNC: 1.4 MG/DL — HIGH (ref 0.2–1.2)
BUN SERPL-MCNC: 38 MG/DL — HIGH (ref 10–20)
CALCIUM SERPL-MCNC: 7.4 MG/DL — LOW (ref 8.5–10.1)
CHLORIDE SERPL-SCNC: 105 MMOL/L — SIGNIFICANT CHANGE UP (ref 98–110)
CO2 SERPL-SCNC: 21 MMOL/L — SIGNIFICANT CHANGE UP (ref 17–32)
CREAT SERPL-MCNC: 1.2 MG/DL — SIGNIFICANT CHANGE UP (ref 0.7–1.5)
EOSINOPHIL # BLD AUTO: 0.06 K/UL — SIGNIFICANT CHANGE UP (ref 0–0.7)
EOSINOPHIL NFR BLD AUTO: 0.4 % — SIGNIFICANT CHANGE UP (ref 0–8)
GLUCOSE SERPL-MCNC: 77 MG/DL — SIGNIFICANT CHANGE UP (ref 70–99)
HCT VFR BLD CALC: 24.7 % — LOW (ref 42–52)
HGB BLD-MCNC: 8.3 G/DL — LOW (ref 14–18)
IMM GRANULOCYTES NFR BLD AUTO: 0.5 % — HIGH (ref 0.1–0.3)
INR BLD: 2.69 RATIO — HIGH (ref 0.65–1.3)
LYMPHOCYTES # BLD AUTO: 0.55 K/UL — LOW (ref 1.2–3.4)
LYMPHOCYTES # BLD AUTO: 3.6 % — LOW (ref 20.5–51.1)
MAGNESIUM SERPL-MCNC: 2 MG/DL — SIGNIFICANT CHANGE UP (ref 1.8–2.4)
MCHC RBC-ENTMCNC: 33.6 G/DL — SIGNIFICANT CHANGE UP (ref 32–37)
MCHC RBC-ENTMCNC: 33.6 PG — HIGH (ref 27–31)
MCV RBC AUTO: 100 FL — HIGH (ref 80–94)
MONOCYTES # BLD AUTO: 1.32 K/UL — HIGH (ref 0.1–0.6)
MONOCYTES NFR BLD AUTO: 8.7 % — SIGNIFICANT CHANGE UP (ref 1.7–9.3)
NEUTROPHILS # BLD AUTO: 13.21 K/UL — HIGH (ref 1.4–6.5)
NEUTROPHILS NFR BLD AUTO: 86.7 % — HIGH (ref 42.2–75.2)
NRBC # BLD: 0 /100 WBCS — SIGNIFICANT CHANGE UP (ref 0–0)
PLATELET # BLD AUTO: 187 K/UL — SIGNIFICANT CHANGE UP (ref 130–400)
POTASSIUM SERPL-MCNC: 3.9 MMOL/L — SIGNIFICANT CHANGE UP (ref 3.5–5)
POTASSIUM SERPL-SCNC: 3.9 MMOL/L — SIGNIFICANT CHANGE UP (ref 3.5–5)
PROT SERPL-MCNC: 4.8 G/DL — LOW (ref 6–8)
PROTHROM AB SERPL-ACNC: 30.6 SEC — HIGH (ref 9.95–12.87)
RBC # BLD: 2.47 M/UL — LOW (ref 4.7–6.1)
RBC # FLD: 16.5 % — HIGH (ref 11.5–14.5)
SARS-COV-2 RNA SPEC QL NAA+PROBE: SIGNIFICANT CHANGE UP
SODIUM SERPL-SCNC: 139 MMOL/L — SIGNIFICANT CHANGE UP (ref 135–146)
WBC # BLD: 15.22 K/UL — HIGH (ref 4.8–10.8)
WBC # FLD AUTO: 15.22 K/UL — HIGH (ref 4.8–10.8)

## 2021-12-07 PROCEDURE — 99233 SBSQ HOSP IP/OBS HIGH 50: CPT

## 2021-12-07 PROCEDURE — 71045 X-RAY EXAM CHEST 1 VIEW: CPT | Mod: 26

## 2021-12-07 RX ORDER — WARFARIN SODIUM 2.5 MG/1
1 TABLET ORAL ONCE
Refills: 0 | Status: COMPLETED | OUTPATIENT
Start: 2021-12-07 | End: 2021-12-07

## 2021-12-07 RX ADMIN — METHOCARBAMOL 500 MILLIGRAM(S): 500 TABLET, FILM COATED ORAL at 21:44

## 2021-12-07 RX ADMIN — OXYCODONE AND ACETAMINOPHEN 1 TABLET(S): 5; 325 TABLET ORAL at 23:39

## 2021-12-07 RX ADMIN — OXYCODONE AND ACETAMINOPHEN 1 TABLET(S): 5; 325 TABLET ORAL at 15:09

## 2021-12-07 RX ADMIN — Medication 12.5 MILLIGRAM(S): at 05:24

## 2021-12-07 RX ADMIN — OXYCODONE AND ACETAMINOPHEN 1 TABLET(S): 5; 325 TABLET ORAL at 23:09

## 2021-12-07 RX ADMIN — Medication 12.5 MILLIGRAM(S): at 17:58

## 2021-12-07 RX ADMIN — ATORVASTATIN CALCIUM 20 MILLIGRAM(S): 80 TABLET, FILM COATED ORAL at 21:44

## 2021-12-07 RX ADMIN — Medication 5 MILLIGRAM(S): at 21:48

## 2021-12-07 RX ADMIN — SPIRONOLACTONE 12.5 MILLIGRAM(S): 25 TABLET, FILM COATED ORAL at 05:24

## 2021-12-07 RX ADMIN — Medication 20 MILLIGRAM(S): at 05:25

## 2021-12-07 RX ADMIN — BUDESONIDE AND FORMOTEROL FUMARATE DIHYDRATE 2 PUFF(S): 160; 4.5 AEROSOL RESPIRATORY (INHALATION) at 08:11

## 2021-12-07 RX ADMIN — METHOCARBAMOL 500 MILLIGRAM(S): 500 TABLET, FILM COATED ORAL at 05:25

## 2021-12-07 RX ADMIN — WARFARIN SODIUM 1 MILLIGRAM(S): 2.5 TABLET ORAL at 22:57

## 2021-12-07 RX ADMIN — CHLORHEXIDINE GLUCONATE 1 APPLICATION(S): 213 SOLUTION TOPICAL at 05:30

## 2021-12-07 RX ADMIN — METHOCARBAMOL 500 MILLIGRAM(S): 500 TABLET, FILM COATED ORAL at 14:14

## 2021-12-07 RX ADMIN — OXYCODONE AND ACETAMINOPHEN 1 TABLET(S): 5; 325 TABLET ORAL at 16:09

## 2021-12-07 RX ADMIN — MIDODRINE HYDROCHLORIDE 10 MILLIGRAM(S): 2.5 TABLET ORAL at 17:58

## 2021-12-07 RX ADMIN — MORPHINE SULFATE 2 MILLIGRAM(S): 50 CAPSULE, EXTENDED RELEASE ORAL at 19:09

## 2021-12-07 RX ADMIN — MIDODRINE HYDROCHLORIDE 10 MILLIGRAM(S): 2.5 TABLET ORAL at 05:24

## 2021-12-07 RX ADMIN — MORPHINE SULFATE 2 MILLIGRAM(S): 50 CAPSULE, EXTENDED RELEASE ORAL at 18:00

## 2021-12-07 RX ADMIN — Medication 1 MILLIGRAM(S): at 12:18

## 2021-12-07 NOTE — PROGRESS NOTE ADULT - SUBJECTIVE AND OBJECTIVE BOX
RODNEY SANTO 81y Male  MRN#: [id number]   CODE STATUS: Full  Hospital Day: 7d  Pt is currently admitted with the primary diagnosis of: Atypical Chest Pain    SUBJECTIVE  Overnight events: None reported. Patient continues to complain of L-sided chest pain radiating to L-arm, dull, worse with deep breathing.  Subjective complaints: endorses L-sided chest pain, L-arm pain, denies neck pain, jaw pain, sweating, fevers/chills, endorses mild dyspnea.                                          ----------------------------------------------------------  OBJECTIVE  PAST MEDICAL & SURGICAL HISTORY  COPD (chronic obstructive pulmonary disease)    Hypertension    Deep vein thrombosis (DVT) of left lower extremity, unspecified chronicity, unspecified vein    Cellulitis of left lower extremity    Chronic atrial fibrillation    Mitral valve insufficiency, unspecified etiology  Moderate    CHF (congestive heart failure)    S/P coronary artery stent placement    History of total right knee replacement                                          -----------------------------------------------------------  ALLERGIES:  No Known Allergies                                          ------------------------------------------------------------  HOME MEDICATIONS  Home Medications:  atorvastatin 20 mg oral tablet: 1 tab(s) orally once a day (13 Sep 2021 04:59)  folic acid 1 mg oral tablet: 1 tab(s) orally once a day (21 Sep 2021 11:34)  oxycodone-acetaminophen 5 mg-325 mg oral tablet: 1 tab(s) orally every 6 hours, As needed, Moderate Pain (4 - 6) (21 Sep 2021 11:32)  spironolactone 25 mg oral tablet: 0.5 tab(s) orally once a day (21 Sep 2021 11:34)  Symbicort 160 mcg-4.5 mcg/inh inhalation aerosol: 2 puff(s) inhaled 2 times a day (13 Sep 2021 04:59)  Ventolin HFA 90 mcg/inh inhalation aerosol: 2 puff(s) inhaled every 6 hours as neede for shortness of breath (13 Sep 2021 04:59)  warfarin 2 mg oral tablet: Take half a tablet Sun, Tues, Wed, Thurs, Fri, Sat.  Take a whole tablet on Mon (13 Sep 2021 04:59)                         MEDICATIONS:  STANDING MEDICATIONS  atorvastatin 20 milliGRAM(s) Oral at bedtime  budesonide 160 MICROgram(s)/formoterol 4.5 MICROgram(s) Inhaler 2 Puff(s) Inhalation two times a day  chlorhexidine 4% Liquid 1 Application(s) Topical <User Schedule>  folic acid 1 milliGRAM(s) Oral daily  influenza  Vaccine (HIGH DOSE) 0.7 milliLiter(s) IntraMuscular once  methocarbamol 500 milliGRAM(s) Oral three times a day  metoprolol tartrate 12.5 milliGRAM(s) Oral two times a day  midodrine. 10 milliGRAM(s) Oral <User Schedule>  spironolactone 12.5 milliGRAM(s) Oral daily  torsemide 20 milliGRAM(s) Oral daily    PRN MEDICATIONS  acetaminophen     Tablet .. 650 milliGRAM(s) Oral every 6 hours PRN  ALBUTerol    90 MICROgram(s) HFA Inhaler 2 Puff(s) Inhalation every 6 hours PRN  melatonin 5 milliGRAM(s) Oral at bedtime PRN  morphine  - Injectable 2 milliGRAM(s) IV Push every 8 hours PRN  oxycodone    5 mG/acetaminophen 325 mG 1 Tablet(s) Oral every 6 hours PRN                                          ------------------------------------------------------------  VITAL SIGNS: Last 24 Hours  T(C): 36.7 (07 Dec 2021 08:30), Max: 36.7 (07 Dec 2021 08:30)  T(F): 98.1 (07 Dec 2021 08:30), Max: 98.1 (07 Dec 2021 08:30)  HR: 69 (07 Dec 2021 08:30) (50 - 69)  BP: 92/48 (07 Dec 2021 08:30) (74/36 - 92/48)  BP(mean): 67 (06 Dec 2021 10:54) (67 - 67)  RR: 18 (07 Dec 2021 08:30) (18 - 18)  SpO2: --    12-06-21 @ 07:01  -  12-07-21 @ 07:00  --------------------------------------------------------  IN: 0 mL / OUT: 200 mL / NET: -200 mL                                         --------------------------------------------------------------  LABS:             8.3    15.22 )-----------( 187      ( 07 Dec 2021 07:37 )             24.7     12-06    136  |  103  |  37<H>  ----------------------------<  82  4.1   |  18  |  1.1    Ca    7.6<L>      06 Dec 2021 06:38  Mg     2.0     12-06    TPro  5.1<L>  /  Alb  2.4<L>  /  TBili  1.2  /  DBili  x   /  AST  37  /  ALT  35  /  AlkPhos  243<H>  12-06    PT/INR - ( 06 Dec 2021 06:38 )   PT: 25.40 sec;   INR: 2.23 ratio                                            -------------------------------------------------------------  RADIOLOGY:                                          --------------------------------------------------------------  PHYSICAL EXAM:  General: alert, awake, no acute distress  HEENT: atraumatic  LUNGS: clear to auscultation bilaterally, no wheezes noted  HEART: regular rate/rhythm, no murmurs/gallops  ABDOMEN: soft, nontender, nondistended, normoactive bowel sounds  EXT: 2+ radial pulses, no edema noted  NEURO: aaox3, no focal deficits noted  SKIN: intact, no new lesions noted                                         --------------------------------------------------------------  ASSESSMENT & PLAN  Past medical history and hospital course:  82 yo M with PMHx of HFpEF (50-55%), Chronic AFib on Coumadin, 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 Trop at baseline 0.03, EKG showed afib, CXR small R sided pleural effusion.    #Atypical Chest Pain - likely MSK, no further ischemic workup as per Cardiology (Dr. Herrera)  #PMHx of HFpEF - follows with Dr. 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  #PMHx of DVT Right Common Fem/Fem/Popliteal - on LE Venous Duplex 12/1  - still complaining of 6/10 dull chest pain radiating to L arm, worse with deep breathing, but most likely MSK in nature as per Cardio  - Troponin 0.03 x3, was 0.02-0.03 throughout prior admission, EKG performed x2 with no acute ischemic changes  - D-dimer 342, BNP ~3K near baseline, 11/30 CXR showing small R sided pleural effusion, has remained at baseline oxygen requirements  - TTE 12/2: EF 50-55%, otherwise unremarkable   - continuing Spironolactone 12.5mg PO daily + torsemide 20mg PO daily  - pain control with Percocet 1 tab q6h PRN for moderate pain + Morphine PRN for severe pain  - Cardio consulted (Dr. Herrera), on metoprolol 12.5mg PO BID, outpatient f/u, no further ischemic workup  - INR was supratherapeutic on admission, was reversed with Vitamin K on 12/2, restarted Coumadin, INR therapeutic  - BP on lower side, on midodrine 10mg PO BID   - patient not eating and drinking adequately, calorie count started  - patient refusing PT/Rehab    #Chronic Macrocytic Anemia - on folate supplementation  #Anemia of Chronic Disease  - baseline Hb ~10-11, supratherapeutic INR on admission reversed, B12 WNL in September  - 12/1 iron studies: low TIBC, likely anemia of chronic disease    #DTI Left Heel + Coccyx - Wound Care consulted, recommendations given to RN  #COPD - on home O2 2.5L, has remained at baseline, continuing symbicort 160ug 2 puffs BID, Albuterol PRN  #Recent Cholecystitis - s/p perQ-cholecystostomy placement and removal by IR - no abdominal pain on exam                                                                            ----------------------------------------------------  # DVT prophylaxis: daily Coumadin  # GI prophylaxis: N/A  # Diet: DASH/TLC  # Activity: Ambulate as Tolerated  # Code status: Full  # Disposition: possible d/c today                                                                           --------------------------------------------------------  # Handoff: possible d/c today

## 2021-12-07 NOTE — PROGRESS NOTE ADULT - SUBJECTIVE AND OBJECTIVE BOX
Progress Note:  Provider Speciality                            Hospitalist      RODNEY SANTO MRN-[id number] 81y Male     CHIEF PRESENTING COMPLAINT:  Patient is a 81y old  Male who presents with a chief complaint of Chest Pain (06 Dec 2021 08:32)        SUBJECTIVE:  Patient was seen and examined at bedside. Reports  lethargy & weakness  . No significant overnight events reported.     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 Pt reports that this morning, he was awakened by acute, sudden onset L sided chest pain at 4AM. Describes the pain as "pounding", states that he felt some pain radiate to LUE as well. Denies identifiable aggravating or alleviating factors and states that this has never happened before. Denies headache/dizziness, endorses chronic shortness of breath that is unchanged, has been requiring his baseline oxygen. Denies abdominal pain. Endorses compliance with all medications. States that at baseline, he is bedbound, cannot stand up or ambulate due to weakness. Was recently admitted to [facility] for short term rehab, where he wishes to go back because feels that he cannot care for himself at home. Lives with son but needs more help and PT.   In the ED, T 96, /58, HR 85, RR 18, SpO2 98% on O2 NC 4L, then 100% on 3L. Lab work revealed Hb 9.1, INR 7.87, troponin 0.03 and BNP 3327 (both near baseline). EKG revealed AFib, HR 88. CXR revealed slightly inc small R sided pleural effusion. (30 Nov 2021 11:30)        REVIEW OF SYSTEMS:  Patient denies any headache, any vision complaints, runny nose, fever, chills, sore throat. Denies chest pain, shortness of breath, palpitation. Denies nausea, vomiting, abdominal pain, diarrhoea, Denies urinary burning, urgency, frequency, dysuria. Admits  weakness which is generalized  At least 10 systems were reviewed in ROS. All systems reviewed  are within normal limits except for the complaints as described in Subjective.    PAST MEDICAL & SURGICAL HISTORY:  PAST MEDICAL & SURGICAL HISTORY:  COPD (chronic obstructive pulmonary disease)    Hypertension    Deep vein thrombosis (DVT) of left lower extremity, unspecified chronicity, unspecified vein    Cellulitis of left lower extremity    Chronic atrial fibrillation    Mitral valve insufficiency, unspecified etiology  Moderate    CHF (congestive heart failure)    S/P coronary artery stent placement    History of total right knee replacement            VITAL SIGNS:  Vital Signs Last 24 Hrs  T(C): 36.7 (07 Dec 2021 08:30), Max: 36.7 (07 Dec 2021 08:30)  T(F): 98.1 (07 Dec 2021 08:30), Max: 98.1 (07 Dec 2021 08:30)  HR: 69 (07 Dec 2021 08:30) (50 - 69)  BP: 92/48 (07 Dec 2021 08:30) (74/36 - 92/48)  BP(mean): --  RR: 18 (07 Dec 2021 08:30) (18 - 18)  SpO2: --        PHYSICAL EXAMINATION:  Not in acute distress , lethargic  General: No pallor, no icterus  HEENT:   EOMI, no JVD.  Heart: S1+S2 audible  Lungs: bilateral  fair air entry, no wheezing, no crepitations.  Abdomen: Soft, non-tender, non-distended , no  rigidity or guarding.  CNS: Awake alert, CN  grossly intact.  Extremities:  No edema            CONSULTS:  Consultant(s) Notes Reviewed by me.   Care Discussed with Consultants/Other Providers where required.        MEDICATIONS:  MEDICATIONS  (STANDING):  atorvastatin 20 milliGRAM(s) Oral at bedtime  budesonide 160 MICROgram(s)/formoterol 4.5 MICROgram(s) Inhaler 2 Puff(s) Inhalation two times a day  chlorhexidine 4% Liquid 1 Application(s) Topical <User Schedule>  folic acid 1 milliGRAM(s) Oral daily  influenza  Vaccine (HIGH DOSE) 0.7 milliLiter(s) IntraMuscular once  methocarbamol 500 milliGRAM(s) Oral three times a day  metoprolol tartrate 12.5 milliGRAM(s) Oral two times a day  midodrine. 10 milliGRAM(s) Oral <User Schedule>  spironolactone 12.5 milliGRAM(s) Oral daily  torsemide 20 milliGRAM(s) Oral daily  warfarin 2 milliGRAM(s) Oral once    MEDICATIONS  (PRN):  acetaminophen     Tablet .. 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     Suspected atypical chest pain possibly skeletomuscular  leucocytosis   Chronic A-Fib on Coumadin  Chronic HFpEF  Chronic DVT  COPD on home O2  Tachy/selvin syndrome S/P pacemaker              Suspected atypical chest pain possibly skeletomuscular- CE negative x 3  leucocytosis - though afebrile but hypotensive. Will get sepsis work up   Chronic A-Fib on Coumadin. Cont Metoprolol. INR in range  Chronic HFpEF- no exacerbation  orthostatic hypotension-Cont Midodrine 10 mg po q 12h. 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

## 2021-12-08 LAB
ALBUMIN SERPL ELPH-MCNC: 2.2 G/DL — LOW (ref 3.5–5.2)
ALP SERPL-CCNC: 277 U/L — HIGH (ref 30–115)
ALT FLD-CCNC: 33 U/L — SIGNIFICANT CHANGE UP (ref 0–41)
ANION GAP SERPL CALC-SCNC: 13 MMOL/L — SIGNIFICANT CHANGE UP (ref 7–14)
AST SERPL-CCNC: 26 U/L — SIGNIFICANT CHANGE UP (ref 0–41)
BASOPHILS # BLD AUTO: 0 K/UL — SIGNIFICANT CHANGE UP (ref 0–0.2)
BASOPHILS NFR BLD AUTO: 0 % — SIGNIFICANT CHANGE UP (ref 0–1)
BILIRUB SERPL-MCNC: 1.4 MG/DL — HIGH (ref 0.2–1.2)
BUN SERPL-MCNC: 42 MG/DL — HIGH (ref 10–20)
CALCIUM SERPL-MCNC: 7.7 MG/DL — LOW (ref 8.5–10.1)
CHLORIDE SERPL-SCNC: 101 MMOL/L — SIGNIFICANT CHANGE UP (ref 98–110)
CO2 SERPL-SCNC: 23 MMOL/L — SIGNIFICANT CHANGE UP (ref 17–32)
CREAT SERPL-MCNC: 1.5 MG/DL — SIGNIFICANT CHANGE UP (ref 0.7–1.5)
EOSINOPHIL # BLD AUTO: 0.05 K/UL — SIGNIFICANT CHANGE UP (ref 0–0.7)
EOSINOPHIL NFR BLD AUTO: 0.4 % — SIGNIFICANT CHANGE UP (ref 0–8)
GLUCOSE SERPL-MCNC: 83 MG/DL — SIGNIFICANT CHANGE UP (ref 70–99)
HCT VFR BLD CALC: 26.1 % — LOW (ref 42–52)
HGB BLD-MCNC: 8.3 G/DL — LOW (ref 14–18)
IMM GRANULOCYTES NFR BLD AUTO: 0.4 % — HIGH (ref 0.1–0.3)
INR BLD: 3.07 RATIO — HIGH (ref 0.65–1.3)
LYMPHOCYTES # BLD AUTO: 0.59 K/UL — LOW (ref 1.2–3.4)
LYMPHOCYTES # BLD AUTO: 4.5 % — LOW (ref 20.5–51.1)
MAGNESIUM SERPL-MCNC: 2 MG/DL — SIGNIFICANT CHANGE UP (ref 1.8–2.4)
MCHC RBC-ENTMCNC: 31.8 G/DL — LOW (ref 32–37)
MCHC RBC-ENTMCNC: 32.4 PG — HIGH (ref 27–31)
MCV RBC AUTO: 102 FL — HIGH (ref 80–94)
MONOCYTES # BLD AUTO: 0.82 K/UL — HIGH (ref 0.1–0.6)
MONOCYTES NFR BLD AUTO: 6.3 % — SIGNIFICANT CHANGE UP (ref 1.7–9.3)
NEUTROPHILS # BLD AUTO: 11.51 K/UL — HIGH (ref 1.4–6.5)
NEUTROPHILS NFR BLD AUTO: 88.4 % — HIGH (ref 42.2–75.2)
NRBC # BLD: 0 /100 WBCS — SIGNIFICANT CHANGE UP (ref 0–0)
PLATELET # BLD AUTO: 180 K/UL — SIGNIFICANT CHANGE UP (ref 130–400)
POTASSIUM SERPL-MCNC: 4.2 MMOL/L — SIGNIFICANT CHANGE UP (ref 3.5–5)
POTASSIUM SERPL-SCNC: 4.2 MMOL/L — SIGNIFICANT CHANGE UP (ref 3.5–5)
PROCALCITONIN SERPL-MCNC: 0.25 NG/ML — HIGH (ref 0.02–0.1)
PROT SERPL-MCNC: 4.9 G/DL — LOW (ref 6–8)
PROTHROM AB SERPL-ACNC: 34.9 SEC — HIGH (ref 9.95–12.87)
RBC # BLD: 2.56 M/UL — LOW (ref 4.7–6.1)
RBC # FLD: 16.3 % — HIGH (ref 11.5–14.5)
SODIUM SERPL-SCNC: 137 MMOL/L — SIGNIFICANT CHANGE UP (ref 135–146)
WBC # BLD: 13.02 K/UL — HIGH (ref 4.8–10.8)
WBC # FLD AUTO: 13.02 K/UL — HIGH (ref 4.8–10.8)

## 2021-12-08 PROCEDURE — 99233 SBSQ HOSP IP/OBS HIGH 50: CPT

## 2021-12-08 PROCEDURE — 71275 CT ANGIOGRAPHY CHEST: CPT | Mod: 26

## 2021-12-08 RX ORDER — WARFARIN SODIUM 2.5 MG/1
0.5 TABLET ORAL ONCE
Refills: 0 | Status: COMPLETED | OUTPATIENT
Start: 2021-12-08 | End: 2021-12-08

## 2021-12-08 RX ORDER — MIDODRINE HYDROCHLORIDE 2.5 MG/1
10 TABLET ORAL THREE TIMES A DAY
Refills: 0 | Status: DISCONTINUED | OUTPATIENT
Start: 2021-12-08 | End: 2021-12-22

## 2021-12-08 RX ORDER — SODIUM CHLORIDE 9 MG/ML
500 INJECTION, SOLUTION INTRAVENOUS ONCE
Refills: 0 | Status: DISCONTINUED | OUTPATIENT
Start: 2021-12-08 | End: 2021-12-23

## 2021-12-08 RX ADMIN — METHOCARBAMOL 500 MILLIGRAM(S): 500 TABLET, FILM COATED ORAL at 22:37

## 2021-12-08 RX ADMIN — CHLORHEXIDINE GLUCONATE 1 APPLICATION(S): 213 SOLUTION TOPICAL at 06:54

## 2021-12-08 RX ADMIN — Medication 5 MILLIGRAM(S): at 22:36

## 2021-12-08 RX ADMIN — MIDODRINE HYDROCHLORIDE 10 MILLIGRAM(S): 2.5 TABLET ORAL at 12:49

## 2021-12-08 RX ADMIN — Medication 1 MILLIGRAM(S): at 13:48

## 2021-12-08 RX ADMIN — MIDODRINE HYDROCHLORIDE 10 MILLIGRAM(S): 2.5 TABLET ORAL at 06:55

## 2021-12-08 RX ADMIN — ATORVASTATIN CALCIUM 20 MILLIGRAM(S): 80 TABLET, FILM COATED ORAL at 22:37

## 2021-12-08 RX ADMIN — SPIRONOLACTONE 12.5 MILLIGRAM(S): 25 TABLET, FILM COATED ORAL at 06:56

## 2021-12-08 RX ADMIN — BUDESONIDE AND FORMOTEROL FUMARATE DIHYDRATE 2 PUFF(S): 160; 4.5 AEROSOL RESPIRATORY (INHALATION) at 02:44

## 2021-12-08 RX ADMIN — Medication 20 MILLIGRAM(S): at 06:55

## 2021-12-08 RX ADMIN — Medication 650 MILLIGRAM(S): at 22:36

## 2021-12-08 RX ADMIN — Medication 650 MILLIGRAM(S): at 23:36

## 2021-12-08 RX ADMIN — BUDESONIDE AND FORMOTEROL FUMARATE DIHYDRATE 2 PUFF(S): 160; 4.5 AEROSOL RESPIRATORY (INHALATION) at 13:50

## 2021-12-08 RX ADMIN — MIDODRINE HYDROCHLORIDE 10 MILLIGRAM(S): 2.5 TABLET ORAL at 17:49

## 2021-12-08 RX ADMIN — WARFARIN SODIUM 0.5 MILLIGRAM(S): 2.5 TABLET ORAL at 22:37

## 2021-12-08 RX ADMIN — METHOCARBAMOL 500 MILLIGRAM(S): 500 TABLET, FILM COATED ORAL at 13:49

## 2021-12-08 RX ADMIN — METHOCARBAMOL 500 MILLIGRAM(S): 500 TABLET, FILM COATED ORAL at 06:55

## 2021-12-08 NOTE — PROGRESS NOTE ADULT - ATTENDING COMMENTS
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             Suspected atypical chest pain possibly skeletomuscular- CE negative x 3  Leucocytosis - though afebrile but hypotensive. wbc elevated and blood cx in lab  Chronic A-Fib on Coumadin. Cont Metoprolol. INR stable  Chronic HFpEF- no exacerbation  orthostatic hypotension-Cont Midodrine 10 mg po q 12h. BP still low -- got fluids last night. 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             Suspected atypical chest pain possibly skeletomuscular- CE negative x 3  Leucocytosis - though afebrile but hypotensive. wbc elevated and blood cx in lab  Chronic A-Fib on Coumadin. Cont Metoprolol. INR stable  Chronic HFpEF- no exacerbation  orthostatic hypotension-Cont Midodrine 10 mg po q 12h. BP still low -- got fluids last night. 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

## 2021-12-08 NOTE — PROGRESS NOTE ADULT - SUBJECTIVE AND OBJECTIVE BOX
ORDNEY SANTO 81y Male  MRN#: [id number]   CODE STATUS: Full  Hospital Day: 8d  Pt is currently admitted with the primary diagnosis of: Atypical Chest Pain    SUBJECTIVE  Overnight events: None reported. Patient continues to complain of L-sided chest pain radiating to L-arm, dull, worse with deep breathing, and increasing fatigue/weakness  Subjective complaints: endorses L-sided chest pain, L-arm pain, denies neck pain, jaw pain, sweating, fevers/chills, endorses mild dyspnea, fatigue/weakness.                                          ----------------------------------------------------------  OBJECTIVE  PAST MEDICAL & SURGICAL HISTORY  COPD (chronic obstructive pulmonary disease)    Hypertension    Deep vein thrombosis (DVT) of left lower extremity, unspecified chronicity, unspecified vein    Cellulitis of left lower extremity    Chronic atrial fibrillation    Mitral valve insufficiency, unspecified etiology  Moderate    CHF (congestive heart failure)    S/P coronary artery stent placement    History of total right knee replacement                                          -----------------------------------------------------------  ALLERGIES:  No Known Allergies                                          ------------------------------------------------------------  HOME MEDICATIONS  Home Medications:  atorvastatin 20 mg oral tablet: 1 tab(s) orally once a day (13 Sep 2021 04:59)  folic acid 1 mg oral tablet: 1 tab(s) orally once a day (21 Sep 2021 11:34)  oxycodone-acetaminophen 5 mg-325 mg oral tablet: 1 tab(s) orally every 6 hours, As needed, Moderate Pain (4 - 6) (21 Sep 2021 11:32)  spironolactone 25 mg oral tablet: 0.5 tab(s) orally once a day (21 Sep 2021 11:34)  Symbicort 160 mcg-4.5 mcg/inh inhalation aerosol: 2 puff(s) inhaled 2 times a day (13 Sep 2021 04:59)  Ventolin HFA 90 mcg/inh inhalation aerosol: 2 puff(s) inhaled every 6 hours as neede for shortness of breath (13 Sep 2021 04:59)  warfarin 2 mg oral tablet: Take half a tablet Sun, Tues, Wed, Thurs, Fri, Sat.  Take a whole tablet on Mon (13 Sep 2021 04:59)                         MEDICATIONS:  STANDING MEDICATIONS  atorvastatin 20 milliGRAM(s) Oral at bedtime  budesonide 160 MICROgram(s)/formoterol 4.5 MICROgram(s) Inhaler 2 Puff(s) Inhalation two times a day  chlorhexidine 4% Liquid 1 Application(s) Topical <User Schedule>  folic acid 1 milliGRAM(s) Oral daily  influenza  Vaccine (HIGH DOSE) 0.7 milliLiter(s) IntraMuscular once  lactated ringers Bolus 500 milliLiter(s) IV Bolus once  methocarbamol 500 milliGRAM(s) Oral three times a day  metoprolol tartrate 12.5 milliGRAM(s) Oral two times a day  midodrine. 10 milliGRAM(s) Oral <User Schedule>  spironolactone 12.5 milliGRAM(s) Oral daily  torsemide 20 milliGRAM(s) Oral daily    PRN MEDICATIONS  acetaminophen     Tablet .. 650 milliGRAM(s) Oral every 6 hours PRN  ALBUTerol    90 MICROgram(s) HFA Inhaler 2 Puff(s) Inhalation every 6 hours PRN  melatonin 5 milliGRAM(s) Oral at bedtime PRN                                            ------------------------------------------------------------  VITAL SIGNS: Last 24 Hours  T(C): 35.7 (08 Dec 2021 07:58), Max: 36.7 (07 Dec 2021 08:30)  T(F): 96.3 (08 Dec 2021 07:58), Max: 98.1 (07 Dec 2021 08:30)  HR: 71 (08 Dec 2021 07:58) (53 - 71)  BP: 79/51 (08 Dec 2021 07:58) (68/30 - 104/44)  BP(mean): --  RR: 18 (08 Dec 2021 07:58) (16 - 18)  SpO2: 100% (08 Dec 2021 07:58) (90% - 100%)    12-07-21 @ 07:01  -  12-08-21 @ 07:00  --------------------------------------------------------  IN: 0 mL / OUT: 300 mL / NET: -300 mL                                         --------------------------------------------------------------  LABS:             8.3    15.22 )-----------( 187      ( 07 Dec 2021 07:37 )             24.7     12-07    139  |  105  |  38<H>  ----------------------------<  77  3.9   |  21  |  1.2    Ca    7.4<L>      07 Dec 2021 07:37  Mg     2.0     12-07    TPro  4.8<L>  /  Alb  2.3<L>  /  TBili  1.4<H>  /  DBili  x   /  AST  30  /  ALT  33  /  AlkPhos  250<H>  12-07    PT/INR - ( 07 Dec 2021 11:00 )   PT: 30.60 sec;   INR: 2.69 ratio                                            -------------------------------------------------------------  RADIOLOGY:  < from: Xray Chest 1 View- PORTABLE-Urgent (Xray Chest 1 View- PORTABLE-Urgent .) (12.07.21 @ 13:27) >  Impression:    Right basilar opacity, unchanged.    Loop recorder.    < end of copied text >                                          --------------------------------------------------------------  PHYSICAL EXAM:  General: alert, awake, no acute distress  HEENT: atraumatic  LUNGS: clear to auscultation bilaterally, no wheezes noted  HEART: regular rate/rhythm, no murmurs/gallops  ABDOMEN: soft, nontender, nondistended, normoactive bowel sounds  EXT: 2+ radial pulses, no edema noted  NEURO: aaox3, no focal deficits noted  SKIN: intact, no new lesions noted                                         --------------------------------------------------------------  ASSESSMENT & PLAN  Past medical history and hospital course:  80 yo M with PMHx of HFpEF (50-55%), Chronic AFib on Coumadin, 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 Trop at baseline 0.03, EKG showed afib, CXR small R sided pleural effusion.    #Fatigue/Weakness - increasing as per patient  #Hypotension and #Leukocytosis  - infectious workup started, CXR 12/8 stable opacity, 2 BCx's collected, procal pending    #Atypical Chest Pain - likely MSK, no further ischemic workup as per Cardiology (Dr. Herrera)  #PMHx of HFpEF - follows with Dr. 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  #PMHx of DVT Right Common Fem/Fem/Popliteal - on LE Venous Duplex 12/1  - still complaining of 6/10 dull chest pain radiating to L arm, worse with deep breathing, but most likely MSK in nature as per Cardio  - Troponin 0.03 x3, was 0.02-0.03 throughout prior admission, EKG performed x2 with no acute ischemic changes  - D-dimer 342, BNP ~3K near baseline, 11/30 CXR showing small R sided pleural effusion, has remained at baseline oxygen requirements  - TTE 12/2: EF 50-55%, otherwise unremarkable   - continuing Spironolactone 12.5mg PO daily + torsemide 20mg PO daily  - pain control with Percocet 1 tab q6h PRN for moderate pain + Morphine PRN for severe pain  - Cardio consulted (Dr. Herrera), on metoprolol 12.5mg PO BID, outpatient f/u, no further ischemic workup  - INR was supratherapeutic on admission, was reversed with Vitamin K on 12/2, restarted Coumadin, INR therapeutic  - BP on lower side, on midodrine 10mg PO BID   - patient not eating and drinking adequately, calorie count started  - patient refusing PT/Rehab    #Chronic Macrocytic Anemia - on folate supplementation  #Anemia of Chronic Disease  - baseline Hb ~10-11, supratherapeutic INR on admission reversed, B12 WNL in September  - 12/1 iron studies: low TIBC, likely anemia of chronic disease    #DTI Left Heel + Coccyx - Wound Care consulted, recommendations given to RN  #COPD - on home O2 2.5L, has remained at baseline, continuing symbicort 160ug 2 puffs BID, Albuterol PRN  #Recent Cholecystitis - s/p perQ-cholecystostomy placement and removal by IR - no abdominal pain on exam                                                                            ----------------------------------------------------  # DVT prophylaxis: daily Coumadin  # GI prophylaxis: N/A  # Diet: DASH/TLC  # Activity: Ambulate as Tolerated  # Code status: Full  # Disposition: remain on floor                                                                           --------------------------------------------------------  # Handoff: RODNEY SANTO 81y Male  MRN#: [id number]   CODE STATUS: Full  Hospital Day: 8d  Pt is currently admitted with the primary diagnosis of: Atypical Chest Pain    SUBJECTIVE  Overnight events: None reported. Patient continues to complain of L-sided chest pain radiating to L-arm, dull, worse with deep breathing, and increasing fatigue/weakness  Subjective complaints: endorses L-sided chest pain, L-arm pain, denies neck pain, jaw pain, sweating, fevers/chills, endorses mild dyspnea, fatigue/weakness.                                          ----------------------------------------------------------  OBJECTIVE  PAST MEDICAL & SURGICAL HISTORY  COPD (chronic obstructive pulmonary disease)    Hypertension    Deep vein thrombosis (DVT) of left lower extremity, unspecified chronicity, unspecified vein    Cellulitis of left lower extremity    Chronic atrial fibrillation    Mitral valve insufficiency, unspecified etiology  Moderate    CHF (congestive heart failure)    S/P coronary artery stent placement    History of total right knee replacement                                          -----------------------------------------------------------  ALLERGIES:  No Known Allergies                                          ------------------------------------------------------------  HOME MEDICATIONS  Home Medications:  atorvastatin 20 mg oral tablet: 1 tab(s) orally once a day (13 Sep 2021 04:59)  folic acid 1 mg oral tablet: 1 tab(s) orally once a day (21 Sep 2021 11:34)  oxycodone-acetaminophen 5 mg-325 mg oral tablet: 1 tab(s) orally every 6 hours, As needed, Moderate Pain (4 - 6) (21 Sep 2021 11:32)  spironolactone 25 mg oral tablet: 0.5 tab(s) orally once a day (21 Sep 2021 11:34)  Symbicort 160 mcg-4.5 mcg/inh inhalation aerosol: 2 puff(s) inhaled 2 times a day (13 Sep 2021 04:59)  Ventolin HFA 90 mcg/inh inhalation aerosol: 2 puff(s) inhaled every 6 hours as neede for shortness of breath (13 Sep 2021 04:59)  warfarin 2 mg oral tablet: Take half a tablet Sun, Tues, Wed, Thurs, Fri, Sat.  Take a whole tablet on Mon (13 Sep 2021 04:59)                         MEDICATIONS:  STANDING MEDICATIONS  atorvastatin 20 milliGRAM(s) Oral at bedtime  budesonide 160 MICROgram(s)/formoterol 4.5 MICROgram(s) Inhaler 2 Puff(s) Inhalation two times a day  chlorhexidine 4% Liquid 1 Application(s) Topical <User Schedule>  folic acid 1 milliGRAM(s) Oral daily  influenza  Vaccine (HIGH DOSE) 0.7 milliLiter(s) IntraMuscular once  lactated ringers Bolus 500 milliLiter(s) IV Bolus once  methocarbamol 500 milliGRAM(s) Oral three times a day  metoprolol tartrate 12.5 milliGRAM(s) Oral two times a day  midodrine. 10 milliGRAM(s) Oral <User Schedule>  spironolactone 12.5 milliGRAM(s) Oral daily  torsemide 20 milliGRAM(s) Oral daily    PRN MEDICATIONS  acetaminophen     Tablet .. 650 milliGRAM(s) Oral every 6 hours PRN  ALBUTerol    90 MICROgram(s) HFA Inhaler 2 Puff(s) Inhalation every 6 hours PRN  melatonin 5 milliGRAM(s) Oral at bedtime PRN                                            ------------------------------------------------------------  VITAL SIGNS: Last 24 Hours  T(C): 35.7 (08 Dec 2021 07:58), Max: 36.7 (07 Dec 2021 08:30)  T(F): 96.3 (08 Dec 2021 07:58), Max: 98.1 (07 Dec 2021 08:30)  HR: 71 (08 Dec 2021 07:58) (53 - 71)  BP: 79/51 (08 Dec 2021 07:58) (68/30 - 104/44)  BP(mean): --  RR: 18 (08 Dec 2021 07:58) (16 - 18)  SpO2: 100% (08 Dec 2021 07:58) (90% - 100%)    12-07-21 @ 07:01  -  12-08-21 @ 07:00  --------------------------------------------------------  IN: 0 mL / OUT: 300 mL / NET: -300 mL                                         --------------------------------------------------------------  LABS:             8.3    15.22 )-----------( 187      ( 07 Dec 2021 07:37 )             24.7     12-07    139  |  105  |  38<H>  ----------------------------<  77  3.9   |  21  |  1.2    Ca    7.4<L>      07 Dec 2021 07:37  Mg     2.0     12-07    TPro  4.8<L>  /  Alb  2.3<L>  /  TBili  1.4<H>  /  DBili  x   /  AST  30  /  ALT  33  /  AlkPhos  250<H>  12-07    PT/INR - ( 07 Dec 2021 11:00 )   PT: 30.60 sec;   INR: 2.69 ratio                                            -------------------------------------------------------------  RADIOLOGY:  < from: Xray Chest 1 View- PORTABLE-Urgent (Xray Chest 1 View- PORTABLE-Urgent .) (12.07.21 @ 13:27) >  Impression:    Right basilar opacity, unchanged.    Loop recorder.    < end of copied text >                                          --------------------------------------------------------------  PHYSICAL EXAM:  General: alert, awake, no acute distress  HEENT: atraumatic  LUNGS: clear to auscultation bilaterally, no wheezes noted  HEART: regular rate/rhythm, no murmurs/gallops  ABDOMEN: soft, nontender, nondistended, normoactive bowel sounds  EXT: 2+ radial pulses, no edema noted  NEURO: aaox3, no focal deficits noted  SKIN: intact, no new lesions noted                                         --------------------------------------------------------------  ASSESSMENT & PLAN  Past medical history and hospital course:  82 yo M with PMHx of HFpEF (50-55%), Chronic AFib on Coumadin, 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 Trop at baseline 0.03, EKG showed afib, CXR small R sided pleural effusion.    #Fatigue/Weakness - increasing as per patient  #Hypotension and #Leukocytosis  - infectious workup started, CXR 12/8 stable opacities, 2 BCx's collected, procal 0.25 on 12/7, afebrile    #Atypical Chest Pain - likely MSK, no further ischemic workup as per Cardiology (Dr. Herrera)  #PMHx of HFpEF - follows with Dr. 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  #PMHx of DVT Right Common Fem/Fem/Popliteal - on LE Venous Duplex 12/1  - still complaining of 6/10 dull chest pain radiating to L arm, worse with deep breathing, but most likely MSK in nature as per Cardio  - Troponin 0.03 x3, was 0.02-0.03 throughout prior admission, EKG performed x2 with no acute ischemic changes  - D-dimer 342, BNP ~3K near baseline, 11/30 CXR showing small R sided pleural effusion, has remained at baseline oxygen requirements  - TTE 12/2: EF 50-55%, otherwise unremarkable   - continuing Spironolactone 12.5mg PO daily + decrease torsemide to 10mg PO daily as euvolemic on exam  - pain control with Percocet 1 tab q6h PRN for moderate pain + Morphine PRN for severe pain  - Cardio consulted (Dr. Herrera), on metoprolol 12.5mg PO BID, outpatient f/u, no further ischemic workup  - INR was supratherapeutic on admission, was reversed with Vitamin K on 12/2, restarted Coumadin, INR supratherapeutic today, reducing today's dose to 0.5mg  - BP on lower side, increased midodrine to 10mg PO TID  - patient not eating and drinking adequately, calorie count started  - patient refusing PT/Rehab    #Chronic Macrocytic Anemia - on folate supplementation  #Anemia of Chronic Disease  - baseline Hb ~10-11, supratherapeutic INR on admission reversed, B12 WNL in September  - 12/1 iron studies: low TIBC, likely anemia of chronic disease    #DTI Left Heel + Coccyx - Wound Care consulted, recommendations given to RN  #COPD - on home O2 2.5L, has remained at baseline, continuing symbicort 160ug 2 puffs BID, Albuterol PRN  #Recent Cholecystitis - s/p perQ-cholecystostomy placement and removal by IR - no abdominal pain on exam                                                                            ----------------------------------------------------  # DVT prophylaxis: daily Coumadin  # GI prophylaxis: N/A  # Diet: DASH/TLC  # Activity: Ambulate as Tolerated  # Code status: Full  # Disposition: remain on floor                                                                           --------------------------------------------------------  # Handoff: f/u BCx's + BP monitoring

## 2021-12-09 LAB
ALBUMIN SERPL ELPH-MCNC: 2 G/DL — LOW (ref 3.5–5.2)
ALP SERPL-CCNC: 339 U/L — HIGH (ref 30–115)
ALT FLD-CCNC: 37 U/L — SIGNIFICANT CHANGE UP (ref 0–41)
ANION GAP SERPL CALC-SCNC: 11 MMOL/L — SIGNIFICANT CHANGE UP (ref 7–14)
APPEARANCE UR: CLEAR — SIGNIFICANT CHANGE UP
AST SERPL-CCNC: 35 U/L — SIGNIFICANT CHANGE UP (ref 0–41)
BASOPHILS # BLD AUTO: 0.01 K/UL — SIGNIFICANT CHANGE UP (ref 0–0.2)
BASOPHILS NFR BLD AUTO: 0.1 % — SIGNIFICANT CHANGE UP (ref 0–1)
BILIRUB SERPL-MCNC: 1.4 MG/DL — HIGH (ref 0.2–1.2)
BILIRUB UR-MCNC: NEGATIVE — SIGNIFICANT CHANGE UP
BUN SERPL-MCNC: 38 MG/DL — HIGH (ref 10–20)
CALCIUM SERPL-MCNC: 7.6 MG/DL — LOW (ref 8.5–10.1)
CHLORIDE SERPL-SCNC: 103 MMOL/L — SIGNIFICANT CHANGE UP (ref 98–110)
CO2 SERPL-SCNC: 24 MMOL/L — SIGNIFICANT CHANGE UP (ref 17–32)
COLOR SPEC: YELLOW — SIGNIFICANT CHANGE UP
CREAT SERPL-MCNC: 1.3 MG/DL — SIGNIFICANT CHANGE UP (ref 0.7–1.5)
DIFF PNL FLD: NEGATIVE — SIGNIFICANT CHANGE UP
EOSINOPHIL # BLD AUTO: 0.03 K/UL — SIGNIFICANT CHANGE UP (ref 0–0.7)
EOSINOPHIL NFR BLD AUTO: 0.2 % — SIGNIFICANT CHANGE UP (ref 0–8)
GLUCOSE SERPL-MCNC: 82 MG/DL — SIGNIFICANT CHANGE UP (ref 70–99)
GLUCOSE UR QL: NEGATIVE — SIGNIFICANT CHANGE UP
GRAM STN FLD: SIGNIFICANT CHANGE UP
GRAM STN FLD: SIGNIFICANT CHANGE UP
HCT VFR BLD CALC: 24.9 % — LOW (ref 42–52)
HGB BLD-MCNC: 8.2 G/DL — LOW (ref 14–18)
IMM GRANULOCYTES NFR BLD AUTO: 0.4 % — HIGH (ref 0.1–0.3)
INR BLD: 3.96 RATIO — HIGH (ref 0.65–1.3)
KETONES UR-MCNC: NEGATIVE — SIGNIFICANT CHANGE UP
LEUKOCYTE ESTERASE UR-ACNC: NEGATIVE — SIGNIFICANT CHANGE UP
LYMPHOCYTES # BLD AUTO: 0.43 K/UL — LOW (ref 1.2–3.4)
LYMPHOCYTES # BLD AUTO: 3.2 % — LOW (ref 20.5–51.1)
MAGNESIUM SERPL-MCNC: 2 MG/DL — SIGNIFICANT CHANGE UP (ref 1.8–2.4)
MCHC RBC-ENTMCNC: 32.4 PG — HIGH (ref 27–31)
MCHC RBC-ENTMCNC: 32.9 G/DL — SIGNIFICANT CHANGE UP (ref 32–37)
MCV RBC AUTO: 98.4 FL — HIGH (ref 80–94)
METHOD TYPE: SIGNIFICANT CHANGE UP
MONOCYTES # BLD AUTO: 1.05 K/UL — HIGH (ref 0.1–0.6)
MONOCYTES NFR BLD AUTO: 7.7 % — SIGNIFICANT CHANGE UP (ref 1.7–9.3)
NEUTROPHILS # BLD AUTO: 12.03 K/UL — HIGH (ref 1.4–6.5)
NEUTROPHILS NFR BLD AUTO: 88.4 % — HIGH (ref 42.2–75.2)
NITRITE UR-MCNC: NEGATIVE — SIGNIFICANT CHANGE UP
NRBC # BLD: 0 /100 WBCS — SIGNIFICANT CHANGE UP (ref 0–0)
P MIRABILIS DNA BLD POS QL NAA+PROBE: SIGNIFICANT CHANGE UP
PH UR: 5.5 — SIGNIFICANT CHANGE UP (ref 5–8)
PLATELET # BLD AUTO: 171 K/UL — SIGNIFICANT CHANGE UP (ref 130–400)
POTASSIUM SERPL-MCNC: 4.3 MMOL/L — SIGNIFICANT CHANGE UP (ref 3.5–5)
POTASSIUM SERPL-SCNC: 4.3 MMOL/L — SIGNIFICANT CHANGE UP (ref 3.5–5)
PROT SERPL-MCNC: 4.7 G/DL — LOW (ref 6–8)
PROT UR-MCNC: SIGNIFICANT CHANGE UP
PROTHROM AB SERPL-ACNC: >40 SEC — HIGH (ref 9.95–12.87)
RBC # BLD: 2.53 M/UL — LOW (ref 4.7–6.1)
RBC # FLD: 16.5 % — HIGH (ref 11.5–14.5)
SODIUM SERPL-SCNC: 138 MMOL/L — SIGNIFICANT CHANGE UP (ref 135–146)
SP GR SPEC: 1.02 — SIGNIFICANT CHANGE UP (ref 1.01–1.03)
UROBILINOGEN FLD QL: ABNORMAL
WBC # BLD: 13.6 K/UL — HIGH (ref 4.8–10.8)
WBC # FLD AUTO: 13.6 K/UL — HIGH (ref 4.8–10.8)

## 2021-12-09 PROCEDURE — 99233 SBSQ HOSP IP/OBS HIGH 50: CPT

## 2021-12-09 RX ORDER — COLLAGENASE CLOSTRIDIUM HIST. 250 UNIT/G
1 OINTMENT (GRAM) TOPICAL DAILY
Refills: 0 | Status: DISCONTINUED | OUTPATIENT
Start: 2021-12-09 | End: 2021-12-19

## 2021-12-09 RX ORDER — CEFEPIME 1 G/1
1000 INJECTION, POWDER, FOR SOLUTION INTRAMUSCULAR; INTRAVENOUS ONCE
Refills: 0 | Status: COMPLETED | OUTPATIENT
Start: 2021-12-09 | End: 2021-12-09

## 2021-12-09 RX ORDER — CEFEPIME 1 G/1
1000 INJECTION, POWDER, FOR SOLUTION INTRAMUSCULAR; INTRAVENOUS EVERY 8 HOURS
Refills: 0 | Status: DISCONTINUED | OUTPATIENT
Start: 2021-12-09 | End: 2021-12-13

## 2021-12-09 RX ORDER — CEFEPIME 1 G/1
INJECTION, POWDER, FOR SOLUTION INTRAMUSCULAR; INTRAVENOUS
Refills: 0 | Status: DISCONTINUED | OUTPATIENT
Start: 2021-12-09 | End: 2021-12-13

## 2021-12-09 RX ORDER — OXYCODONE AND ACETAMINOPHEN 5; 325 MG/1; MG/1
1 TABLET ORAL EVERY 4 HOURS
Refills: 0 | Status: DISCONTINUED | OUTPATIENT
Start: 2021-12-09 | End: 2021-12-11

## 2021-12-09 RX ADMIN — CEFEPIME 100 MILLIGRAM(S): 1 INJECTION, POWDER, FOR SOLUTION INTRAMUSCULAR; INTRAVENOUS at 21:21

## 2021-12-09 RX ADMIN — BUDESONIDE AND FORMOTEROL FUMARATE DIHYDRATE 2 PUFF(S): 160; 4.5 AEROSOL RESPIRATORY (INHALATION) at 08:10

## 2021-12-09 RX ADMIN — CEFEPIME 100 MILLIGRAM(S): 1 INJECTION, POWDER, FOR SOLUTION INTRAMUSCULAR; INTRAVENOUS at 17:27

## 2021-12-09 RX ADMIN — METHOCARBAMOL 500 MILLIGRAM(S): 500 TABLET, FILM COATED ORAL at 14:31

## 2021-12-09 RX ADMIN — Medication 5 MILLIGRAM(S): at 21:38

## 2021-12-09 RX ADMIN — ATORVASTATIN CALCIUM 20 MILLIGRAM(S): 80 TABLET, FILM COATED ORAL at 21:20

## 2021-12-09 RX ADMIN — METHOCARBAMOL 500 MILLIGRAM(S): 500 TABLET, FILM COATED ORAL at 21:20

## 2021-12-09 RX ADMIN — MIDODRINE HYDROCHLORIDE 10 MILLIGRAM(S): 2.5 TABLET ORAL at 11:11

## 2021-12-09 RX ADMIN — Medication 1 MILLIGRAM(S): at 11:11

## 2021-12-09 RX ADMIN — MIDODRINE HYDROCHLORIDE 10 MILLIGRAM(S): 2.5 TABLET ORAL at 05:31

## 2021-12-09 RX ADMIN — METHOCARBAMOL 500 MILLIGRAM(S): 500 TABLET, FILM COATED ORAL at 05:32

## 2021-12-09 RX ADMIN — OXYCODONE AND ACETAMINOPHEN 1 TABLET(S): 5; 325 TABLET ORAL at 21:37

## 2021-12-09 RX ADMIN — MIDODRINE HYDROCHLORIDE 10 MILLIGRAM(S): 2.5 TABLET ORAL at 17:28

## 2021-12-09 NOTE — CHART NOTE - NSCHARTNOTEFT_GEN_A_CORE
Registered Dietitian Follow-Up     Patient Profile Reviewed                           Yes [x]   No []     Nutrition History Previously Obtained        Yes [x]  No []       Pertinent Subjective Information: Pt reports appetite is "so-so". eating about 50% of all meals. Does not like shake supplements, but is willing to try other forms of nutrition supplements.      Pertinent Medical Interventions:  #Fatigue/Weakness - increasing as per patient  #Hypotension and #Leukocytosis- complaining of urinary frequency and dysuria today, will send UA  #Atypical Chest Pain - likely MSK, no further ischemic workup as per Cardiology (Dr. Herrera)  - patient refusing PT/Rehab, will consider GOC discussion  #Chronic Macrocytic Anemia - on folate supplementation  #Anemia of Chronic Disease- baseline Hb ~10-11  #DTPI L heel, DTPI coccyx-left buttock, and IAD b/l buttock per wound care RN       Diet order: Diet, DASH/TLC:   Sodium & Cholesterol Restricted (21 @ 10:57) [Active]    Anthropometrics:  Ht: 190.5cm  Wt: 74kg per initial RD assessment  -- bedscle broken, unable to take wt   BMI: 20.4  IBW: 89.1kg     Daily Weight in k.5 (), Weight in k ()    MEDICATIONS  (STANDING):  atorvastatin 20 milliGRAM(s) Oral at bedtime  budesonide 160 MICROgram(s)/formoterol 4.5 MICROgram(s) Inhaler 2 Puff(s) Inhalation two times a day  folic acid 1 milliGRAM(s) Oral daily  lactated ringers Bolus 500 milliLiter(s) IV Bolus once  methocarbamol 500 milliGRAM(s) Oral three times a day  midodrine. 10 milliGRAM(s) Oral three times a day    MEDICATIONS  (PRN):  ALBUTerol    90 MICROgram(s) HFA Inhaler 2 Puff(s) Inhalation every 6 hours PRN Shortness of Breath and/or Wheezing    Pertinent Labs:  @ 04:30: Na 138, BUN 38<H>, Cr 1.3, BG 82, K+ 4.3, Phos --, Mg 2.0, Alk Phos 339<H>, ALT/SGPT 37, AST/SGOT 35, HbA1c --    Physical Findings:  - Appearance: WDL; : 3+ R foot edema   - GI function: WDL, LBM  per pt   - Tubes: n/a   - Oral/Mouth cavity: no chewing/swallowing difficulty reported   - Skin: DTI's as noted above      Nutrition Requirements: adjusted as no PI's found   Weight Used: 74kg      Estimated Energy Needs    Continue []  Adjust [x]  MSJ 1546; 1855-2010kcal (MSJ 1.2-1.3 AF -- needs increased d/t DTI's -- aim towrads upper end)      Estimated Protein Needs    Continue []  Adjust [x]  89-96g/day (1.2-1.3g/kg -- same reason as above)       Estimated Fluid Needs        Continue [x]  Adjust []  1mL/kcal or LIP      Nutrient Intake: ~50% per pt -- CC up, however, will collect 12/10 as not completed today, started on .      [] Previous Nutrition Diagnosis:  Increased Nutrient Needs -- discontinued      Nutrition Intervention: meals and snacks, medical food supplements      Goal/Expected Outcome: Pt to consume and tolerate >75% of meals/snacks and PO supplements in 4 days.      Nutrition Monitoring:diet order,energy intake,body composition,NFPF     Recommendation:  1. Add Ensure Pudding BID   2. Add Magic Cup BID   3. cont / current diet order   4. obtain new wts   5. encourage PO intake   6. Will collect CC 12/10     [phone]  RD remains available: Kate Stoll RDN [phone]

## 2021-12-09 NOTE — PROGRESS NOTE ADULT - SUBJECTIVE AND OBJECTIVE BOX
RODNEY SANTO 81y Male  MRN#: [id number]   CODE STATUS: Full  Hospital Day: 9d  Pt is currently admitted with the primary diagnosis of: Atypical Chest Pain    SUBJECTIVE  Overnight events: None reported. Patient continues to complain of L-sided chest pain radiating to L-arm, dull, worse with deep breathing, and increasing fatigue/weakness  Subjective complaints: endorses L-sided chest pain, L-arm pain, denies neck pain, jaw pain, sweating, fevers/chills, endorses mild dyspnea, fatigue/weakness.                                          ----------------------------------------------------------  OBJECTIVE  PAST MEDICAL & SURGICAL HISTORY  COPD (chronic obstructive pulmonary disease)    Hypertension    Deep vein thrombosis (DVT) of left lower extremity, unspecified chronicity, unspecified vein    Cellulitis of left lower extremity    Chronic atrial fibrillation    Mitral valve insufficiency, unspecified etiology  Moderate    CHF (congestive heart failure)    S/P coronary artery stent placement    History of total right knee replacement                                          -----------------------------------------------------------  ALLERGIES:  No Known Allergies                                          ------------------------------------------------------------  HOME MEDICATIONS  Home Medications:  atorvastatin 20 mg oral tablet: 1 tab(s) orally once a day (13 Sep 2021 04:59)  folic acid 1 mg oral tablet: 1 tab(s) orally once a day (21 Sep 2021 11:34)  oxycodone-acetaminophen 5 mg-325 mg oral tablet: 1 tab(s) orally every 6 hours, As needed, Moderate Pain (4 - 6) (21 Sep 2021 11:32)  spironolactone 25 mg oral tablet: 0.5 tab(s) orally once a day (21 Sep 2021 11:34)  Symbicort 160 mcg-4.5 mcg/inh inhalation aerosol: 2 puff(s) inhaled 2 times a day (13 Sep 2021 04:59)  Ventolin HFA 90 mcg/inh inhalation aerosol: 2 puff(s) inhaled every 6 hours as neede for shortness of breath (13 Sep 2021 04:59)  warfarin 2 mg oral tablet: Take half a tablet Sun, Tues, Wed, Thurs, Fri, Sat.  Take a whole tablet on Mon (13 Sep 2021 04:59)                         MEDICATIONS:  STANDING MEDICATIONS  atorvastatin 20 milliGRAM(s) Oral at bedtime  budesonide 160 MICROgram(s)/formoterol 4.5 MICROgram(s) Inhaler 2 Puff(s) Inhalation two times a day  chlorhexidine 4% Liquid 1 Application(s) Topical <User Schedule>  folic acid 1 milliGRAM(s) Oral daily  influenza  Vaccine (HIGH DOSE) 0.7 milliLiter(s) IntraMuscular once  lactated ringers Bolus 500 milliLiter(s) IV Bolus once  methocarbamol 500 milliGRAM(s) Oral three times a day  midodrine. 10 milliGRAM(s) Oral three times a day    PRN MEDICATIONS  acetaminophen     Tablet .. 650 milliGRAM(s) Oral every 6 hours PRN  ALBUTerol    90 MICROgram(s) HFA Inhaler 2 Puff(s) Inhalation every 6 hours PRN  melatonin 5 milliGRAM(s) Oral at bedtime PRN                                          ------------------------------------------------------------  VITAL SIGNS: Last 24 Hours  T(C): 35.9 (09 Dec 2021 05:25), Max: 36.3 (09 Dec 2021 00:00)  T(F): 96.7 (09 Dec 2021 05:25), Max: 97.3 (09 Dec 2021 00:00)  HR: 61 (09 Dec 2021 05:25) (50 - 86)  BP: 100/59 (09 Dec 2021 05:25) (80/42 - 114/56)  BP(mean): --  RR: 19 (09 Dec 2021 05:25) (18 - 20)  SpO2: 99% (09 Dec 2021 05:25) (99% - 100%)    12-08-21 @ 07:01  -  12-09-21 @ 07:00  --------------------------------------------------------  IN: 1120 mL / OUT: 660 mL / NET: 460 mL                                         --------------------------------------------------------------  LABS:             8.2    13.60 )-----------( 171      ( 09 Dec 2021 04:30 )             24.9     12-08    137  |  101  |  42<H>  ----------------------------<  83  4.2   |  23  |  1.5    Ca    7.7<L>      08 Dec 2021 04:30  Mg     2.0     12-08    TPro  4.9<L>  /  Alb  2.2<L>  /  TBili  1.4<H>  /  DBili  x   /  AST  26  /  ALT  33  /  AlkPhos  277<H>  12-08    PT/INR - ( 09 Dec 2021 04:30 )   PT: >40.00 sec;   INR: 3.96 ratio      Culture - Blood (collected 07 Dec 2021 21:00)  Source: .Blood None  Preliminary Report (09 Dec 2021 08:00):    No growth to date.    Culture - Blood (collected 07 Dec 2021 18:41)  Source: .Blood None  Preliminary Report (09 Dec 2021 01:02):    No growth to date.                                          -------------------------------------------------------------  RADIOLOGY:                                          --------------------------------------------------------------  PHYSICAL EXAM:  General: alert, awake, no acute distress  HEENT: atraumatic  LUNGS: clear to auscultation bilaterally, no wheezes noted  HEART: regular rate/rhythm, no murmurs/gallops  ABDOMEN: soft, nontender, nondistended, normoactive bowel sounds  EXT: 2+ radial pulses, no edema noted  NEURO: aaox3, no focal deficits noted  SKIN: intact, no new lesions noted                                         --------------------------------------------------------------  ASSESSMENT & PLAN  Past medical history and hospital course:  82 yo M with PMHx of HFpEF (50-55%), Chronic AFib on Coumadin, 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 Trop at baseline 0.03, EKG showed afib, CXR small R sided pleural effusion.    #Fatigue/Weakness - increasing as per patient  #Hypotension and #Leukocytosis  - infectious workup started, CXR 12/8 stable opacities, 2 BCx's collected, procal 0.25 on 12/7, afebrile    #Atypical Chest Pain - likely MSK, no further ischemic workup as per Cardiology (Dr. Herrera)  #PMHx of HFpEF - follows with Dr. 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  #PMHx of DVT Right Common Fem/Fem/Popliteal - on LE Venous Duplex 12/1  - still complaining of 6/10 dull chest pain radiating to L arm, worse with deep breathing, but most likely MSK in nature as per Cardio  - Troponin 0.03 x3, was 0.02-0.03 throughout prior admission, EKG performed x2 with no acute ischemic changes  - D-dimer 342, BNP ~3K near baseline, 11/30 CXR showing small R sided pleural effusion, has remained at baseline oxygen requirements  - TTE 12/2: EF 50-55%, otherwise unremarkable   - continuing Spironolactone 12.5mg PO daily + decrease torsemide to 10mg PO daily as euvolemic on exam  - pain control with Percocet 1 tab q6h PRN for moderate pain + Morphine PRN for severe pain  - Cardio consulted (Dr. Herrera), on metoprolol 12.5mg PO BID, outpatient f/u, no further ischemic workup  - INR was supratherapeutic on admission, was reversed with Vitamin K on 12/2, restarted Coumadin, INR supratherapeutic today, reducing today's dose to 0.5mg  - BP on lower side, increased midodrine to 10mg PO TID  - patient not eating and drinking adequately, calorie count started  - patient refusing PT/Rehab  - CTA-chest 12/9 negative for PE    #Chronic Macrocytic Anemia - on folate supplementation  #Anemia of Chronic Disease  - baseline Hb ~10-11, supratherapeutic INR on admission reversed, B12 WNL in September  - 12/1 iron studies: low TIBC, likely anemia of chronic disease    #DTI Left Heel + Coccyx - Wound Care consulted, recommendations given to RN  #COPD - on home O2 2.5L, has remained at baseline, continuing symbicort 160ug 2 puffs BID, Albuterol PRN  #Recent Cholecystitis - s/p perQ-cholecystostomy placement and removal by IR - no abdominal pain on exam                                                                            ----------------------------------------------------  # DVT prophylaxis: daily Coumadin  # GI prophylaxis: N/A  # Diet: DASH/TLC  # Activity: Ambulate as Tolerated  # Code status: Full  # Disposition: remain on floor                                                                           --------------------------------------------------------  # Handoff: f/u BCx's + BP monitoring RODNEY SANTO 81y Male  MRN#: [id number]   CODE STATUS: Full  Hospital Day: 9d  Pt is currently admitted with the primary diagnosis of: Atypical Chest Pain    SUBJECTIVE  Overnight events: None reported. Patient continues to complain of L-sided chest pain radiating to L-arm, dull, worse with deep breathing, and increasing fatigue/weakness.  Subjective complaints: endorses dysuria/urinary frequency, L-sided chest pain, L-arm pain, denies neck pain, jaw pain, sweating, fevers/chills, endorses mild dyspnea, fatigue/weakness.                                          ----------------------------------------------------------  OBJECTIVE  PAST MEDICAL & SURGICAL HISTORY  COPD (chronic obstructive pulmonary disease)    Hypertension    Deep vein thrombosis (DVT) of left lower extremity, unspecified chronicity, unspecified vein    Cellulitis of left lower extremity    Chronic atrial fibrillation    Mitral valve insufficiency, unspecified etiology  Moderate    CHF (congestive heart failure)    S/P coronary artery stent placement    History of total right knee replacement                                          -----------------------------------------------------------  ALLERGIES:  No Known Allergies                                          ------------------------------------------------------------  HOME MEDICATIONS  Home Medications:  atorvastatin 20 mg oral tablet: 1 tab(s) orally once a day (13 Sep 2021 04:59)  folic acid 1 mg oral tablet: 1 tab(s) orally once a day (21 Sep 2021 11:34)  oxycodone-acetaminophen 5 mg-325 mg oral tablet: 1 tab(s) orally every 6 hours, As needed, Moderate Pain (4 - 6) (21 Sep 2021 11:32)  spironolactone 25 mg oral tablet: 0.5 tab(s) orally once a day (21 Sep 2021 11:34)  Symbicort 160 mcg-4.5 mcg/inh inhalation aerosol: 2 puff(s) inhaled 2 times a day (13 Sep 2021 04:59)  Ventolin HFA 90 mcg/inh inhalation aerosol: 2 puff(s) inhaled every 6 hours as neede for shortness of breath (13 Sep 2021 04:59)  warfarin 2 mg oral tablet: Take half a tablet Sun, Tues, Wed, Thurs, Fri, Sat.  Take a whole tablet on Mon (13 Sep 2021 04:59)                         MEDICATIONS:  STANDING MEDICATIONS  atorvastatin 20 milliGRAM(s) Oral at bedtime  budesonide 160 MICROgram(s)/formoterol 4.5 MICROgram(s) Inhaler 2 Puff(s) Inhalation two times a day  chlorhexidine 4% Liquid 1 Application(s) Topical <User Schedule>  folic acid 1 milliGRAM(s) Oral daily  influenza  Vaccine (HIGH DOSE) 0.7 milliLiter(s) IntraMuscular once  lactated ringers Bolus 500 milliLiter(s) IV Bolus once  methocarbamol 500 milliGRAM(s) Oral three times a day  midodrine. 10 milliGRAM(s) Oral three times a day    PRN MEDICATIONS  acetaminophen     Tablet .. 650 milliGRAM(s) Oral every 6 hours PRN  ALBUTerol    90 MICROgram(s) HFA Inhaler 2 Puff(s) Inhalation every 6 hours PRN  melatonin 5 milliGRAM(s) Oral at bedtime PRN                                          ------------------------------------------------------------  VITAL SIGNS: Last 24 Hours  T(C): 35.9 (09 Dec 2021 05:25), Max: 36.3 (09 Dec 2021 00:00)  T(F): 96.7 (09 Dec 2021 05:25), Max: 97.3 (09 Dec 2021 00:00)  HR: 61 (09 Dec 2021 05:25) (50 - 86)  BP: 100/59 (09 Dec 2021 05:25) (80/42 - 114/56)  BP(mean): --  RR: 19 (09 Dec 2021 05:25) (18 - 20)  SpO2: 99% (09 Dec 2021 05:25) (99% - 100%)    12-08-21 @ 07:01  -  12-09-21 @ 07:00  --------------------------------------------------------  IN: 1120 mL / OUT: 660 mL / NET: 460 mL                                         --------------------------------------------------------------  LABS:             8.2    13.60 )-----------( 171      ( 09 Dec 2021 04:30 )             24.9     12-08    137  |  101  |  42<H>  ----------------------------<  83  4.2   |  23  |  1.5    Ca    7.7<L>      08 Dec 2021 04:30  Mg     2.0     12-08    TPro  4.9<L>  /  Alb  2.2<L>  /  TBili  1.4<H>  /  DBili  x   /  AST  26  /  ALT  33  /  AlkPhos  277<H>  12-08    PT/INR - ( 09 Dec 2021 04:30 )   PT: >40.00 sec;   INR: 3.96 ratio      Culture - Blood (collected 07 Dec 2021 21:00)  Source: .Blood None  Preliminary Report (09 Dec 2021 08:00):    No growth to date.    Culture - Blood (collected 07 Dec 2021 18:41)  Source: .Blood None  Preliminary Report (09 Dec 2021 01:02):    No growth to date.                                          -------------------------------------------------------------  RADIOLOGY:                                          --------------------------------------------------------------  PHYSICAL EXAM:  General: alert, awake, no acute distress  HEENT: atraumatic  LUNGS: clear to auscultation bilaterally, no wheezes noted  HEART: regular rate/rhythm, no murmurs/gallops  ABDOMEN: soft, nontender, nondistended, normoactive bowel sounds  EXT: 2+ radial pulses, no edema noted  NEURO: aaox3, no focal deficits noted  SKIN: intact, no new lesions noted                                         --------------------------------------------------------------  ASSESSMENT & PLAN  Past medical history and hospital course:  80 yo M with PMHx of HFpEF (50-55%), Chronic AFib on Coumadin, 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 Trop at baseline 0.03, EKG showed afib, CXR small R sided pleural effusion.    #Fatigue/Weakness - increasing as per patient  #Hypotension and #Leukocytosis  - CXR 12/8 stable opacities, 12/7 BCx no growth will f/u, remains afebrile  - complaining of urinary frequency and dysuria today, will send UA  - d/c diuretics + BP meds    #Atypical Chest Pain - likely MSK, no further ischemic workup as per Cardiology (Dr. Herrera)  #PMHx of HFpEF - follows with Dr. 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  #PMHx of DVT Right Common Fem/Fem/Popliteal - on LE Venous Duplex 12/1  - still complaining of 6/10 dull chest pain radiating to L arm, worse with deep breathing, but most likely MSK in nature as per Cardio  - Troponin 0.03 x3, was 0.02-0.03 throughout prior admission, EKG performed x2 with no acute ischemic changes  - D-dimer 342, BNP ~3K near baseline, 11/30 CXR showing small R sided pleural effusion, has remained at baseline oxygen requirements  - TTE 12/2: EF 50-55%, otherwise unremarkable   - d/c Spironolactone + Torsemide due to persistent hypotension, not overloaded on exam  - pain control with Percocet 1 tab q6h PRN for moderate pain + Morphine PRN for severe pain  - Cardio consulted (Dr. Herrera), on metoprolol 12.5mg PO BID, outpatient f/u, no further ischemic workup  - INR was supratherapeutic on admission, was reversed with Vitamin K on 12/2, restarted Coumadin, INR supratherapeutic today, holding today's dose, may need q48h dosing moving forward  - BP on lower side, increased midodrine to 10mg PO TID  - patient not eating and drinking adequately, calorie count started  - patient refusing PT/Rehab, will consider GOC discussion  - CTA-chest 12/9 negative for PE    #Chronic Macrocytic Anemia - on folate supplementation  #Anemia of Chronic Disease  - baseline Hb ~10-11, supratherapeutic INR on admission reversed, B12 WNL in September  - 12/1 iron studies: low TIBC, likely anemia of chronic disease    #DTI Left Heel + Coccyx - Wound Care consulted, recommendations given to RN  #COPD - on home O2 2.5L, has remained at baseline, continuing symbicort 160ug 2 puffs BID, Albuterol PRN  #Recent Cholecystitis - s/p perQ-cholecystostomy placement and removal by IR - no abdominal pain on exam                                                                            ----------------------------------------------------  # DVT prophylaxis: daily Coumadin (holding today)  # GI prophylaxis: N/A  # Diet: DASH/TLC  # Activity: Ambulate as Tolerated  # Code status: Full  # Disposition: remain on floor                                                                           --------------------------------------------------------  # Handoff: f/u BCx's + BP monitoring

## 2021-12-09 NOTE — PROGRESS NOTE ADULT - ATTENDING COMMENTS
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             Suspected atypical chest pain possibly skeletomuscular- CE negative x 3  Leucocytosis with bacteremia- though afebrile but hypotensive. wbc elevated and blood cx shows gm negative rods  Chronic A-Fib on Coumadin. Cont Metoprolol. INR high-- hold coumadin  Chronic HFpEF- no exacerbation  orthostatic hypotension-Cont Midodrine 10 mg po q 12h. BP still low -- got fluids last night.  torsemide on hold  ECHO 12/02- unremarkable with EF of 55%  chronic DVT of the R common femoral and popliteal veins-On coumadin--  CT chest to r/o PE-- NO PE  Tachy-selvin synd-- s/p loop recorder    Gm negative bacteremia-- awaiting final cx ID eval.  prognosis is guarded.

## 2021-12-10 LAB
ALBUMIN SERPL ELPH-MCNC: 2.1 G/DL — LOW (ref 3.5–5.2)
ALP SERPL-CCNC: 384 U/L — HIGH (ref 30–115)
ALT FLD-CCNC: 42 U/L — HIGH (ref 0–41)
ANION GAP SERPL CALC-SCNC: 13 MMOL/L — SIGNIFICANT CHANGE UP (ref 7–14)
AST SERPL-CCNC: 39 U/L — SIGNIFICANT CHANGE UP (ref 0–41)
BASOPHILS # BLD AUTO: 0.01 K/UL — SIGNIFICANT CHANGE UP (ref 0–0.2)
BASOPHILS NFR BLD AUTO: 0.1 % — SIGNIFICANT CHANGE UP (ref 0–1)
BILIRUB SERPL-MCNC: 1.3 MG/DL — HIGH (ref 0.2–1.2)
BUN SERPL-MCNC: 34 MG/DL — HIGH (ref 10–20)
CALCIUM SERPL-MCNC: 7.5 MG/DL — LOW (ref 8.5–10.1)
CHLORIDE SERPL-SCNC: 104 MMOL/L — SIGNIFICANT CHANGE UP (ref 98–110)
CO2 SERPL-SCNC: 21 MMOL/L — SIGNIFICANT CHANGE UP (ref 17–32)
CREAT SERPL-MCNC: 1.2 MG/DL — SIGNIFICANT CHANGE UP (ref 0.7–1.5)
EOSINOPHIL # BLD AUTO: 0.1 K/UL — SIGNIFICANT CHANGE UP (ref 0–0.7)
EOSINOPHIL NFR BLD AUTO: 0.7 % — SIGNIFICANT CHANGE UP (ref 0–8)
GLUCOSE SERPL-MCNC: 79 MG/DL — SIGNIFICANT CHANGE UP (ref 70–99)
HCT VFR BLD CALC: 24.5 % — LOW (ref 42–52)
HGB BLD-MCNC: 8.1 G/DL — LOW (ref 14–18)
IMM GRANULOCYTES NFR BLD AUTO: 0.5 % — HIGH (ref 0.1–0.3)
INR BLD: 3.87 RATIO — HIGH (ref 0.65–1.3)
LYMPHOCYTES # BLD AUTO: 0.49 K/UL — LOW (ref 1.2–3.4)
LYMPHOCYTES # BLD AUTO: 3.3 % — LOW (ref 20.5–51.1)
MAGNESIUM SERPL-MCNC: 1.9 MG/DL — SIGNIFICANT CHANGE UP (ref 1.8–2.4)
MCHC RBC-ENTMCNC: 32.7 PG — HIGH (ref 27–31)
MCHC RBC-ENTMCNC: 33.1 G/DL — SIGNIFICANT CHANGE UP (ref 32–37)
MCV RBC AUTO: 98.8 FL — HIGH (ref 80–94)
MONOCYTES # BLD AUTO: 0.98 K/UL — HIGH (ref 0.1–0.6)
MONOCYTES NFR BLD AUTO: 6.7 % — SIGNIFICANT CHANGE UP (ref 1.7–9.3)
NEUTROPHILS # BLD AUTO: 12.99 K/UL — HIGH (ref 1.4–6.5)
NEUTROPHILS NFR BLD AUTO: 88.7 % — HIGH (ref 42.2–75.2)
NRBC # BLD: 0 /100 WBCS — SIGNIFICANT CHANGE UP (ref 0–0)
PLATELET # BLD AUTO: 161 K/UL — SIGNIFICANT CHANGE UP (ref 130–400)
POTASSIUM SERPL-MCNC: 4 MMOL/L — SIGNIFICANT CHANGE UP (ref 3.5–5)
POTASSIUM SERPL-SCNC: 4 MMOL/L — SIGNIFICANT CHANGE UP (ref 3.5–5)
PROT SERPL-MCNC: 4.8 G/DL — LOW (ref 6–8)
PROTHROM AB SERPL-ACNC: >40 SEC — HIGH (ref 9.95–12.87)
RBC # BLD: 2.48 M/UL — LOW (ref 4.7–6.1)
RBC # FLD: 16.2 % — HIGH (ref 11.5–14.5)
SODIUM SERPL-SCNC: 138 MMOL/L — SIGNIFICANT CHANGE UP (ref 135–146)
WBC # BLD: 14.64 K/UL — HIGH (ref 4.8–10.8)
WBC # FLD AUTO: 14.64 K/UL — HIGH (ref 4.8–10.8)

## 2021-12-10 PROCEDURE — 76705 ECHO EXAM OF ABDOMEN: CPT | Mod: 26

## 2021-12-10 PROCEDURE — 99233 SBSQ HOSP IP/OBS HIGH 50: CPT

## 2021-12-10 RX ORDER — METRONIDAZOLE 500 MG
500 TABLET ORAL EVERY 8 HOURS
Refills: 0 | Status: DISCONTINUED | OUTPATIENT
Start: 2021-12-10 | End: 2021-12-22

## 2021-12-10 RX ADMIN — METHOCARBAMOL 500 MILLIGRAM(S): 500 TABLET, FILM COATED ORAL at 21:10

## 2021-12-10 RX ADMIN — MIDODRINE HYDROCHLORIDE 10 MILLIGRAM(S): 2.5 TABLET ORAL at 05:13

## 2021-12-10 RX ADMIN — Medication 1 APPLICATION(S): at 12:59

## 2021-12-10 RX ADMIN — METHOCARBAMOL 500 MILLIGRAM(S): 500 TABLET, FILM COATED ORAL at 14:40

## 2021-12-10 RX ADMIN — CEFEPIME 100 MILLIGRAM(S): 1 INJECTION, POWDER, FOR SOLUTION INTRAMUSCULAR; INTRAVENOUS at 21:11

## 2021-12-10 RX ADMIN — MIDODRINE HYDROCHLORIDE 10 MILLIGRAM(S): 2.5 TABLET ORAL at 18:39

## 2021-12-10 RX ADMIN — Medication 500 MILLIGRAM(S): at 21:11

## 2021-12-10 RX ADMIN — OXYCODONE AND ACETAMINOPHEN 1 TABLET(S): 5; 325 TABLET ORAL at 22:00

## 2021-12-10 RX ADMIN — METHOCARBAMOL 500 MILLIGRAM(S): 500 TABLET, FILM COATED ORAL at 05:12

## 2021-12-10 RX ADMIN — OXYCODONE AND ACETAMINOPHEN 1 TABLET(S): 5; 325 TABLET ORAL at 21:11

## 2021-12-10 RX ADMIN — ATORVASTATIN CALCIUM 20 MILLIGRAM(S): 80 TABLET, FILM COATED ORAL at 21:10

## 2021-12-10 RX ADMIN — BUDESONIDE AND FORMOTEROL FUMARATE DIHYDRATE 2 PUFF(S): 160; 4.5 AEROSOL RESPIRATORY (INHALATION) at 08:58

## 2021-12-10 RX ADMIN — Medication 5 MILLIGRAM(S): at 21:11

## 2021-12-10 RX ADMIN — CEFEPIME 100 MILLIGRAM(S): 1 INJECTION, POWDER, FOR SOLUTION INTRAMUSCULAR; INTRAVENOUS at 05:13

## 2021-12-10 RX ADMIN — MIDODRINE HYDROCHLORIDE 10 MILLIGRAM(S): 2.5 TABLET ORAL at 12:56

## 2021-12-10 RX ADMIN — Medication 1 MILLIGRAM(S): at 12:57

## 2021-12-10 RX ADMIN — CEFEPIME 100 MILLIGRAM(S): 1 INJECTION, POWDER, FOR SOLUTION INTRAMUSCULAR; INTRAVENOUS at 14:37

## 2021-12-10 NOTE — CONSULT NOTE ADULT - SUBJECTIVE AND OBJECTIVE BOX
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 Pt reports that this morning, he was awakened by acute, sudden onset L sided chest pain at 4AM. Describes the pain as "pounding", states that he felt some pain radiate to LUE as well. Denies identifiable aggravating or alleviating factors and states that this has never happened before. Denies headache/dizziness, endorses chronic shortness of breath that is unchanged, has been requiring his baseline oxygen. Denies abdominal pain. Endorses compliance with all medications. States that at baseline, he is bedbound, cannot stand up or ambulate due to weakness. Was recently admitted to [facility] for short term rehab, where he wishes to go back because feels that he cannot care for himself at home. Lives with son but needs more help and PT.   In the ED, T 96, /58, HR 85, RR 18, SpO2 98% on O2 NC 4L, then 100% on 3L. Lab work revealed Hb 9.1, INR 7.87, troponin 0.03 and BNP 3327 (both near baseline). EKG revealed AFib, HR 88. CXR revealed slightly inc small R sided pleural effusion. (2021 11:30)      INFECTIOUS DISEASE HISTORY:  ID consulted for +BCX proteus  difficult to obtain history from patient  denies urinary symptoms  no cough  no phlebitis  no significant RUQ tenderness to palpation     PMH  PAST MEDICAL & SURGICAL HISTORY:  COPD (chronic obstructive pulmonary disease)    Hypertension    Deep vein thrombosis (DVT) of left lower extremity, unspecified chronicity, unspecified vein    Cellulitis of left lower extremity    Chronic atrial fibrillation    Mitral valve insufficiency, unspecified etiology  Moderate    CHF (congestive heart failure)    S/P coronary artery stent placement    History of total right knee replacement        FAMILY HISTORY  No pertinent family history in first degree relatives        SOCIAL HISTORY  Social History:  Lives at home with son, has trouble taking care of himself.   Mostly bedbound. (2021 11:30)        ROS  General: Denies rigors, nightsweats  HEENT: Denies headache, rhinorrhea, sore throat, eye pain  CV: Denies CP, palpitations  PULM: Denies wheezing, hemoptysis  GI: Denies hematemesis, hematochezia, melena  : Denies discharge, hematuria  MSK: Denies arthralgias, myalgias  SKIN: Denies rash, lesions  NEURO: Denies paresthesias, weakness  PSYCH: Denies depression, anxiety     VITALS:  T(F): 97.3, Max: 97.3 (12-10-21 @ 08:00)  HR: 63  BP: 92/46  RR: 19Vital Signs Last 24 Hrs  T(C): 36.3 (10 Dec 2021 08:00), Max: 36.3 (10 Dec 2021 08:00)  T(F): 97.3 (10 Dec 2021 08:00), Max: 97.3 (10 Dec 2021 08:00)  HR: 63 (10 Dec 2021 08:00) (63 - 84)  BP: 92/46 (10 Dec 2021 08:00) (92/46 - 112/53)  BP(mean): --  RR: 19 (10 Dec 2021 08:00) (19 - 19)  SpO2: --    PHYSICAL EXAM:  Gen: NAD, resting in bed chronically ill appearing   HEENT: Normocephalic, atraumatic  Neck: supple, no lymphadenopathy  CV: Regular rate & regular rhythm  Lungs: decreased BS at bases, no fremitus  Abdomen: Soft, BS present, no RUQ tenderness to palpation no suprapubic tenderness, no CVAT   Ext: Warm, well perfused  Neuro: non focal, awake  Skin: no rash, no erythema  Lines: no phlebitis   decub some darker tissue, no purulence    TESTS & MEASUREMENTS:                        8.1    14.64 )-----------( 161      ( 10 Dec 2021 04:30 )             24.5     12-10    138  |  104  |  34<H>  ----------------------------<  79  4.0   |  21  |  1.2    Ca    7.5<L>      10 Dec 2021 04:30  Mg     1.9     12-10    TPro  4.8<L>  /  Alb  2.1<L>  /  TBili  1.3<H>  /  DBili  x   /  AST  39  /  ALT  42<H>  /  AlkPhos  384<H>  12-10    eGFR if Non African American: 57 mL/min/1.73M2 (12-10-21 @ 04:30)  eGFR if African American: 66 mL/min/1.73M2 (12-10-21 @ 04:30)    LIVER FUNCTIONS - ( 10 Dec 2021 04:30 )  Alb: 2.1 g/dL / Pro: 4.8 g/dL / ALK PHOS: 384 U/L / ALT: 42 U/L / AST: 39 U/L / GGT: x           Urinalysis Basic - ( 09 Dec 2021 12:00 )    Color: Yellow / Appearance: Clear / S.024 / pH: x  Gluc: x / Ketone: Negative  / Bili: Negative / Urobili: 3 mg/dL   Blood: x / Protein: Trace / Nitrite: Negative   Leuk Esterase: Negative / RBC: x / WBC x   Sq Epi: x / Non Sq Epi: x / Bacteria: x        Culture - Blood (collected 21 @ 21:00)  Source: .Blood None  Gram Stain (21 @ 19:26):    Growth in anaerobic bottle: Gram Negative Rods  Preliminary Report (21 @ 19:27):    Growth in anaerobic bottle: Gram Negative Rods    Culture - Blood (collected 21 @ 18:41)  Source: .Blood None  Gram Stain (21 @ 14:38):    Growth in anaerobic bottle: Gram Negative Rods  Preliminary Report (21 @ 14:39):    Growth in anaerobic bottle: Gram Negative Rods    ***Blood Panel PCR results on this specimen are available    approximately 3 hours after the Gram stain result.***    Gram stain, PCR, and/or culture results may not always    correspond due to difference in methodologies.    ************************************************************    This PCR assay was performed by multiplex PCR. This    Assay tests for 66 bacterial and resistance gene targets.    Please refer to the [facility] test directory    at [email or web address] for details.  Organism: Blood Culture PCR (21 @ 16:48)  Organism: Blood Culture PCR (21 @ 16:48)      -  Proteus mirabilis: Detec      Method Type: PCR    Culture - Urine (collected 10-16-21 @ 11:21)  Source: Clean Catch Clean Catch (Midstream)  Final Report (10-19-21 @ 07:21):    >=3 organisms. Probable collection contamination.    Culture - Body Fluid with Gram Stain (collected 21 @ 18:00)  Source: .Body Fluid gallbladder drainage  Gram Stain (09-15-21 @ 15:29):    No polymorphonuclear leukocytes seen per low power field    Rare Gram Variable Rods seen per oil power field  Final Report (21 @ 07:52):    Rare Escherichia coli  Organism: Escherichia coli (21 @ 07:52)  Organism: Escherichia coli (21 @ 07:52)      -  Amikacin: S <=16      -  Amoxicillin/Clavulanic Acid: S <=8/4      -  Ampicillin: S <=8 These ampicillin results predict results for amoxicillin      -  Ampicillin/Sulbactam: S <=4/2 Enterobacter, Citrobacter, and Serratia may develop resistance during prolonged therapy (3-4 days)      -  Aztreonam: S <=4      -  Cefazolin: S <=2 Enterobacter, Citrobacter, and Serratia may develop resistance during prolonged therapy (3-4 days)      -  Cefepime: S <=2      -  Cefoxitin: S <=8      -  Ceftriaxone: S <=1 Enterobacter, Citrobacter, and Serratia may develop resistance during prolonged therapy      -  Ciprofloxacin: S <=0.25      -  Ertapenem: S <=0.5      -  Gentamicin: S <=2      -  Imipenem: S <=1      -  Levofloxacin: S <=0.5      -  Meropenem: S <=1      -  Piperacillin/Tazobactam: S <=8      -  Tobramycin: S <=2      -  Trimethoprim/Sulfamethoxazole: S <=0.5/9.5      Method Type: SHEFALI    Culture - Blood (collected 21 @ 11:00)  Source: .Blood Blood  Final Report (21 @ 02:01):    No Growth Final    Culture - Urine (collected 21 @ 12:50)  Source: Clean Catch Clean Catch (Midstream)  Final Report (09-15-21 @ 05:25):    >100,000 CFU/ml Klebsiella pneumoniae  Organism: Klebsiella pneumoniae (09-15-21 @ 05:25)  Organism: Klebsiella pneumoniae (09-15-21 @ 05:25)      -  Amikacin: S <=16      -  Amoxicillin/Clavulanic Acid: S <=8/4      -  Ampicillin: R >16 These ampicillin results predict results for amoxicillin      -  Ampicillin/Sulbactam: S <=4/2 Enterobacter, Citrobacter, and Serratia may develop resistance during prolonged therapy (3-4 days)      -  Aztreonam: S <=4      -  Cefazolin: S <=2 ([id number]) For uncomplicated UTI with K. pneumoniae, E. coli, or P. mirablis: SHEFALI <=16 is sensitive and SHEFALI >=32 is resistant. This also predicts results for oral agents cefaclor, cefdinir, cefpodoxime, cefprozil, cefuroxime axetil, cephalexin and locarbef for uncomplicated UTI. Note that some isolates may be susceptible to these agents while testing resistant to cefazolin.      -  Cefepime: S <=2      -  Cefoxitin: S <=8      -  Ceftriaxone: S <=1 Enterobacter, Citrobacter, and Serratia may develop resistance during prolonged therapy      -  Ciprofloxacin: S <=0.25      -  Ertapenem: S <=0.5      -  Gentamicin: S <=2      -  Imipenem: S <=1      -  Levofloxacin: S <=0.5      -  Meropenem: S <=1      -  Nitrofurantoin: I 64 Should not be used to treat pyelonephritis      -  Piperacillin/Tazobactam: S <=8      -  Tigecycline: S <=2      -  Tobramycin: S <=2      -  Trimethoprim/Sulfamethoxazole: S <=0.5/9.5      Method Type: SHEFALI    Culture - Urine (collected 21 @ 23:26)  Source: .Urine Catheterized  Final Report (21 @ 09:09):    <10,000 CFU/mL Normal Urogenital Guerita            INFECTIOUS DISEASES TESTING  Procalcitonin, Serum: 0.25 ng/mL (21 @ 18:41)  COVID-19 PCR: NotDetec (21 @ 12:50)  COVID-19 PCR: NotDetec (21 @ 06:35)  COVID-19 PCR: NotDetec (21 @ 13:35)  COVID-19 PCR: NotDetec (21 @ 19:15)  Procalcitonin, Serum: 0.16 ng/mL (21 @ 05:00)  COVID-19 PCR: NotDetec (21 @ 12:00)  COVID-19 PCR: NotDetec (21 @ 14:22)  COVID-19 PCR: NotDetec (21 @ 20:37)  COVID-19 PCR: NotDetec (21 @ 15:05)  COVID-19 PCR: NotDetec (21 @ 15:20)  COVID-19 PCR: NotDetec (07-10-21 @ 15:11)  HIV-1/2 Combo Result: Nonreact (21 @ 06:54)  COVID-19 PCR: NotDetec (21 @ 13:20)  COVID-19 PCR: NotDetec (21 @ 13:19)      INFLAMMATORY MARKERS      RADIOLOGY & ADDITIONAL TESTS:  I have personally reviewed the last Chest xray  CXR      CT  CT Angio Chest PE Protocol w/ IV Cont:   ACC: [id number] EXAM:  CT ANGIO CHEST PULM ART [facility]                          PROCEDURE DATE:  2021          INTERPRETATION:  CLINICAL HISTORY / REASON FOR EXAM: SOB. Chest pain.    Pulmonary embolus evaluation    TECHNIQUE:   Contiguous axial CT images were obtained from the thoracic   inlet to the upper abdomen with intravenous contrast. Thin sections were   reconstructed through the pulmonary vasculature. Reformatted images in   the coronal and sagittal planes were acquired, as well as 3DMIP   reconstructed images    Comparison: Chest CT dated      FINDINGS:    TUBES/LINES: None.    PULMONARY ARTERY CIRCULATION: No evidence of central or segmental   pulmonary embolus.    GREAT VESSELS/CARDIAC STRUCTURES/PERICARDIUM: The heart size isenlarged.   No pericardial effusion. Normal caliber aorta. Coronary artery and aortic   calcifications are noted. There is no evidence of aortic aneurysm or   dissection. The brachiocephalic vessels are unremarkable.    The ascending thoracic aorta, measuring 3.6 cm in diameter; the   descending thoracic aorta measures 2.9 cm; the main pulmonary artery is   dilated, measuring 3.8 cm in diameter, which may be seen with pulmonary   hypertension.    LUNG PARENCHYMA/CENTRAL AIRWAYS/PLEURA: The central tracheobronchial tree   is patent. There are small bilateral pleural effusions. There are mild   atelectatic changes at the lung bases.  No evidence of pneumothorax.    MEDIASTINUM/HILUM: There are no enlarged mediastinal, hilar or axillary   lymph nodes.  The visualized portion of the thyroid gland is unremarkable.    CHEST WALL/AXILLA: Unremarkable.    UPPER ABDOMEN: Elevation of left hemidiaphragm. Status post Omid-en-Y   gastric bypass surgery.    OSSEOUS STRUCTURES: Degenerative changes of the spine are noted.      IMPRESSION:    No evidence of pulmonary embolism.    There are small bilateral pleural effusions with mild atelectatic changes   at the lung bases.    --- End of Report ---            ANGELINE BULLOCK MD; Attending Interventional Radiologist  This document has been electronically signed. Dec  8 2021  8:08PM (21 @ 18:55)      CARDIOLOGY TESTING  12 Lead ECG:   Ventricular Rate 59 BPM    QRS Duration 80 ms    Q-T Interval 430 ms    QTC Calculation(Bazett) 425 ms    R Axis -14 degrees    T Axis 28 degrees    Diagnosis Line Atrial fibrillation with slow ventricular response with occasional   ventricular-paced complexes  Low voltage QRS  Possible Inferior infarct , age undetermined  Cannot rule out Anterior infarct , age undetermined  Abnormal ECG    Confirmed by GAB ARZOLA MD ([id number]) on 12/3/2021 11:07:32 AM (21 @ 10:24)  12 Lead ECG:   Ventricular Rate 80 BPM    Atrial Rate 71 BPM    QRS Duration 88 ms    Q-T Interval 388 ms    QTC Calculation(Bazett) 447 ms    R Axis -24 degrees    T Axis 89 degrees    Diagnosis Line Atrial fibrillation  Inferior infarct , age undetermined  Abnormal ECG    Confirmed by JOSEPH CHUN MD ([id number]) on 2021 10:34:43 PM (21 @ 07:34)      MEDICATIONS  atorvastatin 20 Oral at bedtime  budesonide 160 MICROgram(s)/formoterol 4.5 MICROgram(s) Inhaler 2 Inhalation two times a day  cefepime   IVPB     cefepime   IVPB 1000 IV Intermittent every 8 hours  chlorhexidine 4% Liquid 1 Topical <User Schedule>  collagenase Ointment 1 Topical daily  folic acid 1 Oral daily  influenza  Vaccine (HIGH DOSE) 0.7 IntraMuscular once  lactated ringers Bolus 500 IV Bolus once  methocarbamol 500 Oral three times a day  midodrine. 10 Oral three times a day        ANTIBIOTICS:  cefepime   IVPB      cefepime   IVPB 1000 milliGRAM(s) IV Intermittent every 8 hours      ALLERGIES:  No Known Allergies

## 2021-12-10 NOTE — PROGRESS NOTE ADULT - SUBJECTIVE AND OBJECTIVE BOX
RODNEY SANTO 81y Male  MRN#: [id number]   CODE STATUS: Full  Hospital Day: 10d  Pt is currently admitted with the primary diagnosis of: Atypical Chest Pain    SUBJECTIVE  Overnight events: None reported. Patient continues to complain of fatigue/weakness.  Subjective complaints: endorse L-sided chest pain, L-arm pain, denies neck pain, jaw pain, sweating, fevers/chills, endorses mild dyspnea, fatigue/weakness.                                          ----------------------------------------------------------  OBJECTIVE  PAST MEDICAL & SURGICAL HISTORY  COPD (chronic obstructive pulmonary disease)    Hypertension    Deep vein thrombosis (DVT) of left lower extremity, unspecified chronicity, unspecified vein    Cellulitis of left lower extremity    Chronic atrial fibrillation    Mitral valve insufficiency, unspecified etiology  Moderate    CHF (congestive heart failure)    S/P coronary artery stent placement    History of total right knee replacement                                          -----------------------------------------------------------  ALLERGIES:  No Known Allergies                                          ------------------------------------------------------------  HOME MEDICATIONS  Home Medications:  atorvastatin 20 mg oral tablet: 1 tab(s) orally once a day (13 Sep 2021 04:59)  folic acid 1 mg oral tablet: 1 tab(s) orally once a day (21 Sep 2021 11:34)  oxycodone-acetaminophen 5 mg-325 mg oral tablet: 1 tab(s) orally every 6 hours, As needed, Moderate Pain (4 - 6) (21 Sep 2021 11:32)  spironolactone 25 mg oral tablet: 0.5 tab(s) orally once a day (21 Sep 2021 11:34)  Symbicort 160 mcg-4.5 mcg/inh inhalation aerosol: 2 puff(s) inhaled 2 times a day (13 Sep 2021 04:59)  Ventolin HFA 90 mcg/inh inhalation aerosol: 2 puff(s) inhaled every 6 hours as neede for shortness of breath (13 Sep 2021 04:59)  warfarin 2 mg oral tablet: Take half a tablet Sun, Tues, Wed, Thurs, Fri, Sat.  Take a whole tablet on Mon (13 Sep 2021 04:59)                         MEDICATIONS:  STANDING MEDICATIONS  atorvastatin 20 milliGRAM(s) Oral at bedtime  budesonide 160 MICROgram(s)/formoterol 4.5 MICROgram(s) Inhaler 2 Puff(s) Inhalation two times a day  cefepime   IVPB      cefepime   IVPB 1000 milliGRAM(s) IV Intermittent every 8 hours  chlorhexidine 4% Liquid 1 Application(s) Topical <User Schedule>  collagenase Ointment 1 Application(s) Topical daily  folic acid 1 milliGRAM(s) Oral daily  influenza  Vaccine (HIGH DOSE) 0.7 milliLiter(s) IntraMuscular once  lactated ringers Bolus 500 milliLiter(s) IV Bolus once  methocarbamol 500 milliGRAM(s) Oral three times a day  midodrine. 10 milliGRAM(s) Oral three times a day    PRN MEDICATIONS  acetaminophen     Tablet .. 650 milliGRAM(s) Oral every 6 hours PRN  ALBUTerol    90 MICROgram(s) HFA Inhaler 2 Puff(s) Inhalation every 6 hours PRN  melatonin 5 milliGRAM(s) Oral at bedtime PRN  oxycodone    5 mG/acetaminophen 325 mG 1 Tablet(s) Oral every 4 hours PRN                                          ------------------------------------------------------------  VITAL SIGNS: Last 24 Hours  T(C): 36 (10 Dec 2021 00:00), Max: 36 (10 Dec 2021 00:00)  T(F): 96.8 (10 Dec 2021 00:00), Max: 96.8 (10 Dec 2021 00:00)  HR: 78 (10 Dec 2021 00:00) (78 - 84)  BP: 106/49 (10 Dec 2021 00:00) (106/49 - 112/53)  BP(mean): --  RR: 19 (10 Dec 2021 00:00) (19 - 19)  SpO2: --                                         --------------------------------------------------------------  LABS:             8.1    14.64 )-----------( 161      ( 10 Dec 2021 04:30 )             24.5     12-10    138  |  104  |  34<H>  ----------------------------<  79  4.0   |  21  |  1.2    Ca    7.5<L>      10 Dec 2021 04:30  Mg     1.9     12-10    TPro  4.8<L>  /  Alb  2.1<L>  /  TBili  1.3<H>  /  DBili  x   /  AST  39  /  ALT  42<H>  /  AlkPhos  384<H>  12-10    PT/INR - ( 10 Dec 2021 04:30 )   PT: >40.00 sec;   INR: 3.87 ratio      Urinalysis Basic - ( 09 Dec 2021 12:00 )    Color: Yellow / Appearance: Clear / S.024 / pH: x  Gluc: x / Ketone: Negative  / Bili: Negative / Urobili: 3 mg/dL   Blood: x / Protein: Trace / Nitrite: Negative   Leuk Esterase: Negative / RBC: x / WBC x   Sq Epi: x / Non Sq Epi: x / Bacteria: x    Culture - Blood (collected 07 Dec 2021 21:00)  Source: .Blood None  Gram Stain (09 Dec 2021 19:26):    Growth in anaerobic bottle: Gram Negative Rods  Preliminary Report (09 Dec 2021 19:27):    Growth in anaerobic bottle: Gram Negative Rods    Culture - Blood (collected 07 Dec 2021 18:41)  Source: .Blood None  Gram Stain (09 Dec 2021 14:38):    Growth in anaerobic bottle: Gram Negative Rods  Preliminary Report (09 Dec 2021 14:39):    Growth in anaerobic bottle: Gram Negative Rods    ***Blood Panel PCR results on this specimen are available    approximately 3 hours after the Gram stain result.***    Gram stain, PCR, and/or culture results may not always    correspond due to difference in methodologies.    ************************************************************    This PCR assay was performed by multiplex PCR. This    Assay tests for 66 bacterial and resistance gene targets.    Please refer to the [facility] test directory    at [email or web address] for details.  Organism: Blood Culture PCR (09 Dec 2021 16:48)  Organism: Blood Culture PCR (09 Dec 2021 16:48)                                          -------------------------------------------------------------  RADIOLOGY:  < from: CT Angio Chest PE Protocol w/ IV Cont (21 @ 18:55) >  IMPRESSION:    No evidence of pulmonary embolism.    There are small bilateral pleural effusions with mild atelectatic changes   at the lung bases.    < end of copied text >                                          --------------------------------------------------------------  PHYSICAL EXAM:  General: alert, awake, no acute distress  HEENT: atraumatic  LUNGS: clear to auscultation bilaterally, no wheezes noted  HEART: regular rate/rhythm, no murmurs/gallops  ABDOMEN: soft, nontender, nondistended, normoactive bowel sounds  EXT: 2+ radial pulses, no edema noted  NEURO: aaox3, no focal deficits noted  SKIN: intact, no new lesions noted                                         --------------------------------------------------------------  ASSESSMENT & PLAN  Past medical history and hospital course:  82 yo M with PMHx of HFpEF (50-55%), Chronic AFib on Coumadin, 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 Trop at baseline 0.03, EKG showed afib, CXR small R sided pleural effusion.    #Gram-Negative Bacteremia - +BCx on  will f/u  - negative UA on , diuretics have been d/c  - started on Cefepime IV 2g q8h, ID consulted    #Atypical Chest Pain - likely MSK, no further ischemic workup as per Cardiology (Dr. Herrera)  #PMHx of HFpEF - follows with Dr. 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  #PMHx of DVT Right Common Fem/Fem/Popliteal - on LE Venous Duplex   - still complaining of dull chest pain radiating to L arm, worse with deep breathing, but most likely MSK in nature as per Cardio  - Troponin 0.03 x3, was 0.02-0.03 throughout prior admission, EKG performed x2 with no acute ischemic changes  - D-dimer 342, BNP ~3K near baseline,  CXR showing small R sided pleural effusion, has remained at baseline oxygen requirements  - TTE : EF 50-55%, otherwise unremarkable   - d/c Spironolactone + Torsemide due to persistent hypotension, not overloaded on exam  - pain control with Percocet 1 tab q6h PRN for moderate pain + Morphine PRN for severe pain  - Cardio consulted (Dr. Herrera), on metoprolol 12.5mg PO BID, outpatient f/u, no further ischemic workup  - INR was supratherapeutic on admission, was reversed with Vitamin K on , restarted Coumadin, INR remain supratherapeutic today, holding today's dose again, will need decreased dosing moving forward  - BP on lower side, increased midodrine to 10mg PO TID  - patient not eating and drinking adequately, calorie count started  - patient refusing PT/Rehab  - CTA-chest  negative for PE    #Chronic Macrocytic Anemia - on folate supplementation  #Anemia of Chronic Disease  - baseline Hb ~10-11, supratherapeutic INR on admission reversed, B12 WNL in September  -  iron studies: low TIBC, likely anemia of chronic disease    #DTI Left Heel + Coccyx - Wound Care consulted, recommendations given to RN  #COPD - on home O2 2.5L, has remained at baseline, continuing symbicort 160ug 2 puffs BID, Albuterol PRN  #Recent Cholecystitis - s/p perQ-cholecystostomy placement and removal by IR - no abdominal pain on exam                                                                            ----------------------------------------------------  # DVT prophylaxis: Coumadin (holding today)  # GI prophylaxis: N/A  # Diet: DASH/TLC  # Activity: Ambulate as Tolerated  # Code status: Full  # Disposition: remain on floor                                                                           --------------------------------------------------------  # Handoff: f/u BCx's + BP monitoring   RODNEY SANTO 81y Male  MRN#: [id number]   CODE STATUS: Full  Hospital Day: 10d  Pt is currently admitted with the primary diagnosis of: Atypical Chest Pain    SUBJECTIVE  Overnight events: None reported. Patient continues to complain of fatigue/weakness.  Subjective complaints: endorse L-sided chest pain, L-arm pain, denies neck pain, jaw pain, sweating, fevers/chills, endorses mild dyspnea, fatigue/weakness.                                          ----------------------------------------------------------  OBJECTIVE  PAST MEDICAL & SURGICAL HISTORY  COPD (chronic obstructive pulmonary disease)    Hypertension    Deep vein thrombosis (DVT) of left lower extremity, unspecified chronicity, unspecified vein    Cellulitis of left lower extremity    Chronic atrial fibrillation    Mitral valve insufficiency, unspecified etiology  Moderate    CHF (congestive heart failure)    S/P coronary artery stent placement    History of total right knee replacement                                          -----------------------------------------------------------  ALLERGIES:  No Known Allergies                                          ------------------------------------------------------------  HOME MEDICATIONS  Home Medications:  atorvastatin 20 mg oral tablet: 1 tab(s) orally once a day (13 Sep 2021 04:59)  folic acid 1 mg oral tablet: 1 tab(s) orally once a day (21 Sep 2021 11:34)  oxycodone-acetaminophen 5 mg-325 mg oral tablet: 1 tab(s) orally every 6 hours, As needed, Moderate Pain (4 - 6) (21 Sep 2021 11:32)  spironolactone 25 mg oral tablet: 0.5 tab(s) orally once a day (21 Sep 2021 11:34)  Symbicort 160 mcg-4.5 mcg/inh inhalation aerosol: 2 puff(s) inhaled 2 times a day (13 Sep 2021 04:59)  Ventolin HFA 90 mcg/inh inhalation aerosol: 2 puff(s) inhaled every 6 hours as neede for shortness of breath (13 Sep 2021 04:59)  warfarin 2 mg oral tablet: Take half a tablet Sun, Tues, Wed, Thurs, Fri, Sat.  Take a whole tablet on Mon (13 Sep 2021 04:59)                         MEDICATIONS:  STANDING MEDICATIONS  atorvastatin 20 milliGRAM(s) Oral at bedtime  budesonide 160 MICROgram(s)/formoterol 4.5 MICROgram(s) Inhaler 2 Puff(s) Inhalation two times a day  cefepime   IVPB      cefepime   IVPB 1000 milliGRAM(s) IV Intermittent every 8 hours  chlorhexidine 4% Liquid 1 Application(s) Topical <User Schedule>  collagenase Ointment 1 Application(s) Topical daily  folic acid 1 milliGRAM(s) Oral daily  influenza  Vaccine (HIGH DOSE) 0.7 milliLiter(s) IntraMuscular once  lactated ringers Bolus 500 milliLiter(s) IV Bolus once  methocarbamol 500 milliGRAM(s) Oral three times a day  midodrine. 10 milliGRAM(s) Oral three times a day    PRN MEDICATIONS  acetaminophen     Tablet .. 650 milliGRAM(s) Oral every 6 hours PRN  ALBUTerol    90 MICROgram(s) HFA Inhaler 2 Puff(s) Inhalation every 6 hours PRN  melatonin 5 milliGRAM(s) Oral at bedtime PRN  oxycodone    5 mG/acetaminophen 325 mG 1 Tablet(s) Oral every 4 hours PRN                                          ------------------------------------------------------------  VITAL SIGNS: Last 24 Hours  T(C): 36 (10 Dec 2021 00:00), Max: 36 (10 Dec 2021 00:00)  T(F): 96.8 (10 Dec 2021 00:00), Max: 96.8 (10 Dec 2021 00:00)  HR: 78 (10 Dec 2021 00:00) (78 - 84)  BP: 106/49 (10 Dec 2021 00:00) (106/49 - 112/53)  BP(mean): --  RR: 19 (10 Dec 2021 00:00) (19 - 19)  SpO2: --                                         --------------------------------------------------------------  LABS:             8.1    14.64 )-----------( 161      ( 10 Dec 2021 04:30 )             24.5     12-10    138  |  104  |  34<H>  ----------------------------<  79  4.0   |  21  |  1.2    Ca    7.5<L>      10 Dec 2021 04:30  Mg     1.9     12-10    TPro  4.8<L>  /  Alb  2.1<L>  /  TBili  1.3<H>  /  DBili  x   /  AST  39  /  ALT  42<H>  /  AlkPhos  384<H>  12-10    PT/INR - ( 10 Dec 2021 04:30 )   PT: >40.00 sec;   INR: 3.87 ratio      Urinalysis Basic - ( 09 Dec 2021 12:00 )    Color: Yellow / Appearance: Clear / S.024 / pH: x  Gluc: x / Ketone: Negative  / Bili: Negative / Urobili: 3 mg/dL   Blood: x / Protein: Trace / Nitrite: Negative   Leuk Esterase: Negative / RBC: x / WBC x   Sq Epi: x / Non Sq Epi: x / Bacteria: x    Culture - Blood (collected 07 Dec 2021 21:00)  Source: .Blood None  Gram Stain (09 Dec 2021 19:26):    Growth in anaerobic bottle: Gram Negative Rods  Preliminary Report (09 Dec 2021 19:27):    Growth in anaerobic bottle: Gram Negative Rods    Culture - Blood (collected 07 Dec 2021 18:41)  Source: .Blood None  Gram Stain (09 Dec 2021 14:38):    Growth in anaerobic bottle: Gram Negative Rods  Preliminary Report (09 Dec 2021 14:39):    Growth in anaerobic bottle: Gram Negative Rods    ***Blood Panel PCR results on this specimen are available    approximately 3 hours after the Gram stain result.***    Gram stain, PCR, and/or culture results may not always    correspond due to difference in methodologies.    ************************************************************    This PCR assay was performed by multiplex PCR. This    Assay tests for 66 bacterial and resistance gene targets.    Please refer to the [facility] test directory    at [email or web address] for details.  Organism: Blood Culture PCR (09 Dec 2021 16:48)  Organism: Blood Culture PCR (09 Dec 2021 16:48)                                          -------------------------------------------------------------  RADIOLOGY:  < from: CT Angio Chest PE Protocol w/ IV Cont (21 @ 18:55) >  IMPRESSION:    No evidence of pulmonary embolism.    There are small bilateral pleural effusions with mild atelectatic changes   at the lung bases.    < end of copied text >                                          --------------------------------------------------------------  PHYSICAL EXAM:  General: alert, awake, no acute distress  HEENT: atraumatic  LUNGS: clear to auscultation bilaterally, no wheezes noted  HEART: regular rate/rhythm, no murmurs/gallops  ABDOMEN: soft, nontender, nondistended, normoactive bowel sounds  EXT: 2+ radial pulses, no edema noted  NEURO: aaox3, no focal deficits noted  SKIN: intact, no new lesions noted                                         --------------------------------------------------------------  ASSESSMENT & PLAN  Past medical history and hospital course:  80 yo M with PMHx of HFpEF (50-55%), Chronic AFib on Coumadin, 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 Trop at baseline 0.03, EKG showed afib, CXR small R sided pleural effusion.    #Gram-Negative Bacteremia - +BCx on  will f/u  - negative UA on , diuretics have been d/c  - started on Cefepime IV 2g q8h, ID consulted, per phone call recommended RUQ U/S for increasing ALP to investigate for possible source    #Atypical Chest Pain - likely MSK, no further ischemic workup as per Cardiology (Dr. Herrera)  #PMHx of HFpEF - follows with Dr. 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  #PMHx of DVT Right Common Fem/Fem/Popliteal - on LE Venous Duplex   - still complaining of dull chest pain radiating to L arm, worse with deep breathing, but most likely MSK in nature as per Cardio  - Troponin 0.03 x3, was 0.02-0.03 throughout prior admission, EKG performed x2 with no acute ischemic changes  - D-dimer 342, BNP ~3K near baseline,  CXR showing small R sided pleural effusion, has remained at baseline oxygen requirements  - TTE : EF 50-55%, otherwise unremarkable   - d/c Spironolactone + Torsemide due to persistent hypotension, not overloaded on exam  - pain control with Percocet 1 tab q6h PRN for moderate pain + Morphine PRN for severe pain  - Cardio consulted (Dr. Herrera), on metoprolol 12.5mg PO BID, outpatient f/u, no further ischemic workup  - INR was supratherapeutic on admission, was reversed with Vitamin K on , restarted Coumadin, INR remain supratherapeutic today, holding today's dose again, will need decreased dosing moving forward  - BP on lower side, increased midodrine to 10mg PO TID  - patient not eating and drinking adequately, calorie count started  - patient refusing PT/Rehab  - CTA-chest  negative for PE    #Chronic Macrocytic Anemia - on folate supplementation  #Anemia of Chronic Disease  - baseline Hb ~10-11, supratherapeutic INR on admission reversed, B12 WNL in September  -  iron studies: low TIBC, likely anemia of chronic disease    #DTI Left Heel + Coccyx - Wound Care consulted, recommendations given to RN  #COPD - on home O2 2.5L, has remained at baseline, continuing symbicort 160ug 2 puffs BID, Albuterol PRN  #Recent Cholecystitis - s/p perQ-cholecystostomy placement and removal by IR - no abdominal pain on exam                                                                            ----------------------------------------------------  # DVT prophylaxis: Coumadin (holding today)  # GI prophylaxis: N/A  # Diet: DASH/TLC  # Activity: Ambulate as Tolerated  # Code status: Full  # Disposition: remain on floor                                                                           --------------------------------------------------------  # Handoff: f/u BCx's + BP monitoring

## 2021-12-10 NOTE — PROGRESS NOTE ADULT - ATTENDING COMMENTS
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           Proteus bacteremia-- started on cefepime and will get RUQ US  as alk phos elevated  Suspected atypical chest pain possibly skeletomuscular- CE negative x 3  Leucocytosis with bacteremia- though afebrile but hypotensive. wbc elevated and blood cx shows gm negative rods  Chronic A-Fib on Coumadin. Cont Metoprolol. INR high-- hold coumadin  Chronic HFpEF- no exacerbation  orthostatic hypotension-Cont Midodrine 10 mg po q 12h. BP still low -- got fluids last night.  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  will wait for blood cx to clear

## 2021-12-10 NOTE — CONSULT NOTE ADULT - ASSESSMENT
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, DVT July 2021, HTN, and COPD on 2.5L home O2, cholecystitis s/p cholecystostomy by IR in September, removed 10 days ago presents for chest pain admitted 11/30.; Noted to have leukocytosis and hypotension and + BCX, ID consulted    IMPRESSION  #Proteus bacteremia , sources include biliary (rising Alk Ph, Tbili) vs sacral decub vs doubt urine as UA without significant pyuria     12/7 BCX  +    UA unremarkable     9/2021 cholecystitis s/p cholecystotomy, 9/14 CX   Rare Escherichia coli  #PPM  #DVT  #CT There are small bilateral pleural effusions with mild atelectatic changes   at the lung bases.  Creatinine, Serum: 1.2 (12-10-21 @ 04:30)    Weight (kg): 81.6 (10-16-21 @ 10:57)    RECOMMENDATIONS  - f/u proteus sensitivities   - RUQ US  - cefepime   IVPB 1000 milliGRAM(s) IV Intermittent every 8 hours  - ADD PO flagyl 500mg TID as possible biliary source  - Repeat BCX as unclear source  - Monitor sacral area, low threshold to consult Burn, appreciate Wound care consult    If any questions, please call or send a message on [employer] Teams  Please continue to update ID with any pertinent new laboratory or radiographic findings  Spectra [phone]

## 2021-12-10 NOTE — CHART NOTE - NSCHARTNOTEFT_GEN_A_CORE
3-day Calorie Count (12/6-12/8)     Day 1: 832kcal/22.12g PRO     Day 2: 98kcal/2.04g PRO -- no doc for dinner     Day 3: No documentation     2-day average: 465kcal/12.08 -- insignificant documentation + suboptimal PO intake noted. Saw pt 12/9 -- please refer to full follow-up note. Nutrition Supplements ordered, will monitor PO intake.

## 2021-12-11 LAB
-  AMIKACIN: SIGNIFICANT CHANGE UP
-  AMPICILLIN/SULBACTAM: SIGNIFICANT CHANGE UP
-  AMPICILLIN: SIGNIFICANT CHANGE UP
-  AZTREONAM: SIGNIFICANT CHANGE UP
-  CEFAZOLIN: SIGNIFICANT CHANGE UP
-  CEFEPIME: SIGNIFICANT CHANGE UP
-  CEFOXITIN: SIGNIFICANT CHANGE UP
-  CEFTRIAXONE: SIGNIFICANT CHANGE UP
-  CIPROFLOXACIN: SIGNIFICANT CHANGE UP
-  ERTAPENEM: SIGNIFICANT CHANGE UP
-  GENTAMICIN: SIGNIFICANT CHANGE UP
-  LEVOFLOXACIN: SIGNIFICANT CHANGE UP
-  MEROPENEM: SIGNIFICANT CHANGE UP
-  PIPERACILLIN/TAZOBACTAM: SIGNIFICANT CHANGE UP
-  TOBRAMYCIN: SIGNIFICANT CHANGE UP
-  TRIMETHOPRIM/SULFAMETHOXAZOLE: SIGNIFICANT CHANGE UP
ALBUMIN SERPL ELPH-MCNC: 2 G/DL — LOW (ref 3.5–5.2)
ALP SERPL-CCNC: 429 U/L — HIGH (ref 30–115)
ALT FLD-CCNC: 40 U/L — SIGNIFICANT CHANGE UP (ref 0–41)
ANION GAP SERPL CALC-SCNC: 12 MMOL/L — SIGNIFICANT CHANGE UP (ref 7–14)
AST SERPL-CCNC: 36 U/L — SIGNIFICANT CHANGE UP (ref 0–41)
BASOPHILS # BLD AUTO: 0.01 K/UL — SIGNIFICANT CHANGE UP (ref 0–0.2)
BASOPHILS NFR BLD AUTO: 0.1 % — SIGNIFICANT CHANGE UP (ref 0–1)
BILIRUB SERPL-MCNC: 1.5 MG/DL — HIGH (ref 0.2–1.2)
BLD GP AB SCN SERPL QL: SIGNIFICANT CHANGE UP
BUN SERPL-MCNC: 34 MG/DL — HIGH (ref 10–20)
CALCIUM SERPL-MCNC: 7.3 MG/DL — LOW (ref 8.5–10.1)
CHLORIDE SERPL-SCNC: 102 MMOL/L — SIGNIFICANT CHANGE UP (ref 98–110)
CO2 SERPL-SCNC: 22 MMOL/L — SIGNIFICANT CHANGE UP (ref 17–32)
CREAT SERPL-MCNC: 1 MG/DL — SIGNIFICANT CHANGE UP (ref 0.7–1.5)
CULTURE RESULTS: SIGNIFICANT CHANGE UP
CULTURE RESULTS: SIGNIFICANT CHANGE UP
EOSINOPHIL # BLD AUTO: 0.13 K/UL — SIGNIFICANT CHANGE UP (ref 0–0.7)
EOSINOPHIL NFR BLD AUTO: 1 % — SIGNIFICANT CHANGE UP (ref 0–8)
GLUCOSE SERPL-MCNC: 82 MG/DL — SIGNIFICANT CHANGE UP (ref 70–99)
HCT VFR BLD CALC: 25 % — LOW (ref 42–52)
HGB BLD-MCNC: 8.3 G/DL — LOW (ref 14–18)
IMM GRANULOCYTES NFR BLD AUTO: 0.6 % — HIGH (ref 0.1–0.3)
INR BLD: 3.62 RATIO — HIGH (ref 0.65–1.3)
LYMPHOCYTES # BLD AUTO: 0.54 K/UL — LOW (ref 1.2–3.4)
LYMPHOCYTES # BLD AUTO: 4.3 % — LOW (ref 20.5–51.1)
MAGNESIUM SERPL-MCNC: 2 MG/DL — SIGNIFICANT CHANGE UP (ref 1.8–2.4)
MCHC RBC-ENTMCNC: 32.5 PG — HIGH (ref 27–31)
MCHC RBC-ENTMCNC: 33.2 G/DL — SIGNIFICANT CHANGE UP (ref 32–37)
MCV RBC AUTO: 98 FL — HIGH (ref 80–94)
METHOD TYPE: SIGNIFICANT CHANGE UP
MONOCYTES # BLD AUTO: 0.88 K/UL — HIGH (ref 0.1–0.6)
MONOCYTES NFR BLD AUTO: 6.9 % — SIGNIFICANT CHANGE UP (ref 1.7–9.3)
NEUTROPHILS # BLD AUTO: 11.05 K/UL — HIGH (ref 1.4–6.5)
NEUTROPHILS NFR BLD AUTO: 87.1 % — HIGH (ref 42.2–75.2)
NRBC # BLD: 0 /100 WBCS — SIGNIFICANT CHANGE UP (ref 0–0)
ORGANISM # SPEC MICROSCOPIC CNT: SIGNIFICANT CHANGE UP
PLATELET # BLD AUTO: 152 K/UL — SIGNIFICANT CHANGE UP (ref 130–400)
POTASSIUM SERPL-MCNC: 3.7 MMOL/L — SIGNIFICANT CHANGE UP (ref 3.5–5)
POTASSIUM SERPL-SCNC: 3.7 MMOL/L — SIGNIFICANT CHANGE UP (ref 3.5–5)
PROT SERPL-MCNC: 4.7 G/DL — LOW (ref 6–8)
PROTHROM AB SERPL-ACNC: >40 SEC — HIGH (ref 9.95–12.87)
RBC # BLD: 2.55 M/UL — LOW (ref 4.7–6.1)
RBC # FLD: 16.6 % — HIGH (ref 11.5–14.5)
SODIUM SERPL-SCNC: 136 MMOL/L — SIGNIFICANT CHANGE UP (ref 135–146)
SPECIMEN SOURCE: SIGNIFICANT CHANGE UP
SPECIMEN SOURCE: SIGNIFICANT CHANGE UP
WBC # BLD: 12.69 K/UL — HIGH (ref 4.8–10.8)
WBC # FLD AUTO: 12.69 K/UL — HIGH (ref 4.8–10.8)

## 2021-12-11 PROCEDURE — 78226 HEPATOBILIARY SYSTEM IMAGING: CPT | Mod: 26

## 2021-12-11 PROCEDURE — 99233 SBSQ HOSP IP/OBS HIGH 50: CPT

## 2021-12-11 RX ORDER — ACETAMINOPHEN 500 MG
650 TABLET ORAL ONCE
Refills: 0 | Status: COMPLETED | OUTPATIENT
Start: 2021-12-11 | End: 2021-12-11

## 2021-12-11 RX ADMIN — Medication 5 MILLIGRAM(S): at 21:28

## 2021-12-11 RX ADMIN — BUDESONIDE AND FORMOTEROL FUMARATE DIHYDRATE 2 PUFF(S): 160; 4.5 AEROSOL RESPIRATORY (INHALATION) at 06:14

## 2021-12-11 RX ADMIN — MIDODRINE HYDROCHLORIDE 10 MILLIGRAM(S): 2.5 TABLET ORAL at 16:43

## 2021-12-11 RX ADMIN — OXYCODONE AND ACETAMINOPHEN 1 TABLET(S): 5; 325 TABLET ORAL at 08:17

## 2021-12-11 RX ADMIN — CEFEPIME 100 MILLIGRAM(S): 1 INJECTION, POWDER, FOR SOLUTION INTRAMUSCULAR; INTRAVENOUS at 06:08

## 2021-12-11 RX ADMIN — CEFEPIME 100 MILLIGRAM(S): 1 INJECTION, POWDER, FOR SOLUTION INTRAMUSCULAR; INTRAVENOUS at 21:25

## 2021-12-11 RX ADMIN — CEFEPIME 100 MILLIGRAM(S): 1 INJECTION, POWDER, FOR SOLUTION INTRAMUSCULAR; INTRAVENOUS at 16:42

## 2021-12-11 RX ADMIN — Medication 1 MILLIGRAM(S): at 11:49

## 2021-12-11 RX ADMIN — Medication 650 MILLIGRAM(S): at 22:58

## 2021-12-11 RX ADMIN — METHOCARBAMOL 500 MILLIGRAM(S): 500 TABLET, FILM COATED ORAL at 21:28

## 2021-12-11 RX ADMIN — CHLORHEXIDINE GLUCONATE 1 APPLICATION(S): 213 SOLUTION TOPICAL at 06:14

## 2021-12-11 RX ADMIN — MIDODRINE HYDROCHLORIDE 10 MILLIGRAM(S): 2.5 TABLET ORAL at 06:07

## 2021-12-11 RX ADMIN — METHOCARBAMOL 500 MILLIGRAM(S): 500 TABLET, FILM COATED ORAL at 16:44

## 2021-12-11 RX ADMIN — Medication 500 MILLIGRAM(S): at 21:28

## 2021-12-11 RX ADMIN — OXYCODONE AND ACETAMINOPHEN 1 TABLET(S): 5; 325 TABLET ORAL at 09:16

## 2021-12-11 RX ADMIN — Medication 500 MILLIGRAM(S): at 16:42

## 2021-12-11 RX ADMIN — METHOCARBAMOL 500 MILLIGRAM(S): 500 TABLET, FILM COATED ORAL at 06:07

## 2021-12-11 RX ADMIN — Medication 500 MILLIGRAM(S): at 06:07

## 2021-12-11 RX ADMIN — Medication 650 MILLIGRAM(S): at 23:00

## 2021-12-11 RX ADMIN — MIDODRINE HYDROCHLORIDE 10 MILLIGRAM(S): 2.5 TABLET ORAL at 11:50

## 2021-12-11 NOTE — PROGRESS NOTE ADULT - SUBJECTIVE AND OBJECTIVE BOX
SUBJECTIVE:    Patient is a 81y old Male who presents with a chief complaint of chest pain (10 Dec 2021 14:44)    Currently admitted to medicine with the primary diagnosis of Chest pain       Today is hospital day 11d.     PAST MEDICAL & SURGICAL HISTORY  COPD (chronic obstructive pulmonary disease)    Hypertension    Deep vein thrombosis (DVT) of left lower extremity, unspecified chronicity, unspecified vein    Cellulitis of left lower extremity    Chronic atrial fibrillation    Mitral valve insufficiency, unspecified etiology  Moderate    CHF (congestive heart failure)    S/P coronary artery stent placement    History of total right knee replacement      ALLERGIES:  No Known Allergies    MEDICATIONS:  STANDING MEDICATIONS  atorvastatin 20 milliGRAM(s) Oral at bedtime  budesonide 160 MICROgram(s)/formoterol 4.5 MICROgram(s) Inhaler 2 Puff(s) Inhalation two times a day  cefepime   IVPB      cefepime   IVPB 1000 milliGRAM(s) IV Intermittent every 8 hours  chlorhexidine 4% Liquid 1 Application(s) Topical <User Schedule>  collagenase Ointment 1 Application(s) Topical daily  folic acid 1 milliGRAM(s) Oral daily  influenza  Vaccine (HIGH DOSE) 0.7 milliLiter(s) IntraMuscular once  lactated ringers Bolus 500 milliLiter(s) IV Bolus once  methocarbamol 500 milliGRAM(s) Oral three times a day  metroNIDAZOLE    Tablet 500 milliGRAM(s) Oral every 8 hours  midodrine. 10 milliGRAM(s) Oral three times a day    PRN MEDICATIONS  acetaminophen     Tablet .. 650 milliGRAM(s) Oral every 6 hours PRN  ALBUTerol    90 MICROgram(s) HFA Inhaler 2 Puff(s) Inhalation every 6 hours PRN  melatonin 5 milliGRAM(s) Oral at bedtime PRN  oxycodone    5 mG/acetaminophen 325 mG 1 Tablet(s) Oral every 4 hours PRN    VITALS:   T(F): 96.4  HR: 77  BP: 92/41  RR: 18  SpO2: --    LABS:                        8.3    12.69 )-----------( 152      ( 11 Dec 2021 04:30 )             25.0     12-11    136  |  102  |  34<H>  ----------------------------<  82  3.7   |  22  |  1.0    Ca    7.3<L>      11 Dec 2021 04:30  Mg     2.0     12-11    TPro  4.7<L>  /  Alb  2.0<L>  /  TBili  1.5<H>  /  DBili  x   /  AST  36  /  ALT  40  /  AlkPhos  429<H>  12-11    PT/INR - ( 11 Dec 2021 04:30 )   PT: >40.00 sec;   INR: 3.62 ratio                       RADIOLOGY:    PHYSICAL EXAM:  GEN: No acute distress  LUNGS: Clear to auscultation bilaterally   HEART: S1/S2 present. RRR.   ABD/ GI: Soft, non-tender, non-distended. Bowel sounds present  EXT: NC/NC/NE/2+PP/SAPP  NEURO: AAOX3

## 2021-12-11 NOTE — PROGRESS NOTE ADULT - ASSESSMENT
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           Proteus bacteremia-- started on cefepime and  RUQ US-- shows thickened wall with gall stones-- r/o ac cholecystitis--patient does not have pain--   alk phos elevated and bilirubin going up-- will get HIDA scan today. unlikely a candidate for surgery. seen BY ID on cefepime  and flagyl.  Suspected atypical chest pain possibly skeletomuscular- CE negative x 3  Leucocytosis with bacteremia- though afebrile but hypotensive. wbc elevated and blood cx shows proteus sensitive to ceftriaxone  Chronic A-Fib on Coumadin. Cont Metoprolol. INR high-- hold coumadin  Chronic HFpEF- no exacerbation  orthostatic hypotension-Cont Midodrine 10 mg po q 12h. BP still low -- got fluids last night.  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   now pending HIDA scan

## 2021-12-12 LAB
ALBUMIN SERPL ELPH-MCNC: 1.9 G/DL — LOW (ref 3.5–5.2)
ALP SERPL-CCNC: 500 U/L — HIGH (ref 30–115)
ALT FLD-CCNC: 42 U/L — HIGH (ref 0–41)
ANION GAP SERPL CALC-SCNC: 14 MMOL/L — SIGNIFICANT CHANGE UP (ref 7–14)
AST SERPL-CCNC: 42 U/L — HIGH (ref 0–41)
BASOPHILS # BLD AUTO: 0.01 K/UL — SIGNIFICANT CHANGE UP (ref 0–0.2)
BASOPHILS NFR BLD AUTO: 0.1 % — SIGNIFICANT CHANGE UP (ref 0–1)
BILIRUB SERPL-MCNC: 1.5 MG/DL — HIGH (ref 0.2–1.2)
BUN SERPL-MCNC: 33 MG/DL — HIGH (ref 10–20)
CALCIUM SERPL-MCNC: 7.6 MG/DL — LOW (ref 8.5–10.1)
CHLORIDE SERPL-SCNC: 104 MMOL/L — SIGNIFICANT CHANGE UP (ref 98–110)
CO2 SERPL-SCNC: 21 MMOL/L — SIGNIFICANT CHANGE UP (ref 17–32)
CREAT SERPL-MCNC: 1.1 MG/DL — SIGNIFICANT CHANGE UP (ref 0.7–1.5)
EOSINOPHIL # BLD AUTO: 0.09 K/UL — SIGNIFICANT CHANGE UP (ref 0–0.7)
EOSINOPHIL NFR BLD AUTO: 0.6 % — SIGNIFICANT CHANGE UP (ref 0–8)
GLUCOSE SERPL-MCNC: 70 MG/DL — SIGNIFICANT CHANGE UP (ref 70–99)
HCT VFR BLD CALC: 24.3 % — LOW (ref 42–52)
HGB BLD-MCNC: 8.1 G/DL — LOW (ref 14–18)
IMM GRANULOCYTES NFR BLD AUTO: 0.7 % — HIGH (ref 0.1–0.3)
INR BLD: 4.09 RATIO — HIGH (ref 0.65–1.3)
LYMPHOCYTES # BLD AUTO: 0.35 K/UL — LOW (ref 1.2–3.4)
LYMPHOCYTES # BLD AUTO: 2.3 % — LOW (ref 20.5–51.1)
MAGNESIUM SERPL-MCNC: 1.9 MG/DL — SIGNIFICANT CHANGE UP (ref 1.8–2.4)
MCHC RBC-ENTMCNC: 32.7 PG — HIGH (ref 27–31)
MCHC RBC-ENTMCNC: 33.3 G/DL — SIGNIFICANT CHANGE UP (ref 32–37)
MCV RBC AUTO: 98 FL — HIGH (ref 80–94)
MONOCYTES # BLD AUTO: 0.8 K/UL — HIGH (ref 0.1–0.6)
MONOCYTES NFR BLD AUTO: 5.4 % — SIGNIFICANT CHANGE UP (ref 1.7–9.3)
NEUTROPHILS # BLD AUTO: 13.58 K/UL — HIGH (ref 1.4–6.5)
NEUTROPHILS NFR BLD AUTO: 90.9 % — HIGH (ref 42.2–75.2)
NRBC # BLD: 0 /100 WBCS — SIGNIFICANT CHANGE UP (ref 0–0)
PLATELET # BLD AUTO: 145 K/UL — SIGNIFICANT CHANGE UP (ref 130–400)
POTASSIUM SERPL-MCNC: 4.1 MMOL/L — SIGNIFICANT CHANGE UP (ref 3.5–5)
POTASSIUM SERPL-SCNC: 4.1 MMOL/L — SIGNIFICANT CHANGE UP (ref 3.5–5)
PROT SERPL-MCNC: 4.7 G/DL — LOW (ref 6–8)
PROTHROM AB SERPL-ACNC: >40 SEC — HIGH (ref 9.95–12.87)
RBC # BLD: 2.48 M/UL — LOW (ref 4.7–6.1)
RBC # FLD: 16.6 % — HIGH (ref 11.5–14.5)
SODIUM SERPL-SCNC: 139 MMOL/L — SIGNIFICANT CHANGE UP (ref 135–146)
WBC # BLD: 14.94 K/UL — HIGH (ref 4.8–10.8)
WBC # FLD AUTO: 14.94 K/UL — HIGH (ref 4.8–10.8)

## 2021-12-12 PROCEDURE — 99233 SBSQ HOSP IP/OBS HIGH 50: CPT

## 2021-12-12 PROCEDURE — 99223 1ST HOSP IP/OBS HIGH 75: CPT

## 2021-12-12 RX ORDER — OXYCODONE HYDROCHLORIDE 5 MG/1
5 TABLET ORAL EVERY 6 HOURS
Refills: 0 | Status: DISCONTINUED | OUTPATIENT
Start: 2021-12-12 | End: 2021-12-14

## 2021-12-12 RX ADMIN — CEFEPIME 100 MILLIGRAM(S): 1 INJECTION, POWDER, FOR SOLUTION INTRAMUSCULAR; INTRAVENOUS at 05:36

## 2021-12-12 RX ADMIN — METHOCARBAMOL 500 MILLIGRAM(S): 500 TABLET, FILM COATED ORAL at 05:36

## 2021-12-12 RX ADMIN — CHLORHEXIDINE GLUCONATE 1 APPLICATION(S): 213 SOLUTION TOPICAL at 05:36

## 2021-12-12 RX ADMIN — CEFEPIME 100 MILLIGRAM(S): 1 INJECTION, POWDER, FOR SOLUTION INTRAMUSCULAR; INTRAVENOUS at 14:23

## 2021-12-12 RX ADMIN — Medication 1 MILLIGRAM(S): at 11:38

## 2021-12-12 RX ADMIN — Medication 5 MILLIGRAM(S): at 21:01

## 2021-12-12 RX ADMIN — Medication 500 MILLIGRAM(S): at 14:24

## 2021-12-12 RX ADMIN — MIDODRINE HYDROCHLORIDE 10 MILLIGRAM(S): 2.5 TABLET ORAL at 05:36

## 2021-12-12 RX ADMIN — CEFEPIME 100 MILLIGRAM(S): 1 INJECTION, POWDER, FOR SOLUTION INTRAMUSCULAR; INTRAVENOUS at 21:00

## 2021-12-12 RX ADMIN — OXYCODONE HYDROCHLORIDE 5 MILLIGRAM(S): 5 TABLET ORAL at 15:08

## 2021-12-12 RX ADMIN — METHOCARBAMOL 500 MILLIGRAM(S): 500 TABLET, FILM COATED ORAL at 21:01

## 2021-12-12 RX ADMIN — OXYCODONE HYDROCHLORIDE 5 MILLIGRAM(S): 5 TABLET ORAL at 16:08

## 2021-12-12 RX ADMIN — OXYCODONE HYDROCHLORIDE 5 MILLIGRAM(S): 5 TABLET ORAL at 22:02

## 2021-12-12 RX ADMIN — Medication 500 MILLIGRAM(S): at 05:36

## 2021-12-12 RX ADMIN — MIDODRINE HYDROCHLORIDE 10 MILLIGRAM(S): 2.5 TABLET ORAL at 11:37

## 2021-12-12 RX ADMIN — METHOCARBAMOL 500 MILLIGRAM(S): 500 TABLET, FILM COATED ORAL at 14:24

## 2021-12-12 RX ADMIN — MIDODRINE HYDROCHLORIDE 10 MILLIGRAM(S): 2.5 TABLET ORAL at 16:09

## 2021-12-12 RX ADMIN — Medication 500 MILLIGRAM(S): at 21:01

## 2021-12-12 NOTE — PROGRESS NOTE ADULT - SUBJECTIVE AND OBJECTIVE BOX
RODNEY SANTO 81y Male  MRN#: [id number]   CODE STATUS:  Hospital Day: 12d  Pt is currently admitted with the primary diagnosis of:     SUBJECTIVE  Overnight events: None reported.  Subjective complaints: Endorses fatigue/weakness, denies abdominal pain, RUQ pain.                                          ----------------------------------------------------------  OBJECTIVE  PAST MEDICAL & SURGICAL HISTORY  COPD (chronic obstructive pulmonary disease)    Hypertension    Deep vein thrombosis (DVT) of left lower extremity, unspecified chronicity, unspecified vein    Cellulitis of left lower extremity    Chronic atrial fibrillation    Mitral valve insufficiency, unspecified etiology  Moderate    CHF (congestive heart failure)    S/P coronary artery stent placement    History of total right knee replacement                                          -----------------------------------------------------------  ALLERGIES:  No Known Allergies                                          ------------------------------------------------------------  HOME MEDICATIONS  Home Medications:  atorvastatin 20 mg oral tablet: 1 tab(s) orally once a day (13 Sep 2021 04:59)  folic acid 1 mg oral tablet: 1 tab(s) orally once a day (21 Sep 2021 11:34)  oxycodone-acetaminophen 5 mg-325 mg oral tablet: 1 tab(s) orally every 6 hours, As needed, Moderate Pain (4 - 6) (21 Sep 2021 11:32)  spironolactone 25 mg oral tablet: 0.5 tab(s) orally once a day (21 Sep 2021 11:34)  Symbicort 160 mcg-4.5 mcg/inh inhalation aerosol: 2 puff(s) inhaled 2 times a day (13 Sep 2021 04:59)  Ventolin HFA 90 mcg/inh inhalation aerosol: 2 puff(s) inhaled every 6 hours as neede for shortness of breath (13 Sep 2021 04:59)  warfarin 2 mg oral tablet: Take half a tablet Sun, Tues, Wed, Thurs, Fri, Sat.  Take a whole tablet on Mon (13 Sep 2021 04:59)                         MEDICATIONS:  STANDING MEDICATIONS  budesonide 160 MICROgram(s)/formoterol 4.5 MICROgram(s) Inhaler 2 Puff(s) Inhalation two times a day  cefepime   IVPB 1000 milliGRAM(s) IV Intermittent every 8 hours  cefepime   IVPB      chlorhexidine 4% Liquid 1 Application(s) Topical <User Schedule>  collagenase Ointment 1 Application(s) Topical daily  folic acid 1 milliGRAM(s) Oral daily  influenza  Vaccine (HIGH DOSE) 0.7 milliLiter(s) IntraMuscular once  lactated ringers Bolus 500 milliLiter(s) IV Bolus once  methocarbamol 500 milliGRAM(s) Oral three times a day  metroNIDAZOLE    Tablet 500 milliGRAM(s) Oral every 8 hours  midodrine. 10 milliGRAM(s) Oral three times a day    PRN MEDICATIONS  ALBUTerol    90 MICROgram(s) HFA Inhaler 2 Puff(s) Inhalation every 6 hours PRN  melatonin 5 milliGRAM(s) Oral at bedtime PRN                                          ------------------------------------------------------------  VITAL SIGNS: Last 24 Hours  T(C): 35.6 (12 Dec 2021 08:00), Max: 36.1 (11 Dec 2021 16:31)  T(F): 96 (12 Dec 2021 08:00), Max: 97 (11 Dec 2021 16:31)  HR: 76 (12 Dec 2021 08:00) (75 - 95)  BP: 98/52 (12 Dec 2021 08:00) (92/41 - 114/41)  BP(mean): --  RR: 18 (12 Dec 2021 08:00) (18 - 18)  SpO2: --    12-11-21 @ 07:01  -  12-12-21 @ 07:00  --------------------------------------------------------  IN: 0 mL / OUT: 250 mL / NET: -250 mL                                         --------------------------------------------------------------  LABS:             8.1    14.94 )-----------( 145      ( 12 Dec 2021 06:32 )             24.3     12-12    139  |  104  |  33<H>  ----------------------------<  70  4.1   |  21  |  1.1    Ca    7.6<L>      12 Dec 2021 06:32  Mg     1.9     12-12    TPro  4.7<L>  /  Alb  1.9<L>  /  TBili  1.5<H>  /  DBili  x   /  AST  42<H>  /  ALT  42<H>  /  AlkPhos  500<H>  12-12    PT/INR - ( 12 Dec 2021 06:32 )   PT: >40.00 sec;   INR: 4.09 ratio                                            -------------------------------------------------------------  RADIOLOGY:  < from: [facility] Hepatobiliary Imaging (12.11.21 @ 15:37) >  IMPRESSION:    NO DEFINITE VISUALIZATION OF THEGALLBLADDER THROUGH 4 HOURS   POST-INJECTION, CONSISTENT WITH CHOLECYSTITIS, AS DESCRIBED ABOVE.    CLINICAL CORRELATION IS SUGGESTED.    < end of copied text >                                            --------------------------------------------------------------  PHYSICAL EXAM:  General: alert, awake, no acute distress  HEENT: atraumatic  LUNGS: clear to auscultation bilaterally, no wheezes noted  HEART: regular rate/rhythm, no murmurs/gallops  ABDOMEN: soft, nontender, nondistended, normoactive bowel sounds  EXT: 2+ radial pulses, no edema noted  NEURO: aaox3, no focal deficits noted  SKIN: intact, no new lesions noted                                         --------------------------------------------------------------  ASSESSMENT & PLAN  Past medical history and hospital course:  82 yo M with PMHx of HFpEF (50-55%), Chronic AFib on Coumadin, 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 Trop at baseline 0.03, EKG showed afib, CXR small R sided pleural effusion.    #Proteus Bacteremia - +BCx x2 on 12/7, f/u BCx on 12/11 and 12/12  #Acute Cholecystitis - +findings on RUQ U/S (12/10) + HIDA Scan (12/11)  - on Cefepime IV 2g q8h + Flagyl 500mg PO TID, ID following    #Atypical Chest Pain - likely MSK, no further ischemic workup as per Cardiology (Dr. Herrera)  #PMHx of HFpEF - follows with Dr. 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  #PMHx of DVT Right Common Fem/Fem/Popliteal - on LE Venous Duplex 12/1  - still complaining of dull chest pain radiating to L arm, worse with deep breathing, but most likely MSK in nature as per Cardio  - Troponin 0.03 x3, was 0.02-0.03 throughout prior admission, EKG performed x2 with no acute ischemic changes  - D-dimer 342, BNP ~3K near baseline, 11/30 CXR showing small R sided pleural effusion, has remained at baseline oxygen requirements  - TTE 12/2: EF 50-55%, otherwise unremarkable   - d/c Spironolactone + Torsemide due to persistent hypotension, not overloaded on exam  - pain control with Percocet 1 tab q6h PRN for moderate pain + Morphine PRN for severe pain  - Cardio consulted (Dr. Herrera), on metoprolol 12.5mg PO BID, outpatient f/u, no further ischemic workup  - INR was supratherapeutic on admission, was reversed with Vitamin K on 12/2, restarted Coumadin, INR remains supratherapeutic, have been holding the Coumadin, will need decreased dosing moving forward  - BP on lower side, increased midodrine to 10mg PO TID  - patient not eating and drinking adequately, calorie count started  - patient refusing PT/Rehab  - CTA-chest 12/9 negative for PE    #Chronic Macrocytic Anemia - on folate supplementation  #Anemia of Chronic Disease  - baseline Hb ~10-11, supratherapeutic INR on admission reversed, B12 WNL in September  - 12/1 iron studies: low TIBC, likely anemia of chronic disease    #DTI Left Heel + Coccyx - Wound Care consulted, recommendations given to RN  #COPD - on home O2 2.5L, has remained at baseline, continuing symbicort 160ug 2 puffs BID, Albuterol PRN  #Recent Cholecystitis - s/p perQ-cholecystostomy placement and removal by IR - no abdominal pain on exam                                                                            ----------------------------------------------------  # DVT prophylaxis: Coumadin (holding)  # GI prophylaxis: N/A  # Diet: DASH/TLC  # Activity: Ambulate as Tolerated  # Code status: Full  # Disposition: remain on floor                                                                           --------------------------------------------------------  # Handoff: f/u BCx's + BP monitoring RODNEY SANTO 81y Male  MRN#: [id number]   CODE STATUS:  Hospital Day: 12d  Pt is currently admitted with the primary diagnosis of:     SUBJECTIVE  Overnight events: None reported.  Subjective complaints: Endorses fatigue/weakness, denies abdominal pain, RUQ pain.                                          ----------------------------------------------------------  OBJECTIVE  PAST MEDICAL & SURGICAL HISTORY  COPD (chronic obstructive pulmonary disease)    Hypertension    Deep vein thrombosis (DVT) of left lower extremity, unspecified chronicity, unspecified vein    Cellulitis of left lower extremity    Chronic atrial fibrillation    Mitral valve insufficiency, unspecified etiology  Moderate    CHF (congestive heart failure)    S/P coronary artery stent placement    History of total right knee replacement                                          -----------------------------------------------------------  ALLERGIES:  No Known Allergies                                          ------------------------------------------------------------  HOME MEDICATIONS  Home Medications:  atorvastatin 20 mg oral tablet: 1 tab(s) orally once a day (13 Sep 2021 04:59)  folic acid 1 mg oral tablet: 1 tab(s) orally once a day (21 Sep 2021 11:34)  oxycodone-acetaminophen 5 mg-325 mg oral tablet: 1 tab(s) orally every 6 hours, As needed, Moderate Pain (4 - 6) (21 Sep 2021 11:32)  spironolactone 25 mg oral tablet: 0.5 tab(s) orally once a day (21 Sep 2021 11:34)  Symbicort 160 mcg-4.5 mcg/inh inhalation aerosol: 2 puff(s) inhaled 2 times a day (13 Sep 2021 04:59)  Ventolin HFA 90 mcg/inh inhalation aerosol: 2 puff(s) inhaled every 6 hours as neede for shortness of breath (13 Sep 2021 04:59)  warfarin 2 mg oral tablet: Take half a tablet Sun, Tues, Wed, Thurs, Fri, Sat.  Take a whole tablet on Mon (13 Sep 2021 04:59)                         MEDICATIONS:  STANDING MEDICATIONS  budesonide 160 MICROgram(s)/formoterol 4.5 MICROgram(s) Inhaler 2 Puff(s) Inhalation two times a day  cefepime   IVPB 1000 milliGRAM(s) IV Intermittent every 8 hours  cefepime   IVPB      chlorhexidine 4% Liquid 1 Application(s) Topical <User Schedule>  collagenase Ointment 1 Application(s) Topical daily  folic acid 1 milliGRAM(s) Oral daily  influenza  Vaccine (HIGH DOSE) 0.7 milliLiter(s) IntraMuscular once  lactated ringers Bolus 500 milliLiter(s) IV Bolus once  methocarbamol 500 milliGRAM(s) Oral three times a day  metroNIDAZOLE    Tablet 500 milliGRAM(s) Oral every 8 hours  midodrine. 10 milliGRAM(s) Oral three times a day    PRN MEDICATIONS  ALBUTerol    90 MICROgram(s) HFA Inhaler 2 Puff(s) Inhalation every 6 hours PRN  melatonin 5 milliGRAM(s) Oral at bedtime PRN                                          ------------------------------------------------------------  VITAL SIGNS: Last 24 Hours  T(C): 35.6 (12 Dec 2021 08:00), Max: 36.1 (11 Dec 2021 16:31)  T(F): 96 (12 Dec 2021 08:00), Max: 97 (11 Dec 2021 16:31)  HR: 76 (12 Dec 2021 08:00) (75 - 95)  BP: 98/52 (12 Dec 2021 08:00) (92/41 - 114/41)  BP(mean): --  RR: 18 (12 Dec 2021 08:00) (18 - 18)  SpO2: --    12-11-21 @ 07:01  -  12-12-21 @ 07:00  --------------------------------------------------------  IN: 0 mL / OUT: 250 mL / NET: -250 mL                                         --------------------------------------------------------------  LABS:             8.1    14.94 )-----------( 145      ( 12 Dec 2021 06:32 )             24.3     12-12    139  |  104  |  33<H>  ----------------------------<  70  4.1   |  21  |  1.1    Ca    7.6<L>      12 Dec 2021 06:32  Mg     1.9     12-12    TPro  4.7<L>  /  Alb  1.9<L>  /  TBili  1.5<H>  /  DBili  x   /  AST  42<H>  /  ALT  42<H>  /  AlkPhos  500<H>  12-12    PT/INR - ( 12 Dec 2021 06:32 )   PT: >40.00 sec;   INR: 4.09 ratio                                            -------------------------------------------------------------  RADIOLOGY:  < from: [facility] Hepatobiliary Imaging (12.11.21 @ 15:37) >  IMPRESSION:    NO DEFINITE VISUALIZATION OF THEGALLBLADDER THROUGH 4 HOURS   POST-INJECTION, CONSISTENT WITH CHOLECYSTITIS, AS DESCRIBED ABOVE.    CLINICAL CORRELATION IS SUGGESTED.    < end of copied text >                                            --------------------------------------------------------------  PHYSICAL EXAM:  General: alert, awake, no acute distress  HEENT: atraumatic  LUNGS: clear to auscultation bilaterally, no wheezes noted  HEART: regular rate/rhythm, no murmurs/gallops  ABDOMEN: soft, nontender, nondistended, normoactive bowel sounds  EXT: 2+ radial pulses, no edema noted  NEURO: aaox3, no focal deficits noted  SKIN: intact, no new lesions noted                                         --------------------------------------------------------------  ASSESSMENT & PLAN  Past medical history and hospital course:  82 yo M with PMHx of HFpEF (50-55%), Chronic AFib on Coumadin, 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 Trop at baseline 0.03, EKG showed afib, CXR small R sided pleural effusion.    #Proteus Bacteremia - +BCx x2 on 12/7, f/u BCx on 12/11 and 12/12  #Acute Cholecystitis - +findings on RUQ U/S (12/10) + HIDA Scan (12/11)  - on Cefepime IV 2g q8h + Flagyl 500mg PO TID, ID following  - Gen Surg consulted    #Atypical Chest Pain - likely MSK, no further ischemic workup as per Cardiology (Dr. Herrera)  #PMHx of HFpEF - follows with Dr. 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  #PMHx of DVT Right Common Fem/Fem/Popliteal - on LE Venous Duplex 12/1  - still complaining of dull chest pain radiating to L arm, worse with deep breathing, but most likely MSK in nature as per Cardio  - Troponin 0.03 x3, was 0.02-0.03 throughout prior admission, EKG performed x2 with no acute ischemic changes  - D-dimer 342, BNP ~3K near baseline, 11/30 CXR showing small R sided pleural effusion, has remained at baseline oxygen requirements  - TTE 12/2: EF 50-55%, otherwise unremarkable   - d/c Spironolactone + Torsemide due to persistent hypotension, not overloaded on exam  - pain control with Percocet 1 tab q6h PRN for moderate pain + Morphine PRN for severe pain  - Cardio consulted (Dr. Herrera), on metoprolol 12.5mg PO BID, outpatient f/u, no further ischemic workup  - INR was supratherapeutic on admission, was reversed with Vitamin K on 12/2, restarted Coumadin, INR remains supratherapeutic, have been holding the Coumadin, will need decreased dosing moving forward  - BP on lower side, increased midodrine to 10mg PO TID  - patient not eating and drinking adequately, calorie count started  - patient refusing PT/Rehab  - CTA-chest 12/9 negative for PE    #Chronic Macrocytic Anemia - on folate supplementation  #Anemia of Chronic Disease  - baseline Hb ~10-11, supratherapeutic INR on admission reversed, B12 WNL in September  - 12/1 iron studies: low TIBC, likely anemia of chronic disease    #DTI Left Heel + Coccyx - Wound Care consulted, recommendations given to RN  #COPD - on home O2 2.5L, has remained at baseline, continuing symbicort 160ug 2 puffs BID, Albuterol PRN  #Recent Cholecystitis - s/p perQ-cholecystostomy placement and removal by IR - no abdominal pain on exam                                                                            ----------------------------------------------------  # DVT prophylaxis: Coumadin (holding)  # GI prophylaxis: N/A  # Diet: DASH/TLC  # Activity: Ambulate as Tolerated  # Code status: Full  # Disposition: remain on floor                                                                           --------------------------------------------------------  # Handoff: f/u BCx's + BP monitoring RODNEY SANTO 81y Male  MRN#: [id number]   CODE STATUS:  Hospital Day: 12d  Pt is currently admitted with the primary diagnosis of: Atypical Chest Pain    SUBJECTIVE  Overnight events: None reported.  Subjective complaints: Endorses fatigue/weakness, denies abdominal pain, RUQ pain.                                          ----------------------------------------------------------  OBJECTIVE  PAST MEDICAL & SURGICAL HISTORY  COPD (chronic obstructive pulmonary disease)    Hypertension    Deep vein thrombosis (DVT) of left lower extremity, unspecified chronicity, unspecified vein    Cellulitis of left lower extremity    Chronic atrial fibrillation    Mitral valve insufficiency, unspecified etiology  Moderate    CHF (congestive heart failure)    S/P coronary artery stent placement    History of total right knee replacement                                          -----------------------------------------------------------  ALLERGIES:  No Known Allergies                                          ------------------------------------------------------------  HOME MEDICATIONS  Home Medications:  atorvastatin 20 mg oral tablet: 1 tab(s) orally once a day (13 Sep 2021 04:59)  folic acid 1 mg oral tablet: 1 tab(s) orally once a day (21 Sep 2021 11:34)  oxycodone-acetaminophen 5 mg-325 mg oral tablet: 1 tab(s) orally every 6 hours, As needed, Moderate Pain (4 - 6) (21 Sep 2021 11:32)  spironolactone 25 mg oral tablet: 0.5 tab(s) orally once a day (21 Sep 2021 11:34)  Symbicort 160 mcg-4.5 mcg/inh inhalation aerosol: 2 puff(s) inhaled 2 times a day (13 Sep 2021 04:59)  Ventolin HFA 90 mcg/inh inhalation aerosol: 2 puff(s) inhaled every 6 hours as neede for shortness of breath (13 Sep 2021 04:59)  warfarin 2 mg oral tablet: Take half a tablet Sun, Tues, Wed, Thurs, Fri, Sat.  Take a whole tablet on Mon (13 Sep 2021 04:59)                         MEDICATIONS:  STANDING MEDICATIONS  budesonide 160 MICROgram(s)/formoterol 4.5 MICROgram(s) Inhaler 2 Puff(s) Inhalation two times a day  cefepime   IVPB 1000 milliGRAM(s) IV Intermittent every 8 hours  cefepime   IVPB      chlorhexidine 4% Liquid 1 Application(s) Topical <User Schedule>  collagenase Ointment 1 Application(s) Topical daily  folic acid 1 milliGRAM(s) Oral daily  influenza  Vaccine (HIGH DOSE) 0.7 milliLiter(s) IntraMuscular once  lactated ringers Bolus 500 milliLiter(s) IV Bolus once  methocarbamol 500 milliGRAM(s) Oral three times a day  metroNIDAZOLE    Tablet 500 milliGRAM(s) Oral every 8 hours  midodrine. 10 milliGRAM(s) Oral three times a day    PRN MEDICATIONS  ALBUTerol    90 MICROgram(s) HFA Inhaler 2 Puff(s) Inhalation every 6 hours PRN  melatonin 5 milliGRAM(s) Oral at bedtime PRN                                          ------------------------------------------------------------  VITAL SIGNS: Last 24 Hours  T(C): 35.6 (12 Dec 2021 08:00), Max: 36.1 (11 Dec 2021 16:31)  T(F): 96 (12 Dec 2021 08:00), Max: 97 (11 Dec 2021 16:31)  HR: 76 (12 Dec 2021 08:00) (75 - 95)  BP: 98/52 (12 Dec 2021 08:00) (92/41 - 114/41)  BP(mean): --  RR: 18 (12 Dec 2021 08:00) (18 - 18)  SpO2: --    12-11-21 @ 07:01  -  12-12-21 @ 07:00  --------------------------------------------------------  IN: 0 mL / OUT: 250 mL / NET: -250 mL                                         --------------------------------------------------------------  LABS:             8.1    14.94 )-----------( 145      ( 12 Dec 2021 06:32 )             24.3     12-12    139  |  104  |  33<H>  ----------------------------<  70  4.1   |  21  |  1.1    Ca    7.6<L>      12 Dec 2021 06:32  Mg     1.9     12-12    TPro  4.7<L>  /  Alb  1.9<L>  /  TBili  1.5<H>  /  DBili  x   /  AST  42<H>  /  ALT  42<H>  /  AlkPhos  500<H>  12-12    PT/INR - ( 12 Dec 2021 06:32 )   PT: >40.00 sec;   INR: 4.09 ratio                                            -------------------------------------------------------------  RADIOLOGY:  < from: [facility] Hepatobiliary Imaging (12.11.21 @ 15:37) >  IMPRESSION:    NO DEFINITE VISUALIZATION OF THEGALLBLADDER THROUGH 4 HOURS   POST-INJECTION, CONSISTENT WITH CHOLECYSTITIS, AS DESCRIBED ABOVE.    CLINICAL CORRELATION IS SUGGESTED.    < end of copied text >                                            --------------------------------------------------------------  PHYSICAL EXAM:  General: alert, awake, no acute distress  HEENT: atraumatic  LUNGS: clear to auscultation bilaterally, no wheezes noted  HEART: regular rate/rhythm, no murmurs/gallops  ABDOMEN: soft, nontender, nondistended, normoactive bowel sounds  EXT: 2+ radial pulses, no edema noted  NEURO: aaox3, no focal deficits noted  SKIN: intact, no new lesions noted                                         --------------------------------------------------------------  ASSESSMENT & PLAN  Past medical history and hospital course:  82 yo M with PMHx of HFpEF (50-55%), Chronic AFib on Coumadin, 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 Trop at baseline 0.03, EKG showed afib, CXR small R sided pleural effusion.    #Proteus Bacteremia - +BCx x2 on 12/7, f/u BCx on 12/11 and 12/12  #Acute Cholecystitis - +findings on RUQ U/S (12/10) + HIDA Scan (12/11)  #PMHx of Recent Cholecystitis - s/p perQ-cholecystostomy placement and removal by IR - 10d prior to admission  - on Cefepime IV 2g q8h + Flagyl 500mg PO TID, ID following  - Gen Surg consulted    #Atypical Chest Pain - likely MSK, no further ischemic workup as per Cardiology (Dr. Herrera)  #PMHx of HFpEF - follows with Dr. 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  #PMHx of DVT Right Common Fem/Fem/Popliteal - on LE Venous Duplex 12/1  - still complaining of dull chest pain radiating to L arm, worse with deep breathing, but most likely MSK in nature as per Cardio  - Troponin 0.03 x3, was 0.02-0.03 throughout prior admission, EKG performed x2 with no acute ischemic changes  - D-dimer 342, BNP ~3K near baseline, 11/30 CXR showing small R sided pleural effusion, has remained at baseline oxygen requirements  - TTE 12/2: EF 50-55%, otherwise unremarkable   - d/c Spironolactone + Torsemide due to persistent hypotension, not overloaded on exam  - pain control with Percocet 1 tab q6h PRN for moderate pain + Morphine PRN for severe pain  - Cardio consulted (Dr. Herrera), on metoprolol 12.5mg PO BID, outpatient f/u, no further ischemic workup  - INR was supratherapeutic on admission, was reversed with Vitamin K on 12/2, restarted Coumadin, INR remains supratherapeutic, have been holding the Coumadin, will need decreased dosing moving forward  - BP on lower side, increased midodrine to 10mg PO TID  - patient not eating and drinking adequately, calorie count started  - patient refusing PT/Rehab  - CTA-chest 12/9 negative for PE    #Chronic Macrocytic Anemia - on folate supplementation  #Anemia of Chronic Disease  - baseline Hb ~10-11, supratherapeutic INR on admission reversed, B12 WNL in September  - 12/1 iron studies: low TIBC, likely anemia of chronic disease    #DTI Left Heel + Coccyx - Wound Care consulted, recommendations given to RN  #COPD - on home O2 2.5L, has remained at baseline, continuing symbicort 160ug 2 puffs BID, Albuterol PRN                                                                            ----------------------------------------------------  # DVT prophylaxis: Coumadin (holding)  # GI prophylaxis: N/A  # Diet: DASH/TLC  # Activity: Ambulate as Tolerated  # Code status: Full  # Disposition: remain on floor                                                                           --------------------------------------------------------  # Handoff: f/u BCx's + BP monitoring

## 2021-12-12 NOTE — CONSULT NOTE ADULT - ASSESSMENT
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 Physical exam findings, imaging, and labs as documented above.     PLAN:  - to be discussed with attending    Lines/Tubes: PIV    Above plan discussed with Attending Surgeon Dr. 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 Physical exam findings, imaging, and labs as documented above.     PLAN:  - please obtain risk stratification from medicine and cardiology  - trend LFTs (hepatic function panel)  - will plan for laparoscopic cholecystectomy this coming week (not booked yet)    Lines/Tubes: PIV    Above plan discussed with Attending Surgeon Dr. 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 ACS risk calculator showed: 17.7% serious complication, 16.6% any complication, 3.3% cardiac complication, 18.5% readmission, 13.3% death, 39% functional decline. Physical exam findings, imaging, and labs as documented above.     PLAN:  - please obtain risk stratification from medicine and cardiology  - trend LFTs (hepatic function panel)  - will plan for laparoscopic cholecystectomy this coming week (not booked yet)    Lines/Tubes: PIV    Above plan discussed with Attending Surgeon Dr. Johnson, patient, and Primary team  12-12-21 @ 13:05

## 2021-12-12 NOTE — CONSULT NOTE ADULT - SUBJECTIVE AND OBJECTIVE BOX
GENERAL SURGERY CONSULT NOTE    Patient: RODNEY SANTO , 81y ([birth year])Male   MRN: [id number]  Location: [place] [address] A  Visit: 11-30-21 Inpatient  Date: 12-12-21 @ 13:05    HPI:  80 yo M with PMHx of HFpEF (50-55%), Chronic AFib on Coumadin, 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 Pt reports that this morning, he was awakened by acute, sudden onset L sided chest pain at 4AM. Describes the pain as "pounding", states that he felt some pain radiate to LUE as well. Denies identifiable aggravating or alleviating factors and states that this has never happened before. Denies headache/dizziness, endorses chronic shortness of breath that is unchanged, has been requiring his baseline oxygen. Denies abdominal pain. Endorses compliance with all medications. States that at baseline, he is bedbound, cannot stand up or ambulate due to weakness. Was recently admitted to [facility] for short term rehab, where he wishes to go back because feels that he cannot care for himself at home. Lives with son but needs more help and PT.   In the ED, T 96, /58, HR 85, RR 18, SpO2 98% on O2 NC 4L, then 100% on 3L. Lab work revealed Hb 9.1, INR 7.87, troponin 0.03 and BNP 3327 (both near baseline). EKG revealed AFib, HR 88. CXR revealed slightly inc small R sided pleural effusion. 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 During this admission, he was found to have proteus bacteremia (12/7/21) and was started on cefepime and flagyl. UA was unremarkable, and d/t increasing LFTs, pt received RUQ US showing thick-walled gallbladder containing stones and sludge. HIDA was additionally performed with no visualization of the gallbladder. During prior admission, pt underwent cholecystostomy d/t multiple co-morbidities and high risk for surgery.     PAST MEDICAL & SURGICAL HISTORY:  COPD (chronic obstructive pulmonary disease)  Hypertension  Deep vein thrombosis (DVT) of left lower extremity, unspecified chronicity, unspecified vein  Cellulitis of left lower extremity  Chronic atrial fibrillation  Mitral valve insufficiency, unspecified etiology Moderate  CHF (congestive heart failure)  S/P coronary artery stent placement  History of total right knee replacement    Home Medications:  atorvastatin 20 mg oral tablet: 1 tab(s) orally once a day (13 Sep 2021 04:59)  folic acid 1 mg oral tablet: 1 tab(s) orally once a day (21 Sep 2021 11:34)  oxycodone-acetaminophen 5 mg-325 mg oral tablet: 1 tab(s) orally every 6 hours, As needed, Moderate Pain (4 - 6) (21 Sep 2021 11:32)  spironolactone 25 mg oral tablet: 0.5 tab(s) orally once a day (21 Sep 2021 11:34)  Symbicort 160 mcg-4.5 mcg/inh inhalation aerosol: 2 puff(s) inhaled 2 times a day (13 Sep 2021 04:59)  Ventolin HFA 90 mcg/inh inhalation aerosol: 2 puff(s) inhaled every 6 hours as neede for shortness of breath (13 Sep 2021 04:59)  warfarin 2 mg oral tablet: Take half a tablet Sun, Tues, Wed, Thurs, Fri, Sat.  Take a whole tablet on Mon (13 Sep 2021 04:59)    VITALS:  T(F): 96 (12-12-21 @ 08:00), Max: 97 (12-11-21 @ 16:31)  HR: 76 (12-12-21 @ 08:00) (75 - 95)  BP: 98/52 (12-12-21 @ 08:00) (97/55 - 114/41)  RR: 18 (12-12-21 @ 08:00) (18 - 18)    PHYSICAL EXAM:  General: NAD, AAOx3, calm and cooperative  HEENT: NCAT, TREY, EOMI, Trachea ML, Neck supple  Cardiac: RRR S1, S2, no Murmurs, rubs or gallops  Respiratory: CTAB, normal respiratory effort, breath sounds equal BL, no wheeze, rhonchi or crackles  Abdomen: Soft, non-distended, non-tender, no rebound, no guarding. +BS.  Rectal: Good tone, +stool, no blood, no adrian-anal masses/lesions, no fistulas, fissures, hemorrhoids  Musculoskeletal: Strength 5/5 BL UE/LE, ROM intact, compartments soft  Neuro: Sensation grossly intact and equal throughout, no focal deficits  Vascular: Pulses 2+ throughout, extremities well perfused  Skin: Warm/dry, normal color, no jaundice  Incision/wound: healing well, dressings in place, clean, dry and intact    MEDICATIONS  (STANDING):  budesonide 160 MICROgram(s)/formoterol 4.5 MICROgram(s) Inhaler 2 Puff(s) Inhalation two times a day  cefepime   IVPB      cefepime   IVPB 1000 milliGRAM(s) IV Intermittent every 8 hours  chlorhexidine 4% Liquid 1 Application(s) Topical <User Schedule>  collagenase Ointment 1 Application(s) Topical daily  folic acid 1 milliGRAM(s) Oral daily  influenza  Vaccine (HIGH DOSE) 0.7 milliLiter(s) IntraMuscular once  lactated ringers Bolus 500 milliLiter(s) IV Bolus once  methocarbamol 500 milliGRAM(s) Oral three times a day  metroNIDAZOLE    Tablet 500 milliGRAM(s) Oral every 8 hours  midodrine. 10 milliGRAM(s) Oral three times a day    MEDICATIONS  (PRN):  ALBUTerol    90 MICROgram(s) HFA Inhaler 2 Puff(s) Inhalation every 6 hours PRN Shortness of Breath and/or Wheezing  melatonin 5 milliGRAM(s) Oral at bedtime PRN Insomnia    LAB/STUDIES:                        8.1    14.94 )-----------( 145      ( 12 Dec 2021 06:32 )             24.3     12-12    139  |  104  |  33<H>  ----------------------------<  70  4.1   |  21  |  1.1    Ca    7.6<L>      12 Dec 2021 06:32  Mg     1.9     12-12    TPro  4.7<L>  /  Alb  1.9<L>  /  TBili  1.5<H>  /  DBili  x   /  AST  42<H>  /  ALT  42<H>  /  AlkPhos  500<H>  12-12  PT/INR - ( 12 Dec 2021 06:32 )   PT: >40.00 sec;   INR: 4.09 ratio      LIVER FUNCTIONS - ( 12 Dec 2021 06:32 )  Alb: 1.9 g/dL / Pro: 4.7 g/dL / ALK PHOS: 500 U/L / ALT: 42 U/L / AST: 42 U/L / GGT: x           IMAGING:  < from: US Abdomen Upper Quadrant Right (12.10.21 @ 18:17) >  IMPRESSION:   Thick-walled gallbladder containing stones and sludge perhaps representing cholecystitis.  < end of copied text >    < from: NM Hepatobiliary Imaging (12.11.21 @ 15:37) >  IMPRESSION:  NO DEFINITE VISUALIZATION OF THE GALLBLADDER THROUGH 4 HOURS   POST-INJECTION, CONSISTENT WITH CHOLECYSTITIS, AS DESCRIBED ABOVE.    CLINICAL CORRELATION IS SUGGESTED.  < end of copied text > GENERAL SURGERY CONSULT NOTE    Patient: RODNEY SANTO , 81y ([birth year])Male   MRN: [id number]  Location: [place] [address] A  Visit: 11-30-21 Inpatient  Date: 12-12-21 @ 13:05    HPI:  82 yo M with PMHx of HFpEF (50-55%), Chronic AFib on Coumadin, 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 Pt reports that this morning, he was awakened by acute, sudden onset L sided chest pain at 4AM. Describes the pain as "pounding", states that he felt some pain radiate to LUE as well. Denies identifiable aggravating or alleviating factors and states that this has never happened before. Denies headache/dizziness, endorses chronic shortness of breath that is unchanged, has been requiring his baseline oxygen. Denies abdominal pain. Endorses compliance with all medications. States that at baseline, he is bedbound, cannot stand up or ambulate due to weakness. Was recently admitted to [facility] for short term rehab, where he wishes to go back because feels that he cannot care for himself at home. Lives with son but needs more help and PT.   In the ED, T 96, /58, HR 85, RR 18, SpO2 98% on O2 NC 4L, then 100% on 3L. Lab work revealed Hb 9.1, INR 7.87, troponin 0.03 and BNP 3327 (both near baseline). EKG revealed AFib, HR 88. CXR revealed slightly inc small R sided pleural effusion. 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 During this admission, he was found to have proteus bacteremia (12/7/21) and was started on cefepime and flagyl. UA was unremarkable, and d/t increasing LFTs and WBC, pt received RUQ US showing a thick-walled gallbladder containing stones and sludge. HIDA was additionally performed with no visualization of the gallbladder. During the prior admission, pt underwent cholecystostomy with IR on 9/14/21 d/t multiple co-morbidities and high risk for surgery. FU cholecystogram on 11/5/21 showed complete occlusion of the cystic duct with multiple stones in the gallbladder. Pt accidentally pulled his drain out on 11/20/21, and saw IR as an outpatient on 11/23/21 to discuss stone removal with plans for cholangioscopy and stone removal in the future. Surgery is consulted for possible laparoscopic cholecystectomy.      PAST MEDICAL & SURGICAL HISTORY:  COPD (chronic obstructive pulmonary disease)  Hypertension  Deep vein thrombosis (DVT) of left lower extremity, unspecified chronicity, unspecified vein  Cellulitis of left lower extremity  Chronic atrial fibrillation  Mitral valve insufficiency, unspecified etiology Moderate  CHF (congestive heart failure)  S/P coronary artery stent placement  History of total right knee replacement    Home Medications:  atorvastatin 20 mg oral tablet: 1 tab(s) orally once a day (13 Sep 2021 04:59)  folic acid 1 mg oral tablet: 1 tab(s) orally once a day (21 Sep 2021 11:34)  oxycodone-acetaminophen 5 mg-325 mg oral tablet: 1 tab(s) orally every 6 hours, As needed, Moderate Pain (4 - 6) (21 Sep 2021 11:32)  spironolactone 25 mg oral tablet: 0.5 tab(s) orally once a day (21 Sep 2021 11:34)  Symbicort 160 mcg-4.5 mcg/inh inhalation aerosol: 2 puff(s) inhaled 2 times a day (13 Sep 2021 04:59)  Ventolin HFA 90 mcg/inh inhalation aerosol: 2 puff(s) inhaled every 6 hours as neede for shortness of breath (13 Sep 2021 04:59)  warfarin 2 mg oral tablet: Take half a tablet Sun, Tues, Wed, Thurs, Fri, Sat.  Take a whole tablet on Mon (13 Sep 2021 04:59)    VITALS:  T(F): 96 (12-12-21 @ 08:00), Max: 97 (12-11-21 @ 16:31)  HR: 76 (12-12-21 @ 08:00) (75 - 95)  BP: 98/52 (12-12-21 @ 08:00) (97/55 - 114/41)  RR: 18 (12-12-21 @ 08:00) (18 - 18)    PHYSICAL EXAM:  General: NAD, AAOx3, calm and cooperative  HEENT: NCAT, TREY, EOMI, Trachea ML, Neck supple  Cardiac: RRR S1, S2, no Murmurs, rubs or gallops  Respiratory: CTAB, normal respiratory effort, breath sounds equal BL, no wheeze, rhonchi or crackles  Abdomen: Soft, non-distended, non-tender, no rebound, no guarding. +BS.  Rectal: Good tone, +stool, no blood, no adrian-anal masses/lesions, no fistulas, fissures, hemorrhoids  Musculoskeletal: Strength 5/5 BL UE/LE, ROM intact, compartments soft  Neuro: Sensation grossly intact and equal throughout, no focal deficits  Vascular: Pulses 2+ throughout, extremities well perfused  Skin: Warm/dry, normal color, no jaundice  Incision/wound: healing well, dressings in place, clean, dry and intact    MEDICATIONS  (STANDING):  budesonide 160 MICROgram(s)/formoterol 4.5 MICROgram(s) Inhaler 2 Puff(s) Inhalation two times a day  cefepime   IVPB      cefepime   IVPB 1000 milliGRAM(s) IV Intermittent every 8 hours  chlorhexidine 4% Liquid 1 Application(s) Topical <User Schedule>  collagenase Ointment 1 Application(s) Topical daily  folic acid 1 milliGRAM(s) Oral daily  influenza  Vaccine (HIGH DOSE) 0.7 milliLiter(s) IntraMuscular once  lactated ringers Bolus 500 milliLiter(s) IV Bolus once  methocarbamol 500 milliGRAM(s) Oral three times a day  metroNIDAZOLE    Tablet 500 milliGRAM(s) Oral every 8 hours  midodrine. 10 milliGRAM(s) Oral three times a day    MEDICATIONS  (PRN):  ALBUTerol    90 MICROgram(s) HFA Inhaler 2 Puff(s) Inhalation every 6 hours PRN Shortness of Breath and/or Wheezing  melatonin 5 milliGRAM(s) Oral at bedtime PRN Insomnia    LAB/STUDIES:                        8.1    14.94 )-----------( 145      ( 12 Dec 2021 06:32 )             24.3     12-12    139  |  104  |  33<H>  ----------------------------<  70  4.1   |  21  |  1.1    Ca    7.6<L>      12 Dec 2021 06:32  Mg     1.9     12-12    TPro  4.7<L>  /  Alb  1.9<L>  /  TBili  1.5<H>  /  DBili  x   /  AST  42<H>  /  ALT  42<H>  /  AlkPhos  500<H>  12-12  PT/INR - ( 12 Dec 2021 06:32 )   PT: >40.00 sec;   INR: 4.09 ratio      LIVER FUNCTIONS - ( 12 Dec 2021 06:32 )  Alb: 1.9 g/dL / Pro: 4.7 g/dL / ALK PHOS: 500 U/L / ALT: 42 U/L / AST: 42 U/L / GGT: x           IMAGING:  < from: US Abdomen Upper Quadrant Right (12.10.21 @ 18:17) >  IMPRESSION:   Thick-walled gallbladder containing stones and sludge perhaps representing cholecystitis.  < end of copied text >    < from: NM Hepatobiliary Imaging (12.11.21 @ 15:37) >  IMPRESSION:  NO DEFINITE VISUALIZATION OF THE GALLBLADDER THROUGH 4 HOURS   POST-INJECTION, CONSISTENT WITH CHOLECYSTITIS, AS DESCRIBED ABOVE.    CLINICAL CORRELATION IS SUGGESTED.  < end of copied text >

## 2021-12-12 NOTE — PROGRESS NOTE ADULT - ATTENDING COMMENTS
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           Proteus bacteremia-- started on cefepime and  RUQ US-- shows thickened wall with gall stones-- r/o ac cholecystitis--patient does not have pain--   alk phos elevated and bilirubin going up--  HIDA scan --< from: NM Hepatobiliary Imaging (12.11.21 @ 15:37) >      NO DEFINITE VISUALIZATION OF THEGALLBLADDER THROUGH 4 HOURS   POST-INJECTION, CONSISTENT WITH CHOLECYSTITIS, AS DESCRIBED ABOVE.    CLINICAL CORRELATION IS SUGGESTED    < end of copied text >    . unlikely a candidate for surgery. seen BY ID on cefepime  and flagyl.  Suspected atypical chest pain possibly skeletomuscular- CE negative x 3  Leucocytosis with bacteremia- though afebrile but hypotensive. wbc elevated and blood cx shows proteus sensitive to cefepime  Chronic A-Fib on Coumadin. Cont Metoprolol. INR high-- hold coumadin  Chronic HFpEF- no exacerbation  orthostatic hypotension-Cont Midodrine 10 mg po q 12h. BP still low .  torsemide on hold  ECHO 12/02- unremarkable with EF of 55%  chronic DVT of the R common femoral and popliteal veins-On coumadin--  CT chest to r/o PE-- NO PE  Tachy-selvin synd-- s/p loop recorder  Sacral decubitus-- Burn for debridement    prognosis is guarded.    HIDA scan positive-- not a candidate for surgery-- not eating today-- will try to get IR drainage tomorrow.

## 2021-12-13 LAB
ALBUMIN SERPL ELPH-MCNC: 2.1 G/DL — LOW (ref 3.5–5.2)
ALP SERPL-CCNC: 536 U/L — HIGH (ref 30–115)
ALT FLD-CCNC: 39 U/L — SIGNIFICANT CHANGE UP (ref 0–41)
ANION GAP SERPL CALC-SCNC: 14 MMOL/L — SIGNIFICANT CHANGE UP (ref 7–14)
AST SERPL-CCNC: 34 U/L — SIGNIFICANT CHANGE UP (ref 0–41)
BASOPHILS # BLD AUTO: 0.01 K/UL — SIGNIFICANT CHANGE UP (ref 0–0.2)
BASOPHILS NFR BLD AUTO: 0.1 % — SIGNIFICANT CHANGE UP (ref 0–1)
BILIRUB SERPL-MCNC: 1.4 MG/DL — HIGH (ref 0.2–1.2)
BUN SERPL-MCNC: 36 MG/DL — HIGH (ref 10–20)
CALCIUM SERPL-MCNC: 7.6 MG/DL — LOW (ref 8.5–10.1)
CHLORIDE SERPL-SCNC: 103 MMOL/L — SIGNIFICANT CHANGE UP (ref 98–110)
CO2 SERPL-SCNC: 19 MMOL/L — SIGNIFICANT CHANGE UP (ref 17–32)
CREAT SERPL-MCNC: 1.2 MG/DL — SIGNIFICANT CHANGE UP (ref 0.7–1.5)
EOSINOPHIL # BLD AUTO: 0.09 K/UL — SIGNIFICANT CHANGE UP (ref 0–0.7)
EOSINOPHIL NFR BLD AUTO: 0.7 % — SIGNIFICANT CHANGE UP (ref 0–8)
GLUCOSE SERPL-MCNC: 78 MG/DL — SIGNIFICANT CHANGE UP (ref 70–99)
HCT VFR BLD CALC: 23.9 % — LOW (ref 42–52)
HGB BLD-MCNC: 7.8 G/DL — LOW (ref 14–18)
IMM GRANULOCYTES NFR BLD AUTO: 0.7 % — HIGH (ref 0.1–0.3)
INR BLD: 4.64 RATIO — HIGH (ref 0.65–1.3)
LYMPHOCYTES # BLD AUTO: 0.49 K/UL — LOW (ref 1.2–3.4)
LYMPHOCYTES # BLD AUTO: 3.9 % — LOW (ref 20.5–51.1)
MAGNESIUM SERPL-MCNC: 2 MG/DL — SIGNIFICANT CHANGE UP (ref 1.8–2.4)
MCHC RBC-ENTMCNC: 32.1 PG — HIGH (ref 27–31)
MCHC RBC-ENTMCNC: 32.6 G/DL — SIGNIFICANT CHANGE UP (ref 32–37)
MCV RBC AUTO: 98.4 FL — HIGH (ref 80–94)
MONOCYTES # BLD AUTO: 0.73 K/UL — HIGH (ref 0.1–0.6)
MONOCYTES NFR BLD AUTO: 5.8 % — SIGNIFICANT CHANGE UP (ref 1.7–9.3)
NEUTROPHILS # BLD AUTO: 11.07 K/UL — HIGH (ref 1.4–6.5)
NEUTROPHILS NFR BLD AUTO: 88.8 % — HIGH (ref 42.2–75.2)
NRBC # BLD: 0 /100 WBCS — SIGNIFICANT CHANGE UP (ref 0–0)
PLATELET # BLD AUTO: 135 K/UL — SIGNIFICANT CHANGE UP (ref 130–400)
POTASSIUM SERPL-MCNC: 3.8 MMOL/L — SIGNIFICANT CHANGE UP (ref 3.5–5)
POTASSIUM SERPL-SCNC: 3.8 MMOL/L — SIGNIFICANT CHANGE UP (ref 3.5–5)
PROT SERPL-MCNC: 4.7 G/DL — LOW (ref 6–8)
PROTHROM AB SERPL-ACNC: >40 SEC — HIGH (ref 9.95–12.87)
RBC # BLD: 2.43 M/UL — LOW (ref 4.7–6.1)
RBC # FLD: 16.6 % — HIGH (ref 11.5–14.5)
SODIUM SERPL-SCNC: 136 MMOL/L — SIGNIFICANT CHANGE UP (ref 135–146)
WBC # BLD: 12.48 K/UL — HIGH (ref 4.8–10.8)
WBC # FLD AUTO: 12.48 K/UL — HIGH (ref 4.8–10.8)

## 2021-12-13 PROCEDURE — 99232 SBSQ HOSP IP/OBS MODERATE 35: CPT

## 2021-12-13 PROCEDURE — 99233 SBSQ HOSP IP/OBS HIGH 50: CPT

## 2021-12-13 RX ORDER — CEFTRIAXONE 500 MG/1
2000 INJECTION, POWDER, FOR SOLUTION INTRAMUSCULAR; INTRAVENOUS EVERY 24 HOURS
Refills: 0 | Status: DISCONTINUED | OUTPATIENT
Start: 2021-12-13 | End: 2021-12-21

## 2021-12-13 RX ADMIN — MIDODRINE HYDROCHLORIDE 10 MILLIGRAM(S): 2.5 TABLET ORAL at 11:22

## 2021-12-13 RX ADMIN — CEFTRIAXONE 100 MILLIGRAM(S): 500 INJECTION, POWDER, FOR SOLUTION INTRAMUSCULAR; INTRAVENOUS at 17:50

## 2021-12-13 RX ADMIN — MIDODRINE HYDROCHLORIDE 10 MILLIGRAM(S): 2.5 TABLET ORAL at 16:54

## 2021-12-13 RX ADMIN — METHOCARBAMOL 500 MILLIGRAM(S): 500 TABLET, FILM COATED ORAL at 14:47

## 2021-12-13 RX ADMIN — BUDESONIDE AND FORMOTEROL FUMARATE DIHYDRATE 2 PUFF(S): 160; 4.5 AEROSOL RESPIRATORY (INHALATION) at 11:22

## 2021-12-13 RX ADMIN — Medication 500 MILLIGRAM(S): at 05:42

## 2021-12-13 RX ADMIN — CEFEPIME 100 MILLIGRAM(S): 1 INJECTION, POWDER, FOR SOLUTION INTRAMUSCULAR; INTRAVENOUS at 05:42

## 2021-12-13 RX ADMIN — Medication 5 MILLIGRAM(S): at 01:59

## 2021-12-13 RX ADMIN — OXYCODONE HYDROCHLORIDE 5 MILLIGRAM(S): 5 TABLET ORAL at 09:33

## 2021-12-13 RX ADMIN — Medication 1 MILLIGRAM(S): at 11:23

## 2021-12-13 RX ADMIN — Medication 500 MILLIGRAM(S): at 14:47

## 2021-12-13 RX ADMIN — CHLORHEXIDINE GLUCONATE 1 APPLICATION(S): 213 SOLUTION TOPICAL at 05:42

## 2021-12-13 RX ADMIN — METHOCARBAMOL 500 MILLIGRAM(S): 500 TABLET, FILM COATED ORAL at 05:42

## 2021-12-13 RX ADMIN — OXYCODONE HYDROCHLORIDE 5 MILLIGRAM(S): 5 TABLET ORAL at 08:33

## 2021-12-13 RX ADMIN — Medication 5 MILLIGRAM(S): at 23:58

## 2021-12-13 RX ADMIN — Medication 1 APPLICATION(S): at 13:03

## 2021-12-13 RX ADMIN — OXYCODONE HYDROCHLORIDE 5 MILLIGRAM(S): 5 TABLET ORAL at 23:57

## 2021-12-13 RX ADMIN — CEFEPIME 100 MILLIGRAM(S): 1 INJECTION, POWDER, FOR SOLUTION INTRAMUSCULAR; INTRAVENOUS at 14:47

## 2021-12-13 RX ADMIN — MIDODRINE HYDROCHLORIDE 10 MILLIGRAM(S): 2.5 TABLET ORAL at 05:42

## 2021-12-13 NOTE — PROGRESS NOTE ADULT - ASSESSMENT
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           Proteus bacteremia-- started on cefepime and  RUQ US-- shows thickened wall with gall stones-- r/o ac cholecystitis--patient does not have pain--   alk phos elevated and bilirubin going up--  HIDA scan --< from: NM Hepatobiliary Imaging (12.11.21 @ 15:37) >      NO DEFINITE VISUALIZATION OF THEGALLBLADDER THROUGH 4 HOURS   POST-INJECTION, CONSISTENT WITH CHOLECYSTITIS, AS DESCRIBED ABOVE.    CLINICAL CORRELATION IS SUGGESTED    < end of copied text >    . unlikely a candidate for surgery. seen BY ID on cefepiime changed to ceftriaxone  and flagyl.    Suspected atypical chest pain possibly skeletomuscular- CE negative x 3  Leucocytosis with bacteremia- though afebrile but hypotensive. wbc elevated and blood cx shows proteus sensitive to cefepime and ceftriaxone  Chronic A-Fib on Coumadin. Cont Metoprolol. INR high-- hold coumadin  Chronic HFpEF- no exacerbation  orthostatic hypotension-Cont Midodrine 10 mg po q 12h. BP still low .  torsemide on hold  ECHO 12/02- unremarkable with EF of 55%    chronic DVT of the R common femoral and popliteal veins-On coumadin--  CT chest to r/o PE-- NO PE  Tachy-selvin synd-- s/p loop recorder  Sacral decubitus-- Burn for debridement-- patient refused    prognosis is guarded.    HIDA scan positive--  surgery wanted clearance for lap cheli-- not eating today-- patient pulled out last cholecystectomy tube.  spoke with daughter-- she will come tomorrow and palliative care discussion to be started. poor support from family--.

## 2021-12-13 NOTE — PROGRESS NOTE ADULT - ASSESSMENT
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 ACS risk calculator showed: 17.7% serious complication, 16.6% any complication, 3.3% cardiac complication, 18.5% readmission, 13.3% death, 39% functional decline.    PLAN:  - f/u Cardiology, medicine, and pulmonology risk stratification  - Echo this admission  - trend LFTs    Lines/Tubes: PIV,     BLUE TEAM SPECTRA: [id number]

## 2021-12-13 NOTE — PROGRESS NOTE ADULT - ATTENDING COMMENTS
recurrent acute cheli   for surgical risk stratification   surgery decision pending cardiology evaluation

## 2021-12-13 NOTE — PROGRESS NOTE ADULT - ASSESSMENT
ASSESSMENT  82 yo M with PMHx of HFpEF (50-55%), Chronic AFib on Coumadin, Micra PPM placement in August 2021 for tachy-selvin syndrome, DVT July 2021, HTN, and COPD on 2.5L home O2, cholecystitis s/p cholecystostomy by IR in September, removed 10 days ago presents for chest pain admitted 11/30.; Noted to have leukocytosis and hypotension and + BCX, ID consulted    IMPRESSION  #Proteus bacteremia secondary to biliary source    12/11 BCX NGTD     12/7 BCX  + (S)    HIDA +     UA unremarkable     9/2021 cholecystitis s/p cholecystotomy, 9/14 CX   Rare Escherichia coli  #PPM  #DVT  #CT There are small bilateral pleural effusions with mild atelectatic changes   at the lung bases.  Creatinine, Serum: 1.2 (12-10-21 @ 04:30)    Weight (kg): 81.6 (10-16-21 @ 10:57)    RECOMMENDATIONS  - Narrow cefepime to Ceftriaxone 2g q24h IV   - Continue PO flagyl 500mg TID  - Appreciate Surgery consult: will plan for laparoscopic cholecystectomy this coming week (not booked yet)  - Post surgery, cipro 500mg BID and flagyl 500mg TID x 3 days  - Monitor sacral area, low threshold to consult Burn, appreciate Wound care consult    If any questions, please call or send a message on [employer] Teams  Please continue to update ID with any pertinent new laboratory or radiographic findings  Spectra [id number]

## 2021-12-13 NOTE — PROGRESS NOTE ADULT - SUBJECTIVE AND OBJECTIVE BOX
GENERAL SURGERY PROGRESS NOTE    Patient: RODNEY SANTO , 81y ([birth year])Male   MRN: [id number]  Location: [address]  Visit: 11-30-21 Inpatient  Date: 12-13-21 @ 11:52    Hospital Day #:4    Procedure/Dx/Injuries: Acute cheli      Events of past 24 hours:  Pt is stable still c/o RUQ.     PAST MEDICAL & SURGICAL HISTORY:  COPD (chronic obstructive pulmonary disease)    Hypertension    Deep vein thrombosis (DVT) of left lower extremity, unspecified chronicity, unspecified vein    Cellulitis of left lower extremity    Chronic atrial fibrillation    Mitral valve insufficiency, unspecified etiology  Moderate    CHF (congestive heart failure)    S/P coronary artery stent placement    History of total right knee replacement    Vitals:   T(F): 96.2 (12-13-21 @ 08:17), Max: 96.4 (12-12-21 @ 17:33)  HR: 96 (12-13-21 @ 08:43)  BP: 104/55 (12-13-21 @ 08:43)  RR: 20 (12-13-21 @ 08:17)  SpO2: 99% (12-13-21 @ 08:17)    Diet, DASH/TLC:   Sodium & Cholesterol Restricted     Qty per Day:  MAGIC CUP 2X/DAY  Supplement Feeding Modality:  Oral  Ensure Pudding Cans or Servings Per Day:  1       Frequency:  Two Times a day      Fluids:     I & O's:    PHYSICAL EXAM:  General: NAD, AAOx3, calm and cooperative, cachetic, malnourished  Cardiac: Irregulaly irregular   Respiratory: CTAB, dimishined breath sounds at the base  Abdomen: Soft, non-distended, mildly tender, RUQ   Musculoskeletal: 4/5 weakness in b/l UE and LE      MEDICATIONS  (STANDING):  budesonide 160 MICROgram(s)/formoterol 4.5 MICROgram(s) Inhaler 2 Puff(s) Inhalation two times a day  cefepime   IVPB      cefepime   IVPB 1000 milliGRAM(s) IV Intermittent every 8 hours  chlorhexidine 4% Liquid 1 Application(s) Topical <User Schedule>  collagenase Ointment 1 Application(s) Topical daily  folic acid 1 milliGRAM(s) Oral daily  influenza  Vaccine (HIGH DOSE) 0.7 milliLiter(s) IntraMuscular once  lactated ringers Bolus 500 milliLiter(s) IV Bolus once  methocarbamol 500 milliGRAM(s) Oral three times a day  metroNIDAZOLE    Tablet 500 milliGRAM(s) Oral every 8 hours  midodrine. 10 milliGRAM(s) Oral three times a day    MEDICATIONS  (PRN):  ALBUTerol    90 MICROgram(s) HFA Inhaler 2 Puff(s) Inhalation every 6 hours PRN Shortness of Breath and/or Wheezing  melatonin 5 milliGRAM(s) Oral at bedtime PRN Insomnia  oxyCODONE    IR 5 milliGRAM(s) Oral every 6 hours PRN Moderate Pain (4 - 6)      DVT PROPHYLAXIS:   GI PROPHYLAXIS:   ANTICOAGULATION:   ANTIBIOTICS:  cefepime   IVPB    cefepime   IVPB 1000 milliGRAM(s)  metroNIDAZOLE    Tablet 500 milliGRAM(s)            LAB/STUDIES:  Labs:  CAPILLARY BLOOD GLUCOSE                              7.8    12.48 )-----------( 135      ( 13 Dec 2021 04:30 )             23.9       Auto Neutrophil %: 88.8 % (12-13-21 @ 04:30)  Auto Immature Granulocyte %: 0.7 % (12-13-21 @ 04:30)    12-13    136  |  103  |  36<H>  ----------------------------<  78  3.8   |  19  |  1.2      Calcium, Total Serum: 7.6 mg/dL (12-13-21 @ 04:30)      LFTs:             4.7  | 1.4  | 34       ------------------[536     ( 13 Dec 2021 04:30 )  2.1  | x    | 39          Lipase:x      Amylase:x             Coags:     >40.00  ----< 4.64    ( 13 Dec 2021 04:30 )     x           Culture - Blood (collected 11 Dec 2021 04:30)  Source: .Blood Blood  Preliminary Report (12 Dec 2021 17:01):    No growth to date.    IMAGING:  IMPRESSION:    NO DEFINITE VISUALIZATION OF THEGALLBLADDER THROUGH 4 HOURS   POST-INJECTION, CONSISTENT WITH CHOLECYSTITIS, AS DESCRIBED ABOVE.    CLINICAL CORRELATION IS SUGGESTED.    IMPRESSION: Thick-walled gallbladder containing stones and sludge perhaps   representing cholecystitis.    ACCESS/ DEVICES:  [x] Peripheral IV  [ ] Central Venous Line	[ ] R	[ ] L	[ ] IJ	[ ] Fem	[ ] SC	Placed:   [ ] Arterial Line		[ ] R	[ ] L	[ ] Fem	[ ] Rad	[ ] Ax	Placed:   [ ] PICC:					[ ] Mediport  [ ] Urinary Catheter,  Date Placed:   [ ] Chest tube: [ ] Right, [ ] Left  [ ] DON/Manuel Drains

## 2021-12-13 NOTE — PROGRESS NOTE ADULT - ASSESSMENT
C/w medical therapy  C/w low dose BB  Would not increase further given low BP  Gentle hydration  Chest pain is non-cardiac - no plan for cath  S/p PPM for bradycardia.  closely monitor BP, HR  Monitor INR    Given comorbidities, patient has a high risk of complications for a moderate risk procedure.

## 2021-12-13 NOTE — PROGRESS NOTE ADULT - SUBJECTIVE AND OBJECTIVE BOX
RODNEY SANTO  81y, Male  Allergy: No Known Allergies      LOS  13d    CHIEF COMPLAINT: chest pain (13 Dec 2021 09:14)      INTERVAL EVENTS/HPI  - No acute events overnight, + HIDA  - T(F): , Max: 96.4 (12-12-21 @ 17:33)  - Tolerating medication  - WBC Count: 14.94 (12-12-21 @ 06:32)  WBC Count: 12.69 (12-11-21 @ 04:30)     - Creatinine, Serum: 1.1 (12-12-21 @ 06:32)       ROS  ***    VITALS:  T(F): 96.2, Max: 96.4 (12-12-21 @ 17:33)  HR: 96  BP: 104/55  RR: 20Vital Signs Last 24 Hrs  T(C): 35.7 (13 Dec 2021 08:17), Max: 35.8 (12 Dec 2021 17:33)  T(F): 96.2 (13 Dec 2021 08:17), Max: 96.4 (12 Dec 2021 17:33)  HR: 96 (13 Dec 2021 08:43) (70 - 96)  BP: 104/55 (13 Dec 2021 08:43) (82/44 - 110/63)  BP(mean): --  RR: 20 (13 Dec 2021 08:17) (18 - 20)  SpO2: 99% (13 Dec 2021 08:17) (99% - 99%)    PHYSICAL EXAM:  ***    FH: Non-contributory  Social Hx: Non-contributory    TESTS & MEASUREMENTS:                        8.1    14.94 )-----------( 145      ( 12 Dec 2021 06:32 )             24.3     12-12    139  |  104  |  33<H>  ----------------------------<  70  4.1   |  21  |  1.1    Ca    7.6<L>      12 Dec 2021 06:32  Mg     1.9     12-12    TPro  4.7<L>  /  Alb  1.9<L>  /  TBili  1.5<H>  /  DBili  x   /  AST  42<H>  /  ALT  42<H>  /  AlkPhos  500<H>  12-12      LIVER FUNCTIONS - ( 12 Dec 2021 06:32 )  Alb: 1.9 g/dL / Pro: 4.7 g/dL / ALK PHOS: 500 U/L / ALT: 42 U/L / AST: 42 U/L / GGT: x               Culture - Blood (collected 12-11-21 @ 04:30)  Source: .Blood Blood  Preliminary Report (12-12-21 @ 17:01):    No growth to date.    Culture - Blood (collected 12-07-21 @ 21:00)  Source: .Blood None  Gram Stain (12-09-21 @ 19:26):    Growth in anaerobic bottle: Gram Negative Rods  Final Report (12-11-21 @ 08:32):    Growth in anaerobic bottle: Proteus mirabilis    See previous culture [id number]    Culture - Blood (collected 12-07-21 @ 18:41)  Source: .Blood None  Gram Stain (12-09-21 @ 14:38):    Growth in anaerobic bottle: Gram Negative Rods  Final Report (12-11-21 @ 08:29):    Growth in anaerobic bottle: Proteus mirabilis    ***Blood Panel PCR results on this specimen are available    approximately 3 hours after the Gram stain result.***    Gram stain, PCR, and/or culture results may not always    correspond due to difference inmethodologies.    ************************************************************    This PCR assay was performed by multiplex PCR. This    Assay tests for 66 bacterial and resistance gene targets.    Please refer to the [facility] test directory    [email or web address] for details.  Organism: Blood Culture PCR  Proteus mirabilis (12-11-21 @ 08:29)  Organism: Proteus mirabilis (12-11-21 @ 08:29)      -  Amikacin: S <=16      -  Ampicillin: S <=8 These ampicillin results predict results for amoxicillin      -  Ampicillin/Sulbactam: S <=4/2 Enterobacter, Klebsiella aerogenes, Citrobacter, and Serratia may develop resistance during prolonged therapy (3-4 days)      -  Aztreonam: S <=4      -  Cefazolin: S <=2 Enterobacter, Klebsiella aerogenes, Citrobacter, and Serratia may develop resistance during prolonged therapy (3-4 days)      -  Cefepime: S <=2      -  Cefoxitin: S <=8      -  Ceftriaxone: S <=1 Enterobacter, Klebsiella aerogenes, Citrobacter, and Serratia may develop resistance during prolonged therapy      -  Ciprofloxacin: S <=0.25      -  Ertapenem: S <=0.5      -  Gentamicin: S <=2      -  Levofloxacin: S <=0.5      -  Meropenem: S <=1      -  Piperacillin/Tazobactam: S <=8      -  Tobramycin: S <=2      -  Trimethoprim/Sulfamethoxazole: S <=0.5/9.5      Method Type: SHEFALI  Organism: Blood Culture PCR (12-11-21 @ 08:29)      -  Proteus mirabilis: Detec      Method Type: PCR            INFECTIOUS DISEASES TESTING  Procalcitonin, Serum: 0.25 (12-07-21 @ 18:41)  COVID-19 PCR: NotDetec (12-07-21 @ 12:50)  COVID-19 PCR: NotDetec (11-30-21 @ 06:35)  COVID-19 PCR: NotDetec (11-20-21 @ 13:35)  COVID-19 PCR: NotDetec (09-20-21 @ 19:15)  Procalcitonin, Serum: 0.16 (09-13-21 @ 05:00)  COVID-19 PCR: NotDetec (09-12-21 @ 12:00)  COVID-19 PCR: NotDetec (08-02-21 @ 14:22)  COVID-19 PCR: NotDetec (07-30-21 @ 20:37)  COVID-19 PCR: NotDetec (07-22-21 @ 15:05)  COVID-19 PCR: NotDetec (07-19-21 @ 15:20)  COVID-19 PCR: NotDetec (07-10-21 @ 15:11)  HIV-1/2 Combo Result: Nonreact (07-09-21 @ 06:54)  COVID-19 PCR: NotDetec (07-07-21 @ 13:20)  COVID-19 PCR: NotDetec (04-04-21 @ 13:19)      INFLAMMATORY MARKERS      RADIOLOGY & ADDITIONAL TESTS:  I have personally reviewed the last available Chest xray  CXR      CT      CARDIOLOGY TESTING  12 Lead ECG:   Ventricular Rate 59 BPM    QRS Duration 80 ms    Q-T Interval 430 ms    QTC Calculation(Bazett) 425 ms    R Axis -14 degrees    T Axis 28 degrees    Diagnosis Line Atrial fibrillation with slow ventricular response with occasional   ventricular-paced complexes  Low voltage QRS  Possible Inferior infarct , age undetermined  Cannot rule out Anterior infarct , age undetermined  Abnormal ECG    Confirmed by GAB ARZOLA MD ([id number]) on 12/3/2021 11:07:32 AM (12-03-21 @ 10:24)  12 Lead ECG:   Ventricular Rate 80 BPM    Atrial Rate 71 BPM    QRS Duration 88 ms    Q-T Interval 388 ms    QTC Calculation(Bazett) 447 ms    R Axis -24 degrees    T Axis 89 degrees    Diagnosis Line Atrial fibrillation  Inferior infarct , age undetermined  Abnormal ECG    Confirmed by JOSEPH CHUN MD [id number]) on 12/1/2021 10:34:43 PM (12-01-21 @ 07:34)      MEDICATIONS  budesonide 160 MICROgram(s)/formoterol 4.5 MICROgram(s) Inhaler 2 Inhalation two times a day  cefepime   IVPB     cefepime   IVPB 1000 IV Intermittent every 8 hours  chlorhexidine 4% Liquid 1 Topical <User Schedule>  collagenase Ointment 1 Topical daily  folic acid 1 Oral daily  influenza  Vaccine (HIGH DOSE) 0.7 IntraMuscular once  lactated ringers Bolus 500 IV Bolus once  methocarbamol 500 Oral three times a day  metroNIDAZOLE    Tablet 500 Oral every 8 hours  midodrine. 10 Oral three times a day      WEIGHT  Weight (kg): 81.6 (10-16-21 @ 10:57)      ANTIBIOTICS:  cefepime   IVPB      cefepime   IVPB 1000 milliGRAM(s) IV Intermittent every 8 hours  metroNIDAZOLE    Tablet 500 milliGRAM(s) Oral every 8 hours      All available historical records have been reviewed       RODNEY SANTO  81y, Male  Allergy: No Known Allergies      LOS  13d    CHIEF COMPLAINT: chest pain (13 Dec 2021 09:14)      INTERVAL EVENTS/HPI  - No acute events overnight, + HIDA  - T(F): , Max: 96.4 (12-12-21 @ 17:33)  - Tolerating medication  - WBC Count: 14.94 (12-12-21 @ 06:32)  WBC Count: 12.69 (12-11-21 @ 04:30)     - Creatinine, Serum: 1.1 (12-12-21 @ 06:32)       ROS  General: Denies rigors, nightsweats  HEENT: Denies headache, rhinorrhea, sore throat, eye pain  CV: Denies CP, palpitations  PULM: Denies wheezing, hemoptysis  GI: Denies hematemesis, hematochezia, melena  : Denies discharge, hematuria  MSK: Denies arthralgias, myalgias  SKIN: Denies rash, lesions  NEURO: Denies paresthesias, weakness  PSYCH: Denies depression, anxiety     VITALS:  T(F): 96.2, Max: 96.4 (12-12-21 @ 17:33)  HR: 96  BP: 104/55  RR: 20Vital Signs Last 24 Hrs  T(C): 35.7 (13 Dec 2021 08:17), Max: 35.8 (12 Dec 2021 17:33)  T(F): 96.2 (13 Dec 2021 08:17), Max: 96.4 (12 Dec 2021 17:33)  HR: 96 (13 Dec 2021 08:43) (70 - 96)  BP: 104/55 (13 Dec 2021 08:43) (82/44 - 110/63)  BP(mean): --  RR: 20 (13 Dec 2021 08:17) (18 - 20)  SpO2: 99% (13 Dec 2021 08:17) (99% - 99%)    PHYSICAL EXAM:  Gen: NAD, resting in bed  HEENT: Normocephalic, atraumatic  Neck: supple, no lymphadenopathy  CV: Regular rate & regular rhythm  Lungs: decreased BS at bases, no fremitus  Abdomen: Soft, BS present  Ext: Warm, well perfused  Neuro: non focal, awake  Skin: no rash, no erythema  Lines: no phlebitis     FH: Non-contributory  Social Hx: Non-contributory    TESTS & MEASUREMENTS:                        8.1    14.94 )-----------( 145      ( 12 Dec 2021 06:32 )             24.3     12-12    139  |  104  |  33<H>  ----------------------------<  70  4.1   |  21  |  1.1    Ca    7.6<L>      12 Dec 2021 06:32  Mg     1.9     12-12    TPro  4.7<L>  /  Alb  1.9<L>  /  TBili  1.5<H>  /  DBili  x   /  AST  42<H>  /  ALT  42<H>  /  AlkPhos  500<H>  12-12      LIVER FUNCTIONS - ( 12 Dec 2021 06:32 )  Alb: 1.9 g/dL / Pro: 4.7 g/dL / ALK PHOS: 500 U/L / ALT: 42 U/L / AST: 42 U/L / GGT: x               Culture - Blood (collected 12-11-21 @ 04:30)  Source: .Blood Blood  Preliminary Report (12-12-21 @ 17:01):    No growth to date.    Culture - Blood (collected 12-07-21 @ 21:00)  Source: .Blood None  Gram Stain (12-09-21 @ 19:26):    Growth in anaerobic bottle: Gram Negative Rods  Final Report (12-11-21 @ 08:32):    Growth in anaerobic bottle: Proteus mirabilis    See previous culture [id number]    Culture - Blood (collected 12-07-21 @ 18:41)  Source: .Blood None  Gram Stain (12-09-21 @ 14:38):    Growth in anaerobic bottle: Gram Negative Rods  Final Report (12-11-21 @ 08:29):    Growth in anaerobic bottle: Proteus mirabilis    ***Blood Panel PCR results on this specimen are available    approximately 3 hours after the Gram stain result.***    Gram stain, PCR, and/or culture results may not always    correspond due to difference inmethodologies.    ************************************************************    This PCR assay was performed by multiplex PCR. This    Assay tests for 66 bacterial and resistance gene targets.    Please refer to the [facility] test directory    [email or web address] for details.  Organism: Blood Culture PCR  Proteus mirabilis (12-11-21 @ 08:29)  Organism: Proteus mirabilis (12-11-21 @ 08:29)      -  Amikacin: S <=16      -  Ampicillin: S <=8 These ampicillin results predict results for amoxicillin      -  Ampicillin/Sulbactam: S <=4/2 Enterobacter, Klebsiella aerogenes, Citrobacter, and Serratia may develop resistance during prolonged therapy (3-4 days)      -  Aztreonam: S <=4      -  Cefazolin: S <=2 Enterobacter, Klebsiella aerogenes, Citrobacter, and Serratia may develop resistance during prolonged therapy (3-4 days)      -  Cefepime: S <=2      -  Cefoxitin: S <=8      -  Ceftriaxone: S <=1 Enterobacter, Klebsiella aerogenes, Citrobacter, and Serratia may develop resistance during prolonged therapy      -  Ciprofloxacin: S <=0.25      -  Ertapenem: S <=0.5      -  Gentamicin: S <=2      -  Levofloxacin: S <=0.5      -  Meropenem: S <=1      -  Piperacillin/Tazobactam: S <=8      -  Tobramycin: S <=2      -  Trimethoprim/Sulfamethoxazole: S <=0.5/9.5      Method Type: SHEFALI  Organism: Blood Culture PCR (12-11-21 @ 08:29)      -  Proteus mirabilis: Detec      Method Type: PCR            INFECTIOUS DISEASES TESTING  Procalcitonin, Serum: 0.25 (12-07-21 @ 18:41)  COVID-19 PCR: NotDetec (12-07-21 @ 12:50)  COVID-19 PCR: NotDetec (11-30-21 @ 06:35)  COVID-19 PCR: NotDetec (11-20-21 @ 13:35)  COVID-19 PCR: NotDetec (09-20-21 @ 19:15)  Procalcitonin, Serum: 0.16 (09-13-21 @ 05:00)  COVID-19 PCR: NotDetec (09-12-21 @ 12:00)  COVID-19 PCR: NotDetec (08-02-21 @ 14:22)  COVID-19 PCR: NotDetec (07-30-21 @ 20:37)  COVID-19 PCR: NotDetec (07-22-21 @ 15:05)  COVID-19 PCR: NotDetec (07-19-21 @ 15:20)  COVID-19 PCR: NotDetec (07-10-21 @ 15:11)  HIV-1/2 Combo Result: Nonreact (07-09-21 @ 06:54)  COVID-19 PCR: NotDetec (07-07-21 @ 13:20)  COVID-19 PCR: NotDetec (04-04-21 @ 13:19)      INFLAMMATORY MARKERS      RADIOLOGY & ADDITIONAL TESTS:  I have personally reviewed the last available Chest xray  CXR      CT      CARDIOLOGY TESTING  12 Lead ECG:   Ventricular Rate 59 BPM    QRS Duration 80 ms    Q-T Interval 430 ms    QTC Calculation(Bazett) 425 ms    R Axis -14 degrees    T Axis 28 degrees    Diagnosis Line Atrial fibrillation with slow ventricular response with occasional   ventricular-paced complexes  Low voltage QRS  Possible Inferior infarct , age undetermined  Cannot rule out Anterior infarct , age undetermined  Abnormal ECG    Confirmed by GAB ARZOLA MD ([id number]) on 12/3/2021 11:07:32 AM (12-03-21 @ 10:24)  12 Lead ECG:   Ventricular Rate 80 BPM    Atrial Rate 71 BPM    QRS Duration 88 ms    Q-T Interval 388 ms    QTC Calculation(Bazett) 447 ms    R Axis -24 degrees    T Axis 89 degrees    Diagnosis Line Atrial fibrillation  Inferior infarct , age undetermined  Abnormal ECG    Confirmed by [facility] ([id number]) on 12/1/2021 10:34:43 PM (12-01-21 @ 07:34)      MEDICATIONS  budesonide 160 MICROgram(s)/formoterol 4.5 MICROgram(s) Inhaler 2 Inhalation two times a day  cefepime   IVPB     cefepime   IVPB 1000 IV Intermittent every 8 hours  chlorhexidine 4% Liquid 1 Topical <User Schedule>  collagenase Ointment 1 Topical daily  folic acid 1 Oral daily  influenza  Vaccine (HIGH DOSE) 0.7 IntraMuscular once  lactated ringers Bolus 500 IV Bolus once  methocarbamol 500 Oral three times a day  metroNIDAZOLE    Tablet 500 Oral every 8 hours  midodrine. 10 Oral three times a day      WEIGHT  Weight (kg): 81.6 (10-16-21 @ 10:57)      ANTIBIOTICS:  cefepime   IVPB      cefepime   IVPB 1000 milliGRAM(s) IV Intermittent every 8 hours  metroNIDAZOLE    Tablet 500 milliGRAM(s) Oral every 8 hours      All available historical records have been reviewed

## 2021-12-13 NOTE — PROGRESS NOTE ADULT - SUBJECTIVE AND OBJECTIVE BOX
SUBJECTIVE:    Patient is a 81y old Male who presents with a chief complaint of chest pain (13 Dec 2021 10:51)    Currently admitted to medicine with the primary diagnosis of Chest pain       Today is hospital day 13d.     PAST MEDICAL & SURGICAL HISTORY  COPD (chronic obstructive pulmonary disease)    Hypertension    Deep vein thrombosis (DVT) of left lower extremity, unspecified chronicity, unspecified vein    Cellulitis of left lower extremity    Chronic atrial fibrillation    Mitral valve insufficiency, unspecified etiology  Moderate    CHF (congestive heart failure)    S/P coronary artery stent placement    History of total right knee replacement      ALLERGIES:  No Known Allergies    MEDICATIONS:  STANDING MEDICATIONS  budesonide 160 MICROgram(s)/formoterol 4.5 MICROgram(s) Inhaler 2 Puff(s) Inhalation two times a day  cefTRIAXone   IVPB 2000 milliGRAM(s) IV Intermittent every 24 hours  chlorhexidine 4% Liquid 1 Application(s) Topical <User Schedule>  collagenase Ointment 1 Application(s) Topical daily  folic acid 1 milliGRAM(s) Oral daily  influenza  Vaccine (HIGH DOSE) 0.7 milliLiter(s) IntraMuscular once  lactated ringers Bolus 500 milliLiter(s) IV Bolus once  methocarbamol 500 milliGRAM(s) Oral three times a day  metroNIDAZOLE    Tablet 500 milliGRAM(s) Oral every 8 hours  midodrine. 10 milliGRAM(s) Oral three times a day    PRN MEDICATIONS  ALBUTerol    90 MICROgram(s) HFA Inhaler 2 Puff(s) Inhalation every 6 hours PRN  melatonin 5 milliGRAM(s) Oral at bedtime PRN  oxyCODONE    IR 5 milliGRAM(s) Oral every 6 hours PRN    VITALS:   T(F): 96.2  HR: 96  BP: 104/55  RR: 20  SpO2: 99%    LABS:                        7.8    12.48 )-----------( 135      ( 13 Dec 2021 04:30 )             23.9     12-13    136  |  103  |  36<H>  ----------------------------<  78  3.8   |  19  |  1.2    Ca    7.6<L>      13 Dec 2021 04:30  Mg     2.0     12-13    TPro  4.7<L>  /  Alb  2.1<L>  /  TBili  1.4<H>  /  DBili  x   /  AST  34  /  ALT  39  /  AlkPhos  536<H>  12-13    PT/INR - ( 13 Dec 2021 04:30 )   PT: >40.00 sec;   INR: 4.64 ratio                   Culture - Blood (collected 11 Dec 2021 04:30)  Source: .Blood Blood  Preliminary Report (12 Dec 2021 17:01):    No growth to date.          RADIOLOGY:    PHYSICAL EXAM:  GEN: No acute distress  LUNGS: Clear to auscultation bilaterally   HEART: S1/S2 present. RRR.   ABD/ GI: Soft, non-tender, non-distended. Bowel sounds present  EXT: NC/NC/NE/2+PP/SAPP  NEURO: Awake and alert

## 2021-12-13 NOTE — PROGRESS NOTE ADULT - ASSESSMENT
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 Trop at baseline 0.03, EKG showed afib, CXR small R sided pleural effusion.    #Proteus Bacteremia - +BCx x2 on 12/7, f/u BCx on 12/11 NGTD  #Acute Cholecystitis - +findings on RUQ U/S (12/10) + HIDA Scan (12/11)  #PMHx of Recent Cholecystitis - s/p perQ-cholecystostomy placement and removal by IR - 10d prior to admission  - on Cefepime IV 2g q8h + Flagyl 500mg PO TID, ID following  - Gen Surg consulted:  - trend LFTs (hepatic function panel)  - will plan for laparoscopic cholecystectomy this coming week (not booked yet); will obtain medical and cardiac clearence    #Atypical Chest Pain - likely MSK, no further ischemic workup as per Cardiology (Dr. Herrera)  #PMHx of HFpEF - follows with Dr. 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  #Chronic DVT Right Common Fem/Fem/Popliteal - on LE Venous Duplex 12/1  - still complaining of dull chest pain radiating to L arm, worse with deep breathing, but most likely MSK in nature as per Cardio  - Troponin 0.03 x3, was 0.02-0.03 throughout prior admission, EKG performed x2 with no acute ischemic changes  - D-dimer 342, BNP ~3K near baseline, 11/30 CXR showing small R sided pleural effusion, has remained at baseline oxygen requirements  - TTE 12/2: EF 50-55%, otherwise unremarkable   - d/c Spironolactone + Torsemide due to persistent hypotension, not overloaded on exam  - pain control with Percocet 1 tab q6h PRN for moderate pain + Morphine PRN for severe pain  - INR was supratherapeutic on admission, was reversed with Vitamin K on 12/2, restarted Coumadin, INR remains supratherapeutic, have been holding the Coumadin, will need decreased dosing moving forward  - BP on lower side, increased midodrine to 10mg PO TID  - patient not eating and drinking adequately, calorie count started  - patient refusing PT/Rehab  - CTA-chest 12/9 negative for PE    #Chronic Macrocytic Anemia - on folate supplementation  #Anemia of Chronic Disease  - baseline Hb ~10-11, supratherapeutic INR on admission reversed, B12 WNL in September  - 12/1 iron studies: low TIBC, likely anemia of chronic disease    #DTI Left Heel + Coccyx   - Wound Care consulted, recommendations given to RN  - f/u Burn recs    #COPD   - on home O2 2.5L, has remained at baseline, continuing symbicort 160ug 2 puffs BID, Albuterol PRN                                                                          # DVT prophylaxis: Coumadin (holding)  # GI prophylaxis: N/A  # Diet: DASH/TLC  # Activity: Ambulate as Tolerated  # Code status: Full  # Disposition: remain on floor  # Handoff: Medical and cardiac clearance for Maribel Escudero;    82 yo M with PMHx of HFpEF (50-55%), Chronic AFib on Coumadin, 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 Trop at baseline 0.03, EKG showed afib, CXR small R sided pleural effusion.    #Proteus Bacteremia - +BCx x2 on 12/7, f/u BCx on 12/11 NGTD  #Acute Cholecystitis - +findings on RUQ U/S (12/10) + HIDA Scan (12/11)  #PMHx of Recent Cholecystitis - s/p perQ-cholecystostomy placement and removal by IR - 10d prior to admission  - on Cefepime IV 2g q8h + Flagyl 500mg PO TID, ID following  - Gen Surg consulted:  - trend LFTs (hepatic function panel)  - will plan for laparoscopic cholecystectomy this coming week (not booked yet); will obtain medical and cardiac clearence    #Atypical Chest Pain - likely MSK, no further ischemic workup as per Cardiology (Dr. Herrera)  #PMHx of HFpEF - follows with Dr. 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  #Chronic DVT Right Common Fem/Fem/Popliteal - on LE Venous Duplex 12/1  - still complaining of dull chest pain radiating to L arm, worse with deep breathing, but most likely MSK in nature as per Cardio  - Troponin 0.03 x3, was 0.02-0.03 throughout prior admission, EKG performed x2 with no acute ischemic changes  - D-dimer 342, BNP ~3K near baseline, 11/30 CXR showing small R sided pleural effusion, has remained at baseline oxygen requirements  - TTE 12/2: EF 50-55%, otherwise unremarkable   - d/c Spironolactone + Torsemide due to persistent hypotension, not overloaded on exam  - pain control with Percocet 1 tab q6h PRN for moderate pain + Morphine PRN for severe pain  - INR was supratherapeutic on admission, was reversed with Vitamin K on 12/2, restarted Coumadin, INR remains supratherapeutic, have been holding the Coumadin, will need decreased dosing moving forward  - BP on lower side, increased midodrine to 10mg PO TID  - patient not eating and drinking adequately, calorie count started  - patient refusing PT/Rehab  - CTA-chest 12/9 negative for PE    #Chronic Macrocytic Anemia - on folate supplementation  #Anemia of Chronic Disease  - baseline Hb ~10-11, supratherapeutic INR on admission reversed, B12 WNL in September  - 12/1 iron studies: low TIBC, likely anemia of chronic disease    #DTI Left Heel + Coccyx   - Wound Care consulted, recommendations given to RN  - f/u Burn recs    #COPD   - on home O2 2.5L, has remained at baseline, continuing symbicort 160ug 2 puffs BID, Albuterol PRN                                                                          # DVT prophylaxis: Coumadin (holding)  # GI prophylaxis: N/A  # Diet: DASH/TLC  # Activity: Ambulate as Tolerated  # Code status: Full  # Disposition: remain on floor  # Handoff: Poor surgical candidate; Palliative consulted, will meet with pt's daughter tomorrow

## 2021-12-13 NOTE — PROGRESS NOTE ADULT - SUBJECTIVE AND OBJECTIVE BOX
RODNEY SANTO 81y Male  MRN#: [id number]   CODE STATUS: FULL      SUBJECTIVE  Patient is a 81y old Male who presents with a chief complaint of chest pain (12 Dec 2021 13:03)    Today is hospital day 13d,   INTERVAL HPI/OVERNIGHT EVENTS:    Examined pt at bedside this AM. There were no acute events overnight.    Present Today:           Russo Catheter (x)No/ ()Yes?   Indication:             Central Line (x)No/ ()Yes?   Indication:          IV Fluids (x)No/ ()Yes? Type:  Rate:  Indication:    OBJECTIVE  PAST MEDICAL & SURGICAL HISTORY  COPD (chronic obstructive pulmonary disease)    Hypertension    Deep vein thrombosis (DVT) of left lower extremity, unspecified chronicity, unspecified vein    Cellulitis of left lower extremity    Chronic atrial fibrillation    Mitral valve insufficiency, unspecified etiology  Moderate    CHF (congestive heart failure)    S/P coronary artery stent placement    History of total right knee replacement      ALLERGIES:  No Known Allergies    HOME MEDICATIONS:  Home Medications:  atorvastatin 20 mg oral tablet: 1 tab(s) orally once a day (13 Sep 2021 04:59)  folic acid 1 mg oral tablet: 1 tab(s) orally once a day (21 Sep 2021 11:34)  oxycodone-acetaminophen 5 mg-325 mg oral tablet: 1 tab(s) orally every 6 hours, As needed, Moderate Pain (4 - 6) (21 Sep 2021 11:32)  spironolactone 25 mg oral tablet: 0.5 tab(s) orally once a day (21 Sep 2021 11:34)  Symbicort 160 mcg-4.5 mcg/inh inhalation aerosol: 2 puff(s) inhaled 2 times a day (13 Sep 2021 04:59)  Ventolin HFA 90 mcg/inh inhalation aerosol: 2 puff(s) inhaled every 6 hours as neede for shortness of breath (13 Sep 2021 04:59)  warfarin 2 mg oral tablet: Take half a tablet Sun, Tues, Wed, Thurs, Fri, Sat.  Take a whole tablet on Mon (13 Sep 2021 04:59)    MEDICATIONS:  STANDING MEDICATIONS  budesonide 160 MICROgram(s)/formoterol 4.5 MICROgram(s) Inhaler 2 Puff(s) Inhalation two times a day  cefepime   IVPB      cefepime   IVPB 1000 milliGRAM(s) IV Intermittent every 8 hours  chlorhexidine 4% Liquid 1 Application(s) Topical <User Schedule>  collagenase Ointment 1 Application(s) Topical daily  folic acid 1 milliGRAM(s) Oral daily  influenza  Vaccine (HIGH DOSE) 0.7 milliLiter(s) IntraMuscular once  lactated ringers Bolus 500 milliLiter(s) IV Bolus once  methocarbamol 500 milliGRAM(s) Oral three times a day  metroNIDAZOLE    Tablet 500 milliGRAM(s) Oral every 8 hours  midodrine. 10 milliGRAM(s) Oral three times a day    PRN MEDICATIONS  ALBUTerol    90 MICROgram(s) HFA Inhaler 2 Puff(s) Inhalation every 6 hours PRN  melatonin 5 milliGRAM(s) Oral at bedtime PRN  oxyCODONE    IR 5 milliGRAM(s) Oral every 6 hours PRN      VITAL SIGNS: Last 24 Hours  T(C): 35.7 (13 Dec 2021 08:17), Max: 35.8 (12 Dec 2021 17:33)  T(F): 96.2 (13 Dec 2021 08:17), Max: 96.4 (12 Dec 2021 17:33)  HR: 96 (13 Dec 2021 08:43) (70 - 96)  BP: 104/55 (13 Dec 2021 08:43) (82/44 - 110/63)  BP(mean): --  RR: 20 (13 Dec 2021 08:17) (18 - 20)  SpO2: 99% (13 Dec 2021 08:17) (99% - 99%)    LABS:                        8.1    14.94 )-----------( 145      ( 12 Dec 2021 06:32 )             24.3     12-12    139  |  104  |  33<H>  ----------------------------<  70  4.1   |  21  |  1.1    Ca    7.6<L>      12 Dec 2021 06:32  Mg     1.9     12-12    TPro  4.7<L>  /  Alb  1.9<L>  /  TBili  1.5<H>  /  DBili  x   /  AST  42<H>  /  ALT  42<H>  /  AlkPhos  500<H>  12-12    PT/INR - ( 12 Dec 2021 06:32 )   PT: >40.00 sec;   INR: 4.09 ratio                       Culture - Blood (collected 11 Dec 2021 04:30)  Source: .Blood Blood  Preliminary Report (12 Dec 2021 17:01):    No growth to date.      RADIOLOGY:  < from: NM Hepatobiliary Imaging (12.11.21 @ 15:37) >  IMPRESSION:    NO DEFINITE VISUALIZATION OF THEGALLBLADDER THROUGH 4 HOURS   POST-INJECTION, CONSISTENT WITH CHOLECYSTITIS, AS DESCRIBED ABOVE.    CLINICAL CORRELATION IS SUGGESTED.    < end of copied text >      PHYSICAL EXAM:    GENERAL: NAD, well-developed, AAOx3  HEENT:  Atraumatic, Normocephalic  PULMONARY: Clear to auscultation bilaterally; No wheeze  CARDIOVASCULAR: Regular rate and rhythm; No murmurs, rubs, or gallops  GASTROINTESTINAL: Soft, Nontender, Nondistended; Bowel sounds present  MUSCULOSKELETAL:  2+ Peripheral Pulses, No clubbing, cyanosis, or edema  NEUROLOGY: non-focal  SKIN: No rashes or lesions

## 2021-12-13 NOTE — PROGRESS NOTE ADULT - SUBJECTIVE AND OBJECTIVE BOX
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 Pt reports that this morning, he was awakened by acute, sudden onset L sided chest pain at 4AM. Describes the pain as "pounding", states that he felt some pain radiate to LUE as well. Denies identifiable aggravating or alleviating factors and states that this has never happened before. Denies headache/dizziness, endorses chronic shortness of breath that is unchanged, has been requiring his baseline oxygen. Denies abdominal pain. Endorses compliance with all medications. States that at baseline, he is bedbound, cannot stand up or ambulate due to weakness. Was recently admitted to [facility] for short term rehab, where he wishes to go back because feels that he cannot care for himself at home. Lives with son but needs more help and PT.   In the ED, T 96, /58, HR 85, RR 18, SpO2 98% on O2 NC 4L, then 100% on 3L. Lab work revealed Hb 9.1, INR 7.87, troponin 0.03 and BNP 3327 (both near baseline). EKG revealed AFib, HR 88. CXR revealed slightly inc small R sided pleural effusion. (30 Nov 2021 11:30)      SUBJ:  Patient seen and examined. Events noted. Follow-up requested for pre-op evaluation for possible lap-choley      MEDICATIONS  (STANDING):  budesonide 160 MICROgram(s)/formoterol 4.5 MICROgram(s) Inhaler 2 Puff(s) Inhalation two times a day  cefTRIAXone   IVPB 2000 milliGRAM(s) IV Intermittent every 24 hours  chlorhexidine 4% Liquid 1 Application(s) Topical <User Schedule>  collagenase Ointment 1 Application(s) Topical daily  folic acid 1 milliGRAM(s) Oral daily  influenza  Vaccine (HIGH DOSE) 0.7 milliLiter(s) IntraMuscular once  lactated ringers Bolus 500 milliLiter(s) IV Bolus once  methocarbamol 500 milliGRAM(s) Oral three times a day  metroNIDAZOLE    Tablet 500 milliGRAM(s) Oral every 8 hours  midodrine. 10 milliGRAM(s) Oral three times a day    MEDICATIONS  (PRN):  ALBUTerol    90 MICROgram(s) HFA Inhaler 2 Puff(s) Inhalation every 6 hours PRN Shortness of Breath and/or Wheezing  melatonin 5 milliGRAM(s) Oral at bedtime PRN Insomnia  oxyCODONE    IR 5 milliGRAM(s) Oral every 6 hours PRN Moderate Pain (4 - 6)            Vital Signs Last 24 Hrs  T(C): 35.9 (13 Dec 2021 16:16), Max: 35.9 (13 Dec 2021 16:16)  T(F): 96.6 (13 Dec 2021 16:16), Max: 96.6 (13 Dec 2021 16:16)  HR: 84 (13 Dec 2021 16:16) (70 - 96)  BP: 99/56 (13 Dec 2021 16:16) (88/51 - 110/63)  BP(mean): --  RR: 18 (13 Dec 2021 16:16) (18 - 20)  SpO2: 99% (13 Dec 2021 08:17) (99% - 99%)      PHYSICAL EXAM:    GEN:  NAD  HEENT: NC/AT  Neck: No JVD  CV: irreg, S1-S2  Lungs: CTAB  Abd: Soft, + BS  Ext: trace edema        12-13-21 @ 07:01  -  12-13-21 @ 18:08  --------------------------------------------------------  IN: 440 mL / OUT: 0 mL / NET: 440 mL        I&O's Summary    13 Dec 2021 07:01  -  13 Dec 2021 18:08  --------------------------------------------------------  IN: 440 mL / OUT: 0 mL / NET: 440 mL    	        ECG:  < from: 12 Lead ECG (12.03.21 @ 10:24) >  Atrial fibrillation with slow ventricular response with occasional   ventricular-paced complexes  Low voltage QRS  Possible Inferior infarct , age undetermined  Cannot rule out Anterior infarct , age undetermined  Abnormal ECG    < end of copied text >    TTE:  < from: TTE Echo Complete w/o Contrast w/ Doppler (12.02.21 @ 09:38) >    Summary:   1. Left ventricular ejection fraction, by visual estimation, is 50 to 55%.   2. Normal global left ventricular systolic function.   3. Mildly enlarged right ventricle.   4. Mildly enlarged right atrium.   5. Moderately enlarged left atrium.   6. Mild mitral valve regurgitation.   7. Thickening of the anterior and posterior mitral valve leaflets.   8. Mild-moderate tricuspid regurgitation.   9. There is mild aortic root calcification.    PHYSICIAN INTERPRETATION:  Left Ventricle: The left ventricular internal cavity size is normal. Left ventricular wall thickness is normal. There is no left ventricular hypertrophy. Global LV systolic function was normal. Left ventricular ejection fraction, by visual estimation, is 50 to 55%.  Right Ventricle: The right ventricular size is mildly enlarged. RV systolic function is low normal.  Left Atrium: Moderately enlarged left atrium.  Right Atrium: Mildly enlarged right atrium.  Pericardium: There is no evidence of pericardial effusion.  Mitral Valve: Mild mitral valve regurgitation is seen. Thickening of the anterior and posterior mitral valve leaflets.  Tricuspid Valve: Structurally normal tricuspid valve, with normal leaflet excursion. Mild-moderate tricuspid regurgitation is visualized.  Aortic Valve: Trivial aortic valve regurgitation is seen. The aortic valve is trileaflet. No evidence of aortic stenosis. Peak transaortic gradient equals 3.9 mmHg, mean transaortic gradient equals 2.4 mmHg, the calculated aortic valve area equals 2.62 cm² by the continuity equation consistent with normally opening aortic valve.  Pulmonic Valve: Trace pulmonic valve regurgitation.  Aorta: The aortic root and ascending aorta are structurally normal, with no evidence of dilitation. There is mild aortic root calcification.  Pulmonary Artery: The main pulmonary artery is normal in size.      < end of copied text >      LABS:                        7.8    12.48 )-----------( 135      ( 13 Dec 2021 04:30 )             23.9     12-13    136  |  103  |  36<H>  ----------------------------<  78  3.8   |  19  |  1.2    Ca    7.6<L>      13 Dec 2021 04:30  Mg     2.0     12-13    TPro  4.7<L>  /  Alb  2.1<L>  /  TBili  1.4<H>  /  DBili  x   /  AST  34  /  ALT  39  /  AlkPhos  536<H>  12-13        PT/INR - ( 13 Dec 2021 04:30 )   PT: >40.00 sec;   INR: 4.64 ratio               BNP  RADIOLOGY & ADDITIONAL STUDIES:      IMPRESSION AND PLAN:

## 2021-12-14 DIAGNOSIS — R53.81 OTHER MALAISE: ICD-10-CM

## 2021-12-14 DIAGNOSIS — L89.159 PRESSURE ULCER OF SACRAL REGION, UNSPECIFIED STAGE: ICD-10-CM

## 2021-12-14 DIAGNOSIS — Z91.89 OTHER SPECIFIED PERSONAL RISK FACTORS, NOT ELSEWHERE CLASSIFIED: ICD-10-CM

## 2021-12-14 DIAGNOSIS — R53.83 OTHER FATIGUE: ICD-10-CM

## 2021-12-14 DIAGNOSIS — K81.9 CHOLECYSTITIS, UNSPECIFIED: ICD-10-CM

## 2021-12-14 DIAGNOSIS — R52 PAIN, UNSPECIFIED: ICD-10-CM

## 2021-12-14 DIAGNOSIS — Z51.5 ENCOUNTER FOR PALLIATIVE CARE: ICD-10-CM

## 2021-12-14 LAB
ALBUMIN SERPL ELPH-MCNC: 2 G/DL — LOW (ref 3.5–5.2)
ALP SERPL-CCNC: 594 U/L — HIGH (ref 30–115)
ALT FLD-CCNC: 34 U/L — SIGNIFICANT CHANGE UP (ref 0–41)
ANION GAP SERPL CALC-SCNC: 13 MMOL/L — SIGNIFICANT CHANGE UP (ref 7–14)
ANION GAP SERPL CALC-SCNC: 14 MMOL/L — SIGNIFICANT CHANGE UP (ref 7–14)
APTT BLD: 52.5 SEC — HIGH (ref 27–39.2)
AST SERPL-CCNC: 31 U/L — SIGNIFICANT CHANGE UP (ref 0–41)
BASOPHILS # BLD AUTO: 0.01 K/UL — SIGNIFICANT CHANGE UP (ref 0–0.2)
BASOPHILS NFR BLD AUTO: 0.1 % — SIGNIFICANT CHANGE UP (ref 0–1)
BILIRUB SERPL-MCNC: 1 MG/DL — SIGNIFICANT CHANGE UP (ref 0.2–1.2)
BUN SERPL-MCNC: 34 MG/DL — HIGH (ref 10–20)
BUN SERPL-MCNC: 36 MG/DL — HIGH (ref 10–20)
CALCIUM SERPL-MCNC: 7.3 MG/DL — LOW (ref 8.5–10.1)
CALCIUM SERPL-MCNC: 7.4 MG/DL — LOW (ref 8.5–10.1)
CHLORIDE SERPL-SCNC: 103 MMOL/L — SIGNIFICANT CHANGE UP (ref 98–110)
CHLORIDE SERPL-SCNC: 104 MMOL/L — SIGNIFICANT CHANGE UP (ref 98–110)
CO2 SERPL-SCNC: 18 MMOL/L — SIGNIFICANT CHANGE UP (ref 17–32)
CO2 SERPL-SCNC: 20 MMOL/L — SIGNIFICANT CHANGE UP (ref 17–32)
CREAT SERPL-MCNC: 1.2 MG/DL — SIGNIFICANT CHANGE UP (ref 0.7–1.5)
CREAT SERPL-MCNC: 1.2 MG/DL — SIGNIFICANT CHANGE UP (ref 0.7–1.5)
EOSINOPHIL # BLD AUTO: 0.14 K/UL — SIGNIFICANT CHANGE UP (ref 0–0.7)
EOSINOPHIL NFR BLD AUTO: 1.3 % — SIGNIFICANT CHANGE UP (ref 0–8)
GLUCOSE SERPL-MCNC: 84 MG/DL — SIGNIFICANT CHANGE UP (ref 70–99)
GLUCOSE SERPL-MCNC: 91 MG/DL — SIGNIFICANT CHANGE UP (ref 70–99)
HCT VFR BLD CALC: 23.5 % — LOW (ref 42–52)
HGB BLD-MCNC: 7.9 G/DL — LOW (ref 14–18)
IMM GRANULOCYTES NFR BLD AUTO: 0.6 % — HIGH (ref 0.1–0.3)
INR BLD: 4.92 RATIO — HIGH (ref 0.65–1.3)
LYMPHOCYTES # BLD AUTO: 0.5 K/UL — LOW (ref 1.2–3.4)
LYMPHOCYTES # BLD AUTO: 4.8 % — LOW (ref 20.5–51.1)
MAGNESIUM SERPL-MCNC: 2.1 MG/DL — SIGNIFICANT CHANGE UP (ref 1.8–2.4)
MCHC RBC-ENTMCNC: 33.2 PG — HIGH (ref 27–31)
MCHC RBC-ENTMCNC: 33.6 G/DL — SIGNIFICANT CHANGE UP (ref 32–37)
MCV RBC AUTO: 98.7 FL — HIGH (ref 80–94)
MONOCYTES # BLD AUTO: 0.65 K/UL — HIGH (ref 0.1–0.6)
MONOCYTES NFR BLD AUTO: 6.2 % — SIGNIFICANT CHANGE UP (ref 1.7–9.3)
NEUTROPHILS # BLD AUTO: 9.06 K/UL — HIGH (ref 1.4–6.5)
NEUTROPHILS NFR BLD AUTO: 87 % — HIGH (ref 42.2–75.2)
NRBC # BLD: 0 /100 WBCS — SIGNIFICANT CHANGE UP (ref 0–0)
PLATELET # BLD AUTO: 111 K/UL — LOW (ref 130–400)
POTASSIUM SERPL-MCNC: 3.4 MMOL/L — LOW (ref 3.5–5)
POTASSIUM SERPL-MCNC: 4.3 MMOL/L — SIGNIFICANT CHANGE UP (ref 3.5–5)
POTASSIUM SERPL-SCNC: 3.4 MMOL/L — LOW (ref 3.5–5)
POTASSIUM SERPL-SCNC: 4.3 MMOL/L — SIGNIFICANT CHANGE UP (ref 3.5–5)
PROT SERPL-MCNC: 4.4 G/DL — LOW (ref 6–8)
PROTHROM AB SERPL-ACNC: >40 SEC — HIGH (ref 9.95–12.87)
RBC # BLD: 2.38 M/UL — LOW (ref 4.7–6.1)
RBC # FLD: 16.8 % — HIGH (ref 11.5–14.5)
SODIUM SERPL-SCNC: 136 MMOL/L — SIGNIFICANT CHANGE UP (ref 135–146)
SODIUM SERPL-SCNC: 136 MMOL/L — SIGNIFICANT CHANGE UP (ref 135–146)
WBC # BLD: 10.42 K/UL — SIGNIFICANT CHANGE UP (ref 4.8–10.8)
WBC # FLD AUTO: 10.42 K/UL — SIGNIFICANT CHANGE UP (ref 4.8–10.8)

## 2021-12-14 PROCEDURE — 99233 SBSQ HOSP IP/OBS HIGH 50: CPT

## 2021-12-14 PROCEDURE — 99223 1ST HOSP IP/OBS HIGH 75: CPT

## 2021-12-14 PROCEDURE — 99221 1ST HOSP IP/OBS SF/LOW 40: CPT

## 2021-12-14 PROCEDURE — 99497 ADVNCD CARE PLAN 30 MIN: CPT

## 2021-12-14 PROCEDURE — 99497 ADVNCD CARE PLAN 30 MIN: CPT | Mod: 25

## 2021-12-14 RX ORDER — POTASSIUM CHLORIDE 20 MEQ
20 PACKET (EA) ORAL
Refills: 0 | Status: COMPLETED | OUTPATIENT
Start: 2021-12-14 | End: 2021-12-14

## 2021-12-14 RX ORDER — MORPHINE SULFATE 50 MG/1
4 CAPSULE, EXTENDED RELEASE ORAL ONCE
Refills: 0 | Status: DISCONTINUED | OUTPATIENT
Start: 2021-12-14 | End: 2021-12-14

## 2021-12-14 RX ORDER — PHYTONADIONE (VIT K1) 5 MG
10 TABLET ORAL ONCE
Refills: 0 | Status: COMPLETED | OUTPATIENT
Start: 2021-12-14 | End: 2021-12-14

## 2021-12-14 RX ORDER — OXYCODONE HYDROCHLORIDE 5 MG/1
5 TABLET ORAL EVERY 4 HOURS
Refills: 0 | Status: DISCONTINUED | OUTPATIENT
Start: 2021-12-14 | End: 2021-12-15

## 2021-12-14 RX ORDER — MORPHINE SULFATE 50 MG/1
2 CAPSULE, EXTENDED RELEASE ORAL ONCE
Refills: 0 | Status: DISCONTINUED | OUTPATIENT
Start: 2021-12-14 | End: 2021-12-14

## 2021-12-14 RX ORDER — SENNA PLUS 8.6 MG/1
2 TABLET ORAL AT BEDTIME
Refills: 0 | Status: DISCONTINUED | OUTPATIENT
Start: 2021-12-14 | End: 2021-12-23

## 2021-12-14 RX ADMIN — METHOCARBAMOL 500 MILLIGRAM(S): 500 TABLET, FILM COATED ORAL at 05:46

## 2021-12-14 RX ADMIN — Medication 500 MILLIGRAM(S): at 05:46

## 2021-12-14 RX ADMIN — Medication 500 MILLIGRAM(S): at 21:00

## 2021-12-14 RX ADMIN — Medication 1 MILLIGRAM(S): at 11:21

## 2021-12-14 RX ADMIN — SENNA PLUS 2 TABLET(S): 8.6 TABLET ORAL at 21:01

## 2021-12-14 RX ADMIN — Medication 50 MILLIEQUIVALENT(S): at 14:22

## 2021-12-14 RX ADMIN — Medication 50 MILLIEQUIVALENT(S): at 12:10

## 2021-12-14 RX ADMIN — MORPHINE SULFATE 2 MILLIGRAM(S): 50 CAPSULE, EXTENDED RELEASE ORAL at 12:53

## 2021-12-14 RX ADMIN — Medication 102 MILLIGRAM(S): at 17:36

## 2021-12-14 RX ADMIN — MIDODRINE HYDROCHLORIDE 10 MILLIGRAM(S): 2.5 TABLET ORAL at 17:47

## 2021-12-14 RX ADMIN — Medication 500 MILLIGRAM(S): at 14:22

## 2021-12-14 RX ADMIN — Medication 1 APPLICATION(S): at 11:22

## 2021-12-14 RX ADMIN — BUDESONIDE AND FORMOTEROL FUMARATE DIHYDRATE 2 PUFF(S): 160; 4.5 AEROSOL RESPIRATORY (INHALATION) at 20:54

## 2021-12-14 RX ADMIN — METHOCARBAMOL 500 MILLIGRAM(S): 500 TABLET, FILM COATED ORAL at 21:01

## 2021-12-14 RX ADMIN — MIDODRINE HYDROCHLORIDE 10 MILLIGRAM(S): 2.5 TABLET ORAL at 11:21

## 2021-12-14 RX ADMIN — Medication 5 MILLIGRAM(S): at 20:54

## 2021-12-14 RX ADMIN — MIDODRINE HYDROCHLORIDE 10 MILLIGRAM(S): 2.5 TABLET ORAL at 05:46

## 2021-12-14 RX ADMIN — OXYCODONE HYDROCHLORIDE 5 MILLIGRAM(S): 5 TABLET ORAL at 11:45

## 2021-12-14 RX ADMIN — METHOCARBAMOL 500 MILLIGRAM(S): 500 TABLET, FILM COATED ORAL at 14:20

## 2021-12-14 RX ADMIN — CHLORHEXIDINE GLUCONATE 1 APPLICATION(S): 213 SOLUTION TOPICAL at 05:44

## 2021-12-14 RX ADMIN — MORPHINE SULFATE 4 MILLIGRAM(S): 50 CAPSULE, EXTENDED RELEASE ORAL at 15:44

## 2021-12-14 RX ADMIN — CEFTRIAXONE 100 MILLIGRAM(S): 500 INJECTION, POWDER, FOR SOLUTION INTRAMUSCULAR; INTRAVENOUS at 17:47

## 2021-12-14 RX ADMIN — OXYCODONE HYDROCHLORIDE 5 MILLIGRAM(S): 5 TABLET ORAL at 20:54

## 2021-12-14 NOTE — PROGRESS NOTE ADULT - SUBJECTIVE AND OBJECTIVE BOX
GENERAL SURGERY PROGRESS NOTE    Patient: RODNEY SANTO , 81y ([birth year])Male   MRN: [id number]  Location: [address]  Visit: 11-30-21 Inpatient  Date: 12-14-21 @ 12:03    Hospital Day #: 15    Procedure/Dx/Injuries: consulted for acute cholecystits     Events of past 24 hours: Medical clearance reported as poor surgical candidate. Cardiac clearance reported as high risk patient for moderate risk procedure. Palliative care was consulted. No acute events overnight.     PAST MEDICAL & SURGICAL HISTORY:  COPD (chronic obstructive pulmonary disease)    Hypertension    Deep vein thrombosis (DVT) of left lower extremity, unspecified chronicity, unspecified vein    Cellulitis of left lower extremity    Chronic atrial fibrillation    Mitral valve insufficiency, unspecified etiology  Moderate    CHF (congestive heart failure)    S/P coronary artery stent placement    History of total right knee replacement        Vitals:   T(F): 96.4 (12-14-21 @ 07:58), Max: 96.6 (12-13-21 @ 16:16)  HR: 76 (12-14-21 @ 07:58)  BP: 141/60 (12-14-21 @ 07:58)  RR: 20 (12-14-21 @ 07:58)  SpO2: 96% (12-14-21 @ 07:58)      Diet, DASH/TLC:   Sodium & Cholesterol Restricted     Qty per Day:  MAGIC CUP 2X/DAY  Supplement Feeding Modality:  Oral  Ensure Pudding Cans or Servings Per Day:  1       Frequency:  Two Times a day      Fluids:     I & O's:    12-13-21 @ 07:01  -  12-14-21 @ 07:00  --------------------------------------------------------  IN:    Oral Fluid: 440 mL  Total IN: 440 mL    OUT:  Total OUT: 0 mL    Total NET: 440 mL      PHYSICAL EXAM:  General: NAD, AAOx3, calm and cooperative, cachetic, malnourished  Cardiac: Irregulaly irregular   Respiratory: CTAB, dimishined breath sounds at the base  Abdomen: Soft, non-distended, mildly tender, RUQ   Musculoskeletal: 4/5 weakness in b/l UE and LE    MEDICATIONS  (STANDING):  budesonide 160 MICROgram(s)/formoterol 4.5 MICROgram(s) Inhaler 2 Puff(s) Inhalation two times a day  cefTRIAXone   IVPB 2000 milliGRAM(s) IV Intermittent every 24 hours  chlorhexidine 4% Liquid 1 Application(s) Topical <User Schedule>  collagenase Ointment 1 Application(s) Topical daily  folic acid 1 milliGRAM(s) Oral daily  influenza  Vaccine (HIGH DOSE) 0.7 milliLiter(s) IntraMuscular once  lactated ringers Bolus 500 milliLiter(s) IV Bolus once  methocarbamol 500 milliGRAM(s) Oral three times a day  metroNIDAZOLE    Tablet 500 milliGRAM(s) Oral every 8 hours  midodrine. 10 milliGRAM(s) Oral three times a day  potassium chloride  20 mEq/100 mL IVPB 20 milliEquivalent(s) IV Intermittent every 2 hours    MEDICATIONS  (PRN):  ALBUTerol    90 MICROgram(s) HFA Inhaler 2 Puff(s) Inhalation every 6 hours PRN Shortness of Breath and/or Wheezing  melatonin 5 milliGRAM(s) Oral at bedtime PRN Insomnia  oxyCODONE    IR 5 milliGRAM(s) Oral every 6 hours PRN Moderate Pain (4 - 6)      DVT PROPHYLAXIS:   GI PROPHYLAXIS:   ANTICOAGULATION:   ANTIBIOTICS:  cefTRIAXone   IVPB 2000 milliGRAM(s)  metroNIDAZOLE    Tablet 500 milliGRAM(s)            LAB/STUDIES:  Labs:  CAPILLARY BLOOD GLUCOSE                              7.9    10.42 )-----------( 111      ( 14 Dec 2021 04:30 )             23.5       Auto Neutrophil %: 87.0 % (12-14-21 @ 04:30)  Auto Immature Granulocyte %: 0.6 % (12-14-21 @ 04:30)    12-14    136  |  104  |  36<H>  ----------------------------<  84  3.4<L>   |  18  |  1.2      Calcium, Total Serum: 7.4 mg/dL (12-14-21 @ 04:30)      LFTs:             4.4  | 1.0  | 31       ------------------[594     ( 14 Dec 2021 04:30 )  2.0  | x    | 34          Lipase:x      Amylase:x             Coags:     >40.00  ----< 4.92    ( 14 Dec 2021 04:30 )     52.5                    Culture - Blood (collected 12 Dec 2021 06:32)  Source: .Blood None  Preliminary Report (13 Dec 2021 17:02):    No growth to date.              IMAGING:      ACCESS/ DEVICES:  [ x] Peripheral IV  [ ] Central Venous Line	[ ] R	[ ] L	[ ] IJ	[ ] Fem	[ ] SC	Placed:   [ ] Arterial Line		[ ] R	[ ] L	[ ] Fem	[ ] Rad	[ ] Ax	Placed:   [ ] PICC:					[ ] Mediport  [ ] Urinary Catheter,  Date Placed:   [ ] Chest tube: [ ] Right, [ ] Left  [ ] DON/Manuel Drains

## 2021-12-14 NOTE — CONSULT NOTE ADULT - PROBLEM SELECTOR RECOMMENDATION 5
DNR/I - MOlst completed  will follow Recommend non-pharmacological interventions to prevent/treat delirium  - maintain day/night light cycles  - optimize sleep-wake cycle, minimize environmental noise  - reorientation frequently  - use verbal redirection as first line  - minimize restraints and lines  - ensure good bladder/bowel function  - ensure adequate pain control

## 2021-12-14 NOTE — CONSULT NOTE ADULT - ATTENDING COMMENTS
recurrent acute cholecystitis   will start Antibiotics   cardiology evaluation for risk stratification   lap cheli unless he is prohibitive risk , then will need another drainage by IR if not improving with Antibiotics
As above   pt seen on rounds   Chart reviewed     EXAM:   Pt awake alert , appropriate verbal responses to questions ;     Sacrum ~ 10 x 10 cm gray brown necrotic skin - boggy, foul smelling, loosening       Procedure - with pt's verbal permission skin  incised  with scissor to r/o underlying gross abscess- necrotic dark discolored subcutis noted   no gross purulence   wound swab for cx obtained     A/P -   Pressure ulcer of sacrum - advanced stage   Surgical debridement recommended   pt agreeable   Likely bedside debridement as per daughter's preference  Elevated INR- will limit extent of debridement to avoid bleeding issues  -staged/  serial debridement
30 minutes spent on advance care planning    I adjusted the A+P above  Recommend oxycodone 5mg q4h PRN for pain to simplify regimen  Add senna 2 tabs qhs for constipation  Hospice consult for informational purposes  WIll follow

## 2021-12-14 NOTE — CONSULT NOTE ADULT - ASSESSMENT
Infected sacral pressure ulcer    RECOMMENDATION:  Wound care - Wash with soap and water. Apply silvadene, cover with Allevyn pads.   Surgical debridement recommended  - Given patient's current medical status, would recommend at this time bedside debridement - spoke with daughter, Mery Lira, who does not want surgery at this time and is agreeable to bedside debridement of sacrum.   IV abx as indicated/ per ID - wound culture taken - f/u results  Offloading/ positional changes  Will follow.

## 2021-12-14 NOTE — CONSULT NOTE ADULT - SUBJECTIVE AND OBJECTIVE BOX
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 Pt reports that this morning, he was awakened by acute, sudden onset L sided chest pain at 4AM. Describes the pain as "pounding", states that he felt some pain radiate to LUE as well. Denies identifiable aggravating or alleviating factors and states that this has never happened before. Denies headache/dizziness, endorses chronic shortness of breath that is unchanged, has been requiring his baseline oxygen. Denies abdominal pain. Endorses compliance with all medications. States that at baseline, he is bedbound, cannot stand up or ambulate due to weakness. Was recently admitted to [facility] for short term rehab, where he wishes to go back because feels that he cannot care for himself at home. Lives with son but needs more help and PT.   In the ED, T 96, /58, HR 85, RR 18, SpO2 98% on O2 NC 4L, then 100% on 3L. Lab work revealed Hb 9.1, INR 7.87, troponin 0.03 and BNP 3327 (both near baseline). EKG revealed AFib, HR 88. CXR revealed slightly inc small R sided pleural effusion. (30 Nov 2021 11:30)      PAST MEDICAL & SURGICAL HISTORY:  COPD (chronic obstructive pulmonary disease)    Hypertension    Deep vein thrombosis (DVT) of left lower extremity, unspecified chronicity, unspecified vein    Cellulitis of left lower extremity    Chronic atrial fibrillation    Mitral valve insufficiency, unspecified etiology  Moderate    CHF (congestive heart failure)    S/P coronary artery stent placement    History of total right knee replacement        FAMILY HISTORY:   Reviewed and found non contributory in mother or father    SOCIAL HISTORY:   Tobacco/etoh/illicit drug use use reported. Yes [ ]  _________  No [ ]  Pt resides at: home [ ]  facility [ ]  other [ ] _______        ROS:	    Unable to attain due to:  NOT COMPLETE                    Dyspnea (Beltran 0-10): 0                       N/V (Y/N): No                             Secretions (Y/N) : No                                          Agitation(Y/N): No                              Pain (Y/N): No                                 -Provocation/Palliation: N/A  -Quality/Quantity: N/A  -Radiating: N/A  -Severity: No pain  -Timing/Frequency: N/A  -Impact on ADLs: N/A    General:  Denied  HEENT:    Denied  Neck:  Denied  CVS:  Denied  Resp:  Denied  GI:  Denied    :  Denied  Musc:  Denied  Neuro:  Denied  Psych:  Denied  Skin:  Denied  Lymph:  Denied    Last BM:      Allergies    No Known Allergies    Intolerances      Opiate Naive (Y/N):  N  -iStop reviewed (Y/N): Y  Ref#:     Reference #: [id number]    Others' Prescriptions  Patient Name: Madhu Glasgow Date: 1940  Address: [address] [place] [id number]: Male  Rx Written	Rx Dispensed	Drug	Quantity	Days Supply	Prescriber Name	Prescriber Emily #	Payment Method	Dispenser  11/10/2021	11/11/2021	oxycodone-acetaminophen 5-325 mg tablet	20	5	Neftali Underwood MD	[id number]	Medicare	[id number]    Patient Name: Madhu Glasgow Date: 1940  Address: [address] [place] [address] [id number]: Male  Rx Written	Rx Dispensed	Drug	Quantity	Days Supply	Prescriber Name	Prescriber Emily #	Payment Method	Dispenser  10/21/2021	11/03/2021	oxycodone-acetaminophen 5-325 mg tab	30	7	Bharath Khan	[id number]	Insurance	[employer]  10/20/2021	10/20/2021	oxycodone-acetaminophen 5-325 mg tab	40	10	Neftali Underwood MD	[id number]	Insurance	[employer]  10/06/2021	10/06/2021	oxycodone-acetaminophen 5-325 mg tab	40	10	Neftali Underwood MD	[id number]	Insurance	[employer]  09/23/2021	09/23/2021	oxycodone-acetaminophen 5-325 mg tab	40	10	Neftali Underwood MD	[id number]	Insurance	[employer]  07/25/2021	07/26/2021	oxycodone-acetaminophen 5-325 mg tab	20	10	Maru Mendze MD	[id number]	Insurance	[employer]    Patient Name: Madhu Glasgow Date: 1940  Address: [address] [place] [address] [id number]: Male  Rx Written	Rx Dispensed	Drug	Quantity	Days Supply	Prescriber Name	Prescriber Emily #	Payment Method	Dispenser  08/30/2021	08/30/2021	oxycodone-acetaminophen 5-325 mg tab	60	15	Miya Wall	[id number]	[facility]  08/06/2021	08/06/2021	oxycodone-acetaminophen 5-325 mg tab	40	10	Miya Wall	[id number]	Sanchez	[facility]             Labs:	    CBC:                        7.9    10.42 )-----------( 111      ( 14 Dec 2021 04:30 )             23.5     CMP:    12-14    136  |  104  |  36<H>  ----------------------------<  84  3.4<L>   |  18  |  1.2      Ca    7.4<L>      14 Dec 2021 04:30  Mg     2.1     12-14    TPro  4.4<L>  /  Alb  2.0<L>  /  TBili  1.0  /  DBili  x   /  AST  31  /  ALT  34  /  AlkPhos  594<H>  12-14       PT/INR - ( 14 Dec 2021 04:30 )   PT: >40.00 sec;   INR: 4.92 ratio         PTT - ( 14 Dec 2021 04:30 )  PTT:52.5 sec           Radiology:	   < from: NM Hepatobiliary Imaging (12.11.21 @ 15:37) >  IMPRESSION:    NO DEFINITE VISUALIZATION OF THEGALLBLADDER THROUGH 4 HOURS   POST-INJECTION, CONSISTENT WITH CHOLECYSTITIS, AS DESCRIBED ABOVE.    CLINICAL CORRELATION IS SUGGESTED.    These findings were discussed with Dr. Carolina at 12/11/2021 5:15   PM by Dr. Mcconnell with read back confirmation.    --- End of Report ---          KRYSTEN MCCONNELL MD; Resident Radiologist  This document has been electronically signed.  CHAPARRITA CANCHOLA MD; Attending Radiologist  This document has been electronically signed. Dec 11 2021 10:25PM    < end of copied text >    < from: US Abdomen Upper Quadrant Right (12.10.21 @ 18:17) >  IMPRESSION: Thick-walled gallbladder containing stones and sludge perhaps   representing cholecystitis.      --- End of Report ---            BHARATH ARAYA MD; Attending Radiologist  This document has been electronically signed. Dec 10 2021  7:45PM    < end of copied text >    < from: CT Angio Chest PE Protocol w/ IV Cont (12.08.21 @ 18:55) >  IMPRESSION:    No evidence of pulmonary embolism.    There are small bilateral pleural effusions with mild atelectatic changes   at the lung bases.    --- End of Report ---      ANGELINE BULLOCK MD; Attending Interventional Radiologist  This document has been electronically signed. Dec  8 2021  8:08PM    < end of copied text >      EKG:	  < from: 12 Lead ECG (12.03.21 @ 10:24) >    Ventricular Rate 59 BPM    QRS Duration 80 ms    Q-T Interval 430 ms    QTC Calculation(Bazett) 425 ms    R Axis -14 degrees    T Axis 28 degrees    Diagnosis Line Atrial fibrillation with slow ventricular response with occasional   ventricular-paced complexes  Low voltage QRS  Possible Inferior infarct , age undetermined  Cannot rule out Anterior infarct , age undetermined  Abnormal ECG    Confirmed by GAB ARZOLA MD ([id number]) on 12/3/2021 11:07:32 AM    < end of copied text >        Imaging Personally Reviewed:  [ x] YES  [ ] NO    Consultant(s) Notes Reviewed:  [ x] YES  [ ] NO  Care Discussed with Consultants/Other Providers [x ] YES  [ ] NO    PEx:	  T(C): 35.8 (12-14-21 @ 07:58), Max: 35.9 (12-13-21 @ 16:16)  HR: 76 (12-14-21 @ 07:58) (74 - 84)  BP: 141/60 (12-14-21 @ 07:58) (98/46 - 141/60)  RR: 20 (12-14-21 @ 07:58) (18 - 20)  SpO2: 96% (12-14-21 @ 07:58) (96% - 96%)  Wt(kg): --  Daily     Daily     NOT CDONE  General:  found in bed in NAD  Eyes:  PERRL EOMI Non icteric MOM  ENMT: no external oral ulcers, MMM, no thrush   CVS: RR S1S2 No M/G/R  Resp: Unlabored Non tachypneic No increased WOB  GI:  Soft NT ND BS+  :  Voiding / Russo / PrimaFit  Musc: No C/C/E    Neuro: Follows commands No focal deficits  Psych: Calm Pleasant, AAOx3    Skin: Non jaundiced , no rash   Lymph: no adenopathy     Preadmit Karnofsky:  %           Current Karnofsky:     %  http://www.[facility].org/files/news/karnofsky_performance_scale.pdf   http://www.[facility].org/files/news/palliative_performance_scale_PPSv2.pdf  Cachexia (Y/N):   BMI:      Medications:	      MEDICATIONS  (STANDING):  budesonide 160 MICROgram(s)/formoterol 4.5 MICROgram(s) Inhaler 2 Puff(s) Inhalation two times a day  cefTRIAXone   IVPB 2000 milliGRAM(s) IV Intermittent every 24 hours  chlorhexidine 4% Liquid 1 Application(s) Topical <User Schedule>  collagenase Ointment 1 Application(s) Topical daily  folic acid 1 milliGRAM(s) Oral daily  influenza  Vaccine (HIGH DOSE) 0.7 milliLiter(s) IntraMuscular once  lactated ringers Bolus 500 milliLiter(s) IV Bolus once  methocarbamol 500 milliGRAM(s) Oral three times a day  metroNIDAZOLE    Tablet 500 milliGRAM(s) Oral every 8 hours  midodrine. 10 milliGRAM(s) Oral three times a day  potassium chloride  20 mEq/100 mL IVPB 20 milliEquivalent(s) IV Intermittent every 2 hours    MEDICATIONS  (PRN):  ALBUTerol    90 MICROgram(s) HFA Inhaler 2 Puff(s) Inhalation every 6 hours PRN Shortness of Breath and/or Wheezing  melatonin 5 milliGRAM(s) Oral at bedtime PRN Insomnia  oxyCODONE    IR 5 milliGRAM(s) Oral every 6 hours PRN Moderate Pain (4 - 6)        Advanced Directives:	     Full Code     Decision maker: The patient is able to participate in complex medical decision making conversations.   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 Pt reports that this morning, he was awakened by acute, sudden onset L sided chest pain at 4AM. Describes the pain as "pounding", states that he felt some pain radiate to LUE as well. Denies identifiable aggravating or alleviating factors and states that this has never happened before. Denies headache/dizziness, endorses chronic shortness of breath that is unchanged, has been requiring his baseline oxygen. Denies abdominal pain. Endorses compliance with all medications. States that at baseline, he is bedbound, cannot stand up or ambulate due to weakness. Was recently admitted to [facility] for short term rehab, where he wishes to go back because feels that he cannot care for himself at home. Lives with son but needs more help and PT.   In the ED, T 96, /58, HR 85, RR 18, SpO2 98% on O2 NC 4L, then 100% on 3L. Lab work revealed Hb 9.1, INR 7.87, troponin 0.03 and BNP 3327 (both near baseline). EKG revealed AFib, HR 88. CXR revealed slightly inc small R sided pleural effusion. (30 Nov 2021 11:30)      PAST MEDICAL & SURGICAL HISTORY:  COPD (chronic obstructive pulmonary disease)    Hypertension    Deep vein thrombosis (DVT) of left lower extremity, unspecified chronicity, unspecified vein    Cellulitis of left lower extremity    Chronic atrial fibrillation    Mitral valve insufficiency, unspecified etiology  Moderate    CHF (congestive heart failure)    S/P coronary artery stent placement    History of total right knee replacement    FAMILY HISTORY:   Reviewed and found non contributory in mother or father    SOCIAL HISTORY:   Tobacco/etoh/illicit drug use use reported. Yes [ ]  _________  No [ ?]  Pt resides at: home [ ]  facility [ x]  other [ ] _______  dgt reports being in/out of hospital/rehab; discribed that wife as ALEK, and that the 5 children + stressors and that she is the decision-maker    ROS:	    Unable to attain due to:                      Dyspnea (Beltran 0-10): 0                       N/V (Y/N): No                             Secretions (Y/N) : No                                          Agitation(Y/N): No                              Pain (Y/N): "all over" "everywhere" - cannot localize/? existential                                 -Provocation/Palliation: N/A  -Quality/Quantity: N/A  -Radiating: N/A  -Severity: No pain  -Timing/Frequency: N/A  -Impact on ADLs: yes    General:  Denied  HEENT:    Denied  Neck:  Denied  CVS:  Denied  Resp:  Denied  GI:  Denied    :  Denied  Musc:  Denied  Neuro:  Denied  Psych:  Denied  Skin:  Denied  Lymph:  Denied    Last BM: ?      Allergies    No Known Allergies    Intolerances      Opiate Naive (Y/N):  N  -iStop reviewed (Y/N): Y  Ref#:     Reference #: [id number]    Others' Prescriptions  Patient Name: Maduh Glasgow Date: 1940  Address: [address] [place] [id number]: Male  Rx Written	Rx Dispensed	Drug	Quantity	Days Supply	Prescriber Name	Prescriber Emily #	Payment Method	Dispenser  11/10/2021	11/11/2021	oxycodone-acetaminophen 5-325 mg tablet	20	5	Neftali Underwood MD	[id number]	Medicare	[id number]    Patient Name: Madhu Glasgow Date: 1940  Address: [address] [place] [id number]: Male  Rx Written	Rx Dispensed	Drug	Quantity	Days Supply	Prescriber Name	Prescriber Emily #	Payment Method	Dispenser  10/21/2021	11/03/2021	oxycodone-acetaminophen 5-325 mg tab	30	7	Bharath Khan	[id number]	Insurance	[employer]  10/20/2021	10/20/2021	oxycodone-acetaminophen 5-325 mg tab	40	10	Neftali Underwood MD	[id number]	Insurance	[employer]  10/06/2021	10/06/2021	oxycodone-acetaminophen 5-325 mg tab	40	10	Neftali Underwood MD	[id number]	Insurance	[employer]  09/23/2021	09/23/2021	oxycodone-acetaminophen 5-325 mg tab	40	10	Neftali Underwood MD	[id number]	Insurance	[employer]  07/25/2021	07/26/2021	oxycodone-acetaminophen 5-325 mg tab	20	10	Maru Mendez MD	[id number]	Insurance	[employer]    Patient Name: Madhu Glasgow Date: 1940  Address: [address] [place] [id number]: Male  Rx Written	Rx Dispensed	Drug	Quantity	Days Supply	Prescriber Name	Prescriber Emily #	Payment Method	Dispenser  08/30/2021	08/30/2021	oxycodone-acetaminophen 5-325 mg tab	60	15	Mae Divinarizwana	[id number]	Sanchez	[facility]  08/06/2021	08/06/2021	oxycodone-acetaminophen 5-325 mg tab	40	10	MaeMiya	[id number]	Sanchez	[facility]             Labs:	    CBC:                        7.9    10.42 )-----------( 111      ( 14 Dec 2021 04:30 )             23.5     CMP:    12-14    136  |  104  |  36<H>  ----------------------------<  84  3.4<L>   |  18  |  1.2      Ca    7.4<L>      14 Dec 2021 04:30  Mg     2.1     12-14    TPro  4.4<L>  /  Alb  2.0<L>  /  TBili  1.0  /  DBili  x   /  AST  31  /  ALT  34  /  AlkPhos  594<H>  12-14       PT/INR - ( 14 Dec 2021 04:30 )   PT: >40.00 sec;   INR: 4.92 ratio         PTT - ( 14 Dec 2021 04:30 )  PTT:52.5 sec           Radiology:	   < from: NM Hepatobiliary Imaging (12.11.21 @ 15:37) >  IMPRESSION:    NO DEFINITE VISUALIZATION OF THEGALLBLADDER THROUGH 4 HOURS   POST-INJECTION, CONSISTENT WITH CHOLECYSTITIS, AS DESCRIBED ABOVE.    CLINICAL CORRELATION IS SUGGESTED.    These findings were discussed with Dr. Carolina at 12/11/2021 5:15   PM by Dr. Mcconnell with read back confirmation.    --- End of Report ---          KRYSTEN MCCONNELL MD; Resident Radiologist  This document has been electronically signed.  CHAPARRITA CANCHOLA MD; Attending Radiologist  This document has been electronically signed. Dec 11 2021 10:25PM    < end of copied text >    < from: US Abdomen Upper Quadrant Right (12.10.21 @ 18:17) >  IMPRESSION: Thick-walled gallbladder containing stones and sludge perhaps   representing cholecystitis.      --- End of Report ---            BHARATH ARAYA MD; Attending Radiologist  This document has been electronically signed. Dec 10 2021  7:45PM    < end of copied text >    < from: CT Angio Chest PE Protocol w/ IV Cont (12.08.21 @ 18:55) >  IMPRESSION:    No evidence of pulmonary embolism.    There are small bilateral pleural effusions with mild atelectatic changes   at the lung bases.    --- End of Report ---      ANGELINE BULLOCK MD; Attending Interventional Radiologist  This document has been electronically signed. Dec  8 2021  8:08PM    < end of copied text >      EKG:	  < from: 12 Lead ECG (12.03.21 @ 10:24) >    Ventricular Rate 59 BPM    QRS Duration 80 ms    Q-T Interval 430 ms    QTC Calculation(Bazett) 425 ms    R Axis -14 degrees    T Axis 28 degrees    Diagnosis Line Atrial fibrillation with slow ventricular response with occasional   ventricular-paced complexes  Low voltage QRS  Possible Inferior infarct , age undetermined  Cannot rule out Anterior infarct , age undetermined  Abnormal ECG    Confirmed by GAB ARZOLA MD ([id number]) on 12/3/2021 11:07:32 AM    < end of copied text >        Imaging Personally Reviewed:  [ x] YES  [ ] NO    Consultant(s) Notes Reviewed:  [ x] YES  [ ] NO  Care Discussed with Consultants/Other Providers [x ] YES  [ ] NO    PEx:	  T(C): 35.8 (12-14-21 @ 07:58), Max: 35.9 (12-13-21 @ 16:16)  HR: 76 (12-14-21 @ 07:58) (74 - 84)  BP: 141/60 (12-14-21 @ 07:58) (98/46 - 141/60)  RR: 20 (12-14-21 @ 07:58) (18 - 20)  SpO2: 96% (12-14-21 @ 07:58) (96% - 96%)  Wt(kg): --  Daily     Daily     General:  found in bed as encounters pulsed/progressed - + pain requiring oxycodone and the morphine IV - then was sleeping no respiratory depression  Eyes:  PER sl icteric MOM  ENMT: no external oral ulcers,   CVS: not tachy  Resp: Unlabored Non tachypneic No increased WOB  GI:  Soft NT ND   :  Voiding   Musc: No C/C/E    Neuro: Follows commands No focal deficits  Psych: Calm at times frustrated and then wth ^pain, AAOx3    Skin: Non jaundiced      Preadmit Karnofsky:  %           Current Karnofsky:     40%  http://www.npcrc.org/files/news/karnofsky_performance_scale.pdf   http://www.npcrc.org/files/news/palliative_performance_scale_PPSv2.pdf  Cachexia (Y/N): y  BMI: na      Medications:	      MEDICATIONS  (STANDING):  budesonide 160 MICROgram(s)/formoterol 4.5 MICROgram(s) Inhaler 2 Puff(s) Inhalation two times a day  cefTRIAXone   IVPB 2000 milliGRAM(s) IV Intermittent every 24 hours  chlorhexidine 4% Liquid 1 Application(s) Topical <User Schedule>  collagenase Ointment 1 Application(s) Topical daily  folic acid 1 milliGRAM(s) Oral daily  influenza  Vaccine (HIGH DOSE) 0.7 milliLiter(s) IntraMuscular once  lactated ringers Bolus 500 milliLiter(s) IV Bolus once  methocarbamol 500 milliGRAM(s) Oral three times a day  metroNIDAZOLE    Tablet 500 milliGRAM(s) Oral every 8 hours  midodrine. 10 milliGRAM(s) Oral three times a day  potassium chloride  20 mEq/100 mL IVPB 20 milliEquivalent(s) IV Intermittent every 2 hours    MEDICATIONS  (PRN):  ALBUTerol    90 MICROgram(s) HFA Inhaler 2 Puff(s) Inhalation every 6 hours PRN Shortness of Breath and/or Wheezing  melatonin 5 milliGRAM(s) Oral at bedtime PRN Insomnia  oxyCODONE    IR 5 milliGRAM(s) Oral every 6 hours PRN Moderate Pain (4 - 6)        Advanced Directives:	     Full Code     Decision maker: The patient is able to participate in complex medical decision making conversations.   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 Pt reports that this morning, he was awakened by acute, sudden onset L sided chest pain at 4AM. Describes the pain as "pounding", states that he felt some pain radiate to LUE as well. Denies identifiable aggravating or alleviating factors and states that this has never happened before. Denies headache/dizziness, endorses chronic shortness of breath that is unchanged, has been requiring his baseline oxygen. Denies abdominal pain. Endorses compliance with all medications. States that at baseline, he is bedbound, cannot stand up or ambulate due to weakness. Was recently admitted to [facility] for short term rehab, where he wishes to go back because feels that he cannot care for himself at home. Lives with son but needs more help and PT.   In the ED, T 96, /58, HR 85, RR 18, SpO2 98% on O2 NC 4L, then 100% on 3L. Lab work revealed Hb 9.1, INR 7.87, troponin 0.03 and BNP 3327 (both near baseline). EKG revealed AFib, HR 88. CXR revealed slightly inc small R sided pleural effusion. (30 Nov 2021 11:30)      PAST MEDICAL & SURGICAL HISTORY:  COPD (chronic obstructive pulmonary disease)    Hypertension    Deep vein thrombosis (DVT) of left lower extremity, unspecified chronicity, unspecified vein    Cellulitis of left lower extremity    Chronic atrial fibrillation    Mitral valve insufficiency, unspecified etiology  Moderate    CHF (congestive heart failure)    S/P coronary artery stent placement    History of total right knee replacement    FAMILY HISTORY:   Reviewed and found non contributory in mother or father    SOCIAL HISTORY:   Tobacco/etoh/illicit drug use use reported. Yes [ ]  _________  No [ ?]  Pt resides at: home [ ]  facility [ x]  other [ ] _______  dgt reports being in/out of hospital/rehab; discribed that wife as ALEK, and that the 5 children + stressors and that she is the decision-maker    ROS:	                     Dyspnea (Beltran 0-10): 0                       N/V (Y/N): No                             Secretions (Y/N) : No                                          Agitation(Y/N): No                              Pain (Y/N): "all over" "everywhere" - cannot localize/? existential                                 -Provocation/Palliation: N/A  -Quality/Quantity: N/A  -Radiating: N/A  -Severity: No pain  -Timing/Frequency: N/A  -Impact on ADLs: yes    General:  Denied  HEENT:    Denied  Neck:  Denied  CVS:  Denied  Resp:  Denied  GI:  Denied    :  Denied  Musc:  Denied  Neuro:  Denied  Psych:  Denied  Skin:  Denied  Lymph:  Denied    Last BM: ?      Allergies  No Known Allergies      Opiate Naive (Y/N):  N  -iStop reviewed (Y/N): Y  Ref#:     Reference #: [id number]    Others' Prescriptions  Patient Name: Madhu Glasgow Date: 1940  Address: [address] [place] [id number]: Male  Rx Written	Rx Dispensed	Drug	Quantity	Days Supply	Prescriber Name	Prescriber Emily #	Payment Method	Dispenser  11/10/2021	11/11/2021	oxycodone-acetaminophen 5-325 mg tablet	20	5	Neftali Underwood MD	[id number]	Medicare	[id number]    Patient Name: Madhu Glasgow Date: 1940  Address: [address] [place] [id number]: Male  Rx Written	Rx Dispensed	Drug	Quantity	Days Supply	Prescriber Name	Prescriber Emily #	Payment Method	Dispenser  10/21/2021	11/03/2021	oxycodone-acetaminophen 5-325 mg tab	30	7	Bharath Khan	[id number]	Insurance	[employer]  10/20/2021	10/20/2021	oxycodone-acetaminophen 5-325 mg tab	40	10	Neftali Underwood MD	[id number]	Insurance	[employer]  10/06/2021	10/06/2021	oxycodone-acetaminophen 5-325 mg tab	40	10	Neftali Underwood MD	[id number]	Insurance	[employer]  09/23/2021	09/23/2021	oxycodone-acetaminophen 5-325 mg tab	40	10	Neftali Underwood MD	[id number]	Insurance	[employer]  07/25/2021	07/26/2021	oxycodone-acetaminophen 5-325 mg tab	20	10	Maru Mendez MD	[id number]	Insurance	[employer]    Patient Name: Madhu Glasgow Date: 1940  Address: [address] [place] [id number]: Male  Rx Written	Rx Dispensed	Drug	Quantity	Days Supply	Prescriber Name	Prescriber Emily #	Payment Method	Dispenser  08/30/2021	08/30/2021	oxycodone-acetaminophen 5-325 mg tab	60	15	Mae Divinarizwana	[id number]	Sanchez	[facility]  08/06/2021	08/06/2021	oxycodone-acetaminophen 5-325 mg tab	40	10	MaeMiya	[id number]	Sanchez	Procar             Labs:	    CBC:                        7.9    10.42 )-----------( 111      ( 14 Dec 2021 04:30 )             23.5     CMP:    12-14    136  |  104  |  36<H>  ----------------------------<  84  3.4<L>   |  18  |  1.2      Ca    7.4<L>      14 Dec 2021 04:30  Mg     2.1     12-14    TPro  4.4<L>  /  Alb  2.0<L>  /  TBili  1.0  /  DBili  x   /  AST  31  /  ALT  34  /  AlkPhos  594<H>  12-14       PT/INR - ( 14 Dec 2021 04:30 )   PT: >40.00 sec;   INR: 4.92 ratio         PTT - ( 14 Dec 2021 04:30 )  PTT:52.5 sec     Radiology:	   < from: NM Hepatobiliary Imaging (12.11.21 @ 15:37) >  IMPRESSION:    NO DEFINITE VISUALIZATION OF THEGALLBLADDER THROUGH 4 HOURS   POST-INJECTION, CONSISTENT WITH CHOLECYSTITIS, AS DESCRIBED ABOVE.    CLINICAL CORRELATION IS SUGGESTED.    These findings were discussed with Dr. Carolina at 12/11/2021 5:15   PM by Dr. Mcconnell with read back confirmation.    --- End of Report ---          KRYSTEN MCCONNELL MD; Resident Radiologist  This document has been electronically signed.  CHAPARRITA CANCHOLA MD; Attending Radiologist  This document has been electronically signed. Dec 11 2021 10:25PM    < end of copied text >    < from: US Abdomen Upper Quadrant Right (12.10.21 @ 18:17) >  IMPRESSION: Thick-walled gallbladder containing stones and sludge perhaps   representing cholecystitis.      --- End of Report ---            BHARATH ARAYA MD; Attending Radiologist  This document has been electronically signed. Dec 10 2021  7:45PM    < end of copied text >    < from: CT Angio Chest PE Protocol w/ IV Cont (12.08.21 @ 18:55) >  IMPRESSION:    No evidence of pulmonary embolism.    There are small bilateral pleural effusions with mild atelectatic changes   at the lung bases.    --- End of Report ---      ANGELINE BULLOCK MD; Attending Interventional Radiologist  This document has been electronically signed. Dec  8 2021  8:08PM    < end of copied text >      EKG:	  < from: 12 Lead ECG (12.03.21 @ 10:24) >    Ventricular Rate 59 BPM    QRS Duration 80 ms    Q-T Interval 430 ms    QTC Calculation(Bazett) 425 ms    R Axis -14 degrees    T Axis 28 degrees    Diagnosis Line Atrial fibrillation with slow ventricular response with occasional   ventricular-paced complexes  Low voltage QRS  Possible Inferior infarct , age undetermined  Cannot rule out Anterior infarct , age undetermined  Abnormal ECG    Confirmed by GAB ARZOLA MD ([id number]) on 12/3/2021 11:07:32 AM    < end of copied text >    Imaging Personally Reviewed:  [ x] YES  [ ] NO    Consultant(s) Notes Reviewed:  [ x] YES  [ ] NO  Care Discussed with Consultants/Other Providers [x ] YES  [ ] NO    PEx:	  T(C): 35.8 (12-14-21 @ 07:58), Max: 35.9 (12-13-21 @ 16:16)  HR: 76 (12-14-21 @ 07:58) (74 - 84)  BP: 141/60 (12-14-21 @ 07:58) (98/46 - 141/60)  RR: 20 (12-14-21 @ 07:58) (18 - 20)  SpO2: 96% (12-14-21 @ 07:58) (96% - 96%)  Wt(kg): --  Daily     Daily     General:  found in bed as encounters pulsed/progressed - + pain requiring oxycodone and the morphine IV - then was sleeping no respiratory depression  Eyes:  PER sl icteric MOM  ENMT: no external oral ulcers,   CVS: not tachy  Resp: Unlabored Non tachypneic No increased WOB  GI:  Soft NT ND   :  Voiding   Musc: No C/C/E    Neuro: Follows commands No focal deficits  Psych: Calm at times frustrated and then with increased pain, AAOx3    Skin: Non jaundiced      Preadmit Karnofsky:  Unknown%           Current Karnofsky:     40%  http://www.npcrc.org/files/news/karnofsky_performance_scale.pdf   http://www.npcrc.org/files/news/palliative_performance_scale_PPSv2.pdf  Cachexia (Y/N): y  BMI: na      Medications:	      MEDICATIONS  (STANDING):  budesonide 160 MICROgram(s)/formoterol 4.5 MICROgram(s) Inhaler 2 Puff(s) Inhalation two times a day  cefTRIAXone   IVPB 2000 milliGRAM(s) IV Intermittent every 24 hours  chlorhexidine 4% Liquid 1 Application(s) Topical <User Schedule>  collagenase Ointment 1 Application(s) Topical daily  folic acid 1 milliGRAM(s) Oral daily  influenza  Vaccine (HIGH DOSE) 0.7 milliLiter(s) IntraMuscular once  lactated ringers Bolus 500 milliLiter(s) IV Bolus once  methocarbamol 500 milliGRAM(s) Oral three times a day  metroNIDAZOLE    Tablet 500 milliGRAM(s) Oral every 8 hours  midodrine. 10 milliGRAM(s) Oral three times a day  potassium chloride  20 mEq/100 mL IVPB 20 milliEquivalent(s) IV Intermittent every 2 hours    MEDICATIONS  (PRN):  ALBUTerol    90 MICROgram(s) HFA Inhaler 2 Puff(s) Inhalation every 6 hours PRN Shortness of Breath and/or Wheezing  melatonin 5 milliGRAM(s) Oral at bedtime PRN Insomnia  oxyCODONE    IR 5 milliGRAM(s) Oral every 6 hours PRN Moderate Pain (4 - 6)        Advanced Directives:	     Full Code     Decision maker: The patient is able to participate in complex medical decision making conversations.   Legal surrogate:    GOALS OF CARE DISCUSSION	       Palliative care info/counseling provided	            Documentation of GOC/Advanced Care Planning:  See below    PSYCHOSOCIAL-SPIRITUAL ASSESSMENT:       Reviewed       See Palliative Care SW/ documentation        	    REFERRALS       Palliative Med        Unit SW/Case Mgmt

## 2021-12-14 NOTE — PROGRESS NOTE ADULT - SUBJECTIVE AND OBJECTIVE BOX
GENERAL SURGERY PROGRESS NOTE    Patient: RODNEY SANTO , 81y ([birth year])Male   MRN: [id number]  Location: [address]  Visit: 11-30-21 Inpatient  Date: 12-14-21 @ 12:06    Hospital Day #: 15  Post-Op Day #:    Procedure/Dx/Injuries: consult for acute cholecystitis    Events of past 24 hours: Patient is doing well, not complaining of any pain in his abdomen, afebrile. Tolerating diet having gas and bowel movements.    PAST MEDICAL & SURGICAL HISTORY:  COPD (chronic obstructive pulmonary disease)    Hypertension    Deep vein thrombosis (DVT) of left lower extremity, unspecified chronicity, unspecified vein    Cellulitis of left lower extremity    Chronic atrial fibrillation    Mitral valve insufficiency, unspecified etiology  Moderate    CHF (congestive heart failure)    S/P coronary artery stent placement    History of total right knee replacement        Vitals:   T(F): 96.4 (12-14-21 @ 07:58), Max: 96.6 (12-13-21 @ 16:16)  HR: 76 (12-14-21 @ 07:58)  BP: 141/60 (12-14-21 @ 07:58)  RR: 20 (12-14-21 @ 07:58)  SpO2: 96% (12-14-21 @ 07:58)      Diet, DASH/TLC:   Sodium & Cholesterol Restricted     Qty per Day:  MAGIC CUP 2X/DAY  Supplement Feeding Modality:  Oral  Ensure Pudding Cans or Servings Per Day:  1       Frequency:  Two Times a day      Fluids:     I & O's:    12-13-21 @ 07:01  -  12-14-21 @ 07:00  --------------------------------------------------------  IN:    Oral Fluid: 440 mL  Total IN: 440 mL    OUT:  Total OUT: 0 mL    Total NET: 440 mL        Bowel Movement: : [x] YES [] NO  Flatus: : [] YES [] NO    PHYSICAL EXAM:  General: NAD, AAOx3, calm and cooperative  HEENT: NCAT, TREY, EOMI, Trachea ML, Neck supple  Cardiac: RRR S1, S2, no Murmurs, rubs or gallops  Respiratory: CTAB  Abdomen: Soft, non-distended, mildly tender in RUQ to deep palpation    MEDICATIONS  (STANDING):  budesonide 160 MICROgram(s)/formoterol 4.5 MICROgram(s) Inhaler 2 Puff(s) Inhalation two times a day  cefTRIAXone   IVPB 2000 milliGRAM(s) IV Intermittent every 24 hours  chlorhexidine 4% Liquid 1 Application(s) Topical <User Schedule>  collagenase Ointment 1 Application(s) Topical daily  folic acid 1 milliGRAM(s) Oral daily  influenza  Vaccine (HIGH DOSE) 0.7 milliLiter(s) IntraMuscular once  lactated ringers Bolus 500 milliLiter(s) IV Bolus once  methocarbamol 500 milliGRAM(s) Oral three times a day  metroNIDAZOLE    Tablet 500 milliGRAM(s) Oral every 8 hours  midodrine. 10 milliGRAM(s) Oral three times a day  potassium chloride  20 mEq/100 mL IVPB 20 milliEquivalent(s) IV Intermittent every 2 hours    MEDICATIONS  (PRN):  ALBUTerol    90 MICROgram(s) HFA Inhaler 2 Puff(s) Inhalation every 6 hours PRN Shortness of Breath and/or Wheezing  melatonin 5 milliGRAM(s) Oral at bedtime PRN Insomnia  oxyCODONE    IR 5 milliGRAM(s) Oral every 6 hours PRN Moderate Pain (4 - 6)      DVT PROPHYLAXIS:   GI PROPHYLAXIS:   ANTICOAGULATION:   ANTIBIOTICS:  cefTRIAXone   IVPB 2000 milliGRAM(s)  metroNIDAZOLE    Tablet 500 milliGRAM(s)            LAB/STUDIES:  Labs:  CAPILLARY BLOOD GLUCOSE                              7.9    10.42 )-----------( 111      ( 14 Dec 2021 04:30 )             23.5       Auto Neutrophil %: 87.0 % (12-14-21 @ 04:30)  Auto Immature Granulocyte %: 0.6 % (12-14-21 @ 04:30)    12-14    136  |  104  |  36<H>  ----------------------------<  84  3.4<L>   |  18  |  1.2      Calcium, Total Serum: 7.4 mg/dL (12-14-21 @ 04:30)      LFTs:             4.4  | 1.0  | 31       ------------------[594     ( 14 Dec 2021 04:30 )  2.0  | x    | 34          Lipase:x      Amylase:x             Coags:     >40.00  ----< 4.92    ( 14 Dec 2021 04:30 )     52.5                    Culture - Blood (collected 12 Dec 2021 06:32)  Source: .Blood None  Preliminary Report (13 Dec 2021 17:02):    No growth to date.              IMAGING:      no new

## 2021-12-14 NOTE — CONSULT NOTE ADULT - ASSESSMENT
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    Please call [phone] with questions or concerns 24/7.   We will continue to follow.      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 D/W primary and palliative care teams    Please call [phone] with questions or concerns 24/7.   We will continue to follow.      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 D/W primary and palliative care teams    Please call [phone] with questions or concerns 24/7.   We will continue to follow.

## 2021-12-14 NOTE — CONSULT NOTE ADULT - PROBLEM SELECTOR RECOMMENDATION 4
had been managed with PRN oxycodone   today more demand that may be due to existential pain as we have been addressing GOC and dgt had shared that he is now talking more about being ready to die    will d/c percocet order as no need for two opioid orders  will increase availability of oxycodone from every 6 H to every 4H PRN for pain 4-10    bowel regimen had been managed with PRN oxycodone   today more demand that may be due to existential pain as we have been addressing GOC and dgt had shared that he is now talking more about being ready to die    will increase availability of oxycodone 5mg PO from every 6 H to every 4H PRN for pain 4-10    bowel regimen

## 2021-12-14 NOTE — CONSULT NOTE ADULT - PROBLEM SELECTOR RECOMMENDATION 7
DNR/I - MOlst completed  Treatment options including hospice discussed  Hospice consult for informational purposes  will follow

## 2021-12-14 NOTE — PROGRESS NOTE ADULT - ASSESSMENT
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 Trop at baseline 0.03, EKG showed afib, CXR small R sided pleural effusion.    #Proteus Bacteremia - +BCx x2 on 12/7, f/u BCx on 12/11 NGTD  #Acute Cholecystitis - +findings on RUQ U/S (12/10) + HIDA Scan (12/11)  #PMHx of Recent Cholecystitis - s/p perQ-cholecystostomy placement and removal by IR - 10d prior to admission  -ID consulted:  - Narrow cefepime to Ceftriaxone 2g q24h IV   - Continue PO flagyl 500mg TID  - Appreciate Surgery consult: will plan for laparoscopic cholecystectomy this coming week (not booked yet)  - Post surgery, cipro 500mg BID and flagyl 500mg TID x 3 days  - Monitor sacral area, low threshold to consult Burn, appreciate Wound care consult  - Gen Surg consulted:  - trend LFTs (hepatic function panel)  - will plan for laparoscopic cholecystectomy this coming week (not booked yet);  - f/u Cardiology, medicine, and pulmonology risk stratification  - Echo this admission    #Atypical Chest Pain - likely MSK, no further ischemic workup as per Cardiology (Dr. Herrera)  #PMHx of HFpEF - follows with Dr. 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  #Chronic DVT Right Common Fem/Fem/Popliteal - on LE Venous Duplex 12/1  - Troponin 0.03 x3, was 0.02-0.03 throughout prior admission, EKG performed x2 with no acute ischemic changes  - D-dimer 342, BNP ~3K near baseline, 11/30 CXR showing small R sided pleural effusion, has remained at baseline oxygen requirements  - TTE 12/2: EF 50-55%, otherwise unremarkable   - d/c Spironolactone + Torsemide due to persistent hypotension, not overloaded on exam  - pain control with Percocet 1 tab q6h PRN for moderate pain + Morphine PRN for severe pain  - INR was supratherapeutic on admission, was reversed with Vitamin K on 12/2, restarted Coumadin, INR remains supratherapeutic, have been holding the Coumadin, will need decreased dosing moving forward  - BP on lower side, increased midodrine to 10mg PO TID  - patient not eating and drinking adequately, calorie count started  - patient refusing PT/Rehab  - CTA-chest 12/9 negative for PE    #Chronic Macrocytic Anemia - on folate supplementation  #Anemia of Chronic Disease  - baseline Hb ~10-11, supratherapeutic INR on admission reversed, B12 WNL in September  - 12/1 iron studies: low TIBC, likely anemia of chronic disease    #DTI Left Heel + Coccyx   - Wound Care consulted, recommendations given to RN  - f/u Burn recs:   Burn team attempted to see patient for evaluation of sacral wound. Pt refusing examination at this time. Educated on risks associated with refusing evaluation including poor wound healing/infection.    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 Trop at baseline 0.03, EKG showed afib, CXR small R sided pleural effusion.    #Proteus Bacteremia - +BCx x2 on 12/7, f/u BCx on 12/11 NGTD  #Acute Cholecystitis - +findings on RUQ U/S (12/10) + HIDA Scan (12/11)  #PMHx of Recent Cholecystitis - s/p perQ-cholecystostomy placement and removal by IR - 10d prior to admission  -ID consulted:  - Narrow cefepime to Ceftriaxone 2g q24h IV   - Continue PO flagyl 500mg TID  - Appreciate Surgery consult: will plan for laparoscopic cholecystectomy this coming week (not booked yet)  - Post surgery, cipro 500mg BID and flagyl 500mg TID x 3 days  - Monitor sacral area, low threshold to consult Burn, appreciate Wound care consult  - Gen Surg consulted:  - trend LFTs (hepatic function panel)  - will plan for laparoscopic cholecystectomy this coming week (not booked yet);  - f/u Cardiology, medicine, and pulmonology risk stratification  - Echo this admission    #Atypical Chest Pain - likely MSK, no further ischemic workup as per Cardiology (Dr. Herrera)  #PMHx of HFpEF - follows with Dr. 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  #Chronic DVT Right Common Fem/Fem/Popliteal - on LE Venous Duplex 12/1  - Troponin 0.03 x3, was 0.02-0.03 throughout prior admission, EKG performed x2 with no acute ischemic changes  - D-dimer 342, BNP ~3K near baseline, 11/30 CXR showing small R sided pleural effusion, has remained at baseline oxygen requirements  - TTE 12/2: EF 50-55%, otherwise unremarkable   - d/c Spironolactone + Torsemide due to persistent hypotension, not overloaded on exam  - pain control with Percocet 1 tab q6h PRN for moderate pain + Morphine PRN for severe pain  - INR was supratherapeutic on admission, was reversed with Vitamin K on 12/2, restarted Coumadin, INR remains supratherapeutic, have been holding the Coumadin, will need decreased dosing moving forward  - BP on lower side, increased midodrine to 10mg PO TID  - patient not eating and drinking adequately, calorie count started  - patient refusing PT/Rehab  - CTA-chest 12/9 negative for PE    #Chronic Macrocytic Anemia - on folate supplementation  #Anemia of Chronic Disease  - baseline Hb ~10-11, supratherapeutic INR on admission reversed, B12 WNL in September  - 12/1 iron studies: low TIBC, likely anemia of chronic disease    #DTI Left Heel + Coccyx   - Wound Care consulted, recommendations given to RN  - f/u Burn recs:   Burn evaluated pt at bedside and took biopsies of the Coccyx ulcer. Pt will likely need debridement, however INR is still elevated.    #COPD   - on home O2 2.5L, has remained at baseline, continuing symbicort 160ug 2 puffs BID, Albuterol PRN                                                                          # DVT prophylaxis: Coumadin (holding)  # GI prophylaxis: N/A  # Diet: DASH/TLC  # Activity: Ambulate as Tolerated  # Code status: Full  # Disposition: remain on floor  # Handoff: Poor surgical candidate; Palliative consulted, will have further discussions with family regarding GOC

## 2021-12-14 NOTE — PROGRESS NOTE ADULT - ASSESSMENT
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 ACS risk calculator showed: 17.7% serious complication, 16.6% any complication, 3.3% cardiac complication, 18.5% readmission, 13.3% death, 39% functional decline.      PLAN:  - No surgical intervention   - Consult IR for percutaneous cholecystostomy   - Follow up goals of care conversation   - Recall as needed [phone]    Lines/Tubes: PIV,     [phone]

## 2021-12-14 NOTE — PROGRESS NOTE ADULT - SUBJECTIVE AND OBJECTIVE BOX
RODNEY SANTO 81y Male  MRN#: [id number]   CODE STATUS: FULL      SUBJECTIVE  Patient is a 81y old Male who presents with a chief complaint of chest pain (13 Dec 2021 18:07)    Today is hospital day 14d,   INTERVAL HPI/OVERNIGHT EVENTS:    Examined pt at bedside this AM. There were no acute events overnight.    Present Today:           Russo Catheter (x)No/ ()Yes?   Indication:             Central Line (x)No/ ()Yes?   Indication:          IV Fluids (x)No/ ()Yes? Type:  Rate:  Indication:    OBJECTIVE  PAST MEDICAL & SURGICAL HISTORY  COPD (chronic obstructive pulmonary disease)    Hypertension    Deep vein thrombosis (DVT) of left lower extremity, unspecified chronicity, unspecified vein    Cellulitis of left lower extremity    Chronic atrial fibrillation    Mitral valve insufficiency, unspecified etiology  Moderate    CHF (congestive heart failure)    S/P coronary artery stent placement    History of total right knee replacement      ALLERGIES:  No Known Allergies    HOME MEDICATIONS:  Home Medications:  atorvastatin 20 mg oral tablet: 1 tab(s) orally once a day (13 Sep 2021 04:59)  folic acid 1 mg oral tablet: 1 tab(s) orally once a day (21 Sep 2021 11:34)  oxycodone-acetaminophen 5 mg-325 mg oral tablet: 1 tab(s) orally every 6 hours, As needed, Moderate Pain (4 - 6) (21 Sep 2021 11:32)  spironolactone 25 mg oral tablet: 0.5 tab(s) orally once a day (21 Sep 2021 11:34)  Symbicort 160 mcg-4.5 mcg/inh inhalation aerosol: 2 puff(s) inhaled 2 times a day (13 Sep 2021 04:59)  Ventolin HFA 90 mcg/inh inhalation aerosol: 2 puff(s) inhaled every 6 hours as neede for shortness of breath (13 Sep 2021 04:59)  warfarin 2 mg oral tablet: Take half a tablet Sun, Tues, Wed, Thurs, Fri, Sat.  Take a whole tablet on Mon (13 Sep 2021 04:59)    MEDICATIONS:  STANDING MEDICATIONS  budesonide 160 MICROgram(s)/formoterol 4.5 MICROgram(s) Inhaler 2 Puff(s) Inhalation two times a day  cefTRIAXone   IVPB 2000 milliGRAM(s) IV Intermittent every 24 hours  chlorhexidine 4% Liquid 1 Application(s) Topical <User Schedule>  collagenase Ointment 1 Application(s) Topical daily  folic acid 1 milliGRAM(s) Oral daily  influenza  Vaccine (HIGH DOSE) 0.7 milliLiter(s) IntraMuscular once  lactated ringers Bolus 500 milliLiter(s) IV Bolus once  methocarbamol 500 milliGRAM(s) Oral three times a day  metroNIDAZOLE    Tablet 500 milliGRAM(s) Oral every 8 hours  midodrine. 10 milliGRAM(s) Oral three times a day    PRN MEDICATIONS  ALBUTerol    90 MICROgram(s) HFA Inhaler 2 Puff(s) Inhalation every 6 hours PRN  melatonin 5 milliGRAM(s) Oral at bedtime PRN  oxyCODONE    IR 5 milliGRAM(s) Oral every 6 hours PRN      VITAL SIGNS: Last 24 Hours  T(C): 35.7 (14 Dec 2021 00:00), Max: 35.9 (13 Dec 2021 16:16)  T(F): 96.2 (14 Dec 2021 00:00), Max: 96.6 (13 Dec 2021 16:16)  HR: 74 (14 Dec 2021 00:00) (74 - 96)  BP: 98/46 (14 Dec 2021 00:00) (88/51 - 104/55)  BP(mean): --  RR: 18 (14 Dec 2021 00:00) (18 - 20)  SpO2: 99% (13 Dec 2021 08:17) (99% - 99%)    LABS:                        7.8    12.48 )-----------( 135      ( 13 Dec 2021 04:30 )             23.9     12-13    136  |  103  |  36<H>  ----------------------------<  78  3.8   |  19  |  1.2    Ca    7.6<L>      13 Dec 2021 04:30  Mg     2.0     12-13    TPro  4.7<L>  /  Alb  2.1<L>  /  TBili  1.4<H>  /  DBili  x   /  AST  34  /  ALT  39  /  AlkPhos  536<H>  12-13    PT/INR - ( 13 Dec 2021 04:30 )   PT: >40.00 sec;   INR: 4.64 ratio                       Culture - Blood (collected 12 Dec 2021 06:32)  Source: .Blood None  Preliminary Report (13 Dec 2021 17:02):    No growth to date.      RADIOLOGY:  < from: NM Hepatobiliary Imaging (12.11.21 @ 15:37) >  IMPRESSION:    NO DEFINITE VISUALIZATION OF THEGALLBLADDER THROUGH 4 HOURS   POST-INJECTION, CONSISTENT WITH CHOLECYSTITIS, AS DESCRIBED ABOVE.    CLINICAL CORRELATION IS SUGGESTED.    < end of copied text >      PHYSICAL EXAM:    GENERAL: NAD, well-developed, AAOx3  HEENT:  Atraumatic, Normocephalic  PULMONARY: Clear to auscultation bilaterally; No wheeze  CARDIOVASCULAR: Regular rate and rhythm; No murmurs, rubs, or gallops  GASTROINTESTINAL: Soft, Nontender, Nondistended; Bowel sounds present  MUSCULOSKELETAL:  2+ Peripheral Pulses, No clubbing, cyanosis, or edema  NEUROLOGY: non-focal  SKIN: No rashes or lesions

## 2021-12-14 NOTE — PROGRESS NOTE ADULT - ATTENDING COMMENTS
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           Proteus bacteremia-- started on cefepime and  RUQ US-- shows thickened wall with gall stones-- r/o ac cholecystitis--patient does not have pain--   alk phos elevated and bilirubin going up--  HIDA scan --< from: [facility] (12.11.21 @ 15:37) >  NO DEFINITE VISUALIZATION OF THEGALLBLADDER THROUGH 4 HOURS   POST-INJECTION, CONSISTENT WITH CHOLECYSTITIS, AS DESCRIBED ABOVE.    CLINICAL CORRELATION IS SUGGESTED        .Not a candidate for surgery. seen BY ID on cefepiime changed to ceftriaxone  and flagyl.-- surgery planned lap cholecystectomy-- but high risk so cholecystostomy by IR planned but patient refused    Suspected atypical chest pain possibly skeletomuscular- CE negative x 3  Leucocytosis with bacteremia- though afebrile but hypotensive. wbc elevated and blood cx shows proteus sensitive to cefepime and ceftriaxone  Chronic A-Fib on Coumadin. Cont Metoprolol. INR high-- reverse INR as patient may bleed  Chronic HFpEF- no exacerbation  orthostatic hypotension-Cont Midodrine 10 mg po q 12h. BP still low .  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    HIDA scan positive--  - not eating today-- patient pulled out last cholecystectomy tube inprevious admission.   daughter at bedside--. patient wants to be left alone to die-- hospice care called. 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           Proteus bacteremia-- started on cefepime and  RUQ US-- shows thickened wall with gall stones-- r/o ac cholecystitis--patient does not have pain--   alk phos elevated and bilirubin going up--  HIDA scan --< from: [facility] (12.11.21 @ 15:37) >  NO DEFINITE VISUALIZATION OF THEGALLBLADDER THROUGH 4 HOURS   POST-INJECTION, CONSISTENT WITH CHOLECYSTITIS, AS DESCRIBED ABOVE.    CLINICAL CORRELATION IS SUGGESTED        .Not a candidate for surgery. seen BY ID on cefepime changed to ceftriaxone  and flagyl.-- surgery planned lap cholecystectomy-- but high risk so cholecystostomy by IR planned but patient refused    Suspected atypical chest pain possibly skeletomuscular- CE negative x 3  Leucocytosis with bacteremia- though afebrile but hypotensive. wbc elevated and blood cx shows proteus sensitive to cefepime and ceftriaxone  Chronic A-Fib on Coumadin. Cont Metoprolol. INR high-- reverse INR as patient may bleed  Chronic HFpEF- no exacerbation  orthostatic hypotension-Cont Midodrine 10 mg po q 12h. BP still low .  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    HIDA scan positive--  - not eating today-- patient pulled out last cholecystectomy tube inprevious admission.   daughter at bedside--. patient wants to be left alone to die-- hospice care called.

## 2021-12-14 NOTE — CHART NOTE - NSCHARTNOTEFT_GEN_A_CORE
Patient's daughter, Mery Lira, consented to debridement of sacrum today. Attempted with Dr. Rosales to debride sacrum, but patient refused. He stated he is in a lot of pain, "wants to die" and refused to be moved at all. Resident and nurse aware. Called daughter as well and made her aware.

## 2021-12-14 NOTE — CONSULT NOTE ADULT - CONSULT REASON
bactermai fromgall bladder source-- last admission t cholecytostomy tubefell-- now again with this    decubitus uler refusng debridement bactermai fromgall bladder source-- last admission t cholecytostomy tubefell-- now again with this    decubitus ulcer refusng debridement

## 2021-12-14 NOTE — CONSULT NOTE ADULT - CONVERSATION DETAILS
Started to have conversation at bedside with dgt and patient, but patient became overwhelmed and deferred to dgt and for conversation to continue outside of room   Explained the ^ risk nature/and no rec for surgery.   Mery explained that the patient has had a continued decline especially in last 6 months with 'vicious cycle' of in/out hospital/rehab.   Explained-CMO-limited med intervention-aggressive disease directed care  She is open to hospice c/s and is leaning towards [facility]-but needs to have time to decide.   All questions answered in detail Started to have conversation at bedside with dgt and patient, but patient became overwhelmed and deferred to dgt and for conversation to continue outside of room   Explained the increase risk nature/and recommendations aginst surgery.   Mery explained that the patient has had a continued decline especially in last 6 months with 'vicious cycle' of in/out hospital/rehab.   Explained-CMO-limited med intervention-aggressive disease directed care  She is open to hospice c/s and is leaning towards [facility]-but needs to have time to decide.   All questions answered in detail  Code status also discussed - Patient's daughter wishes for DNR/DNI

## 2021-12-14 NOTE — CONSULT NOTE ADULT - SUBJECTIVE AND OBJECTIVE BOX
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 Pt reports that this morning, he was awakened by acute, sudden onset L sided chest pain at 4AM. Describes the pain as "pounding", states that he felt some pain radiate to LUE as well. Denies identifiable aggravating or alleviating factors and states that this has never happened before. Denies headache/dizziness, endorses chronic shortness of breath that is unchanged, has been requiring his baseline oxygen. Denies abdominal pain. Endorses compliance with all medications. States that at baseline, he is bedbound, cannot stand up or ambulate due to weakness. Was recently admitted to [facility] for short term rehab, where he wishes to go back because feels that he cannot care for himself at home. Lives with son but needs more help and PT.   In the ED, T 96, /58, HR 85, RR 18, SpO2 98% on O2 NC 4L, then 100% on 3L. Lab work revealed Hb 9.1, INR 7.87, troponin 0.03 and BNP 3327 (both near baseline). EKG revealed AFib, HR 88. CXR revealed slightly inc small R sided pleural effusion. (30 Nov 2021 11:30)    -------------    Burn consulted to evaluate sacral pressure ulcer. Attempted to see patient yesterday but he refused. Today patient is agreeable to consult.     Allergies    No Known Allergies        PAST MEDICAL & SURGICAL HISTORY:  COPD (chronic obstructive pulmonary disease)    Hypertension    Deep vein thrombosis (DVT) of left lower extremity, unspecified chronicity, unspecified vein    Cellulitis of left lower extremity    Chronic atrial fibrillation    Mitral valve insufficiency, unspecified etiology  Moderate    CHF (congestive heart failure)    S/P coronary artery stent placement    History of total right knee replacement                            7.9    10.42 )-----------( 111      ( 14 Dec 2021 04:30 )             23.5   INR: 4.92: Recommended ranges for therapeutic INR:    2.0-3.0 for most medical and surgical thromboembolic states    2.0-3.0 for atrial fibrillation    2.0-3.0 for bileaflet mechanical valve in aortic position    2.5-3.5 for mechanical heart valves    Chest 2004;[id number]  The presence of direct thrombin inhibitors (argatroban, refludan)  may falsely increase results. ratio (12.14.21 @ 04:30)        Exam:  Gen: NAD, lying in bed comfortably, pleasant, cooperative  Neuro: awake, alert, speaking in full sentences  Resp: on RA, breathing comfortably, equal chest rise and fall bilaterally  Wound:  Sacrum - ~55p16wc sacral pressure ulcer; full thickness. Loosely adherent black/grey necrotic  tissue, extremely foul odor, surrounding erythema, no purulence expressed. Wound culture taken .     Large dressing change done, patient tolerated well.      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 Pt reports that this morning, he was awakened by acute, sudden onset L sided chest pain at 4AM. Describes the pain as "pounding", states that he felt some pain radiate to LUE as well. Denies identifiable aggravating or alleviating factors and states that this has never happened before. Denies headache/dizziness, endorses chronic shortness of breath that is unchanged, has been requiring his baseline oxygen. Denies abdominal pain. Endorses compliance with all medications. States that at baseline, he is bedbound, cannot stand up or ambulate due to weakness. Was recently admitted to [facility] for short term rehab, where he wishes to go back because feels that he cannot care for himself at home. Lives with son but needs more help and PT.   In the ED, T 96, /58, HR 85, RR 18, SpO2 98% on O2 NC 4L, then 100% on 3L. Lab work revealed Hb 9.1, INR 7.87, troponin 0.03 and BNP 3327 (both near baseline). EKG revealed AFib, HR 88. CXR revealed slightly inc small R sided pleural effusion. (30 Nov 2021 11:30)    -------------    Burn consulted to evaluate sacral pressure ulcer. Attempted to see patient yesterday but he refused. Today patient is agreeable to consult.     Allergies    No Known Allergies        PAST MEDICAL & SURGICAL HISTORY:  COPD (chronic obstructive pulmonary disease)    Hypertension    Deep vein thrombosis (DVT) of left lower extremity, unspecified chronicity, unspecified vein    Cellulitis of left lower extremity    Chronic atrial fibrillation    Mitral valve insufficiency, unspecified etiology  Moderate    CHF (congestive heart failure)    S/P coronary artery stent placement    History of total right knee replacement                            7.9    10.42 )-----------( 111      ( 14 Dec 2021 04:30 )             23.5   INR: 4.92: Recommended ranges for therapeutic INR:    2.0-3.0 for most medical and surgical thromboembolic states    2.0-3.0 for atrial fibrillation    2.0-3.0 for bileaflet mechanical valve in aortic position    2.5-3.5 for mechanical heart valves    Chest 2004;126:[id number]  The presence of direct thrombin inhibitors (argatroban, refludan)  may falsely increase results. ratio (12.14.21 @ 04:30)        Exam:  Gen: NAD, lying in bed comfortably, pleasant, cooperative  Neuro: awake, alert, speaking in full sentences  Resp: on RA, breathing comfortably, equal chest rise and fall bilaterally  Wound:  Sacrum - ~90j13yq sacral pressure ulcer; full thickness. Soft, boggy  adherent black/grey necrotic  tissue, extremely foul odor, surrounding erythema, no purulence expressed. Wound culture taken .     Large dressing change done, patient tolerated well.

## 2021-12-14 NOTE — PROGRESS NOTE ADULT - ASSESSMENT
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 ACS risk calculator showed: 17.7% serious complication, 16.6% any complication, 3.3% cardiac complication, 18.5% readmission, 13.3% death, 39% functional decline.    Plan    Cardiology risk stratified the patient as high risk for cholecystectomy  Medicine risk stratified the patient as high risk  Patient is not a surgical candidate  Please consult IR for re-placement of percutaneous cholecystostomy tube  Please recall surgery as needed    Spectra: [phone]

## 2021-12-14 NOTE — CONSULT NOTE ADULT - PROBLEM SELECTOR RECOMMENDATION 3
patient has recent trajectory of 6months+ of physical decline that has included efforts at rehabilitation  rehab if/as tolerated

## 2021-12-14 NOTE — CONSULT NOTE ADULT - PROBLEM SELECTOR RECOMMENDATION 9
wound care RN input appreciated  Burn c/s in process  patient has refused care in past  + pain - see below

## 2021-12-14 NOTE — CONSULT NOTE ADULT - PROBLEM SELECTOR RECOMMENDATION 6
In the setting of sepsis and overall poor conditioning.  -continue treatment per primary team  -no need for pharmacological treatment of fatigue at this time

## 2021-12-14 NOTE — HOSPICE CARE NOTE - CONVESATION DETAILS
Consult completed via phone call to patient's daughter Mery. Reviewed hospice philosophy and services. Presently patient requires screen for Medicaid at [facility]. Hospice available once D/C plan is in place.

## 2021-12-14 NOTE — CONSULT NOTE ADULT - PROBLEM SELECTOR RECOMMENDATION 2
s/p cholecystostomy with subsequent dislodgement  not surgical candidate  ?IR candidate  see GOC discussion below s/p cholecystostomy with subsequent dislodgement  not surgical candidate  Possible IR candidate  see GOC discussion below

## 2021-12-14 NOTE — CONSULT NOTE ADULT - TREATMENT GUIDELINE COMMENT
continue current plan of care  hospice c/s    TIme spent 40minutes DNR/DNI  continue current plan of care  hospice c/s    TIme spent 40minutes

## 2021-12-15 DIAGNOSIS — K59.00 CONSTIPATION, UNSPECIFIED: ICD-10-CM

## 2021-12-15 PROCEDURE — 99233 SBSQ HOSP IP/OBS HIGH 50: CPT

## 2021-12-15 RX ORDER — HYDROMORPHONE HYDROCHLORIDE 2 MG/ML
0.25 INJECTION INTRAMUSCULAR; INTRAVENOUS; SUBCUTANEOUS ONCE
Refills: 0 | Status: DISCONTINUED | OUTPATIENT
Start: 2021-12-15 | End: 2021-12-15

## 2021-12-15 RX ORDER — POLYETHYLENE GLYCOL 3350 17 G/17G
17 POWDER, FOR SOLUTION ORAL DAILY
Refills: 0 | Status: DISCONTINUED | OUTPATIENT
Start: 2021-12-15 | End: 2021-12-23

## 2021-12-15 RX ORDER — HYDROMORPHONE HYDROCHLORIDE 2 MG/ML
0.25 INJECTION INTRAMUSCULAR; INTRAVENOUS; SUBCUTANEOUS
Refills: 0 | Status: DISCONTINUED | OUTPATIENT
Start: 2021-12-15 | End: 2021-12-21

## 2021-12-15 RX ORDER — HYDROMORPHONE HYDROCHLORIDE 2 MG/ML
25 INJECTION INTRAMUSCULAR; INTRAVENOUS; SUBCUTANEOUS
Refills: 0 | Status: DISCONTINUED | OUTPATIENT
Start: 2021-12-15 | End: 2021-12-15

## 2021-12-15 RX ORDER — FENTANYL CITRATE 50 UG/ML
1 INJECTION INTRAVENOUS
Refills: 0 | Status: DISCONTINUED | OUTPATIENT
Start: 2021-12-15 | End: 2021-12-21

## 2021-12-15 RX ADMIN — Medication 500 MILLIGRAM(S): at 21:08

## 2021-12-15 RX ADMIN — MIDODRINE HYDROCHLORIDE 10 MILLIGRAM(S): 2.5 TABLET ORAL at 05:06

## 2021-12-15 RX ADMIN — CHLORHEXIDINE GLUCONATE 1 APPLICATION(S): 213 SOLUTION TOPICAL at 05:05

## 2021-12-15 RX ADMIN — METHOCARBAMOL 500 MILLIGRAM(S): 500 TABLET, FILM COATED ORAL at 21:08

## 2021-12-15 RX ADMIN — HYDROMORPHONE HYDROCHLORIDE 0.25 MILLIGRAM(S): 2 INJECTION INTRAMUSCULAR; INTRAVENOUS; SUBCUTANEOUS at 17:43

## 2021-12-15 RX ADMIN — FENTANYL CITRATE 1 PATCH: 50 INJECTION INTRAVENOUS at 11:55

## 2021-12-15 RX ADMIN — OXYCODONE HYDROCHLORIDE 5 MILLIGRAM(S): 5 TABLET ORAL at 05:06

## 2021-12-15 RX ADMIN — HYDROMORPHONE HYDROCHLORIDE 0.25 MILLIGRAM(S): 2 INJECTION INTRAMUSCULAR; INTRAVENOUS; SUBCUTANEOUS at 11:56

## 2021-12-15 RX ADMIN — Medication 500 MILLIGRAM(S): at 05:06

## 2021-12-15 RX ADMIN — METHOCARBAMOL 500 MILLIGRAM(S): 500 TABLET, FILM COATED ORAL at 05:06

## 2021-12-15 RX ADMIN — FENTANYL CITRATE 1 PATCH: 50 INJECTION INTRAVENOUS at 19:30

## 2021-12-15 RX ADMIN — CEFTRIAXONE 100 MILLIGRAM(S): 500 INJECTION, POWDER, FOR SOLUTION INTRAMUSCULAR; INTRAVENOUS at 17:43

## 2021-12-15 NOTE — PROGRESS NOTE ADULT - SUBJECTIVE AND OBJECTIVE BOX
RODNEY SANTO  81y, Male  Allergy: No Known Allergies      LOS  15d    CHIEF COMPLAINT: chest pain (15 Dec 2021 07:06)      INTERVAL EVENTS/HPI  - No acute events overnight  - T(F): , Max: 97.2 (12-15-21 @ 00:05)  - Denies any worsening symptoms  - Tolerating medication  - WBC Count: 10.42 (12-14-21 @ 04:30)  WBC Count: 12.48 (12-13-21 @ 04:30)     - Creatinine, Serum: 1.2 (12-14-21 @ 20:00)  Creatinine, Serum: 1.2 (12-14-21 @ 04:30)       ROS  General: Denies rigors, nightsweats  HEENT: Denies headache, rhinorrhea, sore throat, eye pain  CV: Denies CP, palpitations  PULM: Denies wheezing, hemoptysis  GI: Denies hematemesis, hematochezia, melena  : Denies discharge, hematuria  MSK: Denies arthralgias, myalgias  SKIN: Denies rash, lesions  NEURO: Denies paresthesias, weakness  PSYCH: Denies depression, anxiety    VITALS:  T(F): 97.2, Max: 97.2 (12-15-21 @ 00:05)  HR: 50  BP: 91/53  RR: 19Vital Signs Last 24 Hrs  T(C): 36.2 (15 Dec 2021 00:05), Max: 36.2 (15 Dec 2021 00:05)  T(F): 97.2 (15 Dec 2021 00:05), Max: 97.2 (15 Dec 2021 00:05)  HR: 50 (15 Dec 2021 00:05) (50 - 57)  BP: 91/53 (15 Dec 2021 00:05) (91/53 - 119/48)  BP(mean): --  RR: 19 (15 Dec 2021 00:05) (19 - 20)  SpO2: 96% (14 Dec 2021 16:00) (96% - 96%)    PHYSICAL EXAM:  Gen: NAD, resting in bed  HEENT: Normocephalic, atraumatic  Neck: supple, no lymphadenopathy  CV: Regular rate & regular rhythm  Lungs: decreased BS at bases, no fremitus  Abdomen: Soft, BS present  Ext: Warm, well perfused  Neuro: non focal, awake  Skin: no rash, no erythema  Lines: no phlebitis    FH: Non-contributory  Social Hx: Non-contributory    TESTS & MEASUREMENTS:                        7.9    10.42 )-----------( 111      ( 14 Dec 2021 04:30 )             23.5     12-14    136  |  103  |  34<H>  ----------------------------<  91  4.3   |  20  |  1.2    Ca    7.3<L>      14 Dec 2021 20:00  Mg     2.1     12-14    TPro  4.4<L>  /  Alb  2.0<L>  /  TBili  1.0  /  DBili  x   /  AST  31  /  ALT  34  /  AlkPhos  594<H>  12-14    eGFR if Non African American: 57 mL/min/1.73M2 (12-14-21 @ 20:00)  eGFR if African American: 66 mL/min/1.73M2 (12-14-21 @ 20:00)    LIVER FUNCTIONS - ( 14 Dec 2021 04:30 )  Alb: 2.0 g/dL / Pro: 4.4 g/dL / ALK PHOS: 594 U/L / ALT: 34 U/L / AST: 31 U/L / GGT: x               Culture - Blood (collected 12-12-21 @ 06:32)  Source: .Blood None  Preliminary Report (12-13-21 @ 17:02):    No growth to date.    Culture - Blood (collected 12-11-21 @ 04:30)  Source: .Blood Blood  Preliminary Report (12-12-21 @ 17:01):    No growth to date.    Culture - Blood (collected 12-07-21 @ 21:00)  Source: .Blood None  Gram Stain (12-09-21 @ 19:26):    Growth in anaerobic bottle: Gram Negative Rods  Final Report (12-11-21 @ 08:32):    Growth in anaerobic bottle: Proteus mirabilis    See previous culture [id number]    Culture - Blood (collected 12-07-21 @ 18:41)  Source: .Blood None  Gram Stain (12-09-21 @ 14:38):    Growth in anaerobic bottle: Gram Negative Rods  Final Report (12-11-21 @ 08:29):    Growth in anaerobic bottle: Proteus mirabilis    ***Blood Panel PCR results on this specimen are available    approximately 3 hours after the Gram stain result.***    Gram stain, PCR, and/or culture results may not always    correspond due to difference inmethodologies.    ************************************************************    This PCR assay was performed by multiplex PCR. This    Assay tests for 66 bacterial and resistance gene targets.    Please refer to the [facility] test directory    [email or web address] for details.  Organism: Blood Culture PCR  Proteus mirabilis (12-11-21 @ 08:29)  Organism: Proteus mirabilis (12-11-21 @ 08:29)      -  Amikacin: S <=16      -  Ampicillin: S <=8 These ampicillin results predict results for amoxicillin      -  Ampicillin/Sulbactam: S <=4/2 Enterobacter, Klebsiella aerogenes, Citrobacter, and Serratia may develop resistance during prolonged therapy (3-4 days)      -  Aztreonam: S <=4      -  Cefazolin: S <=2 Enterobacter, Klebsiella aerogenes, Citrobacter, and Serratia may develop resistance during prolonged therapy (3-4 days)      -  Cefepime: S <=2      -  Cefoxitin: S <=8      -  Ceftriaxone: S <=1 Enterobacter, Klebsiella aerogenes, Citrobacter, and Serratia may develop resistance during prolonged therapy      -  Ciprofloxacin: S <=0.25      -  Ertapenem: S <=0.5      -  Gentamicin: S <=2      -  Levofloxacin: S <=0.5      -  Meropenem: S <=1      -  Piperacillin/Tazobactam: S <=8      -  Tobramycin: S <=2      -  Trimethoprim/Sulfamethoxazole: S <=0.5/9.5      Method Type: SHEFALI  Organism: Blood Culture PCR (12-11-21 @ 08:29)      -  Proteus mirabilis: Detec      Method Type: PCR            INFECTIOUS DISEASES TESTING  COVID-19 PCR: NotDetec (12-13-21 @ 17:25)  Procalcitonin, Serum: 0.25 (12-07-21 @ 18:41)  COVID-19 PCR: NotDetec (12-07-21 @ 12:50)  COVID-19 PCR: NotDetec (11-30-21 @ 06:35)  COVID-19 PCR: NotDetec (11-20-21 @ 13:35)  COVID-19 PCR: NotDetec (09-20-21 @ 19:15)  Procalcitonin, Serum: 0.16 (09-13-21 @ 05:00)  COVID-19 PCR: NotDetec (09-12-21 @ 12:00)  COVID-19 PCR: NotDetec (08-02-21 @ 14:22)  COVID-19 PCR: NotDetec (07-30-21 @ 20:37)  COVID-19 PCR: NotDetec (07-22-21 @ 15:05)  COVID-19 PCR: NotDetec (07-19-21 @ 15:20)  COVID-19 PCR: NotDetec (07-10-21 @ 15:11)  HIV-1/2 Combo Result: Nonreact (07-09-21 @ 06:54)  COVID-19 PCR: NotDetec (07-07-21 @ 13:20)  COVID-19 PCR: NotDetec (04-04-21 @ 13:19)      INFLAMMATORY MARKERS      RADIOLOGY & ADDITIONAL TESTS:  I have personally reviewed the last available Chest xray  CXR      CT      CARDIOLOGY TESTING  12 Lead ECG:   Ventricular Rate 59 BPM    QRS Duration 80 ms    Q-T Interval 430 ms    QTC Calculation(Bazett) 425 ms    R Axis -14 degrees    T Axis 28 degrees    Diagnosis Line Atrial fibrillation with slow ventricular response with occasional   ventricular-paced complexes  Low voltage QRS  Possible Inferior infarct , age undetermined  Cannot rule out Anterior infarct , age undetermined  Abnormal ECG    Confirmed by GAB ARZOLA MD ([id number]) on 12/3/2021 11:07:32 AM (12-03-21 @ 10:24)      MEDICATIONS  budesonide 160 MICROgram(s)/formoterol 4.5 MICROgram(s) Inhaler 2 Inhalation two times a day  cefTRIAXone   IVPB 2000 IV Intermittent every 24 hours  chlorhexidine 4% Liquid 1 Topical <User Schedule>  collagenase Ointment 1 Topical daily  fentaNYL   Patch  12 MICROgram(s)/Hr 1 Transdermal every 72 hours  folic acid 1 Oral daily  influenza  Vaccine (HIGH DOSE) 0.7 IntraMuscular once  lactated ringers Bolus 500 IV Bolus once  methocarbamol 500 Oral three times a day  metroNIDAZOLE    Tablet 500 Oral every 8 hours  midodrine. 10 Oral three times a day  senna 2 Oral at bedtime  silver sulfADIAZINE 1% Cream 1 Topical two times a day      WEIGHT  Weight (kg): 81.6 (10-16-21 @ 10:57)  Creatinine, Serum: 1.2 mg/dL (12-14-21 @ 20:00)      ANTIBIOTICS:  cefTRIAXone   IVPB 2000 milliGRAM(s) IV Intermittent every 24 hours  metroNIDAZOLE    Tablet 500 milliGRAM(s) Oral every 8 hours      All available historical records have been reviewed

## 2021-12-15 NOTE — PROGRESS NOTE ADULT - ASSESSMENT
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  Creatinine, Serum: 1.2 (12-10-21 @ 04:30)    Weight (kg): 81.6 (10-16-21 @ 10:57)    RECOMMENDATIONS  - Ceftriaxone 2g q24h IV   - Continue PO flagyl 500mg TID  - Appreciate Surgery consult: will plan for laparoscopic cholecystectomy   - Post surgery, cipro 500mg BID and flagyl 500mg TID x 3 days  - Monitor sacral area, low threshold to consult Burn, appreciate Wound care consult    If any questions, please call or send a message on [employer] Teams  Please continue to update ID with any pertinent new laboratory or radiographic findings  Spectra [phone]

## 2021-12-15 NOTE — PROGRESS NOTE ADULT - SUBJECTIVE AND OBJECTIVE BOX
RODNEY SANTO             MRN-[id number]      Patient is a 81y old Male who presents with a chief complaint of chest pain (15 Dec 2021 07:06)    Currently admitted with the primary diagnosis of: bacteremia  / pressure ulcer, cholecystitis     SUBJECTIVE: saw in collaboration with our SW  "I want it out" then "I want to die"  per subjective and chart review about treatment (eg burn debridement) - vacillating   explored - explained that yesterday he deferred to dgt, Mery and today wants to have say: wanting pain control optimized;   Pain+ feels pills help wants better pain control;       ROS:    DYSPNEA: N	  NAUS/VOM:  N	  SECRETIONS: N	  AGITATION: with anger  Pain (Y/N):       -Provocation/Palliation: (stomach) touch agrevates; medications relieve  -Quality/Quantity:  -Radiating:  -Severity: 10  -Timing/Frequency: constant with periods of relief from medicaitons  -Impact on ADLs: y    General:  Denied  HEENT:    Denied  Neck:  Denied  CVS:  Denied  Resp:  Denied  GI:  Denied    :  Denied  Musc:  Denied  Neuro:  Denied  Psych:  Denied  Skin:  Denied  Lymph:  Denied      PEx:   T(C): 36.2 (12-15-21 @ 00:05), Max: 36.2 (12-15-21 @ 00:05)  HR: 50 (12-15-21 @ 00:05) (50 - 57)  BP: 91/53 (12-15-21 @ 00:05) (91/53 - 119/48)  RR: 19 (12-15-21 @ 00:05) (19 - 20)  SpO2: 96% (12-14-21 @ 16:00) (96% - 96%)  Wt(kg): --            General:  found in bed in NAD - but   ain/anger with encounter  Eyes:  PER Non icteric   ENMT: no external oral ulcers, dry   CVS: not tachy  Resp: Unlabored Non tachypneic No increased WOB  GI:  Soft _+ ND   :  Voiding   Musc: No C/C/E    Neuro: Follows commands No focal deficits  Psych: Calm Pleasant, AAOx3    Skin: Non jaundiced        Last BM: last documented 'fecal incontinence' 12/8 questionable?    ALLERGIES: No Known Allergies            Labs:	    CBC:                        7.9    10.42 )-----------( 111      ( 14 Dec 2021 04:30 )             23.5     CMP:    12-14    136  |  103  |  34<H>  ----------------------------<  91  4.3   |  20  |  1.2    Ca    7.3<L>      14 Dec 2021 20:00  Mg     2.1     12-14    TPro  4.4<L>  /  Alb  2.0<L>  /  TBili  1.0  /  DBili  x   /  AST  31  /  ALT  34  /  AlkPhos  594<H>  12-14       PT/INR - ( 14 Dec 2021 04:30 )   PT: >40.00 sec;   INR: 4.92 ratio         PTT - ( 14 Dec 2021 04:30 )  PTT:52.5 sec       RADIOLOGY  no new - old rev'd      EKG  no new - old rev'd      Imaging Personally Reviewed:  [ x YES  [ ] NO    Consultant(s) Notes Reviewed:  [x] YES  [ ] NO  Care Discussed with Consultants/Other Providers [x] YES  [ ] NO    Medications:	      MEDICATIONS  (STANDING):  budesonide 160 MICROgram(s)/formoterol 4.5 MICROgram(s) Inhaler 2 Puff(s) Inhalation two times a day  cefTRIAXone   IVPB 2000 milliGRAM(s) IV Intermittent every 24 hours  chlorhexidine 4% Liquid 1 Application(s) Topical <User Schedule>  collagenase Ointment 1 Application(s) Topical daily  fentaNYL   Patch  12 MICROgram(s)/Hr 1 Patch Transdermal every 72 hours  folic acid 1 milliGRAM(s) Oral daily  influenza  Vaccine (HIGH DOSE) 0.7 milliLiter(s) IntraMuscular once  lactated ringers Bolus 500 milliLiter(s) IV Bolus once  methocarbamol 500 milliGRAM(s) Oral three times a day  metroNIDAZOLE    Tablet 500 milliGRAM(s) Oral every 8 hours  midodrine. 10 milliGRAM(s) Oral three times a day  senna 2 Tablet(s) Oral at bedtime  silver sulfADIAZINE 1% Cream 1 Application(s) Topical two times a day    MEDICATIONS  (PRN):  ALBUTerol    90 MICROgram(s) HFA Inhaler 2 Puff(s) Inhalation every 6 hours PRN Shortness of Breath and/or Wheezing  HYDROmorphone  Injectable 0.25 milliGRAM(s) IV Push every 2 hours PRN pain 4-10  melatonin 5 milliGRAM(s) Oral at bedtime PRN Insomnia  oxyCODONE    IR 5 milliGRAM(s) Oral every 4 hours PRN 4-10 Pain      ADVANCED DIRECTIVES:                    DNR DNI               DECISION MAKER: Patient [jose rodrigez] [  ]  Other [  ] _______  LEGAL SURROGATE:      GOALS OF CARE DISCUSSION       Palliative care info/counseling provided	                 Documentation of GOC/Advanced Care Planning see previous will need to readdress          PSYCHOSOCIAL-SPIRITUAL ASSESSMENT:            See Palliative Care SW/ documentation      CURRENT DISPO PLAN:         WILL REMAIN IN HOSPITAL      [facility]      Hospice        REFERRALS	        Palliative Med        Unit SW/Case Mgmt              Hospice        RODNEY SANTO             MRN-[id number]      Patient is a 81y old Male who presents with a chief complaint of chest pain (15 Dec 2021 07:06)    Currently admitted with the primary diagnosis of: bacteremia  / pressure ulcer, cholecystitis     SUBJECTIVE: saw in collaboration with our SW  "I want it out" then "I want to die"  per subjective and chart review about treatment (eg burn debridement) - vacillating   explored - explained that yesterday he deferred to dgt, Mery and today wants to have say: wanting pain control optimized;   Pain+ feels pills help wants better pain control;       ROS:    DYSPNEA: N	  NAUS/VOM:  N	  SECRETIONS: N	  AGITATION: with anger  Pain (Y/N):       -Provocation/Palliation: (stomach) touch agrevates; medications relieve  -Quality/Quantity:  -Radiating:  -Severity: 10  -Timing/Frequency: constant with periods of relief from medicaitons  -Impact on ADLs: y    General:  Denied  HEENT:    Denied  Neck:  Denied  CVS:  Denied  Resp:  Denied  GI:  Denied    :  Denied  Musc:  Denied  Neuro:  Denied  Psych:  Denied  Skin:  Denied  Lymph:  Denied      PEx:   T(C): 36.2 (12-15-21 @ 00:05), Max: 36.2 (12-15-21 @ 00:05)  HR: 50 (12-15-21 @ 00:05) (50 - 57)  BP: 91/53 (12-15-21 @ 00:05) (91/53 - 119/48)  RR: 19 (12-15-21 @ 00:05) (19 - 20)  SpO2: 96% (12-14-21 @ 16:00) (96% - 96%)  Wt(kg): --            General:  found in bed in NAD - but   ain/anger with encounter  Eyes:  PER Non icteric   ENMT: no external oral ulcers, dry   CVS: not tachy  Resp: Unlabored Non tachypneic No increased WOB  GI:  Soft _+ ND   :  Voiding   Musc: No C/C/E    Neuro: Follows commands No focal deficits  Psych: Calm Pleasant, AAOx3    Skin: Non jaundiced        Last BM: last documented 'fecal incontinence' 12/8 questionable?    ALLERGIES: No Known Allergies            Labs:	    CBC:                        7.9    10.42 )-----------( 111      ( 14 Dec 2021 04:30 )             23.5     CMP:    12-14    136  |  103  |  34<H>  ----------------------------<  91  4.3   |  20  |  1.2    Ca    7.3<L>      14 Dec 2021 20:00  Mg     2.1     12-14    TPro  4.4<L>  /  Alb  2.0<L>  /  TBili  1.0  /  DBili  x   /  AST  31  /  ALT  34  /  AlkPhos  594<H>  12-14       PT/INR - ( 14 Dec 2021 04:30 )   PT: >40.00 sec;   INR: 4.92 ratio         PTT - ( 14 Dec 2021 04:30 )  PTT:52.5 sec       RADIOLOGY  no new - old rev'd      EKG  no new - old rev'd      Imaging Personally Reviewed:  [ x YES  [ ] NO    Consultant(s) Notes Reviewed:  [x] YES  [ ] NO  Care Discussed with Consultants/Other Providers [x] YES  [ ] NO    Medications:	      MEDICATIONS  (STANDING):  budesonide 160 MICROgram(s)/formoterol 4.5 MICROgram(s) Inhaler 2 Puff(s) Inhalation two times a day  cefTRIAXone   IVPB 2000 milliGRAM(s) IV Intermittent every 24 hours  chlorhexidine 4% Liquid 1 Application(s) Topical <User Schedule>  collagenase Ointment 1 Application(s) Topical daily  fentaNYL   Patch  12 MICROgram(s)/Hr 1 Patch Transdermal every 72 hours  folic acid 1 milliGRAM(s) Oral daily  influenza  Vaccine (HIGH DOSE) 0.7 milliLiter(s) IntraMuscular once  lactated ringers Bolus 500 milliLiter(s) IV Bolus once  methocarbamol 500 milliGRAM(s) Oral three times a day  metroNIDAZOLE    Tablet 500 milliGRAM(s) Oral every 8 hours  midodrine. 10 milliGRAM(s) Oral three times a day  senna 2 Tablet(s) Oral at bedtime  silver sulfADIAZINE 1% Cream 1 Application(s) Topical two times a day    MEDICATIONS  (PRN):  ALBUTerol    90 MICROgram(s) HFA Inhaler 2 Puff(s) Inhalation every 6 hours PRN Shortness of Breath and/or Wheezing  HYDROmorphone  Injectable 0.25 milliGRAM(s) IV Push every 2 hours PRN pain 4-10  melatonin 5 milliGRAM(s) Oral at bedtime PRN Insomnia  oxyCODONE    IR 5 milliGRAM(s) Oral every 4 hours PRN 4-10 Pain      ADVANCED DIRECTIVES:                    DNR DNI               DECISION MAKER: Patient [jose rodrigez] [  ]  Other [  ] _______  LEGAL SURROGATE:      GOALS OF CARE DISCUSSION       Palliative care info/counseling provided	                 Documentation of GOC/Advanced Care Planning see previous will need to readdress          PSYCHOSOCIAL-SPIRITUAL ASSESSMENT:            See Palliative Care SW/ documentation      CURRENT DISPO PLAN:         WILL REMAIN IN HOSPITAL      [facility]    REFERRALS	        Palliative Med        Unit SW/Case Mgmt              Hospice

## 2021-12-15 NOTE — CHART NOTE - NSCHARTNOTEFT_GEN_A_CORE
Registered Dietitian Follow-Up     Patient Profile Reviewed                           Yes [x]   No []     Nutrition History Previously Obtained        Yes [x]  No []       Pertinent Medical Interventions: Admitted with chest pain. Atypical Chest Pain - likely MSK, no further ischemic workup as per Cardiology. Proteus Bacteremia noted. cute Cholecystitis noted. 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 Chronic Macrocytic Anemia - on folate supplementation. DTI Left Heel + Coccyx. Declined debridement at bedside by burn team; GOC to be revisited with pt's daughter.     Diet order: Diet, DASH/TLC + Magic Cup 2x/day + Ensure Pudding 2x/day.    Anthropometrics:  Ht: 190.5cm  Wt: 71.9 kg () - no significant wt changes observed at this time  BMI: 20.8  IBW: 89.1kg     Daily Weight in k.5 (), Weight in k ()    Pertinent Medications: lactated ringers, miralax, senna, folic acid    Pertinent Labs: : RBC-2.38, H/H-7.9/23.5, K-4.3 (WDL), Na-136 (WDL), creat-1.2 (WDL), BUN-34, Mg-2.1 (WDL)    Physical Findings:  - Appearance: alert; no edema reported at this time  - GI function: no abd distention noted; last BM 12/15; no N/V/D/C reported  - Tubes: no feeding tubes  - Oral/Mouth cavity: no chewing/swallowing difficulty reported   - Skin: DTI's as noted above      Nutrition Requirements: adjusted as no PI's found   Weight Used: 74kg      Estimated Energy Needs    Continue []  Adjust [x]  MSJ 1546; 1855-2010kcal (MSJ 1.2-1.3 AF -- needs increased d/t DTI's -- aim [place] upper end)      Estimated Protein Needs    Continue []  Adjust [x]  89-96g/day (1.2-1.3g/kg -- same reason as above)       Estimated Fluid Needs        Continue [x]  Adjust []  1mL/kcal or LIP      Nutrient Intake: Po intake declined d/t reduced appetite d/t weakness, feeling "unwell" per report. Consuming 25-50% of meals; refusing meals at times.     [x] Previous Nutrition Diagnosis: Inadequate oral intake     Nutrition Intervention: meals and snacks, nutrition related medication management     Goal/Expected Outcome: Pt to consume and tolerate >75% of meals/snacks and PO supplements in 4 days.      Nutrition Monitoring:diet order,energy intake,body composition,NFPF     Recommendation: Order an appetite stimulant if not medically contraindicated. Continue DASH/TLC + Magic Cup 2x/day + Ensure Pudding 2x/day.

## 2021-12-15 NOTE — PROGRESS NOTE ADULT - PROBLEM SELECTOR PLAN 2
s/p cholecystostomy with subsequent dislodgement  not surgical candidate  Possible IR candidate - primary team is f/u to learn

## 2021-12-15 NOTE — PROGRESS NOTE ADULT - PROBLEM SELECTOR PLAN 8
? last BM - documented 12/8  monitor  continue senna as written  add miralax daily  add dulcolax suppository daily PRN if no BM /24H ? last BM - documented 12/8  monitor  continue senna as written  add miralax 17g daily  add dulcolax suppository daily PRN if no BM /24H

## 2021-12-15 NOTE — PROGRESS NOTE ADULT - SUBJECTIVE AND OBJECTIVE BOX
RODNEY SANTO 81y Male  MRN#: [id number]   CODE STATUS: FULL  Do Not Resuscitate      SUBJECTIVE  Patient is a 81y old Male who presents with a chief complaint of chest pain (14 Dec 2021 12:22)    Today is hospital day 15d,   INTERVAL HPI/OVERNIGHT EVENTS:    Examined pt at bedside this AM. Yesterday, pt refused sacral debridement yesterday with Burn.    Present Today:           Russo Catheter (x)No/ ()Yes?   Indication:             Central Line (x)No/ ()Yes?   Indication:          IV Fluids (x)No/ ()Yes? Type:  Rate:  Indication:    OBJECTIVE  PAST MEDICAL & SURGICAL HISTORY  COPD (chronic obstructive pulmonary disease)    Hypertension    Deep vein thrombosis (DVT) of left lower extremity, unspecified chronicity, unspecified vein    Cellulitis of left lower extremity    Chronic atrial fibrillation    Mitral valve insufficiency, unspecified etiology  Moderate    CHF (congestive heart failure)    S/P coronary artery stent placement    History of total right knee replacement      ALLERGIES:  No Known Allergies    HOME MEDICATIONS:  Home Medications:  atorvastatin 20 mg oral tablet: 1 tab(s) orally once a day (13 Sep 2021 04:59)  folic acid 1 mg oral tablet: 1 tab(s) orally once a day (21 Sep 2021 11:34)  oxycodone-acetaminophen 5 mg-325 mg oral tablet: 1 tab(s) orally every 6 hours, As needed, Moderate Pain (4 - 6) (21 Sep 2021 11:32)  spironolactone 25 mg oral tablet: 0.5 tab(s) orally once a day (21 Sep 2021 11:34)  Symbicort 160 mcg-4.5 mcg/inh inhalation aerosol: 2 puff(s) inhaled 2 times a day (13 Sep 2021 04:59)  Ventolin HFA 90 mcg/inh inhalation aerosol: 2 puff(s) inhaled every 6 hours as neede for shortness of breath (13 Sep 2021 04:59)  warfarin 2 mg oral tablet: Take half a tablet Sun, Tues, Wed, Thurs, Fri, Sat.  Take a whole tablet on Mon (13 Sep 2021 04:59)    MEDICATIONS:  STANDING MEDICATIONS  budesonide 160 MICROgram(s)/formoterol 4.5 MICROgram(s) Inhaler 2 Puff(s) Inhalation two times a day  cefTRIAXone   IVPB 2000 milliGRAM(s) IV Intermittent every 24 hours  chlorhexidine 4% Liquid 1 Application(s) Topical <User Schedule>  collagenase Ointment 1 Application(s) Topical daily  folic acid 1 milliGRAM(s) Oral daily  influenza  Vaccine (HIGH DOSE) 0.7 milliLiter(s) IntraMuscular once  lactated ringers Bolus 500 milliLiter(s) IV Bolus once  methocarbamol 500 milliGRAM(s) Oral three times a day  metroNIDAZOLE    Tablet 500 milliGRAM(s) Oral every 8 hours  midodrine. 10 milliGRAM(s) Oral three times a day  senna 2 Tablet(s) Oral at bedtime  silver sulfADIAZINE 1% Cream 1 Application(s) Topical two times a day    PRN MEDICATIONS  ALBUTerol    90 MICROgram(s) HFA Inhaler 2 Puff(s) Inhalation every 6 hours PRN  melatonin 5 milliGRAM(s) Oral at bedtime PRN  oxyCODONE    IR 5 milliGRAM(s) Oral every 4 hours PRN      VITAL SIGNS: Last 24 Hours  T(C): 36.2 (15 Dec 2021 00:05), Max: 36.2 (15 Dec 2021 00:05)  T(F): 97.2 (15 Dec 2021 00:05), Max: 97.2 (15 Dec 2021 00:05)  HR: 50 (15 Dec 2021 00:05) (50 - 76)  BP: 91/53 (15 Dec 2021 00:05) (91/53 - 141/60)  BP(mean): --  RR: 19 (15 Dec 2021 00:05) (19 - 20)  SpO2: 96% (14 Dec 2021 16:00) (96% - 96%)    LABS:                        7.9    10.42 )-----------( 111      ( 14 Dec 2021 04:30 )             23.5     12-14    136  |  103  |  34<H>  ----------------------------<  91  4.3   |  20  |  1.2    Ca    7.3<L>      14 Dec 2021 20:00  Mg     2.1     12-14    TPro  4.4<L>  /  Alb  2.0<L>  /  TBili  1.0  /  DBili  x   /  AST  31  /  ALT  34  /  AlkPhos  594<H>  12-14    PT/INR - ( 14 Dec 2021 04:30 )   PT: >40.00 sec;   INR: 4.92 ratio         PTT - ( 14 Dec 2021 04:30 )  PTT:52.5 sec        RADIOLOGY:  < from: [facility] Hepatobiliary Imaging (12.11.21 @ 15:37) >  IMPRESSION:    NO DEFINITE VISUALIZATION OF THEGALLBLADDER THROUGH 4 HOURS   POST-INJECTION, CONSISTENT WITH CHOLECYSTITIS, AS DESCRIBED ABOVE.    CLINICAL CORRELATION IS SUGGESTED.    < end of copied text >      PHYSICAL EXAM:  GENERAL: NAD, well-developed, AAOx3  HEENT:  Atraumatic, Normocephalic  PULMONARY: Clear to auscultation bilaterally; No wheeze  CARDIOVASCULAR: Regular rate and rhythm; No murmurs, rubs, or gallops  GASTROINTESTINAL: Soft, Nontender, Nondistended; Bowel sounds present  MUSCULOSKELETAL:  2+ Peripheral Pulses, No clubbing, cyanosis, or edema  NEUROLOGY: non-focal  SKIN: ~40a18nc sacral pressure ulcer; full thickness. Soft, boggy  adherent black/grey necrotic  tissue, extremely foul odor, surrounding erythema, no purulence expressed

## 2021-12-15 NOTE — PROGRESS NOTE ADULT - PROBLEM SELECTOR PLAN 6
In the setting of advanced illness and overall poor conditioning.  -continue treatment per primary team/ speciality  -PT as appropriate per primary team  -no need for pharmacological treatment of fatigue at this time.

## 2021-12-15 NOTE — PROGRESS NOTE ADULT - ASSESSMENT
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 Trop at baseline 0.03, EKG showed afib, CXR small R sided pleural effusion.    #Proteus Bacteremia - +BCx x2 on 12/7, f/u BCx on 12/11 NGTD  #Acute Cholecystitis - +findings on RUQ U/S (12/10) + HIDA Scan (12/11)  #PMHx of Recent Cholecystitis - s/p perQ-cholecystostomy placement and removal by IR - 10d prior to admission  -ID consulted:  - Narrow cefepime to Ceftriaxone 2g q24h IV   - Continue PO flagyl 500mg TID  - Appreciate Surgery consult: will plan for laparoscopic cholecystectomy this coming week (not booked yet)  - Post surgery, cipro 500mg BID and flagyl 500mg TID x 3 days  - Monitor sacral area, low threshold to consult Burn, appreciate Wound care consult  - Gen Surg consulted:  - trend LFTs (hepatic function panel)  - will plan for laparoscopic cholecystectomy this coming week (not booked yet);  - f/u Cardiology, medicine, and pulmonology risk stratification  - Echo this admission  - Prognosis is not good.     #Atypical Chest Pain - likely MSK, no further ischemic workup as per Cardiology (Dr. Herrera)  #PMHx of HFpEF - follows with Dr. 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  #Chronic DVT Right Common Fem/Fem/Popliteal - on LE Venous Duplex 12/1  - Troponin 0.03 x3, was 0.02-0.03 throughout prior admission, EKG performed x2 with no acute ischemic changes  - D-dimer 342, BNP ~3K near baseline, 11/30 CXR showing small R sided pleural effusion, has remained at baseline oxygen requirements  - TTE 12/2: EF 50-55%, otherwise unremarkable   - d/c Spironolactone + Torsemide due to persistent hypotension, not overloaded on exam  - pain control with Percocet 1 tab q6h PRN for moderate pain + Morphine PRN for severe pain  - INR was supratherapeutic on admission, was reversed with Vitamin K on 12/2, restarted Coumadin, INR remains supratherapeutic, have been holding the Coumadin, will need decreased dosing moving forward  - BP on lower side, increased midodrine to 10mg PO TID  - patient not eating and drinking adequately, calorie count started  - patient refusing PT/Rehab  - CTA-chest 12/9 negative for PE    #Chronic Macrocytic Anemia - on folate supplementation  #Anemia of Chronic Disease  - baseline Hb ~10-11, supratherapeutic INR on admission reversed, B12 WNL in September  - 12/1 iron studies: low TIBC, likely anemia of chronic disease    #DTI Left Heel + Coccyx   - Wound Care consulted, recommendations given to RN  - f/u Burn recs:   Bedside debridement was attempted yesterday but pt refused stating that he is in pain and "wants to die"    #COPD   - on home O2 2.5L, has remained at baseline, continuing symbicort 160ug 2 puffs BID, Albuterol PRN                                                                          # DVT prophylaxis: Coumadin (holding)  # GI prophylaxis: N/A  # Diet: DASH/TLC  # Activity: Ambulate as Tolerated  # Code status: Full  # Disposition: remain on floor  # Handoff: Poor surgical candidate; Patient wants to be left alone to die-- hospice care called.        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 Trop at baseline 0.03, EKG showed afib, CXR small R sided pleural effusion.    #Proteus Bacteremia - +BCx x2 on 12/7, f/u BCx on 12/11 NGTD  #Acute Cholecystitis - +findings on RUQ U/S (12/10) + HIDA Scan (12/11)  #PMHx of Recent Cholecystitis - s/p perQ-cholecystostomy placement and removal by IR - 10d prior to admission  -ID consulted:  - Narrow cefepime to Ceftriaxone 2g q24h IV   - Continue PO flagyl 500mg TID  - Post surgery, cipro 500mg BID and flagyl 500mg TID x 3 days  - Monitor sacral area, low threshold to consult Burn, appreciate Wound care consult  - Gen Surg consulted:  Cardiology risk stratified the patient as high risk for cholecystectomy  Medicine risk stratified the patient as high risk  Patient is not a surgical candidate  Please consult IR for re-placement of percutaneous cholecystostomy tube  Please recall surgery as needed    #Atypical Chest Pain - likely MSK, no further ischemic workup as per Cardiology (Dr. Herrera)  #PMHx of HFpEF - follows with Dr. 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  #Chronic DVT Right Common Fem/Fem/Popliteal - on LE Venous Duplex 12/1  - Troponin 0.03 x3, was 0.02-0.03 throughout prior admission, EKG performed x2 with no acute ischemic changes  - D-dimer 342, BNP ~3K near baseline, 11/30 CXR showing small R sided pleural effusion, has remained at baseline oxygen requirements  - TTE 12/2: EF 50-55%, otherwise unremarkable   - d/c Spironolactone + Torsemide due to persistent hypotension, not overloaded on exam  - pain control with Percocet 1 tab q6h PRN for moderate pain + Morphine PRN for severe pain  - INR was supratherapeutic on admission, was reversed with Vitamin K on 12/2, restarted Coumadin, INR remains supratherapeutic, have been holding the Coumadin, will need decreased dosing moving forward  - BP on lower side, increased midodrine to 10mg PO TID  - patient not eating and drinking adequately, calorie count started  - patient refusing PT/Rehab  - CTA-chest 12/9 negative for PE    #Chronic Macrocytic Anemia - on folate supplementation  #Anemia of Chronic Disease  - baseline Hb ~10-11, supratherapeutic INR on admission reversed, B12 WNL in September  - 12/1 iron studies: low TIBC, likely anemia of chronic disease    #DTI Left Heel + Coccyx   - Wound Care consulted, recommendations given to RN  - f/u Burn recs:   Bedside debridement was attempted yesterday but pt refused stating that he is in pain and "wants to die"    #COPD   - on home O2 2.5L, has remained at baseline, continuing symbicort 160ug 2 puffs BID, Albuterol PRN                                                                          # DVT prophylaxis: Coumadin (holding)  # GI prophylaxis: N/A  # Diet: DASH/TLC  # Activity: Ambulate as Tolerated  # Code status: Full  # Disposition: remain on floor  # Handoff: Poor surgical candidate; Patient wants to be left alone to die-- hospice care called.

## 2021-12-15 NOTE — PROGRESS NOTE ADULT - PROBLEM SELECTOR PLAN 4
had been managed with PRN oxycodone requring ^ dosing including PRN IV morphine  new today - refusing pills    DC oxycodone  hydromorpyhone 0.25mg IVP x1 now and then every 4H PRN for pain 4-10  start fentanyl 25mcg patch every 72H    bowel regimen - see below had been managed with PRN oxycodone requring increasing dosing including PRN IV morphine  new today - refusing pills    DC oxycodone  hydromorpyhone 0.25mg IVP x1 now and then every 4H PRN for pain 4-10  start fentanyl 12mcg/hour patch every 72H    bowel regimen - see below

## 2021-12-15 NOTE — PROGRESS NOTE ADULT - ASSESSMENT
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 D/W primary and palliative care teams    Please call [phone] with questions or concerns 24/7.   We will continue to follow.    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 D/W primary and palliative care teams    Please call [phone] with questions or concerns 24/7.   We will continue to follow.

## 2021-12-15 NOTE — CHART NOTE - NSCHARTNOTEFT_GEN_A_CORE
PALLIATIVE MEDICINE INTERDISCIPLINARY TEAM NOTE    Provider:  [x   ]Social Work   [   ]          [ x  ] Initial visit [   ] Follow up    Family or contact name / phone #   Met with: [ x  ] Patient  [   ] Family  [   ] Other:    Primary Language: [   ] English [   ] Other*:                      *Interpretation provided by:    SUPPORT DIAGNOSES            (Check all that apply)  [   ] Psychosocial spiritual assessment (PSSA)  [   ] EOL issues  [   ] Cultural / spiritual concerns  [ x  ] Pain / suffering  [   ] Dementia / AMS  [   ] Other:  [   ] AD issues  [   ] Grief / loss / sadness  [   ] Discharge issues  [ x  ] Distress / coping    PSYCHOSOCIAL ASSESSMENT OF PATIENT         (Check all that apply)  [ x  ] Initial Assessment            [   ] Reassessment          [   ] Not Applicable this visit    Pain/suffering acuity:  [   ] None to mild (0-3)           [   ] Moderate (4-6)        [  x ] High (7-10)    Mental Status:  [   x] Alert/oriented (x3)          [   ] Confused/Altered(x2/x1)         [   ] Non-resp    Functional status:  [   ] Independent w ADLs      [   ] Needs Assistance             [  x ] Bedbound/Full Care    Coping:  [   ] Coping well                     [x   ] Coping w/difficulty            [   ] Poor coping    Support system:  [ x  ] Strong                              [   ] Adequate                        [   ] Inadequate      Past history and medications for:   unknown    [ ] Anxiety       [ ] Depression    [ ] Sleep disorders     SPIRITUAL ASSESSMENT  Restorationist/Spiritual practice: ___________________________    Role of organized Rastafari:  [   ] Important                     [   ] Some (fam tradition, cultural)               [   ] None    Effects on medical care:  [   ] Yes, _____________________________________                         [   ] None    Cultural/Baptist need:  [   ] Yes, _____________________________________                         [   ] None    Refer to Pastoral Care:  [   ] Yes           [   ] No, not at this time    SERVICE PROVIDED  [   ]PSSA                                                                             [   ]Discharge support / facilitation  [   ]AD / goals of care counseling                                  [   ]EOL / death / bereavement counseling  [ x  ]Counseling / support                                                [   ] Family meeting  [   ]Prayer / sacrament / ritual                                      [   ] Referral   [   ]Other                                                                       NOTE and Plan of Care (PoC):    Patient is a 81 year old male.  Patient was admitted on 11/30/21, dx:  chest pain, Supratherapeutic INR.  Patient has advanced related to heart failure and COPD.      Patient present in pain.  Meena Perera NP was present during visit.  Medication was addressed.  Patient expressed frustration with unmanaged pain and reduced physical functioning.  Support rendered.

## 2021-12-15 NOTE — PROGRESS NOTE ADULT - ATTENDING COMMENTS
Patient seen at bedside  Endorsed pain, but refusing pills  Though not ideal, transitioning to fentanyl patch and IV dilaudid PRN for now  WIll follow

## 2021-12-16 DIAGNOSIS — Z51.5 ENCOUNTER FOR PALLIATIVE CARE: ICD-10-CM

## 2021-12-16 DIAGNOSIS — K59.00 CONSTIPATION, UNSPECIFIED: ICD-10-CM

## 2021-12-16 DIAGNOSIS — K81.9 CHOLECYSTITIS, UNSPECIFIED: ICD-10-CM

## 2021-12-16 DIAGNOSIS — R52 PAIN, UNSPECIFIED: ICD-10-CM

## 2021-12-16 DIAGNOSIS — Z91.89 OTHER SPECIFIED PERSONAL RISK FACTORS, NOT ELSEWHERE CLASSIFIED: ICD-10-CM

## 2021-12-16 LAB
ALBUMIN SERPL ELPH-MCNC: 1.9 G/DL — LOW (ref 3.5–5.2)
ALP SERPL-CCNC: 599 U/L — HIGH (ref 30–115)
ALT FLD-CCNC: 25 U/L — SIGNIFICANT CHANGE UP (ref 0–41)
ANION GAP SERPL CALC-SCNC: 15 MMOL/L — HIGH (ref 7–14)
AST SERPL-CCNC: 21 U/L — SIGNIFICANT CHANGE UP (ref 0–41)
BASOPHILS # BLD AUTO: 0.01 K/UL — SIGNIFICANT CHANGE UP (ref 0–0.2)
BASOPHILS NFR BLD AUTO: 0.1 % — SIGNIFICANT CHANGE UP (ref 0–1)
BILIRUB SERPL-MCNC: 0.9 MG/DL — SIGNIFICANT CHANGE UP (ref 0.2–1.2)
BLD GP AB SCN SERPL QL: SIGNIFICANT CHANGE UP
BUN SERPL-MCNC: 34 MG/DL — HIGH (ref 10–20)
CALCIUM SERPL-MCNC: 7.7 MG/DL — LOW (ref 8.5–10.1)
CHLORIDE SERPL-SCNC: 106 MMOL/L — SIGNIFICANT CHANGE UP (ref 98–110)
CO2 SERPL-SCNC: 17 MMOL/L — SIGNIFICANT CHANGE UP (ref 17–32)
CREAT SERPL-MCNC: 1 MG/DL — SIGNIFICANT CHANGE UP (ref 0.7–1.5)
CULTURE RESULTS: SIGNIFICANT CHANGE UP
EOSINOPHIL # BLD AUTO: 0.04 K/UL — SIGNIFICANT CHANGE UP (ref 0–0.7)
EOSINOPHIL NFR BLD AUTO: 0.4 % — SIGNIFICANT CHANGE UP (ref 0–8)
GLUCOSE SERPL-MCNC: 80 MG/DL — SIGNIFICANT CHANGE UP (ref 70–99)
HCT VFR BLD CALC: 24.5 % — LOW (ref 42–52)
HGB BLD-MCNC: 7.9 G/DL — LOW (ref 14–18)
IMM GRANULOCYTES NFR BLD AUTO: 1.2 % — HIGH (ref 0.1–0.3)
INR BLD: 1.37 RATIO — HIGH (ref 0.65–1.3)
LYMPHOCYTES # BLD AUTO: 0.42 K/UL — LOW (ref 1.2–3.4)
LYMPHOCYTES # BLD AUTO: 4.2 % — LOW (ref 20.5–51.1)
MAGNESIUM SERPL-MCNC: 2.2 MG/DL — SIGNIFICANT CHANGE UP (ref 1.8–2.4)
MCHC RBC-ENTMCNC: 32.2 G/DL — SIGNIFICANT CHANGE UP (ref 32–37)
MCHC RBC-ENTMCNC: 32.9 PG — HIGH (ref 27–31)
MCV RBC AUTO: 102.1 FL — HIGH (ref 80–94)
MONOCYTES # BLD AUTO: 0.69 K/UL — HIGH (ref 0.1–0.6)
MONOCYTES NFR BLD AUTO: 6.9 % — SIGNIFICANT CHANGE UP (ref 1.7–9.3)
NEUTROPHILS # BLD AUTO: 8.79 K/UL — HIGH (ref 1.4–6.5)
NEUTROPHILS NFR BLD AUTO: 87.2 % — HIGH (ref 42.2–75.2)
NRBC # BLD: 0 /100 WBCS — SIGNIFICANT CHANGE UP (ref 0–0)
PLATELET # BLD AUTO: 101 K/UL — LOW (ref 130–400)
POTASSIUM SERPL-MCNC: 4.4 MMOL/L — SIGNIFICANT CHANGE UP (ref 3.5–5)
POTASSIUM SERPL-SCNC: 4.4 MMOL/L — SIGNIFICANT CHANGE UP (ref 3.5–5)
PROT SERPL-MCNC: 4.7 G/DL — LOW (ref 6–8)
PROTHROM AB SERPL-ACNC: 15.7 SEC — HIGH (ref 9.95–12.87)
RBC # BLD: 2.4 M/UL — LOW (ref 4.7–6.1)
RBC # FLD: 16.8 % — HIGH (ref 11.5–14.5)
SODIUM SERPL-SCNC: 138 MMOL/L — SIGNIFICANT CHANGE UP (ref 135–146)
SPECIMEN SOURCE: SIGNIFICANT CHANGE UP
WBC # BLD: 10.07 K/UL — SIGNIFICANT CHANGE UP (ref 4.8–10.8)
WBC # FLD AUTO: 10.07 K/UL — SIGNIFICANT CHANGE UP (ref 4.8–10.8)

## 2021-12-16 PROCEDURE — 99233 SBSQ HOSP IP/OBS HIGH 50: CPT

## 2021-12-16 RX ADMIN — CEFTRIAXONE 100 MILLIGRAM(S): 500 INJECTION, POWDER, FOR SOLUTION INTRAMUSCULAR; INTRAVENOUS at 23:08

## 2021-12-16 RX ADMIN — Medication 1 APPLICATION(S): at 11:02

## 2021-12-16 RX ADMIN — MIDODRINE HYDROCHLORIDE 10 MILLIGRAM(S): 2.5 TABLET ORAL at 05:11

## 2021-12-16 RX ADMIN — METHOCARBAMOL 500 MILLIGRAM(S): 500 TABLET, FILM COATED ORAL at 21:12

## 2021-12-16 RX ADMIN — CHLORHEXIDINE GLUCONATE 1 APPLICATION(S): 213 SOLUTION TOPICAL at 05:04

## 2021-12-16 RX ADMIN — HYDROMORPHONE HYDROCHLORIDE 0.25 MILLIGRAM(S): 2 INJECTION INTRAMUSCULAR; INTRAVENOUS; SUBCUTANEOUS at 06:10

## 2021-12-16 RX ADMIN — MIDODRINE HYDROCHLORIDE 10 MILLIGRAM(S): 2.5 TABLET ORAL at 17:51

## 2021-12-16 RX ADMIN — HYDROMORPHONE HYDROCHLORIDE 0.25 MILLIGRAM(S): 2 INJECTION INTRAMUSCULAR; INTRAVENOUS; SUBCUTANEOUS at 11:50

## 2021-12-16 RX ADMIN — Medication 1 MILLIGRAM(S): at 11:01

## 2021-12-16 RX ADMIN — FENTANYL CITRATE 1 PATCH: 50 INJECTION INTRAVENOUS at 19:01

## 2021-12-16 RX ADMIN — FENTANYL CITRATE 1 PATCH: 50 INJECTION INTRAVENOUS at 07:30

## 2021-12-16 RX ADMIN — METHOCARBAMOL 500 MILLIGRAM(S): 500 TABLET, FILM COATED ORAL at 13:02

## 2021-12-16 RX ADMIN — SENNA PLUS 2 TABLET(S): 8.6 TABLET ORAL at 21:11

## 2021-12-16 RX ADMIN — BUDESONIDE AND FORMOTEROL FUMARATE DIHYDRATE 2 PUFF(S): 160; 4.5 AEROSOL RESPIRATORY (INHALATION) at 21:12

## 2021-12-16 RX ADMIN — Medication 5 MILLIGRAM(S): at 21:12

## 2021-12-16 RX ADMIN — METHOCARBAMOL 500 MILLIGRAM(S): 500 TABLET, FILM COATED ORAL at 05:11

## 2021-12-16 RX ADMIN — Medication 500 MILLIGRAM(S): at 13:02

## 2021-12-16 RX ADMIN — Medication 500 MILLIGRAM(S): at 05:11

## 2021-12-16 RX ADMIN — Medication 500 MILLIGRAM(S): at 21:12

## 2021-12-16 RX ADMIN — MIDODRINE HYDROCHLORIDE 10 MILLIGRAM(S): 2.5 TABLET ORAL at 11:01

## 2021-12-16 RX ADMIN — Medication 1 APPLICATION(S): at 17:51

## 2021-12-16 NOTE — PROGRESS NOTE ADULT - PROBLEM SELECTOR PLAN 5
Recommend non-pharmacological interventions to prevent/treat delirium  - maintain day/night light cycles  - optimize sleep-wake cycle, minimize environmental noise  - reorientation frequently  - use verbal redirection as first line  - minimize restraints and lines  - ensure good bladder/bowel function  - ensure adequate pain control  - minimize use of anticholinergic, antihistaminic, and benzodiazepine medications.
-DNR/DNI  -ongoing medical management  -Hospice consult placed previously for informational purposes  -Will continue to follow and be available for GOC discussions as appropriate

## 2021-12-16 NOTE — PROGRESS NOTE ADULT - ASSESSMENT
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 Palliative care consulted for GOC.     Patient seen at bedside. He notes pain better controlled today.    Discussed desire for interventions moving forward. Per chart review and discussion with primary team, patient has been at times refusing intervention, either surgical or via IR. Patient is too high a risk for cholecystectomy, but is a candidate for possible replacement of percutaneous cholecystostomy tube. Patient noted he would like pursue placement of the tube.     MEDD (morphine equivalent daily dose): 35mg      See Recs below. Discussed with primary team resident.    Please call [phone] with questions or concerns 24/7.   We will continue to follow.    81 yr old male with hx of HFpEF (50-55%), Chronic AFib on Coumadin, Micra PPM placement in August 2021 for tachy-slevin syndrome, DVT July 2021, HTN, and COPD on 2.5L home O2, cholecystitis s/p cholecystostomy by IR in September, removed 10 days ago admitted for chest pain. Palliative care consulted for GOC.     Patient seen at bedside. He notes pain better controlled today.    Discussed desire for interventions moving forward. Per chart review and discussion with primary team, patient has been at times refusing intervention, either surgical or via IR. Patient is too high a risk for cholecystectomy, but is a candidate for possible replacement of percutaneous cholecystostomy tube. Patient noted he would like pursue placement of the tube.     Reached out to patient's daughter, Mery, by phone and left voicemail.    MEDD (morphine equivalent daily dose): 35mg      See Recs below. Discussed with primary team resident.    Please call [phone] with questions or concerns 24/7.   We will continue to follow.

## 2021-12-16 NOTE — CHART NOTE - NSCHARTNOTEFT_GEN_A_CORE
Patient  discussed that he is having procedure done tomorrow.  Patient expressed frustration with pain and reduced physical functioning.  Support rendered.

## 2021-12-16 NOTE — PROGRESS NOTE ADULT - SUBJECTIVE AND OBJECTIVE BOX
RODNEY SANTO             MRN-[id number]      Patient is a 81y old Male who presents with a chief complaint of chest pain     Currently admitted with the primary diagnosis of: bacteremia  / pressure ulcer, cholecystitis     SUBJECTIVE:   -Patient seen at bedside  -Sleeping but arousable  -Notes he has some abdominal pain, but that it's better controlled today and only mild  -Notes he has been having bowel movements  -Denies dyspnea      ROS:    DYSPNEA: N	  NAUS/VOM:  N	  SECRETIONS: N	  AGITATION: N  Pain (Y/N): Yes as above    General:  Denied  HEENT:    Denied  Neck:  Denied  CVS:  Denied  Resp:  Denied  GI:  Denied    :  Denied  Musc:  Denied  Neuro:  Denied  Psych:  Denied  Skin:  Denied  Lymph:  Denied      PEx:   Vital Signs Last 24 Hrs  T(C): 35.7 (16 Dec 2021 07:18), Max: 37 (16 Dec 2021 00:05)  T(F): 96.2 (16 Dec 2021 07:18), Max: 98.6 (16 Dec 2021 00:05)  HR: 82 (16 Dec 2021 07:18) (82 - 82)  BP: 96/42 (16 Dec 2021 07:18) (90/55 - 96/42)  BP(mean): --  RR: 18 (16 Dec 2021 07:18) (18 - 19)  SpO2: 94% (16 Dec 2021 07:18) (94% - 97%)    General:  found in bed in NAD, sleeping but arousable  Eyes:  PER Non icteric   ENMT: no external oral ulcers, dry   CVS: not tachy  Resp: Unlabored Non tachypneic No increased WOB  GI:  Soft _+ ND, mild diffuse tenderness  :  Voiding   Musc: No clubbing  Neuro: Follows commands No focal deficits  Psych: Calm Pleasant, AAOx3    Skin: Non jaundiced        Last BM: patient having multiple BMs per report    ALLERGIES: No Known Allergies            Labs:	                          7.9    10.07 )-----------( 101      ( 16 Dec 2021 07:54 )             24.5       12-16    138  |  106  |  34<H>  ----------------------------<  80  4.4   |  17  |  1.0    Ca    7.7<L>      16 Dec 2021 07:54  Mg     2.2     12-16    TPro  4.7<L>  /  Alb  1.9<L>  /  TBili  0.9  /  DBili  x   /  AST  21  /  ALT  25  /  AlkPhos  599<H>  12-16                            CAPILLARY BLOOD GLUCOSE                    RADIOLOGY  no new - old rev'd      EKG  no new - old rev'd      Imaging Personally Reviewed:  [ x YES  [ ] NO    Consultant(s) Notes Reviewed:  [x] YES  [ ] NO  Care Discussed with Consultants/Other Providers [x] YES  [ ] NO    Medications:	      MEDICATIONS  (STANDING):  budesonide 160 MICROgram(s)/formoterol 4.5 MICROgram(s) Inhaler 2 Puff(s) Inhalation two times a day  cefTRIAXone   IVPB 2000 milliGRAM(s) IV Intermittent every 24 hours  chlorhexidine 4% Liquid 1 Application(s) Topical <User Schedule>  collagenase Ointment 1 Application(s) Topical daily  fentaNYL   Patch  12 MICROgram(s)/Hr 1 Patch Transdermal every 72 hours  folic acid 1 milliGRAM(s) Oral daily  influenza  Vaccine (HIGH DOSE) 0.7 milliLiter(s) IntraMuscular once  lactated ringers Bolus 500 milliLiter(s) IV Bolus once  methocarbamol 500 milliGRAM(s) Oral three times a day  metroNIDAZOLE    Tablet 500 milliGRAM(s) Oral every 8 hours  midodrine. 10 milliGRAM(s) Oral three times a day  polyethylene glycol 3350 17 Gram(s) Oral daily  senna 2 Tablet(s) Oral at bedtime  silver sulfADIAZINE 1% Cream 1 Application(s) Topical two times a day    MEDICATIONS  (PRN):  ALBUTerol    90 MICROgram(s) HFA Inhaler 2 Puff(s) Inhalation every 6 hours PRN Shortness of Breath and/or Wheezing  HYDROmorphone  Injectable 0.25 milliGRAM(s) IV Push every 2 hours PRN pain 4-10  melatonin 5 milliGRAM(s) Oral at bedtime PRN Insomnia        ADVANCED DIRECTIVES:                    DNR DNI               DECISION MAKER: Patient [jose rodrigez] [  ]  Other [  ] _______  LEGAL SURROGATE:      GOALS OF CARE DISCUSSION       Palliative care info/counseling provided	                 Documentation of GOC/Advanced Care Planning see previous will need to readdress          PSYCHOSOCIAL-SPIRITUAL ASSESSMENT:            See Palliative Care SW/ documentation      CURRENT DISPO PLAN:         WILL REMAIN IN HOSPITAL      STALIN    REFERRALS	        Palliative Med        Unit SW/Case Mgmt              Hospice

## 2021-12-16 NOTE — PROGRESS NOTE ADULT - SUBJECTIVE AND OBJECTIVE BOX
RODNEY SANTO 81y Male  MRN#: [id number]   CODE STATUS: Do Not Resuscitate      SUBJECTIVE  Patient is a 81y old Male who presents with a chief complaint of chest pain (15 Dec 2021 12:39)    Today is hospital day 16d,   INTERVAL HPI/OVERNIGHT EVENTS:    Examined pt at bedside this AM. There were no acute events overnight.    Present Today:           Russo Catheter (x)No/ ()Yes?   Indication:             Central Line (x)No/ ()Yes?   Indication:          IV Fluids (x)No/ ()Yes? Type:  Rate:  Indication:    OBJECTIVE  PAST MEDICAL & SURGICAL HISTORY  COPD (chronic obstructive pulmonary disease)    Hypertension    Deep vein thrombosis (DVT) of left lower extremity, unspecified chronicity, unspecified vein    Cellulitis of left lower extremity    Chronic atrial fibrillation    Mitral valve insufficiency, unspecified etiology  Moderate    CHF (congestive heart failure)    S/P coronary artery stent placement    History of total right knee replacement      ALLERGIES:  No Known Allergies    HOME MEDICATIONS:  Home Medications:  atorvastatin 20 mg oral tablet: 1 tab(s) orally once a day (13 Sep 2021 04:59)  folic acid 1 mg oral tablet: 1 tab(s) orally once a day (21 Sep 2021 11:34)  oxycodone-acetaminophen 5 mg-325 mg oral tablet: 1 tab(s) orally every 6 hours, As needed, Moderate Pain (4 - 6) (21 Sep 2021 11:32)  spironolactone 25 mg oral tablet: 0.5 tab(s) orally once a day (21 Sep 2021 11:34)  Symbicort 160 mcg-4.5 mcg/inh inhalation aerosol: 2 puff(s) inhaled 2 times a day (13 Sep 2021 04:59)  Ventolin HFA 90 mcg/inh inhalation aerosol: 2 puff(s) inhaled every 6 hours as neede for shortness of breath (13 Sep 2021 04:59)  warfarin 2 mg oral tablet: Take half a tablet Sun, Tues, Wed, Thurs, Fri, Sat.  Take a whole tablet on Mon (13 Sep 2021 04:59)    MEDICATIONS:  STANDING MEDICATIONS  budesonide 160 MICROgram(s)/formoterol 4.5 MICROgram(s) Inhaler 2 Puff(s) Inhalation two times a day  cefTRIAXone   IVPB 2000 milliGRAM(s) IV Intermittent every 24 hours  chlorhexidine 4% Liquid 1 Application(s) Topical <User Schedule>  collagenase Ointment 1 Application(s) Topical daily  fentaNYL   Patch  12 MICROgram(s)/Hr 1 Patch Transdermal every 72 hours  folic acid 1 milliGRAM(s) Oral daily  influenza  Vaccine (HIGH DOSE) 0.7 milliLiter(s) IntraMuscular once  lactated ringers Bolus 500 milliLiter(s) IV Bolus once  methocarbamol 500 milliGRAM(s) Oral three times a day  metroNIDAZOLE    Tablet 500 milliGRAM(s) Oral every 8 hours  midodrine. 10 milliGRAM(s) Oral three times a day  polyethylene glycol 3350 17 Gram(s) Oral daily  senna 2 Tablet(s) Oral at bedtime  silver sulfADIAZINE 1% Cream 1 Application(s) Topical two times a day    PRN MEDICATIONS  ALBUTerol    90 MICROgram(s) HFA Inhaler 2 Puff(s) Inhalation every 6 hours PRN  HYDROmorphone  Injectable 0.25 milliGRAM(s) IV Push every 2 hours PRN  melatonin 5 milliGRAM(s) Oral at bedtime PRN      VITAL SIGNS: Last 24 Hours  T(C): 37 (16 Dec 2021 00:05), Max: 37 (16 Dec 2021 00:05)  T(F): 98.6 (16 Dec 2021 00:05), Max: 98.6 (16 Dec 2021 00:05)  HR: 82 (16 Dec 2021 00:05) (82 - 82)  BP: 90/55 (16 Dec 2021 00:05) (90/55 - 90/55)  BP(mean): --  RR: 19 (16 Dec 2021 00:05) (19 - 19)  SpO2: 97% (16 Dec 2021 00:05) (97% - 97%)    LABS:    12-14    136  |  103  |  34<H>  ----------------------------<  91  4.3   |  20  |  1.2    Ca    7.3<L>      14 Dec 2021 20:00                    Culture - Other (collected 14 Dec 2021 12:17)  Source: .Other sacrum  Preliminary Report (16 Dec 2021 06:44):    Numerous Gram Negative Rods      RADIOLOGY:  < from: [facility] Hepatobiliary [facility] (12.11.21 @ 15:37) >  IMPRESSION:    NO DEFINITE VISUALIZATION OF THEGALLBLADDER THROUGH 4 HOURS   POST-INJECTION, CONSISTENT WITH CHOLECYSTITIS, AS DESCRIBED ABOVE.    CLINICAL CORRELATION IS SUGGESTED.    < end of copied text >      PHYSICAL EXAM:  GENERAL: NAD, well-developed, AAOx3  HEENT:  Atraumatic, Normocephalic  PULMONARY: Clear to auscultation bilaterally; No wheeze  CARDIOVASCULAR: Regular rate and rhythm; No murmurs, rubs, or gallops  GASTROINTESTINAL: Soft, Nontender, Nondistended; Bowel sounds present  MUSCULOSKELETAL:  2+ Peripheral Pulses, No clubbing, cyanosis, or edema  NEUROLOGY: non-focal  SKIN: ~[id number] sacral pressure ulcer; full thickness. Soft, boggy  adherent black/grey necrotic  tissue, extremely foul odor, surrounding erythema, no purulence expressed

## 2021-12-16 NOTE — PROGRESS NOTE ADULT - PROBLEM SELECTOR PLAN 2
Recommend non-pharmacological interventions to prevent/treat delirium  - maintain day/night light cycles  - optimize sleep-wake cycle, minimize environmental noise  - reorientation frequently  - use verbal redirection as first line  - minimize restraints and lines  - ensure good bladder/bowel function  - ensure adequate pain control

## 2021-12-16 NOTE — PROGRESS NOTE ADULT - ASSESSMENT
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 Trop at baseline 0.03, EKG showed afib, CXR small R sided pleural effusion.    #Proteus Bacteremia - +BCx x2 on 12/7, f/u BCx on 12/11 NGTD  #Acute Cholecystitis - +findings on RUQ U/S (12/10) + HIDA Scan (12/11)  #PMHx of Recent Cholecystitis - s/p perQ-cholecystostomy placement and removal by IR - 10d prior to admission  -ID consulted:  - Narrow cefepime to Ceftriaxone 2g q24h IV   - Continue PO flagyl 500mg TID  - Post surgery, cipro 500mg BID and flagyl 500mg TID x 3 days  - Monitor sacral area, low threshold to consult Burn, appreciate Wound care consult  - Gen Surg consulted:  Cardiology risk stratified the patient as high risk for cholecystectomy  Medicine risk stratified the patient as high risk  Patient is not a surgical candidate  Please consult IR for re-placement of percutaneous cholecystostomy tube  Please recall surgery as needed  12/15: Pt. does not want PerQ Cholecysostomy w/IR, declined debridement at bedside by burn team; GOC to be revisited with pt's daughter.    #Atypical Chest Pain - likely MSK, no further ischemic workup as per Cardiology (Dr. Herrera)  #PMHx of HFpEF - follows with Dr. 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  #Chronic DVT Right Common Fem/Fem/Popliteal - on LE Venous Duplex 12/1  - Troponin 0.03 x3, was 0.02-0.03 throughout prior admission, EKG performed x2 with no acute ischemic changes  - D-dimer 342, BNP ~3K near baseline, 11/30 CXR showing small R sided pleural effusion, has remained at baseline oxygen requirements  - TTE 12/2: EF 50-55%, otherwise unremarkable   - d/c Spironolactone + Torsemide due to persistent hypotension, not overloaded on exam  - pain control with Percocet 1 tab q6h PRN for moderate pain + Morphine PRN for severe pain  - INR was supratherapeutic on admission, was reversed with Vitamin K on 12/2, restarted Coumadin, INR remains supratherapeutic, have been holding the Coumadin, will need decreased dosing moving forward  - BP on lower side, increased midodrine to 10mg PO TID  - patient not eating and drinking adequately, calorie count started  - patient refusing PT/Rehab  - CTA-chest 12/9 negative for PE    #Chronic Macrocytic Anemia - on folate supplementation  #Anemia of Chronic Disease  - baseline Hb ~10-11, supratherapeutic INR on admission reversed, B12 WNL in September  - 12/1 iron studies: low TIBC, likely anemia of chronic disease    #DTI Left Heel + Coccyx   - Wound Care consulted, recommendations given to RN  - f/u Burn recs:   Bedside debridement was attempted 12/14 but pt refused stating that he is in pain and "wants to die"    #COPD   - on home O2 2.5L, has remained at baseline, continuing symbicort 160ug 2 puffs BID, Albuterol PRN                                                                          # DVT prophylaxis: Coumadin (holding)  # GI prophylaxis: N/A  # Diet: DASH/TLC  # Activity: Ambulate as Tolerated  # Code status: Full  # Disposition: remain on floor  # Handoff: Poor surgical candidate; Pt. does not want PerQ Cholecysostomy w/IR, declined debridement at bedside by burn team; GOC to be revisited with pt's daughter.

## 2021-12-16 NOTE — CONSULT NOTE ADULT - SUBJECTIVE AND OBJECTIVE BOX
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 Pt reports that this morning, he was awakened by acute, sudden onset L sided chest pain at 4AM. Describes the pain as "pounding", states that he felt some pain radiate to LUE as well. Denies identifiable aggravating or alleviating factors and states that this has never happened before. Denies headache/dizziness, endorses chronic shortness of breath that is unchanged, has been requiring his baseline oxygen. Denies abdominal pain. Endorses compliance with all medications. States that at baseline, he is bedbound, cannot stand up or ambulate due to weakness. Was recently admitted to [facility] for short term rehab, where he wishes to go back because feels that he cannot care for himself at home. Lives with son but needs more help and PT.   In the ED, T 96, /58, HR 85, RR 18, SpO2 98% on O2 NC 4L, then 100% on 3L. Lab work revealed Hb 9.1, INR 7.87, troponin 0.03 and BNP 3327 (both near baseline). EKG revealed AFib, HR 88. CXR revealed slightly inc small R sided pleural effusion. (30 Nov 2021 11:30)      PAST MEDICAL & SURGICAL HISTORY:  COPD (chronic obstructive pulmonary disease)    Hypertension    Deep vein thrombosis (DVT) of left lower extremity, unspecified chronicity, unspecified vein    Cellulitis of left lower extremity    Chronic atrial fibrillation    Mitral valve insufficiency, unspecified etiology  Moderate    CHF (congestive heart failure)    S/P coronary artery stent placement    History of total right knee replacement        MEDICATIONS  (STANDING):  budesonide 160 MICROgram(s)/formoterol 4.5 MICROgram(s) Inhaler 2 Puff(s) Inhalation two times a day  cefTRIAXone   IVPB 2000 milliGRAM(s) IV Intermittent every 24 hours  chlorhexidine 4% Liquid 1 Application(s) Topical <User Schedule>  collagenase Ointment 1 Application(s) Topical daily  fentaNYL   Patch  12 MICROgram(s)/Hr 1 Patch Transdermal every 72 hours  folic acid 1 milliGRAM(s) Oral daily  influenza  Vaccine (HIGH DOSE) 0.7 milliLiter(s) IntraMuscular once  lactated ringers Bolus 500 milliLiter(s) IV Bolus once  methocarbamol 500 milliGRAM(s) Oral three times a day  metroNIDAZOLE    Tablet 500 milliGRAM(s) Oral every 8 hours  midodrine. 10 milliGRAM(s) Oral three times a day  polyethylene glycol 3350 17 Gram(s) Oral daily  senna 2 Tablet(s) Oral at bedtime  silver sulfADIAZINE 1% Cream 1 Application(s) Topical two times a day    MEDICATIONS  (PRN):  ALBUTerol    90 MICROgram(s) HFA Inhaler 2 Puff(s) Inhalation every 6 hours PRN Shortness of Breath and/or Wheezing  HYDROmorphone  Injectable 0.25 milliGRAM(s) IV Push every 2 hours PRN pain 4-10  melatonin 5 milliGRAM(s) Oral at bedtime PRN Insomnia      Allergies    No Known Allergies    Physical Exam:   Vital Signs Last 24 Hrs  T(C): 35.7 (16 Dec 2021 07:18), Max: 37 (16 Dec 2021 00:05)  T(F): 96.2 (16 Dec 2021 07:18), Max: 98.6 (16 Dec 2021 00:05)  HR: 82 (16 Dec 2021 07:18) (82 - 82)  BP: 96/42 (16 Dec 2021 07:18) (90/55 - 96/42)  BP(mean): --  RR: 18 (16 Dec 2021 07:18) (18 - 19)  SpO2: 94% (16 Dec 2021 07:18) (94% - 97%)      Labs:                         7.9    10.07 )-----------( 101      ( 16 Dec 2021 07:54 )             24.5     12-16    138  |  106  |  34<H>  ----------------------------<  80  4.4   |  17  |  1.0    Ca    7.7<L>      16 Dec 2021 07:54  Mg     2.2     12-16    TPro  4.7<L>  /  Alb  1.9<L>  /  TBili  0.9  /  DBili  x   /  AST  21  /  ALT  25  /  AlkPhos  599<H>  12-16    PT/INR - ( 16 Dec 2021 11:00 )   PT: 15.70 sec;   INR: 1.37 ratio             Pertinent labs:                      7.9    10.07 )-----------( 101      ( 16 Dec 2021 07:54 )             24.5       12-16    138  |  106  |  34<H>  ----------------------------<  80  4.4   |  17  |  1.0    Ca    7.7<L>      16 Dec 2021 07:54  Mg     2.2     12-16    TPro  4.7<L>  /  Alb  1.9<L>  /  TBili  0.9  /  DBili  x   /  AST  21  /  ALT  25  /  AlkPhos  599<H>  12-16      PT/INR - ( 16 Dec 2021 11:00 )   PT: 15.70 sec;   INR: 1.37 ratio             Radiology & Additional Studies:     Radiology imaging reviewed.       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   - IR placed perc cheli 9/14/21  - was pulled out accidentally a few weeks ago  - now with RUQ pain, bacteremia, no leukocytosis, no fever  - BP 90s/60s  - US shows inflamed GB   - Positive HIDA  - pt on coumadin at home, last dose 12/8   - INR now 1.4  - will plan for perc cheli tomorrow  - repeat coags ravinder am  - NPO at midnight          Risks, benefits, and alternatives to treatment discussed. All questions answered with understanding.    Thank you for the courtesy of this consult, please call [phone] with any further questions.

## 2021-12-17 LAB
-  AMIKACIN: SIGNIFICANT CHANGE UP
-  AMOXICILLIN/CLAVULANIC ACID: SIGNIFICANT CHANGE UP
-  AMPICILLIN/SULBACTAM: SIGNIFICANT CHANGE UP
-  AMPICILLIN: SIGNIFICANT CHANGE UP
-  AZTREONAM: SIGNIFICANT CHANGE UP
-  CEFAZOLIN: SIGNIFICANT CHANGE UP
-  CEFEPIME: SIGNIFICANT CHANGE UP
-  CEFOXITIN: SIGNIFICANT CHANGE UP
-  CEFTRIAXONE: SIGNIFICANT CHANGE UP
-  CIPROFLOXACIN: SIGNIFICANT CHANGE UP
-  ERTAPENEM: SIGNIFICANT CHANGE UP
-  GENTAMICIN: SIGNIFICANT CHANGE UP
-  LEVOFLOXACIN: SIGNIFICANT CHANGE UP
-  MEROPENEM: SIGNIFICANT CHANGE UP
-  PIPERACILLIN/TAZOBACTAM: SIGNIFICANT CHANGE UP
-  TOBRAMYCIN: SIGNIFICANT CHANGE UP
-  TRIMETHOPRIM/SULFAMETHOXAZOLE: SIGNIFICANT CHANGE UP
ALBUMIN SERPL ELPH-MCNC: 2 G/DL — LOW (ref 3.5–5.2)
ALP SERPL-CCNC: 524 U/L — HIGH (ref 30–115)
ALT FLD-CCNC: 22 U/L — SIGNIFICANT CHANGE UP (ref 0–41)
ANION GAP SERPL CALC-SCNC: 14 MMOL/L — SIGNIFICANT CHANGE UP (ref 7–14)
AST SERPL-CCNC: 22 U/L — SIGNIFICANT CHANGE UP (ref 0–41)
BASOPHILS # BLD AUTO: 0.01 K/UL — SIGNIFICANT CHANGE UP (ref 0–0.2)
BASOPHILS NFR BLD AUTO: 0.1 % — SIGNIFICANT CHANGE UP (ref 0–1)
BILIRUB SERPL-MCNC: 0.8 MG/DL — SIGNIFICANT CHANGE UP (ref 0.2–1.2)
BUN SERPL-MCNC: 32 MG/DL — HIGH (ref 10–20)
CALCIUM SERPL-MCNC: 7.3 MG/DL — LOW (ref 8.5–10.1)
CHLORIDE SERPL-SCNC: 108 MMOL/L — SIGNIFICANT CHANGE UP (ref 98–110)
CO2 SERPL-SCNC: 19 MMOL/L — SIGNIFICANT CHANGE UP (ref 17–32)
CREAT SERPL-MCNC: 1 MG/DL — SIGNIFICANT CHANGE UP (ref 0.7–1.5)
CULTURE RESULTS: SIGNIFICANT CHANGE UP
EOSINOPHIL # BLD AUTO: 0.06 K/UL — SIGNIFICANT CHANGE UP (ref 0–0.7)
EOSINOPHIL NFR BLD AUTO: 0.7 % — SIGNIFICANT CHANGE UP (ref 0–8)
GLUCOSE SERPL-MCNC: 101 MG/DL — HIGH (ref 70–99)
GRAM STN FLD: SIGNIFICANT CHANGE UP
HCT VFR BLD CALC: 23.8 % — LOW (ref 42–52)
HGB BLD-MCNC: 7.7 G/DL — LOW (ref 14–18)
IMM GRANULOCYTES NFR BLD AUTO: 0.6 % — HIGH (ref 0.1–0.3)
LYMPHOCYTES # BLD AUTO: 0.55 K/UL — LOW (ref 1.2–3.4)
LYMPHOCYTES # BLD AUTO: 6.1 % — LOW (ref 20.5–51.1)
MAGNESIUM SERPL-MCNC: 2.2 MG/DL — SIGNIFICANT CHANGE UP (ref 1.8–2.4)
MCHC RBC-ENTMCNC: 32.4 G/DL — SIGNIFICANT CHANGE UP (ref 32–37)
MCHC RBC-ENTMCNC: 32.5 PG — HIGH (ref 27–31)
MCV RBC AUTO: 100.4 FL — HIGH (ref 80–94)
METHOD TYPE: SIGNIFICANT CHANGE UP
MONOCYTES # BLD AUTO: 0.82 K/UL — HIGH (ref 0.1–0.6)
MONOCYTES NFR BLD AUTO: 9.1 % — SIGNIFICANT CHANGE UP (ref 1.7–9.3)
NEUTROPHILS # BLD AUTO: 7.57 K/UL — HIGH (ref 1.4–6.5)
NEUTROPHILS NFR BLD AUTO: 83.4 % — HIGH (ref 42.2–75.2)
NRBC # BLD: 0 /100 WBCS — SIGNIFICANT CHANGE UP (ref 0–0)
PLATELET # BLD AUTO: 95 K/UL — LOW (ref 130–400)
POTASSIUM SERPL-MCNC: 4 MMOL/L — SIGNIFICANT CHANGE UP (ref 3.5–5)
POTASSIUM SERPL-SCNC: 4 MMOL/L — SIGNIFICANT CHANGE UP (ref 3.5–5)
PROT SERPL-MCNC: 4.9 G/DL — LOW (ref 6–8)
RBC # BLD: 2.37 M/UL — LOW (ref 4.7–6.1)
RBC # FLD: 16.8 % — HIGH (ref 11.5–14.5)
SODIUM SERPL-SCNC: 141 MMOL/L — SIGNIFICANT CHANGE UP (ref 135–146)
SPECIMEN SOURCE: SIGNIFICANT CHANGE UP
SPECIMEN SOURCE: SIGNIFICANT CHANGE UP
WBC # BLD: 9.06 K/UL — SIGNIFICANT CHANGE UP (ref 4.8–10.8)
WBC # FLD AUTO: 9.06 K/UL — SIGNIFICANT CHANGE UP (ref 4.8–10.8)

## 2021-12-17 PROCEDURE — 47490 INCISION OF GALLBLADDER: CPT

## 2021-12-17 PROCEDURE — 99233 SBSQ HOSP IP/OBS HIGH 50: CPT

## 2021-12-17 PROCEDURE — 99223 1ST HOSP IP/OBS HIGH 75: CPT

## 2021-12-17 RX ADMIN — Medication 5 MILLIGRAM(S): at 22:44

## 2021-12-17 RX ADMIN — MIDODRINE HYDROCHLORIDE 10 MILLIGRAM(S): 2.5 TABLET ORAL at 17:22

## 2021-12-17 RX ADMIN — HYDROMORPHONE HYDROCHLORIDE 0.25 MILLIGRAM(S): 2 INJECTION INTRAMUSCULAR; INTRAVENOUS; SUBCUTANEOUS at 11:21

## 2021-12-17 RX ADMIN — MIDODRINE HYDROCHLORIDE 10 MILLIGRAM(S): 2.5 TABLET ORAL at 05:29

## 2021-12-17 RX ADMIN — METHOCARBAMOL 500 MILLIGRAM(S): 500 TABLET, FILM COATED ORAL at 21:20

## 2021-12-17 RX ADMIN — MIDODRINE HYDROCHLORIDE 10 MILLIGRAM(S): 2.5 TABLET ORAL at 11:20

## 2021-12-17 RX ADMIN — FENTANYL CITRATE 1 PATCH: 50 INJECTION INTRAVENOUS at 20:12

## 2021-12-17 RX ADMIN — Medication 500 MILLIGRAM(S): at 11:21

## 2021-12-17 RX ADMIN — SENNA PLUS 2 TABLET(S): 8.6 TABLET ORAL at 21:20

## 2021-12-17 RX ADMIN — Medication 500 MILLIGRAM(S): at 21:20

## 2021-12-17 RX ADMIN — HYDROMORPHONE HYDROCHLORIDE 0.25 MILLIGRAM(S): 2 INJECTION INTRAMUSCULAR; INTRAVENOUS; SUBCUTANEOUS at 22:44

## 2021-12-17 RX ADMIN — Medication 1 MILLIGRAM(S): at 11:20

## 2021-12-17 RX ADMIN — POLYETHYLENE GLYCOL 3350 17 GRAM(S): 17 POWDER, FOR SOLUTION ORAL at 11:20

## 2021-12-17 RX ADMIN — Medication 500 MILLIGRAM(S): at 05:29

## 2021-12-17 RX ADMIN — METHOCARBAMOL 500 MILLIGRAM(S): 500 TABLET, FILM COATED ORAL at 05:29

## 2021-12-17 RX ADMIN — METHOCARBAMOL 500 MILLIGRAM(S): 500 TABLET, FILM COATED ORAL at 11:22

## 2021-12-17 RX ADMIN — CHLORHEXIDINE GLUCONATE 1 APPLICATION(S): 213 SOLUTION TOPICAL at 05:29

## 2021-12-17 NOTE — PROGRESS NOTE ADULT - SUBJECTIVE AND OBJECTIVE BOX
INTERVENTIONAL RADIOLOGY BRIEF-OPERATIVE NOTE    Procedure: Percutaneous cholecystostomy    Pre-Op Diagnosis: Cholecystitis    Post-Op Diagnosis: Same    Attending: Tee Jacobs MD  Resident: Akash Velazquez MD    Anesthesia (type):  [x] General Anesthesia  [ ] Sedation  [ ] Spinal Anesthesia  [x] Local/Regional    Contrast: 15 cc Visipaque via cholecystostomy    Estimated Blood Loss: 5 cc    Condition:   [ ] Critical  [ ] Serious  [ ] Fair   [x] Good    Findings/Follow up Plan of Care:  Placement of 8.5F cholecystostomy tube with removal of 40cc bilious fluid, sent for microbiologic laboratory analysis. Procedure well-tolerated without immediate complication.    Specimens Removed: None.    Implants: 8.5F cholecystostomy pigtail catheter.    Complications: None immediate.    Disposition: Anticipate return to previous level of care following short-interval monitoring in recovery.    Please call Interventional Radiology with questions or concerns:   - M-F [phone]: [phone]   - All other hours: [phone] INTERVENTIONAL RADIOLOGY BRIEF-OPERATIVE NOTE    Procedure: Percutaneous cholecystostomy    Pre-Op Diagnosis: Cholecystitis    Post-Op Diagnosis: Same    Attending: Tee Jacobs MD  Resident: Akash Velazquez MD    Anesthesia (type):  [x] General Anesthesia  [ ] Sedation  [ ] Spinal Anesthesia  [x] Local/Regional    Contrast: 15 cc Visipaque via cholecystostomy    Estimated Blood Loss: 5 cc    Condition:   [ ] Critical  [ ] Serious  [ ] Fair   [x] Good    Findings/Follow up Plan of Care:  Placement of 8.5F cholecystostomy tube with removal of 40cc bilious fluid, sent for microbiologic laboratory analysis. Cholecystogram demonstrating patency of the cystic duct but CBD occluded. Procedure well-tolerated without immediate complication.    Specimens Removed: None.    Implants: 8.5F cholecystostomy pigtail catheter.    Complications: None immediate.    Disposition: Anticipate return to previous level of care following short-interval monitoring in recovery. Advanced GI consultation recommended for consideration of ERCP for CBD occlusion.    Please call Interventional Radiology with questions or concerns:   - M-F [phone]: [phone]   - All other hours: [phone]

## 2021-12-17 NOTE — CHART NOTE - NSCHARTNOTEFT_GEN_A_CORE
PALLIATIVE MEDICINE INTERDISCIPLINARY TEAM NOTE    Provider:  [   ]Social Work   [   ]          [   ] Initial visit [   ] Follow up    Family or contact name / phone #   Met with: [   ] Patient  [   ] Family  [   ] Other:    Primary Language: [   ] English [   ] Other*:                      *Interpretation provided by:    SUPPORT DIAGNOSES            (Check all that apply)  [   ] Psychosocial spiritual assessment (PSSA)  [   ] EOL issues  [   ] Cultural / spiritual concerns  [   ] Pain / suffering  [   ] Dementia / AMS  [   ] Other:  [   ] AD issues  [   ] Grief / loss / sadness  [   ] Discharge issues  [   ] Distress / coping    PSYCHOSOCIAL ASSESSMENT OF PATIENT         (Check all that apply)  [   ] Initial Assessment            [   ] Reassessment          [   ] Not Applicable this visit    Pain/suffering acuity:  [   ] None to mild (0-3)           [   ] Moderate (4-6)        [   ] High (7-10)    Mental Status:  [   ] Alert/oriented (x3)          [   ] Confused/Altered(x2/x1)         [   ] Non-resp    Functional status:  [   ] Independent w ADLs      [   ] Needs Assistance             [   ] Bedbound/Full Care    Coping:  [   ] Coping well                     [   ] Coping w/difficulty            [   ] Poor coping    Support system:  [   ] Strong                              [   ] Adequate                        [   ] Inadequate    SPIRITUAL ASSESSMENT  Nondenominational/Spiritual practice: _______catholic____________________    Role of organized [place]ism:  [   ] Important                     [  x ] Some (fam tradition, cultural)               [   ] None    Effects on medical care:  [   ] Yes, _____________________________________                         [ x  ] None    Cultural/Sabianist need:  [   ] Yes, _____________________________________                         [ x  ] None    Refer to Pastoral Care:  [   ] Yes           [ x  ] No, not at this time    SERVICE PROVIDED  [   ]PSSA                                                                             [   ]Discharge support / facilitation  [   ]AD / goals of care counseling                                  [   ]EOL / death / bereavement counseling  [ x  ]Counseling / support                                                [   ] Family meeting  [ x  ]Prayer / sacrament / ritual                                      [   ] Referral   [   ]Other                                                                       NOTE and Plan of Care (PoC): Pt was a bit uncomfortable but said he can cope. Pt has community and a deep alberto in God. He uses his alberto as a coping mechanism. Pt  states through prayer he feels comforted. I provided spiritual presence fo Pt. I will follow up as needed.

## 2021-12-17 NOTE — CHART NOTE - NSCHARTNOTEFT_GEN_A_CORE
Patient reported that he had procedure this morning.  Patient reported feeling in pain.  RN administered pain medication.  Support rendered.

## 2021-12-17 NOTE — CONSULT NOTE ADULT - SUBJECTIVE AND OBJECTIVE BOX
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 Pt reports that this morning, he was awakened by acute, sudden onset L sided chest pain at 4AM. Describes the pain as "pounding", states that he felt some pain radiate to LUE as well. Denies identifiable aggravating or alleviating factors and states that this has never happened before. Denies headache/dizziness, endorses chronic shortness of breath that is unchanged, has been requiring his baseline oxygen. Denies abdominal pain. Endorses compliance with all medications. States that at baseline, he is bedbound, cannot stand up or ambulate due to weakness. Was recently admitted to [facility] for short term rehab, where he wishes to go back because feels that he cannot care for himself at home. Lives with son but needs more help and PT.   In the ED, T 96, /58, HR 85, RR 18, SpO2 98% on O2 NC 4L, then 100% on 3L. Lab work revealed Hb 9.1, INR 7.87, troponin 0.03 and BNP 3327 (both near baseline). EKG revealed AFib, HR 88. CXR revealed slightly inc small R sided pleural effusion. (30 Nov 2021 11:30)      Prior EGD: none on chart  Prior Colonoscopy: 2013 Dr. Tierney       PAST MEDICAL & SURGICAL HISTORY:  COPD (chronic obstructive pulmonary disease)    Hypertension    Deep vein thrombosis (DVT) of left lower extremity, unspecified chronicity, unspecified vein    Cellulitis of left lower extremity    Chronic atrial fibrillation    Mitral valve insufficiency, unspecified etiology  Moderate    CHF (congestive heart failure)    S/P coronary artery stent placement    History of total right knee replacement        FAMILY HISTORY:      Social History:  Tobacco:  Alcohol:  Drugs:    Home Medications:  atorvastatin 20 mg oral tablet: 1 tab(s) orally once a day (13 Sep 2021 04:59)  folic acid 1 mg oral tablet: 1 tab(s) orally once a day (21 Sep 2021 11:34)  oxycodone-acetaminophen 5 mg-325 mg oral tablet: 1 tab(s) orally every 6 hours, As needed, Moderate Pain (4 - 6) (21 Sep 2021 11:32)  spironolactone 25 mg oral tablet: 0.5 tab(s) orally once a day (21 Sep 2021 11:34)  Symbicort 160 mcg-4.5 mcg/inh inhalation aerosol: 2 puff(s) inhaled 2 times a day (13 Sep 2021 04:59)  Ventolin HFA 90 mcg/inh inhalation aerosol: 2 puff(s) inhaled every 6 hours as neede for shortness of breath (13 Sep 2021 04:59)  warfarin 2 mg oral tablet: Take half a tablet Sun, Tues, Wed, Thurs, Fri, Sat.  Take a whole tablet on Mon (13 Sep 2021 04:59)    MEDICATIONS  (STANDING):  budesonide 160 MICROgram(s)/formoterol 4.5 MICROgram(s) Inhaler 2 Puff(s) Inhalation two times a day  cefTRIAXone   IVPB 2000 milliGRAM(s) IV Intermittent every 24 hours  chlorhexidine 4% Liquid 1 Application(s) Topical <User Schedule>  collagenase Ointment 1 Application(s) Topical daily  fentaNYL   Patch  12 MICROgram(s)/Hr 1 Patch Transdermal every 72 hours  folic acid 1 milliGRAM(s) Oral daily  influenza  Vaccine (HIGH DOSE) 0.7 milliLiter(s) IntraMuscular once  lactated ringers Bolus 500 milliLiter(s) IV Bolus once  methocarbamol 500 milliGRAM(s) Oral three times a day  metroNIDAZOLE    Tablet 500 milliGRAM(s) Oral every 8 hours  midodrine. 10 milliGRAM(s) Oral three times a day  polyethylene glycol 3350 17 Gram(s) Oral daily  senna 2 Tablet(s) Oral at bedtime  silver sulfADIAZINE 1% Cream 1 Application(s) Topical two times a day    MEDICATIONS  (PRN):  ALBUTerol    90 MICROgram(s) HFA Inhaler 2 Puff(s) Inhalation every 6 hours PRN Shortness of Breath and/or Wheezing  HYDROmorphone  Injectable 0.25 milliGRAM(s) IV Push every 2 hours PRN pain 4-10  melatonin 5 milliGRAM(s) Oral at bedtime PRN Insomnia      Allergies  No Known Allergies      Review of Systems:   Constitutional:  No Fever, No Chills  ENT/Mouth:  No Hearing Changes,  No Difficulty Swallowing  Eyes:  No Eye Pain, No Vision Changes  Cardiovascular:  No Chest Pain, No Palpitations  Respiratory:  No Cough, No Dyspnea  Gastrointestinal:  As described in HPI  Musculoskeletal:  No Joint Swelling, No Back Pain  Skin:  No Skin Lesions, No Jaundice  Neuro:  No Syncope, No Dizziness  Heme/Lymph:  No Bruising, No Bleeding.          Physical Examination:  T(C): 35.6 (12-17-21 @ 00:42), Max: 36.1 (12-16-21 @ 16:00)  HR: 68 (12-17-21 @ 08:04) (68 - 69)  BP: 113/58 (12-17-21 @ 08:04) (103/59 - 113/58)  RR: 18 (12-17-21 @ 08:04) (18 - 18)  SpO2: 98% (12-17-21 @ 08:04) (98% - 98%)  Height (cm): 190.5 (12-17-21 @ 08:04)      Constitutional: No acute distress.  Eyes:. Conjunctivae are clear, Sclera is non-icteric.  Ears Nose and Throat: The external ears are normal appearing,  Oral mucosa is pink and moist.  Respiratory:  No signs of respiratory distress. Lung sounds are clear bilaterally.  Cardiovascular:  S1 S2, Regular rate and rhythm.  GI: Abdomen is soft, symmetric, and non-tender without distention. There are no visible lesions or scars. Bowel sounds are present and normoactive in all four quadrants. No masses, hepatomegaly, or splenomegaly are noted.   Neuro: No Tremor, No involuntary movements  Skin: No rashes, No Jaundice.          Data:                        7.9    10.07 )-----------( 101      ( 16 Dec 2021 07:54 )             24.5     Hgb Trend:  7.9  12-16-21 @ 07:54        12-16    138  |  106  |  34<H>  ----------------------------<  80  4.4   |  17  |  1.0    Ca    7.7<L>      16 Dec 2021 07:54  Mg     2.2     12-16    TPro  4.7<L>  /  Alb  1.9<L>  /  TBili  0.9  /  DBili  x   /  AST  21  /  ALT  25  /  AlkPhos  599<H>  12-16    Liver panel trend:  TBili 0.9   /   AST 21   /   ALT 25   /   AlkP 599   /   Tptn 4.7   /   Alb 1.9    /   DBili --      12-16  TBili 1.0   /   AST 31   /   ALT 34   /   AlkP 594   /   Tptn 4.4   /   Alb 2.0    /   DBili --      12-14  TBili 1.4   /   AST 34   /   ALT 39   /   AlkP 536   /   Tptn 4.7   /   Alb 2.1    /   DBili --      12-13  TBili 1.5   /   AST 42   /   ALT 42   /   AlkP 500   /   Tptn 4.7   /   Alb 1.9    /   DBili --      12-12  TBili 1.5   /   AST 36   /   ALT 40   /   AlkP 429   /   Tptn 4.7   /   Alb 2.0    /   DBili --      12-11  TBili 1.3   /   AST 39   /   ALT 42   /   AlkP 384   /   Tptn 4.8   /   Alb 2.1    /   DBili --      12-10  TBili 1.4   /   AST 35   /   ALT 37   /   AlkP 339   /   Tptn 4.7   /   Alb 2.0    /   DBili --      12-09  TBili 1.4   /   AST 26   /   ALT 33   /   AlkP 277   /   Tptn 4.9   /   Alb 2.2    /   DBili --      12-08      PT/INR - ( 16 Dec 2021 11:00 )   PT: 15.70 sec;   INR: 1.37 ratio             Culture - Other (collected 14 Dec 2021 12:17)  Source: .Other sacrum  Preliminary Report (17 Dec 2021 08:59):    Numerous Proteus mirabilis ESBL  Organism: Proteus mirabilis ESBL (17 Dec 2021 08:58)  Organism: Proteus mirabilis ESBL (17 Dec 2021 08:58)          Radiology:

## 2021-12-17 NOTE — PROGRESS NOTE ADULT - ASSESSMENT
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 Trop at baseline 0.03, EKG showed afib, CXR small R sided pleural effusion.    #Proteus Bacteremia - +BCx x2 on 12/7, f/u BCx on 12/11 NGTD  #Acute Cholecystitis - +findings on RUQ U/S (12/10) + HIDA Scan (12/11)  #PMHx of Recent Cholecystitis - s/p perQ-cholecystostomy placement and removal by IR - 10d prior to admission  -ID consulted:  - Narrow cefepime to Ceftriaxone 2g q24h IV   - Continue PO flagyl 500mg TID  - Post surgery, cipro 500mg BID and flagyl 500mg TID x 3 days  - Monitor sacral area, low threshold to consult Burn, appreciate Wound care consult  - Gen Surg consulted:  Cardiology risk stratified the patient as high risk for cholecystectomy  Medicine risk stratified the patient as high risk  Patient is not a surgical candidate  Please consult IR for re-placement of percutaneous cholecystostomy tube  Please recall surgery as needed  12/16: IR consulted for PreQ Cholecystostomy  - IR placed perc cheli 9/14/21  - was pulled out accidentally a few weeks ago  - now with RUQ pain, bacteremia, no leukocytosis, no fever  - BP 90s/60s  - US shows inflamed GB   - Positive HIDA  - pt on coumadin at home, last dose 12/8   - INR now 1.4  - will plan for perc cheli tomorrow  - repeat coags ravinder am  - NPO at midnight      #Atypical Chest Pain - likely MSK, no further ischemic workup as per Cardiology (Dr. Herrera)  #PMHx of HFpEF - follows with Dr. 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  #Chronic DVT Right Common Fem/Fem/Popliteal - on LE Venous Duplex 12/1  - Troponin 0.03 x3, was 0.02-0.03 throughout prior admission, EKG performed x2 with no acute ischemic changes  - D-dimer 342, BNP ~3K near baseline, 11/30 CXR showing small R sided pleural effusion, has remained at baseline oxygen requirements  - TTE 12/2: EF 50-55%, otherwise unremarkable   - d/c Spironolactone + Torsemide due to persistent hypotension, not overloaded on exam  - pain control with Percocet 1 tab q6h PRN for moderate pain + Morphine PRN for severe pain  - INR was supratherapeutic on admission, was reversed with Vitamin K on 12/2, restarted Coumadin, INR remains supratherapeutic, have been holding the Coumadin, will need decreased dosing moving forward  - BP on lower side, increased midodrine to 10mg PO TID  - patient not eating and drinking adequately, calorie count started  - patient refusing PT/Rehab  - CTA-chest 12/9 negative for PE    #Chronic Macrocytic Anemia - on folate supplementation  #Anemia of Chronic Disease  - baseline Hb ~10-11, supratherapeutic INR on admission reversed, B12 WNL in September  - 12/1 iron studies: low TIBC, likely anemia of chronic disease    #DTI Left Heel + Coccyx   - Wound Care consulted, recommendations given to RN  - f/u Burn recs:   Bedside debridement was attempted 12/14 but pt refused stating that he is in pain and "wants to die"    #COPD   - on home O2 2.5L, has remained at baseline, continuing symbicort 160ug 2 puffs BID, Albuterol PRN                                                                          # DVT prophylaxis: Coumadin (holding)  # GI prophylaxis: N/A  # Diet: DASH/TLC  # Activity: Ambulate as Tolerated  # Code status: Full  # Disposition: remain on floor  # Handoff: Poor surgical candidate; PerQ Cholecysostomy w/IR planned for 12/17

## 2021-12-17 NOTE — CONSULT NOTE ADULT - PROVIDER SPECIALTY LIST ADULT
Infectious Disease
Cardiology
Burn
Gastroenterology
Surgery
Gastroenterology
Palliative Care
Physiatry
Intervent Radiology

## 2021-12-17 NOTE — CONSULT NOTE ADULT - SUBJECTIVE AND OBJECTIVE BOX
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 Patient was admitted on November 30th and hospital course significant for proteus bacteremia. Patient was seen on past admission by surgery and Cholecystectomy was not done. He had prior Gallbladder drain which was removed around 2 weeks prior to this presentation. Patient now s/p placement of a percutaneous Gallbladder drain but cholangiogram noted possible occlusion of the CBD.          PAST MEDICAL & SURGICAL HISTORY:  COPD (chronic obstructive pulmonary disease)  Hypertension  Deep vein thrombosis (DVT) of left lower extremity, unspecified chronicity, unspecified vein  Cellulitis of left lower extremity  Chronic atrial fibrillation  Mitral valve insufficiency, unspecified etiology  CHF (congestive heart failure)  S/P coronary artery stent placement  History of total right knee replacement    Social History  Denies Current Tobacco use  Denies Current ETOH use  Denies Current Illicit Drug use     Family Hx:  Father: Non Contributory   Mother: Non Contributory      MEDICATIONS  (STANDING):  budesonide 160 MICROgram(s)/formoterol 4.5 MICROgram(s) Inhaler 2 Puff(s) Inhalation two times a day  cefTRIAXone   IVPB 2000 milliGRAM(s) IV Intermittent every 24 hours  chlorhexidine 4% Liquid 1 Application(s) Topical <User Schedule>  collagenase Ointment 1 Application(s) Topical daily  fentaNYL   Patch  12 MICROgram(s)/Hr 1 Patch Transdermal every 72 hours  folic acid 1 milliGRAM(s) Oral daily  influenza  Vaccine (HIGH DOSE) 0.7 milliLiter(s) IntraMuscular once  lactated ringers Bolus 500 milliLiter(s) IV Bolus once  methocarbamol 500 milliGRAM(s) Oral three times a day  metroNIDAZOLE    Tablet 500 milliGRAM(s) Oral every 8 hours  midodrine. 10 milliGRAM(s) Oral three times a day  polyethylene glycol 3350 17 Gram(s) Oral daily  senna 2 Tablet(s) Oral at bedtime  silver sulfADIAZINE 1% Cream 1 Application(s) Topical two times a day    MEDICATIONS  (PRN):  ALBUTerol    90 MICROgram(s) HFA Inhaler 2 Puff(s) Inhalation every 6 hours PRN Shortness of Breath and/or Wheezing  HYDROmorphone  Injectable 0.25 milliGRAM(s) IV Push every 2 hours PRN pain 4-10  melatonin 5 milliGRAM(s) Oral at bedtime PRN Insomnia      Allergies  No Known Allergies        REVIEW OF SYSTEMS    [ ] A ten-point review of systems was otherwise negative except as noted.  [ ] Due to altered mental status/intubation, subjective information were not able to be obtained from the patient. History was obtained, to the extent possible, from review of the chart and collateral sources of information.      Vital Signs Last 24 Hrs  T(C): 35.6 (17 Dec 2021 00:42), Max: 36.1 (16 Dec 2021 16:00)  T(F): 96.1 (17 Dec 2021 00:42), Max: 97 (16 Dec 2021 16:00)  HR: 68 (17 Dec 2021 08:04) (68 - 69)  BP: 113/58 (17 Dec 2021 08:04) (103/59 - 113/58)  RR: 18 (17 Dec 2021 08:04) (18 - 18)  SpO2: 98% (17 Dec 2021 08:04) (98% - 98%)        Labs:                          7.9    10.07 )-----------( 101      ( 16 Dec 2021 07:54 )             24.5         12-16    138  |  106  |  34<H>  ----------------------------<  80  4.4   |  17  |  1.0          LFTs:             4.7  | 0.9  | 21       ------------------[599     ( 16 Dec 2021 07:54 )  1.9  | x    | 25            Coags:     15.70  ----< 1.37    ( 16 Dec 2021 11:00 )     x             Culture - Other (collected 14 Dec 2021 12:17)  Source: .Other sacrum  Preliminary Report (17 Dec 2021 08:59):    Numerous Proteus mirabilis ESBL  Organism: Proteus mirabilis ESBL (17 Dec 2021 08:58)  Organism: Proteus mirabilis ESBL (17 Dec 2021 08:58)      RADIOLOGY & ADDITIONAL STUDIES:  NM Hepatobiliary Imaging 12.11.21   IMPRESSION:    NO DEFINITE VISUALIZATION OF THEGALLBLADDER THROUGH 4 HOURS   POST-INJECTION, CONSISTENT WITH CHOLECYSTITIS, AS DESCRIBED ABOVE.    CLINICAL CORRELATION IS SUGGESTED.     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 Patient was admitted on November 30th and hospital course significant for proteus bacteremia. Patient was seen on past admission by surgery and Cholecystectomy was not done. He had prior Gallbladder drain which was removed around 2 weeks prior to this presentation. Patient now s/p placement of a percutaneous Gallbladder drain but cholangiogram noted possible occlusion of the CBD. Patient currently feels well, a bit fatigued but no abdominal pains. Tolerating clear liquids at bedside.         PAST MEDICAL & SURGICAL HISTORY:  COPD (chronic obstructive pulmonary disease)  Hypertension  Deep vein thrombosis (DVT) of left lower extremity, unspecified chronicity, unspecified vein  Cellulitis of left lower extremity  Chronic atrial fibrillation  Mitral valve insufficiency, unspecified etiology  CHF (congestive heart failure)  S/P coronary artery stent placement  History of total right knee replacement    Social History  Denies Current Tobacco use  Denies Current ETOH use  Denies Current Illicit Drug use     Family Hx:  Father: Non Contributory   Mother: Non Contributory      MEDICATIONS  (STANDING):  budesonide 160 MICROgram(s)/formoterol 4.5 MICROgram(s) Inhaler 2 Puff(s) Inhalation two times a day  cefTRIAXone   IVPB 2000 milliGRAM(s) IV Intermittent every 24 hours  chlorhexidine 4% Liquid 1 Application(s) Topical <User Schedule>  collagenase Ointment 1 Application(s) Topical daily  fentaNYL   Patch  12 MICROgram(s)/Hr 1 Patch Transdermal every 72 hours  folic acid 1 milliGRAM(s) Oral daily  influenza  Vaccine (HIGH DOSE) 0.7 milliLiter(s) IntraMuscular once  lactated ringers Bolus 500 milliLiter(s) IV Bolus once  methocarbamol 500 milliGRAM(s) Oral three times a day  metroNIDAZOLE    Tablet 500 milliGRAM(s) Oral every 8 hours  midodrine. 10 milliGRAM(s) Oral three times a day  polyethylene glycol 3350 17 Gram(s) Oral daily  senna 2 Tablet(s) Oral at bedtime  silver sulfADIAZINE 1% Cream 1 Application(s) Topical two times a day    MEDICATIONS  (PRN):  ALBUTerol    90 MICROgram(s) HFA Inhaler 2 Puff(s) Inhalation every 6 hours PRN Shortness of Breath and/or Wheezing  HYDROmorphone  Injectable 0.25 milliGRAM(s) IV Push every 2 hours PRN pain 4-10  melatonin 5 milliGRAM(s) Oral at bedtime PRN Insomnia      Allergies  No Known Allergies      Review of Systems  General:  Denies Fatigue, Denies Fever, Denies Weakness ,Denies Weight Loss   HEENT: Denies Trouble Swallowing ,Denies  Sore Throat , Denies Change in hearing/vision/speech ,Denies Dizziness    Cardio: Denies  Chest Pain , Palpitations    Respiratory: Denies worsening of SOB, Denies Cough  Abdomen: See detailed HPI  Neuro: Denies Headache Denies Dizziness, Denies Paresthesias  MSK: Denies pain in Bones/Joints/Muscles   Psych: Patient denies depression, denies suicidal or homicidal ideations  Integ: Patient Denies rash, or new skin lesions       Vital Signs Last 24 Hrs  T(C): 35.6 (17 Dec 2021 00:42), Max: 36.1 (16 Dec 2021 16:00)  T(F): 96.1 (17 Dec 2021 00:42), Max: 97 (16 Dec 2021 16:00)  HR: 68 (17 Dec 2021 08:04) (68 - 69)  BP: 113/58 (17 Dec 2021 08:04) (103/59 - 113/58)  RR: 18 (17 Dec 2021 08:04) (18 - 18)  SpO2: 98% (17 Dec 2021 08:04) (98% - 98%)  Physical Exam  Gen: NAD  HEENT: NC/AT, Mucosal Membranes Dry  Cardio: S1/S2 No S3/S4, Regular  Resp: CTA B/L  Abdomen: Soft, ND/NT, Percutaneous drain noted  Neuro: No tremors, no asterixis   Extremities: FROM x 4  Skin: No jaundice noted       Labs:                          7.9    10.07 )-----------( 101      ( 16 Dec 2021 07:54 )             24.5         12-16    138  |  106  |  34<H>  ----------------------------<  80  4.4   |  17  |  1.0          LFTs:             4.7  | 0.9  | 21       ------------------[599     ( 16 Dec 2021 07:54 )  1.9  | x    | 25            Coags:     15.70  ----< 1.37    ( 16 Dec 2021 11:00 )     x             Culture - Other (collected 14 Dec 2021 12:17)  Source: .Other sacrum  Preliminary Report (17 Dec 2021 08:59):    Numerous Proteus mirabilis ESBL  Organism: Proteus mirabilis ESBL (17 Dec 2021 08:58)  Organism: Proteus mirabilis ESBL (17 Dec 2021 08:58)      RADIOLOGY & ADDITIONAL STUDIES:  NM Hepatobiliary Imaging 12.11.21   IMPRESSION:    NO DEFINITE VISUALIZATION OF THEGALLBLADDER THROUGH 4 HOURS   POST-INJECTION, CONSISTENT WITH CHOLECYSTITIS, AS DESCRIBED ABOVE.    CLINICAL CORRELATION IS SUGGESTED.

## 2021-12-17 NOTE — PROGRESS NOTE ADULT - SUBJECTIVE AND OBJECTIVE BOX
RODNEY SANTO 81y Male  MRN#: [id number]   CODE STATUS: FULL  Do Not Resuscitate      SUBJECTIVE  Patient is a 81y old Male who presents with a chief complaint of chest pain (16 Dec 2021 16:41)    Today is hospital day 17d,   INTERVAL HPI/OVERNIGHT EVENTS:    Examined pt at bedside this AM. There were no acute events overnight.    Present Today:           Russo Catheter (x)No/ ()Yes?   Indication:             Central Line (x)No/ ()Yes?   Indication:          IV Fluids (x)No/ ()Yes? Type:  Rate:  Indication:    OBJECTIVE  PAST MEDICAL & SURGICAL HISTORY  COPD (chronic obstructive pulmonary disease)    Hypertension    Deep vein thrombosis (DVT) of left lower extremity, unspecified chronicity, unspecified vein    Cellulitis of left lower extremity    Chronic atrial fibrillation    Mitral valve insufficiency, unspecified etiology  Moderate    CHF (congestive heart failure)    S/P coronary artery stent placement    History of total right knee replacement      ALLERGIES:  No Known Allergies    HOME MEDICATIONS:  Home Medications:  atorvastatin 20 mg oral tablet: 1 tab(s) orally once a day (13 Sep 2021 04:59)  folic acid 1 mg oral tablet: 1 tab(s) orally once a day (21 Sep 2021 11:34)  oxycodone-acetaminophen 5 mg-325 mg oral tablet: 1 tab(s) orally every 6 hours, As needed, Moderate Pain (4 - 6) (21 Sep 2021 11:32)  spironolactone 25 mg oral tablet: 0.5 tab(s) orally once a day (21 Sep 2021 11:34)  Symbicort 160 mcg-4.5 mcg/inh inhalation aerosol: 2 puff(s) inhaled 2 times a day (13 Sep 2021 04:59)  Ventolin HFA 90 mcg/inh inhalation aerosol: 2 puff(s) inhaled every 6 hours as neede for shortness of breath (13 Sep 2021 04:59)  warfarin 2 mg oral tablet: Take half a tablet Sun, Tues, Wed, Thurs, Fri, Sat.  Take a whole tablet on Mon (13 Sep 2021 04:59)    MEDICATIONS:  STANDING MEDICATIONS  budesonide 160 MICROgram(s)/formoterol 4.5 MICROgram(s) Inhaler 2 Puff(s) Inhalation two times a day  cefTRIAXone   IVPB 2000 milliGRAM(s) IV Intermittent every 24 hours  chlorhexidine 4% Liquid 1 Application(s) Topical <User Schedule>  collagenase Ointment 1 Application(s) Topical daily  fentaNYL   Patch  12 MICROgram(s)/Hr 1 Patch Transdermal every 72 hours  folic acid 1 milliGRAM(s) Oral daily  influenza  Vaccine (HIGH DOSE) 0.7 milliLiter(s) IntraMuscular once  lactated ringers Bolus 500 milliLiter(s) IV Bolus once  methocarbamol 500 milliGRAM(s) Oral three times a day  metroNIDAZOLE    Tablet 500 milliGRAM(s) Oral every 8 hours  midodrine. 10 milliGRAM(s) Oral three times a day  polyethylene glycol 3350 17 Gram(s) Oral daily  senna 2 Tablet(s) Oral at bedtime  silver sulfADIAZINE 1% Cream 1 Application(s) Topical two times a day    PRN MEDICATIONS  ALBUTerol    90 MICROgram(s) HFA Inhaler 2 Puff(s) Inhalation every 6 hours PRN  HYDROmorphone  Injectable 0.25 milliGRAM(s) IV Push every 2 hours PRN  melatonin 5 milliGRAM(s) Oral at bedtime PRN      VITAL SIGNS: Last 24 Hours  T(C): 35.6 (17 Dec 2021 00:42), Max: 36.1 (16 Dec 2021 16:00)  T(F): 96.1 (17 Dec 2021 00:42), Max: 97 (16 Dec 2021 16:00)  HR: 69 (17 Dec 2021 00:42) (68 - 82)  BP: 103/59 (17 Dec 2021 00:42) (96/42 - 113/49)  BP(mean): --  RR: 18 (16 Dec 2021 16:00) (18 - 18)  SpO2: 98% (17 Dec 2021 00:42) (94% - 98%)    LABS:                        7.9    10.07 )-----------( 101      ( 16 Dec 2021 07:54 )             24.5     12-16    138  |  106  |  34<H>  ----------------------------<  80  4.4   |  17  |  1.0    Ca    7.7<L>      16 Dec 2021 07:54  Mg     2.2     12-16    TPro  4.7<L>  /  Alb  1.9<L>  /  TBili  0.9  /  DBili  x   /  AST  21  /  ALT  25  /  AlkPhos  599<H>  12-16    PT/INR - ( 16 Dec 2021 11:00 )   PT: 15.70 sec;   INR: 1.37 ratio                       Culture - Other (collected 14 Dec 2021 12:17)  Source: .Other sacrum  Preliminary Report (16 Dec 2021 13:43):    Numerous Proteus mirabilis      RADIOLOGY:  < from: NM Hepatobiliary Imaging (12.11.21 @ 15:37) >  IMPRESSION:    NO DEFINITE VISUALIZATION OF THEGALLBLADDER THROUGH 4 HOURS   POST-INJECTION, CONSISTENT WITH CHOLECYSTITIS, AS DESCRIBED ABOVE.    CLINICAL CORRELATION IS SUGGESTED.    < end of copied text >      PHYSICAL EXAM:  GENERAL: NAD, well-developed, AAOx3  HEENT:  Atraumatic, Normocephalic  PULMONARY: Clear to auscultation bilaterally; No wheeze  CARDIOVASCULAR: Regular rate and rhythm; No murmurs, rubs, or gallops  GASTROINTESTINAL: Soft, Nontender, Nondistended; Bowel sounds present  MUSCULOSKELETAL:  2+ Peripheral Pulses, No clubbing, cyanosis, or edema  NEUROLOGY: non-focal  SKIN: ~[id number] sacral pressure ulcer; full thickness. Soft, boggy  adherent black/grey necrotic  tissue, extremely foul odor, surrounding erythema, no purulence expressed

## 2021-12-17 NOTE — CHART NOTE - NSCHARTNOTEFT_GEN_A_CORE
PACU ANESTHESIA ADMISSION NOTE      Procedure:   Post op diagnosis:      ____  Intubated  TV:______       Rate: ______      FiO2: ______    __x__  Patent Airway    __x__  Full return of protective reflexes    __x__  Full recovery from anesthesia / back to baseline status    Vitals:  T(C): 35.6 (12-17-21 @ 00:42), Max: 36.1 (12-16-21 @ 16:00)  HR: 68 (12-17-21 @ 08:04) (68 - 69)  BP: 113/58 (12-17-21 @ 08:04) (103/59 - 113/58)  RR: 18 (12-17-21 @ 08:04) (18 - 18)  SpO2: 98% (12-17-21 @ 08:04) (98% - 98%)    Mental Status:  __x__ Awake   ___x__ Alert   _____ Drowsy   _____ Sedated    Nausea/Vomiting:  __x__ NO  ______Yes,   See Post - Op Orders          Pain Scale (0-10):  _____    Treatment: ____ None    __x__ See Post - Op/PCA Orders    Post - Operative Fluids:   ____ Oral   __x__ See Post - Op Orders    Plan: Discharge:   ____Home       __x___Floor     _____Critical Care    _____  Other:_________________    Comments: Patient had smooth intraoperative event, no anesthesia complication.  PACU Vital signs: HR:   89         BP:  89      /   53       RR:  16           O2 Sat:   100    %     Temp 97.2F

## 2021-12-17 NOTE — CONSULT NOTE ADULT - ASSESSMENT
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 Patient was admitted on November 30th and hospital course significant for proteus bacteremia. Patient was seen on past admission by surgery and Cholecystectomy was not done. He had prior Gallbladder drain which was removed around 2 weeks prior to this presentation. Patient now s/p placement of a percutaneous Gallbladder drain but cholangiogram noted possible occlusion of the CBD. Patient currently feels well, a bit fatigued but no abdominal pains. Tolerating clear liquids at bedside. Patient with acceptable WBC and t claire. No need for urgent ERCP. Patient stable but frail appearing with involved medical history. Patient added for ERCP Monday 12/20 but notes he is hesitant to have this done. Please medically optimize, cardiology clearance noted, keep INR under 1.5, and update Covid testing. GI team will check in with patient over the weekend to see if he will allow procedure to be done.     Cholecystitis/ Known Cholelithiasis/ S/P percutaneous drainage with visualized CBD occlusion during procedure   - On ABX  - T claire 0.9, WBC 10.07  - Percutaneous drain noted  - Discussed ERCP for Monday 12/20 with patient who is hesitant, he will think about allowing us to do procedure, he is DNR/DNI currently  - GI team will follow patient over the weekend to confirm if patient wishes to proceed with procedure  - If he wishes to proceed please update Covid swab, keep an active TnS, and keep INR less than 1.5, hold AC 12/20  - Will follow

## 2021-12-17 NOTE — CONSULT NOTE ADULT - CONSULT REQUESTED BY NAME
medicine
medical team
medicine
primary team
Interventional Radiology
Medicine
ER
Primary Team
Karlo Sahu

## 2021-12-17 NOTE — CONSULT NOTE ADULT - CONSULT REQUESTED DATE/TIME
17-Dec-2021 10:49
10-Dec-2021 00:00
17-Dec-2021 10:43
30-Nov-2021 16:16
12-Dec-2021 13:05
16-Dec-2021 16:44
01-Dec-2021 13:07
14-Dec-2021 12:23
14-Dec-2021 12:31

## 2021-12-18 LAB
ALBUMIN SERPL ELPH-MCNC: 2.1 G/DL — LOW (ref 3.5–5.2)
ALP SERPL-CCNC: 434 U/L — HIGH (ref 30–115)
ALT FLD-CCNC: 19 U/L — SIGNIFICANT CHANGE UP (ref 0–41)
ANION GAP SERPL CALC-SCNC: 12 MMOL/L — SIGNIFICANT CHANGE UP (ref 7–14)
APTT BLD: 30.3 SEC — SIGNIFICANT CHANGE UP (ref 27–39.2)
AST SERPL-CCNC: 19 U/L — SIGNIFICANT CHANGE UP (ref 0–41)
BASOPHILS # BLD AUTO: 0.01 K/UL — SIGNIFICANT CHANGE UP (ref 0–0.2)
BASOPHILS NFR BLD AUTO: 0.1 % — SIGNIFICANT CHANGE UP (ref 0–1)
BILIRUB SERPL-MCNC: 0.7 MG/DL — SIGNIFICANT CHANGE UP (ref 0.2–1.2)
BUN SERPL-MCNC: 30 MG/DL — HIGH (ref 10–20)
CALCIUM SERPL-MCNC: 7.4 MG/DL — LOW (ref 8.5–10.1)
CHLORIDE SERPL-SCNC: 109 MMOL/L — SIGNIFICANT CHANGE UP (ref 98–110)
CO2 SERPL-SCNC: 21 MMOL/L — SIGNIFICANT CHANGE UP (ref 17–32)
CREAT SERPL-MCNC: 1 MG/DL — SIGNIFICANT CHANGE UP (ref 0.7–1.5)
EOSINOPHIL # BLD AUTO: 0.06 K/UL — SIGNIFICANT CHANGE UP (ref 0–0.7)
EOSINOPHIL NFR BLD AUTO: 0.6 % — SIGNIFICANT CHANGE UP (ref 0–8)
GLUCOSE SERPL-MCNC: 82 MG/DL — SIGNIFICANT CHANGE UP (ref 70–99)
HCT VFR BLD CALC: 23.6 % — LOW (ref 42–52)
HGB BLD-MCNC: 7.5 G/DL — LOW (ref 14–18)
IMM GRANULOCYTES NFR BLD AUTO: 0.7 % — HIGH (ref 0.1–0.3)
INR BLD: 1.4 RATIO — HIGH (ref 0.65–1.3)
LYMPHOCYTES # BLD AUTO: 0.59 K/UL — LOW (ref 1.2–3.4)
LYMPHOCYTES # BLD AUTO: 5.8 % — LOW (ref 20.5–51.1)
MAGNESIUM SERPL-MCNC: 2.2 MG/DL — SIGNIFICANT CHANGE UP (ref 1.8–2.4)
MCHC RBC-ENTMCNC: 31.8 G/DL — LOW (ref 32–37)
MCHC RBC-ENTMCNC: 32.9 PG — HIGH (ref 27–31)
MCV RBC AUTO: 103.5 FL — HIGH (ref 80–94)
MONOCYTES # BLD AUTO: 0.81 K/UL — HIGH (ref 0.1–0.6)
MONOCYTES NFR BLD AUTO: 8 % — SIGNIFICANT CHANGE UP (ref 1.7–9.3)
NEUTROPHILS # BLD AUTO: 8.57 K/UL — HIGH (ref 1.4–6.5)
NEUTROPHILS NFR BLD AUTO: 84.8 % — HIGH (ref 42.2–75.2)
NRBC # BLD: 0 /100 WBCS — SIGNIFICANT CHANGE UP (ref 0–0)
PLATELET # BLD AUTO: 91 K/UL — LOW (ref 130–400)
POTASSIUM SERPL-MCNC: 4.5 MMOL/L — SIGNIFICANT CHANGE UP (ref 3.5–5)
POTASSIUM SERPL-SCNC: 4.5 MMOL/L — SIGNIFICANT CHANGE UP (ref 3.5–5)
PROT SERPL-MCNC: 4.9 G/DL — LOW (ref 6–8)
PROTHROM AB SERPL-ACNC: 16.1 SEC — HIGH (ref 9.95–12.87)
RBC # BLD: 2.28 M/UL — LOW (ref 4.7–6.1)
RBC # FLD: 16.9 % — HIGH (ref 11.5–14.5)
SODIUM SERPL-SCNC: 142 MMOL/L — SIGNIFICANT CHANGE UP (ref 135–146)
WBC # BLD: 10.11 K/UL — SIGNIFICANT CHANGE UP (ref 4.8–10.8)
WBC # FLD AUTO: 10.11 K/UL — SIGNIFICANT CHANGE UP (ref 4.8–10.8)

## 2021-12-18 PROCEDURE — 99233 SBSQ HOSP IP/OBS HIGH 50: CPT

## 2021-12-18 RX ORDER — ENOXAPARIN SODIUM 100 MG/ML
72 INJECTION SUBCUTANEOUS EVERY 12 HOURS
Refills: 0 | Status: DISCONTINUED | OUTPATIENT
Start: 2021-12-18 | End: 2021-12-19

## 2021-12-18 RX ADMIN — Medication 1 APPLICATION(S): at 17:40

## 2021-12-18 RX ADMIN — FENTANYL CITRATE 1 PATCH: 50 INJECTION INTRAVENOUS at 14:40

## 2021-12-18 RX ADMIN — Medication 5 MILLIGRAM(S): at 21:11

## 2021-12-18 RX ADMIN — METHOCARBAMOL 500 MILLIGRAM(S): 500 TABLET, FILM COATED ORAL at 21:11

## 2021-12-18 RX ADMIN — ENOXAPARIN SODIUM 72 MILLIGRAM(S): 100 INJECTION SUBCUTANEOUS at 17:38

## 2021-12-18 RX ADMIN — FENTANYL CITRATE 1 PATCH: 50 INJECTION INTRAVENOUS at 07:50

## 2021-12-18 RX ADMIN — Medication 1 MILLIGRAM(S): at 14:29

## 2021-12-18 RX ADMIN — Medication 500 MILLIGRAM(S): at 05:00

## 2021-12-18 RX ADMIN — Medication 500 MILLIGRAM(S): at 21:10

## 2021-12-18 RX ADMIN — FENTANYL CITRATE 1 PATCH: 50 INJECTION INTRAVENOUS at 14:26

## 2021-12-18 RX ADMIN — METHOCARBAMOL 500 MILLIGRAM(S): 500 TABLET, FILM COATED ORAL at 14:27

## 2021-12-18 RX ADMIN — CEFTRIAXONE 100 MILLIGRAM(S): 500 INJECTION, POWDER, FOR SOLUTION INTRAMUSCULAR; INTRAVENOUS at 17:38

## 2021-12-18 RX ADMIN — BUDESONIDE AND FORMOTEROL FUMARATE DIHYDRATE 2 PUFF(S): 160; 4.5 AEROSOL RESPIRATORY (INHALATION) at 08:07

## 2021-12-18 RX ADMIN — FENTANYL CITRATE 1 PATCH: 50 INJECTION INTRAVENOUS at 19:30

## 2021-12-18 RX ADMIN — MIDODRINE HYDROCHLORIDE 10 MILLIGRAM(S): 2.5 TABLET ORAL at 05:01

## 2021-12-18 RX ADMIN — MIDODRINE HYDROCHLORIDE 10 MILLIGRAM(S): 2.5 TABLET ORAL at 17:39

## 2021-12-18 RX ADMIN — SENNA PLUS 2 TABLET(S): 8.6 TABLET ORAL at 21:10

## 2021-12-18 RX ADMIN — METHOCARBAMOL 500 MILLIGRAM(S): 500 TABLET, FILM COATED ORAL at 05:00

## 2021-12-18 RX ADMIN — Medication 500 MILLIGRAM(S): at 14:30

## 2021-12-18 RX ADMIN — HYDROMORPHONE HYDROCHLORIDE 0.25 MILLIGRAM(S): 2 INJECTION INTRAMUSCULAR; INTRAVENOUS; SUBCUTANEOUS at 21:11

## 2021-12-18 RX ADMIN — MIDODRINE HYDROCHLORIDE 10 MILLIGRAM(S): 2.5 TABLET ORAL at 14:29

## 2021-12-18 NOTE — PROVIDER CONTACT NOTE (MEDICATION) - BACKGROUND
Pt with wound to coccyx and left buttock ordered for silverdene - ordered and left heel DTI- ordered for alleyvn.

## 2021-12-18 NOTE — PROVIDER CONTACT NOTE (MEDICATION) - ACTION/TREATMENT ORDERED:
As per MD no collagenase ordered.
MD assessed bedside, agreeable to medications- will attempt to administer medications again.

## 2021-12-18 NOTE — PROVIDER CONTACT NOTE (MEDICATION) - SITUATION
Pt refused all PO medication at 1200.
Pt ordered for collagenase at 1200. No wound care ordered states use of collagenase.

## 2021-12-18 NOTE — PROGRESS NOTE ADULT - ASSESSMENT
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 Trop at baseline 0.03, EKG showed afib, CXR small R sided pleural effusion.    #Proteus Bacteremia - +BCx x2 on 12/7, f/u BCx on 12/11 NGTD  #Acute Cholecystitis - +findings on RUQ U/S (12/10) + HIDA Scan (12/11)  #PMHx of Recent Cholecystitis - s/p perQ-cholecystostomy placement and removal by IR - 10d prior to admission  -ID consulted:  - Narrow cefepime to Ceftriaxone 2g q24h IV   - Continue PO flagyl 500mg TID  - Post surgery, cipro 500mg BID and flagyl 500mg TID x 3 days  - Monitor sacral area, low threshold to consult Burn, appreciate Wound care consult  - Gen Surg consulted:  Cardiology risk stratified the patient as high risk for cholecystectomy  Medicine risk stratified the patient as high risk  Patient is not a surgical candidate  Please consult IR for re-placement of percutaneous cholecystostomy tube  Please recall surgery as needed  12/16: IR consulted for PreQ Cholecystostomy  - s/p PreQ Cholecystostomy  - Advanced GI consultation recommended for consideration of ERCP for CBD occlusion.      #Atypical Chest Pain - likely MSK, no further ischemic workup as per Cardiology (Dr. Herrera)  #PMHx of HFpEF - follows with Dr. 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  #Chronic DVT Right Common Fem/Fem/Popliteal - on LE Venous Duplex 12/1  - Troponin 0.03 x3, was 0.02-0.03 throughout prior admission, EKG performed x2 with no acute ischemic changes  - D-dimer 342, BNP ~3K near baseline, 11/30 CXR showing small R sided pleural effusion, has remained at baseline oxygen requirements  - TTE 12/2: EF 50-55%, otherwise unremarkable   - d/c Spironolactone + Torsemide due to persistent hypotension, not overloaded on exam  - pain control with Percocet 1 tab q6h PRN for moderate pain + Morphine PRN for severe pain  - INR was supratherapeutic on admission, was reversed with Vitamin K on 12/2, restarted Coumadin, INR now normalized; was supratherapuetic  - BP on lower side, increased midodrine to 10mg PO TID  - patient not eating and drinking adequately, calorie count started  - patient refusing PT/Rehab  - CTA-chest 12/9 negative for PE    #Chronic Macrocytic Anemia - on folate supplementation  #Anemia of Chronic Disease  - baseline Hb ~10-11, supratherapeutic INR on admission reversed, B12 WNL in September  - 12/1 iron studies: low TIBC, likely anemia of chronic disease    #DTI Left Heel + Coccyx   - Wound Care consulted, recommendations given to RN  - f/u Burn recs:   Bedside debridement was attempted 12/14 but pt refused stating that he is in pain and "wants to die"    #COPD   - on home O2 2.5L, has remained at baseline, continuing symbicort 160ug 2 puffs BID, Albuterol PRN                                                                          # DVT prophylaxis: Coumadin (holding)  # GI prophylaxis: N/A  # Diet: DASH/TLC  # Activity: Ambulate as Tolerated  # Code status: Full  # Disposition: remain on floor  # Handoff: Poor surgical candidate; s/p PerQ Cholecysostomy w/IR; Advanced GI consultation recommended for consideration of ERCP for CBD occlusion.   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 Trop at baseline 0.03, EKG showed afib, CXR small R sided pleural effusion.    #Proteus Bacteremia - +BCx x2 on 12/7, f/u BCx on 12/11 NGTD  #Acute Cholecystitis - +findings on RUQ U/S (12/10) + HIDA Scan (12/11)  #PMHx of Recent Cholecystitis - s/p perQ-cholecystostomy placement and removal by IR - 10d prior to admission  -ID consulted:  - Narrow cefepime to Ceftriaxone 2g q24h IV   - Continue PO flagyl 500mg TID  - Post surgery, cipro 500mg BID and flagyl 500mg TID x 3 days  - Monitor sacral area, low threshold to consult Burn, appreciate Wound care consult  - Gen Surg consulted:  Cardiology risk stratified the patient as high risk for cholecystectomy  Medicine risk stratified the patient as high risk  Patient is not a surgical candidate  Please consult IR for re-placement of percutaneous cholecystostomy tube  Please recall surgery as needed  12/16: IR consulted for PreQ Cholecystostomy  - s/p PreQ Cholecystostomy  - Advanced GI consultation recommended for consideration of ERCP for CBD occlusion.      #Atypical Chest Pain - likely MSK, no further ischemic workup as per Cardiology (Dr. Herrera)  #PMHx of HFpEF - follows with Dr. 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  #Chronic DVT Right Common Fem/Fem/Popliteal - on LE Venous Duplex 12/1  - Troponin 0.03 x3, was 0.02-0.03 throughout prior admission, EKG performed x2 with no acute ischemic changes  - D-dimer 342, BNP ~3K near baseline, 11/30 CXR showing small R sided pleural effusion, has remained at baseline oxygen requirements  - TTE 12/2: EF 50-55%, otherwise unremarkable   - d/c Spironolactone + Torsemide due to persistent hypotension, not overloaded on exam  - pain control with Percocet 1 tab q6h PRN for moderate pain + Morphine PRN for severe pain  - INR was supratherapeutic on admission, was reversed with Vitamin K on 12/2, restarted Coumadin, INR now normalized; was supratherapuetic  - A/C was held for PerQ; will restart A/C  - BP on lower side, increased midodrine to 10mg PO TID  - patient not eating and drinking adequately, calorie count started  - patient refusing PT/Rehab  - CTA-chest 12/9 negative for PE    #Chronic Macrocytic Anemia - on folate supplementation  #Anemia of Chronic Disease  - baseline Hb ~10-11, supratherapeutic INR on admission reversed, B12 WNL in September  - 12/1 iron studies: low TIBC, likely anemia of chronic disease    #DTI Left Heel + Coccyx   - Wound Care consulted, recommendations given to RN  - f/u Burn recs:   Bedside debridement was attempted 12/14 but pt refused stating that he is in pain and "wants to die"    #COPD   - on home O2 2.5L, has remained at baseline, continuing symbicort 160ug 2 puffs BID, Albuterol PRN                                                                          # DVT prophylaxis: Coumadin (bridging)  # GI prophylaxis: N/A  # Diet: DASH/TLC  # Activity: Ambulate as Tolerated  # Code status: Full  # Disposition: remain on floor  # Handoff: Poor surgical candidate; s/p PerQ Cholecysostomy w/IR; Advanced GI consultation recommended for consideration of ERCP for CBD occlusion.

## 2021-12-18 NOTE — PROGRESS NOTE ADULT - SUBJECTIVE AND OBJECTIVE BOX
RODNEY SANTO 81y Male  MRN#: [id number]   CODE STATUS: FULL  Do Not Resuscitate      SUBJECTIVE  Patient is a 81y old Male who presents with a chief complaint of chest pain (17 Dec 2021 10:49)    Today is hospital day 18d,   INTERVAL HPI/OVERNIGHT EVENTS:    Examined pt at bedside this AM. There were no acute events overnight.    Present Today:           Russo Catheter (x)No/ ()Yes?   Indication:             Central Line (x)No/ ()Yes?   Indication:          IV Fluids (x)No/ ()Yes? Type:  Rate:  Indication:    OBJECTIVE  PAST MEDICAL & SURGICAL HISTORY  COPD (chronic obstructive pulmonary disease)    Hypertension    Deep vein thrombosis (DVT) of left lower extremity, unspecified chronicity, unspecified vein    Cellulitis of left lower extremity    Chronic atrial fibrillation    Mitral valve insufficiency, unspecified etiology  Moderate    CHF (congestive heart failure)    S/P coronary artery stent placement    History of total right knee replacement      ALLERGIES:  No Known Allergies    HOME MEDICATIONS:  Home Medications:  atorvastatin 20 mg oral tablet: 1 tab(s) orally once a day (13 Sep 2021 04:59)  folic acid 1 mg oral tablet: 1 tab(s) orally once a day (21 Sep 2021 11:34)  oxycodone-acetaminophen 5 mg-325 mg oral tablet: 1 tab(s) orally every 6 hours, As needed, Moderate Pain (4 - 6) (21 Sep 2021 11:32)  spironolactone 25 mg oral tablet: 0.5 tab(s) orally once a day (21 Sep 2021 11:34)  Symbicort 160 mcg-4.5 mcg/inh inhalation aerosol: 2 puff(s) inhaled 2 times a day (13 Sep 2021 04:59)  Ventolin HFA 90 mcg/inh inhalation aerosol: 2 puff(s) inhaled every 6 hours as neede for shortness of breath (13 Sep 2021 04:59)  warfarin 2 mg oral tablet: Take half a tablet Sun, Tues, Wed, Thurs, Fri, Sat.  Take a whole tablet on Mon (13 Sep 2021 04:59)    MEDICATIONS:  STANDING MEDICATIONS  budesonide 160 MICROgram(s)/formoterol 4.5 MICROgram(s) Inhaler 2 Puff(s) Inhalation two times a day  cefTRIAXone   IVPB 2000 milliGRAM(s) IV Intermittent every 24 hours  chlorhexidine 4% Liquid 1 Application(s) Topical <User Schedule>  collagenase Ointment 1 Application(s) Topical daily  fentaNYL   Patch  12 MICROgram(s)/Hr 1 Patch Transdermal every 72 hours  folic acid 1 milliGRAM(s) Oral daily  influenza  Vaccine (HIGH DOSE) 0.7 milliLiter(s) IntraMuscular once  lactated ringers Bolus 500 milliLiter(s) IV Bolus once  methocarbamol 500 milliGRAM(s) Oral three times a day  metroNIDAZOLE    Tablet 500 milliGRAM(s) Oral every 8 hours  midodrine. 10 milliGRAM(s) Oral three times a day  polyethylene glycol 3350 17 Gram(s) Oral daily  senna 2 Tablet(s) Oral at bedtime  silver sulfADIAZINE 1% Cream 1 Application(s) Topical two times a day    PRN MEDICATIONS  ALBUTerol    90 MICROgram(s) HFA Inhaler 2 Puff(s) Inhalation every 6 hours PRN  HYDROmorphone  Injectable 0.25 milliGRAM(s) IV Push every 2 hours PRN  melatonin 5 milliGRAM(s) Oral at bedtime PRN      VITAL SIGNS: Last 24 Hours  T(C): 35.6 (18 Dec 2021 00:00), Max: 35.6 (17 Dec 2021 16:10)  T(F): 96 (18 Dec 2021 00:00), Max: 96 (17 Dec 2021 16:10)  HR: 68 (18 Dec 2021 00:00) (68 - 80)  BP: 100/66 (18 Dec 2021 00:00) (97/49 - 113/58)  BP(mean): --  RR: 18 (18 Dec 2021 00:00) (18 - 19)  SpO2: 97% (17 Dec 2021 16:10) (97% - 98%)    LABS:                        7.7    9.06  )-----------( 95       ( 17 Dec 2021 04:30 )             23.8     12-17    141  |  108  |  32<H>  ----------------------------<  101<H>  4.0   |  19  |  1.0    Ca    7.3<L>      17 Dec 2021 04:30  Mg     2.2     12-17    TPro  4.9<L>  /  Alb  2.0<L>  /  TBili  0.8  /  DBili  x   /  AST  22  /  ALT  22  /  AlkPhos  524<H>  12-17    PT/INR - ( 16 Dec 2021 11:00 )   PT: 15.70 sec;   INR: 1.37 ratio                       Culture - Body Fluid with Gram Stain (collected 17 Dec 2021 09:36)  Source: .Body Fluid Bile Fluid  Gram Stain (17 Dec 2021 23:03):    No polymorphonuclear leukocytes seen    Gram positive cocci in pairs seen per oil power field    Gram Negative Rods seen per oil power field    by cytocentrifuge      RADIOLOGY:  < from: [facility] Hepatobiliary [facility] (12.11.21 @ 15:37) >  IMPRESSION:    NO DEFINITE VISUALIZATION OF THE GALLBLADDER THROUGH 4 HOURS   POST-INJECTION, CONSISTENT WITH CHOLECYSTITIS, AS DESCRIBED ABOVE.    CLINICAL CORRELATION IS SUGGESTED.    < end of copied text >      PHYSICAL EXAM:  GENERAL: NAD, well-developed, AAOx3  HEENT:  Atraumatic, Normocephalic  PULMONARY: Clear to auscultation bilaterally; No wheeze  CARDIOVASCULAR: Regular rate and rhythm; No murmurs, rubs, or gallops  GASTROINTESTINAL: Soft, Nontender, Nondistended; PerQ Kena bag in place; Site C/D/I  MUSCULOSKELETAL:  2+ Peripheral Pulses, No clubbing, cyanosis, or edema  NEUROLOGY: non-focal  SKIN: ~[id number] sacral pressure ulcer; full thickness. Soft, boggy adherent black/grey necrotic tissue, extremely foul odor, surrounding erythema, no purulence expressed

## 2021-12-19 LAB
-  AMIKACIN: SIGNIFICANT CHANGE UP
-  AMOXICILLIN/CLAVULANIC ACID: SIGNIFICANT CHANGE UP
-  AMPICILLIN/SULBACTAM: SIGNIFICANT CHANGE UP
-  AMPICILLIN: SIGNIFICANT CHANGE UP
-  AMPICILLIN: SIGNIFICANT CHANGE UP
-  AZTREONAM: SIGNIFICANT CHANGE UP
-  CEFAZOLIN: SIGNIFICANT CHANGE UP
-  CEFEPIME: SIGNIFICANT CHANGE UP
-  CEFOXITIN: SIGNIFICANT CHANGE UP
-  CEFTRIAXONE: SIGNIFICANT CHANGE UP
-  CIPROFLOXACIN: SIGNIFICANT CHANGE UP
-  ERTAPENEM: SIGNIFICANT CHANGE UP
-  GENTAMICIN: SIGNIFICANT CHANGE UP
-  IMIPENEM: SIGNIFICANT CHANGE UP
-  LEVOFLOXACIN: SIGNIFICANT CHANGE UP
-  MEROPENEM: SIGNIFICANT CHANGE UP
-  PIPERACILLIN/TAZOBACTAM: SIGNIFICANT CHANGE UP
-  TETRACYCLINE: SIGNIFICANT CHANGE UP
-  TOBRAMYCIN: SIGNIFICANT CHANGE UP
-  TRIMETHOPRIM/SULFAMETHOXAZOLE: SIGNIFICANT CHANGE UP
-  VANCOMYCIN: SIGNIFICANT CHANGE UP
ALBUMIN SERPL ELPH-MCNC: 1.9 G/DL — LOW (ref 3.5–5.2)
ALBUMIN SERPL ELPH-MCNC: 2.2 G/DL — LOW (ref 3.5–5.2)
ALP SERPL-CCNC: 322 U/L — HIGH (ref 30–115)
ALP SERPL-CCNC: 368 U/L — HIGH (ref 30–115)
ALT FLD-CCNC: 14 U/L — SIGNIFICANT CHANGE UP (ref 0–41)
ALT FLD-CCNC: 16 U/L — SIGNIFICANT CHANGE UP (ref 0–41)
ANION GAP SERPL CALC-SCNC: 12 MMOL/L — SIGNIFICANT CHANGE UP (ref 7–14)
ANION GAP SERPL CALC-SCNC: 16 MMOL/L — HIGH (ref 7–14)
APTT BLD: 33 SEC — SIGNIFICANT CHANGE UP (ref 27–39.2)
APTT BLD: 40.8 SEC — HIGH (ref 27–39.2)
AST SERPL-CCNC: 11 U/L — SIGNIFICANT CHANGE UP (ref 0–41)
AST SERPL-CCNC: 15 U/L — SIGNIFICANT CHANGE UP (ref 0–41)
BASOPHILS # BLD AUTO: 0 K/UL — SIGNIFICANT CHANGE UP (ref 0–0.2)
BASOPHILS # BLD AUTO: 0.01 K/UL — SIGNIFICANT CHANGE UP (ref 0–0.2)
BASOPHILS NFR BLD AUTO: 0 % — SIGNIFICANT CHANGE UP (ref 0–1)
BASOPHILS NFR BLD AUTO: 0.1 % — SIGNIFICANT CHANGE UP (ref 0–1)
BILIRUB SERPL-MCNC: 0.5 MG/DL — SIGNIFICANT CHANGE UP (ref 0.2–1.2)
BILIRUB SERPL-MCNC: 0.6 MG/DL — SIGNIFICANT CHANGE UP (ref 0.2–1.2)
BLD GP AB SCN SERPL QL: SIGNIFICANT CHANGE UP
BUN SERPL-MCNC: 30 MG/DL — HIGH (ref 10–20)
BUN SERPL-MCNC: 30 MG/DL — HIGH (ref 10–20)
CALCIUM SERPL-MCNC: 7.3 MG/DL — LOW (ref 8.5–10.1)
CALCIUM SERPL-MCNC: 7.6 MG/DL — LOW (ref 8.5–10.1)
CHLORIDE SERPL-SCNC: 105 MMOL/L — SIGNIFICANT CHANGE UP (ref 98–110)
CHLORIDE SERPL-SCNC: 107 MMOL/L — SIGNIFICANT CHANGE UP (ref 98–110)
CO2 SERPL-SCNC: 18 MMOL/L — SIGNIFICANT CHANGE UP (ref 17–32)
CO2 SERPL-SCNC: 20 MMOL/L — SIGNIFICANT CHANGE UP (ref 17–32)
CREAT SERPL-MCNC: 0.9 MG/DL — SIGNIFICANT CHANGE UP (ref 0.7–1.5)
CREAT SERPL-MCNC: 0.9 MG/DL — SIGNIFICANT CHANGE UP (ref 0.7–1.5)
EOSINOPHIL # BLD AUTO: 0.03 K/UL — SIGNIFICANT CHANGE UP (ref 0–0.7)
EOSINOPHIL # BLD AUTO: 0.06 K/UL — SIGNIFICANT CHANGE UP (ref 0–0.7)
EOSINOPHIL NFR BLD AUTO: 0.4 % — SIGNIFICANT CHANGE UP (ref 0–8)
EOSINOPHIL NFR BLD AUTO: 0.7 % — SIGNIFICANT CHANGE UP (ref 0–8)
GLUCOSE SERPL-MCNC: 78 MG/DL — SIGNIFICANT CHANGE UP (ref 70–99)
GLUCOSE SERPL-MCNC: 79 MG/DL — SIGNIFICANT CHANGE UP (ref 70–99)
HCT VFR BLD CALC: 21.7 % — LOW (ref 42–52)
HCT VFR BLD CALC: 21.9 % — LOW (ref 42–52)
HGB BLD-MCNC: 6.9 G/DL — CRITICAL LOW (ref 14–18)
HGB BLD-MCNC: 7.2 G/DL — LOW (ref 14–18)
IMM GRANULOCYTES NFR BLD AUTO: 0.8 % — HIGH (ref 0.1–0.3)
IMM GRANULOCYTES NFR BLD AUTO: 0.8 % — HIGH (ref 0.1–0.3)
INR BLD: 1.38 RATIO — HIGH (ref 0.65–1.3)
INR BLD: 1.44 RATIO — HIGH (ref 0.65–1.3)
LYMPHOCYTES # BLD AUTO: 0.6 K/UL — LOW (ref 1.2–3.4)
LYMPHOCYTES # BLD AUTO: 0.63 K/UL — LOW (ref 1.2–3.4)
LYMPHOCYTES # BLD AUTO: 6.9 % — LOW (ref 20.5–51.1)
LYMPHOCYTES # BLD AUTO: 8 % — LOW (ref 20.5–51.1)
MAGNESIUM SERPL-MCNC: 2.1 MG/DL — SIGNIFICANT CHANGE UP (ref 1.8–2.4)
MAGNESIUM SERPL-MCNC: 2.2 MG/DL — SIGNIFICANT CHANGE UP (ref 1.8–2.4)
MCHC RBC-ENTMCNC: 31.8 G/DL — LOW (ref 32–37)
MCHC RBC-ENTMCNC: 32.4 PG — HIGH (ref 27–31)
MCHC RBC-ENTMCNC: 32.9 G/DL — SIGNIFICANT CHANGE UP (ref 32–37)
MCHC RBC-ENTMCNC: 33.6 PG — HIGH (ref 27–31)
MCV RBC AUTO: 101.9 FL — HIGH (ref 80–94)
MCV RBC AUTO: 102.3 FL — HIGH (ref 80–94)
METHOD TYPE: SIGNIFICANT CHANGE UP
METHOD TYPE: SIGNIFICANT CHANGE UP
MONOCYTES # BLD AUTO: 0.7 K/UL — HIGH (ref 0.1–0.6)
MONOCYTES # BLD AUTO: 0.83 K/UL — HIGH (ref 0.1–0.6)
MONOCYTES NFR BLD AUTO: 9.1 % — SIGNIFICANT CHANGE UP (ref 1.7–9.3)
MONOCYTES NFR BLD AUTO: 9.3 % — SIGNIFICANT CHANGE UP (ref 1.7–9.3)
NEUTROPHILS # BLD AUTO: 6.12 K/UL — SIGNIFICANT CHANGE UP (ref 1.4–6.5)
NEUTROPHILS # BLD AUTO: 7.49 K/UL — HIGH (ref 1.4–6.5)
NEUTROPHILS NFR BLD AUTO: 81.4 % — HIGH (ref 42.2–75.2)
NEUTROPHILS NFR BLD AUTO: 82.5 % — HIGH (ref 42.2–75.2)
NRBC # BLD: 0 /100 WBCS — SIGNIFICANT CHANGE UP (ref 0–0)
NRBC # BLD: 0 /100 WBCS — SIGNIFICANT CHANGE UP (ref 0–0)
PLATELET # BLD AUTO: 84 K/UL — LOW (ref 130–400)
PLATELET # BLD AUTO: 94 K/UL — LOW (ref 130–400)
POTASSIUM SERPL-MCNC: 3.8 MMOL/L — SIGNIFICANT CHANGE UP (ref 3.5–5)
POTASSIUM SERPL-MCNC: 4 MMOL/L — SIGNIFICANT CHANGE UP (ref 3.5–5)
POTASSIUM SERPL-SCNC: 3.8 MMOL/L — SIGNIFICANT CHANGE UP (ref 3.5–5)
POTASSIUM SERPL-SCNC: 4 MMOL/L — SIGNIFICANT CHANGE UP (ref 3.5–5)
PROT SERPL-MCNC: 4.8 G/DL — LOW (ref 6–8)
PROT SERPL-MCNC: 4.9 G/DL — LOW (ref 6–8)
PROTHROM AB SERPL-ACNC: 15.8 SEC — HIGH (ref 9.95–12.87)
PROTHROM AB SERPL-ACNC: 16.5 SEC — HIGH (ref 9.95–12.87)
RBC # BLD: 2.13 M/UL — LOW (ref 4.7–6.1)
RBC # BLD: 2.14 M/UL — LOW (ref 4.7–6.1)
RBC # FLD: 16.7 % — HIGH (ref 11.5–14.5)
RBC # FLD: 16.7 % — HIGH (ref 11.5–14.5)
SARS-COV-2 RNA SPEC QL NAA+PROBE: SIGNIFICANT CHANGE UP
SODIUM SERPL-SCNC: 137 MMOL/L — SIGNIFICANT CHANGE UP (ref 135–146)
SODIUM SERPL-SCNC: 141 MMOL/L — SIGNIFICANT CHANGE UP (ref 135–146)
WBC # BLD: 7.52 K/UL — SIGNIFICANT CHANGE UP (ref 4.8–10.8)
WBC # BLD: 9.08 K/UL — SIGNIFICANT CHANGE UP (ref 4.8–10.8)
WBC # FLD AUTO: 7.52 K/UL — SIGNIFICANT CHANGE UP (ref 4.8–10.8)
WBC # FLD AUTO: 9.08 K/UL — SIGNIFICANT CHANGE UP (ref 4.8–10.8)

## 2021-12-19 PROCEDURE — 99233 SBSQ HOSP IP/OBS HIGH 50: CPT

## 2021-12-19 RX ADMIN — FENTANYL CITRATE 1 PATCH: 50 INJECTION INTRAVENOUS at 07:50

## 2021-12-19 RX ADMIN — HYDROMORPHONE HYDROCHLORIDE 0.25 MILLIGRAM(S): 2 INJECTION INTRAMUSCULAR; INTRAVENOUS; SUBCUTANEOUS at 18:17

## 2021-12-19 RX ADMIN — Medication 500 MILLIGRAM(S): at 05:32

## 2021-12-19 RX ADMIN — Medication 500 MILLIGRAM(S): at 12:28

## 2021-12-19 RX ADMIN — SENNA PLUS 2 TABLET(S): 8.6 TABLET ORAL at 21:32

## 2021-12-19 RX ADMIN — METHOCARBAMOL 500 MILLIGRAM(S): 500 TABLET, FILM COATED ORAL at 21:31

## 2021-12-19 RX ADMIN — FENTANYL CITRATE 1 PATCH: 50 INJECTION INTRAVENOUS at 19:08

## 2021-12-19 RX ADMIN — MIDODRINE HYDROCHLORIDE 10 MILLIGRAM(S): 2.5 TABLET ORAL at 17:55

## 2021-12-19 RX ADMIN — MIDODRINE HYDROCHLORIDE 10 MILLIGRAM(S): 2.5 TABLET ORAL at 12:29

## 2021-12-19 RX ADMIN — HYDROMORPHONE HYDROCHLORIDE 0.25 MILLIGRAM(S): 2 INJECTION INTRAMUSCULAR; INTRAVENOUS; SUBCUTANEOUS at 21:34

## 2021-12-19 RX ADMIN — BUDESONIDE AND FORMOTEROL FUMARATE DIHYDRATE 2 PUFF(S): 160; 4.5 AEROSOL RESPIRATORY (INHALATION) at 07:51

## 2021-12-19 RX ADMIN — CEFTRIAXONE 100 MILLIGRAM(S): 500 INJECTION, POWDER, FOR SOLUTION INTRAMUSCULAR; INTRAVENOUS at 17:58

## 2021-12-19 RX ADMIN — HYDROMORPHONE HYDROCHLORIDE 0.25 MILLIGRAM(S): 2 INJECTION INTRAMUSCULAR; INTRAVENOUS; SUBCUTANEOUS at 19:45

## 2021-12-19 RX ADMIN — Medication 1 APPLICATION(S): at 06:21

## 2021-12-19 RX ADMIN — MIDODRINE HYDROCHLORIDE 10 MILLIGRAM(S): 2.5 TABLET ORAL at 05:32

## 2021-12-19 RX ADMIN — METHOCARBAMOL 500 MILLIGRAM(S): 500 TABLET, FILM COATED ORAL at 05:32

## 2021-12-19 RX ADMIN — Medication 1 MILLIGRAM(S): at 12:28

## 2021-12-19 RX ADMIN — POLYETHYLENE GLYCOL 3350 17 GRAM(S): 17 POWDER, FOR SOLUTION ORAL at 12:29

## 2021-12-19 RX ADMIN — Medication 500 MILLIGRAM(S): at 21:32

## 2021-12-19 RX ADMIN — Medication 5 MILLIGRAM(S): at 21:34

## 2021-12-19 RX ADMIN — Medication 1 APPLICATION(S): at 17:55

## 2021-12-19 RX ADMIN — ENOXAPARIN SODIUM 72 MILLIGRAM(S): 100 INJECTION SUBCUTANEOUS at 05:32

## 2021-12-19 RX ADMIN — METHOCARBAMOL 500 MILLIGRAM(S): 500 TABLET, FILM COATED ORAL at 12:28

## 2021-12-19 NOTE — PROVIDER CONTACT NOTE (OTHER) - ACTION/TREATMENT ORDERED:
MD Sterling to place IV with ultrasound.
Awaiting Md to come to unit to assess Pt.
MD to review chart.

## 2021-12-19 NOTE — PROVIDER CONTACT NOTE (OTHER) - RECOMMENDATIONS
Pt is asymptomatic and has history of hypovolemia. Pt last bp 85/56 Hr 75. Would you like to order a bolus and PO Tylenol?
Reconsult wound care specialist.

## 2021-12-19 NOTE — PROGRESS NOTE ADULT - SUBJECTIVE AND OBJECTIVE BOX
Progress Note:  Provider Speciality                            Hospitalist      RODNEY SANTO MRN-[id number] 81y Male     CHIEF PRESENTING COMPLAINT:  Patient is a 81y old  Male who presents with a chief complaint of chest pain (18 Dec 2021 06:47)        SUBJECTIVE:  Patient was seen and examined at bedside.  No new complaints described by patient in morning rounds.   No significant overnight events reported.     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 Pt reports that this morning, he was awakened by acute, sudden onset L sided chest pain at 4AM. Describes the pain as "pounding", states that he felt some pain radiate to LUE as well. Denies identifiable aggravating or alleviating factors and states that this has never happened before. Denies headache/dizziness, endorses chronic shortness of breath that is unchanged, has been requiring his baseline oxygen. Denies abdominal pain. Endorses compliance with all medications. States that at baseline, he is bedbound, cannot stand up or ambulate due to weakness. Was recently admitted to [facility] for short term rehab, where he wishes to go back because feels that he cannot care for himself at home. Lives with son but needs more help and PT.   In the ED, T 96, /58, HR 85, RR 18, SpO2 98% on O2 NC 4L, then 100% on 3L. Lab work revealed Hb 9.1, INR 7.87, troponin 0.03 and BNP 3327 (both near baseline). EKG revealed AFib, HR 88. CXR revealed slightly inc small R sided pleural effusion. (30 Nov 2021 11:30)        REVIEW OF SYSTEMS:  Patient denies any headache, any vision complaints, runny nose, fever, chills, sore throat. Denies chest pain, shortness of breath, palpitation. Denies nausea, vomiting, abdominal pain, diarrhoea, Denies urinary burning, urgency, frequency, dysuria. At least 10 systems were reviewed in ROS. All systems reviewed  are within normal limits except for the complaints as described in Subjective.    PAST MEDICAL & SURGICAL HISTORY:  PAST MEDICAL & SURGICAL HISTORY:  COPD (chronic obstructive pulmonary disease)    Hypertension    Deep vein thrombosis (DVT) of left lower extremity, unspecified chronicity, unspecified vein    Cellulitis of left lower extremity    Chronic atrial fibrillation    Mitral valve insufficiency, unspecified etiology  Moderate    CHF (congestive heart failure)    S/P coronary artery stent placement    History of total right knee replacement            VITAL SIGNS:  Vital Signs Last 24 Hrs  T(C): 35.9 (19 Dec 2021 07:20), Max: 36.1 (19 Dec 2021 00:00)  T(F): 96.7 (19 Dec 2021 07:20), Max: 96.9 (19 Dec 2021 00:00)  HR: 57 (19 Dec 2021 07:20) (57 - 77)  BP: 103/53 (19 Dec 2021 07:20) (103/50 - 119/59)  BP(mean): --  RR: 18 (19 Dec 2021 07:20) (18 - 18)  SpO2: 99% (19 Dec 2021 07:20) (99% - 99%)          PHYSICAL EXAMINATION:  Not in acute distress, lethargic  General: No pallor, no icterus  HEENT:   EOMI, no JVD.  Heart: S1+S2 audible  Lungs: bilateral  fair air entry, no wheezing, no crepitations.  Abdomen: Soft, non-tender, non-distended , no  rigidity or guarding.  CNS: Awake alert, very lethargic  Extremities:  No edema            CONSULTS:  Consultant(s) Notes Reviewed by me.   Care Discussed with Consultants/Other Providers where required.        MEDICATIONS:  MEDICATIONS  (STANDING):  budesonide 160 MICROgram(s)/formoterol 4.5 MICROgram(s) Inhaler 2 Puff(s) Inhalation two times a day  cefTRIAXone   IVPB 2000 milliGRAM(s) IV Intermittent every 24 hours  chlorhexidine 4% Liquid 1 Application(s) Topical <User Schedule>  enoxaparin Injectable 72 milliGRAM(s) SubCutaneous every 12 hours  fentaNYL   Patch  12 MICROgram(s)/Hr 1 Patch Transdermal every 72 hours  folic acid 1 milliGRAM(s) Oral daily  influenza  Vaccine (HIGH DOSE) 0.7 milliLiter(s) IntraMuscular once  lactated ringers Bolus 500 milliLiter(s) IV Bolus once  methocarbamol 500 milliGRAM(s) Oral three times a day  metroNIDAZOLE    Tablet 500 milliGRAM(s) Oral every 8 hours  midodrine. 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     Acute cholecystitis  Proteus bacteremia secondary to biliary source  Suspected atypical chest pain possibly skeletomuscular  Sacral decubitus  Chronic A-Fib   Chronic HFpEF  Orthostatic hypotension  chronic DVT      S/p  Percutaneous cholecystostomy &  pigtail catheter. 12/17  Advanced GI consultation recommended for consideration of ERCP for CBD occlusion.  Surgery noted- high risk . not a candidate for surgery for cholecystectomy  ID-- Ceftriaxone 2g q24h IV - Continue PO flagyl 500mg TID  Chronic HFpEF- no exacerbation  Orthostatic hypotension-Cont Midodrine 10 mg po q 12h. BP still low .  torsemide on hold  ECHO 12/02- unremarkable with EF of 55%  Chronic A-Fib on Coumadin( held as INR was high). 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   Handoff: Pending ERCP & burn for debridement if pt & family agrees.

## 2021-12-20 ENCOUNTER — TRANSCRIPTION ENCOUNTER (OUTPATIENT)
Age: 81
End: 2021-12-20

## 2021-12-20 LAB
ALBUMIN SERPL ELPH-MCNC: 2 G/DL — LOW (ref 3.5–5.2)
ALP SERPL-CCNC: 318 U/L — HIGH (ref 30–115)
ALT FLD-CCNC: 14 U/L — SIGNIFICANT CHANGE UP (ref 0–41)
ANION GAP SERPL CALC-SCNC: 14 MMOL/L — SIGNIFICANT CHANGE UP (ref 7–14)
AST SERPL-CCNC: 16 U/L — SIGNIFICANT CHANGE UP (ref 0–41)
BASOPHILS # BLD AUTO: 0.01 K/UL — SIGNIFICANT CHANGE UP (ref 0–0.2)
BASOPHILS NFR BLD AUTO: 0.1 % — SIGNIFICANT CHANGE UP (ref 0–1)
BILIRUB SERPL-MCNC: 0.7 MG/DL — SIGNIFICANT CHANGE UP (ref 0.2–1.2)
BUN SERPL-MCNC: 27 MG/DL — HIGH (ref 10–20)
CALCIUM SERPL-MCNC: 7.5 MG/DL — LOW (ref 8.5–10.1)
CHLORIDE SERPL-SCNC: 107 MMOL/L — SIGNIFICANT CHANGE UP (ref 98–110)
CO2 SERPL-SCNC: 18 MMOL/L — SIGNIFICANT CHANGE UP (ref 17–32)
CREAT SERPL-MCNC: 0.8 MG/DL — SIGNIFICANT CHANGE UP (ref 0.7–1.5)
EOSINOPHIL # BLD AUTO: 0.05 K/UL — SIGNIFICANT CHANGE UP (ref 0–0.7)
EOSINOPHIL NFR BLD AUTO: 0.6 % — SIGNIFICANT CHANGE UP (ref 0–8)
GLUCOSE BLDC GLUCOMTR-MCNC: 64 MG/DL — LOW (ref 70–99)
GLUCOSE SERPL-MCNC: 57 MG/DL — LOW (ref 70–99)
HCT VFR BLD CALC: 29.7 % — LOW (ref 42–52)
HGB BLD-MCNC: 9.9 G/DL — LOW (ref 14–18)
IMM GRANULOCYTES NFR BLD AUTO: 0.7 % — HIGH (ref 0.1–0.3)
LYMPHOCYTES # BLD AUTO: 0.57 K/UL — LOW (ref 1.2–3.4)
LYMPHOCYTES # BLD AUTO: 7.1 % — LOW (ref 20.5–51.1)
MAGNESIUM SERPL-MCNC: 2.3 MG/DL — SIGNIFICANT CHANGE UP (ref 1.8–2.4)
MCHC RBC-ENTMCNC: 31.3 PG — HIGH (ref 27–31)
MCHC RBC-ENTMCNC: 33.3 G/DL — SIGNIFICANT CHANGE UP (ref 32–37)
MCV RBC AUTO: 94 FL — SIGNIFICANT CHANGE UP (ref 80–94)
MONOCYTES # BLD AUTO: 0.63 K/UL — HIGH (ref 0.1–0.6)
MONOCYTES NFR BLD AUTO: 7.8 % — SIGNIFICANT CHANGE UP (ref 1.7–9.3)
NEUTROPHILS # BLD AUTO: 6.76 K/UL — HIGH (ref 1.4–6.5)
NEUTROPHILS NFR BLD AUTO: 83.7 % — HIGH (ref 42.2–75.2)
NRBC # BLD: 0 /100 WBCS — SIGNIFICANT CHANGE UP (ref 0–0)
PLATELET # BLD AUTO: 86 K/UL — LOW (ref 130–400)
POTASSIUM SERPL-MCNC: 4.4 MMOL/L — SIGNIFICANT CHANGE UP (ref 3.5–5)
POTASSIUM SERPL-SCNC: 4.4 MMOL/L — SIGNIFICANT CHANGE UP (ref 3.5–5)
PROT SERPL-MCNC: 5 G/DL — LOW (ref 6–8)
RBC # BLD: 3.16 M/UL — LOW (ref 4.7–6.1)
RBC # FLD: 18.7 % — HIGH (ref 11.5–14.5)
SODIUM SERPL-SCNC: 139 MMOL/L — SIGNIFICANT CHANGE UP (ref 135–146)
WBC # BLD: 8.08 K/UL — SIGNIFICANT CHANGE UP (ref 4.8–10.8)
WBC # FLD AUTO: 8.08 K/UL — SIGNIFICANT CHANGE UP (ref 4.8–10.8)

## 2021-12-20 PROCEDURE — 43261 ENDO CHOLANGIOPANCREATOGRAPH: CPT | Mod: 53

## 2021-12-20 PROCEDURE — 99233 SBSQ HOSP IP/OBS HIGH 50: CPT

## 2021-12-20 RX ORDER — METHOCARBAMOL 500 MG/1
500 TABLET, FILM COATED ORAL ONCE
Refills: 0 | Status: COMPLETED | OUTPATIENT
Start: 2021-12-20 | End: 2021-12-20

## 2021-12-20 RX ORDER — METRONIDAZOLE 500 MG
500 TABLET ORAL ONCE
Refills: 0 | Status: COMPLETED | OUTPATIENT
Start: 2021-12-20 | End: 2021-12-20

## 2021-12-20 RX ORDER — INDOMETHACIN 50 MG
100 CAPSULE ORAL ONCE
Refills: 0 | Status: DISCONTINUED | OUTPATIENT
Start: 2021-12-20 | End: 2021-12-23

## 2021-12-20 RX ORDER — PANTOPRAZOLE SODIUM 20 MG/1
40 TABLET, DELAYED RELEASE ORAL
Refills: 0 | Status: DISCONTINUED | OUTPATIENT
Start: 2021-12-20 | End: 2021-12-23

## 2021-12-20 RX ORDER — MIDODRINE HYDROCHLORIDE 2.5 MG/1
10 TABLET ORAL ONCE
Refills: 0 | Status: COMPLETED | OUTPATIENT
Start: 2021-12-20 | End: 2021-12-20

## 2021-12-20 RX ADMIN — Medication 5 MILLIGRAM(S): at 21:29

## 2021-12-20 RX ADMIN — HYDROMORPHONE HYDROCHLORIDE 0.25 MILLIGRAM(S): 2 INJECTION INTRAMUSCULAR; INTRAVENOUS; SUBCUTANEOUS at 17:50

## 2021-12-20 RX ADMIN — METHOCARBAMOL 500 MILLIGRAM(S): 500 TABLET, FILM COATED ORAL at 08:41

## 2021-12-20 RX ADMIN — HYDROMORPHONE HYDROCHLORIDE 0.25 MILLIGRAM(S): 2 INJECTION INTRAMUSCULAR; INTRAVENOUS; SUBCUTANEOUS at 21:29

## 2021-12-20 RX ADMIN — FENTANYL CITRATE 1 PATCH: 50 INJECTION INTRAVENOUS at 18:27

## 2021-12-20 RX ADMIN — MIDODRINE HYDROCHLORIDE 10 MILLIGRAM(S): 2.5 TABLET ORAL at 08:42

## 2021-12-20 RX ADMIN — HYDROMORPHONE HYDROCHLORIDE 0.25 MILLIGRAM(S): 2 INJECTION INTRAMUSCULAR; INTRAVENOUS; SUBCUTANEOUS at 18:27

## 2021-12-20 RX ADMIN — Medication 500 MILLIGRAM(S): at 08:42

## 2021-12-20 NOTE — CHART NOTE - NSCHARTNOTEFT_GEN_A_CORE
PALLIATIVE MEDICINE INTERDISCIPLINARY TEAM NOTE    Provider:  [   ]Social Work   [   ]          [   ] Initial visit [   ] Follow up    Family or contact name / phone #   Met with: [   ] Patient  [   ] Family  [   ] Other:    Primary Language: [   ] English [   ] Other*:                      *Interpretation provided by:    SUPPORT DIAGNOSES            (Check all that apply)  [   ] Psychosocial spiritual assessment (PSSA)  [   ] EOL issues  [   ] Cultural / spiritual concerns  [   ] Pain / suffering  [   ] Dementia / AMS  [   ] Other:  [   ] AD issues  [   ] Grief / loss / sadness  [   ] Discharge issues  [   ] Distress / coping    PSYCHOSOCIAL ASSESSMENT OF PATIENT         (Check all that apply)  [   ] Initial Assessment            [   ] Reassessment          [   ] Not Applicable this visit    Pain/suffering acuity:  [   ] None to mild (0-3)           [   ] Moderate (4-6)        [   ] High (7-10)    Mental Status:  [   ] Alert/oriented (x3)          [   ] Confused/Altered(x2/x1)         [   ] Non-resp    Functional status:  [   ] Independent w ADLs      [   ] Needs Assistance             [   ] Bedbound/Full Care    Coping:  [   ] Coping well                     [   ] Coping w/difficulty            [   ] Poor coping    Support system:  [   ] Strong                              [   ] Adequate                        [   ] Inadequate    SPIRITUAL ASSESSMENT  Gnosticism/Spiritual practice: __Catholic_________________________    Role of organized Christianity:  [ x  ] Important                     [   ] Some (fam tradition, cultural)               [   ] None    Effects on medical care:  [   ] Yes, _____________________________________                         [ x  ] None    Cultural/Yarsanism need:  [   ] Yes, _____________________________________                         [  x ] None    Refer to Pastoral Care:  [   ] Yes           [   ] No, not at this time    SERVICE PROVIDED  [   ]PSSA                                                                             [   ]Discharge support / facilitation  [   ]AD / goals of care counseling                                  [   ]EOL / death / bereavement counseling  [   ]Counseling / support                                                [   ] Family meeting  [ x  ]Prayer / sacrament / ritual                                      [   ] Referral   [   ]Other                                                                       NOTE and Plan of Care (PoC): This was a follow up visit., Pt was asleep, I said a silent prayer. I will follow up as needed.

## 2021-12-20 NOTE — CHART NOTE - NSCHARTNOTEFT_GEN_A_CORE
PACU ANESTHESIA ADMISSION NOTE      Procedure: ERCP  Post op diagnosis: CBD Stone       ____  Intubated  TV:______       Rate: ______      FiO2: ______    _x___  Patent Airway    _x___  Full return of protective reflexes    _x___  Full recovery from anesthesia / back to baseline status    Vitals:  T(F): 97.2   HR: 82  BP: 122/61  RR: 12  SpO2: 99%    Mental Status:  _x___ Awake   _x___ Alert   _____ Drowsy   _____ Sedated    Nausea/Vomiting:  _x___  NO       ______Yes,   See Post - Op Orders         Pain Scale (0-10):  __0___    Treatment: _x___ None    ____ See Post - Op/PCA Orders    Post - Operative Fluids:   __x__ Oral   ____ See Post - Op Orders    Plan: Discharge:   ___Home       ___x__Floor     _____Critical Care    _____  Other:_________________    Comments:  No anesthesia issues or complications noted.  Discharge when criteria met.

## 2021-12-20 NOTE — PROGRESS NOTE ADULT - SUBJECTIVE AND OBJECTIVE BOX
Progress Note:  Provider Speciality                            Hospitalist      RODNEY SANTO MRN-[id number] 81y Male     CHIEF PRESENTING COMPLAINT:  Patient is a 81y old  Male who presents with a chief complaint of chest pain (18 Dec 2021 06:47)        SUBJECTIVE:  Patient was seen and examined at bedside. complaint of lethargy & wekness  No significant overnight events reported.     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 Pt reports that this morning, he was awakened by acute, sudden onset L sided chest pain at 4AM. Describes the pain as "pounding", states that he felt some pain radiate to LUE as well. Denies identifiable aggravating or alleviating factors and states that this has never happened before. Denies headache/dizziness, endorses chronic shortness of breath that is unchanged, has been requiring his baseline oxygen. Denies abdominal pain. Endorses compliance with all medications. States that at baseline, he is bedbound, cannot stand up or ambulate due to weakness. Was recently admitted to [facility] for short term rehab, where he wishes to go back because feels that he cannot care for himself at home. Lives with son but needs more help and PT.   In the ED, T 96, /58, HR 85, RR 18, SpO2 98% on O2 NC 4L, then 100% on 3L. Lab work revealed Hb 9.1, INR 7.87, troponin 0.03 and BNP 3327 (both near baseline). EKG revealed AFib, HR 88. CXR revealed slightly inc small R sided pleural effusion. (30 Nov 2021 11:30)        REVIEW OF SYSTEMS:  Patient denies any headache, any vision complaints, runny nose, fever, chills, sore throat. Denies chest pain, shortness of breath, palpitation. Denies nausea, vomiting, abdominal pain, diarrhoea, Denies urinary burning, urgency, frequency, dysuria. At least 10 systems were reviewed in ROS. All systems reviewed  are within normal limits except for the complaints as described in Subjective.    PAST MEDICAL & SURGICAL HISTORY:  PAST MEDICAL & SURGICAL HISTORY:  COPD (chronic obstructive pulmonary disease)    Hypertension    Deep vein thrombosis (DVT) of left lower extremity, unspecified chronicity, unspecified vein    Cellulitis of left lower extremity    Chronic atrial fibrillation    Mitral valve insufficiency, unspecified etiology  Moderate    CHF (congestive heart failure)    S/P coronary artery stent placement    History of total right knee replacement            VITAL SIGNS:  Vital Signs Last 24 Hrs  T(C): 36.1 (20 Dec 2021 13:30), Max: 37.1 (20 Dec 2021 07:30)  T(F): 97.7 (20 Dec 2021 13:28), Max: 98.7 (20 Dec 2021 07:30)  HR: 67 (20 Dec 2021 13:30) (67 - 80)  BP: 144/70 (20 Dec 2021 13:30) (99/57 - 144/70)  BP(mean): --  RR: 24 (20 Dec 2021 13:30) (18 - 24)  SpO2: 98% (20 Dec 2021 13:30) (98% - 100%)    PHYSICAL EXAMINATION:  Not in acute distress, lethargic  General: No pallor, no icterus  HEENT:   EOMI, no JVD.  Heart: S1+S2 audible  Lungs: bilateral  fair air entry, no wheezing, no crepitations.  Abdomen: Soft, non-tender, non-distended , no  rigidity or guarding.  CNS: Awake alert, very lethargic  Extremities:  No edema            CONSULTS:  Consultant(s) Notes Reviewed by me.   Care Discussed with Consultants/Other Providers where required.        MEDICATIONS:  MEDICATIONS  (STANDING):  budesonide 160 MICROgram(s)/formoterol 4.5 MICROgram(s) Inhaler 2 Puff(s) Inhalation two times a day  cefTRIAXone   IVPB 2000 milliGRAM(s) IV Intermittent every 24 hours  chlorhexidine 4% Liquid 1 Application(s) Topical <User Schedule>  enoxaparin Injectable 72 milliGRAM(s) SubCutaneous every 12 hours  fentaNYL   Patch  12 MICROgram(s)/Hr 1 Patch Transdermal every 72 hours  folic acid 1 milliGRAM(s) Oral daily  influenza  Vaccine (HIGH DOSE) 0.7 milliLiter(s) IntraMuscular once  lactated ringers Bolus 500 milliLiter(s) IV Bolus once  methocarbamol 500 milliGRAM(s) Oral three times a day  metroNIDAZOLE    Tablet 500 milliGRAM(s) Oral every 8 hours  midodrine. 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     Acute cholecystitis  Proteus bacteremia secondary to biliary source  Suspected atypical chest pain possibly skeletomuscular  Sacral decubitus  Chronic A-Fib   Chronic HFpEF  Orthostatic hypotension  chronic DVT      Planned for ERCP today for CBD occlusion  Acute anemia- Status post BT one unit 12/19  S/p  Percutaneous cholecystostomy &  pigtail catheter. 12/17  Surgery noted- high risk . not a candidate for surgery for cholecystectomy  ID-- Ceftriaxone 2g q24h IV - Continue PO flagyl 500mg TID  Chronic HFpEF- no exacerbation  Orthostatic hypotension-Cont Midodrine 10 mg po q 12h. BP still low .  torsemide on hold  ECHO 12/02- unremarkable with EF of 55%  Chronic A-Fib on Coumadin( held as INR was high). 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   Handoff: Pending ERCP & burn for debridement

## 2021-12-20 NOTE — HOSPICE CARE NOTE - CONVESATION DETAILS
Per documentation, patient pending D/C back to [facility] to continue his STR. Hospice to follow up in the community.

## 2021-12-20 NOTE — CHART NOTE - NSCHARTNOTEFT_GEN_A_CORE
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 The ERCP was not performed.    Plan:  Patient reports a history of gastric surgery but was unaware of the details. Discussed with daughter who confirmed the history of ariel-en-y gastric bypass.    Optimal option in his condition is to get a gastric port assisted ERCP with the help of surgery vs. IR guided therapy.     Other options include double balloon enteroscopy assisted ERCP but this could be a prolonged procedure with a 60-70% success rate, or the EDGE procedure, however patient is too frail to undergo it.    -Please get surgical evaluation for possible port-access for ERCP  -Will plan for port-assisted ERCP pending surgery evaluation  -Check LFTs daily  -Open capsule PPI q12h  -We will follow

## 2021-12-20 NOTE — CHART NOTE - NSCHARTNOTEFT_GEN_A_CORE
Patient's daughter and emergency contact Mery Lira was contacted on [phone]. Informed that GI want hgb > 8 to proceed with the procedure and that patients last Hgb was 6.9. Informed of risks and benefits of receiving blood including anaphylaxis, fevers, and more serious reactions. She provided verbal consent.

## 2021-12-20 NOTE — PROGRESS NOTE ADULT - SUBJECTIVE AND OBJECTIVE BOX
CHIEF COMPLAINT:  Patient is a 81y old  Male who presents with a chief complaint of chest pain (20 Dec 2021 08:57)      INTERVAL HISTORY/OVERNIGHT EVENTS:  12/20  -no events overnight, pt "refused" meds but told me he was sleeping when nurse came, reordered flagyl, robaxin and midodrine  -pt willing to try bedside debridement, burn reconsulted   -ERCP today    ======================  MEDICATIONS:  budesonide 160 MICROgram(s)/formoterol 4.5 MICROgram(s) Inhaler 2 Puff(s) Inhalation two times a day  cefTRIAXone   IVPB 2000 milliGRAM(s) IV Intermittent every 24 hours  chlorhexidine 4% Liquid 1 Application(s) Topical <User Schedule>  fentaNYL   Patch  12 MICROgram(s)/Hr 1 Patch Transdermal every 72 hours  folic acid 1 milliGRAM(s) Oral daily  indomethacin Suppository 100 milliGRAM(s) Rectal once  influenza  Vaccine (HIGH DOSE) 0.7 milliLiter(s) IntraMuscular once  lactated ringers Bolus 500 milliLiter(s) IV Bolus once  methocarbamol 500 milliGRAM(s) Oral three times a day  metroNIDAZOLE    Tablet 500 milliGRAM(s) Oral every 8 hours  midodrine. 10 milliGRAM(s) Oral three times a day  pantoprazole    Tablet 40 milliGRAM(s) Oral before breakfast  polyethylene glycol 3350 17 Gram(s) Oral daily  senna 2 Tablet(s) Oral at bedtime  silver sulfADIAZINE 1% Cream 1 Application(s) Topical two times a day    DRIPS:    PRN:       ======================  PHYSICAL EXAMINATION:  GEN:  nad.   HEENT:  eomi. ncat  PULM:  b/l bs.  clear.  no wheezing. no crackles or rales.   CARD: regular. s1, s2.  no murmurs.   ABD: +bs. ntnd  EXT:  no new rashes.  no pitting edema b/l .   NEURO:  no new focal deficits.   ======================  OBJECTIVE:        VS:  T(F): 97.7 (12-20 @ 13:28), Max: 98.7 (12-20 @ 07:30)  HR: 67 (12-20 @ 13:30) (67 - 69)  BP: 144/70 (12-20 @ 13:30) (99/57 - 144/70)  RR: 24 (12-20 @ 13:30) (18 - 24)  SpO2: 98% (12-20 @ 13:30) (98% - 100%)  CVP(mm Hg): --  CO: --  CI: --  PA: --  PCWP: --    I/O:      12-19 @ 07:01  -  12-20 @ 07:00  --------------------------------------------------------  IN: 0 mL / OUT: 200 mL / NET: -200 mL        Weight trend:  Weight (kg): 72.5 (12-20)    ======================    LABS:                          9.9    8.08  )-----------( 86       ( 20 Dec 2021 04:30 )             29.7     12-20    139  |  107  |  27<H>  ----------------------------<  57<L>  4.4   |  18  |  0.8    Ca    7.5<L>      20 Dec 2021 04:30  Mg     2.3     12-20    TPro  5.0<L>  /  Alb  2.0<L>  /  TBili  0.7  /  DBili  x   /  AST  16  /  ALT  14  /  AlkPhos  318<H>  12-20    LIVER FUNCTIONS - ( 20 Dec 2021 04:30 )  Alb: 2.0 g/dL / Pro: 5.0 g/dL / ALK PHOS: 318 U/L / ALT: 14 U/L / AST: 16 U/L / GGT: x           PT/INR - ( 19 Dec 2021 21:56 )   PT: 15.80 sec;   INR: 1.38 ratio         PTT - ( 19 Dec 2021 21:56 )  PTT:33.0 sec              Cultures:    Culture - Body Fluid with Gram Stain (collected 12-17)  Source: .Body Fluid Bile Fluid  Gram Stain:    No polymorphonuclear leukocytes seen    Gram positive cocci in pairs seen per oil power field    Gram Negative Rods seen per oil power field    by cytocentrifuge  Preliminary Report:    Numerous Enterococcus faecium    Numerous Escherichia coli ESBL  Organism: Enterococcus faecium  Escherichia coli ESBL  Organism: Enterococcus faecium      -  Ampicillin: R >8 Predicts results to ampicillin/sulbactam, amoxacillin-clavulanate and  piperacillin-tazobactam.      -  Tetra/Doxy: R >8      -  Vancomycin: S 1      Method Type: SHEFALI  Organism: Escherichia coli ESBL      -  Amikacin: S <=16      -  Amoxicillin/Clavulanic Acid: S <=8/4      -  Ampicillin: R >16 These ampicillin results predict results for amoxicillin      -  Ampicillin/Sulbactam: R >16/8 Enterobacter, Klebsiella aerogenes, Citrobacter, and Serratia may develop resistance during prolonged therapy (3-4 days)      -  Aztreonam: R >16      -  Cefazolin: R >16 Enterobacter, Klebsiella aerogenes, Citrobacter, and Serratia may develop resistance during prolonged therapy (3-4 days)      -  Cefepime: R >16      -  Cefoxitin: I 16      -  Ceftriaxone: R >32 Enterobacter, Klebsiella aerogenes, Citrobacter, and Serratia may develop resistance during prolonged therapy      -  Ciprofloxacin: R >2      -  Ertapenem: S <=0.5      -  Gentamicin: S <=2      -  Imipenem: S <=1      -  Levofloxacin: R >4      -  Meropenem: S <=1      -  Piperacillin/Tazobactam: R <=8      -  Tobramycin: S 4      -  Trimethoprim/Sulfamethoxazole: S <=0.5/9.5      Method Type: SHEFALI

## 2021-12-20 NOTE — PROVIDER CONTACT NOTE (OTHER) - SITUATION
Pt noted with documented pressure injury to coccyx and left buttock- assessed by wound care nurse on 12/4/21- staged as evolving DTI. Pressure injury is now unstageable with slough.
MD aware he needs attending to sign MOLST.
Pt with no IV access, two nurses attempted.
Pt is in pain and requested pain meds. Upon walking into room to take vitals before administering pain med patient Iv was out. Pt vital was out of range for narcotic pain meds.

## 2021-12-20 NOTE — PROGRESS NOTE ADULT - ASSESSMENT
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 Trop at baseline 0.03, EKG showed afib, CXR small R sided pleural effusion.    #Proteus Bacteremia - +BCx x2 on 12/7, f/u BCx on 12/11 NGTD  #Acute Cholecystitis - +findings on RUQ U/S (12/10) + HIDA Scan (12/11)  #PMHx of Recent Cholecystitis - s/p perQ-cholecystostomy placement and removal by IR - 10d prior to admission  -ID consulted:  - Narrow cefepime to Ceftriaxone 2g q24h IV   - Continue PO flagyl 500mg TID  - Post surgery, cipro 500mg BID and flagyl 500mg TID x 3 days  - Monitor sacral area, low threshold to consult Burn, appreciate Wound care consult  - Gen Surg consulted:  Cardiology risk stratified the patient as high risk for cholecystectomy  Medicine risk stratified the patient as high risk  Patient is not a surgical candidate  Please consult IR for re-placement of percutaneous cholecystostomy tube  Please recall surgery as needed  12/16: IR consulted for PreQ Cholecystostomy  - s/p PreQ Cholecystostomy  12/20: advanced GI ERCP for cbd occlusion      #Atypical Chest Pain - likely MSK, no further ischemic workup as per Cardiology (Dr. Herrera)  #PMHx of HFpEF - follows with Dr. 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  #Chronic DVT Right Common Fem/Fem/Popliteal - on LE Venous Duplex 12/1  - Troponin 0.03 x3, was 0.02-0.03 throughout prior admission, EKG performed x2 with no acute ischemic changes  - D-dimer 342, BNP ~3K near baseline, 11/30 CXR showing small R sided pleural effusion, has remained at baseline oxygen requirements  - TTE 12/2: EF 50-55%, otherwise unremarkable   - d/c Spironolactone + Torsemide due to persistent hypotension, not overloaded on exam  - pain control with Percocet 1 tab q6h PRN for moderate pain + Morphine PRN for severe pain  - INR was supratherapeutic on admission, was reversed with Vitamin K on 12/2, restarted Coumadin, INR now normalized; was supratherapuetic  - A/C was held for PerQ; will restart A/C  - BP on lower side, increased midodrine to 10mg PO TID  - patient not eating and drinking adequately, calorie count started  - patient refusing PT/Rehab  - CTA-chest 12/9 negative for PE    #Chronic Macrocytic Anemia - on folate supplementation  #Anemia of Chronic Disease  - baseline Hb ~10-11, supratherapeutic INR on admission reversed, B12 WNL in September  - 12/1 iron studies: low TIBC, likely anemia of chronic disease    #DTI Left Heel + Coccyx   - Wound Care consulted, recommendations given to RN  - Burn consult for bedside debridement    #COPD   - on home O2 2.5L, has remained at baseline, continuing symbicort 160ug 2 puffs BID, Albuterol PRN                                                                          # DVT prophylaxis: Coumadin (bridging)  # GI prophylaxis: N/A  # Diet: DASH/TLC  # Activity: Ambulate as Tolerated  # Code status: Full  # Disposition: remain on floor  # Handoff: f/u burn for debridement, f/u ercp results; needs MOLST FORM

## 2021-12-21 LAB
-  AMIKACIN: SIGNIFICANT CHANGE UP
-  AMOXICILLIN/CLAVULANIC ACID: SIGNIFICANT CHANGE UP
-  AMPICILLIN/SULBACTAM: SIGNIFICANT CHANGE UP
-  AMPICILLIN: SIGNIFICANT CHANGE UP
-  AZTREONAM: SIGNIFICANT CHANGE UP
-  CEFAZOLIN: SIGNIFICANT CHANGE UP
-  CEFEPIME: SIGNIFICANT CHANGE UP
-  CEFOXITIN: SIGNIFICANT CHANGE UP
-  CEFTRIAXONE: SIGNIFICANT CHANGE UP
-  CIPROFLOXACIN: SIGNIFICANT CHANGE UP
-  ERTAPENEM: SIGNIFICANT CHANGE UP
-  GENTAMICIN: SIGNIFICANT CHANGE UP
-  IMIPENEM: SIGNIFICANT CHANGE UP
-  LEVOFLOXACIN: SIGNIFICANT CHANGE UP
-  MEROPENEM: SIGNIFICANT CHANGE UP
-  PIPERACILLIN/TAZOBACTAM: SIGNIFICANT CHANGE UP
-  TOBRAMYCIN: SIGNIFICANT CHANGE UP
-  TRIMETHOPRIM/SULFAMETHOXAZOLE: SIGNIFICANT CHANGE UP
CULTURE RESULTS: SIGNIFICANT CHANGE UP
METHOD TYPE: SIGNIFICANT CHANGE UP
ORGANISM # SPEC MICROSCOPIC CNT: SIGNIFICANT CHANGE UP
SPECIMEN SOURCE: SIGNIFICANT CHANGE UP

## 2021-12-21 PROCEDURE — 99233 SBSQ HOSP IP/OBS HIGH 50: CPT

## 2021-12-21 PROCEDURE — 99498 ADVNCD CARE PLAN ADDL 30 MIN: CPT

## 2021-12-21 PROCEDURE — 99497 ADVNCD CARE PLAN 30 MIN: CPT

## 2021-12-21 PROCEDURE — 99497 ADVNCD CARE PLAN 30 MIN: CPT | Mod: 25

## 2021-12-21 RX ORDER — MORPHINE SULFATE 50 MG/1
4 CAPSULE, EXTENDED RELEASE ORAL EVERY 4 HOURS
Refills: 0 | Status: DISCONTINUED | OUTPATIENT
Start: 2021-12-21 | End: 2021-12-22

## 2021-12-21 RX ORDER — MEROPENEM 1 G/30ML
1000 INJECTION INTRAVENOUS EVERY 8 HOURS
Refills: 0 | Status: DISCONTINUED | OUTPATIENT
Start: 2021-12-21 | End: 2021-12-22

## 2021-12-21 RX ORDER — VANCOMYCIN HCL 1 G
VIAL (EA) INTRAVENOUS
Refills: 0 | Status: DISCONTINUED | OUTPATIENT
Start: 2021-12-21 | End: 2021-12-22

## 2021-12-21 RX ORDER — VANCOMYCIN HCL 1 G
1000 VIAL (EA) INTRAVENOUS ONCE
Refills: 0 | Status: COMPLETED | OUTPATIENT
Start: 2021-12-21 | End: 2021-12-21

## 2021-12-21 RX ORDER — FENTANYL CITRATE 50 UG/ML
1 INJECTION INTRAVENOUS
Refills: 0 | Status: DISCONTINUED | OUTPATIENT
Start: 2021-12-21 | End: 2021-12-23

## 2021-12-21 RX ORDER — MEROPENEM 1 G/30ML
1000 INJECTION INTRAVENOUS ONCE
Refills: 0 | Status: COMPLETED | OUTPATIENT
Start: 2021-12-21 | End: 2021-12-21

## 2021-12-21 RX ORDER — MEROPENEM 1 G/30ML
INJECTION INTRAVENOUS
Refills: 0 | Status: DISCONTINUED | OUTPATIENT
Start: 2021-12-21 | End: 2021-12-22

## 2021-12-21 RX ORDER — VANCOMYCIN HCL 1 G
1000 VIAL (EA) INTRAVENOUS EVERY 12 HOURS
Refills: 0 | Status: DISCONTINUED | OUTPATIENT
Start: 2021-12-21 | End: 2021-12-22

## 2021-12-21 RX ADMIN — HYDROMORPHONE HYDROCHLORIDE 0.25 MILLIGRAM(S): 2 INJECTION INTRAMUSCULAR; INTRAVENOUS; SUBCUTANEOUS at 08:34

## 2021-12-21 RX ADMIN — FENTANYL CITRATE 1 PATCH: 50 INJECTION INTRAVENOUS at 21:02

## 2021-12-21 RX ADMIN — CHLORHEXIDINE GLUCONATE 1 APPLICATION(S): 213 SOLUTION TOPICAL at 05:12

## 2021-12-21 RX ADMIN — FENTANYL CITRATE 1 PATCH: 50 INJECTION INTRAVENOUS at 08:12

## 2021-12-21 RX ADMIN — MORPHINE SULFATE 4 MILLIGRAM(S): 50 CAPSULE, EXTENDED RELEASE ORAL at 11:47

## 2021-12-21 RX ADMIN — Medication 1 APPLICATION(S): at 07:43

## 2021-12-21 RX ADMIN — METHOCARBAMOL 500 MILLIGRAM(S): 500 TABLET, FILM COATED ORAL at 21:18

## 2021-12-21 RX ADMIN — Medication 250 MILLIGRAM(S): at 17:52

## 2021-12-21 RX ADMIN — Medication 500 MILLIGRAM(S): at 21:18

## 2021-12-21 RX ADMIN — MEROPENEM 100 MILLIGRAM(S): 1 INJECTION INTRAVENOUS at 11:41

## 2021-12-21 RX ADMIN — FENTANYL CITRATE 1 PATCH: 50 INJECTION INTRAVENOUS at 11:32

## 2021-12-21 RX ADMIN — Medication 250 MILLIGRAM(S): at 11:41

## 2021-12-21 RX ADMIN — METHOCARBAMOL 500 MILLIGRAM(S): 500 TABLET, FILM COATED ORAL at 05:11

## 2021-12-21 RX ADMIN — MIDODRINE HYDROCHLORIDE 10 MILLIGRAM(S): 2.5 TABLET ORAL at 05:11

## 2021-12-21 RX ADMIN — FENTANYL CITRATE 1 PATCH: 50 INJECTION INTRAVENOUS at 11:22

## 2021-12-21 RX ADMIN — MEROPENEM 100 MILLIGRAM(S): 1 INJECTION INTRAVENOUS at 21:38

## 2021-12-21 RX ADMIN — Medication 500 MILLIGRAM(S): at 05:11
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
## 2021-12-21 NOTE — PROGRESS NOTE ADULT - ASSESSMENT
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 Trop at baseline 0.03, EKG showed afib, CXR small R sided pleural effusion.    #Proteus Bacteremia - +BCx x2 on 12/7, f/u BCx on 12/11 NGTD  #Acute Cholecystitis - +findings on RUQ U/S (12/10) + HIDA Scan (12/11)  #PMHx of Recent Cholecystitis - s/p perQ-cholecystostomy placement and removal by IR - 10d prior to admission  -ID consulted:  - Narrow cefepime to Ceftriaxone 2g q24h IV   - Continue PO flagyl 500mg TID  - Post surgery, cipro 500mg BID and flagyl 500mg TID x 3 days  - Monitor sacral area, low threshold to consult Burn, appreciate Wound care consult  - Gen Surg consulted:  Cardiology risk stratified the patient as high risk for cholecystectomy  Medicine risk stratified the patient as high risk  Patient is not a surgical candidate  Please consult IR for re-placement of percutaneous cholecystostomy tube  Please recall surgery as needed  12/16: IR consulted for PreQ Cholecystostomy  - s/p PreQ Cholecystostomy  12/20: advanced GI ERCP for cbd occlusion  >>surgical consult for port assisted ERCP d/t hx of rou-en-y surgery      #Atypical Chest Pain - likely MSK, no further ischemic workup as per Cardiology (Dr. Herrera)  #PMHx of HFpEF - follows with Dr. 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  #Chronic DVT Right Common Fem/Fem/Popliteal - on LE Venous Duplex 12/1  - Troponin 0.03 x3, was 0.02-0.03 throughout prior admission, EKG performed x2 with no acute ischemic changes  - D-dimer 342, BNP ~3K near baseline, 11/30 CXR showing small R sided pleural effusion, has remained at baseline oxygen requirements  - TTE 12/2: EF 50-55%, otherwise unremarkable   - d/c Spironolactone + Torsemide due to persistent hypotension, not overloaded on exam  - pain control with Percocet 1 tab q6h PRN for moderate pain + Morphine PRN for severe pain  - INR was supratherapeutic on admission, was reversed with Vitamin K on 12/2, restarted Coumadin, INR now normalized; was supratherapuetic  - A/C was held for PerQ; will restart A/C  - BP on lower side, increased midodrine to 10mg PO TID  - patient not eating and drinking adequately, calorie count started  - patient refusing PT/Rehab  - CTA-chest 12/9 negative for PE    #sacral decubitus  #DTI Left Heel + Coccyx   - Wound Care consulted, recommendations given to RN  -Burn following  -will perform bedside debridement    #Chronic Macrocytic Anemia - on folate supplementation  #Anemia of Chronic Disease  - baseline Hb ~10-11, supratherapeutic INR on admission reversed, B12 WNL in September  - 12/1 iron studies: low TIBC, likely anemia of chronic disease    #COPD   - on home O2 2.5L, has remained at baseline, continuing symbicort 160ug 2 puffs BID, Albuterol PRN  -On RA at times                                                                          # DVT prophylaxis: held for now d/t procedures, will discuss heparin gtt  # GI prophylaxis: N/A  # Diet: DASH/TLC  # Activity: Ambulate as Tolerated  # Code status: Full  # Disposition: remain on floor  # Handoff: f/u burn for debridement, f/u ercp results; needs MOLST FORM, restart AC after procedures   80 yo M with PMHx of HFpEF (50-55%), Chronic AFib on Coumadin, 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 Trop at baseline 0.03, EKG showed afib, CXR small R sided pleural effusion.    #Proteus Bacteremia - +BCx x2 on 12/7, f/u BCx on 12/11 NGTD  #Acute Cholecystitis - +findings on RUQ U/S (12/10) + HIDA Scan (12/11)  #PMHx of Recent Cholecystitis - s/p perQ-cholecystostomy placement and removal by IR - 10d prior to admission  -ID consulted:  - Narrow cefepime to Ceftriaxone 2g q24h IV   - Continue PO flagyl 500mg TID  - Post surgery, cipro 500mg BID and flagyl 500mg TID x 3 days  - Monitor sacral area, low threshold to consult Burn, appreciate Wound care consult  - Gen Surg consulted:  Cardiology risk stratified the patient as high risk for cholecystectomy  Medicine risk stratified the patient as high risk  Patient is not a surgical candidate  Please consult IR for re-placement of percutaneous cholecystostomy tube  Please recall surgery as needed  12/16: IR consulted for PreQ Cholecystostomy  - s/p PreQ Cholecystostomy  12/20: advanced GI ERCP for cbd occlusion  >>surgical consult for port assisted ERCP d/t hx of rou-en-y surgery; DISCONTINUED      #Atypical Chest Pain - likely MSK, no further ischemic workup as per Cardiology (Dr. Herrera)  #PMHx of HFpEF - follows with Dr. 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  #Chronic DVT Right Common Fem/Fem/Popliteal - on LE Venous Duplex 12/1  - Troponin 0.03 x3, was 0.02-0.03 throughout prior admission, EKG performed x2 with no acute ischemic changes  - D-dimer 342, BNP ~3K near baseline, 11/30 CXR showing small R sided pleural effusion, has remained at baseline oxygen requirements  - TTE 12/2: EF 50-55%, otherwise unremarkable   - d/c Spironolactone + Torsemide due to persistent hypotension, not overloaded on exam  - pain control with Percocet 1 tab q6h PRN for moderate pain + Morphine PRN for severe pain  - INR was supratherapeutic on admission, was reversed with Vitamin K on 12/2, restarted Coumadin, INR now normalized; was supratherapuetic  - A/C was held for PerQ; will restart A/C  - BP on lower side, increased midodrine to 10mg PO TID  - patient not eating and drinking adequately, calorie count started  - patient refusing PT/Rehab  - CTA-chest 12/9 negative for PE    #sacral decubitus  #DTI Left Heel + Coccyx   - Wound Care consulted, recommendations given to RN  -Burn following  -will perform bedside debridement  -need burn recs for wound care    #Chronic Macrocytic Anemia - on folate supplementation  #Anemia of Chronic Disease  - baseline Hb ~10-11, supratherapeutic INR on admission reversed, B12 WNL in September  - 12/1 iron studies: low TIBC, likely anemia of chronic disease    #COPD   - on home O2 2.5L, has remained at baseline, continuing symbicort 160ug 2 puffs BID, Albuterol PRN  -On RA at times                                                                          # DVT prophylaxis: held for now d/t procedures, will discuss heparin gtt  # GI prophylaxis: N/A  # Diet: DASH/TLC  # Activity: Ambulate as Tolerated  # Code status: Full  # Disposition: remain on floor  # Handoff: f/u burn for debridement, f/u ercp results; needs MOLST FORM, restart AC after procedures   80 yo M with PMHx of HFpEF (50-55%), Chronic AFib on Coumadin, 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 Trop at baseline 0.03, EKG showed afib, CXR small R sided pleural effusion.    #Proteus Bacteremia - +BCx x2 on 12/7, f/u BCx on 12/11 NGTD  #Acute Cholecystitis - +findings on RUQ U/S (12/10) + HIDA Scan (12/11)  #PMHx of Recent Cholecystitis - s/p perQ-cholecystostomy placement and removal by IR - 10d prior to admission  -ID consulted:  - Narrow cefepime to Ceftriaxone 2g q24h IV   - Continue PO flagyl 500mg TID  - Post surgery, cipro 500mg BID and flagyl 500mg TID x 3 days  - Monitor sacral area, low threshold to consult Burn, appreciate Wound care consult  - Gen Surg consulted:  Cardiology risk stratified the patient as high risk for cholecystectomy  Medicine risk stratified the patient as high risk  Patient is not a surgical candidate  Please consult IR for re-placement of percutaneous cholecystostomy tube  Please recall surgery as needed  12/16: IR consulted for PreQ Cholecystostomy  - s/p PreQ Cholecystostomy  12/20: advanced GI ERCP for cbd occlusion  >>surgical consult for port assisted ERCP d/t hx of rou-en-y surgery; DISCONTINUED      #Atypical Chest Pain - likely MSK, no further ischemic workup as per Cardiology (Dr. Herrera)  #PMHx of HFpEF - follows with Dr. 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  #Chronic DVT Right Common Fem/Fem/Popliteal - on LE Venous Duplex 12/1  - Troponin 0.03 x3, was 0.02-0.03 throughout prior admission, EKG performed x2 with no acute ischemic changes  - D-dimer 342, BNP ~3K near baseline, 11/30 CXR showing small R sided pleural effusion, has remained at baseline oxygen requirements  - TTE 12/2: EF 50-55%, otherwise unremarkable   - d/c Spironolactone + Torsemide due to persistent hypotension, not overloaded on exam  - pain control with Percocet 1 tab q6h PRN for moderate pain + Morphine PRN for severe pain  - INR was supratherapeutic on admission, was reversed with Vitamin K on 12/2, restarted Coumadin, INR now normalized; was supratherapuetic  - A/C was held for PerQ; will restart A/C  - BP on lower side, increased midodrine to 10mg PO TID  - patient not eating and drinking adequately, calorie count started  - patient refusing PT/Rehab  - CTA-chest 12/9 negative for PE    #sacral decubitus  #DTI Left Heel + Coccyx   - Wound Care consulted, recommendations given to RN  -Burn following  -will perform bedside debridement  -need burn recs for wound care    #Chronic Macrocytic Anemia - on folate supplementation  #Anemia of Chronic Disease  - baseline Hb ~10-11, supratherapeutic INR on admission reversed, B12 WNL in September  - 12/1 iron studies: low TIBC, likely anemia of chronic disease    #COPD   - on home O2 2.5L, has remained at baseline, continuing symbicort 160ug 2 puffs BID, Albuterol PRN  -On RA at times                                                                          # DVT prophylaxis: held for now d/t procedures, will discuss heparin gtt  # GI prophylaxis: N/A  # Diet: DASH/TLC  # Activity: Ambulate as Tolerated  # Code status: Full  # Disposition: remain on floor; hospice planning  # Handoff: f/u burn for debridement, cm initiation, iv abx per ID

## 2021-12-21 NOTE — PROGRESS NOTE ADULT - SUBJECTIVE AND OBJECTIVE BOX
Progress Note:  Provider Speciality                            Hospitalist      RODNEY SANTO MRN-[id number] 81y Male     CHIEF PRESENTING COMPLAINT:  Patient is a 81y old  Male who presents with a chief complaint of chest pain (18 Dec 2021 06:47)        SUBJECTIVE:  Patient was seen and examined at bedside. complaint of lethargy & weakness  No significant overnight events reported.     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 Pt reports that this morning, he was awakened by acute, sudden onset L sided chest pain at 4AM. Describes the pain as "pounding", states that he felt some pain radiate to LUE as well. Denies identifiable aggravating or alleviating factors and states that this has never happened before. Denies headache/dizziness, endorses chronic shortness of breath that is unchanged, has been requiring his baseline oxygen. Denies abdominal pain. Endorses compliance with all medications. States that at baseline, he is bedbound, cannot stand up or ambulate due to weakness. Was recently admitted to [facility] for short term rehab, where he wishes to go back because feels that he cannot care for himself at home. Lives with son but needs more help and PT.   In the ED, T 96, /58, HR 85, RR 18, SpO2 98% on O2 NC 4L, then 100% on 3L. Lab work revealed Hb 9.1, INR 7.87, troponin 0.03 and BNP 3327 (both near baseline). EKG revealed AFib, HR 88. CXR revealed slightly inc small R sided pleural effusion. (30 Nov 2021 11:30)        REVIEW OF SYSTEMS:  Patient denies any headache, any vision complaints, runny nose, fever, chills, sore throat. Denies chest pain, shortness of breath, palpitation. Denies nausea, vomiting, abdominal pain, diarrhoea, Denies urinary burning, urgency, frequency, dysuria. At least 10 systems were reviewed in ROS. All systems reviewed  are within normal limits except for the complaints as described in Subjective.    PAST MEDICAL & SURGICAL HISTORY:  PAST MEDICAL & SURGICAL HISTORY:  COPD (chronic obstructive pulmonary disease)    Hypertension    Deep vein thrombosis (DVT) of left lower extremity, unspecified chronicity, unspecified vein    Cellulitis of left lower extremity    Chronic atrial fibrillation    Mitral valve insufficiency, unspecified etiology  Moderate    CHF (congestive heart failure)    S/P coronary artery stent placement    History of total right knee replacement            VITAL SIGNS:  Vital Signs Last 24 Hrs  T(C): 36.1 (21 Dec 2021 07:30), Max: 36.2 (20 Dec 2021 17:00)  T(F): 96.9 (21 Dec 2021 07:30), Max: 97.2 (20 Dec 2021 17:00)  HR: 93 (21 Dec 2021 07:30) (62 - 93)  BP: 111/57 (21 Dec 2021 07:30) (111/57 - 140/63)  BP(mean): --  RR: 18 (21 Dec 2021 07:30) (15 - 21)  SpO2: 100% (21 Dec 2021 00:30) (99% - 100%)        PHYSICAL EXAMINATION:  Not in acute distress, lethargic  General: No pallor, no icterus  HEENT:   EOMI, no JVD.  Heart: S1+S2 audible  Lungs: bilateral  fair air entry, no wheezing, no crepitations.  Abdomen:_ DON drain, Soft, non-tender, non-distended , no  rigidity or guarding.  CNS: Awake alert, very lethargic  Extremities:  No edema            CONSULTS:  Consultant(s) Notes Reviewed by me.   Care Discussed with Consultants/Other Providers where required.        MEDICATIONS:  MEDICATIONS  (STANDING):  budesonide 160 MICROgram(s)/formoterol 4.5 MICROgram(s) Inhaler 2 Puff(s) Inhalation two times a day  cefTRIAXone   IVPB 2000 milliGRAM(s) IV Intermittent every 24 hours  chlorhexidine 4% Liquid 1 Application(s) Topical <User Schedule>  enoxaparin Injectable 72 milliGRAM(s) SubCutaneous every 12 hours  fentaNYL   Patch  12 MICROgram(s)/Hr 1 Patch Transdermal every 72 hours  folic acid 1 milliGRAM(s) Oral daily  influenza  Vaccine (HIGH DOSE) 0.7 milliLiter(s) IntraMuscular once  lactated ringers Bolus 500 milliLiter(s) IV Bolus once  methocarbamol 500 milliGRAM(s) Oral three times a day  metroNIDAZOLE    Tablet 500 milliGRAM(s) Oral every 8 hours  midodrine. 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     Acute cholecystitis  Proteus bacteremia secondary to biliary source  Suspected atypical chest pain possibly skeletomuscular  Sacral decubitus  Chronic A-Fib   Chronic HFpEF  Orthostatic hypotension  chronic DVT      PT was planned for ERCP today for CBD occlusion but he refused and opts for hospice  Acute anemia- Status post BT one unit 12/19  S/p  Percutaneous cholecystostomy &  pigtail catheter. 12/17  Surgery noted- high risk . not a candidate for surgery for cholecystectomy  ID noted -- Ertapenem 1g q8h IV given ESBL, - Vanc 1g q12h IV for e. faecium R amp , If IV antibiotic not within GOC, can do PO bactrim 1 DS BID & PO flagyl 500mg TID 7 days)  Chronic HFpEF- no exacerbation  Orthostatic hypotension-Cont Midodrine 10 mg po q 12h. BP still low .  torsemide on hold  ECHO 12/02- unremarkable with EF of 55%  Chronic A-Fib on Coumadin( held as INR was high). Lovenox for now as INR is now low  chronic DVT of the R common femoral and popliteal veins-On coumadin- NO PE as per CTA  Tachy-selvin syndrome-- s/p loop recorder  Reefused debridement for Sacral decubitus  Poor prognosis  Patient is DNR/DNI.     Addendum for GOC:;    Handoff: Pending hospice in SNF       Progress Note:  Provider Speciality                            Hospitalist      RODNEY SANTO MRN-[id number] 81y Male     CHIEF PRESENTING COMPLAINT:  Patient is a 81y old  Male who presents with a chief complaint of chest pain (18 Dec 2021 06:47)        SUBJECTIVE:  Patient was seen and examined at bedside. complaint of lethargy & weakness  No significant overnight events reported.     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 Pt reports that this morning, he was awakened by acute, sudden onset L sided chest pain at 4AM. Describes the pain as "pounding", states that he felt some pain radiate to LUE as well. Denies identifiable aggravating or alleviating factors and states that this has never happened before. Denies headache/dizziness, endorses chronic shortness of breath that is unchanged, has been requiring his baseline oxygen. Denies abdominal pain. Endorses compliance with all medications. States that at baseline, he is bedbound, cannot stand up or ambulate due to weakness. Was recently admitted to [facility] for short term rehab, where he wishes to go back because feels that he cannot care for himself at home. Lives with son but needs more help and PT.   In the ED, T 96, /58, HR 85, RR 18, SpO2 98% on O2 NC 4L, then 100% on 3L. Lab work revealed Hb 9.1, INR 7.87, troponin 0.03 and BNP 3327 (both near baseline). EKG revealed AFib, HR 88. CXR revealed slightly inc small R sided pleural effusion. (30 Nov 2021 11:30)        REVIEW OF SYSTEMS:  Patient denies any headache, any vision complaints, runny nose, fever, chills, sore throat. Denies chest pain, shortness of breath, palpitation. Denies nausea, vomiting, abdominal pain, diarrhoea, Denies urinary burning, urgency, frequency, dysuria. At least 10 systems were reviewed in ROS. All systems reviewed  are within normal limits except for the complaints as described in Subjective.    PAST MEDICAL & SURGICAL HISTORY:  PAST MEDICAL & SURGICAL HISTORY:  COPD (chronic obstructive pulmonary disease)    Hypertension    Deep vein thrombosis (DVT) of left lower extremity, unspecified chronicity, unspecified vein    Cellulitis of left lower extremity    Chronic atrial fibrillation    Mitral valve insufficiency, unspecified etiology  Moderate    CHF (congestive heart failure)    S/P coronary artery stent placement    History of total right knee replacement            VITAL SIGNS:  Vital Signs Last 24 Hrs  T(C): 36.1 (21 Dec 2021 07:30), Max: 36.2 (20 Dec 2021 17:00)  T(F): 96.9 (21 Dec 2021 07:30), Max: 97.2 (20 Dec 2021 17:00)  HR: 93 (21 Dec 2021 07:30) (62 - 93)  BP: 111/57 (21 Dec 2021 07:30) (111/57 - 140/63)  BP(mean): --  RR: 18 (21 Dec 2021 07:30) (15 - 21)  SpO2: 100% (21 Dec 2021 00:30) (99% - 100%)        PHYSICAL EXAMINATION:  Not in acute distress, lethargic  General: No pallor, no icterus  HEENT:   EOMI, no JVD.  Heart: S1+S2 audible  Lungs: bilateral  fair air entry, no wheezing, no crepitations.  Abdomen:_ DON drain, Soft, non-tender, non-distended , no  rigidity or guarding.  CNS: Awake alert, very lethargic  Extremities:  No edema            CONSULTS:  Consultant(s) Notes Reviewed by me.   Care Discussed with Consultants/Other Providers where required.        MEDICATIONS:  MEDICATIONS  (STANDING):  budesonide 160 MICROgram(s)/formoterol 4.5 MICROgram(s) Inhaler 2 Puff(s) Inhalation two times a day  cefTRIAXone   IVPB 2000 milliGRAM(s) IV Intermittent every 24 hours  chlorhexidine 4% Liquid 1 Application(s) Topical <User Schedule>  enoxaparin Injectable 72 milliGRAM(s) SubCutaneous every 12 hours  fentaNYL   Patch  12 MICROgram(s)/Hr 1 Patch Transdermal every 72 hours  folic acid 1 milliGRAM(s) Oral daily  influenza  Vaccine (HIGH DOSE) 0.7 milliLiter(s) IntraMuscular once  lactated ringers Bolus 500 milliLiter(s) IV Bolus once  methocarbamol 500 milliGRAM(s) Oral three times a day  metroNIDAZOLE    Tablet 500 milliGRAM(s) Oral every 8 hours  midodrine. 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     Acute cholecystitis  Proteus bacteremia secondary to biliary source  Suspected atypical chest pain possibly skeletomuscular  Sacral decubitus  Chronic A-Fib   Chronic HFpEF  Orthostatic hypotension  chronic DVT      PT was planned for ERCP today for CBD occlusion but he refused and opts for hospice  Acute anemia- Status post BT one unit 12/19  S/p  Percutaneous cholecystostomy &  pigtail catheter. 12/17  Surgery noted- high risk . not a candidate for surgery for cholecystectomy  ID noted -- Ertapenem 1g q8h IV given ESBL, - Vanc 1g q12h IV for e. faecium R amp , If IV antibiotic not within GOC, can do PO bactrim 1 DS BID & PO flagyl 500mg TID 7 days)  Chronic HFpEF- no exacerbation  Orthostatic hypotension-Cont Midodrine 10 mg po q 12h. BP still low .  torsemide on hold  ECHO 12/02- unremarkable with EF of 55%  Chronic A-Fib on Coumadin( held as INR was high). Lovenox for now as INR is now low  chronic DVT of the R common femoral and popliteal veins-On coumadin- NO PE as per CTA  Tachy-selvin syndrome-- s/p loop recorder  Reefused debridement for Sacral decubitus  Poor prognosis    Addendum for GOC:  Met with patient  to discuss overall goals of care. Discussed patient's current prognosis, diagnosis, and treatment options.  Patient is able to make decisions  Patient verbalized understanding of his very complicated medical conditions.. At this time patient is requesting comfort measures only, and categorically refuses any invasive management.  Discussed hospice, pt receptive.  Hospice consult already  requested.   Pt is already  DNR/DNI.      Handoff: Pending hospice in SNF

## 2021-12-21 NOTE — CHART NOTE - NSCHARTNOTEFT_GEN_A_CORE
Spoke to daughter Mery of patient    She said pt requesting comfort measures only and does not want further procedures. Comfort care reviewed with daughter Mery who is next of kin. She said she will be available by phone when palliative care sees pt today between 10:30am.    attempted to call resident and nursing unit - no answer.    A/P   DNR/DNI  patient and family requesting comfort care, will confirm   continue current pain management   Family agreeable to hospice   call x [phone] for palliative care team

## 2021-12-21 NOTE — PROGRESS NOTE ADULT - ASSESSMENT
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, DVT July 2021, HTN, and COPD on 2.5L home O2, cholecystitis s/p cholecystostomy by IR in September, removed 10 days ago presents for chest pain admitted 11/30.; Noted to have leukocytosis and hypotension and + BCX, ID consulted    IMPRESSION  #Proteus bacteremia secondary to biliary source    12/17 body fluid   Numerous Enterococcus faecium (R amp S amp )    Numerous Escherichia coli ESBL     s/p Percutaneous cholecystostomy 12/17 12/12 BCX NGTD     12/11 BCX NGTD     12/7 BCX  + (S)    HIDA +     UA unremarkable     9/2021 cholecystitis s/p cholecystotomy, 9/14 CX   Rare Escherichia coli  #Sacral ulcer    12/14 CX   Numerous Proteus mirabilis ESBL    Moderate Escherichia coli ESBL  #PPM  #DVT  #CT There are small bilateral pleural effusions with mild atelectatic changes   at the lung bases.  Creatinine, Serum: 0.8 mg/dL (12.20.21 @ 04:30)    Weight (kg): 81.6 (10-16-21 @ 10:57)    RECOMMENDATIONS  - If within GOC, CHANGE antibiotic to Meropenem 1g q8h IV given ESBL  - Vanc 1g q12h IV for e. faecium R amp   - Please check vanc trough 30 min prior to 4th dose   - Duration 7 days   - If IV antibiotic not within GOC, can do PO bactrim 1 DS BID & PO flagyl 500mg TID 7 days    If any questions, please call or send a message on [employer]  Please continue to update ID with any pertinent new laboratory or radiographic findings  Spectra [id number]

## 2021-12-21 NOTE — PROGRESS NOTE ADULT - TIME BILLING
I have personally seen and examined this patient.  I have reviewed all pertinent clinical information and reviewed all relevant imaging and diagnostic studies personally.   I counseled the patient about diagnostic testing and treatment plan. All questions were answered.  I discussed recommendations with the primary team.
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       Proteus bacteremia secondary to biliary source  Suspected atypical chest pain possibly skeletomuscular  Sacral decubitus  Chronic A-Fib   Chronic HFpEF  Orthostatic hypotension  chronic DVT        Repeat 12/12 BCX NGTD on 12/11 and 12/12  HIDA positive  ID-- Ceftriaxone 2g q24h IV - Continue PO flagyl 500mg TID  Surgery noted- high risk . not a candidate for surgery  Chronic HFpEF- no exacerbation  Orthostatic hypotension-Cont Midodrine 10 mg po q 12h. BP still low .  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   Handoff: Refusing  IR cholecystostomy and also debridement for Sacral decubitus, pending hospice at SNF
I have personally seen and examined this patient.  I have reviewed all pertinent clinical information and reviewed all relevant imaging and diagnostic studies personally.   I counseled the patient about diagnostic testing and treatment plan. All questions were answered.  I discussed recommendations with the primary team.
I have personally seen and examined this patient.  I have reviewed all pertinent clinical information and reviewed all relevant imaging and diagnostic studies personally.   I counseled the patient about diagnostic testing and treatment plan. All questions were answered.  I discussed recommendations with the primary team.
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       Proteus bacteremia secondary to biliary source  Suspected atypical chest pain possibly skeletomuscular  Sacral decubitus  Chronic A-Fib   Chronic HFpEF  Orthostatic hypotension  chronic DVT        Repeat 12/12 BCX NGTD on 12/11 and 12/12  HIDA positive  ID-- Ceftriaxone 2g q24h IV - Continue PO flagyl 500mg TID  Surgery noted- high risk . not a candidate for surgery. IR consulted for cholecystostomy  Chronic HFpEF- no exacerbation  Orthostatic hypotension-Cont Midodrine 10 mg po q 12h. BP still low .  torsemide on hold  ECHO 12/02- unremarkable with EF of 55%  Chronic A-Fib on Coumadin( held as INR was high). 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   Handoff: Pending  IR cholecystostomy.
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       Proteus bacteremia secondary to biliary source  Suspected atypical chest pain possibly skeletomuscular  Sacral decubitus  Chronic A-Fib   Chronic HFpEF  Orthostatic hypotension  chronic DVT        Repeat 12/12 BCX NGTD on 12/11 and 12/12  HIDA positive  ID-- Ceftriaxone 2g q24h IV - Continue PO flagyl 500mg TID  Surgery noted- high risk . not a candidate for surgery. IR consulted for cholecystostomy  Chronic HFpEF- no exacerbation  Orthostatic hypotension-Cont Midodrine 10 mg po q 12h. BP still low .  torsemide on hold  ECHO 12/02- unremarkable with EF of 55%  Chronic A-Fib on Coumadin( held as INR was high). 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   Handoff: Pending  IR cholecystostomy today.
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     Acute cholecystitis  Proteus bacteremia secondary to biliary source  Suspected atypical chest pain possibly skeletomuscular  Sacral decubitus  Chronic A-Fib   Chronic HFpEF  Orthostatic hypotension  chronic DVT      S/p  Percutaneous cholecystostomy &  pigtail catheter. 12/17  Advanced GI consultation recommended for consideration of ERCP for CBD occlusion.  Surgery noted- high risk . not a candidate for surgery for cholecystectomy  ID-- Ceftriaxone 2g q24h IV - Continue PO flagyl 500mg TID  Chronic HFpEF- no exacerbation  Orthostatic hypotension-Cont Midodrine 10 mg po q 12h. BP still low .  torsemide on hold  ECHO 12/02- unremarkable with EF of 55%  Chronic A-Fib on Coumadin( held as INR was high). 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   Handoff: Pending ERCP & burn for debridement if pt & family agrees

## 2021-12-21 NOTE — PROGRESS NOTE ADULT - SUBJECTIVE AND OBJECTIVE BOX
RODNEY SANTO  81y, Male  Allergy: No Known Allergies      LOS  21d    CHIEF COMPLAINT: chest pain (21 Dec 2021 09:14)      INTERVAL EVENTS/HPI  - No acute events overnight  - T(F): , Max: 97.7 (12-20-21 @ 13:28)  - Tolerating medication  - WBC Count: 8.08 (12-20-21 @ 04:30)  WBC Count: 7.52 (12-19-21 @ 21:56)     - Creatinine, Serum: 0.8 (12-20-21 @ 04:30)  Creatinine, Serum: 0.9 (12-19-21 @ 21:56)       ROS  ***    VITALS:  T(F): 96.9, Max: 97.7 (12-20-21 @ 13:28)  HR: 93  BP: 111/57  RR: 18Vital Signs Last 24 Hrs  T(C): 36.1 (21 Dec 2021 07:30), Max: 36.5 (20 Dec 2021 13:28)  T(F): 96.9 (21 Dec 2021 07:30), Max: 97.7 (20 Dec 2021 13:28)  HR: 93 (21 Dec 2021 07:30) (62 - 93)  BP: 111/57 (21 Dec 2021 07:30) (111/57 - 144/70)  BP(mean): --  RR: 18 (21 Dec 2021 07:30) (15 - 24)  SpO2: 100% (21 Dec 2021 00:30) (98% - 100%)    PHYSICAL EXAM:  ***    FH: Non-contributory  Social Hx: Non-contributory    TESTS & MEASUREMENTS:                        9.9    8.08  )-----------( 86       ( 20 Dec 2021 04:30 )             29.7     12-20    139  |  107  |  27<H>  ----------------------------<  57<L>  4.4   |  18  |  0.8    Ca    7.5<L>      20 Dec 2021 04:30  Mg     2.3     12-20    TPro  5.0<L>  /  Alb  2.0<L>  /  TBili  0.7  /  DBili  x   /  AST  16  /  ALT  14  /  AlkPhos  318<H>  12-20      LIVER FUNCTIONS - ( 20 Dec 2021 04:30 )  Alb: 2.0 g/dL / Pro: 5.0 g/dL / ALK PHOS: 318 U/L / ALT: 14 U/L / AST: 16 U/L / GGT: x               Culture - Body Fluid with Gram Stain (collected 12-17-21 @ 09:36)  Source: .Body Fluid Bile Fluid  Gram Stain (12-17-21 @ 23:03):    No polymorphonuclear leukocytes seen    Gram positive cocci in pairs seen per oil power field    Gram Negative Rods seen per oil power field    by cytocentrifuge  Preliminary Report (12-19-21 @ 22:10):    Numerous Enterococcus faecium    Numerous Escherichia coli ESBL  Organism: Enterococcus faecium  Escherichia coli ESBL (12-19-21 @ 22:10)  Organism: Enterococcus faecium (12-19-21 @ 22:10)      -  Ampicillin: R >8 Predicts results to ampicillin/sulbactam, amoxacillin-clavulanate and  piperacillin-tazobactam.      -  Tetra/Doxy: R >8      -  Vancomycin: S 1      Method Type: SHEFALI  Organism: Escherichia coli ESBL (12-19-21 @ 21:16)      -  Amikacin: S <=16      -  Amoxicillin/Clavulanic Acid: S <=8/4      -  Ampicillin: R >16 These ampicillin results predict results for amoxicillin      -  Ampicillin/Sulbactam: R >16/8 Enterobacter, Klebsiella aerogenes, Citrobacter, and Serratia may develop resistance during prolonged therapy (3-4 days)      -  Aztreonam: R >16      -  Cefazolin: R >16 Enterobacter, Klebsiella aerogenes, Citrobacter, and Serratia may develop resistance during prolonged therapy (3-4 days)      -  Cefepime: R >16      -  Cefoxitin: I 16      -  Ceftriaxone: R >32 Enterobacter, Klebsiella aerogenes, Citrobacter, and Serratia may develop resistance during prolonged therapy      -  Ciprofloxacin: R >2      -  Ertapenem: S <=0.5      -  Gentamicin: S <=2      -  Imipenem: S <=1      -  Levofloxacin: R >4      -  Meropenem: S <=1      -  Piperacillin/Tazobactam: R <=8      -  Tobramycin: S 4      -  Trimethoprim/Sulfamethoxazole: S <=0.5/9.5      Method Type: SHEFALI    Culture - Other (collected 12-14-21 @ 12:17)  Source: .Other sacrum  Final Report (12-21-21 @ 08:20):    Numerous Proteus mirabilis ESBL    Moderate Escherichia coli ESBL  Organism: Proteus mirabilis ESBL  Escherichia coli ESBL (12-21-21 @ 08:20)  Organism: Escherichia coli ESBL (12-21-21 @ 08:20)      -  Amikacin: S <=16      -  Amoxicillin/Clavulanic Acid: S <=8/4      -  Ampicillin: R >16 These ampicillin results predict results for amoxicillin      -  Ampicillin/Sulbactam: R >16/8 Enterobacter, Klebsiella aerogenes, Citrobacter, and Serratia may develop resistance during prolonged therapy (3-4 days)      -  Aztreonam: R >16      -  Cefazolin: R >16 Enterobacter, Klebsiella aerogenes, Citrobacter, and Serratia may develop resistance during prolonged therapy (3-4 days)      -  Cefepime: R >16      -  Cefoxitin: S <=8      -  Ceftriaxone: R >32 Enterobacter, Klebsiella aerogenes, Citrobacter, and Serratia may develop resistance during prolonged therapy      -  Ciprofloxacin: R >2      -  Ertapenem: S <=0.5      -  Gentamicin: S <=2      -  Imipenem: S <=1      -  Levofloxacin: R >4      -  Meropenem: S <=1      -  Piperacillin/Tazobactam: R <=8      -  Tobramycin: S <=2      -  Trimethoprim/Sulfamethoxazole: S <=0.5/9.5      Method Type: SHEFALI  Organism: Proteus mirabilis ESBL (12-21-21 @ 08:20)      -  Amikacin: S <=16      -  Amoxicillin/Clavulanic Acid: S <=8/4      -  Ampicillin: R >16 These ampicillin results predict results for amoxicillin      -  Ampicillin/Sulbactam: R <=4/2 Enterobacter, Klebsiella aerogenes, Citrobacter, and Serratia may develop resistance during prolonged therapy (3-4 days)      -  Aztreonam: R <=4      -  Cefazolin: R >16 Enterobacter, Klebsiella aerogenes, Citrobacter, and Serratia may develop resistance during prolonged therapy (3-4 days)      -  Cefepime: R 8      -  Cefoxitin: S <=8      -  Ceftriaxone: R 8 Enterobacter, Klebsiella aerogenes, Citrobacter, and Serratia may develop resistance during prolonged therapy      -  Ciprofloxacin: R >2      -  Ertapenem: S <=0.5      -  Gentamicin: S <=2      -  Levofloxacin: R 4      -  Meropenem: S <=1      -  Piperacillin/Tazobactam: R <=8      -  Tobramycin: S <=2      -  Trimethoprim/Sulfamethoxazole: R >2/38      Method Type: SHEFALI    Culture - Blood (collected 12-12-21 @ 06:32)  Source: .Blood None  Final Report (12-17-21 @ 17:00):    No Growth Final    Culture - Blood (collected 12-11-21 @ 04:30)  Source: .Blood Blood  Final Report (12-16-21 @ 17:01):    No Growth Final    Culture - Blood (collected 12-07-21 @ 21:00)  Source: .Blood None  Gram Stain (12-09-21 @ 19:26):    Growth in anaerobic bottle: Gram Negative Rods  Final Report (12-11-21 @ 08:32):    Growth in anaerobic bottle: Proteus mirabilis    See previous culture [id number]    Culture - Blood (collected 12-07-21 @ 18:41)  Source: .Blood None  Gram Stain (12-09-21 @ 14:38):    Growth in anaerobic bottle: Gram Negative Rods  Final Report (12-11-21 @ 08:29):    Growth in anaerobic bottle: Proteus mirabilis    ***Blood Panel PCR results on this specimen are available    approximately 3 hours after the Gram stain result.***    Gram stain, PCR, and/or culture results may not always    correspond due to difference inmethodologies.    ************************************************************    This PCR assay was performed by multiplex PCR. This    Assay tests for 66 bacterial and resistance gene targets.    Please refer to the [facility] test directory    [email or web address] for details.  Organism: Blood Culture PCR  Proteus mirabilis (12-11-21 @ 08:29)  Organism: Proteus mirabilis (12-11-21 @ 08:29)      -  Amikacin: S <=16      -  Ampicillin: S <=8 These ampicillin results predict results for amoxicillin      -  Ampicillin/Sulbactam: S <=4/2 Enterobacter, Klebsiella aerogenes, Citrobacter, and Serratia may develop resistance during prolonged therapy (3-4 days)      -  Aztreonam: S <=4      -  Cefazolin: S <=2 Enterobacter, Klebsiella aerogenes, Citrobacter, and Serratia may develop resistance during prolonged therapy (3-4 days)      -  Cefepime: S <=2      -  Cefoxitin: S <=8      -  Ceftriaxone: S <=1 Enterobacter, Klebsiella aerogenes, Citrobacter, and Serratia may develop resistance during prolonged therapy      -  Ciprofloxacin: S <=0.25      -  Ertapenem: S <=0.5      -  Gentamicin: S <=2      -  Levofloxacin: S <=0.5      -  Meropenem: S <=1      -  Piperacillin/Tazobactam: S <=8      -  Tobramycin: S <=2      -  Trimethoprim/Sulfamethoxazole: S <=0.5/9.5      Method Type: SHEFALI  Organism: Blood Culture PCR (12-11-21 @ 08:29)      -  Proteus mirabilis: Detec      Method Type: PCR            INFECTIOUS DISEASES TESTING  COVID-19 PCR: NotDetec (12-19-21 @ 04:30)  COVID-19 PCR: NotDetec (12-13-21 @ 17:25)  Procalcitonin, Serum: 0.25 (12-07-21 @ 18:41)  COVID-19 PCR: NotDetec (12-07-21 @ 12:50)  COVID-19 PCR: NotDetec (11-30-21 @ 06:35)  COVID-19 PCR: NotDetec (11-20-21 @ 13:35)  COVID-19 PCR: NotDetec (09-20-21 @ 19:15)  Procalcitonin, Serum: 0.16 (09-13-21 @ 05:00)  COVID-19 PCR: NotDetec (09-12-21 @ 12:00)  COVID-19 PCR: NotDetec (08-02-21 @ 14:22)  COVID-19 PCR: NotDetec (07-30-21 @ 20:37)  COVID-19 PCR: NotDetec (07-22-21 @ 15:05)  COVID-19 PCR: NotDetec (07-19-21 @ 15:20)  COVID-19 PCR: NotDetec (07-10-21 @ 15:11)  HIV-1/2 Combo Result: Nonreact (07-09-21 @ 06:54)  COVID-19 PCR: NotDetec (07-07-21 @ 13:20)  COVID-19 PCR: NotDetec (04-04-21 @ 13:19)      INFLAMMATORY MARKERS      RADIOLOGY & ADDITIONAL TESTS:  I have personally reviewed the last available Chest xray  CXR      CT      CARDIOLOGY TESTING      MEDICATIONS  budesonide 160 MICROgram(s)/formoterol 4.5 MICROgram(s) Inhaler 2 Inhalation two times a day  cefTRIAXone   IVPB 2000 IV Intermittent every 24 hours  chlorhexidine 4% Liquid 1 Topical <User Schedule>  fentaNYL   Patch  12 MICROgram(s)/Hr 1 Transdermal every 72 hours  folic acid 1 Oral daily  indomethacin Suppository 100 Rectal once  influenza  Vaccine (HIGH DOSE) 0.7 IntraMuscular once  lactated ringers Bolus 500 IV Bolus once  methocarbamol 500 Oral three times a day  metroNIDAZOLE    Tablet 500 Oral every 8 hours  midodrine. 10 Oral three times a day  pantoprazole    Tablet 40 Oral before breakfast  polyethylene glycol 3350 17 Oral daily  senna 2 Oral at bedtime  silver sulfADIAZINE 1% Cream 1 Topical two times a day      WEIGHT  Weight (kg): 72.5 (12-20-21 @ 13:32)      ANTIBIOTICS:  cefTRIAXone   IVPB 2000 milliGRAM(s) IV Intermittent every 24 hours  metroNIDAZOLE    Tablet 500 milliGRAM(s) Oral every 8 hours      All available historical records have been reviewed       RODNEY SANTO  81y, Male  Allergy: No Known Allergies      LOS  21d    CHIEF COMPLAINT: chest pain (21 Dec 2021 09:14)      INTERVAL EVENTS/HPI  - No acute events overnight  - T(F): , Max: 97.7 (12-20-21 @ 13:28)  - Tolerating medication  - WBC Count: 8.08 (12-20-21 @ 04:30)  WBC Count: 7.52 (12-19-21 @ 21:56)     - Creatinine, Serum: 0.8 (12-20-21 @ 04:30)  Creatinine, Serum: 0.9 (12-19-21 @ 21:56)       ROS  General: Denies rigors, nightsweats  HEENT: Denies headache, rhinorrhea, sore throat, eye pain  CV: Denies CP, palpitations  PULM: Denies wheezing, hemoptysis  GI: Denies hematemesis, hematochezia, melena  : Denies discharge, hematuria  MSK: Denies arthralgias, myalgias  SKIN: Denies rash, lesions  NEURO: Denies paresthesias, weakness  PSYCH: Denies depression, anxiety     VITALS:  T(F): 96.9, Max: 97.7 (12-20-21 @ 13:28)  HR: 93  BP: 111/57  RR: 18Vital Signs Last 24 Hrs  T(C): 36.1 (21 Dec 2021 07:30), Max: 36.5 (20 Dec 2021 13:28)  T(F): 96.9 (21 Dec 2021 07:30), Max: 97.7 (20 Dec 2021 13:28)  HR: 93 (21 Dec 2021 07:30) (62 - 93)  BP: 111/57 (21 Dec 2021 07:30) (111/57 - 144/70)  BP(mean): --  RR: 18 (21 Dec 2021 07:30) (15 - 24)  SpO2: 100% (21 Dec 2021 00:30) (98% - 100%)    PHYSICAL EXAM:  Gen: NAD, resting in bed, chronically ill appearing   HEENT: Normocephalic, atraumatic  Neck: supple, no lymphadenopathy  CV: Regular rate & regular rhythm  Lungs: decreased BS at bases, no fremitus  Abdomen: Soft, BS present, cholecystotomy tube  Ext: Warm, well perfused  Neuro: non focal, awake  Skin: no rash, no erythema  Lines: no phlebitis     FH: Non-contributory  Social Hx: Non-contributory    TESTS & MEASUREMENTS:                        9.9    8.08  )-----------( 86       ( 20 Dec 2021 04:30 )             29.7     12-20    139  |  107  |  27<H>  ----------------------------<  57<L>  4.4   |  18  |  0.8    Ca    7.5<L>      20 Dec 2021 04:30  Mg     2.3     12-20    TPro  5.0<L>  /  Alb  2.0<L>  /  TBili  0.7  /  DBili  x   /  AST  16  /  ALT  14  /  AlkPhos  318<H>  12-20      LIVER FUNCTIONS - ( 20 Dec 2021 04:30 )  Alb: 2.0 g/dL / Pro: 5.0 g/dL / ALK PHOS: 318 U/L / ALT: 14 U/L / AST: 16 U/L / GGT: x               Culture - Body Fluid with Gram Stain (collected 12-17-21 @ 09:36)  Source: .Body Fluid Bile Fluid  Gram Stain (12-17-21 @ 23:03):    No polymorphonuclear leukocytes seen    Gram positive cocci in pairs seen per oil power field    Gram Negative Rods seen per oil power field    by cytocentrifuge  Preliminary Report (12-19-21 @ 22:10):    Numerous Enterococcus faecium    Numerous Escherichia coli ESBL  Organism: Enterococcus faecium  Escherichia coli ESBL (12-19-21 @ 22:10)  Organism: Enterococcus faecium (12-19-21 @ 22:10)      -  Ampicillin: R >8 Predicts results to ampicillin/sulbactam, amoxacillin-clavulanate and  piperacillin-tazobactam.      -  Tetra/Doxy: R >8      -  Vancomycin: S 1      Method Type: SHEFALI  Organism: Escherichia coli ESBL (12-19-21 @ 21:16)      -  Amikacin: S <=16      -  Amoxicillin/Clavulanic Acid: S <=8/4      -  Ampicillin: R >16 These ampicillin results predict results for amoxicillin      -  Ampicillin/Sulbactam: R >16/8 Enterobacter, Klebsiella aerogenes, Citrobacter, and Serratia may develop resistance during prolonged therapy (3-4 days)      -  Aztreonam: R >16      -  Cefazolin: R >16 Enterobacter, Klebsiella aerogenes, Citrobacter, and Serratia may develop resistance during prolonged therapy (3-4 days)      -  Cefepime: R >16      -  Cefoxitin: I 16      -  Ceftriaxone: R >32 Enterobacter, Klebsiella aerogenes, Citrobacter, and Serratia may develop resistance during prolonged therapy      -  Ciprofloxacin: R >2      -  Ertapenem: S <=0.5      -  Gentamicin: S <=2      -  Imipenem: S <=1      -  Levofloxacin: R >4      -  Meropenem: S <=1      -  Piperacillin/Tazobactam: R <=8      -  Tobramycin: S 4      -  Trimethoprim/Sulfamethoxazole: S <=0.5/9.5      Method Type: SHEFALI    Culture - Other (collected 12-14-21 @ 12:17)  Source: .Other sacrum  Final Report (12-21-21 @ 08:20):    Numerous Proteus mirabilis ESBL    Moderate Escherichia coli ESBL  Organism: Proteus mirabilis ESBL  Escherichia coli ESBL (12-21-21 @ 08:20)  Organism: Escherichia coli ESBL (12-21-21 @ 08:20)      -  Amikacin: S <=16      -  Amoxicillin/Clavulanic Acid: S <=8/4      -  Ampicillin: R >16 These ampicillin results predict results for amoxicillin      -  Ampicillin/Sulbactam: R >16/8 Enterobacter, Klebsiella aerogenes, Citrobacter, and Serratia may develop resistance during prolonged therapy (3-4 days)      -  Aztreonam: R >16      -  Cefazolin: R >16 Enterobacter, Klebsiella aerogenes, Citrobacter, and Serratia may develop resistance during prolonged therapy (3-4 days)      -  Cefepime: R >16      -  Cefoxitin: S <=8      -  Ceftriaxone: R >32 Enterobacter, Klebsiella aerogenes, Citrobacter, and Serratia may develop resistance during prolonged therapy      -  Ciprofloxacin: R >2      -  Ertapenem: S <=0.5      -  Gentamicin: S <=2      -  Imipenem: S <=1      -  Levofloxacin: R >4      -  Meropenem: S <=1      -  Piperacillin/Tazobactam: R <=8      -  Tobramycin: S <=2      -  Trimethoprim/Sulfamethoxazole: S <=0.5/9.5      Method Type: SHEFALI  Organism: Proteus mirabilis ESBL (12-21-21 @ 08:20)      -  Amikacin: S <=16      -  Amoxicillin/Clavulanic Acid: S <=8/4      -  Ampicillin: R >16 These ampicillin results predict results for amoxicillin      -  Ampicillin/Sulbactam: R <=4/2 Enterobacter, Klebsiella aerogenes, Citrobacter, and Serratia may develop resistance during prolonged therapy (3-4 days)      -  Aztreonam: R <=4      -  Cefazolin: R >16 Enterobacter, Klebsiella aerogenes, Citrobacter, and Serratia may develop resistance during prolonged therapy (3-4 days)      -  Cefepime: R 8      -  Cefoxitin: S <=8      -  Ceftriaxone: R 8 Enterobacter, Klebsiella aerogenes, Citrobacter, and Serratia may develop resistance during prolonged therapy      -  Ciprofloxacin: R >2      -  Ertapenem: S <=0.5      -  Gentamicin: S <=2      -  Levofloxacin: R 4      -  Meropenem: S <=1      -  Piperacillin/Tazobactam: R <=8      -  Tobramycin: S <=2      -  Trimethoprim/Sulfamethoxazole: R >2/38      Method Type: SHEFALI    Culture - Blood (collected 12-12-21 @ 06:32)  Source: .Blood None  Final Report (12-17-21 @ 17:00):    No Growth Final    Culture - Blood (collected 12-11-21 @ 04:30)  Source: .Blood Blood  Final Report (12-16-21 @ 17:01):    No Growth Final    Culture - Blood (collected 12-07-21 @ 21:00)  Source: .Blood None  Gram Stain (12-09-21 @ 19:26):    Growth in anaerobic bottle: Gram Negative Rods  Final Report (12-11-21 @ 08:32):    Growth in anaerobic bottle: Proteus mirabilis    See previous culture [id number]    Culture - Blood (collected 12-07-21 @ 18:41)  Source: .Blood None  Gram Stain (12-09-21 @ 14:38):    Growth in anaerobic bottle: Gram Negative Rods  Final Report (12-11-21 @ 08:29):    Growth in anaerobic bottle: Proteus mirabilis    ***Blood Panel PCR results on this specimen are available    approximately 3 hours after the Gram stain result.***    Gram stain, PCR, and/or culture results may not always    correspond due to difference inmethodologies.    ************************************************************    This PCR assay was performed by multiplex PCR. This    Assay tests for 66 bacterial and resistance gene targets.    Please refer to the [facility] test directory    [email or web address] for details.  Organism: Blood Culture PCR  Proteus mirabilis (12-11-21 @ 08:29)  Organism: Proteus mirabilis (12-11-21 @ 08:29)      -  Amikacin: S <=16      -  Ampicillin: S <=8 These ampicillin results predict results for amoxicillin      -  Ampicillin/Sulbactam: S <=4/2 Enterobacter, Klebsiella aerogenes, Citrobacter, and Serratia may develop resistance during prolonged therapy (3-4 days)      -  Aztreonam: S <=4      -  Cefazolin: S <=2 Enterobacter, Klebsiella aerogenes, Citrobacter, and Serratia may develop resistance during prolonged therapy (3-4 days)      -  Cefepime: S <=2      -  Cefoxitin: S <=8      -  Ceftriaxone: S <=1 Enterobacter, Klebsiella aerogenes, Citrobacter, and Serratia may develop resistance during prolonged therapy      -  Ciprofloxacin: S <=0.25      -  Ertapenem: S <=0.5      -  Gentamicin: S <=2      -  Levofloxacin: S <=0.5      -  Meropenem: S <=1      -  Piperacillin/Tazobactam: S <=8      -  Tobramycin: S <=2      -  Trimethoprim/Sulfamethoxazole: S <=0.5/9.5      Method Type: SHEFALI  Organism: Blood Culture PCR (12-11-21 @ 08:29)      -  Proteus mirabilis: Detec      Method Type: PCR            INFECTIOUS DISEASES TESTING  COVID-19 PCR: NotDetec (12-19-21 @ 04:30)  COVID-19 PCR: NotDetec (12-13-21 @ 17:25)  Procalcitonin, Serum: 0.25 (12-07-21 @ 18:41)  COVID-19 PCR: NotDetec (12-07-21 @ 12:50)  COVID-19 PCR: NotDetec (11-30-21 @ 06:35)  COVID-19 PCR: NotDetec (11-20-21 @ 13:35)  COVID-19 PCR: NotDetec (09-20-21 @ 19:15)  Procalcitonin, Serum: 0.16 (09-13-21 @ 05:00)  COVID-19 PCR: NotDetec (09-12-21 @ 12:00)  COVID-19 PCR: NotDetec (08-02-21 @ 14:22)  COVID-19 PCR: NotDetec (07-30-21 @ 20:37)  COVID-19 PCR: NotDetec (07-22-21 @ 15:05)  COVID-19 PCR: NotDetec (07-19-21 @ 15:20)  COVID-19 PCR: NotDetec (07-10-21 @ 15:11)  HIV-1/2 Combo Result: Nonreact (07-09-21 @ 06:54)  COVID-19 PCR: NotDetec (07-07-21 @ 13:20)  COVID-19 PCR: NotDetec (04-04-21 @ 13:19)      INFLAMMATORY MARKERS      RADIOLOGY & ADDITIONAL TESTS:  I have personally reviewed the last available Chest xray  CXR      CT      CARDIOLOGY TESTING      MEDICATIONS  budesonide 160 MICROgram(s)/formoterol 4.5 MICROgram(s) Inhaler 2 Inhalation two times a day  cefTRIAXone   IVPB 2000 IV Intermittent every 24 hours  chlorhexidine 4% Liquid 1 Topical <User Schedule>  fentaNYL   Patch  12 MICROgram(s)/Hr 1 Transdermal every 72 hours  folic acid 1 Oral daily  indomethacin Suppository 100 Rectal once  influenza  Vaccine (HIGH DOSE) 0.7 IntraMuscular once  lactated ringers Bolus 500 IV Bolus once  methocarbamol 500 Oral three times a day  metroNIDAZOLE    Tablet 500 Oral every 8 hours  midodrine. 10 Oral three times a day  pantoprazole    Tablet 40 Oral before breakfast  polyethylene glycol 3350 17 Oral daily  senna 2 Oral at bedtime  silver sulfADIAZINE 1% Cream 1 Topical two times a day      WEIGHT  Weight (kg): 72.5 (12-20-21 @ 13:32)      ANTIBIOTICS:  cefTRIAXone   IVPB 2000 milliGRAM(s) IV Intermittent every 24 hours  metroNIDAZOLE    Tablet 500 milliGRAM(s) Oral every 8 hours      All available historical records have been reviewed

## 2021-12-21 NOTE — PROGRESS NOTE ADULT - SUBJECTIVE AND OBJECTIVE BOX
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 Patient was admitted on November 30th and hospital course significant for proteus bacteremia. Patient was seen on past admission by surgery and Cholecystectomy was not done. He had prior Gallbladder drain which was removed around 2 weeks prior to this presentation. Patient now s/p placement of a percutaneous Gallbladder drain but cholangiogram noted possible occlusion of the CBD. Patient had ERCP attempted 12/20 but has Omid anatomy from prior surgery so ERCP could not be complete ( Found to have ulcers- see event note). Patient notes to me pain all over and weakness but no direct GI complaints. Does not want additional procedures.          PAST MEDICAL & SURGICAL HISTORY:  COPD (chronic obstructive pulmonary disease)  Hypertension  Deep vein thrombosis (DVT) of left lower extremity, unspecified chronicity, unspecified vein  Cellulitis of left lower extremity  Chronic atrial fibrillation  Mitral valve insufficiency, unspecified etiology  CHF (congestive heart failure)  S/P coronary artery stent placement  History of total right knee replacement    Social History  Denies Current Tobacco use  Denies Current ETOH use  Denies Current Illicit Drug use     Family Hx:  Father: Non Contributory   Mother: Non Contributory      MEDICATIONS  (STANDING):  budesonide 160 MICROgram(s)/formoterol 4.5 MICROgram(s) Inhaler 2 Puff(s) Inhalation two times a day  cefTRIAXone   IVPB 2000 milliGRAM(s) IV Intermittent every 24 hours  chlorhexidine 4% Liquid 1 Application(s) Topical <User Schedule>  collagenase Ointment 1 Application(s) Topical daily  fentaNYL   Patch  12 MICROgram(s)/Hr 1 Patch Transdermal every 72 hours  folic acid 1 milliGRAM(s) Oral daily  influenza  Vaccine (HIGH DOSE) 0.7 milliLiter(s) IntraMuscular once  lactated ringers Bolus 500 milliLiter(s) IV Bolus once  methocarbamol 500 milliGRAM(s) Oral three times a day  metroNIDAZOLE    Tablet 500 milliGRAM(s) Oral every 8 hours  midodrine. 10 milliGRAM(s) Oral three times a day  polyethylene glycol 3350 17 Gram(s) Oral daily  senna 2 Tablet(s) Oral at bedtime  silver sulfADIAZINE 1% Cream 1 Application(s) Topical two times a day    MEDICATIONS  (PRN):  ALBUTerol    90 MICROgram(s) HFA Inhaler 2 Puff(s) Inhalation every 6 hours PRN Shortness of Breath and/or Wheezing  HYDROmorphone  Injectable 0.25 milliGRAM(s) IV Push every 2 hours PRN pain 4-10  melatonin 5 milliGRAM(s) Oral at bedtime PRN Insomnia      Allergies  No Known Allergies      Review of Systems  General:  See HPI  HEENT: Denies Trouble Swallowing ,Denies  Sore Throat , Denies Change in hearing/vision/speech ,Denies Dizziness    Cardio: Denies  Chest Pain , Palpitations    Respiratory: Denies worsening of SOB, Denies Cough  Abdomen: See detailed HPI  Neuro: Denies Headache Denies Dizziness, Denies Paresthesias  MSK: Denies pain in Bones/Joints/Muscles   Psych: Patient denies depression, denies suicidal or homicidal ideations  Integ: Patient Denies rash, or new skin lesions       Vital Signs   T(F): 96.9 (21 Dec 2021 07:30), Max: 97.7 (20 Dec 2021 13:28)  HR: 93 (21 Dec 2021 07:30) (62 - 93)  BP: 111/57 (21 Dec 2021 07:30) (111/57 - 144/70)  RR: 18 (21 Dec 2021 07:30) (15 - 24)  SpO2: 100% (21 Dec 2021 00:30) (98% - 100%)  Physical Exam  Gen: NAD  HEENT: NC/AT, Mucosal Membranes Dry  Cardio: S1/S2 No S3/S4, Regular  Resp: CTA B/L  Abdomen: Soft, ND/NT, Percutaneous drain noted  Neuro: No tremors, no asterixis   Extremities: FROM x 4  Skin: No jaundice noted       Labs:                        9.9    8.08  )-----------( 86       ( 20 Dec 2021 04:30 )             29.7     12-20    139  |  107  |  27<H>  ----------------------------<  57<L>  4.4   |  18  |  0.8    Ca    7.5<L>      20 Dec 2021 04:30  Mg     2.3     12-20    TPro  5.0<L>  /  Alb  2.0<L>  /  TBili  0.7  /  DBili  x   /  AST  16  /  ALT  14  /  AlkPhos  318<H>  12-20

## 2021-12-21 NOTE — CHART NOTE - NSCHARTNOTEFT_GEN_A_CORE
Registered Dietitian Follow-Up     Patient Profile Reviewed                           Yes [x]   No []     Nutrition History Previously Obtained        Yes [x]  No []       Pertinent Subjective Information: pt reports not having the desire to eat. Says he just doesn't want to eat anything. Asked pt if he eats any nutrition supplements, but reports not wanting to eat them. Attempted to obtain food preferences from pt, but did not want to provide any.      Pertinent Medical Interventions:  -ERCP not done d/t gastric anatomy, surgical consult placed for port assisted ERCP, f/u burn and surgery  -pt refusing further intervention, family and patient on board for CMO  #Proteus Bacteremia - +BCx x2 on , f/u BCx on  NGTD  #Acute Cholecystitis - +findings on RUQ U/S (12/10) + HIDA Scan ()  : advanced GI ERCP for cbd occlusion  >>surgical consult for port assisted ERCP d/t hx of rou-en-y surgery; DISCONTINUED  #sacral decubitus  #DTI Left Heel + Coccyx -will perform bedside debridement     Diet order: Diet, NPO after Midnight:      NPO Start Date: 19-Dec-2021,   NPO Start Time: 23:59  Except Medications (21 @ 16:45) [Active]  Diet, DASH/TLC:   Sodium & Cholesterol Restricted     Qty per Day:  MAGIC CUP 2X/DAY  Supplement Feeding Modality:  Oral  Ensure Pudding Cans or Servings Per Day:  1       Frequency:  Two Times a day (21 @ 16:16) [Active]    Anthropometrics:  Height (cm): 190.5 (21 @ 13:32)  Weight (kg): 72.5 (21 @ 13:32)  BMI (kg/m2): 20 (21 @ 13:32)  IBW:  89.1kg     Daily Weight in k.3 (-),  71.5 (), Weight in k ()    MEDICATIONS  (STANDING):  budesonide 160 MICROgram(s)/formoterol 4.5 MICROgram(s) Inhaler 2 Puff(s) Inhalation two times a day  cefTRIAXone   IVPB 2000 milliGRAM(s) IV Intermittent every 24 hours  fentaNYL   Patch  12 MICROgram(s)/Hr 1 Patch Transdermal every 72 hours  folic acid 1 milliGRAM(s) Oral daily  indomethacin Suppository 100 milliGRAM(s) Rectal once  lactated ringers Bolus 500 milliLiter(s) IV Bolus once  meropenem  IVPB      methocarbamol 500 milliGRAM(s) Oral three times a day  metroNIDAZOLE    Tablet 500 milliGRAM(s) Oral every 8 hours  midodrine. 10 milliGRAM(s) Oral three times a day  pantoprazole    Tablet 40 milliGRAM(s) Oral before breakfast  polyethylene glycol 3350 17 Gram(s) Oral daily  senna 2 Tablet(s) Oral at bedtime  vancomycin  IVPB        MEDICATIONS  (PRN):  ALBUTerol    90 MICROgram(s) HFA Inhaler 2 Puff(s) Inhalation every 6 hours PRN Shortness of Breath and/or Wheezing  HYDROmorphone  Injectable 0.25 milliGRAM(s) IV Push every 2 hours PRN pain 4-10  melatonin 5 milliGRAM(s) Oral at bedtime PRN Insomnia    Pertinent Labs: () H/H: 9.9/29.7, BUN: 27, Glucose: 57, ALP: 318  Finger Sticks:  POCT Blood Glucose.: 64 mg/dL ( @ 13:26)    Physical Findings:  - Appearance: Oriented X4; : 3+ R foot edema   - GI function: no complaints, LBM  per pt   - Tubes: n/a  - Oral/Mouth cavity: no chewing/swallowing difficulty reported   - Skin: DTI's left heel, buttocks/coccyx      Nutrition Requirements: per prev RD assessment 12/15   Weight Used: 74kg      Estimated Energy Needs    Continue [x]  Adjust []  MSJ 1546; 1855-2010kcal (MSJ 1.2-1.3 AF -- needs increased d/t DTI's -- aim [facility] upper end)      Estimated Protein Needs    Continue [x]  Adjust []  89-96g/day (1.2-1.3g/kg -- same reason as above)       Estimated Fluid Needs        Continue [x]  Adjust []  1mL/kcal or LIP      Nutrient Intake: <25%, likely 0% as pt reports not eating      [] Previous Nutrition Diagnosis:  Inadequate oral intake            [x] Ongoing          [] Resolved     Nutrition Intervention: meals and snacks     Goal/Expected Outcome: Pt to consume and tolerate >25-50% of meals/snacks and PO supplements in 4 days.      Nutrition Monitoring:diet order,energy intake,body composition,NFPF     Recommendation:  1. Pt and family leaning towards comfort Mesures, PT not eating adequately d/t diminished desire to eat as per pt. Please discuss alternate means of nutrition hydration upon GOC discussion.   2. Pt needs EN if family/pt want to continue nutrition interventions.   3. Will f/u w/ GOC   4. Encourage PO intake    [phone]  RD remains available: Kate Stoll RDN [phone]

## 2021-12-21 NOTE — GOALS OF CARE CONVERSATION - ADVANCED CARE PLANNING - CONVERSATION DETAILS
Met with patient  to discuss overall goals of care. Discussed patient's current prognosis, diagnosis, and treatment options.  Patient is able to make decisions  Patient verbalized understanding of her very complicated medical conditions.. At this time patient is requesting comfort measures only, and categorically refuses any invasive management.  Discussed hospice, pt receptive.  Hospice consultalready  requested.   Pt is already  DNR/DNI. Met with patient  to discuss overall goals of care. Discussed patient's current prognosis, diagnosis, and treatment options.  Patient is able to make decisions  Patient verbalized understanding of his very complicated medical conditions.. At this time patient is requesting comfort measures only, and categorically refuses any invasive management.  Discussed hospice, pt receptive.  Hospice consult already  requested.   Pt is already  DNR/DNI.

## 2021-12-21 NOTE — PROGRESS NOTE ADULT - ASSESSMENT
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 Patient was admitted on November 30th and hospital course significant for proteus bacteremia. Patient was seen on past admission by surgery and Cholecystectomy was not done. He had prior Gallbladder drain which was removed around 2 weeks prior to this presentation. Patient now s/p placement of a percutaneous Gallbladder drain but cholangiogram noted possible occlusion of the CBD. Patient had ERCP attempted 12/20 but has Omid anatomy from prior surgery so ERCP could not be complete ( Found to have ulcers- see event note). Patient notes to me pain all over and weakness but no direct GI complaints. Does not want additional procedures.      Cholecystitis/ Known Cholelithiasis/ S/P percutaneous drainage with visualized CBD occlusion during procedure   - On ABX  - T claire acceptable   - Percutaneous drain noted  - Pantoprazole 40mg BID because of ulcer  - Palliative note seen family and patient do not want additional procedures   - Advanced GI can be reconsulted as needed

## 2021-12-21 NOTE — CHART NOTE - NSCHARTNOTESELECT_GEN_ALL_CORE
Event Note
Event Note
Nutrition/Event Note
PACU Note
Palliative Care SW Note/Event Note
Palliative NP note/Event Note
Calorie Count/Event Note
Event Note
GI fellow/Event Note
Nutrition/Event Note
Palliative Care SW Initial Assessment/Event Note
Palliative Care SW Note/Event Note
Palliative Care SW Note/Event Note
Palliative Care/Event Note
RD Follow-Up/Event Note
Transfer Note

## 2021-12-22 DIAGNOSIS — G89.3 NEOPLASM RELATED PAIN (ACUTE) (CHRONIC): ICD-10-CM

## 2021-12-22 DIAGNOSIS — Z51.5 ENCOUNTER FOR PALLIATIVE CARE: ICD-10-CM

## 2021-12-22 LAB
CULTURE RESULTS: SIGNIFICANT CHANGE UP
ORGANISM # SPEC MICROSCOPIC CNT: SIGNIFICANT CHANGE UP
SPECIMEN SOURCE: SIGNIFICANT CHANGE UP

## 2021-12-22 PROCEDURE — 99233 SBSQ HOSP IP/OBS HIGH 50: CPT

## 2021-12-22 PROCEDURE — 99231 SBSQ HOSP IP/OBS SF/LOW 25: CPT

## 2021-12-22 RX ORDER — BUDESONIDE AND FORMOTEROL FUMARATE DIHYDRATE 160; 4.5 UG/1; UG/1
2 AEROSOL RESPIRATORY (INHALATION)
Qty: 0 | Refills: 0 | DISCHARGE

## 2021-12-22 RX ORDER — ATORVASTATIN CALCIUM 80 MG/1
1 TABLET, FILM COATED ORAL
Qty: 0 | Refills: 0 | DISCHARGE

## 2021-12-22 RX ORDER — POLYETHYLENE GLYCOL 3350 17 G/17G
17 POWDER, FOR SOLUTION ORAL
Qty: 0 | Refills: 0 | DISCHARGE
Start: 2021-12-22

## 2021-12-22 RX ORDER — FENTANYL CITRATE 50 UG/ML
1 INJECTION INTRAVENOUS
Qty: 0 | Refills: 0 | DISCHARGE
Start: 2021-12-22

## 2021-12-22 RX ORDER — MORPHINE SULFATE 50 MG/1
15 CAPSULE, EXTENDED RELEASE ORAL
Refills: 0 | Status: DISCONTINUED | OUTPATIENT
Start: 2021-12-22 | End: 2021-12-23

## 2021-12-22 RX ORDER — WARFARIN SODIUM 2.5 MG/1
0 TABLET ORAL
Qty: 0 | Refills: 0 | DISCHARGE

## 2021-12-22 RX ORDER — MORPHINE SULFATE 50 MG/1
0.75 CAPSULE, EXTENDED RELEASE ORAL
Qty: 0 | Refills: 0 | DISCHARGE
Start: 2021-12-22

## 2021-12-22 RX ADMIN — Medication 500 MILLIGRAM(S): at 11:13

## 2021-12-22 RX ADMIN — MORPHINE SULFATE 15 MILLIGRAM(S): 50 CAPSULE, EXTENDED RELEASE ORAL at 18:17

## 2021-12-22 RX ADMIN — Medication 500 MILLIGRAM(S): at 05:49

## 2021-12-22 RX ADMIN — MIDODRINE HYDROCHLORIDE 10 MILLIGRAM(S): 2.5 TABLET ORAL at 05:50

## 2021-12-22 RX ADMIN — METHOCARBAMOL 500 MILLIGRAM(S): 500 TABLET, FILM COATED ORAL at 11:12

## 2021-12-22 RX ADMIN — Medication 250 MILLIGRAM(S): at 05:51

## 2021-12-22 RX ADMIN — METHOCARBAMOL 500 MILLIGRAM(S): 500 TABLET, FILM COATED ORAL at 05:50

## 2021-12-22 RX ADMIN — Medication 5 MILLIGRAM(S): at 22:04

## 2021-12-22 RX ADMIN — SENNA PLUS 2 TABLET(S): 8.6 TABLET ORAL at 21:44

## 2021-12-22 RX ADMIN — METHOCARBAMOL 500 MILLIGRAM(S): 500 TABLET, FILM COATED ORAL at 21:43

## 2021-12-22 RX ADMIN — MEROPENEM 100 MILLIGRAM(S): 1 INJECTION INTRAVENOUS at 05:51

## 2021-12-22 RX ADMIN — MORPHINE SULFATE 15 MILLIGRAM(S): 50 CAPSULE, EXTENDED RELEASE ORAL at 22:05

## 2021-12-22 RX ADMIN — CHLORHEXIDINE GLUCONATE 1 APPLICATION(S): 213 SOLUTION TOPICAL at 05:48

## 2021-12-22 RX ADMIN — POLYETHYLENE GLYCOL 3350 17 GRAM(S): 17 POWDER, FOR SOLUTION ORAL at 11:13

## 2021-12-22 RX ADMIN — MEROPENEM 100 MILLIGRAM(S): 1 INJECTION INTRAVENOUS at 17:07

## 2021-12-22 RX ADMIN — Medication 1 MILLIGRAM(S): at 11:12

## 2021-12-22 NOTE — PROGRESS NOTE ADULT - PROBLEM SELECTOR PLAN 1
Calm environment, supportive care, SNF for hospice
In the setting of chronic cholecystitis and lack of placement of percutaneous tube.  -continue fentanyl patch at 12mcg/hour q72  -continue dilaudid 0.25mg IV q2h PRN for moderate/severe pain  -treatment of cholecystitis and IR consult for tube placement  -anticipate that will titrate off fentanyl patch after tube placement
wound care RN input appreciated  patient has been refusing treatments   + pain - see below.

## 2021-12-22 NOTE — PROGRESS NOTE ADULT - ASSESSMENT
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 Trop at baseline 0.03, EKG showed afib, CXR small R sided pleural effusion.    #Proteus Bacteremia - +BCx x2 on 12/7, f/u BCx on 12/11 NGTD  #Acute Cholecystitis - +findings on RUQ U/S (12/10) + HIDA Scan (12/11)  #PMHx of Recent Cholecystitis - s/p perQ-cholecystostomy placement and removal by IR - 10d prior to admission  -ID consulted:  - Narrow cefepime to Ceftriaxone 2g q24h IV   - Continue PO flagyl 500mg TID  - Post surgery, cipro 500mg BID and flagyl 500mg TID x 3 days  - Monitor sacral area, low threshold to consult Burn, appreciate Wound care consult  - Gen Surg consulted:  Cardiology risk stratified the patient as high risk for cholecystectomy  Medicine risk stratified the patient as high risk  Patient is not a surgical candidate  Please consult IR for re-placement of percutaneous cholecystostomy tube  Please recall surgery as needed  12/16: IR consulted for PreQ Cholecystostomy  - s/p PreQ Cholecystostomy  12/20: advanced GI ERCP for cbd occlusion  >>surgical consult for port assisted ERCP d/t hx of rou-en-y surgery; DISCONTINUED  -ID recs: Ertapenem 1g q8h IV given ESBL, - Vanc 1g q12h IV for e. faecium R amp    #Atypical Chest Pain - likely MSK, no further ischemic workup as per Cardiology (Dr. Herrera)  #PMHx of HFpEF - follows with Dr. 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  #Chronic DVT Right Common Fem/Fem/Popliteal - on LE Venous Duplex 12/1  - Troponin 0.03 x3, was 0.02-0.03 throughout prior admission, EKG performed x2 with no acute ischemic changes  - D-dimer 342, BNP ~3K near baseline, 11/30 CXR showing small R sided pleural effusion, has remained at baseline oxygen requirements  - TTE 12/2: EF 50-55%, otherwise unremarkable   - d/c Spironolactone + Torsemide due to persistent hypotension, not overloaded on exam  - pain control with Percocet 1 tab q6h PRN for moderate pain + Morphine PRN for severe pain  - INR was supratherapeutic on admission, was reversed with Vitamin K on 12/2, restarted Coumadin, INR now normalized; was supratherapuetic  - A/C was held for PerQ; will restart A/C  - BP on lower side, increased midodrine to 10mg PO TID  - patient not eating and drinking adequately, calorie count started  - patient refusing PT/Rehab  - CTA-chest 12/9 negative for PE    #sacral decubitus  #DTI Left Heel + Coccyx   - Wound Care consulted, recommendations given to RN  -Burn following  -will perform bedside debridement  -need burn recs for wound care    #Chronic Macrocytic Anemia - on folate supplementation  #Anemia of Chronic Disease  - baseline Hb ~10-11, supratherapeutic INR on admission reversed, B12 WNL in September  - 12/1 iron studies: low TIBC, likely anemia of chronic disease    #COPD   - on home O2 2.5L, has remained at baseline, continuing symbicort 160ug 2 puffs BID, Albuterol PRN  -On RA at times                                                                          # DVT prophylaxis: held for now d/t procedures, will discuss heparin gtt  # GI prophylaxis: N/A  # Diet: DASH/TLC  # Activity: Ambulate as Tolerated  # Code status: Full  # Disposition: remain on floor; hospice planning  # Handoff: f/u burn for debridement, cm initiation, iv abx per ID 82 yo M with PMHx of HFpEF (50-55%), Chronic AFib on Coumadin, 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 Trop at baseline 0.03, EKG showed afib, CXR small R sided pleural effusion.    #Proteus Bacteremia - +BCx x2 on 12/7, f/u BCx on 12/11 NGTD  #Acute Cholecystitis - +findings on RUQ U/S (12/10) + HIDA Scan (12/11)  #PMHx of Recent Cholecystitis - s/p perQ-cholecystostomy placement and removal by IR - 10d prior to admission  -ID consulted:  - Narrow cefepime to Ceftriaxone 2g q24h IV   - Continue PO flagyl 500mg TID  - Post surgery, cipro 500mg BID and flagyl 500mg TID x 3 days  - Monitor sacral area, low threshold to consult Burn, appreciate Wound care consult  - Gen Surg consulted:  Cardiology risk stratified the patient as high risk for cholecystectomy  Medicine risk stratified the patient as high risk  Patient is not a surgical candidate  Please consult IR for re-placement of percutaneous cholecystostomy tube  Please recall surgery as needed  12/16: IR consulted for PreQ Cholecystostomy  - s/p PreQ Cholecystostomy  12/20: advanced GI ERCP for cbd occlusion  >>surgical consult for port assisted ERCP d/t hx of rou-en-y surgery; DISCONTINUED  -ID recs: Ertapenem 1g q8h IV given ESBL, - Vanc 1g q12h IV for e. faecium R amp    #Atypical Chest Pain - likely MSK, no further ischemic workup as per Cardiology (Dr. Herrera)  #PMHx of HFpEF - follows with Dr. 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  #Chronic DVT Right Common Fem/Fem/Popliteal - on LE Venous Duplex 12/1  - Troponin 0.03 x3, was 0.02-0.03 throughout prior admission, EKG performed x2 with no acute ischemic changes  - D-dimer 342, BNP ~3K near baseline, 11/30 CXR showing small R sided pleural effusion, has remained at baseline oxygen requirements  - TTE 12/2: EF 50-55%, otherwise unremarkable   - d/c Spironolactone + Torsemide due to persistent hypotension, not overloaded on exam  - pain control with Percocet 1 tab q6h PRN for moderate pain + Morphine PRN for severe pain  - INR was supratherapeutic on admission, was reversed with Vitamin K on 12/2, restarted Coumadin, INR now normalized; was supratherapuetic  - A/C was held for PerQ; will restart A/C  - BP on lower side, increased midodrine to 10mg PO TID  - patient not eating and drinking adequately, calorie count started  - patient refusing PT/Rehab  - CTA-chest 12/9 negative for PE    #sacral decubitus  #DTI Left Heel + Coccyx   - Wound Care consulted, recommendations given to RN  -Burn following  -will perform bedside debridement  -need burn recs for wound care    #Chronic Macrocytic Anemia - on folate supplementation  #Anemia of Chronic Disease  - baseline Hb ~10-11, supratherapeutic INR on admission reversed, B12 WNL in September  - 12/1 iron studies: low TIBC, likely anemia of chronic disease    #COPD   - on home O2 2.5L, has remained at baseline, continuing symbicort 160ug 2 puffs BID, Albuterol PRN  -On RA at times                                                                          # DVT prophylaxis: held for now d/t procedures, will discuss heparin gtt  # GI prophylaxis: N/A  # Diet: DASH/TLC  # Activity: Ambulate as Tolerated  # Code status: Full  # Disposition: remain on floor; hospice planning  # Handoff: CMO, HOSPICE PLACEMENT

## 2021-12-22 NOTE — PROGRESS NOTE ADULT - ASSESSMENT
81yMale being evaluated for goals of care and symptom management. Pt is DNR/DNI/CMO as of 12/21/21. Pt plan for hospice in SNF      MEDD (morphine equivalent daily dose):      See Recs below.    Please call [phone] with questions or concerns 24/7.   We will continue to follow.    81yMale being evaluated for goals of care and symptom management. Pt is DNR/DNI/CMO as of 12/21/21. Pt plan for hospice in SNF. Pt in pain today, and complaining that IV sites are pain,. Said 4mg IV not fully effective for breakthrough pain. Spoke to ID who re ordered abt IV, patient and daughter do not want ABT - pt is full comfort care.       MEDD (morphine equivalent daily dose): 56mg/24hrs       See Recs below.    Please call [phone] with questions or concerns 24/7.   We will continue to follow.

## 2021-12-22 NOTE — PROGRESS NOTE ADULT - PROBLEM SELECTOR PLAN 3
Hospice care in SNF is the plan, palliative following for symptom management
Patient wishes for placement of percutaneous cholecystostomy tube  -IR consulted per primary team  -pain management as above
patient has recent trajectory of 6months+ of physical decline that has included efforts at rehabilitation  rehab if/as tolerated.

## 2021-12-22 NOTE — PROGRESS NOTE ADULT - PROBLEM SELECTOR PLAN 4
Fentanyl 25mcg patch q 72 hrs   Morphine 4mg IV q 4 hrs PRN Fentanyl 25mcg patch q 72 hrs   D/C Morphine 4mg IV q 4 hrs PRN  Roxanol 15mg SL Q 2 hrs PRN for pain or dyspnea - dose increased

## 2021-12-22 NOTE — PROGRESS NOTE ADULT - SUBJECTIVE AND OBJECTIVE BOX
CHIEF COMPLAINT:  Patient is a 81y old  Male who presents with a chief complaint of chest pain (21 Dec 2021 16:17)      INTERVAL HISTORY/OVERNIGHT EVENTS:  12/22  -Overnight events? Pt placed on CMO, IV abx Ertapenem 1g q8h IV given ESBL, - Vanc 1g q12h IV for e. faecium R amp  -pt refusing debridement, no burn f/u will c/w dressing recs  12/21  -ERCP not done d/t gastric anatomy, surgical consult placed for port assisted ERCP, f/u burn and surgery  -pt refusing further intervention, family and patient on board for CMO  -per ID starting jabari and vancomycin   12/20  -no events overnight, pt "refused" meds but told me he was sleeping when nurse came, reordered flagyl, robaxin and midodrine  -pt willing to try bedside debridement, burn reconsulted   -ERCP today    ======================  MEDICATIONS:  budesonide 160 MICROgram(s)/formoterol 4.5 MICROgram(s) Inhaler 2 Puff(s) Inhalation two times a day  chlorhexidine 4% Liquid 1 Application(s) Topical <User Schedule>  fentaNYL   Patch  25 MICROgram(s)/Hr 1 Patch Transdermal every 72 hours  folic acid 1 milliGRAM(s) Oral daily  indomethacin Suppository 100 milliGRAM(s) Rectal once  influenza  Vaccine (HIGH DOSE) 0.7 milliLiter(s) IntraMuscular once  lactated ringers Bolus 500 milliLiter(s) IV Bolus once  meropenem  IVPB      meropenem  IVPB 1000 milliGRAM(s) IV Intermittent every 8 hours  methocarbamol 500 milliGRAM(s) Oral three times a day  metroNIDAZOLE    Tablet 500 milliGRAM(s) Oral every 8 hours  midodrine. 10 milliGRAM(s) Oral three times a day  pantoprazole    Tablet 40 milliGRAM(s) Oral before breakfast  polyethylene glycol 3350 17 Gram(s) Oral daily  senna 2 Tablet(s) Oral at bedtime  silver sulfADIAZINE 1% Cream 1 Application(s) Topical two times a day  vancomycin  IVPB      vancomycin  IVPB 1000 milliGRAM(s) IV Intermittent every 12 hours    DRIPS:    PRN:       ======================  PHYSICAL EXAMINATION:  GEN:  nad.   HEENT:  eomi. ncat  PULM:  b/l bs.  clear.  no wheezing. no crackles or rales.   CARD: regular. s1, s2.  no murmurs.   ABD: +bs. ntnd  EXT:  no new rashes.  no pitting edema b/l .   NEURO:  no new focal deficits.   ======================  OBJECTIVE:        VS:  T(F): 97.6 (12-21 @ 16:30), Max: 97.6 (12-21 @ 16:30)  HR: 68 (12-21 @ 16:30) (68 - 93)  BP: 109/44 (12-21 @ 16:30) (109/44 - 111/57)  RR: 18 (12-21 @ 16:30) (18 - 18)  SpO2: --  CVP(mm Hg): --  CO: --  CI: --  PA: --  PCWP: --    I/O:      12-19 @ 07:01  -  12-20 @ 07:00  --------------------------------------------------------  IN: 0 mL / OUT: 200 mL / NET: -200 mL    12-20 @ 07:01  -  12-21 @ 07:00  --------------------------------------------------------  IN: 0 mL / OUT: 125 mL / NET: -125 mL    12-21 @ 07:01  -  12-22 @ 05:46  --------------------------------------------------------  IN: 0 mL / OUT: 350 mL / NET: -350 mL        Weight trend:  Weight (kg): 72.5 (12-20)    ======================    LABS:                            Cultures:    Culture - Body Fluid with Gram Stain (collected 12-17)  Source: .Body Fluid Bile Fluid  Gram Stain:    No polymorphonuclear leukocytes seen    Gram positive cocci in pairs seen per oil power field    Gram Negative Rods seen per oil power field    by cytocentrifuge  Preliminary Report:    Numerous Enterococcus faecium    Numerous Escherichia coli ESBL  Organism: Enterococcus faecium  Escherichia coli ESBL  Organism: Enterococcus faecium      -  Ampicillin: R >8 Predicts results to ampicillin/sulbactam, amoxacillin-clavulanate and  piperacillin-tazobactam.      -  Tetra/Doxy: R >8      -  Vancomycin: S 1      Method Type: SHEFALI  Organism: Escherichia coli ESBL      -  Amikacin: S <=16      -  Amoxicillin/Clavulanic Acid: S <=8/4      -  Ampicillin: R >16 These ampicillin results predict results for amoxicillin      -  Ampicillin/Sulbactam: R >16/8 Enterobacter, Klebsiella aerogenes, Citrobacter, and Serratia may develop resistance during prolonged therapy (3-4 days)      -  Aztreonam: R >16      -  Cefazolin: R >16 Enterobacter, Klebsiella aerogenes, Citrobacter, and Serratia may develop resistance during prolonged therapy (3-4 days)      -  Cefepime: R >16      -  Cefoxitin: I 16      -  Ceftriaxone: R >32 Enterobacter, Klebsiella aerogenes, Citrobacter, and Serratia may develop resistance during prolonged therapy      -  Ciprofloxacin: R >2      -  Ertapenem: S <=0.5      -  Gentamicin: S <=2      -  Imipenem: S <=1      -  Levofloxacin: R >4      -  Meropenem: S <=1      -  Piperacillin/Tazobactam: R <=8      -  Tobramycin: S 4      -  Trimethoprim/Sulfamethoxazole: S <=0.5/9.5      Method Type: SHEFALI           CHIEF COMPLAINT:  Patient is a 81y old  Male who presents with a chief complaint of chest pain (21 Dec 2021 16:17)      INTERVAL HISTORY/OVERNIGHT EVENTS:  12/22  -no events overnight  -Pt placed on CMO, IV abx Ertapenem 1g q8h IV given ESBL, - Vanc 1g q12h IV for e. faecium R amp  -pt refusing debridement, no burn f/u will c/w dressing recs  12/21  -ERCP not done d/t gastric anatomy, surgical consult placed for port assisted ERCP, f/u burn and surgery  -pt refusing further intervention, family and patient on board for CMO  -per ID starting jabari and vancomycin   12/20  -no events overnight, pt "refused" meds but told me he was sleeping when nurse came, reordered flagyl, robaxin and midodrine  -pt willing to try bedside debridement, burn reconsulted   -ERCP today    ======================  MEDICATIONS:  budesonide 160 MICROgram(s)/formoterol 4.5 MICROgram(s) Inhaler 2 Puff(s) Inhalation two times a day  chlorhexidine 4% Liquid 1 Application(s) Topical <User Schedule>  fentaNYL   Patch  25 MICROgram(s)/Hr 1 Patch Transdermal every 72 hours  folic acid 1 milliGRAM(s) Oral daily  indomethacin Suppository 100 milliGRAM(s) Rectal once  influenza  Vaccine (HIGH DOSE) 0.7 milliLiter(s) IntraMuscular once  lactated ringers Bolus 500 milliLiter(s) IV Bolus once  meropenem  IVPB      meropenem  IVPB 1000 milliGRAM(s) IV Intermittent every 8 hours  methocarbamol 500 milliGRAM(s) Oral three times a day  metroNIDAZOLE    Tablet 500 milliGRAM(s) Oral every 8 hours  midodrine. 10 milliGRAM(s) Oral three times a day  pantoprazole    Tablet 40 milliGRAM(s) Oral before breakfast  polyethylene glycol 3350 17 Gram(s) Oral daily  senna 2 Tablet(s) Oral at bedtime  silver sulfADIAZINE 1% Cream 1 Application(s) Topical two times a day  vancomycin  IVPB      vancomycin  IVPB 1000 milliGRAM(s) IV Intermittent every 12 hours    DRIPS:    PRN:       ======================  PHYSICAL EXAMINATION:  Gen; pt comfortable, no complaints  ======================  OBJECTIVE:        VS:  T(F): 97.6 (12-21 @ 16:30), Max: 97.6 (12-21 @ 16:30)  HR: 68 (12-21 @ 16:30) (68 - 93)  BP: 109/44 (12-21 @ 16:30) (109/44 - 111/57)  RR: 18 (12-21 @ 16:30) (18 - 18)  SpO2: --  CVP(mm Hg): --  CO: --  CI: --  PA: --  PCWP: --    I/O:      12-19 @ 07:01  -  12-20 @ 07:00  --------------------------------------------------------  IN: 0 mL / OUT: 200 mL / NET: -200 mL    12-20 @ 07:01  -  12-21 @ 07:00  --------------------------------------------------------  IN: 0 mL / OUT: 125 mL / NET: -125 mL    12-21 @ 07:01  -  12-22 @ 05:46  --------------------------------------------------------  IN: 0 mL / OUT: 350 mL / NET: -350 mL        Weight trend:  Weight (kg): 72.5 (12-20)    ======================    LABS:                            Cultures:    Culture - Body Fluid with Gram Stain (collected 12-17)  Source: .Body Fluid Bile Fluid  Gram Stain:    No polymorphonuclear leukocytes seen    Gram positive cocci in pairs seen per oil power field    Gram Negative Rods seen per oil power field    by cytocentrifuge  Preliminary Report:    Numerous Enterococcus faecium    Numerous Escherichia coli ESBL  Organism: Enterococcus faecium  Escherichia coli ESBL  Organism: Enterococcus faecium      -  Ampicillin: R >8 Predicts results to ampicillin/sulbactam, amoxacillin-clavulanate and  piperacillin-tazobactam.      -  Tetra/Doxy: R >8      -  Vancomycin: S 1      Method Type: SHEFALI  Organism: Escherichia coli ESBL      -  Amikacin: S <=16      -  Amoxicillin/Clavulanic Acid: S <=8/4      -  Ampicillin: R >16 These ampicillin results predict results for amoxicillin      -  Ampicillin/Sulbactam: R >16/8 Enterobacter, Klebsiella aerogenes, Citrobacter, and Serratia may develop resistance during prolonged therapy (3-4 days)      -  Aztreonam: R >16      -  Cefazolin: R >16 Enterobacter, Klebsiella aerogenes, Citrobacter, and Serratia may develop resistance during prolonged therapy (3-4 days)      -  Cefepime: R >16      -  Cefoxitin: I 16      -  Ceftriaxone: R >32 Enterobacter, Klebsiella aerogenes, Citrobacter, and Serratia may develop resistance during prolonged therapy      -  Ciprofloxacin: R >2      -  Ertapenem: S <=0.5      -  Gentamicin: S <=2      -  Imipenem: S <=1      -  Levofloxacin: R >4      -  Meropenem: S <=1      -  Piperacillin/Tazobactam: R <=8      -  Tobramycin: S 4      -  Trimethoprim/Sulfamethoxazole: S <=0.5/9.5      Method Type: SHEFALI

## 2021-12-22 NOTE — PROGRESS NOTE ADULT - ATTENDING COMMENTS
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     Acute cholecystitis  Proteus bacteremia secondary to biliary source  Suspected atypical chest pain possibly skeletomuscular  Sacral decubitus  Chronic A-Fib   Chronic HFpEF  Orthostatic hypotension  chronic DVT      # Ac Cholecystitis--S/p  Percutaneous cholecystostomy &  pigtail catheter. 12/17    Surgery noted- high risk . not a candidate for surgery for cholecystectomy  ID -- Ertapenem 1g q8h IV given ESBL, - Vanc 1g q12h IV for e. faecium R amp , stopped ABX at DC as per hospice  Chronic HFpEF- no exacerbation  Orthostatic hypotension-was on Midodrine 10 mg po q 12h. BP still low .  torsemide on hold  ECHO 12/02- unremarkable with EF of 55%  Chronic A-Fib on Coumadin( held as INR was high). 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

## 2021-12-22 NOTE — HOSPICE CARE NOTE - CONVESATION DETAILS
Patient pending transfer to [facility] Thursday. D/C and transport requested for 8:30 AM. Report received from bedside RN.

## 2021-12-22 NOTE — PROGRESS NOTE ADULT - PROBLEM SELECTOR PLAN 2
DNR/DNI/CMO  hospice planning DNR/DNI/CMO  hospice planning  - no ABT full cmo, spoke to ID and primary team

## 2021-12-22 NOTE — PROGRESS NOTE ADULT - SUBJECTIVE AND OBJECTIVE BOX
RODNEY SANTO             MRN-[id number]      Patient is a 81y old Male who presents with a chief complaint of chest pain (22 Dec 2021 05:46)    Currently admitted with the primary diagnosis of: chest pain        SUBJECTIVE:      ROS:  UNABLE TO OBTAIN  due to:    DYSPNEA: Y / N	  NAUS/VOM: Y / N	  SECRETIONS: Y / N	  AGITATION: Y / N  Pain (Y/N):       -Provocation/Palliation:  -Quality/Quantity:  -Radiating:  -Severity:  -Timing/Frequency:  -Impact on ADLs:    General:  Denied  HEENT:    Denied  Neck:  Denied  CVS:  Denied  Resp:  Denied  GI:  Denied    :  Denied  Musc:  Denied  Neuro:  Denied  Psych:  Denied  Skin:  Denied  Lymph:  Denied      PEx:   T(C): 35.6 (12-22-21 @ 08:11), Max: 36.4 (12-21-21 @ 16:30)  HR: 77 (12-22-21 @ 08:11) (68 - 77)  BP: 104/56 (12-22-21 @ 08:11) (100/58 - 109/44)  RR: 18 (12-22-21 @ 08:11) (18 - 18)  SpO2: 99% (12-22-21 @ 00:26) (99% - 99%)  Wt(kg): --            General:  found in bed in NAD  Eyes:  PERRL EOMI Non icteric MOM  ENMT: no external oral ulcers, MMM, no thrush   CVS: RR S1S2 No M/G/R  Resp: Unlabored Non tachypneic No increased WOB  GI:  Soft NT ND BS+  :  Voiding / Russo / PrimaFit  Musc: No C/C/E    Neuro: Follows commands No focal deficits  Psych: Calm Pleasant, AAOx3    Skin: Non jaundiced , no rash   Lymph: no adenopathy     Last BM:     ALLERGIES: No Known Allergies            Labs:	    CBC:    CMP:                    RADIOLOGY      EKG      Imaging Personally Reviewed:  [ ] YES  [ ] NO    Consultant(s) Notes Reviewed:  [ ] YES  [ ] NO  Care Discussed with Consultants/Other Providers [ ] YES  [ ] NO    Medications:	      MEDICATIONS  (STANDING):  budesonide 160 MICROgram(s)/formoterol 4.5 MICROgram(s) Inhaler 2 Puff(s) Inhalation two times a day  chlorhexidine 4% Liquid 1 Application(s) Topical <User Schedule>  fentaNYL   Patch  25 MICROgram(s)/Hr 1 Patch Transdermal every 72 hours  folic acid 1 milliGRAM(s) Oral daily  indomethacin Suppository 100 milliGRAM(s) Rectal once  influenza  Vaccine (HIGH DOSE) 0.7 milliLiter(s) IntraMuscular once  lactated ringers Bolus 500 milliLiter(s) IV Bolus once  meropenem  IVPB      meropenem  IVPB 1000 milliGRAM(s) IV Intermittent every 8 hours  methocarbamol 500 milliGRAM(s) Oral three times a day  metroNIDAZOLE    Tablet 500 milliGRAM(s) Oral every 8 hours  midodrine. 10 milliGRAM(s) Oral three times a day  pantoprazole    Tablet 40 milliGRAM(s) Oral before breakfast  polyethylene glycol 3350 17 Gram(s) Oral daily  senna 2 Tablet(s) Oral at bedtime  silver sulfADIAZINE 1% Cream 1 Application(s) Topical two times a day  vancomycin  IVPB      vancomycin  IVPB 1000 milliGRAM(s) IV Intermittent every 12 hours    MEDICATIONS  (PRN):  ALBUTerol    90 MICROgram(s) HFA Inhaler 2 Puff(s) Inhalation every 6 hours PRN Shortness of Breath and/or Wheezing  melatonin 5 milliGRAM(s) Oral at bedtime PRN Insomnia  morphine  - Injectable 4 milliGRAM(s) IV Push every 4 hours PRN pain 4-10        ADVANCED DIRECTIVES:        DNR/DNI/CMO   DECISION MAKER: Patient [  ]  Family [  ]  Other [  ] _______  LEGAL SURROGATE:      GOALS OF CARE DISCUSSION       Palliative care info/counseling provided	           Family meeting scheduled 	           Documentation of GOC/Advanced Care Planning:       See previous Palliative Medicine Note    PSYCHOSOCIAL-SPIRITUAL ASSESSMENT:       Reviewed       See Palliative Care SW/ documentation      CURRENT DISPO PLAN:         WILL REMAIN IN HOSPITAL      [facility]      Hospice      Home      REFERRALS	        Palliative Med        Unit SW/Case Mgmt              Hospice        RODNEY SANTO             MRN-[id number]      Patient is a 81y old Male who presents with a chief complaint of chest pain (22 Dec 2021 05:46)    Currently admitted with the primary diagnosis of: chest pain        SUBJECTIVE:      ROS:  UNABLE TO OBTAIN  due to:    DYSPNEA: Y / N	  NAUS/VOM: Y / N	  SECRETIONS: Y / N	  AGITATION: Y / N  Pain (Y/N):       -Provocation/Palliation:  -Quality/Quantity:  -Radiating:  -Severity:  -Timing/Frequency:  -Impact on ADLs:        PEx:   T(C): 35.6 (12-22-21 @ 08:11), Max: 36.4 (12-21-21 @ 16:30)  HR: 77 (12-22-21 @ 08:11) (68 - 77)  BP: 104/56 (12-22-21 @ 08:11) (100/58 - 109/44)  RR: 18 (12-22-21 @ 08:11) (18 - 18)  SpO2: 99% (12-22-21 @ 00:26) (99% - 99%)  Wt(kg): --            General:  found in bed appears to have advanced illness   Eyes:  PERRL EOMI Non icteric MOM  ENMT: no external oral ulcers, MMM, no thrush   CVS: RR S1S2 No M/G/R  Resp: Unlabored Non tachypneic No increased WOB  GI:  (+) bilious drain   :  Voiding  Musc: No C/C/E    Neuro: Follows commands No focal deficits  Psych: affect sad, appropriate   Skin: Non jaundiced , no rash   Lymph: no adenopathy     Last BM:     ALLERGIES: No Known Allergies            Labs:	    CBC:    CMP:                    RADIOLOGY      EKG      Imaging Personally Reviewed:  [ ] YES  [ ] NO    Consultant(s) Notes Reviewed:  [ ] YES  [ ] NO  Care Discussed with Consultants/Other Providers [ ] YES  [ ] NO    Medications:	      MEDICATIONS  (STANDING):  budesonide 160 MICROgram(s)/formoterol 4.5 MICROgram(s) Inhaler 2 Puff(s) Inhalation two times a day  chlorhexidine 4% Liquid 1 Application(s) Topical <User Schedule>  fentaNYL   Patch  25 MICROgram(s)/Hr 1 Patch Transdermal every 72 hours  folic acid 1 milliGRAM(s) Oral daily  indomethacin Suppository 100 milliGRAM(s) Rectal once  influenza  Vaccine (HIGH DOSE) 0.7 milliLiter(s) IntraMuscular once  lactated ringers Bolus 500 milliLiter(s) IV Bolus once  meropenem  IVPB      meropenem  IVPB 1000 milliGRAM(s) IV Intermittent every 8 hours  methocarbamol 500 milliGRAM(s) Oral three times a day  metroNIDAZOLE    Tablet 500 milliGRAM(s) Oral every 8 hours  midodrine. 10 milliGRAM(s) Oral three times a day  pantoprazole    Tablet 40 milliGRAM(s) Oral before breakfast  polyethylene glycol 3350 17 Gram(s) Oral daily  senna 2 Tablet(s) Oral at bedtime  silver sulfADIAZINE 1% Cream 1 Application(s) Topical two times a day  vancomycin  IVPB      vancomycin  IVPB 1000 milliGRAM(s) IV Intermittent every 12 hours    MEDICATIONS  (PRN):  ALBUTerol    90 MICROgram(s) HFA Inhaler 2 Puff(s) Inhalation every 6 hours PRN Shortness of Breath and/or Wheezing  melatonin 5 milliGRAM(s) Oral at bedtime PRN Insomnia  morphine  - Injectable 4 milliGRAM(s) IV Push every 4 hours PRN pain 4-10        ADVANCED DIRECTIVES:        DNR/DNI/CMO   DECISION MAKER: Patient [  ]  Family [ x ]  Other [  ] _______  LEGAL SURROGATE: daughter Mery in conjunction with patient       GOALS OF CARE DISCUSSION       Palliative care info/counseling provided	           Family meeting scheduled 	           Documentation of GOC/Advanced Care Planning:       See previous Palliative Medicine Note    PSYCHOSOCIAL-SPIRITUAL ASSESSMENT:       Reviewed       See Palliative Care SW/ documentation      CURRENT DISPO PLAN:         WILL REMAIN IN HOSPITAL      [facility]      Hospice      Home      REFERRALS	        Palliative Med        Unit SW/Case Mgmt              Hospice

## 2021-12-23 ENCOUNTER — TRANSCRIPTION ENCOUNTER (OUTPATIENT)
Age: 81
End: 2021-12-23

## 2021-12-23 ENCOUNTER — NON-APPOINTMENT (OUTPATIENT)
Age: 81
End: 2021-12-23

## 2021-12-23 VITALS
HEART RATE: 77 BPM | RESPIRATION RATE: 18 BRPM | DIASTOLIC BLOOD PRESSURE: 53 MMHG | SYSTOLIC BLOOD PRESSURE: 102 MMHG | TEMPERATURE: 98 F

## 2021-12-23 PROCEDURE — 99239 HOSP IP/OBS DSCHRG MGMT >30: CPT

## 2021-12-23 RX ORDER — HYDROMORPHONE HYDROCHLORIDE 2 MG/ML
2 INJECTION INTRAMUSCULAR; INTRAVENOUS; SUBCUTANEOUS ONCE
Refills: 0 | Status: DISCONTINUED | OUTPATIENT
Start: 2021-12-23 | End: 2021-12-23

## 2021-12-23 RX ADMIN — MORPHINE SULFATE 15 MILLIGRAM(S): 50 CAPSULE, EXTENDED RELEASE ORAL at 08:28

## 2021-12-23 RX ADMIN — METHOCARBAMOL 500 MILLIGRAM(S): 500 TABLET, FILM COATED ORAL at 05:01

## 2021-12-23 NOTE — PROGRESS NOTE ADULT - PROVIDER SPECIALTY LIST ADULT
Cardiology
Gastroenterology
Hospitalist
Hospitalist
Internal Medicine
Intervent Radiology
Cardiology
Cardiology
Hospitalist
Internal Medicine
Surgery
Hospitalist
Infectious Disease
Internal Medicine
Surgery
Hospitalist
Hospitalist
Infectious Disease
Internal Medicine
Surgery
Infectious Disease
Palliative Care

## 2021-12-23 NOTE — DISCHARGE NOTE NURSING/CASE MANAGEMENT/SOCIAL WORK - PATIENT PORTAL LINK FT
You can access the FollowMyHealth Patient Portal offered by [facility] by registering at the following website: [email or web address]. By joining [employer]’s FollowMyHealth portal, you will also be able to view your health information using other applications (apps) compatible with our system.

## 2021-12-23 NOTE — DISCHARGE NOTE NURSING/CASE MANAGEMENT/SOCIAL WORK - NSDCPEFALRISK_GEN_ALL_CORE
For information on Fall & Injury Prevention, visit: [email or web address] OR  [email or web address] OR  https://www.cdc.gov/steadi/patient.html

## 2021-12-23 NOTE — PROGRESS NOTE ADULT - SUBJECTIVE AND OBJECTIVE BOX
SUBJECTIVE:    Patient is a 81y old Male who presents with a chief complaint of chest pain (22 Dec 2021 09:04)    Currently admitted to medicine with the primary diagnosis of Acute cholecystitis       Today is hospital day 23d.     PAST MEDICAL & SURGICAL HISTORY  COPD (chronic obstructive pulmonary disease)    Hypertension    Deep vein thrombosis (DVT) of left lower extremity, unspecified chronicity, unspecified vein    Cellulitis of left lower extremity    Chronic atrial fibrillation    Mitral valve insufficiency, unspecified etiology  Moderate    CHF (congestive heart failure)    S/P coronary artery stent placement    History of total right knee replacement      ALLERGIES:  No Known Allergies    MEDICATIONS:  STANDING MEDICATIONS  budesonide 160 MICROgram(s)/formoterol 4.5 MICROgram(s) Inhaler 2 Puff(s) Inhalation two times a day  chlorhexidine 4% Liquid 1 Application(s) Topical <User Schedule>  fentaNYL   Patch  25 MICROgram(s)/Hr 1 Patch Transdermal every 72 hours  folic acid 1 milliGRAM(s) Oral daily  indomethacin Suppository 100 milliGRAM(s) Rectal once  influenza  Vaccine (HIGH DOSE) 0.7 milliLiter(s) IntraMuscular once  lactated ringers Bolus 500 milliLiter(s) IV Bolus once  methocarbamol 500 milliGRAM(s) Oral three times a day  pantoprazole    Tablet 40 milliGRAM(s) Oral before breakfast  polyethylene glycol 3350 17 Gram(s) Oral daily  senna 2 Tablet(s) Oral at bedtime  silver sulfADIAZINE 1% Cream 1 Application(s) Topical two times a day    PRN MEDICATIONS  ALBUTerol    90 MICROgram(s) HFA Inhaler 2 Puff(s) Inhalation every 6 hours PRN  melatonin 5 milliGRAM(s) Oral at bedtime PRN  morphine Concentrate 15 milliGRAM(s) Oral every 2 hours PRN    VITALS:   T(F): 97.5  HR: 77  BP: 102/53  RR: 18  SpO2: 98%    LABS:                        RADIOLOGY:    PHYSICAL EXAM:  GEN: No acute distress  LUNGS: Clear to auscultation bilaterally   HEART: S1/S2 present. RRR.   ABD/ GI: Soft, non-tender, non-distended. Bowel sounds present  EXT: NC/NC/NE/2+PP/SAPP  NEURO: AAOX1

## 2021-12-27 DIAGNOSIS — D63.8 ANEMIA IN OTHER CHRONIC DISEASES CLASSIFIED ELSEWHERE: ICD-10-CM

## 2021-12-27 DIAGNOSIS — K81.0 ACUTE CHOLECYSTITIS: ICD-10-CM

## 2021-12-27 DIAGNOSIS — B95.2 ENTEROCOCCUS AS THE CAUSE OF DISEASES CLASSIFIED ELSEWHERE: ICD-10-CM

## 2021-12-27 DIAGNOSIS — Z79.01 LONG TERM (CURRENT) USE OF ANTICOAGULANTS: ICD-10-CM

## 2021-12-27 DIAGNOSIS — L89.154 PRESSURE ULCER OF SACRAL REGION, STAGE 4: ICD-10-CM

## 2021-12-27 DIAGNOSIS — I95.1 ORTHOSTATIC HYPOTENSION: ICD-10-CM

## 2021-12-27 DIAGNOSIS — R07.9 CHEST PAIN, UNSPECIFIED: ICD-10-CM

## 2021-12-27 DIAGNOSIS — B96.4 PROTEUS (MIRABILIS) (MORGANII) AS THE CAUSE OF DISEASES CLASSIFIED ELSEWHERE: ICD-10-CM

## 2021-12-27 DIAGNOSIS — K91.89 OTHER POSTPROCEDURAL COMPLICATIONS AND DISORDERS OF DIGESTIVE SYSTEM: ICD-10-CM

## 2021-12-27 DIAGNOSIS — Z95.5 PRESENCE OF CORONARY ANGIOPLASTY IMPLANT AND GRAFT: ICD-10-CM

## 2021-12-27 DIAGNOSIS — M94.0 CHONDROCOSTAL JUNCTION SYNDROME [TIETZE]: ICD-10-CM

## 2021-12-27 DIAGNOSIS — I50.32 CHRONIC DIASTOLIC (CONGESTIVE) HEART FAILURE: ICD-10-CM

## 2021-12-27 DIAGNOSIS — L89.626 PRESSURE-INDUCED DEEP TISSUE DAMAGE OF LEFT HEEL: ICD-10-CM

## 2021-12-27 DIAGNOSIS — Z99.81 DEPENDENCE ON SUPPLEMENTAL OXYGEN: ICD-10-CM

## 2021-12-27 DIAGNOSIS — I82.511 CHRONIC EMBOLISM AND THROMBOSIS OF RIGHT FEMORAL VEIN: ICD-10-CM

## 2021-12-27 DIAGNOSIS — D72.829 ELEVATED WHITE BLOOD CELL COUNT, UNSPECIFIED: ICD-10-CM

## 2021-12-27 DIAGNOSIS — I48.20 CHRONIC ATRIAL FIBRILLATION, UNSPECIFIED: ICD-10-CM

## 2021-12-27 DIAGNOSIS — R78.81 BACTEREMIA: ICD-10-CM

## 2021-12-27 DIAGNOSIS — R79.1 ABNORMAL COAGULATION PROFILE: ICD-10-CM

## 2021-12-27 DIAGNOSIS — B96.20 UNSPECIFIED ESCHERICHIA COLI [E. COLI] AS THE CAUSE OF DISEASES CLASSIFIED ELSEWHERE: ICD-10-CM

## 2021-12-27 DIAGNOSIS — I25.10 ATHEROSCLEROTIC HEART DISEASE OF NATIVE CORONARY ARTERY WITHOUT ANGINA PECTORIS: ICD-10-CM

## 2021-12-27 DIAGNOSIS — K59.00 CONSTIPATION, UNSPECIFIED: ICD-10-CM

## 2021-12-27 DIAGNOSIS — E78.5 HYPERLIPIDEMIA, UNSPECIFIED: ICD-10-CM

## 2021-12-27 DIAGNOSIS — I96 GANGRENE, NOT ELSEWHERE CLASSIFIED: ICD-10-CM

## 2021-12-27 DIAGNOSIS — I11.0 HYPERTENSIVE HEART DISEASE WITH HEART FAILURE: ICD-10-CM

## 2021-12-27 DIAGNOSIS — Z95.0 PRESENCE OF CARDIAC PACEMAKER: ICD-10-CM

## 2021-12-27 DIAGNOSIS — K28.9 GASTROJEJUNAL ULCER, UNSPECIFIED AS ACUTE OR CHRONIC, WITHOUT HEMORRHAGE OR PERFORATION: ICD-10-CM

## 2021-12-27 DIAGNOSIS — K83.1 OBSTRUCTION OF BILE DUCT: ICD-10-CM

## 2021-12-27 DIAGNOSIS — I82.531 CHRONIC EMBOLISM AND THROMBOSIS OF RIGHT POPLITEAL VEIN: ICD-10-CM

## 2021-12-27 DIAGNOSIS — I47.2 VENTRICULAR TACHYCARDIA: ICD-10-CM

## 2021-12-27 DIAGNOSIS — J44.9 CHRONIC OBSTRUCTIVE PULMONARY DISEASE, UNSPECIFIED: ICD-10-CM

## 2022-06-14 NOTE — CHART NOTE - NSCHARTNOTEFT_GEN_A_CORE
patient seen and examined  agreeable for ERCP in am    - Please correct electrolytes (Target Na 135-145, Mg 1.7-2.2, K 3.5-5)  - Please correct INR to <1.5  - Please keep Hb > 8 for anesthesia purposes , discussed with medical resident for 1 unit blood transfusion   - NPO after midnight   - Last COVID-19 PCR: 12/19/2021 neg patient seen and examined  agreeable for ERCP in am    - Please correct electrolytes (Target Na 135-145, Mg 1.7-2.2, K 3.5-5)  - Please correct INR to <1.5  - Please keep Hb > 8 for anesthesia purposes , discussed with medical resident for 1 unit blood transfusion   - NPO after midnight   - Last COVID-19 PCR: 12/19/2021 neg  - hold tonight and tomorrow morning of lovenox home

## 2022-08-02 NOTE — ED ADULT NURSE NOTE - NURSING MUSC JOINTS
Triage Assessment     Row Name 08/02/22 1846       Triage Assessment (Adult)    Airway WDL WDL       Respiratory WDL    Respiratory WDL WDL       Skin Circulation/Temperature WDL    Skin Circulation/Temperature WDL X;all  bruising to left shoulder       Cardiac WDL    Cardiac WDL WDL              
no pain, swelling or deformity of joints

## 2022-10-05 NOTE — ED ADULT NURSE NOTE - CHIEF COMPLAINT QUOTE
"I am short of breath and I gained 7 pounds in the past 2 days" This CM made outgoing call to patient at 1031 via teams, returning call to [facility] office from yesterday 10/04/2022  This CM was unable to leave voicemail message as it was noted that "Patient was either busy or on the other line "    This CM will attempt to complete this call again later

## 2022-11-04 NOTE — ED ADULT TRIAGE NOTE - CCCP TRG CHIEF CMPLNT
- C/w home synthroid 175 mcg daily   - f/u tsh, ft4 shortness of breath - C/w home synthroid 175 mcg daily   - TSH 12, free T4 0.9. Endocrine emailed

## 2023-11-13 NOTE — PATIENT PROFILE ADULT - ARRIVAL FROM
Matthew Kim (:  1926) is a 82 y.o. female,Established patient, here for evaluation of the following chief complaint(s):  Pain (Chronic pain- still having pain ) and 6 Month Follow-Up          Subjective   SUBJECTIVE/OBJECTIVE:  HPI  Chief Complaint   Patient presents with    Pain     Chronic pain- still having pain     6 Month Follow-Up     6 month eval.    Pt c/o persistent pain. See message below:    Received a call from the patients daughter Evangeline Duverney stating that the patients pain is getting worse. She states that the patient will cry daily due to the pain. She states that the pain gets so bad that they will put heat on her neck and shoulders. States that the patient is not able to sleep at night due to pain. She is asking if her pain medication can be increased to give her some comfort? On Norco 6 times daily. Feels fine other than generalized uncontrolled pain. Wt Readings from Last 3 Encounters:   23 65.5 kg (144 lb 4.8 oz)   10/12/23 64.9 kg (143 lb)   10/02/23 64.9 kg (143 lb 1.6 oz)     BP Readings from Last 3 Encounters:   23 118/70   10/12/23 130/84   10/02/23 118/60         BP Readings from Last 3 Encounters:   23 118/70   10/12/23 130/84   10/02/23 118/60     Wt Readings from Last 3 Encounters:   23 65.5 kg (144 lb 4.8 oz)   10/12/23 64.9 kg (143 lb)   10/02/23 64.9 kg (143 lb 1.6 oz)       Patient Active Problem List   Diagnosis    GERD (gastroesophageal reflux disease)    Raynaud phenomenon    Osteoarthrosis involving multiple sites, eknna wt bearing joints. Medication monitoring encounter    Tinnitus, bilateral    Arthralgia of multiple joints, chronic pain. Hip pain, left, s/p THR. S/P total hip arthroplasty, right. S/P TKR (total knee replacement), bilateral.    SK (seborrheic keratosis), facial    Pedal edema    Allergic rhinitis    Fibromyalgia syndrome    Obesity (BMI 30-39. 9)    History of left hip replacement, 16.
Nursing home

## 2024-02-23 NOTE — CONSULT NOTE ADULT - RESPIRATORY
Physical Therapy 
Breath Sounds equal & clear to percussion & auscultation, no accessory muscle use

## 2024-04-26 NOTE — H&P ADULT - NSHPROSALLOTHERNEGRD_GEN_ALL_CORE
All other review of systems negative, except as noted in HPI
[FreeTextEntry1] : Patient to follow up in 1 year for annual GYN exam Mammogram due: 10/24 Colonoscopy or cologuard due: 2/25   Bone density due: 10/25 Pap ordered Hemoccult ordered  All questions answered, patient agreeable with plan.  I [facility] am scribing for the presence of Dr. Grey the following sections HISTORY OF PRESENT ILLNESS, PAST MEDICAL/FAMILY/SOCIAL HISTORY; REVIEW OF SYSTEMS; VITAL SIGNS; PHYSICAL EXAM; DISPOSITION. I personally performed the services described in the documentation, reviewed the documentation recorded by the scribe in my presence and it accurately and completely records my words and actions.

## 2024-05-05 NOTE — REVIEW OF SYSTEMS
[Negative] : Gastrointestinal BMI: BMI (kg/m2): 30.2 (01-15-24 @ 12:52)  HbA1c: A1C with Estimated Average Glucose Result: 7.6 % (01-12-24 @ 12:40)    Glucose: POCT Blood Glucose.: 214 mg/dL (05-03-24 @ 20:19)    BP: --Vital Signs Last 24 Hrs  T(C): --  T(F): --  HR: --  BP: --  BP(mean): --  RR: --  SpO2: --      Lipid Panel:

## 2024-05-30 NOTE — ED PROVIDER NOTE - IV ALTEPLASE EXCL REL HIDDEN
show Coagulation: Bleeding disorder either through use of anticoagulants or underlying clinical condition(s)

## 2024-06-05 NOTE — PROGRESS NOTE ADULT - SUBJECTIVE AND OBJECTIVE BOX
Interval History:    - Patient resting in bed, in NAD  - Remains fluid overloaded      81 y/o M with h/o CAD s/p PCI, COPD on home O2, HFpEF, afib admitted with worsening SOB. Patient reports having worsening SOB over past few days along with b/l LE edema and chest pain. No LOC, N/V. He claims to be compliant with medications.           PMH/PSH: CAD s/p PCI, COPD, HFpEF      FH: No early CAD or SCD       PHYSICAL EXAM     General: AAO x3, no acute distress   Neck: Trachea is central, JVD -ve  Lungs: CTA, no crackles   Heart: Regular heart sounds, no murmurs   GI: Abdomen is soft, NT  Neuro: Normal strength  Psych: Calm affect   Integument: No skin bruises         6-12 yrs

## 2024-10-08 NOTE — CONSULT NOTE ADULT - PROVIDER SPECIALTY LIST ADULT
session  P: Continue with rehab plan  Cayden Hutson PT    Treatment Charges: Mins Units   Initial Evaluation     Re-Evaluation     Ther Exercise         TE     Manual Therapy     MT     Ther Activities        TA 40 3   Gait Training          GT     Neuro Re-education NR     Modalities     Non-Billable Service Time     Other     Total Time/Units 40 3      
Nephrology
Intervent Radiology
Cardiology
Surgery

## 2025-01-19 NOTE — ED ADULT NURSE NOTE - DRUG PRE-SCREENING (DAST -1)
"Cardiology Progress Note - Joseph Aguilar 69 y.o. male MRN: [id number]    Unit/Bed#: ICU 04 Encounter: [id number]      Assessment & Plan  Anterior STEMI s/p PCI with CHYNA to LM-ostial LAD and mLAD with IABP;  of LCx (1/17/2024)  Troponin with peak >22,973.  TTE pending.  Telemetry shows NSR with brief runs of PSVT and NSVT, continue to monitor.  Continue ASA, Brilinta.  Lipitor restarted given improvement of transaminitis.  Not on BB at this time due to cardiogenic shock.  On heparin gtt given presence of IABP.  Cardiogenic shock (HCC)  Previously weaned off Levophed.  IABP in place, wean as as tolerated.  Recommend IV Lasix as tolerated.  Plan for eventual optimization of GDMT as tolerated.  Pending results of TTE, may need consideration for LifeVest.  History of hypertension  BP now soft, continue to monitor.  See plan above.  Mixed hyperlipidemia  Lipitor restarted as per above.  Type 2 diabetes mellitus without complication, without long-term current use of insulin (HCC)  Lab Results   Component Value Date    HGBA1C 8.2 (H) 01/18/2025     Recent Labs     01/19/25  0353 01/19/25  0603 01/19/25  0820 01/19/25  1018   POCGLU 109 119 106 139   Blood Sugar Average: Last 72 hrs:  (P) 151.9  Management per primary service.        Subjective:   Patient seen and examined.  Patient experienced episodes of anxiety/agitation overnight.  IABP remains in place.  All questions were answered.  Case discussed with critical care service.    Objective:     Vitals: Blood pressure 100/66, pulse 86, temperature (!) 97.3 °F (36.3 °C), temperature source Oral, resp. rate 16, height 6' 2\" (1.88 m), weight 114 kg (251 lb 12.3 oz), SpO2 96%., Body mass index is 32.32 kg/m².,   Orthostatic Blood Pressures      Flowsheet Row Most Recent Value   Blood Pressure 100/66 filed at 01/19/2025 0900   Patient Position - Orthostatic VS Lying filed at 01/19/2025 0800              Intake/Output Summary (Last 24 hours) at 1/19/2025 1044  Last data " filed at 1/19/2025 0800  Gross per 24 hour   Intake 321.77 ml   Output 1075 ml   Net -753.23 ml         Physical Exam:  GEN: Alert and oriented x 3, in no acute distress.  Ill appearing and well nourished.   HEENT: Sclera anicteric, conjunctivae pink, mucous membranes moist. Oropharynx clear.   NECK: Supple, no carotid bruits, no significant JVD. Trachea midline, no thyromegaly.   HEART: Regular rhythm, normal S1 and S2, + murmur, no clicks, gallops or rubs. PMI nondisplaced, no thrills.   LUNGS: Clear to auscultation bilaterally; no wheezes, rales, or rhonchi. No increased work of breathing or signs of respiratory distress.   ABDOMEN: Soft, nontender, nondistended, normoactive bowel sounds.   EXTREMITIES: Skin warm and well perfused, no clubbing or cyanosis, trace edema.  NEURO: No focal findings. Normal speech. Mood and affect normal.   SKIN: Normal without suspicious lesions on exposed skin.        Medications:      Current Facility-Administered Medications:     aluminum-magnesium hydroxide-simethicone (MAALOX) oral suspension 30 mL, 30 mL, Oral, Q4H PRN, MARGARITA Galeano, 30 mL at 01/18/25 1114    aspirin (ECOTRIN LOW STRENGTH) EC tablet 81 mg, 81 mg, Oral, Daily, MARGARITA Hernandez, 81 mg at 01/19/25 0834    atorvastatin (LIPITOR) tablet 80 mg, 80 mg, Oral, Daily With Dinner, MARGARITA Galeano    bisacodyl (DULCOLAX) rectal suppository 10 mg, 10 mg, Rectal, Daily PRN, MARGARITA Hernandez    chlorhexidine (PERIDEX) 0.12 % oral rinse 15 mL, 15 mL, Mouth/Throat, Q12H YNES, MARGARITA Hernandez, 15 mL at 01/18/25 0903    diphenhydrAMINE (BENADRYL) injection 25 mg, 25 mg, Intravenous, Q6H PRN, MARGARITA Hernandez, 25 mg at 01/18/25 2354    Famotidine (PF) (PEPCID) injection 20 mg, 20 mg, Intravenous, BID, Ivelisse Carlos, MARGARITA, 20 mg at 01/19/25 0840    heparin (porcine) 25,000 units in 0.45% NaCl 250 mL infusion (premix), 3-20  Units/kg/hr (Order-Specific), Intravenous, Titrated, MARGARITA Hernandez, Last Rate: 12.7 mL/hr at 01/19/25 1019, 14.1 Units/kg/hr at 01/19/25 1019    heparin (porcine) injection 2,000 Units, 2,000 Units, Intravenous, Q6H PRN, MARGARITA Hernandez, 2,000 Units at 01/18/25 2141    heparin (porcine) injection 4,000 Units, 4,000 Units, Intravenous, Q6H PRN, MARGARITA Hernandez    insulin regular (HumuLIN R,NovoLIN R) 1 Units/mL in sodium chloride 0.9 % 100 mL infusion, 0.3-21 Units/hr, Intravenous, Titrated, MARGARITA Hernandez, Last Rate: 0.5 mL/hr at 01/19/25 1019, 0.5 Units/hr at 01/19/25 1019    lidocaine (LIDODERM) 5 % patch 1 patch, 1 patch, Topical, Daily, MARGARITA Galeano, 1 patch at 01/19/25 0834    melatonin tablet 6 mg, 6 mg, Oral, HS, MARGARITA Hernandez, 6 mg at 01/18/25 2355    menthol-methyl salicylate (BENGAY) 10-15 % cream, , Apply externally, 4x Daily PRN, MARGARITA Hernandez, 1 Application at 01/18/25 1845    ondansetron (ZOFRAN) injection 4 mg, 4 mg, Intravenous, Q6H PRN, MARGARITA Galeano    oxyCODONE (ROXICODONE) split tablet 2.5 mg, 2.5 mg, Oral, Q4H PRN, 2.5 mg at 01/18/25 2355 **OR** oxyCODONE (ROXICODONE) IR tablet 5 mg, 5 mg, Oral, Q4H PRN, MARGARITA Galeano, 5 mg at 01/18/25 1859    senna-docusate sodium (SENOKOT S) 8.6-50 mg per tablet 2 tablet, 2 tablet, Oral, BID, MARGARITA Hernandez    ticagrelor (BRILINTA) tablet 90 mg, 90 mg, Oral, Q12H YNES, MARGARITA Hernandez, 90 mg at 01/19/25 0834     Labs & Results:     Results from last 7 days   Lab Units 01/17/25  2246 01/17/25 2056 01/17/25  1800   HS TNI 0HR ng/L  --   --  113*   HS TNI 2HR ng/L  --  >22,973*  --    HSTNI D2 ng/L  --  >22,860*  --    HS TNI 4HR ng/L >22,973*  --   --    HSTNI D4 ng/L >22,860*  --   --      Results from last 7 days   Lab Units 01/19/25  0456 01/18/25  7748  "25   WBC Thousand/uL 13.84* 13.81* 16.44*   HEMOGLOBIN g/dL 13.0 13.6 13.4   HEMATOCRIT % 39.3 40.5 40.2   PLATELETS Thousands/uL 151 167 211     Results from last 7 days   Lab Units 25  0443   TRIGLYCERIDES mg/dL 104   HDL mg/dL 33*     Results from last 7 days   Lab Units 25  0456 25  1624 25   POTASSIUM mmol/L 4.0 4.0 3.8   CHLORIDE mmol/L 110* 107 107   CO2 mmol/L 24 23 24   BUN mg/dL 18 20 20   CREATININE mg/dL 0.86 0.78 0.68   CALCIUM mg/dL 8.4 8.7 8.5   ALK PHOS U/L 61 66 67   ALT U/L 88* 116* 132*   AST U/L 143* 269* 459*     Results from last 7 days   Lab Units 25  0456 25  1940 25  1214 25   INR   --   --   --   --  1.20*   PTT seconds 79* 57* 58*   < > >210*    < > = values in this interval not displayed.     Results from last 7 days   Lab Units 25  16225   MAGNESIUM mg/dL 1.7* 1.9 1.7*       Vitals: Blood pressure 100/66, pulse 86, temperature (!) 97.3 °F (36.3 °C), temperature source Oral, resp. rate 16, height 6' 2\" (1.88 m), weight 114 kg (251 lb 12.3 oz), SpO2 96%., Body mass index is 32.32 kg/m².,   Orthostatic Blood Pressures      Flowsheet Row Most Recent Value   Blood Pressure 100/66 filed at 2025 0900   Patient Position - Orthostatic VS Lying filed at 2025 0800            Systolic (24hrs), Av , Min:100 , Max:149     Diastolic (24hrs), Av, Min:56, Max:92        Intake/Output Summary (Last 24 hours) at 2025 1044  Last data filed at 2025 0800  Gross per 24 hour   Intake 321.77 ml   Output 1075 ml   Net -753.23 ml       Invasive Devices       Peripheral Intravenous Line  Duration             Peripheral IV 25 Left Antecubital 1 day    Peripheral IV 25 Right Antecubital 1 day              Line  Duration             Arterial Sheath -- days    IABP 8.0 Fr. 50 mL 1 day              Drain  Duration             Urethral Catheter 1 day         "                  BP Readings from Last 3 Encounters:   01/19/25 100/66   10/07/24 131/89   07/05/24 (!) 153/110      Wt Readings from Last 3 Encounters:   01/19/25 114 kg (251 lb 12.3 oz)   01/06/25 112 kg (247 lb)   10/07/24 109 kg (240 lb)                     Statement Selected

## 2025-04-29 NOTE — ED ADULT NURSE NOTE - DOES PATIENT HAVE ADVANCE DIRECTIVE
RT Inhaler-Nebulizer Bronchodilator Protocol Note    There is a bronchodilator order in the chart from a provider indicating to follow the RT Bronchodilator Protocol and there is an “Initiate RT Inhaler-Nebulizer Bronchodilator Protocol” order as well (see protocol at bottom of note).    CXR Findings:  XR CHEST PORTABLE  Result Date: 4/28/2025  Stable chest       The findings from the last RT Protocol Assessment were as follows:   History Pulmonary Disease: Chronic pulmonary disease  Respiratory Pattern: Dyspnea on exertion or RR 21-25 bpm  Breath Sounds: Slightly diminished and/or crackles  Cough: Strong, productive  Indication for Bronchodilator Therapy: On home bronchodilators  Bronchodilator Assessment Score: 7    Aerosolized bronchodilator medication orders have been revised according to the RT Inhaler-Nebulizer Bronchodilator Protocol below.    Respiratory Therapist to perform RT Therapy Protocol Assessment initially then follow the protocol.  Repeat RT Therapy Protocol Assessment PRN for score 0-3 or on second treatment, BID, and PRN for scores above 3.    No Indications - adjust the frequency to every 6 hours PRN wheezing or bronchospasm, if no treatments needed after 48 hours then discontinue using Per Protocol order mode.     If indication present, adjust the RT bronchodilator orders based on the Bronchodilator Assessment Score as indicated below.  Use Inhaler orders unless patient has one or more of the following: on home nebulizer, not able to hold breath for 10 seconds, is not alert and oriented, cannot activate and use MDI correctly, or respiratory rate 25 breaths per minute or more, then use the equivalent nebulizer order(s) with same Frequency and PRN reasons based on the score.  If a patient is on this medication at home then do not decrease Frequency below that used at home.    0-3 - enter or revise RT bronchodilator order(s) to equivalent RT Bronchodilator order with Frequency of every 4 hours PRN  No
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
